# Patient Record
Sex: FEMALE | Race: WHITE | NOT HISPANIC OR LATINO | URBAN - METROPOLITAN AREA
[De-identification: names, ages, dates, MRNs, and addresses within clinical notes are randomized per-mention and may not be internally consistent; named-entity substitution may affect disease eponyms.]

---

## 2018-07-06 ENCOUNTER — OUTPATIENT (OUTPATIENT)
Dept: OUTPATIENT SERVICES | Facility: HOSPITAL | Age: 73
LOS: 1 days | Discharge: HOME | End: 2018-07-06

## 2018-07-06 VITALS
OXYGEN SATURATION: 97 % | DIASTOLIC BLOOD PRESSURE: 77 MMHG | HEIGHT: 66 IN | RESPIRATION RATE: 17 BRPM | WEIGHT: 110.01 LBS | TEMPERATURE: 97 F | SYSTOLIC BLOOD PRESSURE: 162 MMHG | HEART RATE: 77 BPM

## 2018-07-06 VITALS — DIASTOLIC BLOOD PRESSURE: 70 MMHG | HEART RATE: 69 BPM | RESPIRATION RATE: 20 BRPM | SYSTOLIC BLOOD PRESSURE: 158 MMHG

## 2018-07-06 RX ORDER — ACETAMINOPHEN 500 MG
650 TABLET ORAL ONCE
Qty: 0 | Refills: 0 | Status: DISCONTINUED | OUTPATIENT
Start: 2018-07-06 | End: 2018-07-21

## 2018-07-06 RX ORDER — SODIUM CHLORIDE 9 MG/ML
1000 INJECTION, SOLUTION INTRAVENOUS
Qty: 0 | Refills: 0 | Status: DISCONTINUED | OUTPATIENT
Start: 2018-07-06 | End: 2018-07-21

## 2018-07-06 RX ORDER — ONDANSETRON 8 MG/1
4 TABLET, FILM COATED ORAL ONCE
Qty: 0 | Refills: 0 | Status: DISCONTINUED | OUTPATIENT
Start: 2018-07-06 | End: 2018-07-21

## 2018-07-06 RX ADMIN — SODIUM CHLORIDE 100 MILLILITER(S): 9 INJECTION, SOLUTION INTRAVENOUS at 10:53

## 2018-07-06 NOTE — PRE-ANESTHESIA EVALUATION ADULT - NSANTHOSAYNRD_GEN_A_CORE
No. GABO screening performed.  STOP BANG Legend: 0-2 = LOW Risk; 3-4 = INTERMEDIATE Risk; 5-8 = HIGH Risk

## 2018-07-10 DIAGNOSIS — H25.89 OTHER AGE-RELATED CATARACT: ICD-10-CM

## 2018-07-10 DIAGNOSIS — I10 ESSENTIAL (PRIMARY) HYPERTENSION: ICD-10-CM

## 2018-07-13 ENCOUNTER — OUTPATIENT (OUTPATIENT)
Dept: OUTPATIENT SERVICES | Facility: HOSPITAL | Age: 73
LOS: 1 days | Discharge: HOME | End: 2018-07-13

## 2018-07-13 VITALS
SYSTOLIC BLOOD PRESSURE: 151 MMHG | RESPIRATION RATE: 16 BRPM | WEIGHT: 108.91 LBS | OXYGEN SATURATION: 97 % | HEART RATE: 56 BPM | TEMPERATURE: 96 F | DIASTOLIC BLOOD PRESSURE: 79 MMHG

## 2018-07-13 VITALS — DIASTOLIC BLOOD PRESSURE: 67 MMHG | SYSTOLIC BLOOD PRESSURE: 126 MMHG | RESPIRATION RATE: 16 BRPM | HEART RATE: 64 BPM

## 2018-07-13 DIAGNOSIS — Z98.890 OTHER SPECIFIED POSTPROCEDURAL STATES: Chronic | ICD-10-CM

## 2018-07-13 RX ORDER — ONDANSETRON 8 MG/1
4 TABLET, FILM COATED ORAL ONCE
Qty: 0 | Refills: 0 | Status: DISCONTINUED | OUTPATIENT
Start: 2018-07-13 | End: 2018-07-28

## 2018-07-13 RX ORDER — SODIUM CHLORIDE 9 MG/ML
1000 INJECTION INTRAMUSCULAR; INTRAVENOUS; SUBCUTANEOUS
Qty: 0 | Refills: 0 | Status: DISCONTINUED | OUTPATIENT
Start: 2018-07-13 | End: 2018-07-28

## 2018-07-13 RX ADMIN — SODIUM CHLORIDE 50 MILLILITER(S): 9 INJECTION INTRAMUSCULAR; INTRAVENOUS; SUBCUTANEOUS at 11:09

## 2018-07-17 DIAGNOSIS — H25.89 OTHER AGE-RELATED CATARACT: ICD-10-CM

## 2018-07-17 DIAGNOSIS — I10 ESSENTIAL (PRIMARY) HYPERTENSION: ICD-10-CM

## 2020-09-16 NOTE — ASU PREOP CHECKLIST - ISOLATION PRECAUTIONS
RN, CDCES called and spoke with the patient to request verbal permission to speak with his mother about issues related to diabetes management. Patient states that he gives permission to speak with his mother regarding his diabetes management.     Patient has not been seen by diabetes educator since 310/2020, and has seen dietitian/CDE on 4/28/2020 and has had an appointment with Justina Franco MD on 7/31/2020. Patient admits again today that he is fearful of taking mealtime insulin and having a low afterward. ELIESER DE LA TORRE advised trying just two units of insulin to reduce the effect of high blood sugar but not cause a rapid drop. He could do this a few days just to understand how the food and insulin corollate and build confidence that low blood sugar may not occur. Patient states that he is will to try. RN, CDCES and the patient discssed importance of management of blood sugar so that A1c is under 8%. Patient declines the RN's offer to schedule a follow up appointment at this time.     RN, CDCES called the patient's mother Leonie, who reports that Kristopher is not taking mealtime insulin again due to fear of low blood sugar. RN, CDCES encouraged Leonie to encourage the patient to take diabetes medications as advised by Claudia Franco MD. Leonie and the ELIESER DE LA TORRE discussed options to start continuous glucose monitoring system like Dexcom but would need to show proof of testing for 30 days or wear a continuous glucose monitor in order to obtain at least a three day data collection. Leonie felt that this could be a a good option for the patient to know how high blood sugar is running as well as to decrease fear of not knowing how low his blood sugar maybe dropping.     ELIESER DE LA TORRE left a message on the patient's cell phone offering to have Justina Franco MD order the Cristino 14 day sensor and that he could use his smart phone as the scanning device rather than to purchase a reader at an extra cost.     RN, CDCES notified the patient's  mother thather son did not answer the phone and that we will await his decision.     RN, CDCES held a spot on 9/22/2020 at 10:30 in case the patient would like to come into the clinic to train on the Cristino sensor.     RN, CDCES will await the patient's call for further assistance            none

## 2021-01-22 NOTE — PRE-ANESTHESIA EVALUATION ADULT - WEIGHT IN LBS
Some signs and symptoms of bleeding include: Nose bleed or cut that does not stop bleeding in 10 minutes, bleeding of the gums, vomiting (will look like coffee grounds) or coughing up blood, unusual, easy or large areas of bruising, increased or unexpected vaginal bleeding or increased menstrual flow, red or black stools, red or orange urine, prolonged or severe headache, pale skin, unusual or constant tiredness.  If you have these please call 911 or seek medical care immediately.      Some signs and symptoms of clots include: pain or tenderness in arm or leg, swelling in arm or leg, changes in skin color, or area is warm to touch, shortness or breath, trouble breathing.  Numbness or weakness especially on 1 side of the body, sudden trouble speaking or swallowing, sudden trouble seeing, sudden confusion, dizzy spells or headache.  If you have these please call 911 or seek medical care immediately.     110

## 2022-01-26 PROBLEM — I10 ESSENTIAL (PRIMARY) HYPERTENSION: Chronic | Status: ACTIVE | Noted: 2018-07-06

## 2022-06-14 ENCOUNTER — EMERGENCY (EMERGENCY)
Facility: HOSPITAL | Age: 77
LOS: 0 days | Discharge: HOME | End: 2022-06-14
Attending: EMERGENCY MEDICINE | Admitting: EMERGENCY MEDICINE
Payer: MEDICARE

## 2022-06-14 VITALS
TEMPERATURE: 98 F | OXYGEN SATURATION: 98 % | HEART RATE: 87 BPM | RESPIRATION RATE: 18 BRPM | DIASTOLIC BLOOD PRESSURE: 73 MMHG | SYSTOLIC BLOOD PRESSURE: 130 MMHG

## 2022-06-14 VITALS
DIASTOLIC BLOOD PRESSURE: 61 MMHG | RESPIRATION RATE: 18 BRPM | HEART RATE: 114 BPM | HEIGHT: 72 IN | OXYGEN SATURATION: 98 % | SYSTOLIC BLOOD PRESSURE: 120 MMHG | TEMPERATURE: 100 F

## 2022-06-14 DIAGNOSIS — K57.32 DIVERTICULITIS OF LARGE INTESTINE WITHOUT PERFORATION OR ABSCESS WITHOUT BLEEDING: ICD-10-CM

## 2022-06-14 DIAGNOSIS — R63.0 ANOREXIA: ICD-10-CM

## 2022-06-14 DIAGNOSIS — I10 ESSENTIAL (PRIMARY) HYPERTENSION: ICD-10-CM

## 2022-06-14 DIAGNOSIS — Z79.82 LONG TERM (CURRENT) USE OF ASPIRIN: ICD-10-CM

## 2022-06-14 DIAGNOSIS — Z98.890 OTHER SPECIFIED POSTPROCEDURAL STATES: Chronic | ICD-10-CM

## 2022-06-14 DIAGNOSIS — F03.90 UNSPECIFIED DEMENTIA WITHOUT BEHAVIORAL DISTURBANCE: ICD-10-CM

## 2022-06-14 DIAGNOSIS — R10.30 LOWER ABDOMINAL PAIN, UNSPECIFIED: ICD-10-CM

## 2022-06-14 LAB
ALBUMIN SERPL ELPH-MCNC: 3.4 G/DL — LOW (ref 3.5–5.2)
ALP SERPL-CCNC: 85 U/L — SIGNIFICANT CHANGE UP (ref 30–115)
ALT FLD-CCNC: 8 U/L — SIGNIFICANT CHANGE UP (ref 0–41)
ANION GAP SERPL CALC-SCNC: 15 MMOL/L — HIGH (ref 7–14)
APPEARANCE UR: ABNORMAL
AST SERPL-CCNC: 12 U/L — SIGNIFICANT CHANGE UP (ref 0–41)
BACTERIA # UR AUTO: ABNORMAL
BASOPHILS # BLD AUTO: 0.04 K/UL — SIGNIFICANT CHANGE UP (ref 0–0.2)
BASOPHILS NFR BLD AUTO: 0.4 % — SIGNIFICANT CHANGE UP (ref 0–1)
BILIRUB SERPL-MCNC: 0.3 MG/DL — SIGNIFICANT CHANGE UP (ref 0.2–1.2)
BILIRUB UR-MCNC: NEGATIVE — SIGNIFICANT CHANGE UP
BUN SERPL-MCNC: 16 MG/DL — SIGNIFICANT CHANGE UP (ref 10–20)
CALCIUM SERPL-MCNC: 9.3 MG/DL — SIGNIFICANT CHANGE UP (ref 8.5–10.1)
CHLORIDE SERPL-SCNC: 103 MMOL/L — SIGNIFICANT CHANGE UP (ref 98–110)
CO2 SERPL-SCNC: 22 MMOL/L — SIGNIFICANT CHANGE UP (ref 17–32)
COLOR SPEC: SIGNIFICANT CHANGE UP
CREAT SERPL-MCNC: 0.7 MG/DL — SIGNIFICANT CHANGE UP (ref 0.7–1.5)
DIFF PNL FLD: NEGATIVE — SIGNIFICANT CHANGE UP
EGFR: 88 ML/MIN/1.73M2 — SIGNIFICANT CHANGE UP
EOSINOPHIL # BLD AUTO: 0.18 K/UL — SIGNIFICANT CHANGE UP (ref 0–0.7)
EOSINOPHIL NFR BLD AUTO: 1.6 % — SIGNIFICANT CHANGE UP (ref 0–8)
EPI CELLS # UR: 21 /HPF — HIGH (ref 0–5)
GLUCOSE SERPL-MCNC: 118 MG/DL — HIGH (ref 70–99)
GLUCOSE UR QL: NEGATIVE — SIGNIFICANT CHANGE UP
HCT VFR BLD CALC: 35.1 % — LOW (ref 37–47)
HGB BLD-MCNC: 11.2 G/DL — LOW (ref 12–16)
HYALINE CASTS # UR AUTO: 5 /LPF — SIGNIFICANT CHANGE UP (ref 0–7)
IMM GRANULOCYTES NFR BLD AUTO: 0.5 % — HIGH (ref 0.1–0.3)
KETONES UR-MCNC: NEGATIVE — SIGNIFICANT CHANGE UP
LEUKOCYTE ESTERASE UR-ACNC: ABNORMAL
LIDOCAIN IGE QN: 14 U/L — SIGNIFICANT CHANGE UP (ref 7–60)
LYMPHOCYTES # BLD AUTO: 1.09 K/UL — LOW (ref 1.2–3.4)
LYMPHOCYTES # BLD AUTO: 9.8 % — LOW (ref 20.5–51.1)
MCHC RBC-ENTMCNC: 26.2 PG — LOW (ref 27–31)
MCHC RBC-ENTMCNC: 31.9 G/DL — LOW (ref 32–37)
MCV RBC AUTO: 82.2 FL — SIGNIFICANT CHANGE UP (ref 81–99)
MONOCYTES # BLD AUTO: 1.62 K/UL — HIGH (ref 0.1–0.6)
MONOCYTES NFR BLD AUTO: 14.6 % — HIGH (ref 1.7–9.3)
NEUTROPHILS # BLD AUTO: 8.12 K/UL — HIGH (ref 1.4–6.5)
NEUTROPHILS NFR BLD AUTO: 73.1 % — SIGNIFICANT CHANGE UP (ref 42.2–75.2)
NITRITE UR-MCNC: NEGATIVE — SIGNIFICANT CHANGE UP
NRBC # BLD: 0 /100 WBCS — SIGNIFICANT CHANGE UP (ref 0–0)
PH UR: 7 — SIGNIFICANT CHANGE UP (ref 5–8)
PLATELET # BLD AUTO: 377 K/UL — SIGNIFICANT CHANGE UP (ref 130–400)
POTASSIUM SERPL-MCNC: 4.5 MMOL/L — SIGNIFICANT CHANGE UP (ref 3.5–5)
POTASSIUM SERPL-SCNC: 4.5 MMOL/L — SIGNIFICANT CHANGE UP (ref 3.5–5)
PROT SERPL-MCNC: 7.1 G/DL — SIGNIFICANT CHANGE UP (ref 6–8)
PROT UR-MCNC: SIGNIFICANT CHANGE UP
RBC # BLD: 4.27 M/UL — SIGNIFICANT CHANGE UP (ref 4.2–5.4)
RBC # FLD: 14.3 % — SIGNIFICANT CHANGE UP (ref 11.5–14.5)
RBC CASTS # UR COMP ASSIST: 2 /HPF — SIGNIFICANT CHANGE UP (ref 0–4)
SODIUM SERPL-SCNC: 140 MMOL/L — SIGNIFICANT CHANGE UP (ref 135–146)
SP GR SPEC: 1.05 — HIGH (ref 1.01–1.03)
TROPONIN T SERPL-MCNC: <0.01 NG/ML — SIGNIFICANT CHANGE UP
UROBILINOGEN FLD QL: SIGNIFICANT CHANGE UP
WBC # BLD: 11.11 K/UL — HIGH (ref 4.8–10.8)
WBC # FLD AUTO: 11.11 K/UL — HIGH (ref 4.8–10.8)
WBC UR QL: 76 /HPF — HIGH (ref 0–5)

## 2022-06-14 PROCEDURE — 99284 EMERGENCY DEPT VISIT MOD MDM: CPT

## 2022-06-14 PROCEDURE — 93010 ELECTROCARDIOGRAM REPORT: CPT | Mod: 76

## 2022-06-14 PROCEDURE — 74177 CT ABD & PELVIS W/CONTRAST: CPT | Mod: 26,MA

## 2022-06-14 RX ORDER — CIPROFLOXACIN LACTATE 400MG/40ML
1 VIAL (ML) INTRAVENOUS
Qty: 20 | Refills: 0
Start: 2022-06-14 | End: 2022-06-23

## 2022-06-14 RX ORDER — MAGNESIUM SULFATE 500 MG/ML
2 VIAL (ML) INJECTION ONCE
Refills: 0 | Status: COMPLETED | OUTPATIENT
Start: 2022-06-14 | End: 2022-06-14

## 2022-06-14 RX ORDER — SODIUM CHLORIDE 9 MG/ML
1000 INJECTION, SOLUTION INTRAVENOUS ONCE
Refills: 0 | Status: COMPLETED | OUTPATIENT
Start: 2022-06-14 | End: 2022-06-14

## 2022-06-14 RX ORDER — METOPROLOL TARTRATE 50 MG
1 TABLET ORAL
Qty: 0 | Refills: 0 | DISCHARGE

## 2022-06-14 RX ORDER — METRONIDAZOLE 500 MG
1 TABLET ORAL
Qty: 30 | Refills: 0
Start: 2022-06-14 | End: 2022-06-23

## 2022-06-14 RX ADMIN — SODIUM CHLORIDE 1000 MILLILITER(S): 9 INJECTION, SOLUTION INTRAVENOUS at 08:12

## 2022-06-14 RX ADMIN — Medication 25 GRAM(S): at 12:04

## 2022-06-14 NOTE — ED PROVIDER NOTE - CARE PROVIDER_API CALL
Razia Seay (MD)  Gastroenterology  [address] [place] [address]  Phone: [phone]  Fax: [phone]  Follow Up Time: 4-6 Days

## 2022-06-14 NOTE — ED PROVIDER NOTE - PHYSICAL EXAMINATION
CONSTITUTIONAL: Well-developed; well-nourished; in no acute distress.   SKIN: warm, dry  HEAD: Normocephalic; atraumatic.  EYES: PERRL, EOMI, no conjunctival erythema  ENT: No nasal discharge; airway clear.  NECK: Supple; non tender.  CARD: S1, S2 normal; no murmurs, gallops, or rubs. Regular rate and rhythm.   RESP: No wheezes, rales or rhonchi.  ABD: soft, +mildly distended, +L and R lower abdominal pain to palpation, epigastric ttp.  EXT: Normal ROM.  No clubbing, cyanosis or edema.   LYMPH: No acute cervical adenopathy.  NEURO: Alert, oriented, grossly unremarkable  PSYCH: Cooperative, appropriate. CONSTITUTIONAL: Well-developed; well-nourished; in no acute distress.   SKIN: warm, dry  HEAD: Normocephalic; atraumatic.  EYES: PERRL, EOMI, no conjunctival erythema  ENT: No nasal discharge; airway clear.  NECK: Supple; non tender.  CARD: S1, S2 normal; no murmurs, gallops, or rubs. Regular rate and rhythm.   RESP: No wheezes, rales or rhonchi.  ABD: soft, +mildly distended, +L and R lower abdominal pain to palpation, +epigastric ttp.  EXT: Normal ROM.  No clubbing, cyanosis or edema.   LYMPH: No acute cervical adenopathy.  NEURO: Alert, oriented, grossly unremarkable  PSYCH: Cooperative, appropriate.

## 2022-06-14 NOTE — ED ADULT TRIAGE NOTE - BP NONINVASIVE SYSTOLIC (MM HG)
Impression: S/P Cataract Extraction by phacoemulsification with IOL placement [id number]; Shunt:  iStent Inject 0191T, 0376T OS - 8 Days. Encounter for surgical aftercare following surgery on a sense organ  Z48.810.  Plan: 2nd eye orders --Continue Travaprost QHS OD Only
120

## 2022-06-14 NOTE — ED PROVIDER NOTE - ATTENDING CONTRIBUTION TO CARE
77-year-old female history of dementia hypertension here for evaluation abdominal pain.  Patient reports abdominal pain for past month worse over past 2 days.  Patient has had decreased p.o. intake without associated fever chills vomiting or urinary symptoms.  Pain is primarily located to the lower abdomen.  There is no exam  Impression  Patient here with abdominal pain, CT demonstrates acute sigmoid diverticulitis.  Patient well-appearing with no white count normal creatinine patient given Augmentin stable for discharge.  Of note the patient's initial EKG with a prolonged QT was given magnesium with subsequent repeat EKG and a QTC that has improved.  Lastly the patient has large leukocytes on urine sample but has no symptoms.  Patient will be on Augmentin which will potential cover infection as well as a urine culture has been sent.

## 2022-06-14 NOTE — ED PROVIDER NOTE - OBJECTIVE STATEMENT
76 yo female with pmh of dementia, htn presenting for abd pain, present over the past month but worsening over the past few days. per daughter at bedside, she has had stomach problems before, was prescribed a medication that she has not been compliant with. states pain is localized to lower abdomen. has had decreased appetite recently, endorses vomiting as well.

## 2022-06-14 NOTE — ED PROVIDER NOTE - PATIENT PORTAL LINK FT
You can access the FollowMyHealth Patient Portal offered by [facility] by registering at the following website: [email or web address]. By joining [employer]’s FollowMyHealth portal, you will also be able to view your health information using other applications (apps) compatible with our system.

## 2022-06-14 NOTE — ED PROVIDER NOTE - CLINICAL SUMMARY MEDICAL DECISION MAKING FREE TEXT BOX
Patient here with abdominal pain, CT demonstrates acute sigmoid diverticulitis.  Patient well-appearing with no white count normal creatinine patient given Augmentin stable for discharge.  Of note the patient's initial EKG with a prolonged QT was given magnesium with subsequent repeat EKG and a QTC that has improved.  Lastly the patient has large leukocytes on urine sample but has no symptoms.  Patient will be on Augmentin which will potential cover infection as well as a urine culture has been sent.

## 2022-06-15 PROBLEM — Z00.00 ENCOUNTER FOR PREVENTIVE HEALTH EXAMINATION: Status: ACTIVE | Noted: 2022-06-15

## 2022-06-15 LAB
CULTURE RESULTS: SIGNIFICANT CHANGE UP
SPECIMEN SOURCE: SIGNIFICANT CHANGE UP

## 2022-06-20 ENCOUNTER — INPATIENT (INPATIENT)
Facility: HOSPITAL | Age: 77
LOS: 2 days | Discharge: HOME HEALTH | End: 2022-06-23
Attending: STUDENT IN AN ORGANIZED HEALTH CARE EDUCATION/TRAINING PROGRAM | Admitting: STUDENT IN AN ORGANIZED HEALTH CARE EDUCATION/TRAINING PROGRAM
Payer: MEDICARE

## 2022-06-20 VITALS
HEART RATE: 81 BPM | TEMPERATURE: 96 F | OXYGEN SATURATION: 99 % | DIASTOLIC BLOOD PRESSURE: 73 MMHG | HEIGHT: 72 IN | RESPIRATION RATE: 16 BRPM | SYSTOLIC BLOOD PRESSURE: 107 MMHG

## 2022-06-20 DIAGNOSIS — Z98.890 OTHER SPECIFIED POSTPROCEDURAL STATES: Chronic | ICD-10-CM

## 2022-06-20 LAB
ALBUMIN SERPL ELPH-MCNC: 3.4 G/DL — LOW (ref 3.5–5.2)
ALP SERPL-CCNC: 86 U/L — SIGNIFICANT CHANGE UP (ref 30–115)
ALT FLD-CCNC: 9 U/L — SIGNIFICANT CHANGE UP (ref 0–41)
ANION GAP SERPL CALC-SCNC: 10 MMOL/L — SIGNIFICANT CHANGE UP (ref 7–14)
AST SERPL-CCNC: 13 U/L — SIGNIFICANT CHANGE UP (ref 0–41)
BASOPHILS # BLD AUTO: 0.07 K/UL — SIGNIFICANT CHANGE UP (ref 0–0.2)
BASOPHILS NFR BLD AUTO: 1 % — SIGNIFICANT CHANGE UP (ref 0–1)
BILIRUB SERPL-MCNC: 0.2 MG/DL — SIGNIFICANT CHANGE UP (ref 0.2–1.2)
BUN SERPL-MCNC: 15 MG/DL — SIGNIFICANT CHANGE UP (ref 10–20)
CALCIUM SERPL-MCNC: 9.5 MG/DL — SIGNIFICANT CHANGE UP (ref 8.5–10.1)
CHLORIDE SERPL-SCNC: 98 MMOL/L — SIGNIFICANT CHANGE UP (ref 98–110)
CO2 SERPL-SCNC: 26 MMOL/L — SIGNIFICANT CHANGE UP (ref 17–32)
CREAT SERPL-MCNC: 0.6 MG/DL — LOW (ref 0.7–1.5)
EGFR: 92 ML/MIN/1.73M2 — SIGNIFICANT CHANGE UP
EOSINOPHIL # BLD AUTO: 0.39 K/UL — SIGNIFICANT CHANGE UP (ref 0–0.7)
EOSINOPHIL NFR BLD AUTO: 5.8 % — SIGNIFICANT CHANGE UP (ref 0–8)
GLUCOSE SERPL-MCNC: 126 MG/DL — HIGH (ref 70–99)
HCT VFR BLD CALC: 36.7 % — LOW (ref 37–47)
HGB BLD-MCNC: 12 G/DL — SIGNIFICANT CHANGE UP (ref 12–16)
IMM GRANULOCYTES NFR BLD AUTO: 1.2 % — HIGH (ref 0.1–0.3)
LACTATE SERPL-SCNC: 1.1 MMOL/L — SIGNIFICANT CHANGE UP (ref 0.7–2)
LIDOCAIN IGE QN: 12 U/L — SIGNIFICANT CHANGE UP (ref 7–60)
LYMPHOCYTES # BLD AUTO: 1.04 K/UL — LOW (ref 1.2–3.4)
LYMPHOCYTES # BLD AUTO: 15.4 % — LOW (ref 20.5–51.1)
MCHC RBC-ENTMCNC: 26.6 PG — LOW (ref 27–31)
MCHC RBC-ENTMCNC: 32.7 G/DL — SIGNIFICANT CHANGE UP (ref 32–37)
MCV RBC AUTO: 81.4 FL — SIGNIFICANT CHANGE UP (ref 81–99)
MONOCYTES # BLD AUTO: 0.7 K/UL — HIGH (ref 0.1–0.6)
MONOCYTES NFR BLD AUTO: 10.4 % — HIGH (ref 1.7–9.3)
NEUTROPHILS # BLD AUTO: 4.46 K/UL — SIGNIFICANT CHANGE UP (ref 1.4–6.5)
NEUTROPHILS NFR BLD AUTO: 66.2 % — SIGNIFICANT CHANGE UP (ref 42.2–75.2)
NRBC # BLD: 0 /100 WBCS — SIGNIFICANT CHANGE UP (ref 0–0)
PLATELET # BLD AUTO: 393 K/UL — SIGNIFICANT CHANGE UP (ref 130–400)
POTASSIUM SERPL-MCNC: 4.6 MMOL/L — SIGNIFICANT CHANGE UP (ref 3.5–5)
POTASSIUM SERPL-SCNC: 4.6 MMOL/L — SIGNIFICANT CHANGE UP (ref 3.5–5)
PROT SERPL-MCNC: 6.9 G/DL — SIGNIFICANT CHANGE UP (ref 6–8)
RBC # BLD: 4.51 M/UL — SIGNIFICANT CHANGE UP (ref 4.2–5.4)
RBC # FLD: 14.6 % — HIGH (ref 11.5–14.5)
SARS-COV-2 RNA SPEC QL NAA+PROBE: SIGNIFICANT CHANGE UP
SODIUM SERPL-SCNC: 134 MMOL/L — LOW (ref 135–146)
WBC # BLD: 6.74 K/UL — SIGNIFICANT CHANGE UP (ref 4.8–10.8)
WBC # FLD AUTO: 6.74 K/UL — SIGNIFICANT CHANGE UP (ref 4.8–10.8)

## 2022-06-20 PROCEDURE — 74177 CT ABD & PELVIS W/CONTRAST: CPT | Mod: 26,ME

## 2022-06-20 PROCEDURE — 99222 1ST HOSP IP/OBS MODERATE 55: CPT

## 2022-06-20 PROCEDURE — G1004: CPT

## 2022-06-20 PROCEDURE — 99285 EMERGENCY DEPT VISIT HI MDM: CPT | Mod: FS

## 2022-06-20 RX ORDER — ACETAMINOPHEN 500 MG
650 TABLET ORAL EVERY 6 HOURS
Refills: 0 | Status: DISCONTINUED | OUTPATIENT
Start: 2022-06-20 | End: 2022-06-23

## 2022-06-20 RX ORDER — CIPROFLOXACIN LACTATE 400MG/40ML
400 VIAL (ML) INTRAVENOUS ONCE
Refills: 0 | Status: COMPLETED | OUTPATIENT
Start: 2022-06-20 | End: 2022-06-20

## 2022-06-20 RX ORDER — IOHEXOL 300 MG/ML
30 INJECTION, SOLUTION INTRAVENOUS ONCE
Refills: 0 | Status: COMPLETED | OUTPATIENT
Start: 2022-06-20 | End: 2022-06-20

## 2022-06-20 RX ORDER — METRONIDAZOLE 500 MG
500 TABLET ORAL ONCE
Refills: 0 | Status: COMPLETED | OUTPATIENT
Start: 2022-06-20 | End: 2022-06-20

## 2022-06-20 RX ORDER — CIPROFLOXACIN LACTATE 400MG/40ML
400 VIAL (ML) INTRAVENOUS EVERY 12 HOURS
Refills: 0 | Status: DISCONTINUED | OUTPATIENT
Start: 2022-06-20 | End: 2022-06-23

## 2022-06-20 RX ORDER — DIATRIZOATE MEGLUMINE 180 MG/ML
30 INJECTION, SOLUTION INTRAVESICAL ONCE
Refills: 0 | Status: COMPLETED | OUTPATIENT
Start: 2022-06-20 | End: 2022-06-20

## 2022-06-20 RX ORDER — SODIUM CHLORIDE 9 MG/ML
1000 INJECTION INTRAMUSCULAR; INTRAVENOUS; SUBCUTANEOUS
Refills: 0 | Status: DISCONTINUED | OUTPATIENT
Start: 2022-06-20 | End: 2022-06-21

## 2022-06-20 RX ORDER — ASPIRIN/CALCIUM CARB/MAGNESIUM 324 MG
0 TABLET ORAL
Qty: 0 | Refills: 0 | DISCHARGE

## 2022-06-20 RX ORDER — MORPHINE SULFATE 50 MG/1
4 CAPSULE, EXTENDED RELEASE ORAL ONCE
Refills: 0 | Status: DISCONTINUED | OUTPATIENT
Start: 2022-06-20 | End: 2022-06-20

## 2022-06-20 RX ORDER — METRONIDAZOLE 500 MG
500 TABLET ORAL EVERY 8 HOURS
Refills: 0 | Status: DISCONTINUED | OUTPATIENT
Start: 2022-06-20 | End: 2022-06-23

## 2022-06-20 RX ORDER — MORPHINE SULFATE 50 MG/1
2 CAPSULE, EXTENDED RELEASE ORAL EVERY 6 HOURS
Refills: 0 | Status: DISCONTINUED | OUTPATIENT
Start: 2022-06-20 | End: 2022-06-23

## 2022-06-20 RX ADMIN — SODIUM CHLORIDE 75 MILLILITER(S): 9 INJECTION INTRAMUSCULAR; INTRAVENOUS; SUBCUTANEOUS at 21:30

## 2022-06-20 RX ADMIN — MORPHINE SULFATE 4 MILLIGRAM(S): 50 CAPSULE, EXTENDED RELEASE ORAL at 19:20

## 2022-06-20 RX ADMIN — Medication 400 MILLIGRAM(S): at 19:20

## 2022-06-20 RX ADMIN — Medication 100 MILLIGRAM(S): at 15:31

## 2022-06-20 RX ADMIN — DIATRIZOATE MEGLUMINE 30 MILLILITER(S): 180 INJECTION, SOLUTION INTRAVESICAL at 11:54

## 2022-06-20 RX ADMIN — MORPHINE SULFATE 4 MILLIGRAM(S): 50 CAPSULE, EXTENDED RELEASE ORAL at 12:59

## 2022-06-20 RX ADMIN — Medication 100 MILLIGRAM(S): at 21:30

## 2022-06-20 RX ADMIN — Medication 200 MILLIGRAM(S): at 15:31

## 2022-06-20 NOTE — ED PROVIDER NOTE - CHILD ABUSE FACILITY
"        1/8/2019      [address] [address]    Dear Mr. Naat Cleveland,    Your procedure is scheduled with Dr. Froylan Baker on February 6, 2019 at 8 am at: [address] [facility]  [address] [facility], [address] [place] [address],[facility]  [phone]    Please register at [facility] on February 6, 2019 by 6:30 am.    Lillian Peters can expect to be contacted 1 to 3 days prior to the surgery to confirm arrival and surgery time. These times may change due to various OR schedule needs. We will call you ASAP if this happens. The following appointment(s) have been scheduled for you:     Â· Post-op with Dr. Froylan Baker at the [facility] on February 15, 2019 at         8:30 am.    To better prepare for your surgery, please follow these instructions:    5 days prior to your appointment DO NOT TAKE ASPIRIN OR ASPIRIN CONTAINING PRODUCTS. This includes products such as [place], Pepto Bismol. Motrin, Ibuprofen and Advil should also be avoided. (Only Tylenol is aspirin free). If you take coumadin or other blood thinners contact your primary care physician. Do not eat or drink after midnight the night before your surgery. You will need someone to drive you home and remain with you up to 24 hours after you have been discharged. If you have questions regarding the procedure, medications, rehab, etc., please contact the nursing staff at [facility] - central scheduling line at [phone]. If you have any scheduling questions or need to reschedule, please contact me at the telephone number and extension listed below. Thank you,        Darwin Trejo at [phone]  Surgery Scheduler for Dr. Martines Jackelyn: [address] [facility]      ""Help us grow our quality of service. We want to improve - and you can help us. You may receive a survey in the mail.  This is your opportunity to tell us " "what we did well, and where we could use some improvement. We value your input.""                                                        Insurance Authorization Need to [address]    Prior to your surgical procedure, our team will contact your [address] [place] [address],[address] to initiate a PreAuthorization request.      This is not a guarantee of payment from your insurance company, but rather a step taken to ensure that we have all of the information and documentation for them to confirm the procedure is one that is eligible for coverage under your plan. We will contact you if we either need more information from you to fulfill the requirements of your insurance company, or if we need to discuss any concerns that may lead to postponement or cancellation of your procedure. If you have any questions regarding your surgery authorization, please check with your insurance company or call our office for an update. If you need information regarding your level of benefits or out-of-pocket expenses, please contact your insurance company directly. They can also confirm for you whether or not we (the surgeon and the hospital/surgery center) are in your planâs preferred network (aka âin-networkâ). What to do ifâ¦ My Insurance Changes:  If, at any time, [employer], plan or even card changesâ¦ Please call our office so that our team can be sure to update your records. We will need to make sure to submit any PreAuth or randy to the correct, up-to-date insurance plan. What to do ifâ¦ My Insurance Requires A Referral:  If your insurance company requires a Referral for Specialty Care or to see a Specialist, you will need to confirm with them if you have one on file. - If your insurance carrier does not have a referral, then you will need to contact your Primary Care Physician to have one directly submitted to your insurance company ASAP.     - Without a referral on file, your insurance company will not Pre-Authorize your " surgery and may not cover any of your care with our specialty. What to do ifâ¦ I have a Workers Compensation (W/C) Claim:  If you have a W/C claim, please be sure to provide our reception team with the information you have regarding your claim ASAP. We will contact your W/C carrier/adjustor to inform them of your upcoming surgery and check the status of your claim (open vs closed). We will let you know if they advise of any concerns or issues with your claim. - Even if you have an open W/C claim, please also provide us with your personal/family insurance. We will want to be sure this plan is loaded into your account. We always PreAuthorize with personal insurance as a back-up to W/C. Otherwise, if W/C doesnât cover something along the way, you will receive a bill for the services.     What to do ifâ¦ I have Month-to-Month Coverage/Premiums:  If you have an insurance plan that is paid for month to month, or is subject to plan change on a monthly basis, please be aware we cannot initiate PreAuthorization until just before the month of your surgery, as your insurance company will need to verify your premium payments/eligibility first.    What to do ifâ¦ I Do Not Have Insurance Coverage or Have other Insurance/Billing Questions:  Please call our Patient [address] [facility]:  [phone] to speak with a team member about your billing needs, including possible coverage options, setting up payment plans, our fee schedule, etc. [facility]

## 2022-06-20 NOTE — H&P ADULT - NSHPLABSRESULTS_GEN_ALL_CORE
12.0   6.74  )-----------( 393      ( 20 Jun 2022 11:05 )             36.7     06-20    134<L>  |  98  |  15  ----------------------------<  126<H>  4.6   |  26  |  0.6<L>    Ca    9.5      20 Jun 2022 11:05    TPro  6.9  /  Alb  3.4<L>  /  TBili  0.2  /  DBili  x   /  AST  13  /  ALT  9   /  AlkPhos  86  06-20          Lactate, Blood: 1.1 mmol/L (06-20-22 @ 11:05)    [id number]        < from: CT Abdomen and Pelvis w/ Oral Cont and w/ IV Cont (06.20.22 @ 14:00) >    IMPRESSION: No change in findings compatible with sigmoid diverticulitis.   No discrete fluid collection.    Other findings essentially unchanged compared to the prior examination.    < end of copied text >

## 2022-06-20 NOTE — H&P ADULT - NSHPPHYSICALEXAM_GEN_ALL_CORE
PHYSICAL EXAM:  GENERAL: NAD, lying in bed comfortably  HEAD:  Atraumatic, Normocephalic  EYES: EOMI, conjunctiva and sclera clear  ENT: dry mucous membranes  NECK: Supple, No JVD  CHEST/LUNG: Clear to auscultation bilaterally; No rales, rhonchi, wheezing, or rubs. Unlabored respirations  HEART: Regular rate and rhythm; No murmurs, rubs, or gallops  ABDOMEN: Soft, Nontender, Nondistended. No hepatomegally  EXTREMITIES:  2+ Peripheral Pulses, brisk capillary refill. No clubbing, cyanosis, or edema  NERVOUS SYSTEM:  Alert & Oriented X3, speech clear. No deficits GENERAL: NAD, lying in bed comfortably  HEAD:  Atraumatic, Normocephalic  EYES: EOMI, conjunctiva and sclera clear  ENT: dry mucous membranes  NECK: Supple, No JVD  CHEST/LUNG: Clear to auscultation bilaterally; No rales, rhonchi, wheezing, or rubs. Unlabored respirations  HEART: Regular rate and rhythm; No murmurs, rubs, or gallops  ABDOMEN: Soft, lower abdominal pain L>R  EXTREMITIES:  2+ Peripheral Pulses, brisk capillary refill. No clubbing, cyanosis, or edema  NERVOUS SYSTEM:  Alert & Oriented X3, speech clear. No deficits GENERAL: NAD, lying in bed comfortably  HEAD:  Atraumatic, Normocephalic  EYES: EOMI, conjunctiva and sclera clear  ENT: dry mucous membranes  NECK: Supple, No JVD  CHEST/LUNG: Clear to auscultation bilaterally; No rales, rhonchi, wheezing, or rubs. Unlabored respirations  HEART: Regular rate and rhythm; No murmurs, rubs, or gallops  ABDOMEN: Soft, lower abdominal pain L>R  EXTREMITIES:  2+ Peripheral Pulses, brisk capillary refill. No clubbing, cyanosis, or edema  NERVOUS SYSTEM:  Alert & Oriented X 1-2, speech clear. No deficits

## 2022-06-20 NOTE — PATIENT PROFILE ADULT - FALL HARM RISK - HARM RISK INTERVENTIONS

## 2022-06-20 NOTE — H&P ADULT - ATTENDING COMMENTS
GENERAL: NAD, well-developed, Non-toxic, stated age   HEAD:  Atraumatic, Normocephalic  EYES: EOMI, Sclera White   NECK: Supple, No JVD  CHEST/LUNG: clear b/l breath sounds; No wheezing, rhonchi, or crackles  HEART: Regular rate and rhythm; s1, s2, No murmurs, rubs, or gallops  ABDOMEN: Soft, tenderness noted in the lower abdomen, Nondistended; Bowel sounds present, No rebound or guarding noted   EXTREMITIES:  No lower extremity edema or calf tenderness to palpation.  No clubbing or cyanosis  PSYCH: AAOx3, pleasant, cooperative, not anxious  NEUROLOGY: CN intact, 5/5 strength in all extremities, Sensation grossly intact.   SKIN: No rashes or lesions    69-year-old female with Hx of dementia and HTN presents to the ED with complaints of abdominal pain for the past week.    # Acute Sigmoid diverticulitis  - hemodynamically stable   - CT abdomen/pelvis: No change in findings compatible with sigmoid diverticulitis. No discrete fluid collection.   - c/w IV cipro and flagyl  - f/u blood cultures  - c/w with bowel regimen   - outpt GI follow up     # Vascular Dementia  - AAOx1-2 at baseline     # DVT ppx  - start Lovenox     **Pt seen on 06/20/22

## 2022-06-20 NOTE — ED PROVIDER NOTE - PHYSICAL EXAMINATION
CONST: Well appearing in NAD  EYES: PERRL, EOMI, Sclera and conjunctiva clear. Vision grossly intact  ENT: No nasal discharge. TM's clear B/L without drainage. Oropharynx normal appearing, no erythema or exudates. Uvula midline.  NECK: Non-tender, no meningeal signs  CARD: Normal S1 S2; Normal rate and rhythm  RESP: Equal BS B/L, No wheezes, rhonchi or rales. No distress  GI: Tenderness over right lower and left lower quadrant no rebound or guarding  MS: Normal ROM in all extremities. No midline spinal tenderness.  SKIN: Warm, dry, no acute rashes. Good turgor  NEURO: A&Ox3, No focal deficits. Strength 5/5 with no sensory deficits. Steady gait

## 2022-06-20 NOTE — H&P ADULT - ASSESSMENT
Mrs. Deprima 69-year-old female with PMHx of dementia and HTN presents to the ED with complaints of abdominal pain for the past week-  recently seen in ED on 6/18 for abdominal pain x 2 weeks in which was diagnosed with diverticultits based on clinical and imaging studies and discharged on augmentin which she reports has been complaint with. However  states reports having worsening pain especially over the past 2 days.       #worsening abdominal pain 2/2 diverticulitis requiring IV antibiotics as no clinical improvement with oral meds.    -In the ED, vitals stable, afebrile on RA.   - Labs relatively within normal ranges.   - Repeat CT abdomen/pelvis: No change in findings compatible with sigmoid diverticulitis. No discrete fluid collection. Other findings essentially unchanged compared to the prior examination.  - s/p cipro and flagyl in ED.  - c/w with cipro and flagyl  - f/u blood cultures  - c/w with bowel regimen   - c/w with pain med prn  - if no clinical improvement with IV abx -> consult GI (no evidence of abcess collection on recent imaging)   - c/w with supportive care       #HTN    Diet: DASH/TLC/High fiber  Activity: as tolerated  DVT Prophylaxis: lovenox  GI Prophylaxis: protonix  Code Status: full  Disposition: acute        Mrs. Deprima 69-year-old female with PMHx of dementia and HTN presents to the ED with complaints of abdominal pain for the past week-  recently seen in ED on 6/18 for abdominal pain x 2 weeks in which was diagnosed with diverticultits based on clinical and imaging studies and discharged on augmentin which she reports has been complaint with. However  states reports having worsening pain especially over the past 2 days.       #worsening abdominal pain 2/2 diverticulitis requiring IV antibiotics as no clinical improvement with oral meds.    -In the ED, vitals stable, afebrile on RA.   - Labs relatively within normal ranges.   - Repeat CT abdomen/pelvis: No change in findings compatible with sigmoid diverticulitis. No discrete fluid collection. Other findings essentially unchanged compared to the prior examination.  - s/p cipro and flagyl in ED.  - c/w with cipro and flagyl  - f/u blood cultures  - c/w with bowel regimen   - c/w with pain med prn  - if no clinical improvement with IV abx -> consult GI (no evidence of abcess collection on recent imaging)   - c/w with supportive care       #HTN  -c/w metoprolol      #Dementia/depression  - c/w with donepezil and sertraline    Diet: DASH/TLC/High fiber  Activity: as tolerated  DVT Prophylaxis: lovenox  GI Prophylaxis: protonix  Code Status: full  Disposition: acute        Mrs. Deprima 69-year-old female with PMHx of dementia and HTN presents to the ED with complaints of abdominal pain for the past week-  recently seen in ED on 6/18 for abdominal pain x 2 weeks in which was diagnosed with diverticultits based on clinical and imaging studies and discharged on augmentin which she reports has been complaint with. However  states reports having worsening pain especially over the past 2 days.       #worsening abdominal pain 2/2 diverticulitis requiring IV antibiotics as no clinical improvement with oral meds.    -In the ED, vitals stable, afebrile on RA.   - Labs relatively within normal ranges.   - Repeat CT abdomen/pelvis: No change in findings compatible with sigmoid diverticulitis. No discrete fluid collection. Other findings essentially unchanged compared to the prior examination.  - s/p cipro and flagyl in ED.  - c/w with cipro and flagyl  - f/u blood cultures  - c/w with bowel regimen   - c/w with pain med prn  - if no clinical improvement with IV abx -> consult GI (no evidence of abcess collection on recent imaging)   - c/w with supportive care     #HTN  - currently on on any medications  - DASH/TLC   - on admission; normotensive; observe for now     #Dementia  - currently  not on any medications as per daughter   - c/w frequent reorientation     Diet: DASH/TLC/High fiber  Activity: as tolerated  DVT Prophylaxis: lovenox  GI Prophylaxis: protonix  Code Status: full  Disposition: acute        Mrs. Deprima 69-year-old female with PMHx of dementia and HTN presents to the ED with complaints of abdominal pain for the past week-  recently seen in ED on 6/18 for abdominal pain x 2 weeks in which was diagnosed with diverticultits based on clinical and imaging studies and discharged on augmentin which she reports has been complaint with. However  states reports having worsening pain especially over the past 2 days.       #worsening abdominal pain 2/2 diverticulitis requiring IV antibiotics as no clinical improvement with oral meds.    -In the ED, vitals stable, afebrile on RA.   - Labs relatively within normal ranges.   - Repeat CT abdomen/pelvis: No change in findings compatible with sigmoid diverticulitis. No discrete fluid collection. Other findings essentially unchanged compared to the prior examination.  - s/p cipro and flagyl in ED.  - c/w with cipro and flagyl  - f/u blood cultures  - c/w with bowel regimen   - c/w with pain med prn  - if no clinical improvement with IV abx -> consult GI (no evidence of abcess collection on recent imaging)   - c/w with supportive care     #HTN  - currently not on any medications  - DASH/TLC   - on admission; normotensive; observe for now     #Dementia  - currently  not on any medications as per daughter   - c/w frequent reorientation     Diet: DASH/TLC/High fiber  Activity: as tolerated  DVT Prophylaxis: lovenox  GI Prophylaxis: protonix  Code Status: full  Disposition: acute

## 2022-06-20 NOTE — H&P ADULT - HISTORY OF PRESENT ILLNESS
Mrs. Deprima 69-year-old female with PMHx of dementia and HTN presents to the ED with complaints of abdominal pain for the past week.  Patient was recently seen in ED on 6/18 for abdominal pain x 2 weeks in which was diagnosed with diverticultits based on clinical and imaging studies and discharged on augmentin which she reports has been complaint with. However  states reports having worsening pain especially over the past 2 days which is worse with bending walking and palpation. Denies nausea vomiting diarrhea fevers or chills.  Patient has not taken any medication for pain, went to urgent care prior to coming in today and was referred to the ER.    In the ED, vitals stable, afebrile on RA. Labs relatively within normal ranges. Repeat CT abdomen/pelvis: No change in findings compatible with sigmoid diverticulitis. No discrete fluid collection. Other findings essentially unchanged compared to the prior examination. Patient given cipro and flagyl in ED. Patient admitted for worsening abdominal pain 2/2 diverticulitis requiring IV antibiotics as no clinical improvement with oral meds.      Mrs. Deprima 69-year-old female with PMHx of dementia and HTN presents to the ED with complaints of abdominal pain for the past week.  Patient was recently seen in ED on 6/18 for abdominal pain x 2 weeks in which was diagnosed with diverticultits based on clinical and imaging studies and discharged on augmentin which she reports has been complaint with. However  states reports having worsening pain especially over the past 2 days which is worse with bending walking and palpation. Denies nausea vomiting diarrhea fevers or chills.  Patient has not taken any medication for pain, went to urgent care prior to coming in today and was referred to the ER. Daughter reports patient is currently not taking any medications and was planning to establish care with a new PCP soon.     In the ED, vitals stable, afebrile on RA. Labs relatively within normal ranges. Repeat CT abdomen/pelvis: No change in findings compatible with sigmoid diverticulitis. No discrete fluid collection. Other findings essentially unchanged compared to the prior examination. Patient given cipro and flagyl in ED. Patient admitted for worsening abdominal pain 2/2 diverticulitis requiring IV antibiotics as no clinical improvement with oral meds.

## 2022-06-20 NOTE — ED PROVIDER NOTE - OBJECTIVE STATEMENT
69-year-old female presents emerged department complaining of worsening abdominal pain for the past week.  Patient states she was in ED diagnosed with diverticulitis on CT prescribed antibiotics which she has been taking.  Patient states that she is been having worsening pain especially over the past 2 days.  Pain is worse with bending walking and palpation.  Denies nausea vomiting diarrhea fevers or chills.  Patient has not taken any medication for pain, went to urgent care prior to coming in today and was referred to the ER

## 2022-06-20 NOTE — ED PROVIDER NOTE - NS ED ROS FT
CONST: No fever, chills or bodyaches  EYES: No pain, redness, drainage or visual changes.  ENT: No ear pain or discharge, nasal discharge or congestion. No sore throat  CARD: No chest pain, palpitations  RESP: No SOB, cough, hemoptysis. No hx of asthma or COPD  GI: Abdominal pain with no nausea vomiting or diarrhea  : No urinary symptoms  MS: No joint pain, back pain or extremity pain/injury  SKIN: No rashes  NEURO: No headache, dizziness, paresthesias or LOC

## 2022-06-20 NOTE — ED PROVIDER NOTE - CLINICAL SUMMARY MEDICAL DECISION MAKING FREE TEXT BOX
69-year-old female with PMHx of dementia and HTN presents to the ED with complaints of abdominal pain for the past week.  Patient was recently seen in ED on 6/18 for abdominal pain x 2 weeks in which was diagnosed with diverticultits based on clinical and imaging studies and discharged on Augmentin which she reports has been complaint with.  States she is having worsening pain especially over the past 2 days which is worse with bending walking and palpation. Denies nausea vomiting diarrhea fevers or chills.  Patient has not taken any medication for pain, went to urgent care prior to coming in today and was referred to the ER.    In the ED, vitals stable, afebrile on RA. Labs relatively within normal ranges. Repeat CT abdomen/pelvis: No change in findings compatible with sigmoid diverticulitis. No discrete fluid collection. Other findings essentially unchanged compared to the prior examination. Patient given Cipro and Flagyl and admitted for worsening abdominal pain 2/2 diverticulitis requiring IV antibiotics as no clinical improvement with oral meds.

## 2022-06-20 NOTE — ED PROVIDER NOTE - NS ED ATTENDING STATEMENT MOD
This was a shared visit with the FARRUKH. I reviewed and verified the documentation and independently performed the documented:

## 2022-06-21 ENCOUNTER — APPOINTMENT (OUTPATIENT)
Dept: GASTROENTEROLOGY | Facility: CLINIC | Age: 77
End: 2022-06-21

## 2022-06-21 ENCOUNTER — TRANSCRIPTION ENCOUNTER (OUTPATIENT)
Age: 77
End: 2022-06-21

## 2022-06-21 LAB
ALBUMIN SERPL ELPH-MCNC: 2.9 G/DL — LOW (ref 3.5–5.2)
ALP SERPL-CCNC: 74 U/L — SIGNIFICANT CHANGE UP (ref 30–115)
ALT FLD-CCNC: 8 U/L — SIGNIFICANT CHANGE UP (ref 0–41)
ANION GAP SERPL CALC-SCNC: 10 MMOL/L — SIGNIFICANT CHANGE UP (ref 7–14)
AST SERPL-CCNC: 10 U/L — SIGNIFICANT CHANGE UP (ref 0–41)
BASOPHILS # BLD AUTO: 0.06 K/UL — SIGNIFICANT CHANGE UP (ref 0–0.2)
BASOPHILS NFR BLD AUTO: 0.7 % — SIGNIFICANT CHANGE UP (ref 0–1)
BILIRUB SERPL-MCNC: 0.4 MG/DL — SIGNIFICANT CHANGE UP (ref 0.2–1.2)
BUN SERPL-MCNC: 13 MG/DL — SIGNIFICANT CHANGE UP (ref 10–20)
CALCIUM SERPL-MCNC: 8.8 MG/DL — SIGNIFICANT CHANGE UP (ref 8.5–10.1)
CHLORIDE SERPL-SCNC: 103 MMOL/L — SIGNIFICANT CHANGE UP (ref 98–110)
CO2 SERPL-SCNC: 25 MMOL/L — SIGNIFICANT CHANGE UP (ref 17–32)
CREAT SERPL-MCNC: 0.6 MG/DL — LOW (ref 0.7–1.5)
EGFR: 92 ML/MIN/1.73M2 — SIGNIFICANT CHANGE UP
EOSINOPHIL # BLD AUTO: 0.23 K/UL — SIGNIFICANT CHANGE UP (ref 0–0.7)
EOSINOPHIL NFR BLD AUTO: 2.6 % — SIGNIFICANT CHANGE UP (ref 0–8)
GLUCOSE SERPL-MCNC: 105 MG/DL — HIGH (ref 70–99)
HCT VFR BLD CALC: 32.7 % — LOW (ref 37–47)
HGB BLD-MCNC: 10.6 G/DL — LOW (ref 12–16)
IMM GRANULOCYTES NFR BLD AUTO: 0.7 % — HIGH (ref 0.1–0.3)
LYMPHOCYTES # BLD AUTO: 0.8 K/UL — LOW (ref 1.2–3.4)
LYMPHOCYTES # BLD AUTO: 9.2 % — LOW (ref 20.5–51.1)
MAGNESIUM SERPL-MCNC: 2 MG/DL — SIGNIFICANT CHANGE UP (ref 1.8–2.4)
MCHC RBC-ENTMCNC: 26.6 PG — LOW (ref 27–31)
MCHC RBC-ENTMCNC: 32.4 G/DL — SIGNIFICANT CHANGE UP (ref 32–37)
MCV RBC AUTO: 82 FL — SIGNIFICANT CHANGE UP (ref 81–99)
MONOCYTES # BLD AUTO: 1.18 K/UL — HIGH (ref 0.1–0.6)
MONOCYTES NFR BLD AUTO: 13.5 % — HIGH (ref 1.7–9.3)
NEUTROPHILS # BLD AUTO: 6.38 K/UL — SIGNIFICANT CHANGE UP (ref 1.4–6.5)
NEUTROPHILS NFR BLD AUTO: 73.3 % — SIGNIFICANT CHANGE UP (ref 42.2–75.2)
NRBC # BLD: 0 /100 WBCS — SIGNIFICANT CHANGE UP (ref 0–0)
PLATELET # BLD AUTO: 356 K/UL — SIGNIFICANT CHANGE UP (ref 130–400)
POTASSIUM SERPL-MCNC: 4.7 MMOL/L — SIGNIFICANT CHANGE UP (ref 3.5–5)
POTASSIUM SERPL-SCNC: 4.7 MMOL/L — SIGNIFICANT CHANGE UP (ref 3.5–5)
PROCALCITONIN SERPL-MCNC: 0.08 NG/ML — SIGNIFICANT CHANGE UP (ref 0.02–0.1)
PROT SERPL-MCNC: 6 G/DL — SIGNIFICANT CHANGE UP (ref 6–8)
RBC # BLD: 3.99 M/UL — LOW (ref 4.2–5.4)
RBC # FLD: 14.6 % — HIGH (ref 11.5–14.5)
SODIUM SERPL-SCNC: 138 MMOL/L — SIGNIFICANT CHANGE UP (ref 135–146)
WBC # BLD: 8.71 K/UL — SIGNIFICANT CHANGE UP (ref 4.8–10.8)
WBC # FLD AUTO: 8.71 K/UL — SIGNIFICANT CHANGE UP (ref 4.8–10.8)

## 2022-06-21 PROCEDURE — 99232 SBSQ HOSP IP/OBS MODERATE 35: CPT

## 2022-06-21 RX ORDER — METRONIDAZOLE 500 MG
1 TABLET ORAL
Qty: 21 | Refills: 0
Start: 2022-06-21 | End: 2022-06-27

## 2022-06-21 RX ORDER — ONDANSETRON 8 MG/1
4 TABLET, FILM COATED ORAL ONCE
Refills: 0 | Status: COMPLETED | OUTPATIENT
Start: 2022-06-21 | End: 2022-06-21

## 2022-06-21 RX ORDER — SODIUM CHLORIDE 9 MG/ML
1000 INJECTION INTRAMUSCULAR; INTRAVENOUS; SUBCUTANEOUS
Refills: 0 | Status: DISCONTINUED | OUTPATIENT
Start: 2022-06-21 | End: 2022-06-22

## 2022-06-21 RX ORDER — CIPROFLOXACIN LACTATE 400MG/40ML
1 VIAL (ML) INTRAVENOUS
Qty: 14 | Refills: 0
Start: 2022-06-21 | End: 2022-06-27

## 2022-06-21 RX ADMIN — Medication 100 MILLIGRAM(S): at 21:43

## 2022-06-21 RX ADMIN — Medication 200 MILLIGRAM(S): at 05:11

## 2022-06-21 RX ADMIN — SODIUM CHLORIDE 75 MILLILITER(S): 9 INJECTION INTRAMUSCULAR; INTRAVENOUS; SUBCUTANEOUS at 00:19

## 2022-06-21 RX ADMIN — ONDANSETRON 4 MILLIGRAM(S): 8 TABLET, FILM COATED ORAL at 14:58

## 2022-06-21 RX ADMIN — SODIUM CHLORIDE 75 MILLILITER(S): 9 INJECTION INTRAMUSCULAR; INTRAVENOUS; SUBCUTANEOUS at 15:23

## 2022-06-21 RX ADMIN — Medication 200 MILLIGRAM(S): at 17:09

## 2022-06-21 RX ADMIN — SODIUM CHLORIDE 75 MILLILITER(S): 9 INJECTION INTRAMUSCULAR; INTRAVENOUS; SUBCUTANEOUS at 06:30

## 2022-06-21 RX ADMIN — Medication 100 MILLIGRAM(S): at 13:00

## 2022-06-21 RX ADMIN — Medication 100 MILLIGRAM(S): at 05:11

## 2022-06-21 RX ADMIN — SODIUM CHLORIDE 75 MILLILITER(S): 9 INJECTION INTRAMUSCULAR; INTRAVENOUS; SUBCUTANEOUS at 05:11

## 2022-06-21 NOTE — DISCHARGE NOTE PROVIDER - NSDCFUSCHEDAPPT_GEN_ALL_CORE_FT
[facility] Physician [facility]  GERIATRICS [address]  Scheduled Appointment: 07/07/2022

## 2022-06-21 NOTE — DISCHARGE NOTE PROVIDER - NSDCCPCAREPLAN_GEN_ALL_CORE_FT
PRINCIPAL DISCHARGE DIAGNOSIS  Diagnosis: Diverticulitis  Assessment and Plan of Treatment: You came in here for worsening abdominal pain. You were recently discharged with antibiotics but your abdominal pain worsened. CT abdomen/pelvis showed no new changes from the already known sigmoid diverticulitis. We started you on IV antibiotics and you responded well. Your belly pain is improving and you're tolerating youe diet. You will be discharged with 6 more days of antibiotics and you will follow up with GI outpatient.          PRINCIPAL DISCHARGE DIAGNOSIS  Diagnosis: Diverticulitis  Assessment and Plan of Treatment: You came in here for worsening abdominal pain. You were recently discharged with antibiotics but your abdominal pain worsened. CT abdomen/pelvis showed no new changes from the already known sigmoid diverticulitis. We started you on IV antibiotics and you responded well. Your belly pain is improving and you're tolerating youe diet. You will be discharged with 10 more days of antibiotics and you will follow up with GI outpatient.   Please also follow up with the PCP within 1 week to determine if you need a longer antibiotic course or repeat CT abdomen.          PRINCIPAL DISCHARGE DIAGNOSIS  Diagnosis: Diverticulitis  Assessment and Plan of Treatment: You came in here for worsening abdominal pain. You were recently discharged with antibiotics but your abdominal pain worsened. CT abdomen/pelvis showed no new changes from the already known sigmoid diverticulitis. We started you on IV antibiotics and you responded well. Your belly pain is improving and you're tolerating youe diet. You will be discharged with 10 more days of antibiotics and you will follow up with GI outpatient.   Please also follow up with the PCP within 1 week to determine if you need a longer antibiotic course or repeat CT abdomen.   GO SEE GERIATRICIAN PHYSICIAN DR. HANCOCK

## 2022-06-21 NOTE — PHYSICAL THERAPY INITIAL EVALUATION ADULT - ADDITIONAL COMMENTS
Pt. reports she was fully independent PTA and did not use an AD. Pt. reports she lives with family in a private house with 4 JUSTINA.

## 2022-06-21 NOTE — DISCHARGE NOTE PROVIDER - HOSPITAL COURSE
Mrs. Deprima 69-year-old female with PMHx of dementia and HTN presents to the ED with complaints of abdominal pain for the past week.  Patient was recently seen in ED on 6/18 for abdominal pain x 2 weeks in which was diagnosed with diverticultits based on clinical and imaging studies and discharged on augmentin which she reports has been complaint with. However  states reports having worsening pain especially over the past 2 days which is worse with bending walking and palpation. Denies nausea vomiting diarrhea fevers or chills.  Patient has not taken any medication for pain, went to urgent care prior to coming in today and was referred to the ER. Daughter reports patient is currently not taking any medications and was planning to establish care with a new PCP soon.     In the ED, vitals stable, afebrile on RA. Labs relatively within normal ranges. Repeat CT abdomen/pelvis: No change in findings compatible with sigmoid diverticulitis. No discrete fluid collection. Other findings essentially unchanged compared to the prior examination. Patient given cipro and flagyl in ED. Patient admitted for worsening abdominal pain 2/2 diverticulitis requiring IV antibiotics as no clinical improvement with oral meds.      Pt admitted for worsening abdominal pain secondary to diverticulitis requiring IV antibiotics. Repeat CT A/P showed sigmoid diverticulitis, no change, no fluid collection. Started on cipro/flagyl. Pt's abdominal pain has improved and will be discharged home with 6 more days of antbiotics to complete a 7 day course. Pt tolerating diet and stable to be discharged home today with outpatient GI follow up.      Mrs. Deprima 69-year-old female with PMHx of dementia and HTN presents to the ED with complaints of abdominal pain for the past week.  Patient was recently seen in ED on 6/18 for abdominal pain x 2 weeks in which was diagnosed with diverticultits based on clinical and imaging studies and discharged on augmentin which she reports has been complaint with. However  states reports having worsening pain especially over the past 2 days which is worse with bending walking and palpation. Denies nausea vomiting diarrhea fevers or chills.  Patient has not taken any medication for pain, went to urgent care prior to coming in today and was referred to the ER. Daughter reports patient is currently not taking any medications and was planning to establish care with a new PCP soon.     In the ED, vitals stable, afebrile on RA. Labs relatively within normal ranges. Repeat CT abdomen/pelvis: No change in findings compatible with sigmoid diverticulitis. No discrete fluid collection. Other findings essentially unchanged compared to the prior examination. Patient given cipro and flagyl in ED. Patient admitted for worsening abdominal pain 2/2 diverticulitis requiring IV antibiotics as no clinical improvement with oral meds.      Pt admitted for worsening abdominal pain secondary to diverticulitis requiring IV antibiotics. Repeat CT A/P showed sigmoid diverticulitis, no change, no fluid collection. Started on cipro/flagyl. Pt's abdominal pain has improved and will be discharged home with 10 additional days of antibiotics. Pt tolerating diet and stable to be discharged home today with outpatient GI follow up.

## 2022-06-21 NOTE — DISCHARGE NOTE PROVIDER - NSDCMRMEDTOKEN_GEN_ALL_CORE_FT
amoxicillin-clavulanate 875 mg-125 mg oral tablet: 1 tab(s) orally every 8 hours    ciprofloxacin 500 mg oral tablet: 1 tab(s) orally 2 times a day until 6/27  metroNIDAZOLE 500 mg oral tablet: 1 tab(s) orally every 8 hours until 6/27   ciprofloxacin 500 mg oral tablet: 1 tab(s) orally 2 times a day until 7/2  metroNIDAZOLE 500 mg oral tablet: 1 tab(s) orally every 8 hours until 7/2   ciprofloxacin 500 mg oral tablet: 1 tab(s) orally 2 times a day until 7/2  metroNIDAZOLE 500 mg oral tablet: 1 tab(s) orally every 8 hours until 7/2  pantoprazole 40 mg oral delayed release tablet: 1 tab(s) orally once a day (before a meal)

## 2022-06-21 NOTE — DISCHARGE NOTE PROVIDER - PROVIDER TOKENS
PROVIDER:[TOKEN:[[id number]],FOLLOWUP:[2 weeks]] PROVIDER:[TOKEN:[[id number]],FOLLOWUP:[2 weeks]],PROVIDER:[TOKEN:[[id number]]]

## 2022-06-21 NOTE — PHYSICAL THERAPY INITIAL EVALUATION ADULT - PERTINENT HX OF CURRENT PROBLEM, REHAB EVAL
69-year-old female with PMHx of dementia and HTN presents to the ED with complaints of abdominal pain for the past week.  Patient was recently seen in ED on 6/18 for abdominal pain x 2 weeks in which was diagnosed with diverticultits based on clinical and imaging studies and discharged on augmentin which she reports has been complaint with.

## 2022-06-21 NOTE — DISCHARGE NOTE PROVIDER - ATTENDING DISCHARGE PHYSICAL EXAMINATION:
Vital Signs Last 24 Hrs  T(C): 35.9 (23 Jun 2022 04:35), Max: 36.6 (22 Jun 2022 20:23)  T(F): 96.7 (23 Jun 2022 04:35), Max: 97.8 (22 Jun 2022 20:23)  HR: 95 (23 Jun 2022 04:35) (95 - 100)  BP: 116/67 (23 Jun 2022 04:35) (106/57 - 116/67)  RR: 18 (23 Jun 2022 04:35) (18 - 20)  SpO2: 97% (22 Jun 2022 20:23) (97% - 98%)    GEN: NAD  CV: NL S1/2  RESP: CTA B/L  GI: +BS, SOFT, MILD TENDERNESS LLQ NO REBOUND   EXT: NO E/C/C   MS: AOX3                           10.7   5.77  )-----------( 299      ( 23 Jun 2022 06:07 )             33.2   06-23    137  |  104  |  7<L>  ----------------------------<  180<H>  4.7   |  26  |  0.7    Ca    8.8      23 Jun 2022 06:07  Mg     2.2     06-23    TPro  5.4<L>  /  Alb  2.8<L>  /  TBili  <0.2  /  DBili  x   /  AST  14  /  ALT  8   /  AlkPhos  73  06-23

## 2022-06-21 NOTE — DISCHARGE NOTE PROVIDER - CARE PROVIDERS DIRECT ADDRESSES
,DirectAddress_Unknown ,DirectAddress_Unknown,[email or web address]

## 2022-06-21 NOTE — PHYSICAL THERAPY INITIAL EVALUATION ADULT - IMPAIRMENTS CONTRIBUTING TO GAIT DEVIATIONS, PT EVAL
Pt. experienced LOB a few times during ambulation, and required VCs and TCs to help maintain her balance/impaired balance/decreased strength

## 2022-06-21 NOTE — DISCHARGE NOTE PROVIDER - CARE PROVIDER_API CALL
Aparna Bowen)  Medicine  [address] [place] [address] [address]  [place] [address]  Phone: [phone]  Fax: [phone]  Follow Up Time: 2 weeks   Aparna Bowen)  Medicine  [address] [facility] [address]  [place] [address]  Phone: [phone]  Fax: [phone]  Follow Up Time: 2 weeks    Apolonia Cardona)  Geriatric Medicine; Internal Medicine  [address] [place] [address]  Phone: [phone]  Fax: [phone]  Follow Up Time:

## 2022-06-21 NOTE — PHYSICAL THERAPY INITIAL EVALUATION ADULT - GENERAL OBSERVATIONS, REHAB EVAL
Pt. encountered alert and NAD, supine in bed, agreeable to PT IE, (+)IV lock. Pt. left as found, supine in bed (+)IV lock, NAD

## 2022-06-21 NOTE — DISCHARGE NOTE NURSING/CASE MANAGEMENT/SOCIAL WORK - PATIENT PORTAL LINK FT
You can access the FollowMyHealth Patient Portal offered by [facility] by registering at the following website: [email or web address]. By joining [employer]’s FollowMyHealth portal, you will also be able to view your health information using other applications (apps) compatible with our system.

## 2022-06-22 LAB
ALBUMIN SERPL ELPH-MCNC: 2.9 G/DL — LOW (ref 3.5–5.2)
ALP SERPL-CCNC: 68 U/L — SIGNIFICANT CHANGE UP (ref 30–115)
ALT FLD-CCNC: 7 U/L — SIGNIFICANT CHANGE UP (ref 0–41)
ANION GAP SERPL CALC-SCNC: 10 MMOL/L — SIGNIFICANT CHANGE UP (ref 7–14)
AST SERPL-CCNC: 11 U/L — SIGNIFICANT CHANGE UP (ref 0–41)
BASOPHILS # BLD AUTO: 0.03 K/UL — SIGNIFICANT CHANGE UP (ref 0–0.2)
BASOPHILS NFR BLD AUTO: 0.9 % — SIGNIFICANT CHANGE UP (ref 0–1)
BILIRUB SERPL-MCNC: <0.2 MG/DL — SIGNIFICANT CHANGE UP (ref 0.2–1.2)
BUN SERPL-MCNC: 6 MG/DL — LOW (ref 10–20)
CALCIUM SERPL-MCNC: 8.7 MG/DL — SIGNIFICANT CHANGE UP (ref 8.5–10.1)
CHLORIDE SERPL-SCNC: 103 MMOL/L — SIGNIFICANT CHANGE UP (ref 98–110)
CO2 SERPL-SCNC: 22 MMOL/L — SIGNIFICANT CHANGE UP (ref 17–32)
CREAT SERPL-MCNC: 0.6 MG/DL — LOW (ref 0.7–1.5)
EGFR: 92 ML/MIN/1.73M2 — SIGNIFICANT CHANGE UP
EOSINOPHIL # BLD AUTO: 0.12 K/UL — SIGNIFICANT CHANGE UP (ref 0–0.7)
EOSINOPHIL NFR BLD AUTO: 3.5 % — SIGNIFICANT CHANGE UP (ref 0–8)
GLUCOSE SERPL-MCNC: 127 MG/DL — HIGH (ref 70–99)
HCT VFR BLD CALC: 31.7 % — LOW (ref 37–47)
HGB BLD-MCNC: 10.1 G/DL — LOW (ref 12–16)
IMM GRANULOCYTES NFR BLD AUTO: 1.5 % — HIGH (ref 0.1–0.3)
LYMPHOCYTES # BLD AUTO: 0.41 K/UL — LOW (ref 1.2–3.4)
LYMPHOCYTES # BLD AUTO: 12 % — LOW (ref 20.5–51.1)
MAGNESIUM SERPL-MCNC: 2 MG/DL — SIGNIFICANT CHANGE UP (ref 1.8–2.4)
MCHC RBC-ENTMCNC: 26.2 PG — LOW (ref 27–31)
MCHC RBC-ENTMCNC: 31.9 G/DL — LOW (ref 32–37)
MCV RBC AUTO: 82.1 FL — SIGNIFICANT CHANGE UP (ref 81–99)
MONOCYTES # BLD AUTO: 0.68 K/UL — HIGH (ref 0.1–0.6)
MONOCYTES NFR BLD AUTO: 19.8 % — HIGH (ref 1.7–9.3)
NEUTROPHILS # BLD AUTO: 2.14 K/UL — SIGNIFICANT CHANGE UP (ref 1.4–6.5)
NEUTROPHILS NFR BLD AUTO: 62.3 % — SIGNIFICANT CHANGE UP (ref 42.2–75.2)
NRBC # BLD: 0 /100 WBCS — SIGNIFICANT CHANGE UP (ref 0–0)
PLATELET # BLD AUTO: 267 K/UL — SIGNIFICANT CHANGE UP (ref 130–400)
POTASSIUM SERPL-MCNC: 4.4 MMOL/L — SIGNIFICANT CHANGE UP (ref 3.5–5)
POTASSIUM SERPL-SCNC: 4.4 MMOL/L — SIGNIFICANT CHANGE UP (ref 3.5–5)
PROT SERPL-MCNC: 5.7 G/DL — LOW (ref 6–8)
RBC # BLD: 3.86 M/UL — LOW (ref 4.2–5.4)
RBC # FLD: 14.5 % — SIGNIFICANT CHANGE UP (ref 11.5–14.5)
SODIUM SERPL-SCNC: 135 MMOL/L — SIGNIFICANT CHANGE UP (ref 135–146)
WBC # BLD: 3.43 K/UL — LOW (ref 4.8–10.8)
WBC # FLD AUTO: 3.43 K/UL — LOW (ref 4.8–10.8)

## 2022-06-22 PROCEDURE — 99233 SBSQ HOSP IP/OBS HIGH 50: CPT

## 2022-06-22 RX ORDER — METRONIDAZOLE 500 MG
1 TABLET ORAL
Qty: 30 | Refills: 0
Start: 2022-06-22 | End: 2022-07-01

## 2022-06-22 RX ORDER — CIPROFLOXACIN LACTATE 400MG/40ML
1 VIAL (ML) INTRAVENOUS
Qty: 20 | Refills: 0
Start: 2022-06-22 | End: 2022-07-01

## 2022-06-22 RX ORDER — PANTOPRAZOLE SODIUM 20 MG/1
40 TABLET, DELAYED RELEASE ORAL
Refills: 0 | Status: DISCONTINUED | OUTPATIENT
Start: 2022-06-22 | End: 2022-06-23

## 2022-06-22 RX ADMIN — Medication 100 MILLIGRAM(S): at 13:42

## 2022-06-22 RX ADMIN — SODIUM CHLORIDE 75 MILLILITER(S): 9 INJECTION INTRAMUSCULAR; INTRAVENOUS; SUBCUTANEOUS at 06:17

## 2022-06-22 RX ADMIN — Medication 200 MILLIGRAM(S): at 06:11

## 2022-06-22 RX ADMIN — Medication 100 MILLIGRAM(S): at 21:24

## 2022-06-22 RX ADMIN — Medication 200 MILLIGRAM(S): at 17:30

## 2022-06-22 RX ADMIN — PANTOPRAZOLE SODIUM 40 MILLIGRAM(S): 20 TABLET, DELAYED RELEASE ORAL at 17:28

## 2022-06-22 RX ADMIN — Medication 100 MILLIGRAM(S): at 05:08

## 2022-06-22 NOTE — PROGRESS NOTE ADULT - SUBJECTIVE AND OBJECTIVE BOX
Patient is a 77y old  Female who presents with a chief complaint of abdominal pain (21 Jun 2022 12:45)      Patient seen and examined at bedside.    ALLERGIES:  No Known Allergies    MEDICATIONS:  acetaminophen     Tablet .. 650 milliGRAM(s) Oral every 6 hours PRN  ciprofloxacin   IVPB 400 milliGRAM(s) IV Intermittent every 12 hours  metroNIDAZOLE  IVPB 500 milliGRAM(s) IV Intermittent every 8 hours  morphine  - Injectable 2 milliGRAM(s) IV Push every 6 hours PRN  sodium chloride 0.9%. 1000 milliLiter(s) IV Continuous <Continuous>    Vital Signs Last 24 Hrs  T(F): 97.4 (21 Jun 2022 13:00), Max: 99.2 (21 Jun 2022 05:36)  HR: 92 (21 Jun 2022 13:00) (88 - 98)  BP: 115/73 (21 Jun 2022 13:00) (111/62 - 125/60)  RR: 20 (21 Jun 2022 13:00) (18 - 20)  SpO2: 97% (21 Jun 2022 13:00) (95% - 98%)  I&O's Summary      PHYSICAL EXAM:  General: NAD, A/O x 2  ENT: MMM  Neck: Supple, No JVD  Lungs: Clear to auscultation bilaterally, no crackles   Cardio: RRR, S1/S2, No murmurs  Abdomen: Soft, Nontender, Nondistended; Bowel sounds present  Extremities: No cyanosis, No edema    LABS:                        10.6   8.71  )-----------( 356      ( 21 Jun 2022 04:30 )             32.7     06-21    138  |  103  |  13  ----------------------------<  105  4.7   |  25  |  0.6    Ca    8.8      21 Jun 2022 04:30  Mg     2.0     06-21    TPro  6.0  /  Alb  2.9  /  TBili  0.4  /  DBili  x   /  AST  10  /  ALT  8   /  AlkPhos  74  06-21        Lactate, Blood: 1.1 mmol/L (06-20 @ 11:05)                              COVID-19 PCR: NotDetec (06-20-22 @ 10:48)      RADIOLOGY & ADDITIONAL TESTS:    Care Discussed with Consultants/Other Providers: 
KEISHA PANTOJA 77y Female  MRN#: [id number]   Hospital Day: 2d    SUBJECTIVE  Patient is a 77y old Female who presents with a chief complaint of abdominal pain (2022 16:08)  Currently admitted to medicine with the primary diagnosis of Diverticulitis    INTERVAL HPI AND OVERNIGHT EVENTS:  Patient was examined and seen at bedside. This morning she is resting comfortably in bed and reports no issues or overnight events.    OBJECTIVE  PAST MEDICAL & SURGICAL HISTORY  Hypertension, unspecified type    H/O dilation and curettage  for missed       ALLERGIES:  No Known Allergies    MEDICATIONS:  STANDING MEDICATIONS  ciprofloxacin   IVPB 400 milliGRAM(s) IV Intermittent every 12 hours  metroNIDAZOLE  IVPB 500 milliGRAM(s) IV Intermittent every 8 hours  pantoprazole    Tablet 40 milliGRAM(s) Oral before breakfast    PRN MEDICATIONS  acetaminophen     Tablet .. 650 milliGRAM(s) Oral every 6 hours PRN  morphine  - Injectable 2 milliGRAM(s) IV Push every 6 hours PRN      VITAL SIGNS: Last 24 Hours  T(C): 36.6 (2022 12:47), Max: 37.2 (2022 05:22)  T(F): 97.8 (2022 12:47), Max: 98.9 (2022 05:22)  HR: 93 (2022 12:47) (69 - 96)  BP: 128/68 (2022 12:47) (99/57 - 128/68)  BP(mean): --  RR: 20 (2022 12:47) (18 - 20)  SpO2: 100% (2022 12:47) (97% - 100%)    LABS:                        10.1   3.43  )-----------( 267      ( 2022 07:05 )             31.7         135  |  103  |  6<L>  ----------------------------<  127<H>  4.4   |  22  |  0.6<L>    Ca    8.7      2022 07:05  Mg     2.0         TPro  5.7<L>  /  Alb  2.9<L>  /  TBili  <0.2  /  DBili  x   /  AST  11  /  ALT  7   /  AlkPhos  68  06-22              Culture - Blood (collected 2022 04:30)  Source: .Blood Blood  Preliminary Report (2022 14:01):    No growth to date.          RADIOLOGY:    PHYSICAL EXAM:  CONSTITUTIONAL: No acute distress, well-developed, well-groomed, AAOx3  PULMONARY: Clear to auscultation bilaterally; no wheezes, rales, or rhonchi  CARDIOVASCULAR: Regular rate and rhythm; no murmurs, rubs, or gallops  GASTROINTESTINAL: Soft, non-tender, non-distended; bowel sounds present  MUSCULOSKELETAL: 2+ peripheral pulses; no clubbing, no cyanosis, no edema  NEUROLOGY: non-focal  SKIN: No rashes or lesions; warm and dry  
KEISHA PANTOJA 77y Female  MRN#: [id number]   Hospital Day: 1d    SUBJECTIVE  Patient is a 77y old Female who presents with a chief complaint of abdominal pain (2022 16:46)  Currently admitted to medicine with the primary diagnosis of Diverticulitis      INTERVAL HPI AND OVERNIGHT EVENTS:  Patient was examined and seen at bedside. This morning she is resting comfortably in bed. Reports that her abdominal pain is improving and states that she wants to go home and see her dog.     OBJECTIVE  PAST MEDICAL & SURGICAL HISTORY  Hypertension, unspecified type    H/O dilation and curettage  for missed       ALLERGIES:  No Known Allergies    MEDICATIONS:  STANDING MEDICATIONS  ciprofloxacin   IVPB 400 milliGRAM(s) IV Intermittent every 12 hours  metroNIDAZOLE  IVPB 500 milliGRAM(s) IV Intermittent every 8 hours  sodium chloride 0.9%. 1000 milliLiter(s) IV Continuous <Continuous>    PRN MEDICATIONS  acetaminophen     Tablet .. 650 milliGRAM(s) Oral every 6 hours PRN  morphine  - Injectable 2 milliGRAM(s) IV Push every 6 hours PRN      VITAL SIGNS: Last 24 Hours  T(C): 37.3 (2022 05:36), Max: 37.3 (2022 05:36)  T(F): 99.2 (2022 05:36), Max: 99.2 (2022 05:36)  HR: 88 (2022 05:36) (88 - 105)  BP: 111/62 (2022 05:36) (111/62 - 125/60)  BP(mean): --  RR: 18 (2022 05:36) (18 - 20)  SpO2: 97% (2022 05:36) (95% - 99%)    LABS:                        10.6   8.71  )-----------( 356      ( 2022 04:30 )             32.7     06-21    138  |  103  |  13  ----------------------------<  105<H>  4.7   |  25  |  0.6<L>    Ca    8.8      2022 04:30  Mg     2.0     06-21    TPro  6.0  /  Alb  2.9<L>  /  TBili  0.4  /  DBili  x   /  AST  10  /  ALT  8   /  AlkPhos  74  06-21    RADIOLOGY:    PHYSICAL EXAM:  CONSTITUTIONAL: No acute distress, well-developed, well-groomed, AAOx3  PULMONARY: Clear to auscultation bilaterally; no wheezes, rales, or rhonchi  CARDIOVASCULAR: Regular rate and rhythm; no murmurs, rubs, or gallops  GASTROINTESTINAL: mild tenderness to palpation in the suprapubic region  MUSCULOSKELETAL: 2+ peripheral pulses; no clubbing, no cyanosis, no edema  NEUROLOGY: non-focal  SKIN: No rashes or lesions; warm and dry

## 2022-06-22 NOTE — PROGRESS NOTE ADULT - ASSESSMENT
Mrs. Deprima 69-year-old female with PMHx of dementia and HTN presents to the ED with complaints of abdominal pain for the past week-  recently seen in ED on 6/18 for abdominal pain x 2 weeks in which was diagnosed with diverticultits based on clinical and imaging studies and discharged on augmentin which she reports has been complaint with. However  states reports having worsening pain especially over the past 2 days.     #worsening abdominal pain 2/2 diverticulitis requiring IV antibiotics as no clinical improvement with oral meds- improving  -In the ED, vitals stable, afebrile on RA.   - Labs relatively within normal ranges.   - Repeat CT abdomen/pelvis: No change in findings compatible with sigmoid diverticulitis. No discrete fluid collection. Other findings essentially unchanged compared to the prior examination.  - s/p cipro and flagyl in ED  - c/w with cipro and flagyl  - BCx NGTD  - c/w with bowel regimen   - c/w with pain med prn  - if no clinical improvement with IV abx -> consult GI (no evidence of abcess collection on recent imaging)   - c/w with supportive care  - 6/22: vomited again the PM. will downgrade to soft diet, lowfat to see if pt is tolerating    #HTN- normotensive now  - currently not on any medications  - DASH/TLC   - on admission; normotensive; observe for now     #Dementia  - currently  not on any medications as per daughter   - c/w frequent reorientation     #Misc:  - Diet: DASH/TLC/High fiber  - Activity: as tolerated  - DVT Prophylaxis: lovenox  - GI Prophylaxis: protonix  - Code Status: full  - Disposition: anticipate in 24  - Pending: clinical improvement  
Mrs. Deprima 69-year-old female with PMHx of dementia and HTN presents to the ED with complaints of abdominal pain for the past week-  recently seen in ED on 6/18 for abdominal pain x 2 weeks in which was diagnosed with diverticultits based on clinical and imaging studies and discharged on augmentin which she reports has been complaint with. However  states reports having worsening pain especially over the past 2 days.     #worsening abdominal pain 2/2 diverticulitis requiring IV antibiotics as no clinical improvement with oral meds- improving  -In the ED, vitals stable, afebrile on RA.   - Labs relatively within normal ranges.   - Repeat CT abdomen/pelvis: No change in findings compatible with sigmoid diverticulitis. No discrete fluid collection. Other findings essentially unchanged compared to the prior examination.  - s/p cipro and flagyl in ED - c/w with cipro and flagyl  - f/u blood cultures  - c/w with bowel regimen   - c/w with pain med prn  - if no clinical improvement with IV abx -> consult GI (no evidence of abcess collection on recent imaging)   - pt was not able to tolerate diet 6/21, diet reduced down to clear fluids   - c/w IVFs    #HTN- normotensive now  - currently not on any medications  - DASH/TLC   - on admission; normotensive; observe for now     #Dementia  - currently  not on any medications as per daughter   - c/w frequent reorientation     #Misc:  - Diet: DASH/TLC/High fiber  - Activity: as tolerated  - DVT Prophylaxis: lovenox  - GI Prophylaxis: protonix  - Code Status: full  - Disposition: anticipate in 24-48 hours if improving  - Pending: BCx, procalc, clinical improvement    
Mrs. Deprima 69-year-old female with PMHx of dementia and HTN presents to the ED with complaints of abdominal pain for the past week-  recently seen in ED on 6/18 for abdominal pain x 2 weeks in which was diagnosed with diverticultits based on clinical and imaging studies and discharged on augmentin which she reports has been complaint with. However  states reports having worsening pain especially over the past 2 days.     #worsening abdominal pain 2/2 diverticulitis requiring IV antibiotics as no clinical improvement with oral meds- improving  -In the ED, vitals stable, afebrile on RA.   - Labs relatively within normal ranges.   - Repeat CT abdomen/pelvis: No change in findings compatible with sigmoid diverticulitis. No discrete fluid collection. Other findings essentially unchanged compared to the prior examination.  - s/p cipro and flagyl in ED  - c/w with cipro and flagyl  - f/u blood cultures  - c/w with bowel regimen   - c/w with pain med prn  - if no clinical improvement with IV abx -> consult GI (no evidence of abcess collection on recent imaging)   - c/w with supportive care  - c/w IVFs- dc if tolerating diet  - CLD for now, advance as tolerated  - as per son, has virtual appointment with GI today    #HTN- normotensive now  - currently not on any medications  - DASH/TLC   - on admission; normotensive; observe for now     #Dementia  - currently  not on any medications as per daughter   - c/w frequent reorientation     #Misc:  - Diet: DASH/TLC/High fiber  - Activity: as tolerated  - DVT Prophylaxis: lovenox  - GI Prophylaxis: protonix  - Code Status: full  - Disposition: anticipate in 24-48 hours if improving  - Pending: BCx, procalc, clinical improvement

## 2022-06-22 NOTE — PROGRESS NOTE ADULT - ATTENDING COMMENTS
Upon re-evaluation of pt admitted for Diverticulosis, she demonstrates nausea and vomiting this afternoon and therefore unable to tolerate hence will keep in the hospital today - restart liquid diet and c/w IV abx / IV zofran / will anticipate for tomorrow   c/w cipro/flagyl IV                           10.1   3.43  )-----------( 267      ( 22 Jun 2022 07:05 )             31.7     06-22    135  |  103  |  6<L>  ----------------------------<  127<H>  4.4   |  22  |  0.6<L>    Ca    8.7      22 Jun 2022 07:05  Mg     2.0     06-22    TPro  5.7<L>  /  Alb  2.9<L>  /  TBili  <0.2  /  DBili  x   /  AST  11  /  ALT  7   /  AlkPhos  68  06-22    MEDICATIONS  (STANDING):  ciprofloxacin   IVPB 400 milliGRAM(s) IV Intermittent every 12 hours  metroNIDAZOLE  IVPB 500 milliGRAM(s) IV Intermittent every 8 hours  pantoprazole    Tablet 40 milliGRAM(s) Oral before breakfast    Dispo: Possible home tomorrow if tolerates solid diet

## 2022-06-23 VITALS
TEMPERATURE: 97 F | HEART RATE: 89 BPM | RESPIRATION RATE: 20 BRPM | SYSTOLIC BLOOD PRESSURE: 92 MMHG | DIASTOLIC BLOOD PRESSURE: 57 MMHG

## 2022-06-23 LAB
ALBUMIN SERPL ELPH-MCNC: 2.8 G/DL — LOW (ref 3.5–5.2)
ALP SERPL-CCNC: 73 U/L — SIGNIFICANT CHANGE UP (ref 30–115)
ALT FLD-CCNC: 8 U/L — SIGNIFICANT CHANGE UP (ref 0–41)
ANION GAP SERPL CALC-SCNC: 7 MMOL/L — SIGNIFICANT CHANGE UP (ref 7–14)
AST SERPL-CCNC: 14 U/L — SIGNIFICANT CHANGE UP (ref 0–41)
BASOPHILS # BLD AUTO: 0.04 K/UL — SIGNIFICANT CHANGE UP (ref 0–0.2)
BASOPHILS NFR BLD AUTO: 0.7 % — SIGNIFICANT CHANGE UP (ref 0–1)
BILIRUB SERPL-MCNC: <0.2 MG/DL — SIGNIFICANT CHANGE UP (ref 0.2–1.2)
BUN SERPL-MCNC: 7 MG/DL — LOW (ref 10–20)
CALCIUM SERPL-MCNC: 8.8 MG/DL — SIGNIFICANT CHANGE UP (ref 8.5–10.1)
CHLORIDE SERPL-SCNC: 104 MMOL/L — SIGNIFICANT CHANGE UP (ref 98–110)
CO2 SERPL-SCNC: 26 MMOL/L — SIGNIFICANT CHANGE UP (ref 17–32)
CREAT SERPL-MCNC: 0.7 MG/DL — SIGNIFICANT CHANGE UP (ref 0.7–1.5)
EGFR: 88 ML/MIN/1.73M2 — SIGNIFICANT CHANGE UP
EOSINOPHIL # BLD AUTO: 0.22 K/UL — SIGNIFICANT CHANGE UP (ref 0–0.7)
EOSINOPHIL NFR BLD AUTO: 3.8 % — SIGNIFICANT CHANGE UP (ref 0–8)
GLUCOSE SERPL-MCNC: 180 MG/DL — HIGH (ref 70–99)
HCT VFR BLD CALC: 33.2 % — LOW (ref 37–47)
HGB BLD-MCNC: 10.7 G/DL — LOW (ref 12–16)
IMM GRANULOCYTES NFR BLD AUTO: 1.4 % — HIGH (ref 0.1–0.3)
LYMPHOCYTES # BLD AUTO: 0.91 K/UL — LOW (ref 1.2–3.4)
LYMPHOCYTES # BLD AUTO: 15.8 % — LOW (ref 20.5–51.1)
MAGNESIUM SERPL-MCNC: 2.2 MG/DL — SIGNIFICANT CHANGE UP (ref 1.8–2.4)
MCHC RBC-ENTMCNC: 26.3 PG — LOW (ref 27–31)
MCHC RBC-ENTMCNC: 32.2 G/DL — SIGNIFICANT CHANGE UP (ref 32–37)
MCV RBC AUTO: 81.6 FL — SIGNIFICANT CHANGE UP (ref 81–99)
MONOCYTES # BLD AUTO: 0.9 K/UL — HIGH (ref 0.1–0.6)
MONOCYTES NFR BLD AUTO: 15.6 % — HIGH (ref 1.7–9.3)
NEUTROPHILS # BLD AUTO: 3.62 K/UL — SIGNIFICANT CHANGE UP (ref 1.4–6.5)
NEUTROPHILS NFR BLD AUTO: 62.7 % — SIGNIFICANT CHANGE UP (ref 42.2–75.2)
NRBC # BLD: 0 /100 WBCS — SIGNIFICANT CHANGE UP (ref 0–0)
PLATELET # BLD AUTO: 299 K/UL — SIGNIFICANT CHANGE UP (ref 130–400)
POTASSIUM SERPL-MCNC: 4.7 MMOL/L — SIGNIFICANT CHANGE UP (ref 3.5–5)
POTASSIUM SERPL-SCNC: 4.7 MMOL/L — SIGNIFICANT CHANGE UP (ref 3.5–5)
PROT SERPL-MCNC: 5.4 G/DL — LOW (ref 6–8)
RBC # BLD: 4.07 M/UL — LOW (ref 4.2–5.4)
RBC # FLD: 14.4 % — SIGNIFICANT CHANGE UP (ref 11.5–14.5)
SODIUM SERPL-SCNC: 137 MMOL/L — SIGNIFICANT CHANGE UP (ref 135–146)
WBC # BLD: 5.77 K/UL — SIGNIFICANT CHANGE UP (ref 4.8–10.8)
WBC # FLD AUTO: 5.77 K/UL — SIGNIFICANT CHANGE UP (ref 4.8–10.8)

## 2022-06-23 PROCEDURE — 99239 HOSP IP/OBS DSCHRG MGMT >30: CPT

## 2022-06-23 RX ORDER — PANTOPRAZOLE SODIUM 20 MG/1
1 TABLET, DELAYED RELEASE ORAL
Qty: 15 | Refills: 0
Start: 2022-06-23 | End: 2022-07-07

## 2022-06-23 RX ADMIN — Medication 100 MILLIGRAM(S): at 13:46

## 2022-06-23 RX ADMIN — Medication 200 MILLIGRAM(S): at 05:11

## 2022-06-23 RX ADMIN — Medication 100 MILLIGRAM(S): at 06:25

## 2022-06-23 RX ADMIN — PANTOPRAZOLE SODIUM 40 MILLIGRAM(S): 20 TABLET, DELAYED RELEASE ORAL at 06:25

## 2022-06-26 ENCOUNTER — INPATIENT (INPATIENT)
Facility: HOSPITAL | Age: 77
LOS: 12 days | Discharge: NURSING, REHAB OR LONG-TERM CARE | End: 2022-07-09
Attending: HOSPITALIST | Admitting: HOSPITALIST

## 2022-06-26 VITALS
DIASTOLIC BLOOD PRESSURE: 66 MMHG | OXYGEN SATURATION: 98 % | WEIGHT: 87.08 LBS | HEART RATE: 82 BPM | RESPIRATION RATE: 18 BRPM | HEIGHT: 66 IN | SYSTOLIC BLOOD PRESSURE: 125 MMHG | TEMPERATURE: 98 F

## 2022-06-26 DIAGNOSIS — Z98.890 OTHER SPECIFIED POSTPROCEDURAL STATES: Chronic | ICD-10-CM

## 2022-06-26 LAB
ALBUMIN SERPL ELPH-MCNC: 3.1 G/DL — LOW (ref 3.5–5.2)
ALP SERPL-CCNC: 70 U/L — SIGNIFICANT CHANGE UP (ref 30–115)
ALT FLD-CCNC: 11 U/L — SIGNIFICANT CHANGE UP (ref 0–41)
ANION GAP SERPL CALC-SCNC: 10 MMOL/L — SIGNIFICANT CHANGE UP (ref 7–14)
APPEARANCE UR: CLEAR — SIGNIFICANT CHANGE UP
AST SERPL-CCNC: 23 U/L — SIGNIFICANT CHANGE UP (ref 0–41)
BACTERIA # UR AUTO: NEGATIVE — SIGNIFICANT CHANGE UP
BASOPHILS # BLD AUTO: 0.03 K/UL — SIGNIFICANT CHANGE UP (ref 0–0.2)
BASOPHILS NFR BLD AUTO: 0.3 % — SIGNIFICANT CHANGE UP (ref 0–1)
BILIRUB DIRECT SERPL-MCNC: <0.2 MG/DL — SIGNIFICANT CHANGE UP (ref 0–0.3)
BILIRUB INDIRECT FLD-MCNC: SIGNIFICANT CHANGE UP MG/DL (ref 0.2–1.2)
BILIRUB SERPL-MCNC: <0.2 MG/DL — SIGNIFICANT CHANGE UP (ref 0.2–1.2)
BILIRUB UR-MCNC: NEGATIVE — SIGNIFICANT CHANGE UP
BUN SERPL-MCNC: 11 MG/DL — SIGNIFICANT CHANGE UP (ref 10–20)
CALCIUM SERPL-MCNC: 8.6 MG/DL — SIGNIFICANT CHANGE UP (ref 8.5–10.1)
CHLORIDE SERPL-SCNC: 100 MMOL/L — SIGNIFICANT CHANGE UP (ref 98–110)
CO2 SERPL-SCNC: 26 MMOL/L — SIGNIFICANT CHANGE UP (ref 17–32)
COLOR SPEC: YELLOW — SIGNIFICANT CHANGE UP
CREAT SERPL-MCNC: 0.6 MG/DL — LOW (ref 0.7–1.5)
CULTURE RESULTS: SIGNIFICANT CHANGE UP
DIFF PNL FLD: NEGATIVE — SIGNIFICANT CHANGE UP
EGFR: 92 ML/MIN/1.73M2 — SIGNIFICANT CHANGE UP
EOSINOPHIL # BLD AUTO: 0.19 K/UL — SIGNIFICANT CHANGE UP (ref 0–0.7)
EOSINOPHIL NFR BLD AUTO: 2 % — SIGNIFICANT CHANGE UP (ref 0–8)
EPI CELLS # UR: 6 /HPF — HIGH (ref 0–5)
GLUCOSE SERPL-MCNC: 102 MG/DL — HIGH (ref 70–99)
GLUCOSE UR QL: NEGATIVE — SIGNIFICANT CHANGE UP
HCT VFR BLD CALC: 30.7 % — LOW (ref 37–47)
HGB BLD-MCNC: 9.8 G/DL — LOW (ref 12–16)
HYALINE CASTS # UR AUTO: 1 /LPF — SIGNIFICANT CHANGE UP (ref 0–7)
IMM GRANULOCYTES NFR BLD AUTO: 0.7 % — HIGH (ref 0.1–0.3)
KETONES UR-MCNC: NEGATIVE — SIGNIFICANT CHANGE UP
LACTATE SERPL-SCNC: 0.8 MMOL/L — SIGNIFICANT CHANGE UP (ref 0.7–2)
LEUKOCYTE ESTERASE UR-ACNC: ABNORMAL
LYMPHOCYTES # BLD AUTO: 0.85 K/UL — LOW (ref 1.2–3.4)
LYMPHOCYTES # BLD AUTO: 9 % — LOW (ref 20.5–51.1)
MCHC RBC-ENTMCNC: 25.5 PG — LOW (ref 27–31)
MCHC RBC-ENTMCNC: 31.9 G/DL — LOW (ref 32–37)
MCV RBC AUTO: 79.9 FL — LOW (ref 81–99)
MONOCYTES # BLD AUTO: 1.37 K/UL — HIGH (ref 0.1–0.6)
MONOCYTES NFR BLD AUTO: 14.5 % — HIGH (ref 1.7–9.3)
NEUTROPHILS # BLD AUTO: 6.92 K/UL — HIGH (ref 1.4–6.5)
NEUTROPHILS NFR BLD AUTO: 73.5 % — SIGNIFICANT CHANGE UP (ref 42.2–75.2)
NITRITE UR-MCNC: NEGATIVE — SIGNIFICANT CHANGE UP
NRBC # BLD: 0 /100 WBCS — SIGNIFICANT CHANGE UP (ref 0–0)
PH UR: 6 — SIGNIFICANT CHANGE UP (ref 5–8)
PLATELET # BLD AUTO: 295 K/UL — SIGNIFICANT CHANGE UP (ref 130–400)
POTASSIUM SERPL-MCNC: 4.2 MMOL/L — SIGNIFICANT CHANGE UP (ref 3.5–5)
POTASSIUM SERPL-SCNC: 4.2 MMOL/L — SIGNIFICANT CHANGE UP (ref 3.5–5)
PROT SERPL-MCNC: 6.3 G/DL — SIGNIFICANT CHANGE UP (ref 6–8)
PROT UR-MCNC: NEGATIVE — SIGNIFICANT CHANGE UP
RBC # BLD: 3.84 M/UL — LOW (ref 4.2–5.4)
RBC # FLD: 14.6 % — HIGH (ref 11.5–14.5)
RBC CASTS # UR COMP ASSIST: 14 /HPF — HIGH (ref 0–4)
SARS-COV-2 RNA SPEC QL NAA+PROBE: DETECTED
SODIUM SERPL-SCNC: 136 MMOL/L — SIGNIFICANT CHANGE UP (ref 135–146)
SP GR SPEC: 1.01 — SIGNIFICANT CHANGE UP (ref 1.01–1.03)
SPECIMEN SOURCE: SIGNIFICANT CHANGE UP
UROBILINOGEN FLD QL: SIGNIFICANT CHANGE UP
WBC # BLD: 9.43 K/UL — SIGNIFICANT CHANGE UP (ref 4.8–10.8)
WBC # FLD AUTO: 9.43 K/UL — SIGNIFICANT CHANGE UP (ref 4.8–10.8)
WBC UR QL: 1 /HPF — SIGNIFICANT CHANGE UP (ref 0–5)

## 2022-06-26 PROCEDURE — 74177 CT ABD & PELVIS W/CONTRAST: CPT | Mod: 26,MA

## 2022-06-26 PROCEDURE — 99285 EMERGENCY DEPT VISIT HI MDM: CPT | Mod: CS

## 2022-06-26 PROCEDURE — 99223 1ST HOSP IP/OBS HIGH 75: CPT

## 2022-06-26 RX ORDER — PIPERACILLIN AND TAZOBACTAM 4; .5 G/20ML; G/20ML
3.38 INJECTION, POWDER, LYOPHILIZED, FOR SOLUTION INTRAVENOUS ONCE
Refills: 0 | Status: COMPLETED | OUTPATIENT
Start: 2022-06-26 | End: 2022-06-26

## 2022-06-26 RX ORDER — PANTOPRAZOLE SODIUM 20 MG/1
40 TABLET, DELAYED RELEASE ORAL
Refills: 0 | Status: DISCONTINUED | OUTPATIENT
Start: 2022-06-26 | End: 2022-07-09

## 2022-06-26 RX ORDER — LANOLIN ALCOHOL/MO/W.PET/CERES
10 CREAM (GRAM) TOPICAL AT BEDTIME
Refills: 0 | Status: DISCONTINUED | OUTPATIENT
Start: 2022-06-26 | End: 2022-07-09

## 2022-06-26 RX ORDER — POLYETHYLENE GLYCOL 3350 17 G/17G
17 POWDER, FOR SOLUTION ORAL DAILY
Refills: 0 | Status: DISCONTINUED | OUTPATIENT
Start: 2022-06-26 | End: 2022-07-03

## 2022-06-26 RX ORDER — SENNA PLUS 8.6 MG/1
2 TABLET ORAL AT BEDTIME
Refills: 0 | Status: DISCONTINUED | OUTPATIENT
Start: 2022-06-26 | End: 2022-07-03

## 2022-06-26 RX ORDER — ENOXAPARIN SODIUM 100 MG/ML
40 INJECTION SUBCUTANEOUS EVERY 24 HOURS
Refills: 0 | Status: DISCONTINUED | OUTPATIENT
Start: 2022-06-26 | End: 2022-07-09

## 2022-06-26 RX ORDER — SODIUM CHLORIDE 9 MG/ML
1200 INJECTION, SOLUTION INTRAVENOUS ONCE
Refills: 0 | Status: COMPLETED | OUTPATIENT
Start: 2022-06-26 | End: 2022-06-26

## 2022-06-26 RX ORDER — ONDANSETRON 8 MG/1
4 TABLET, FILM COATED ORAL ONCE
Refills: 0 | Status: COMPLETED | OUTPATIENT
Start: 2022-06-26 | End: 2022-06-26

## 2022-06-26 RX ORDER — PIPERACILLIN AND TAZOBACTAM 4; .5 G/20ML; G/20ML
3.38 INJECTION, POWDER, LYOPHILIZED, FOR SOLUTION INTRAVENOUS EVERY 8 HOURS
Refills: 0 | Status: DISCONTINUED | OUTPATIENT
Start: 2022-06-26 | End: 2022-06-27

## 2022-06-26 RX ORDER — MORPHINE SULFATE 50 MG/1
4 CAPSULE, EXTENDED RELEASE ORAL ONCE
Refills: 0 | Status: DISCONTINUED | OUTPATIENT
Start: 2022-06-26 | End: 2022-06-26

## 2022-06-26 RX ADMIN — MORPHINE SULFATE 4 MILLIGRAM(S): 50 CAPSULE, EXTENDED RELEASE ORAL at 09:28

## 2022-06-26 RX ADMIN — MORPHINE SULFATE 4 MILLIGRAM(S): 50 CAPSULE, EXTENDED RELEASE ORAL at 18:15

## 2022-06-26 RX ADMIN — SODIUM CHLORIDE 1200 MILLILITER(S): 9 INJECTION, SOLUTION INTRAVENOUS at 09:27

## 2022-06-26 RX ADMIN — ONDANSETRON 4 MILLIGRAM(S): 8 TABLET, FILM COATED ORAL at 09:27

## 2022-06-26 RX ADMIN — PIPERACILLIN AND TAZOBACTAM 200 GRAM(S): 4; .5 INJECTION, POWDER, LYOPHILIZED, FOR SOLUTION INTRAVENOUS at 14:30

## 2022-06-26 NOTE — H&P ADULT - NSHPREVIEWOFSYSTEMS_GEN_ALL_CORE
REVIEW OF SYSTEMS:    CONSTITUTIONAL: No weakness, fevers or chills  EYES/ENT: No visual changes;  No vertigo or throat pain   NECK: No pain or stiffness  RESPIRATORY: No cough, wheezing, hemoptysis; No shortness of breath  CARDIOVASCULAR: No chest pain or palpitations  GASTROINTESTINAL: (+) Abdominal pain. No nausea, vomiting, or hematemesis; No diarrhea or constipation. No melena or hematochezia.  GENITOURINARY: No dysuria, frequency or hematuria  NEUROLOGICAL: No numbness or weakness  SKIN: No itching, rashes

## 2022-06-26 NOTE — H&P ADULT - HISTORY OF PRESENT ILLNESS
78yo F PMHx of dementia and HTN presents to the ED with complaints of abdominal pain.     Patient had a recent hospital course 6/20-6/22 last month for diverticulitis, and prescribed a 10-day course of cipro + flagyl to complete on 7/2/22.     At the ED, patient is afebrile, VS stable, on RA. Labs significant for COVID-19 (+), Hb- 9.8 (appears chronic). CT A+P shows unchanged severe rectosigmoid wall thickening with pericolonic infiltrative changes + diverticuli + small bowel thickening, large stool burden without drainable fluid collection, no obstruction noted.        76yo F PMHx of dementia and HTN presents to the ED with complaints of abdominal pain.     Patient had a recent hospital course 6/20-6/22 last month for diverticulitis, and prescribed a 10-day course of cipro + flagyl to complete on 7/2/22. Abdominal pain started yesterday. States it is sharp, severe, on the left lower abdomen. Per patient's daughter, patient is compliant with taking her antibiotics (in the AM). Denies nausea/vomiting. Endorses constipation, but has been having frequent small loose brown-colored stools. Patient has decreased PO intake, but has no issues with  swallowing.     Otherwise, ROS (-). Denies fever, shortness of breath, chest pain, dysuria, myalgias, back pain.     At the ED, patient is afebrile, VS stable, on RA. Labs significant for COVID-19 (+), Hb- 9.8 (appears chronic). LFTs wnl. CT A+P shows unchanged severe rectosigmoid wall thickening with pericolonic infiltrative changes + diverticuli + small bowel thickening, large stool burden without drainable fluid collection, no obstruction noted.

## 2022-06-26 NOTE — H&P ADULT - ASSESSMENT
78yo F PMHx of dementia and HTN, recent hospital course for diverticulitis presents to the ED with complaints of abdominal pain consistent with diverticulitis.    #Acute diverticulitis- suspected ?resistance  #Constipation- (loose stools likely from overflow leaking from constipation demonstrated on CT A+P)  - was prescribed 10-day course of cipro+flagyl (end date 7/2/22). Patient and patient's daughter endorses she is compliant with her medication  - CT A+P- findings consistent with unchanged diverticulitis/colitis + diverticula, pericolonic infiltrative changes. Adjacent small bowel loops have wall thickening as well   - Started zosyn. f/u ID consult  - would need outpatient colonoscopy from CT findings. Patient has not had a colonoscopy in > 10 years.   - f/u GI consult  - OK to start full diet if patient tolerates    #HTN  - well controlled without medications    #DVT ppx: lovenox  #GI ppx: protonix  #   78yo F PMHx of dementia and HTN, recent hospital course for diverticulitis presents to the ED with complaints of abdominal pain consistent with diverticulitis.    #Acute diverticulitis- suspected ?resistance  #Constipation- (loose stools likely from overflow leaking from constipation demonstrated on CT A+P)  - was prescribed 10-day course of cipro+flagyl (end date 7/2/22). Patient and patient's daughter endorses she is compliant with her medication  - CT A+P- findings consistent with unchanged diverticulitis/colitis + diverticula, pericolonic infiltrative changes. Adjacent small bowel loops have wall thickening as well   - Started zosyn. f/u ID consult  - would need outpatient colonoscopy from CT findings. Patient has not had a colonoscopy in > 10 years.   - f/u GI consult  - OK to start full diet if patient tolerates    #HTN  - well controlled without medications    #DVT ppx: lovenox  #GI ppx: protonix. patient currently not having diarrhea as she's constipated with loose stools 2/2 suspected leak. if patient does have diarrhea, switch to famotidine  #Diet: full, low fiber  #Code: full  #handoff: f/u ID 78yo F PMHx of dementia and HTN, recent hospital course for diverticulitis presents to the ED with complaints of abdominal pain consistent with diverticulitis.    #Acute diverticulitis- suspected ?resistance  #Constipation- (loose stools likely from overflow leaking from constipation demonstrated on CT A+P)  - was prescribed 10-day course of cipro+flagyl (end date 7/2/22). Patient and patient's daughter endorses she is compliant with her medication  - CT A+P- findings consistent with unchanged diverticulitis/colitis + diverticula, pericolonic infiltrative changes. Adjacent small bowel loops have wall thickening as well   - Started zosyn. f/u ID consult  - would need outpatient colonoscopy from CT findings. Patient has not had a colonoscopy in > 10 years.   - f/u GI consult  - OK to start full diet if patient tolerates    #COVID-19 (+): Asymptomatic, Incidental  - VS stable. No need for steroids. Cont monitor    #HTN  - well controlled without medications    #DVT ppx: lovenox  #GI ppx: protonix. patient currently not having diarrhea as she's constipated with loose stools 2/2 suspected leak. if patient does have diarrhea, switch to famotidine  #Diet: full, low fiber  #Code: full  #handoff: f/u ID 78yo F PMHx of dementia and HTN, recent hospital course for diverticulitis presents to the ED with complaints of abdominal pain consistent with diverticulitis.    #Acute diverticulitis- suspected ?resistance  #Constipation- (loose stools likely from overflow leaking from constipation demonstrated on CT A+P)  - was prescribed 10-day course of cipro+flagyl (end date 7/2/22). Patient and patient's daughter endorses she is compliant with her medication  - CT A+P- findings consistent with unchanged diverticulitis/colitis + diverticula, pericolonic infiltrative changes. Adjacent small bowel loops have wall thickening as well   - started senna + miralax for constipation  - Started zosyn. f/u ID consult  - would need outpatient colonoscopy from CT findings. Patient has not had a colonoscopy in > 10 years.   - f/u GI consult  - OK to start full diet if patient tolerates    #COVID-19 (+): Asymptomatic, Incidental  - VS stable. No need for steroids. Cont monitor    #HTN  - well controlled without medications    #DVT ppx: lovenox  #GI ppx: protonix. patient currently not having diarrhea as she's constipated with loose stools 2/2 suspected leak. if patient does have diarrhea, switch to famotidine  #Diet: full, low fiber  #Code: full  #handoff: f/u ID

## 2022-06-26 NOTE — H&P ADULT - NSHPPHYSICALEXAM_GEN_ALL_CORE
PHYSICAL EXAM:  GENERAL: NAD, lying in bed comfortably  HEAD:  Atraumatic, Normocephalic  EYES: EOMI, PERRLA, conjunctiva and sclera clear  ENT: Moist mucous membranes  NECK: Supple, No JVD  CHEST/LUNG: Clear to auscultation bilaterally; No rales, rhonchi, wheezing, or rubs. Unlabored respirations  HEART: Regular rate and rhythm; No murmurs, rubs, or gallops  ABDOMEN: Tenderness to palpation in the LLQ without rebound tenderness. Bowel sounds present; Soft, Nondistended. No hepatomegaly  EXTREMITIES:  2+ Peripheral Pulses, brisk capillary refill. No clubbing, cyanosis, or edema  NERVOUS SYSTEM:  Alert & Oriented X3, speech clear. No deficits   MSK: FROM all 4 extremities, full and equal strength  SKIN: No rashes or lesions

## 2022-06-26 NOTE — ED PROVIDER NOTE - PHYSICAL EXAMINATION
VITAL SIGNS: I have reviewed nursing notes and confirm.  CONSTITUTIONAL: non-toxic, well appearing  SKIN: no rash, no petechiae.  EYES: pink conjunctiva, anicteric  ENT: tongue midline, no exudates, MMM  NECK: Supple; no meningismus, no JVD  CARD: RRR, no murmurs, equal radial pulses bilaterally 2+  RESP: CTAB, no respiratory distress  ABD: + ttp to RLQ and LLQ with some distension, no peritoneal signs, soft  EXT: No edema. No calves tenderness  NEURO: Alert, oriented to name and location, no focal deficits

## 2022-06-26 NOTE — ED PROVIDER NOTE - NS ED ROS FT
Review of Systems    Constitutional: (-) fever  Cardiovascular: (-) chest pain, (-) syncope  Respiratory: (-) cough, (-) shortness of breath  Gastrointestinal: (-) vomiting, (-) diarrhea, + abdominal pain  Musculoskeletal: (-) neck pain, (-) back pain, (-) joint pain  Integumentary: (-) rash, (-) edema  Neurological: (-) headache, (-) altered mental status    Except as documented in the HPI, all other systems are negative.

## 2022-06-26 NOTE — ED PROVIDER NOTE - ATTENDING CONTRIBUTION TO CARE
76yo F PMHx of dementia and HTN presents to the ED with complaints of persistent abdominal pain for several weeks. currently on course of oral abx however pain persists. denies fevers.     CONSTITUTIONAL: Well-developed; thin, in no acute distress.   SKIN: warm, dry  HEAD: Normocephalic; atraumatic.  EYES: PERRL, EOMI, no conjunctival erythema  ENT: No nasal discharge; airway clear.  NECK: Supple; non tender.  CARD: S1, S2 normal; no murmurs, gallops, or rubs. Regular rate and rhythm.   RESP: No wheezes, rales or rhonchi.  ABD: soft nondistended, tenderness to palpation of the LLQ.   EXT: Normal ROM.  No clubbing, cyanosis or edema.   NEURO: Alert, oriented x 2, grossly unremarkable  PSYCH: Cooperative, appropriate.    failed oral abx- r/o abscess/perforation  plan for labs, imaging, admit.

## 2022-06-26 NOTE — ED PROVIDER NOTE - CLINICAL SUMMARY MEDICAL DECISION MAKING FREE TEXT BOX
78 yo F p/w abd pain 2/2 diverticulitis that failed outpatient management. labs and imaging reviewed. admitted for further management.

## 2022-06-26 NOTE — PATIENT PROFILE ADULT - FALL HARM RISK - HARM RISK INTERVENTIONS

## 2022-06-26 NOTE — H&P ADULT - ATTENDING COMMENTS
78 YO F with a PMH of dementia and HTN who presents to the hospital with a c/o ABD pain for the past x 2 days. Described as located in the LLQ pain, non-radiating, sharp, and waxes/wanes. - N/V. - black/bloody stools. Denies any fevers/chills, hematemesis, rashes, flank pain, dysuria, LE swelling, headaches, or CP.     In the ED, CT-AP w/ IV contrast showed rectosigmoid diverticulitis w/ large stool burden. Pt started on IVFs (LR), anti-emetics, IV ABXs (Zosyn), and pain meds in the ED.     FMHx: Reviewed, not relevant    Physical exam shows pt laying in bed in NAD. VSS, afebrile, not hypoxic on RA. A&Ox3. Neuro exam intact. CTA B/L with no W/C/R. RRR, no M/G/R. ABD is soft with TTP LLQ, normoactive BSs. LEs with no pitting edema. No rashes. Labs and radiology as above.     LLQ ABD pain due to acute rectosigmoid diverticulitis without abscess/perforation; no sepsis present on admission. IV ABXs (Zosyn). PRN pain meds. IVFs (LR). Anti-emetics PRN. FU cultures. GI consult.    Large stool burden. Bowel regimen.     Microcytic anemia, below baseline. Pt denies any bleeding symptoms. Send anemia work-up. Replace PRN.     Hypoalbuminemia, from poor oral intake. Nutrition eval.     Hx of dementia and HTN. Restart home meds, except as stated above. DVT PPX. Inform PCP of pt's admission to hospital. My note supersedes the residents note.     Date seen by [email or web address]

## 2022-06-26 NOTE — ED ADULT NURSE NOTE - OBJECTIVE STATEMENT
77 yr F c/o abd pain 77 yr F c/o abd pain, pt recently admitted for liver cirrhosis as per son said she was d/c on Thursday sent home with oral ABX Cipro and flagyl. Pt alert but flat affect. Will monitor.

## 2022-06-26 NOTE — H&P ADULT - NSHPLABSRESULTS_GEN_ALL_CORE
LABS:  cret                        9.8    9.43  )-----------( 295      ( 26 Jun 2022 09:28 )             30.7     06-26    136  |  100  |  11  ----------------------------<  102<H>  4.2   |  26  |  0.6<L>    Ca    8.6      26 Jun 2022 09:28    TPro  6.3  /  Alb  3.1<L>  /  TBili  <0.2  /  DBili  <0.2  /  AST  23  /  ALT  11  /  AlkPhos  70  06-26      < from: CT Abdomen and Pelvis w/ IV Cont (06.26.22 @ 11:49) >    IMPRESSION:    Redemonstration of rectosigmoid severe wall thickening with pericolonic   infiltrative change consistent with unchanged diverticulitis/colitis and   unchanged perisigmoid lymphadenopathy.    Underlying colonic neoplasm cannot be excluded on this examination.    When the patient's current symptoms improve, outpatient colonoscopy is   recommended.    Large colonic stool burden.    Bibasilar streak-like opacities, likely atelectatic although   infectious/inflammatory etiologies are not excluded in the appropriate   clinical setting.    Unchanged indeterminate splenic hypodensity and biliary duct dilatation    --- End of Report ---

## 2022-06-26 NOTE — ED PROVIDER NOTE - OBJECTIVE STATEMENT
76yo F PMHx of dementia and HTN presents to the ED with complaints of persistent abdominal pain for several weeks. Pt was recently diagnosed with diverticulitis and has returned to the hospital several times for continued pain (most recent course 6/20-6/22) and prescribed a 10-day course of cipro + flagyl as well as PPI. Denies f/c, cp, sob, n/v/d, LE edema.

## 2022-06-27 LAB
ALBUMIN SERPL ELPH-MCNC: 2.9 G/DL — LOW (ref 3.5–5.2)
ALP SERPL-CCNC: 69 U/L — SIGNIFICANT CHANGE UP (ref 30–115)
ALT FLD-CCNC: 9 U/L — SIGNIFICANT CHANGE UP (ref 0–41)
ANION GAP SERPL CALC-SCNC: 13 MMOL/L — SIGNIFICANT CHANGE UP (ref 7–14)
AST SERPL-CCNC: 15 U/L — SIGNIFICANT CHANGE UP (ref 0–41)
BILIRUB SERPL-MCNC: 0.3 MG/DL — SIGNIFICANT CHANGE UP (ref 0.2–1.2)
BUN SERPL-MCNC: 8 MG/DL — LOW (ref 10–20)
CALCIUM SERPL-MCNC: 8.7 MG/DL — SIGNIFICANT CHANGE UP (ref 8.5–10.1)
CHLORIDE SERPL-SCNC: 101 MMOL/L — SIGNIFICANT CHANGE UP (ref 98–110)
CO2 SERPL-SCNC: 23 MMOL/L — SIGNIFICANT CHANGE UP (ref 17–32)
CREAT SERPL-MCNC: 0.6 MG/DL — LOW (ref 0.7–1.5)
EGFR: 92 ML/MIN/1.73M2 — SIGNIFICANT CHANGE UP
GLUCOSE SERPL-MCNC: 150 MG/DL — HIGH (ref 70–99)
HCT VFR BLD CALC: 30.1 % — LOW (ref 37–47)
HGB BLD-MCNC: 9.8 G/DL — LOW (ref 12–16)
MAGNESIUM SERPL-MCNC: 2.1 MG/DL — SIGNIFICANT CHANGE UP (ref 1.8–2.4)
MCHC RBC-ENTMCNC: 26.3 PG — LOW (ref 27–31)
MCHC RBC-ENTMCNC: 32.6 G/DL — SIGNIFICANT CHANGE UP (ref 32–37)
MCV RBC AUTO: 80.9 FL — LOW (ref 81–99)
NRBC # BLD: 0 /100 WBCS — SIGNIFICANT CHANGE UP (ref 0–0)
PLATELET # BLD AUTO: 280 K/UL — SIGNIFICANT CHANGE UP (ref 130–400)
POTASSIUM SERPL-MCNC: 4.9 MMOL/L — SIGNIFICANT CHANGE UP (ref 3.5–5)
POTASSIUM SERPL-SCNC: 4.9 MMOL/L — SIGNIFICANT CHANGE UP (ref 3.5–5)
PROT SERPL-MCNC: 5.8 G/DL — LOW (ref 6–8)
RBC # BLD: 3.72 M/UL — LOW (ref 4.2–5.4)
RBC # FLD: 14.8 % — HIGH (ref 11.5–14.5)
SODIUM SERPL-SCNC: 137 MMOL/L — SIGNIFICANT CHANGE UP (ref 135–146)
WBC # BLD: 8.12 K/UL — SIGNIFICANT CHANGE UP (ref 4.8–10.8)
WBC # FLD AUTO: 8.12 K/UL — SIGNIFICANT CHANGE UP (ref 4.8–10.8)

## 2022-06-27 PROCEDURE — 99222 1ST HOSP IP/OBS MODERATE 55: CPT

## 2022-06-27 PROCEDURE — 74018 RADEX ABDOMEN 1 VIEW: CPT | Mod: 26

## 2022-06-27 PROCEDURE — 99233 SBSQ HOSP IP/OBS HIGH 50: CPT

## 2022-06-27 RX ORDER — CEFTRIAXONE 500 MG/1
INJECTION, POWDER, FOR SOLUTION INTRAMUSCULAR; INTRAVENOUS
Refills: 0 | Status: DISCONTINUED | OUTPATIENT
Start: 2022-06-27 | End: 2022-07-03

## 2022-06-27 RX ORDER — CEFTRIAXONE 500 MG/1
2000 INJECTION, POWDER, FOR SOLUTION INTRAMUSCULAR; INTRAVENOUS ONCE
Refills: 0 | Status: COMPLETED | OUTPATIENT
Start: 2022-06-27 | End: 2022-06-27

## 2022-06-27 RX ORDER — MORPHINE SULFATE 50 MG/1
2 CAPSULE, EXTENDED RELEASE ORAL EVERY 4 HOURS
Refills: 0 | Status: DISCONTINUED | OUTPATIENT
Start: 2022-06-27 | End: 2022-07-03

## 2022-06-27 RX ORDER — METRONIDAZOLE 500 MG
500 TABLET ORAL EVERY 8 HOURS
Refills: 0 | Status: DISCONTINUED | OUTPATIENT
Start: 2022-06-27 | End: 2022-07-03

## 2022-06-27 RX ORDER — CEFTRIAXONE 500 MG/1
1000 INJECTION, POWDER, FOR SOLUTION INTRAMUSCULAR; INTRAVENOUS EVERY 24 HOURS
Refills: 0 | Status: DISCONTINUED | OUTPATIENT
Start: 2022-06-27 | End: 2022-06-27

## 2022-06-27 RX ORDER — LANOLIN ALCOHOL/MO/W.PET/CERES
3 CREAM (GRAM) TOPICAL AT BEDTIME
Refills: 0 | Status: DISCONTINUED | OUTPATIENT
Start: 2022-06-27 | End: 2022-06-27

## 2022-06-27 RX ORDER — CEFTRIAXONE 500 MG/1
2000 INJECTION, POWDER, FOR SOLUTION INTRAMUSCULAR; INTRAVENOUS EVERY 24 HOURS
Refills: 0 | Status: DISCONTINUED | OUTPATIENT
Start: 2022-06-28 | End: 2022-07-03

## 2022-06-27 RX ORDER — ACETAMINOPHEN 500 MG
650 TABLET ORAL EVERY 6 HOURS
Refills: 0 | Status: DISCONTINUED | OUTPATIENT
Start: 2022-06-27 | End: 2022-07-09

## 2022-06-27 RX ADMIN — Medication 650 MILLIGRAM(S): at 21:42

## 2022-06-27 RX ADMIN — Medication 500 MILLIGRAM(S): at 16:13

## 2022-06-27 RX ADMIN — MORPHINE SULFATE 2 MILLIGRAM(S): 50 CAPSULE, EXTENDED RELEASE ORAL at 22:36

## 2022-06-27 RX ADMIN — MORPHINE SULFATE 2 MILLIGRAM(S): 50 CAPSULE, EXTENDED RELEASE ORAL at 22:00

## 2022-06-27 RX ADMIN — PANTOPRAZOLE SODIUM 40 MILLIGRAM(S): 20 TABLET, DELAYED RELEASE ORAL at 06:55

## 2022-06-27 RX ADMIN — ENOXAPARIN SODIUM 40 MILLIGRAM(S): 100 INJECTION SUBCUTANEOUS at 11:33

## 2022-06-27 RX ADMIN — PIPERACILLIN AND TAZOBACTAM 25 GRAM(S): 4; .5 INJECTION, POWDER, LYOPHILIZED, FOR SOLUTION INTRAVENOUS at 06:55

## 2022-06-27 RX ADMIN — CEFTRIAXONE 100 MILLIGRAM(S): 500 INJECTION, POWDER, FOR SOLUTION INTRAMUSCULAR; INTRAVENOUS at 16:28

## 2022-06-27 RX ADMIN — Medication 10 MILLIGRAM(S): at 22:01

## 2022-06-27 RX ADMIN — POLYETHYLENE GLYCOL 3350 17 GRAM(S): 17 POWDER, FOR SOLUTION ORAL at 11:33

## 2022-06-27 RX ADMIN — Medication 650 MILLIGRAM(S): at 20:47

## 2022-06-27 RX ADMIN — SENNA PLUS 2 TABLET(S): 8.6 TABLET ORAL at 22:00

## 2022-06-27 RX ADMIN — Medication 500 MILLIGRAM(S): at 22:01

## 2022-06-27 NOTE — PROGRESS NOTE ADULT - ASSESSMENT
78yo F PMHx of dementia and HTN presents to the ED with complaints of abdominal pain.   Patient had a recent hospital course 6/20-6/22 last month for diverticulitis, and prescribed a 10-day course of cipro + flagyl to complete on 7/2/22.     #Acute rectosigmoid diverticulitis  CTAP w IV 06/26: Redemonstration of rectosigmoid severe wall thickening with pericolonic infiltrative change consistent with unchanged diverticulitis/colitis  Pt endorses pain has significantly improved today, tolerating diet  - Cont zosyn 3.375mg q8h  - ID f/u regarding outpt abx recds, ?need for IV  - Cont low fiber diet  - Cont bowel regimen    #HTN  - Currently not on meds    #Dementia  - Supportive care    DVT PPX, Lovenox    #Progress Note Handoff  Pending (specify): Cont IV abx, ID eval  Family discussion: d/w pt regarding tx for diverticulitis  Disposition: Home

## 2022-06-27 NOTE — CONSULT NOTE ADULT - ASSESSMENT
76 yo F PMHx of dementia and HTN presenting with persistent abdominal pain, after being treated for Diverticulitis with PO ciprofloxacin and Flagyl. CT findings suggestive of  Rectosigmoid diverticulitis/Colitis.     # Rectosigmoid diverticulitis/Colitis  # not improved with outpatient antibiotics  # No sepsis/Perforation/Abscess  - IV antibiotics  - IV fluids  - Bowel rest  - ID eval  - OP colonoscopy to r/o colonic neoplasm after resolution of diverticulitis.           ***** INCOMPLETE NOTE PENDING DISCUSSION WITH ATTENDING *****   76 yo F PMHx of dementia and HTN presenting with persistent abdominal pain, after being treated for Diverticulitis with PO ciprofloxacin and Flagyl. CT findings suggestive of  Rectosigmoid diverticulitis/Colitis.     # Rectosigmoid diverticulitis/Colitis  # not improved with outpatient antibiotics  # No sepsis/Perforation/Abscess  - LLQ abdominal pain x 10 days  - CT abdomen with IV contrast: Redemonstration of severe rectosigmoid wall thickening with pericolonic infiltrative changes and diverticuli. There is a large colonic stool burden. Adjacent small bowel loops demonstrates wall thickening. There is a small hiatal hernia. Oral contrast is seen within the colon, likely from prior administration. No discrete drainable   fluid collection is demonstrated. No definite evidence of pneumoperitoneum. No evidence of obstruction.  - antibiotics per ID  - follow up cultures  - Monitor BMP, LFT  - Supportive care : Pain control and antiemetics PRN  - IV fluids  - Low fiber diet  - OP colonoscopy in 4-6 weeks to r/o colonic neoplasm.     ***** INCOMPLETE NOTE PENDING DISCUSSION WITH ATTENDING *****   78 yo F PMHx of dementia and HTN presenting with persistent abdominal pain, after being treated for Diverticulitis with PO ciprofloxacin and Flagyl. CT findings suggestive of  Rectosigmoid diverticulitis/Colitis.     # Rectosigmoid diverticulitis/Colitis  # not improved with outpatient antibiotics  # No sepsis/Perforation/Abscess  - LLQ abdominal pain x 10 days  - CT abdomen with IV contrast: Redemonstration of severe rectosigmoid wall thickening with pericolonic infiltrative changes and diverticuli. There is a large colonic stool burden. Adjacent small bowel loops demonstrates wall thickening. There is a small hiatal hernia. Oral contrast is seen within the colon, likely from prior administration. No discrete drainable   fluid collection is demonstrated. No definite evidence of pneumoperitoneum. No evidence of obstruction.  - antibiotics per ID  - follow up cultures  - Monitor BMP, LFT  - Supportive care : Pain control and antiemetics PRN  - IV fluids  - Low fiber diet until pain subsides  - OP colonoscopy in 4-6 weeks to r/o colonic neoplasm.     ***** INCOMPLETE NOTE PENDING DISCUSSION WITH ATTENDING *****   76 yo F PMHx of dementia and HTN presenting with persistent abdominal pain, after being treated for Diverticulitis with PO ciprofloxacin and Flagyl. CT findings suggestive of  Rectosigmoid diverticulitis/Colitis.     # Rectosigmoid diverticulitis/Colitis  # not improved with outpatient antibiotics  # No sepsis/Perforation/Abscess  - LLQ abdominal pain x 10 days  - CT abdomen with IV contrast: Redemonstration of severe rectosigmoid wall thickening with pericolonic infiltrative changes and diverticuli. There is a large colonic stool burden. Adjacent small bowel loops demonstrates wall thickening. There is a small hiatal hernia. Oral contrast is seen within the colon, likely from prior administration. No discrete drainable   fluid collection is demonstrated. No definite evidence of pneumoperitoneum. No evidence of obstruction.  - continue IV antibiotics until pain resolution  - follow up cultures  - Monitor BMP, LFT  - Supportive care : IV fluids, Pain control and antiemetics PRN  - Low fiber diet until pain subsides  - OP colonoscopy in 8 weeks to r/o colonic neoplasm  - Surgery evaluation   76 yo F PMHx of dementia and HTN presenting with persistent abdominal pain, after being treated for Diverticulitis with PO ciprofloxacin and Flagyl. CT findings suggestive of  Rectosigmoid diverticulitis/Colitis.     # Rectosigmoid diverticulitis/Colitis  # not improved with outpatient antibiotics  # No sepsis/Perforation/Abscess  - LLQ abdominal pain x 10 days  - CT abdomen with IV contrast: Redemonstration of severe rectosigmoid wall thickening with pericolonic infiltrative changes and diverticuli. There is a large colonic stool burden. Adjacent small bowel loops demonstrates wall thickening. There is a small hiatal hernia. Oral contrast is seen within the colon, likely from prior administration. No discrete drainable   fluid collection is demonstrated. No definite evidence of pneumoperitoneum. No evidence of obstruction.  - continue IV antibiotics until pain resolution  - follow up cultures  - Monitor BMP, LFT  - Supportive care : IV fluids, Pain control and antiemetics PRN  - Advance diet as tolerated  - OP colonoscopy in 8 weeks to r/o colonic neoplasm  - Surgery evaluation

## 2022-06-27 NOTE — CONSULT NOTE ADULT - SUBJECTIVE AND OBJECTIVE BOX
Gastroenterology Consultation:    Patient is a 77y old  Female who presents with a chief complaint of abdominal pain, rectosigmoid diverticulitis (2022 16:06)      Admitted on: 22  HPI:  78yo F PMHx of dementia and HTN presents to the ED with complaints of abdominal pain.     Patient had a recent hospital course - for diverticulitis, and prescribed a 10-day course of cipro + flagyl to complete on 22. Abdominal pain started yesterday. States it is sharp, severe, on the left lower abdomen. Per patient's daughter, patient is compliant with taking her antibiotics (in the AM). Denies nausea/vomiting. Endorses constipation, but has been having frequent small loose brown-colored stools. Patient has decreased PO intake, but has no issues with  swallowing.     Otherwise, ROS (-). Denies fever, shortness of breath, chest pain, dysuria, myalgias, back pain.     At the ED, patient is afebrile, VS stable, on RA. Labs significant for COVID-19 (+), Hb- 9.8 (appears chronic). LFTs wnl. CT A+P shows unchanged severe rectosigmoid wall thickening with pericolonic infiltrative changes + diverticuli + small bowel thickening, large stool burden without drainable fluid collection, no obstruction noted.     (2022 16:06)    GI history:     Prior EGD:  Prior Colonoscopy:      PAST MEDICAL & SURGICAL HISTORY:  Hypertension, unspecified type  H/O dilation and curettage  for missed     FAMILY HISTORY:    Social History:  Tobacco:  Alcohol:  Drugs:    Home Medications:  Ciprofloxacin 500mg BID  Metronidazole 500mg TID  Pantoprazole 40mg OD    MEDICATIONS  (STANDING):  enoxaparin Injectable 40 milliGRAM(s) SubCutaneous every 24 hours  melatonin 10 milliGRAM(s) Oral at bedtime  pantoprazole    Tablet 40 milliGRAM(s) Oral before breakfast  piperacillin/tazobactam IVPB.. 3.375 Gram(s) IV Intermittent every 8 hours  polyethylene glycol 3350 17 Gram(s) Oral daily  senna 2 Tablet(s) Oral at bedtime    MEDICATIONS  (PRN):      Allergies  No Known Allergies      Review of Systems:   Constitutional:  No Fever, No Chills  ENT/Mouth:  No Hearing Changes,  No Difficulty Swallowing  Eyes:  No Eye Pain, No Vision Changes  Cardiovascular:  No Chest Pain, No Palpitations  Respiratory:  No Cough, No Dyspnea  Gastrointestinal:  As described in HPI  Musculoskeletal:  No Joint Swelling, No Back Pain  Skin:  No Skin Lesions, No Jaundice  Neuro:  No Syncope, No Dizziness  Heme/Lymph:  No Bruising, No Bleeding.      Physical Examination:  T(C): 36.2 (22 @ 05:00), Max: 37.4 (22 @ 00:35)  HR: 80 (22 @ 05:00) (78 - 92)  BP: 122/63 (22 @ 05:00) (116/62 - 145/65)  RR: 18 (22 @ 05:00) (18 - 18)  SpO2: 99% (22 @ 05:00) (97% - 99%)  Height (cm): 167.6 (22 @ 08:23)  Weight (kg): 39.5 (22 @ 08:23)    Constitutional: No acute distress.  Eyes:. Conjunctivae are clear, Sclera is non-icteric.  Ears Nose and Throat: The external ears are normal appearing,  Oral mucosa is pink and moist.  Respiratory:  No signs of respiratory distress. Lung sounds are clear bilaterally.  Cardiovascular:  S1 S2, Regular rate and rhythm.  GI: Abdomen is soft, symmetric, and non-tender without distention. There are no visible lesions or scars. Bowel sounds are present and normoactive in all four quadrants. No masses, hepatomegaly, or splenomegaly are noted.   Neuro: No Tremor, No involuntary movements  Skin: No rashes, No Jaundice.      Data:                        9.8    9.43  )-----------( 295      ( 2022 09:28 )             30.7     Hgb Trend:  9.8  22 @ 09:28          136  |  100  |  11  ----------------------------<  102<H>  4.2   |  26  |  0.6<L>    Ca    8.6      2022 09:28    TPro  6.3  /  Alb  3.1<L>  /  TBili  <0.2  /  DBili  <0.2  /  AST  23  /  ALT  11  /  AlkPhos  70      Liver panel trend:  TBili <0.2   /   AST 23   /   ALT 11   /   AlkP 70   /   Tptn 6.3   /   Alb 3.1    /   DBili <0.2        TBili <0.2   /   AST 14   /   ALT 8   /   AlkP 73   /   Tptn 5.4   /   Alb 2.8    /   DBili --        TBili <0.2   /   AST 11   /   ALT 7   /   AlkP 68   /   Tptn 5.7   /   Alb 2.9    /   DBili --        TBili 0.4   /   AST 10   /   ALT 8   /   AlkP 74   /   Tptn 6.0   /   Alb 2.9    /   DBili --        TBili 0.2   /   AST 13   /   ALT 9   /   AlkP 86   /   Tptn 6.9   /   Alb 3.4    /   DBili --            Radiology:  CT Abdomen and Pelvis w/ IV Cont:   ACC: [id number] EXAM:  CT ABDOMEN AND PELVIS IC                          PROCEDURE DATE:  2022      INTERPRETATION:  CLINICAL STATEMENT: Abdominal pain    TECHNIQUE: Contiguous axial CT images were obtained from the lower chest   to the pubic symphysis following the administration of 75 cc Omnipaque   350 intravenous contrast.  Oral contrast was not administered.    Reformatted images in the coronal and sagittal planes were acquired.    COMPARISON CT: CT abdomen and pelvis 2022    FINDINGS:    LOWER CHEST: Basilar streak-like opacities...    HEPATOBILIARY: Unchanged mild intrahepatic extrahepatic biliary ductal   dilatation. Subcentimeter hepatic densities are too small to   characterize. The gallbladder is present..    SPLEEN:1.6 cm splenic hypodensity, indeterminate and unchanged..    PANCREAS: Unremarkable..    ADRENAL GLANDS: Stable..    KIDNEYS: Symmetric renal enhancement. No hydronephrosis. Right renal cyst   and left renal subcentimeter hypodensity, too small to characterize..    ABDOMINOPELVIC NODES: Perisigmoid lymphadenopathy..    PELVIC ORGANS: Under distended urinary bladder. Uterine calcifications.   Uterus and adnexa are incompletely evaluated on CT..    PERITONEUM/MESENTERY/BOWEL: Redemonstration of severe rectosigmoid wall   thickening with pericolonic infiltrative changes and diverticuli. There   is a large colonic stool burden. Adjacent small bowel loops demonstrates   wall thickening. There is a small hiatal hernia. Oral contrast is seen   within the colon, likely from prior administration. No discrete drainable   fluid collection is demonstrated. No definite evidence of   pneumoperitoneum. No evidence of obstruction..    BONES/SOFT TISSUES: Degenerative changes...    OTHER: IVC filter noted. Vascular calcifications. Focal dense   calcifications at the takeoff of the right renal artery....      IMPRESSION:    Redemonstration of rectosigmoid severe wall thickening with pericolonic   infiltrative change consistent with unchanged diverticulitis/colitis and   unchanged perisigmoid lymphadenopathy.    Underlying colonic neoplasm cannot be excluded on this examination.    When the patient's current symptoms improve, outpatient colonoscopy is   recommended.    Large colonic stool burden.    Bibasilar streak-like opacities, likely atelectatic although   infectious/inflammatory etiologies are not excluded in the appropriate   clinical setting.    Unchanged indeterminate splenic hypodensity and biliary duct dilatation    --- End of Report ---         Gastroenterology Consultation:    Patient is a 77y old  Female who presents with a chief complaint of abdominal pain, rectosigmoid diverticulitis (2022 16:06)      Admitted on: 22  HPI:  78yo F PMHx of dementia and HTN presents to the ED with complaints of abdominal pain.     Patient had a recent hospital course - for diverticulitis, and prescribed a 10-day course of cipro + flagyl to complete on 22. Abdominal pain started yesterday. States it is sharp, severe, on the left lower abdomen. Per patient's daughter, patient is compliant with taking her antibiotics (in the AM). Denies nausea/vomiting. Endorses constipation, but has been having frequent small loose brown-colored stools. Patient has decreased PO intake, but has no issues with  swallowing.     Otherwise, ROS (-). Denies fever, shortness of breath, chest pain, dysuria, myalgias, back pain.     At the ED, patient is afebrile, VS stable, on RA. Labs significant for COVID-19 (+), Hb- 9.8 (appears chronic). LFTs wnl. CT A+P shows unchanged severe rectosigmoid wall thickening with pericolonic infiltrative changes + diverticuli + small bowel thickening, large stool burden without drainable fluid collection, no obstruction noted.     (2022 16:06)    GI history: Per patient's son - patient has been having constant abdominal pain for about 10 days. Patient was initially seen in ED On  and discharged on PO augmentin, patient returned back on  and was admitted for 2 days, treated with IV cipro/flagyl --> switched to PO cipro/flagyl on discharge  to complete total 10 days course. Patient states that the pain is 7/10 on left lower quadrant, aggravated by pressing on the lower abdomen, associated with nausea, denies any vomiting, fever, chills, constipation, hematochezia, diarrhea, urinary symptoms, weight loss, fatigue, cough, sore throat, runny nose, headache. Patient doesn't have a gastroenterologist and has never had a colonoscopy or endoscopy. She has an appointment with Dr. Seay but hasn't been able to see her because she got admitted to the hospital. Denies any family history of cancers.   She has been vaccinated against covid including booster.     Prior EGD: Never  Prior Colonoscopy: Never      PAST MEDICAL & SURGICAL HISTORY:  Hypertension, unspecified type  H/O dilation and curettage  for missed     FAMILY HISTORY:    Social History:  Tobacco: None  Alcohol: 2 beers/day  Drugs: None    Home Medications:  Ciprofloxacin 500mg BID  Metronidazole 500mg TID  Pantoprazole 40mg OD    MEDICATIONS  (STANDING):  enoxaparin Injectable 40 milliGRAM(s) SubCutaneous every 24 hours  melatonin 10 milliGRAM(s) Oral at bedtime  pantoprazole    Tablet 40 milliGRAM(s) Oral before breakfast  piperacillin/tazobactam IVPB.. 3.375 Gram(s) IV Intermittent every 8 hours  polyethylene glycol 3350 17 Gram(s) Oral daily  senna 2 Tablet(s) Oral at bedtime    MEDICATIONS  (PRN):      Allergies  No Known Allergies      Review of Systems:   Constitutional:  No Fever, No Chills  ENT/Mouth:  No Hearing Changes,  No Difficulty Swallowing  Eyes:  No Eye Pain, No Vision Changes  Cardiovascular:  No Chest Pain, No Palpitations  Respiratory:  No Cough, No Dyspnea  Gastrointestinal:  As described in HPI  Musculoskeletal:  No Joint Swelling, No Back Pain  Skin:  No Skin Lesions, No Jaundice  Neuro:  No Syncope, No Dizziness  Heme/Lymph:  No Bruising, No Bleeding.      Physical Examination:  T(C): 36.2 (22 @ 05:00), Max: 37.4 (22 @ 00:35)  HR: 80 (22 @ 05:00) (78 - 92)  BP: 122/63 (22 @ 05:00) (116/62 - 145/65)  RR: 18 (22 @ 05:00) (18 - 18)  SpO2: 99% (22 @ 05:00) (97% - 99%)  Height (cm): 167.6 (22 @ 08:23)  Weight (kg): 39.5 (22 @ 08:23)    Constitutional: No acute distress.  Eyes:. Conjunctivae are clear, Sclera is non-icteric.  Ears Nose and Throat: The external ears are normal appearing,  Oral mucosa is pink and moist.  Respiratory:  No signs of respiratory distress. Lung sounds are clear bilaterally.  Cardiovascular:  S1 S2, Regular rate and rhythm.  GI: Abdomen is soft, symmetric, and tenderness to deep palpation on left lower quadrant. Midline scar +. Bowel sounds are present and normoactive in all four quadrants. No masses, hepatomegaly, or splenomegaly are noted.   Neuro: No Tremor, No involuntary movements  Skin: No rashes, No Jaundice.      Data:                        9.8    9.43  )-----------( 295      ( 2022 09:28 )             30.7     Hgb Trend:  9.8  22 @ 09:28          136  |  100  |  11  ----------------------------<  102<H>  4.2   |  26  |  0.6<L>    Ca    8.6      2022 09:28    TPro  6.3  /  Alb  3.1<L>  /  TBili  <0.2  /  DBili  <0.2  /  AST  23  /  ALT  11  /  AlkPhos  70      Liver panel trend:  TBili <0.2   /   AST 23   /   ALT 11   /   AlkP 70   /   Tptn 6.3   /   Alb 3.1    /   DBili <0.2        TBili <0.2   /   AST 14   /   ALT 8   /   AlkP 73   /   Tptn 5.4   /   Alb 2.8    /   DBili --        TBili <0.2   /   AST 11   /   ALT 7   /   AlkP 68   /   Tptn 5.7   /   Alb 2.9    /   DBili --        TBili 0.4   /   AST 10   /   ALT 8   /   AlkP 74   /   Tptn 6.0   /   Alb 2.9    /   DBili --        TBili 0.2   /   AST 13   /   ALT 9   /   AlkP 86   /   Tptn 6.9   /   Alb 3.4    /   DBili --            Radiology:  CT Abdomen and Pelvis w/ IV Cont:   ACC: [id number] EXAM:  CT ABDOMEN AND PELVIS IC                          PROCEDURE DATE:  2022      INTERPRETATION:  CLINICAL STATEMENT: Abdominal pain    TECHNIQUE: Contiguous axial CT images were obtained from the lower chest   to the pubic symphysis following the administration of 75 cc Omnipaque   350 intravenous contrast.  Oral contrast was not administered.    Reformatted images in the coronal and sagittal planes were acquired.    COMPARISON CT: CT abdomen and pelvis 2022    FINDINGS:    LOWER CHEST: Basilar streak-like opacities...    HEPATOBILIARY: Unchanged mild intrahepatic extrahepatic biliary ductal   dilatation. Subcentimeter hepatic densities are too small to   characterize. The gallbladder is present..    SPLEEN:1.6 cm splenic hypodensity, indeterminate and unchanged..    PANCREAS: Unremarkable..    ADRENAL GLANDS: Stable..    KIDNEYS: Symmetric renal enhancement. No hydronephrosis. Right renal cyst   and left renal subcentimeter hypodensity, too small to characterize..    ABDOMINOPELVIC NODES: Perisigmoid lymphadenopathy..    PELVIC ORGANS: Under distended urinary bladder. Uterine calcifications.   Uterus and adnexa are incompletely evaluated on CT..    PERITONEUM/MESENTERY/BOWEL: Redemonstration of severe rectosigmoid wall   thickening with pericolonic infiltrative changes and diverticuli. There   is a large colonic stool burden. Adjacent small bowel loops demonstrates   wall thickening. There is a small hiatal hernia. Oral contrast is seen   within the colon, likely from prior administration. No discrete drainable   fluid collection is demonstrated. No definite evidence of   pneumoperitoneum. No evidence of obstruction..    BONES/SOFT TISSUES: Degenerative changes...    OTHER: IVC filter noted. Vascular calcifications. Focal dense   calcifications at the takeoff of the right renal artery....      IMPRESSION:    Redemonstration of rectosigmoid severe wall thickening with pericolonic   infiltrative change consistent with unchanged diverticulitis/colitis and   unchanged perisigmoid lymphadenopathy.    Underlying colonic neoplasm cannot be excluded on this examination.    When the patient's current symptoms improve, outpatient colonoscopy is   recommended.    Large colonic stool burden.    Bibasilar streak-like opacities, likely atelectatic although   infectious/inflammatory etiologies are not excluded in the appropriate   clinical setting.    Unchanged indeterminate splenic hypodensity and biliary duct dilatation    --- End of Report ---         Gastroenterology Consultation:    Patient is a 77y old  Female who presents with a chief complaint of abdominal pain, rectosigmoid diverticulitis (2022 16:06)      Admitted on: 22  HPI:  76yo F PMHx of dementia and HTN presents to the ED with complaints of abdominal pain.     Patient had a recent hospital course - for diverticulitis, and prescribed a 10-day course of cipro + flagyl to complete on 22. Abdominal pain started yesterday. States it is sharp, severe, on the left lower abdomen. Per patient's daughter, patient is compliant with taking her antibiotics (in the AM). Denies nausea/vomiting. Endorses constipation, but has been having frequent small loose brown-colored stools. Patient has decreased PO intake, but has no issues with  swallowing.     Otherwise, ROS (-). Denies fever, shortness of breath, chest pain, dysuria, myalgias, back pain.     At the ED, patient is afebrile, VS stable, on RA. Labs significant for COVID-19 (+), Hb- 9.8 (appears chronic). LFTs wnl. CT A+P shows unchanged severe rectosigmoid wall thickening with pericolonic infiltrative changes + diverticuli + small bowel thickening, large stool burden without drainable fluid collection, no obstruction noted.     (2022 16:06)    GI history: Per patient's son - patient has been having constant abdominal pain for about 10 days. Patient was initially seen in ED On  and discharged on PO augmentin, patient returned back on  and was admitted for 2 days, treated with IV cipro/flagyl --> switched to PO cipro/flagyl on discharge  to complete total 10 days course. Patient states that the pain is 7/10 on left lower quadrant, aggravated by pressing on the lower abdomen, associated with nausea, denies any vomiting, fever, chills, constipation, hematochezia, diarrhea, urinary symptoms, weight loss, fatigue, cough, sore throat, runny nose, headache. Patient doesn't have a gastroenterologist and has never had a colonoscopy or endoscopy. She has an appointment with Dr. Seay but hasn't been able to see her because she got admitted to the hospital. Denies any family history of cancers.   She has been vaccinated against covid including booster.     Prior EGD: Never  Prior Colonoscopy: Never      PAST MEDICAL & SURGICAL HISTORY:  Hypertension, unspecified type  H/O dilation and curettage  for missed     FAMILY HISTORY: No family history of cancer    Social History:  Tobacco: None  Alcohol: 2 beers/day  Drugs: None    Home Medications:  Ciprofloxacin 500mg BID  Metronidazole 500mg TID  Pantoprazole 40mg OD    MEDICATIONS  (STANDING):  enoxaparin Injectable 40 milliGRAM(s) SubCutaneous every 24 hours  melatonin 10 milliGRAM(s) Oral at bedtime  pantoprazole    Tablet 40 milliGRAM(s) Oral before breakfast  piperacillin/tazobactam IVPB.. 3.375 Gram(s) IV Intermittent every 8 hours  polyethylene glycol 3350 17 Gram(s) Oral daily  senna 2 Tablet(s) Oral at bedtime    MEDICATIONS  (PRN):      Allergies  No Known Allergies      Review of Systems:   Constitutional:  No Fever, No Chills  ENT/Mouth:  No Hearing Changes,  No Difficulty Swallowing  Eyes:  No Eye Pain, No Vision Changes  Cardiovascular:  No Chest Pain, No Palpitations  Respiratory:  No Cough, No Dyspnea  Gastrointestinal:  As described in HPI  Musculoskeletal:  No Joint Swelling, No Back Pain  Skin:  No Skin Lesions, No Jaundice  Neuro:  No Syncope, No Dizziness  Heme/Lymph:  No Bruising, No Bleeding.      Physical Examination:  T(C): 36.2 (22 @ 05:00), Max: 37.4 (22 @ 00:35)  HR: 80 (22 @ 05:00) (78 - 92)  BP: 122/63 (22 @ 05:00) (116/62 - 145/65)  RR: 18 (22 @ 05:00) (18 - 18)  SpO2: 99% (22 @ 05:00) (97% - 99%)  Height (cm): 167.6 (22 @ 08:23)  Weight (kg): 39.5 (22 @ 08:23)    Constitutional: No acute distress.  Eyes:. Conjunctivae are clear, Sclera is non-icteric.  Ears Nose and Throat: The external ears are normal appearing,  Oral mucosa is pink and moist.  Respiratory:  No signs of respiratory distress. Lung sounds are clear bilaterally.  Cardiovascular:  S1 S2, Regular rate and rhythm.  GI: Abdomen is soft, symmetric, and tenderness to deep palpation on left lower quadrant. Midline scar +. Bowel sounds are present and normoactive in all four quadrants. No masses, hepatomegaly, or splenomegaly are noted.   Neuro: No Tremor, No involuntary movements  Skin: No rashes, No Jaundice.      Data:                        9.8    9.43  )-----------( 295      ( 2022 09:28 )             30.7     Hgb Trend:  9.8  22 @ 09:28          136  |  100  |  11  ----------------------------<  102<H>  4.2   |  26  |  0.6<L>    Ca    8.6      2022 09:28    TPro  6.3  /  Alb  3.1<L>  /  TBili  <0.2  /  DBili  <0.2  /  AST  23  /  ALT  11  /  AlkPhos  70      Liver panel trend:  TBili <0.2   /   AST 23   /   ALT 11   /   AlkP 70   /   Tptn 6.3   /   Alb 3.1    /   DBili <0.2        TBili <0.2   /   AST 14   /   ALT 8   /   AlkP 73   /   Tptn 5.4   /   Alb 2.8    /   DBili --        TBili <0.2   /   AST 11   /   ALT 7   /   AlkP 68   /   Tptn 5.7   /   Alb 2.9    /   DBili --        TBili 0.4   /   AST 10   /   ALT 8   /   AlkP 74   /   Tptn 6.0   /   Alb 2.9    /   DBili --        TBili 0.2   /   AST 13   /   ALT 9   /   AlkP 86   /   Tptn 6.9   /   Alb 3.4    /   DBili --            Radiology:  CT Abdomen and Pelvis w/ IV Cont:   ACC: [id number] EXAM:  CT ABDOMEN AND PELVIS IC                          PROCEDURE DATE:  2022      INTERPRETATION:  CLINICAL STATEMENT: Abdominal pain    TECHNIQUE: Contiguous axial CT images were obtained from the lower chest   to the pubic symphysis following the administration of 75 cc Omnipaque   350 intravenous contrast.  Oral contrast was not administered.    Reformatted images in the coronal and sagittal planes were acquired.    COMPARISON CT: CT abdomen and pelvis 2022    FINDINGS:    LOWER CHEST: Basilar streak-like opacities...    HEPATOBILIARY: Unchanged mild intrahepatic extrahepatic biliary ductal   dilatation. Subcentimeter hepatic densities are too small to   characterize. The gallbladder is present..    SPLEEN:1.6 cm splenic hypodensity, indeterminate and unchanged..    PANCREAS: Unremarkable..    ADRENAL GLANDS: Stable..    KIDNEYS: Symmetric renal enhancement. No hydronephrosis. Right renal cyst   and left renal subcentimeter hypodensity, too small to characterize..    ABDOMINOPELVIC NODES: Perisigmoid lymphadenopathy..    PELVIC ORGANS: Under distended urinary bladder. Uterine calcifications.   Uterus and adnexa are incompletely evaluated on CT..    PERITONEUM/MESENTERY/BOWEL: Redemonstration of severe rectosigmoid wall   thickening with pericolonic infiltrative changes and diverticuli. There   is a large colonic stool burden. Adjacent small bowel loops demonstrates   wall thickening. There is a small hiatal hernia. Oral contrast is seen   within the colon, likely from prior administration. No discrete drainable   fluid collection is demonstrated. No definite evidence of   pneumoperitoneum. No evidence of obstruction..    BONES/SOFT TISSUES: Degenerative changes...    OTHER: IVC filter noted. Vascular calcifications. Focal dense   calcifications at the takeoff of the right renal artery....      IMPRESSION:    Redemonstration of rectosigmoid severe wall thickening with pericolonic   infiltrative change consistent with unchanged diverticulitis/colitis and   unchanged perisigmoid lymphadenopathy.    Underlying colonic neoplasm cannot be excluded on this examination.    When the patient's current symptoms improve, outpatient colonoscopy is   recommended.    Large colonic stool burden.    Bibasilar streak-like opacities, likely atelectatic although   infectious/inflammatory etiologies are not excluded in the appropriate   clinical setting.    Unchanged indeterminate splenic hypodensity and biliary duct dilatation    --- End of Report ---         Gastroenterology Consultation:    Patient is a 77y old  Female who presents with a chief complaint of abdominal pain, rectosigmoid diverticulitis (2022 16:06)      Admitted on: 22  HPI:  76yo F PMHx of dementia and HTN presents to the ED with complaints of abdominal pain.     Patient had a recent hospital course - for diverticulitis, and prescribed a 10-day course of cipro + flagyl to complete on 22. Abdominal pain started yesterday. States it is sharp, severe, on the left lower abdomen. Per patient's daughter, patient is compliant with taking her antibiotics (in the AM). Denies nausea/vomiting. Endorses constipation, but has been having frequent small loose brown-colored stools. Patient has decreased PO intake, but has no issues with  swallowing.     Otherwise, ROS (-). Denies fever, shortness of breath, chest pain, dysuria, myalgias, back pain.     At the ED, patient is afebrile, VS stable, on RA. Labs significant for COVID-19 (+), Hb- 9.8 (appears chronic). LFTs wnl. CT A+P shows unchanged severe rectosigmoid wall thickening with pericolonic infiltrative changes + diverticuli + small bowel thickening, large stool burden without drainable fluid collection, no obstruction noted.     (2022 16:06)    GI history: Per patient's son - patient has been having constant abdominal pain for about 10 days. Patient was initially seen in ED On  and discharged on PO augmentin, patient returned back on  and was admitted for 2 days, treated with IV cipro/flagyl --> switched to PO cipro/flagyl on discharge  to complete total 10 days course. Patient states that the pain is 7/10 on left lower quadrant, aggravated by pressing on the lower abdomen, associated with nausea, denies any vomiting, fever, chills, constipation, hematochezia, diarrhea, urinary symptoms, weight loss, fatigue, cough, sore throat, runny nose, headache. Patient doesn't have a gastroenterologist and has never had a colonoscopy or endoscopy. She denies any prior episodes of diverticulitis. She has an appointment with Dr. Seay but hasn't been able to see her because she got admitted to the hospital. Denies any family history of cancers.   She has been vaccinated against covid including booster.     Prior EGD: Never  Prior Colonoscopy: Never      PAST MEDICAL & SURGICAL HISTORY:  Hypertension, unspecified type  H/O dilation and curettage  for missed     FAMILY HISTORY: No family history of cancer    Social History:  Tobacco: None  Alcohol: 2 beers/day  Drugs: None    Home Medications:  Ciprofloxacin 500mg BID  Metronidazole 500mg TID  Pantoprazole 40mg OD    MEDICATIONS  (STANDING):  enoxaparin Injectable 40 milliGRAM(s) SubCutaneous every 24 hours  melatonin 10 milliGRAM(s) Oral at bedtime  pantoprazole    Tablet 40 milliGRAM(s) Oral before breakfast  piperacillin/tazobactam IVPB.. 3.375 Gram(s) IV Intermittent every 8 hours  polyethylene glycol 3350 17 Gram(s) Oral daily  senna 2 Tablet(s) Oral at bedtime    MEDICATIONS  (PRN):      Allergies  No Known Allergies      Review of Systems:   Constitutional:  No Fever, No Chills  ENT/Mouth:  No Hearing Changes,  No Difficulty Swallowing  Eyes:  No Eye Pain, No Vision Changes  Cardiovascular:  No Chest Pain, No Palpitations  Respiratory:  No Cough, No Dyspnea  Gastrointestinal:  As described in HPI  Musculoskeletal:  No Joint Swelling, No Back Pain  Skin:  No Skin Lesions, No Jaundice  Neuro:  No Syncope, No Dizziness  Heme/Lymph:  No Bruising, No Bleeding.      Physical Examination:  T(C): 36.2 (22 @ 05:00), Max: 37.4 (22 @ 00:35)  HR: 80 (22 @ 05:00) (78 - 92)  BP: 122/63 (22 @ 05:00) (116/62 - 145/65)  RR: 18 (22 @ 05:00) (18 - 18)  SpO2: 99% (22 @ 05:00) (97% - 99%)  Height (cm): 167.6 (22 @ 08:23)  Weight (kg): 39.5 (22 @ 08:23)    Constitutional: No acute distress.  Eyes:. Conjunctivae are clear, Sclera is non-icteric.  Ears Nose and Throat: The external ears are normal appearing,  Oral mucosa is pink and moist.  Respiratory:  No signs of respiratory distress. Lung sounds are clear bilaterally.  Cardiovascular:  S1 S2, Regular rate and rhythm.  GI: Abdomen is soft, symmetric, and tenderness to deep palpation on left lower quadrant. Midline scar +. Bowel sounds are present and normoactive in all four quadrants. No masses, hepatomegaly, or splenomegaly are noted.   Neuro: No Tremor, No involuntary movements  Skin: No rashes, No Jaundice.      Data:                        9.8    9.43  )-----------( 295      ( 2022 09:28 )             30.7     Hgb Trend:  9.8  22 @ 09:28          136  |  100  |  11  ----------------------------<  102<H>  4.2   |  26  |  0.6<L>    Ca    8.6      2022 09:28    TPro  6.3  /  Alb  3.1<L>  /  TBili  <0.2  /  DBili  <0.2  /  AST  23  /  ALT  11  /  AlkPhos  70      Liver panel trend:  TBili <0.2   /   AST 23   /   ALT 11   /   AlkP 70   /   Tptn 6.3   /   Alb 3.1    /   DBili <0.2        TBili <0.2   /   AST 14   /   ALT 8   /   AlkP 73   /   Tptn 5.4   /   Alb 2.8    /   DBili --        TBili <0.2   /   AST 11   /   ALT 7   /   AlkP 68   /   Tptn 5.7   /   Alb 2.9    /   DBili --        TBili 0.4   /   AST 10   /   ALT 8   /   AlkP 74   /   Tptn 6.0   /   Alb 2.9    /   DBili --        TBili 0.2   /   AST 13   /   ALT 9   /   AlkP 86   /   Tptn 6.9   /   Alb 3.4    /   DBili --            Radiology:  CT Abdomen and Pelvis w/ IV Cont:   ACC: [id number] EXAM:  CT ABDOMEN AND PELVIS IC                          PROCEDURE DATE:  2022      INTERPRETATION:  CLINICAL STATEMENT: Abdominal pain    TECHNIQUE: Contiguous axial CT images were obtained from the lower chest   to the pubic symphysis following the administration of 75 cc Omnipaque   350 intravenous contrast.  Oral contrast was not administered.    Reformatted images in the coronal and sagittal planes were acquired.    COMPARISON CT: CT abdomen and pelvis 2022    FINDINGS:    LOWER CHEST: Basilar streak-like opacities...    HEPATOBILIARY: Unchanged mild intrahepatic extrahepatic biliary ductal   dilatation. Subcentimeter hepatic densities are too small to   characterize. The gallbladder is present..    SPLEEN:1.6 cm splenic hypodensity, indeterminate and unchanged..    PANCREAS: Unremarkable..    ADRENAL GLANDS: Stable..    KIDNEYS: Symmetric renal enhancement. No hydronephrosis. Right renal cyst   and left renal subcentimeter hypodensity, too small to characterize..    ABDOMINOPELVIC NODES: Perisigmoid lymphadenopathy..    PELVIC ORGANS: Under distended urinary bladder. Uterine calcifications.   Uterus and adnexa are incompletely evaluated on CT..    PERITONEUM/MESENTERY/BOWEL: Redemonstration of severe rectosigmoid wall   thickening with pericolonic infiltrative changes and diverticuli. There   is a large colonic stool burden. Adjacent small bowel loops demonstrates   wall thickening. There is a small hiatal hernia. Oral contrast is seen   within the colon, likely from prior administration. No discrete drainable   fluid collection is demonstrated. No definite evidence of   pneumoperitoneum. No evidence of obstruction..    BONES/SOFT TISSUES: Degenerative changes...    OTHER: IVC filter noted. Vascular calcifications. Focal dense   calcifications at the takeoff of the right renal artery....      IMPRESSION:    Redemonstration of rectosigmoid severe wall thickening with pericolonic   infiltrative change consistent with unchanged diverticulitis/colitis and   unchanged perisigmoid lymphadenopathy.    Underlying colonic neoplasm cannot be excluded on this examination.    When the patient's current symptoms improve, outpatient colonoscopy is   recommended.    Large colonic stool burden.    Bibasilar streak-like opacities, likely atelectatic although   infectious/inflammatory etiologies are not excluded in the appropriate   clinical setting.    Unchanged indeterminate splenic hypodensity and biliary duct dilatation    --- End of Report ---         Gastroenterology Consultation:    Patient is a 77y old  Female who presents with a chief complaint of abdominal pain, rectosigmoid diverticulitis (2022 16:06)      Admitted on: 22  HPI:  76yo F PMHx of dementia and HTN presents to the ED with complaints of abdominal pain.     Patient had a recent hospital course - for diverticulitis, and prescribed a 10-day course of cipro + flagyl to complete on 22. Abdominal pain started yesterday. States it is sharp, severe, on the left lower abdomen. Per patient's daughter, patient is compliant with taking her antibiotics (in the AM). Denies nausea/vomiting. Endorses constipation, but has been having frequent small loose brown-colored stools. Patient has decreased PO intake, but has no issues with  swallowing.     Otherwise, ROS (-). Denies fever, shortness of breath, chest pain, dysuria, myalgias, back pain.     At the ED, patient is afebrile, VS stable, on RA. Labs significant for COVID-19 (+), Hb- 9.8 (appears chronic). LFTs wnl. CT A+P shows unchanged severe rectosigmoid wall thickening with pericolonic infiltrative changes + diverticuli + small bowel thickening, large stool burden without drainable fluid collection, no obstruction noted.     (2022 16:06)    GI history: Per patient's son - patient has been having constant, abdominal pain for about 2 weeks. Patient was initially seen in ED On  and discharged on PO augmentin, patient returned back on  and was admitted for 2 days, treated with IV cipro/flagyl --> switched to PO cipro/flagyl on discharge  to complete total 10 days course. Patient states that the pain is 7/10 dull pain, on left lower quadrant, aggravated by pressing on the lower abdomen, associated with nausea, denies any vomiting, fever, chills, constipation, hematochezia, diarrhea, urinary symptoms, weight loss, fatigue, cough, sore throat, runny nose, headache. Patient doesn't have a gastroenterologist and has never had a colonoscopy or endoscopy. She denies any prior episodes of diverticulitis. She has an appointment with Dr. Seay but hasn't been able to see her because she got admitted to the hospital. Denies any family history of cancers.   She has been vaccinated against covid including booster.     Prior EGD: Never  Prior Colonoscopy: Never      PAST MEDICAL & SURGICAL HISTORY:  Hypertension, unspecified type  H/O dilation and curettage  for missed     FAMILY HISTORY: No family history of cancer    Social History:  Tobacco: None  Alcohol: 2 beers/day  Drugs: None    Home Medications:  Ciprofloxacin 500mg BID  Metronidazole 500mg TID  Pantoprazole 40mg OD    MEDICATIONS  (STANDING):  enoxaparin Injectable 40 milliGRAM(s) SubCutaneous every 24 hours  melatonin 10 milliGRAM(s) Oral at bedtime  pantoprazole    Tablet 40 milliGRAM(s) Oral before breakfast  piperacillin/tazobactam IVPB.. 3.375 Gram(s) IV Intermittent every 8 hours  polyethylene glycol 3350 17 Gram(s) Oral daily  senna 2 Tablet(s) Oral at bedtime    MEDICATIONS  (PRN):      Allergies  No Known Allergies      Review of Systems:   Constitutional:  No Fever, No Chills  ENT/Mouth:  No Hearing Changes,  No Difficulty Swallowing  Eyes:  No Eye Pain, No Vision Changes  Cardiovascular:  No Chest Pain, No Palpitations  Respiratory:  No Cough, No Dyspnea  Gastrointestinal:  As described in HPI  Musculoskeletal:  No Joint Swelling, No Back Pain  Skin:  No Skin Lesions, No Jaundice  Neuro:  No Syncope, No Dizziness  Heme/Lymph:  No Bruising, No Bleeding.      Physical Examination:  T(C): 36.2 (22 @ 05:00), Max: 37.4 (22 @ 00:35)  HR: 80 (22 @ 05:00) (78 - 92)  BP: 122/63 (22 @ 05:00) (116/62 - 145/65)  RR: 18 (22 @ 05:00) (18 - 18)  SpO2: 99% (22 @ 05:00) (97% - 99%)  Height (cm): 167.6 (22 @ 08:23)  Weight (kg): 39.5 (22 @ 08:23)    Constitutional: No acute distress.  Eyes:. Conjunctivae are clear, Sclera is non-icteric.  Ears Nose and Throat: The external ears are normal appearing,  Oral mucosa is pink and moist.  Respiratory:  No signs of respiratory distress. Lung sounds are clear bilaterally.  Cardiovascular:  S1 S2, Regular rate and rhythm.  GI: Abdomen is soft, symmetric, and tenderness to deep palpation on left lower quadrant. Midline scar +. Bowel sounds are present and normoactive in all four quadrants. No masses, hepatomegaly, or splenomegaly are noted.   Neuro: No Tremor, No involuntary movements  Skin: No rashes, No Jaundice.      Data:                        9.8    9.43  )-----------( 295      ( 2022 09:28 )             30.7     Hgb Trend:  9.8  22 @ 09:28          136  |  100  |  11  ----------------------------<  102<H>  4.2   |  26  |  0.6<L>    Ca    8.6      2022 09:28    TPro  6.3  /  Alb  3.1<L>  /  TBili  <0.2  /  DBili  <0.2  /  AST  23  /  ALT  11  /  AlkPhos  70      Liver panel trend:  TBili <0.2   /   AST 23   /   ALT 11   /   AlkP 70   /   Tptn 6.3   /   Alb 3.1    /   DBili <0.2        TBili <0.2   /   AST 14   /   ALT 8   /   AlkP 73   /   Tptn 5.4   /   Alb 2.8    /   DBili --        TBili <0.2   /   AST 11   /   ALT 7   /   AlkP 68   /   Tptn 5.7   /   Alb 2.9    /   DBili --        TBili 0.4   /   AST 10   /   ALT 8   /   AlkP 74   /   Tptn 6.0   /   Alb 2.9    /   DBili --        TBili 0.2   /   AST 13   /   ALT 9   /   AlkP 86   /   Tptn 6.9   /   Alb 3.4    /   DBili --            Radiology:  CT Abdomen and Pelvis w/ IV Cont:   ACC: [id number] EXAM:  CT ABDOMEN AND PELVIS IC                          PROCEDURE DATE:  2022      INTERPRETATION:  CLINICAL STATEMENT: Abdominal pain    TECHNIQUE: Contiguous axial CT images were obtained from the lower chest   to the pubic symphysis following the administration of 75 cc Omnipaque   350 intravenous contrast.  Oral contrast was not administered.    Reformatted images in the coronal and sagittal planes were acquired.    COMPARISON CT: CT abdomen and pelvis 2022    FINDINGS:    LOWER CHEST: Basilar streak-like opacities...    HEPATOBILIARY: Unchanged mild intrahepatic extrahepatic biliary ductal   dilatation. Subcentimeter hepatic densities are too small to   characterize. The gallbladder is present..    SPLEEN:1.6 cm splenic hypodensity, indeterminate and unchanged..    PANCREAS: Unremarkable..    ADRENAL GLANDS: Stable..    KIDNEYS: Symmetric renal enhancement. No hydronephrosis. Right renal cyst   and left renal subcentimeter hypodensity, too small to characterize..    ABDOMINOPELVIC NODES: Perisigmoid lymphadenopathy..    PELVIC ORGANS: Under distended urinary bladder. Uterine calcifications.   Uterus and adnexa are incompletely evaluated on CT..    PERITONEUM/MESENTERY/BOWEL: Redemonstration of severe rectosigmoid wall   thickening with pericolonic infiltrative changes and diverticuli. There   is a large colonic stool burden. Adjacent small bowel loops demonstrates   wall thickening. There is a small hiatal hernia. Oral contrast is seen   within the colon, likely from prior administration. No discrete drainable   fluid collection is demonstrated. No definite evidence of   pneumoperitoneum. No evidence of obstruction..    BONES/SOFT TISSUES: Degenerative changes...    OTHER: IVC filter noted. Vascular calcifications. Focal dense   calcifications at the takeoff of the right renal artery....      IMPRESSION:    Redemonstration of rectosigmoid severe wall thickening with pericolonic   infiltrative change consistent with unchanged diverticulitis/colitis and   unchanged perisigmoid lymphadenopathy.    Underlying colonic neoplasm cannot be excluded on this examination.    When the patient's current symptoms improve, outpatient colonoscopy is   recommended.    Large colonic stool burden.    Bibasilar streak-like opacities, likely atelectatic although   infectious/inflammatory etiologies are not excluded in the appropriate   clinical setting.    Unchanged indeterminate splenic hypodensity and biliary duct dilatation    --- End of Report ---

## 2022-06-27 NOTE — CONSULT NOTE ADULT - ASSESSMENT
ASSESSMENT  78yo F PMHx of dementia and HTN presents to the ED with complaints of abdominal pain.       IMPRESSION   #Diverticulitis vs vs Stercoral-colitis with perisigmoid Lymphadenopathy   - CT Abdomen and Pelvis w/ IV Cont (06.26.22 @ 11:49): Redemonstration of rectosigmoid severe wall thickening with pericolonic  infiltrative change consistent with unchanged diverticulitis/colitis and   unchanged perisigmoid lymphadenopathy. Underlying colonic neoplasm cannot be excluded on this examination. When the patient's current symptoms improve, outpatient colonoscopy is   recommended. Large colonic stool burden.    #Dementia   #Abx allergy: NKDA    RECOMMENDATIONS  - diverticulitis vs stercoral colitis -- suspect stercoral colitis from large stool burden, but given repeat admission, will treat for possible diverticulitis as well   - ensure bowel movements daily   - narrow to ceftriaxone 2g daily and PO flagyl 500 mg TID   - will plan for midline antibiotics with ceftriaxone 2g daily and PO flagyl if with clinical improvement in pain     Please call or message on [employer] Teams if with any questions.  Spectra [phone]

## 2022-06-27 NOTE — PROGRESS NOTE ADULT - SUBJECTIVE AND OBJECTIVE BOX
KEISHA PANTOJA  77y, Female  Allergy: No Known Allergies    Hospital Day: 1d    Patient seen and examined. No acute events overnight    PMH/PSH:  PAST MEDICAL & SURGICAL HISTORY:  Hypertension, unspecified type      H/O dilation and curettage  for missed     VITALS:  T(F): 97.2 (22 @ 05:00), Max: 99.4 (22 @ 00:35)  HR: 80 (22 @ 05:00)  BP: 122/63 (22 @ 05:00) (116/62 - 145/65)  RR: 18 (22 @ 05:00)  SpO2: 99% (22 @ 05:00)    TESTS & MEASUREMENTS:  Weight (Kg): 47.5 (22 @ 08:29)  BMI (kg/m2): 16.9 ()                          9.8    8.12  )-----------( 280      ( 2022 09:24 )             30.1         137  |  101  |  8<L>  ----------------------------<  150<H>  4.9   |  23  |  0.6<L>    Ca    8.7      2022 09:24  Mg     2.1         TPro  5.8<L>  /  Alb  2.9<L>  /  TBili  0.3  /  DBili  x   /  AST  15  /  ALT  9   /  AlkPhos  69      LIVER FUNCTIONS - ( 2022 09:24 )  Alb: 2.9 g/dL / Pro: 5.8 g/dL / ALK PHOS: 69 U/L / ALT: 9 U/L / AST: 15 U/L / GGT: x           Culture - Blood (collected 22 @ 04:30)  Source: .Blood Blood  Final Report (22 @ 14:00):    No Growth Final    Urinalysis Basic - ( 2022 11:30 )    Color: Yellow / Appearance: Clear / S.011 / pH: x  Gluc: x / Ketone: Negative  / Bili: Negative / Urobili: <2 mg/dL   Blood: x / Protein: Negative / Nitrite: Negative   Leuk Esterase: Small / RBC: 14 /HPF / WBC 1 /HPF   Sq Epi: x / Non Sq Epi: 6 /HPF / Bacteria: Negative    RECENT DIAGNOSTIC ORDERS:  Comprehensive Metabolic Panel: AM Sched. Collection: 2022 04:30 (22 @ 10:48)  Complete Blood Count: AM Sched. Collection: 2022 04:30 (22 @ 10:48)  Diet, Low Fiber (22 @ 17:35)    MEDICATIONS:  MEDICATIONS  (STANDING):  enoxaparin Injectable 40 milliGRAM(s) SubCutaneous every 24 hours  melatonin 10 milliGRAM(s) Oral at bedtime  pantoprazole    Tablet 40 milliGRAM(s) Oral before breakfast  piperacillin/tazobactam IVPB.. 3.375 Gram(s) IV Intermittent every 8 hours  polyethylene glycol 3350 17 Gram(s) Oral daily  senna 2 Tablet(s) Oral at bedtime    MEDICATIONS  (PRN):    HOME MEDICATIONS:    REVIEW OF SYSTEMS:  All other review of systems is negative unless indicated above.     PHYSICAL EXAM:  PHYSICAL EXAM:  GENERAL: NAD, well-developed  HEAD:  Atraumatic, Normocephalic  NECK: Supple, No JVD  CHEST/LUNG: Clear to auscultation bilaterally; No wheeze  HEART: Regular rate and rhythm; No murmurs, rubs, or gallops  ABDOMEN: Soft, LLQ tender to palpation, Nondistended; Bowel sounds present  EXTREMITIES:  2+ Peripheral Pulses, No clubbing, cyanosis, or edema  SKIN: No rashes or lesions

## 2022-06-27 NOTE — CONSULT NOTE ADULT - SUBJECTIVE AND OBJECTIVE BOX
KEISHA PANTOJA  77y, Female  Allergy: No Known Allergies      CHIEF COMPLAINT: abdominal pain, rectosigmoid diverticulitis (2022 13:04)      LOS  1d    HPI:  76yo F PMHx of dementia and HTN presents to the ED with complaints of abdominal pain.     Patient had a recent hospital course - last month for diverticulitis, and prescribed a 10-day course of cipro + flagyl to complete on 22. Abdominal pain started yesterday. States it is sharp, severe, on the left lower abdomen. Per patient's daughter, patient is compliant with taking her antibiotics (in the AM). Denies nausea/vomiting. Endorses constipation, but has been having frequent small loose brown-colored stools. Patient has decreased PO intake, but has no issues with  swallowing.     Otherwise, ROS (-). Denies fever, shortness of breath, chest pain, dysuria, myalgias, back pain.     At the ED, patient is afebrile, VS stable, on RA. Labs significant for COVID-19 (+), Hb- 9.8 (appears chronic). LFTs wnl. CT A+P shows unchanged severe rectosigmoid wall thickening with pericolonic infiltrative changes + diverticuli + small bowel thickening, large stool burden without drainable fluid collection, no obstruction noted.        (2022 16:06)      INFECTIOUS DISEASE HISTORY:  History as above.   History is conflicting. She reports that she did not take antibiotics, however, her daughter and son report that she was given course of antibiotics.  She presents for continued abdominal pain.   CT imaging showing unchanged diverticulitis.   SHe denies any nausea, vomiting.   Cannot say if abdominal pain is improving.     PAST MEDICAL & SURGICAL HISTORY:  Hypertension, unspecified type      H/O dilation and curettage  for missed           FAMILY HISTORY  Patient unable to provide medical history        SOCIAL HISTORY  Social History:  (-) tobacco  (-) smoking  (-) recreational drug use (2022 16:06)        ROS  General: Denies rigors, nightsweats  HEENT: Denies headache, rhinorrhea, sore throat, eye pain  CV: Denies CP, palpitations  PULM: Denies wheezing, hemoptysis  GI: Denies hematemesis, hematochezia, melena  : Denies discharge, hematuria  MSK: Denies arthralgias, myalgias  SKIN: Denies rash, lesions  NEURO: Denies paresthesias, weakness  PSYCH: Denies depression, anxiety    VITALS:  T(F): 97.2, Max: 99.4 (- @ 00:35)  HR: 80  BP: 122/63  RR: 18Vital Signs Last 24 Hrs  T(C): 36.2 (2022 05:00), Max: 37.4 (2022 00:35)  T(F): 97.2 (2022 05:00), Max: 99.4 (2022 00:35)  HR: 80 (2022 05:00) (78 - 92)  BP: 122/63 (2022 05:00) (116/62 - 145/65)  BP(mean): --  RR: 18 (2022 05:00) (18 - 18)  SpO2: 99% (2022 05:00) (97% - 99%)    PHYSICAL EXAM:  Gen: NAD, resting in bed  HEENT: Normocephalic, atraumatic  Neck: supple, no lymphadenopathy  CV: Regular rate & regular rhythm  Lungs: decreased BS at bases, no fremitus  Abdomen: mildly distended, tender in lower qaudrants   Ext: Warm, well perfused  Neuro: non focal, awake  Skin: no rash, no erythema  Lines: no phlebitis    TESTS & MEASUREMENTS:                        9.8    8.12  )-----------( 280      ( 2022 09:24 )             30.1         137  |  101  |  8<L>  ----------------------------<  150<H>  4.9   |  23  |  0.6<L>    Ca    8.7      2022 09:24  Mg     2.1         TPro  5.8<L>  /  Alb  2.9<L>  /  TBili  0.3  /  DBili  x   /  AST  15  /  ALT  9   /  AlkPhos  69        LIVER FUNCTIONS - ( 2022 09:24 )  Alb: 2.9 g/dL / Pro: 5.8 g/dL / ALK PHOS: 69 U/L / ALT: 9 U/L / AST: 15 U/L / GGT: x           Urinalysis Basic - ( 2022 11:30 )    Color: Yellow / Appearance: Clear / S.011 / pH: x  Gluc: x / Ketone: Negative  / Bili: Negative / Urobili: <2 mg/dL   Blood: x / Protein: Negative / Nitrite: Negative   Leuk Esterase: Small / RBC: 14 /HPF / WBC 1 /HPF   Sq Epi: x / Non Sq Epi: 6 /HPF / Bacteria: Negative        Culture - Blood (collected 22 @ 04:30)  Source: .Blood Blood  Final Report (22 @ 14:00):    No Growth Final    Culture - Urine (collected 22 @ 13:48)  Source: Clean Catch Clean Catch (Midstream)  Final Report (06-15-22 @ 18:36):    <10,000 CFU/mL Normal Urogenital Lorna        Lactate, Blood: 0.8 mmol/L (22 @ 09:28)      INFECTIOUS DISEASES TESTING  COVID-19 PCR: Detected (22 @ 12:30)  Procalcitonin, Serum: 0.08 ng/mL (22 @ 04:30)  COVID-19 PCR: NotDetec (22 @ 10:48)      RADIOLOGY & ADDITIONAL TESTS:  I have personally reviewed the last Chest xray  CXR      CT  CT Abdomen and Pelvis w/ IV Cont:   ACC: [id number] EXAM:  CT ABDOMEN AND PELVIS IC                          PROCEDURE DATE:  2022          INTERPRETATION:  CLINICAL STATEMENT: Abdominal pain    TECHNIQUE: Contiguous axial CT images were obtained from the lower chest   to the pubic symphysis following the administration of 75 cc Omnipaque   350 intravenous contrast.  Oral contrast was not administered.    Reformatted images in the coronal and sagittal planes were acquired.    COMPARISON CT: CT abdomen and pelvis 2022    FINDINGS:    LOWER CHEST: Basilar streak-like opacities...    HEPATOBILIARY: Unchanged mild intrahepatic extrahepatic biliary ductal   dilatation. Subcentimeter hepatic densities are too small to   characterize. The gallbladder is present..    SPLEEN:1.6 cm splenic hypodensity, indeterminate and unchanged..    PANCREAS: Unremarkable..    ADRENAL GLANDS: Stable..    KIDNEYS: Symmetric renal enhancement. No hydronephrosis. Right renal cyst   and left renal subcentimeter hypodensity, too small to characterize..    ABDOMINOPELVIC NODES: Perisigmoid lymphadenopathy..    PELVIC ORGANS: Under distended urinary bladder. Uterine calcifications.   Uterus and adnexa are incompletely evaluated on CT..    PERITONEUM/MESENTERY/BOWEL: Redemonstration of severe rectosigmoid wall   thickening with pericolonic infiltrative changes and diverticuli. There   is a large colonic stool burden. Adjacent small bowel loops demonstrates   wall thickening. There is a small hiatal hernia. Oral contrast is seen   within the colon, likely from prior administration. No discrete drainable   fluid collection is demonstrated. No definite evidence of   pneumoperitoneum. No evidence of obstruction..    BONES/SOFT TISSUES: Degenerative changes...    OTHER: IVC filter noted. Vascular calcifications. Focal dense   calcifications at the takeoff of the right renal artery....      IMPRESSION:    Redemonstration of rectosigmoid severe wall thickening with pericolonic   infiltrative change consistent with unchanged diverticulitis/colitis and   unchanged perisigmoid lymphadenopathy.    Underlying colonic neoplasm cannot be excluded on this examination.    When the patient's current symptoms improve, outpatient colonoscopy is   recommended.    Large colonic stool burden.    Bibasilar streak-like opacities, likely atelectatic although   infectious/inflammatory etiologies are not excluded in the appropriate   clinical setting.    Unchanged indeterminate splenic hypodensity and biliary duct dilatation    --- End of Report ---          TAMI LOVE DO; Resident Radiologist  This document has been electronically signed.  ARVIND XAVIER MD; Attending Radiologist  This document has been electronically signed. 2022  1:48PM (22 @ 11:49)      CARDIOLOGY TESTING  12 Lead ECG:   Ventricular Rate 85 BPM    Atrial Rate 85 BPM    P-R Interval 144 ms    QRS Duration 80 ms    Q-T Interval 400 ms    QTC Calculation(Bazett) 476 ms    P Axis 47 degrees    R Axis -23 degrees    T Axis 42 degrees    Diagnosis Line Sinus rhythm with marked sinus arrhythmia  Septal infarct , age undetermined  Abnormal ECG    Confirmed by Glynn Philip ([id number]) on 2022 3:19:45 PM (22 @ 14:03)  12 Lead ECG:   Ventricular Rate 87 BPM    Atrial Rate 87 BPM    P-R Interval 114 ms    QRS Duration 88 ms    Q-T Interval 422 ms    QTC Calculation(Bazett) 507 ms    P Axis 103 degrees    R Axis -14 degrees    T Axis 63 degrees    Diagnosis Line Normal sinus rhythm with sinus arrhythmia  Septal infarct , age undetermined  Abnormal ECG    Confirmed by Glynn Philip ([id number]) on 2022 3:20:13 PM (22 @ 13:33)      MEDICATIONS  enoxaparin Injectable 40 SubCutaneous every 24 hours  melatonin 10 Oral at bedtime  pantoprazole    Tablet 40 Oral before breakfast  polyethylene glycol 3350 17 Oral daily  senna 2 Oral at bedtime      Weight  Weight (kg): 47.5 (22 @ 08:29)    ANTIBIOTICS:      ALLERGIES:  No Known Allergies

## 2022-06-28 LAB
ALBUMIN SERPL ELPH-MCNC: 3 G/DL — LOW (ref 3.5–5.2)
ALP SERPL-CCNC: 72 U/L — SIGNIFICANT CHANGE UP (ref 30–115)
ALT FLD-CCNC: 9 U/L — SIGNIFICANT CHANGE UP (ref 0–41)
ANION GAP SERPL CALC-SCNC: 12 MMOL/L — SIGNIFICANT CHANGE UP (ref 7–14)
AST SERPL-CCNC: 13 U/L — SIGNIFICANT CHANGE UP (ref 0–41)
BILIRUB SERPL-MCNC: <0.2 MG/DL — SIGNIFICANT CHANGE UP (ref 0.2–1.2)
BUN SERPL-MCNC: 6 MG/DL — LOW (ref 10–20)
CALCIUM SERPL-MCNC: 8.9 MG/DL — SIGNIFICANT CHANGE UP (ref 8.5–10.1)
CHLORIDE SERPL-SCNC: 97 MMOL/L — LOW (ref 98–110)
CO2 SERPL-SCNC: 26 MMOL/L — SIGNIFICANT CHANGE UP (ref 17–32)
CREAT SERPL-MCNC: 0.5 MG/DL — LOW (ref 0.7–1.5)
EGFR: 96 ML/MIN/1.73M2 — SIGNIFICANT CHANGE UP
GLUCOSE SERPL-MCNC: 106 MG/DL — HIGH (ref 70–99)
HCT VFR BLD CALC: 35.8 % — LOW (ref 37–47)
HGB BLD-MCNC: 11.6 G/DL — LOW (ref 12–16)
MCHC RBC-ENTMCNC: 25.9 PG — LOW (ref 27–31)
MCHC RBC-ENTMCNC: 32.4 G/DL — SIGNIFICANT CHANGE UP (ref 32–37)
MCV RBC AUTO: 79.9 FL — LOW (ref 81–99)
NRBC # BLD: 0 /100 WBCS — SIGNIFICANT CHANGE UP (ref 0–0)
PLATELET # BLD AUTO: 341 K/UL — SIGNIFICANT CHANGE UP (ref 130–400)
POTASSIUM SERPL-MCNC: 4.6 MMOL/L — SIGNIFICANT CHANGE UP (ref 3.5–5)
POTASSIUM SERPL-SCNC: 4.6 MMOL/L — SIGNIFICANT CHANGE UP (ref 3.5–5)
PROT SERPL-MCNC: 6.4 G/DL — SIGNIFICANT CHANGE UP (ref 6–8)
RBC # BLD: 4.48 M/UL — SIGNIFICANT CHANGE UP (ref 4.2–5.4)
RBC # FLD: 14.6 % — HIGH (ref 11.5–14.5)
SODIUM SERPL-SCNC: 135 MMOL/L — SIGNIFICANT CHANGE UP (ref 135–146)
WBC # BLD: 6.1 K/UL — SIGNIFICANT CHANGE UP (ref 4.8–10.8)
WBC # FLD AUTO: 6.1 K/UL — SIGNIFICANT CHANGE UP (ref 4.8–10.8)

## 2022-06-28 PROCEDURE — 99285 EMERGENCY DEPT VISIT HI MDM: CPT | Mod: CS

## 2022-06-28 PROCEDURE — 99232 SBSQ HOSP IP/OBS MODERATE 35: CPT

## 2022-06-28 PROCEDURE — 99233 SBSQ HOSP IP/OBS HIGH 50: CPT

## 2022-06-28 RX ADMIN — Medication 500 MILLIGRAM(S): at 15:21

## 2022-06-28 RX ADMIN — Medication 500 MILLIGRAM(S): at 22:21

## 2022-06-28 RX ADMIN — Medication 500 MILLIGRAM(S): at 05:44

## 2022-06-28 RX ADMIN — ENOXAPARIN SODIUM 40 MILLIGRAM(S): 100 INJECTION SUBCUTANEOUS at 12:02

## 2022-06-28 RX ADMIN — PANTOPRAZOLE SODIUM 40 MILLIGRAM(S): 20 TABLET, DELAYED RELEASE ORAL at 05:44

## 2022-06-28 RX ADMIN — SENNA PLUS 2 TABLET(S): 8.6 TABLET ORAL at 22:21

## 2022-06-28 RX ADMIN — CEFTRIAXONE 100 MILLIGRAM(S): 500 INJECTION, POWDER, FOR SOLUTION INTRAMUSCULAR; INTRAVENOUS at 15:23

## 2022-06-28 RX ADMIN — Medication 10 MILLIGRAM(S): at 22:20

## 2022-06-28 NOTE — CONSULT NOTE ADULT - SUBJECTIVE AND OBJECTIVE BOX
GENERAL SURGERY CONSULT NOTE    Patient: KEISHA PANTOJA , 77y ([birth year])Female   MRN: [id number]  Location: [address]  Visit: 22 Inpatient  Date: 22 @ 14:14    HPI:  76yo F PMHx of dementia and HTN presents to the ED with complaints of abdominal pain.     Patient had a recent hospital course - last month for diverticulitis, and prescribed a 10-day course of cipro + flagyl to complete on 22. Abdominal pain started yesterday. States it is sharp, severe, on the left lower abdomen. Per patient's daughter, patient is compliant with taking her antibiotics (in the AM). Denies nausea/vomiting. Endorses constipation, but has been having frequent small loose brown-colored stools. Patient has decreased PO intake, but has no issues with swallowing.     Otherwise, ROS (-). Denies fever, shortness of breath, chest pain, dysuria, myalgias, back pain.     At the ED, patient is afebrile, VS stable, on RA. Labs significant for COVID-19 (+), Hb- 9.8 (appears chronic). LFTs wnl. CT A+P shows unchanged severe rectosigmoid wall thickening with pericolonic infiltrative changes + diverticuli + small bowel thickening, large stool burden without drainable fluid collection, no obstruction noted.   (2022 16:06)      Surgery consulted for diverticulitis. Patient is a 77 year old Female with PMHx of dementia, HTN, h/o IVC filter presents to ED with lower abdominal pain. Admitted to medical service for diverticulitis. Of note patient was recently admitted for diverticulitis from - and was discharged with 10 days of cipro and flagyl. Pain was unrelieved which brought the patient back to the hospital. Pt reports mild nausea but denies emesis. Pt currently tolerating a PO diet and having bowel movements, last BM was yesterday that was nonbloody. Reports never having a colonoscopy. ID consulted and recommended rocephin and flagyl. Denies fevers, CP, SOB, HA or urinary symptoms.    PAST MEDICAL & SURGICAL HISTORY:  Hypertension, unspecified type  H/O dilation and curettage  for missed     Home Medications:    VITALS:  T(F): 96.7 (22 @ 13:13), Max: 98 (22 @ 20:00)  HR: 85 (22 @ 13:13) (71 - 85)  BP: 144/66 (22 @ 13:13) (144/66 - 173/79)  RR: 18 (22 @ 13:13) (18 - 18)  SpO2: 100% (22 @ 10:58) (98% - 100%)    PHYSICAL EXAM:  General: NAD, AAOx3, calm and cooperative  HEENT: NCAT, RITU, EOMI, Trachea ML, Neck supple  Cardiac: RRR S1, S2, no Murmurs, rubs or gallops  Respiratory: CTAB, normal respiratory effort, breath sounds equal BL, no wheeze, rhonchi or crackles  Abdomen: Soft, non-distended, non-tender, no rebound, no guarding. +BS.  Rectal: Good tone, +stool, no blood, no raymond-anal masses/lesions, no fistulas, fissures, hemorrhoids  Musculoskeletal: Strength 5/5 BL UE/LE, ROM intact, compartments soft  Neuro: Sensation grossly intact and equal throughout, no focal deficits  Vascular: Pulses 2+ throughout, extremities well perfused  Skin: Warm/dry, normal color, no jaundice  Incision/wound: healing well, dressings in place, clean, dry and intact    MEDICATIONS  (STANDING):  cefTRIAXone   IVPB      cefTRIAXone   IVPB 2000 milliGRAM(s) IV Intermittent every 24 hours  enoxaparin Injectable 40 milliGRAM(s) SubCutaneous every 24 hours  melatonin 10 milliGRAM(s) Oral at bedtime  metroNIDAZOLE    Tablet 500 milliGRAM(s) Oral every 8 hours  pantoprazole    Tablet 40 milliGRAM(s) Oral before breakfast  polyethylene glycol 3350 17 Gram(s) Oral daily  senna 2 Tablet(s) Oral at bedtime    MEDICATIONS  (PRN):  acetaminophen     Tablet .. 650 milliGRAM(s) Oral every 6 hours PRN Moderate Pain (4 - 6)  morphine  - Injectable 2 milliGRAM(s) IV Push every 4 hours PRN Severe Pain (7 - 10)    LAB/STUDIES:                        11.6   6.10  )-----------( 341      ( 2022 06:49 )             35.8     06-28    135  |  97<L>  |  6<L>  ----------------------------<  106<H>  4.6   |  26  |  0.5<L>    Ca    8.9      2022 06:49  Mg     2.1         TPro  6.4  /  Alb  3.0<L>  /  TBili  <0.2  /  DBili  x   /  AST  13  /  ALT  9   /  AlkPhos  72      LIVER FUNCTIONS - ( 2022 06:49 )  Alb: 3.0 g/dL / Pro: 6.4 g/dL / ALK PHOS: 72 U/L / ALT: 9 U/L / AST: 13 U/L / GGT: x           Culture - Blood (collected 2022 09:40)  Source: .Blood Blood  Preliminary Report (2022 17:02):    No growth to date.    Culture - Blood (collected 2022 09:34)  Source: .Blood Blood  Preliminary Report (2022 17:02):    No growth to date.    IMAGING:  < from: Xray Kidney Ureter Bladder (22 @ 22:18) >  Impression:  Non obstructive bowel gas pattern.  IVC filter noted.  --- End of Report ---    < from: CT Abdomen and Pelvis w/ IV Cont (22 @ 11:49) >  IMPRESSION:  Redemonstration of rectosigmoid severe wall thickening with pericolonic   infiltrative change consistent with unchanged diverticulitis/colitis and   unchanged perisigmoid lymphadenopathy.    Underlying colonic neoplasm cannot be excluded on this examination.    When the patient's current symptoms improve, outpatient colonoscopy is   recommended.    Large colonic stool burden.    Bibasilar streak-like opacities, likely atelectatic although   infectious/inflammatory etiologies are not excluded in the appropriate   clinical setting.    Unchanged indeterminate splenic hypodensity and biliary duct dilatation  --- End of Report ---

## 2022-06-28 NOTE — PROGRESS NOTE ADULT - SUBJECTIVE AND OBJECTIVE BOX
CHAYO PANTOJABETH  77y  Female      Patient is a 77y old  Female who presents with a chief complaint of abdominal pain, rectosigmoid diverticulitis.      INTERVAL HPI/OVERNIGHT EVENTS:      ******************************* REVIEW OF SYSTEMS:**********************************************    All other review of systems negative    *********************** VITALS ******************************************    T(F): 96.7 (06-28-22 @ 13:13)  HR: 85 (06-28-22 @ 13:13) (71 - 85)  BP: 144/66 (06-28-22 @ 13:13) (144/66 - 173/79)  RR: 18 (06-28-22 @ 13:13) (18 - 18)  SpO2: 100% (06-28-22 @ 10:58) (98% - 100%)            ******************************** PHYSICAL EXAM:**************************************************  GENERAL: NAD    PSYCH: no agitation, baseline mentation  HEENT:     NERVOUS SYSTEM:  Alert & Oriented X3,     PULMONARY: CARLIN, CTA    CARDIOVASCULAR: S1S2 RRR    GI: Soft, NT, ND; BS present.    EXTREMITIES:  2+ Peripheral Pulses, No clubbing, cyanosis, or edema    LYMPH: No lymphadenopathy noted    SKIN: No rashes or lesions      **************************** LABS *******************************************************                          11.6   6.10  )-----------( 341      ( 28 Jun 2022 06:49 )             35.8     06-28    135  |  97<L>  |  6<L>  ----------------------------<  106<H>  4.6   |  26  |  0.5<L>    Ca    8.9      28 Jun 2022 06:49  Mg     2.1     06-27    TPro  6.4  /  Alb  3.0<L>  /  TBili  <0.2  /  DBili  x   /  AST  13  /  ALT  9   /  AlkPhos  72  06-28          Lactate Trend  06-26 @ 09:28 Lactate:0.8         CAPILLARY BLOOD GLUCOSE              **************************Active Medications *******************************************  No Known Allergies      acetaminophen     Tablet .. 650 milliGRAM(s) Oral every 6 hours PRN  cefTRIAXone   IVPB      cefTRIAXone   IVPB 2000 milliGRAM(s) IV Intermittent every 24 hours  enoxaparin Injectable 40 milliGRAM(s) SubCutaneous every 24 hours  melatonin 10 milliGRAM(s) Oral at bedtime  metroNIDAZOLE    Tablet 500 milliGRAM(s) Oral every 8 hours  morphine  - Injectable 2 milliGRAM(s) IV Push every 4 hours PRN  pantoprazole    Tablet 40 milliGRAM(s) Oral before breakfast  polyethylene glycol 3350 17 Gram(s) Oral daily  senna 2 Tablet(s) Oral at bedtime      ***************************************************  RADIOLOGY & ADDITIONAL TESTS:    Imaging Personally Reviewed:  [ ] YES  [ ] NO    HEALTH ISSUES - PROBLEM Dx:

## 2022-06-28 NOTE — PROGRESS NOTE ADULT - ASSESSMENT
78 yo F PMHx of dementia and HTN presenting with persistent abdominal pain, after being treated for Diverticulitis with PO ciprofloxacin and Flagyl. CT findings suggestive of  Rectosigmoid diverticulitis/Colitis.     # Rectosigmoid diverticulitis/Colitis  # not improved with outpatient antibiotics  # No sepsis/Perforation/Abscess  - LLQ abdominal pain x 10 days  - CT abdomen with IV contrast: Redemonstration of severe rectosigmoid wall thickening with pericolonic infiltrative changes and diverticuli. There is a large colonic stool burden. Adjacent small bowel loops demonstrates wall thickening. There is a small hiatal hernia. Oral contrast is seen within the colon, likely from prior administration. No discrete drainable   fluid collection is demonstrated. No definite evidence of pneumoperitoneum. No evidence of obstruction.  - Antibiotics per ID  - follow up cultures  - Monitor BMP, LFT  - Supportive care : IV fluids, Pain control and antiemetics PRN  - Advance diet as tolerated  - OP colonoscopy in 8 weeks to r/o colonic neoplasm  - Surgery evaluation   78 yo F PMHx of dementia and HTN presenting with persistent abdominal pain, after being treated for Diverticulitis with PO ciprofloxacin and Flagyl. CT findings suggestive of  Rectosigmoid diverticulitis/Colitis.     # Rectosigmoid diverticulitis/Colitis  # not improved with outpatient antibiotics  # No sepsis/Perforation/Abscess  - LLQ abdominal pain x 10 days  - CT abdomen with IV contrast: Redemonstration of severe rectosigmoid wall thickening with pericolonic infiltrative changes and diverticuli. There is a large colonic stool burden. Adjacent small bowel loops demonstrates wall thickening. There is a small hiatal hernia. Oral contrast is seen within the colon, likely from prior administration. No discrete drainable   fluid collection is demonstrated. No definite evidence of pneumoperitoneum. No evidence of obstruction.  - Antibiotics per ID  - Supportive care: Pain control and antiemetics PRN  - Tolerating low fiber diet  - Outpatient colonoscopy in 8 weeks to r/o colonic neoplasm - follow up in [facility] after 8 weeks  - Will sign off, Recall PRN

## 2022-06-28 NOTE — PROGRESS NOTE ADULT - SUBJECTIVE AND OBJECTIVE BOX
KEISHA PANTOJA 77y Female  MRN#: [id number]      Pt is currently admitted with the primary diagnosis of serocolitis v. diverticulitis      Hospital Day: 2d  HPI:  78yo F PMHx of dementia and HTN presents to the ED with complaints of abdominal pain.     Patient had a recent hospital course - last month for diverticulitis, and prescribed a 10-day course of cipro + flagyl to complete on 22. Abdominal pain started yesterday. States it is sharp, severe, on the left lower abdomen. Per patient's daughter, patient is compliant with taking her antibiotics (in the AM). Denies nausea/vomiting. Endorses constipation, but has been having frequent small loose brown-colored stools. Patient has decreased PO intake, but has no issues with  swallowing.     Otherwise, ROS (-). Denies fever, shortness of breath, chest pain, dysuria, myalgias, back pain.     At the ED, patient is afebrile, VS stable, on RA. Labs significant for COVID-19 (+), Hb- 9.8 (appears chronic). LFTs wnl. CT A+P shows unchanged severe rectosigmoid wall thickening with pericolonic infiltrative changes + diverticuli + small bowel thickening, large stool burden without drainable fluid collection, no obstruction noted.                                               ----------------------------------------------------------    PAST MEDICAL & SURGICAL HISTORY  Hypertension, unspecified type    H/O dilation and curettage  for missed                                               -----------------------------------------------------------  ALLERGIES:  No Known Allergies                                            ------------------------------------------------------------    HOME MEDICATIONS  Home Medications:                           MEDICATIONS:  STANDING MEDICATIONS  cefTRIAXone   IVPB      cefTRIAXone   IVPB  milliGRAM(s) IV Intermittent every 24 hours  enoxaparin Injectable 40 milliGRAM(s) SubCutaneous every 24 hours  melatonin 10 milliGRAM(s) Oral at bedtime  metroNIDAZOLE    Tablet 500 milliGRAM(s) Oral every 8 hours  pantoprazole    Tablet 40 milliGRAM(s) Oral before breakfast  polyethylene glycol 3350 17 Gram(s) Oral daily  senna 2 Tablet(s) Oral at bedtime    PRN MEDICATIONS  acetaminophen     Tablet .. 650 milliGRAM(s) Oral every 6 hours PRN  morphine  - Injectable 2 milliGRAM(s) IV Push every 4 hours PRN                                            ------------------------------------------------------------  VITAL SIGNS: Last 24 Hours  T(C): 36.2 (2022 05:37), Max: 36.7 (2022 20:00)  T(F): 97.2 (2022 05:37), Max: 98 (2022 20:00)  HR: 71 (2022 05:37) (71 - 82)  BP: 173/79 (2022 05:37) (131/59 - 173/79)  BP(mean): --  RR: 18 (2022 05:37) (18 - 20)  SpO2: 98% (2022 20:00) (98% - 100%)                                             --------------------------------------------------------------  LABS:                        11.6   6.10  )-----------( 341      ( 2022 06:49 )             35.8     06-28    135  |  97<L>  |  6<L>  ----------------------------<  106<H>  4.6   |  26  |  0.5<L>    Ca    8.9      2022 06:49  Mg     2.1         TPro  6.4  /  Alb  3.0<L>  /  TBili  <0.2  /  DBili  x   /  AST  13  /  ALT  9   /  AlkPhos  72        Urinalysis Basic - ( 2022 11:30 )    Color: Yellow / Appearance: Clear / S.011 / pH: x  Gluc: x / Ketone: Negative  / Bili: Negative / Urobili: <2 mg/dL   Blood: x / Protein: Negative / Nitrite: Negative   Leuk Esterase: Small / RBC: 14 /HPF / WBC 1 /HPF   Sq Epi: x / Non Sq Epi: 6 /HPF / Bacteria: Negative              Culture - Blood (collected 2022 09:40)  Source: .Blood Blood  Preliminary Report (2022 17:02):    No growth to date.    Culture - Blood (collected 2022 09:34)  Source: .Blood Blood  Preliminary Report (2022 17:02):    No growth to date.      PHYSICAL EXAM:  General: well-appearing in NAD. AAO x 4.  HEENT: Normocephalic, nontraumatic.   LUNGS: Clear to auscultation b/l. No wheezes, rales, or rhonchi.  HEART: RRR. No murmurs, rubs, or gallops.  ABDOMEN: Nontender, nondistended. + bowel sounds.  EXT: Pulses palpable x 4. Nonedematous.  NEURO: Deferred.  SKIN: Warm, dry.    ASSESSMENT & PLAN:  78yo F PMHx of dementia and HTN, recent hospital course for diverticulitis presents to the ED with complaints of abdominal pain consistent with diverticulitis.    #Acute diverticulitis- suspected ?resistance  #Constipation  - Failed outpt therapy [10-day course of cipro+flagyl (end date 22)]   - CT A+P- findings consistent with unchanged diverticulitis/colitis + diverticula, pericolonic infiltrative changes. Adjacent small bowel loops have wall thickening as well   - Loose stools likely from overflow leaking from constipation demonstrated on CT A+P  - C/w senna + miralax for constipation  - Started zosyn. f/u ID consult  - would need outpatient colonoscopy from CT findings. Patient has not had a colonoscopy in > 10 years.   - f/u GI consult  - OK to start full diet if patient tolerates    #COVID-19, asymptomatic, incidental  - VS stable.   - No need for steroids.   - Cont monitor    #HTN  - well controlled without medications    #DVT ppx: lovenox  #GI ppx: protonix. patient currently not having diarrhea as she's constipated with loose stools 2/2 suspected leak. if patient does have diarrhea, switch to famotidine  #Diet: full, low fiber  #Code: full  #handoff: f/u ID             KEISHA PANTOJA 77y Female  MRN#: [id number]      Pt is currently admitted with the primary diagnosis of serocolitis v. diverticulitis      Hospital Day: 2d  HPI:  76yo F PMHx of dementia and HTN presents to the ED with complaints of abdominal pain.     Patient had a recent hospital course - last month for diverticulitis, and prescribed a 10-day course of cipro + flagyl to complete on 22. Abdominal pain started yesterday. States it is sharp, severe, on the left lower abdomen. Per patient's daughter, patient is compliant with taking her antibiotics (in the AM). Denies nausea/vomiting. Endorses constipation, but has been having frequent small loose brown-colored stools. Patient has decreased PO intake, but has no issues with  swallowing.     Otherwise, ROS (-). Denies fever, shortness of breath, chest pain, dysuria, myalgias, back pain.     At the ED, patient is afebrile, VS stable, on RA. Labs significant for COVID-19 (+), Hb- 9.8 (appears chronic). LFTs wnl. CT A+P shows unchanged severe rectosigmoid wall thickening with pericolonic infiltrative changes + diverticuli + small bowel thickening, large stool burden without drainable fluid collection, no obstruction noted.     Overnight events:  No acute events overnight    Subjective:   Pt was seen and examined at bedside. Reports significant improvement in abdominal pain since prior days. No complaints at this time.                                            ----------------------------------------------------------    PAST MEDICAL & SURGICAL HISTORY  Hypertension, unspecified type    H/O dilation and curettage  for missed                                               -----------------------------------------------------------  ALLERGIES:  No Known Allergies                                            ------------------------------------------------------------    HOME MEDICATIONS  Home Medications:                           MEDICATIONS:  STANDING MEDICATIONS  cefTRIAXone   IVPB      cefTRIAXone   IVPB 2000 milliGRAM(s) IV Intermittent every 24 hours  enoxaparin Injectable 40 milliGRAM(s) SubCutaneous every 24 hours  melatonin 10 milliGRAM(s) Oral at bedtime  metroNIDAZOLE    Tablet 500 milliGRAM(s) Oral every 8 hours  pantoprazole    Tablet 40 milliGRAM(s) Oral before breakfast  polyethylene glycol 3350 17 Gram(s) Oral daily  senna 2 Tablet(s) Oral at bedtime    PRN MEDICATIONS  acetaminophen     Tablet .. 650 milliGRAM(s) Oral every 6 hours PRN  morphine  - Injectable 2 milliGRAM(s) IV Push every 4 hours PRN                                            ------------------------------------------------------------  VITAL SIGNS: Last 24 Hours  T(C): 36.2 (2022 05:37), Max: 36.7 (2022 20:00)  T(F): 97.2 (2022 05:37), Max: 98 (2022 20:00)  HR: 71 (2022 05:37) (71 - 82)  BP: 173/79 (2022 05:37) (131/59 - 173/79)  BP(mean): --  RR: 18 (2022 05:37) (18 - 20)  SpO2: 98% (2022 20:00) (98% - 100%)                                             --------------------------------------------------------------  LABS:                        11.6   6.10  )-----------( 341      ( 2022 06:49 )             35.8     06-28    135  |  97<L>  |  6<L>  ----------------------------<  106<H>  4.6   |  26  |  0.5<L>    Ca    8.9      2022 06:49  Mg     2.1         TPro  6.4  /  Alb  3.0<L>  /  TBili  <0.2  /  DBili  x   /  AST  13  /  ALT  9   /  AlkPhos  72        Urinalysis Basic - ( 2022 11:30 )    Color: Yellow / Appearance: Clear / S.011 / pH: x  Gluc: x / Ketone: Negative  / Bili: Negative / Urobili: <2 mg/dL   Blood: x / Protein: Negative / Nitrite: Negative   Leuk Esterase: Small / RBC: 14 /HPF / WBC 1 /HPF   Sq Epi: x / Non Sq Epi: 6 /HPF / Bacteria: Negative              Culture - Blood (collected 2022 09:40)  Source: .Blood Blood  Preliminary Report (2022 17:02):    No growth to date.    Culture - Blood (collected 2022 09:34)  Source: .Blood Blood  Preliminary Report (2022 17:02):    No growth to date.      PHYSICAL EXAM:  General: well-appearing in NAD.   HEENT: NCAT  LUNGS: CTAB  HEART: RRR.   ABDOMEN: + BS. Distended. Nontender. No rebound tenderness or guarding.   EXT: Nonedematous.  NEURO: Deferred.  SKIN: Warm, dry.    ASSESSMENT & PLAN:  76yo F PMHx of dementia and HTN, recent hospital course for diverticulitis presents to the ED with complaints of abdominal pain consistent with diverticulitis.    #Acute diverticulitis v. Stercoral-colitis with perisigmoid Lymphadenopathy   #Constipation  - Failed outpt therapy [10-day course of cipro+flagyl (end date 22)]   - CT A+P- findings consistent with unchanged diverticulitis/colitis + diverticula, pericolonic infiltrative changes. Adjacent small bowel loops have wall thickening as well   - Loose stools likely from overflow leaking from constipation demonstrated on CT A+P  - C/w senna + miralax for constipation  - C/w IV Ceftriaxone 2g q24H and PO Flagyl 500 mg TID per ID recs  - Procedure team consulted for midline placement for long term outpt abx  - Per GI recs,  surgery consult & low fiber diet as tolerated  - Per surgery, no acute intervention at this time, consider d/c bowel reg  - Outpt colonoscopy in 6-8 weeks upon d/c. Patient has not had a colonoscopy in > 10 years.   - Can follow up with surgery Dr. Isaacs as an outpatient     #COVID-19, asymptomatic, incidental  - VS stable.   - No need for steroids.   - Cont monitor    #HTN  - well controlled without medications    #Misc  -DVT PPx: Lovenox  -GI PPx: Protonix.   -Diet: Low Fiber diet  -Code: Full               KEISHA PANTOJA 77y Female  MRN#: [id number]      Pt is currently admitted with the primary diagnosis of  Stercoral-colitis with perisigmoid Lymphadenopathy  v. Diverticulitis      Hospital Day: 2d  HPI:  78yo F PMHx of dementia and HTN presents to the ED with complaints of abdominal pain.     Patient had a recent hospital course - last month for diverticulitis, and prescribed a 10-day course of cipro + flagyl to complete on 22. Abdominal pain started yesterday. States it is sharp, severe, on the left lower abdomen. Per patient's daughter, patient is compliant with taking her antibiotics (in the AM). Denies nausea/vomiting. Endorses constipation, but has been having frequent small loose brown-colored stools. Patient has decreased PO intake, but has no issues with  swallowing.     Otherwise, ROS (-). Denies fever, shortness of breath, chest pain, dysuria, myalgias, back pain.     At the ED, patient is afebrile, VS stable, on RA. Labs significant for COVID-19 (+), Hb- 9.8 (appears chronic). LFTs wnl. CT A+P shows unchanged severe rectosigmoid wall thickening with pericolonic infiltrative changes + diverticuli + small bowel thickening, large stool burden without drainable fluid collection, no obstruction noted.     Overnight events:  No acute events overnight    Subjective:   Pt was seen and examined at bedside. Reports significant improvement in abdominal pain since prior days. No complaints at this time.                                            ----------------------------------------------------------    PAST MEDICAL & SURGICAL HISTORY  Hypertension, unspecified type    H/O dilation and curettage  for missed                                               -----------------------------------------------------------  ALLERGIES:  No Known Allergies                                            ------------------------------------------------------------    HOME MEDICATIONS  Home Medications:                           MEDICATIONS:  STANDING MEDICATIONS  cefTRIAXone   IVPB      cefTRIAXone   IVPB 2000 milliGRAM(s) IV Intermittent every 24 hours  enoxaparin Injectable 40 milliGRAM(s) SubCutaneous every 24 hours  melatonin 10 milliGRAM(s) Oral at bedtime  metroNIDAZOLE    Tablet 500 milliGRAM(s) Oral every 8 hours  pantoprazole    Tablet 40 milliGRAM(s) Oral before breakfast  polyethylene glycol 3350 17 Gram(s) Oral daily  senna 2 Tablet(s) Oral at bedtime    PRN MEDICATIONS  acetaminophen     Tablet .. 650 milliGRAM(s) Oral every 6 hours PRN  morphine  - Injectable 2 milliGRAM(s) IV Push every 4 hours PRN                                            ------------------------------------------------------------  VITAL SIGNS: Last 24 Hours  T(C): 36.2 (2022 05:37), Max: 36.7 (2022 20:00)  T(F): 97.2 (2022 05:37), Max: 98 (2022 20:00)  HR: 71 (2022 05:37) (71 - 82)  BP: 173/79 (2022 05:37) (131/59 - 173/79)  BP(mean): --  RR: 18 (2022 05:37) (18 - 20)  SpO2: 98% (2022 20:00) (98% - 100%)                                             --------------------------------------------------------------  LABS:                        11.6   6.10  )-----------( 341      ( 2022 06:49 )             35.8     06-28    135  |  97<L>  |  6<L>  ----------------------------<  106<H>  4.6   |  26  |  0.5<L>    Ca    8.9      2022 06:49  Mg     2.1         TPro  6.4  /  Alb  3.0<L>  /  TBili  <0.2  /  DBili  x   /  AST  13  /  ALT  9   /  AlkPhos  72        Urinalysis Basic - ( 2022 11:30 )    Color: Yellow / Appearance: Clear / S.011 / pH: x  Gluc: x / Ketone: Negative  / Bili: Negative / Urobili: <2 mg/dL   Blood: x / Protein: Negative / Nitrite: Negative   Leuk Esterase: Small / RBC: 14 /HPF / WBC 1 /HPF   Sq Epi: x / Non Sq Epi: 6 /HPF / Bacteria: Negative              Culture - Blood (collected 2022 09:40)  Source: .Blood Blood  Preliminary Report (2022 17:02):    No growth to date.    Culture - Blood (collected 2022 09:34)  Source: .Blood Blood  Preliminary Report (2022 17:02):    No growth to date.      PHYSICAL EXAM:  General: well-appearing in NAD.   HEENT: NCAT  LUNGS: CTAB  HEART: RRR.   ABDOMEN: + BS. Distended. Nontender. No rebound tenderness or guarding.   EXT: Nonedematous.  NEURO: Deferred.  SKIN: Warm, dry.    ASSESSMENT & PLAN:  78yo F PMHx of dementia and HTN, recent hospital course for diverticulitis presents to the ED with complaints of abdominal pain consistent with diverticulitis.    #Acute diverticulitis v. Stercoral-colitis with perisigmoid Lymphadenopathy   #Constipation  - Failed outpt therapy [10-day course of cipro+flagyl (end date 22)]   - CT A+P- findings consistent with unchanged diverticulitis/colitis + diverticula, pericolonic infiltrative changes. Adjacent small bowel loops have wall thickening as well   - Loose stools likely from overflow leaking from constipation demonstrated on CT A+P  - C/w senna + miralax for constipation  - C/w IV Ceftriaxone 2g q24H and PO Flagyl 500 mg TID per ID recs  - Procedure team consulted for midline placement for long term outpt abx  - Per GI recs,  surgery consult & low fiber diet as tolerated  - Per surgery, no acute intervention at this time, consider d/c bowel reg  - Outpt colonoscopy in 6-8 weeks upon d/c. Patient has not had a colonoscopy in > 10 years.   - Can follow up with surgery Dr. Isaacs as an outpatient     #COVID-19, asymptomatic, incidental  - VS stable.   - No need for steroids.   - Cont monitor    #HTN  - well controlled without medications    #Misc  -DVT PPx: Lovenox  -GI PPx: Protonix.   -Diet: Low Fiber diet  -Code: Full

## 2022-06-28 NOTE — PROGRESS NOTE ADULT - ASSESSMENT
78yo F PMHx of dementia and HTN presents to the ED with complaints of abdominal pain.   Patient had a recent hospital course 6/20-6/22 last month for diverticulitis, and prescribed a 10-day course of cipro + flagyl to complete on 7/2/22.     #Acute rectosigmoid diverticulitis ( Recurrent )  CTAP w IV 06/26: Redemonstration of rectosigmoid severe wall thickening with pericolonic infiltrative change consistent with unchanged diverticulitis/colitis  Pt endorses pain has significantly improved today, tolerating diet  - Cont zosyn 3.375mg q8h  - ID f/u noted, might need Midline for prolong IV Abx   - Cont low fiber diet  - Cont bowel regimen    #HTN  - Currently not on meds    #Dementia  - Supportive care    DVT PPX, Lovenox    #Progress Note Handoff  Pending (specify): Midline / IV aBX setup.   Family discussion: d/w pt regarding tx for diverticulitis  Disposition: Home

## 2022-06-28 NOTE — PROGRESS NOTE ADULT - SUBJECTIVE AND OBJECTIVE BOX
Gastroenterology progress note:     Patient is a 77y old  Female who presents with a chief complaint of abdominal pain, rectosigmoid diverticulitis (2022 14:07)       Admitted on: 22    We are following the patient for: Rectosigmoid diverticulitis       Interval History:    No acute events overnight.   - Diet -   - last BM -   - Abdominal pain -       PAST MEDICAL & SURGICAL HISTORY:  Hypertension, unspecified type      H/O dilation and curettage  for missed       MEDICATIONS  (STANDING):  cefTRIAXone   IVPB      cefTRIAXone   IVPB 2000 milliGRAM(s) IV Intermittent every 24 hours  enoxaparin Injectable 40 milliGRAM(s) SubCutaneous every 24 hours  melatonin 10 milliGRAM(s) Oral at bedtime  metroNIDAZOLE    Tablet 500 milliGRAM(s) Oral every 8 hours  pantoprazole    Tablet 40 milliGRAM(s) Oral before breakfast  polyethylene glycol 3350 17 Gram(s) Oral daily  senna 2 Tablet(s) Oral at bedtime    MEDICATIONS  (PRN):  acetaminophen     Tablet .. 650 milliGRAM(s) Oral every 6 hours PRN Moderate Pain (4 - 6)  morphine  - Injectable 2 milliGRAM(s) IV Push every 4 hours PRN Severe Pain (7 - 10)      Allergies  No Known Allergies      Review of Systems:   Cardiovascular:  No Chest Pain, No Palpitations  Respiratory:  No Cough, No Dyspnea  Gastrointestinal:  As described in HPI  Skin:  No Skin Lesions, No Jaundice  Neuro:  No Syncope, No Dizziness    Physical Examination:  T(C): 36.2 (22 @ 05:37), Max: 36.7 (22 @ 20:00)  HR: 71 (22 @ 05:37) (71 - 82)  BP: 173/79 (22 @ 05:37) (131/59 - 173/79)  RR: 18 (22 @ 05:37) (18 - 20)  SpO2: 98% (22 @ 20:00) (98% - 100%)        GENERAL: AAOx3, no acute distress.  HEAD:  Atraumatic, Normocephalic  EYES: conjunctiva and sclera clear  NECK: Supple, no JVD or thyromegaly  CHEST/LUNG: Clear to auscultation bilaterally; No wheeze, rhonchi, or rales  HEART: Regular rate and rhythm; normal S1, S2, No murmurs.  ABDOMEN: Soft, nontender, nondistended; Bowel sounds present  NEUROLOGY: No asterixis or tremor.   SKIN: Intact, no jaundice     Data:                        11.6   6.10  )-----------( 341      ( 2022 06:49 )             35.8     Hgb trend:  11.6  22 @ 06:49  9.8  22 @ 09:24  9.8  22 @ 09:28          137  |  101  |  8<L>  ----------------------------<  150<H>  4.9   |  23  |  0.6<L>    Ca    8.7      2022 09:24  Mg     2.1         TPro  5.8<L>  /  Alb  2.9<L>  /  TBili  0.3  /  DBili  x   /  AST  15  /  ALT  9   /  AlkPhos  69      Liver panel trend:  TBili 0.3   /   AST 15   /   ALT 9   /   AlkP 69   /   Tptn 5.8   /   Alb 2.9    /   DBili --        TBili <0.2   /   AST 23   /   ALT 11   /   AlkP 70   /   Tptn 6.3   /   Alb 3.1    /   DBili <0.2        TBili <0.2   /   AST 14   /   ALT 8   /   AlkP 73   /   Tptn 5.4   /   Alb 2.8    /   DBili --        TBili <0.2   /   AST 11   /   ALT 7   /   AlkP 68   /   Tptn 5.7   /   Alb 2.9    /   DBili --        TBili 0.4   /   AST 10   /   ALT 8   /   AlkP 74   /   Tptn 6.0   /   Alb 2.9    /   DBili --        TBili 0.2   /   AST 13   /   ALT 9   /   AlkP 86   /   Tptn 6.9   /   Alb 3.4    /   DBili --                Culture - Blood (collected 2022 09:40)  Source: .Blood Blood  Preliminary Report (2022 17:02):    No growth to date.    Culture - Blood (collected 2022 09:34)  Source: .Blood Blood  Preliminary Report (2022 17:02):    No growth to date.       Radiology:    CT Abdomen and Pelvis w/ IV Cont:   ACC: [id number] EXAM:  CT ABDOMEN AND PELVIS IC                          PROCEDURE DATE:  2022      INTERPRETATION:  CLINICAL STATEMENT: Abdominal pain    IMPRESSION:    Redemonstration of rectosigmoid severe wall thickening with pericolonic   infiltrative change consistent with unchanged diverticulitis/colitis and   unchanged perisigmoid lymphadenopathy.    Underlying colonic neoplasm cannot be excluded on this examination.    When the patient's current symptoms improve, outpatient colonoscopy is   recommended.    Large colonic stool burden.    Bibasilar streak-like opacities, likely atelectatic although   infectious/inflammatory etiologies are not excluded in the appropriate   clinical setting.    Unchanged indeterminate splenic hypodensity and biliary duct dilatation    --- End of Report ---     Gastroenterology progress note:     Patient is a 77y old  Female who presents with a chief complaint of abdominal pain, rectosigmoid diverticulitis (2022 14:07)       Admitted on: 22    We are following the patient for: Rectosigmoid diverticulitis       Interval History:    No acute events overnight.   - Diet - Low fiber diet  - last BM - Yesterday  - Abdominal pain - Improved      PAST MEDICAL & SURGICAL HISTORY:  Hypertension, unspecified type      H/O dilation and curettage  for missed       MEDICATIONS  (STANDING):  cefTRIAXone   IVPB      cefTRIAXone   IVPB 2000 milliGRAM(s) IV Intermittent every 24 hours  enoxaparin Injectable 40 milliGRAM(s) SubCutaneous every 24 hours  melatonin 10 milliGRAM(s) Oral at bedtime  metroNIDAZOLE    Tablet 500 milliGRAM(s) Oral every 8 hours  pantoprazole    Tablet 40 milliGRAM(s) Oral before breakfast  polyethylene glycol 3350 17 Gram(s) Oral daily  senna 2 Tablet(s) Oral at bedtime    MEDICATIONS  (PRN):  acetaminophen     Tablet .. 650 milliGRAM(s) Oral every 6 hours PRN Moderate Pain (4 - 6)  morphine  - Injectable 2 milliGRAM(s) IV Push every 4 hours PRN Severe Pain (7 - 10)      Allergies  No Known Allergies      Review of Systems:   Cardiovascular:  No Chest Pain, No Palpitations  Respiratory:  No Cough, No Dyspnea  Gastrointestinal:  As described in HPI  Skin:  No Skin Lesions, No Jaundice  Neuro:  No Syncope, No Dizziness    Physical Examination:  T(C): 36.2 (22 @ 05:37), Max: 36.7 (22 @ 20:00)  HR: 71 (22 @ 05:37) (71 - 82)  BP: 173/79 (22 @ 05:37) (131/59 - 173/79)  RR: 18 (22 @ 05:37) (18 - 20)  SpO2: 98% (22 @ 20:00) (98% - 100%)        GENERAL: AAO x 2-3, no acute distress.  HEAD:  Atraumatic, Normocephalic  EYES: conjunctiva and sclera clear  NECK: Supple, no JVD or thyromegaly  CHEST/LUNG: Clear to auscultation bilaterally; No wheeze, rhonchi, or rales  HEART: Regular rate and rhythm; normal S1, S2, No murmurs.  ABDOMEN: Abdomen is soft, symmetric, and tenderness to deep palpation on left lower quadrant. Midline scar +. Bowel sounds are present and normoactive in all four quadrants. No masses,  NEUROLOGY: No asterixis or tremor.   SKIN: Intact, no jaundice     Data:                        11.6   6.10  )-----------( 341      ( 2022 06:49 )             35.8     Hgb trend:  11.6  22 @ 06:49  9.8  22 @ 09:24  9.8  22 @ 09:28      06    137  |  101  |  8<L>  ----------------------------<  150<H>  4.9   |  23  |  0.6<L>    Ca    8.7      2022 09:24  Mg     2.1         TPro  5.8<L>  /  Alb  2.9<L>  /  TBili  0.3  /  DBili  x   /  AST  15  /  ALT  9   /  AlkPhos  69      Liver panel trend:  TBili 0.3   /   AST 15   /   ALT 9   /   AlkP 69   /   Tptn 5.8   /   Alb 2.9    /   DBili --        TBili <0.2   /   AST 23   /   ALT 11   /   AlkP 70   /   Tptn 6.3   /   Alb 3.1    /   DBili <0.2        TBili <0.2   /   AST 14   /   ALT 8   /   AlkP 73   /   Tptn 5.4   /   Alb 2.8    /   DBili --        TBili <0.2   /   AST 11   /   ALT 7   /   AlkP 68   /   Tptn 5.7   /   Alb 2.9    /   DBili --        TBili 0.4   /   AST 10   /   ALT 8   /   AlkP 74   /   Tptn 6.0   /   Alb 2.9    /   DBili --        TBili 0.2   /   AST 13   /   ALT 9   /   AlkP 86   /   Tptn 6.9   /   Alb 3.4    /   DBili --                Culture - Blood (collected 2022 09:40)  Source: .Blood Blood  Preliminary Report (2022 17:02):    No growth to date.    Culture - Blood (collected 2022 09:34)  Source: .Blood Blood  Preliminary Report (2022 17:02):    No growth to date.       Radiology:    CT Abdomen and Pelvis w/ IV Cont:   ACC: [id number] EXAM:  CT ABDOMEN AND PELVIS IC                          PROCEDURE DATE:  2022      INTERPRETATION:  CLINICAL STATEMENT: Abdominal pain    IMPRESSION:    Redemonstration of rectosigmoid severe wall thickening with pericolonic   infiltrative change consistent with unchanged diverticulitis/colitis and   unchanged perisigmoid lymphadenopathy.    Underlying colonic neoplasm cannot be excluded on this examination.    When the patient's current symptoms improve, outpatient colonoscopy is   recommended.    Large colonic stool burden.    Bibasilar streak-like opacities, likely atelectatic although   infectious/inflammatory etiologies are not excluded in the appropriate   clinical setting.    Unchanged indeterminate splenic hypodensity and biliary duct dilatation    --- End of Report ---     Gastroenterology progress note:     Patient is a 77y old  Female who presents with a chief complaint of abdominal pain, rectosigmoid diverticulitis (2022 14:07)       Admitted on: 22    We are following the patient for: Rectosigmoid diverticulitis       Interval History:    No acute events overnight.   - Diet - Low fiber diet  - last BM - Yesterday  - Abdominal pain - Improved      PAST MEDICAL & SURGICAL HISTORY:  Hypertension, unspecified type      H/O dilation and curettage  for missed       MEDICATIONS  (STANDING):  cefTRIAXone   IVPB      cefTRIAXone   IVPB 2000 milliGRAM(s) IV Intermittent every 24 hours  enoxaparin Injectable 40 milliGRAM(s) SubCutaneous every 24 hours  melatonin 10 milliGRAM(s) Oral at bedtime  metroNIDAZOLE    Tablet 500 milliGRAM(s) Oral every 8 hours  pantoprazole    Tablet 40 milliGRAM(s) Oral before breakfast  polyethylene glycol 3350 17 Gram(s) Oral daily  senna 2 Tablet(s) Oral at bedtime    MEDICATIONS  (PRN):  acetaminophen     Tablet .. 650 milliGRAM(s) Oral every 6 hours PRN Moderate Pain (4 - 6)  morphine  - Injectable 2 milliGRAM(s) IV Push every 4 hours PRN Severe Pain (7 - 10)      Allergies  No Known Allergies      Review of Systems:   Cardiovascular:  No Chest Pain, No Palpitations  Respiratory:  No Cough, No Dyspnea  Gastrointestinal:  As described in HPI  Skin:  No Skin Lesions, No Jaundice  Neuro:  No Syncope, No Dizziness    Physical Examination:  T(C): 36.2 (22 @ 05:37), Max: 36.7 (22 @ 20:00)  HR: 71 (22 @ 05:37) (71 - 82)  BP: 173/79 (22 @ 05:37) (131/59 - 173/79)  RR: 18 (22 @ 05:37) (18 - 20)  SpO2: 98% (22 @ 20:00) (98% - 100%)        GENERAL: AAO x 2-3, no acute distress.  HEAD:  Atraumatic, Normocephalic  EYES: conjunctiva and sclera clear  NECK: Supple, no JVD or thyromegaly  CHEST/LUNG: Clear to auscultation bilaterally; No wheeze, rhonchi, or rales  HEART: Regular rate and rhythm; normal S1, S2, No murmurs.  ABDOMEN: Abdomen is soft, symmetric, and Non-tender. Midline scar +. Bowel sounds are present and normoactive in all four quadrants. No masses  NEUROLOGY: No asterixis or tremor.   SKIN: Intact, no jaundice     Data:                        11.6   6.10  )-----------( 341      ( 2022 06:49 )             35.8     Hgb trend:  11.6  22 @ 06:49  9.8  22 @ 09:24  9.8  22 @ 09:28          137  |  101  |  8<L>  ----------------------------<  150<H>  4.9   |  23  |  0.6<L>    Ca    8.7      2022 09:24  Mg     2.1         TPro  5.8<L>  /  Alb  2.9<L>  /  TBili  0.3  /  DBili  x   /  AST  15  /  ALT  9   /  AlkPhos  69      Liver panel trend:  TBili 0.3   /   AST 15   /   ALT 9   /   AlkP 69   /   Tptn 5.8   /   Alb 2.9    /   DBili --        TBili <0.2   /   AST 23   /   ALT 11   /   AlkP 70   /   Tptn 6.3   /   Alb 3.1    /   DBili <0.2        TBili <0.2   /   AST 14   /   ALT 8   /   AlkP 73   /   Tptn 5.4   /   Alb 2.8    /   DBili --        TBili <0.2   /   AST 11   /   ALT 7   /   AlkP 68   /   Tptn 5.7   /   Alb 2.9    /   DBili --        TBili 0.4   /   AST 10   /   ALT 8   /   AlkP 74   /   Tptn 6.0   /   Alb 2.9    /   DBili --        TBili 0.2   /   AST 13   /   ALT 9   /   AlkP 86   /   Tptn 6.9   /   Alb 3.4    /   DBili --                Culture - Blood (collected 2022 09:40)  Source: .Blood Blood  Preliminary Report (2022 17:02):    No growth to date.    Culture - Blood (collected 2022 09:34)  Source: .Blood Blood  Preliminary Report (2022 17:02):    No growth to date.       Radiology:    CT Abdomen and Pelvis w/ IV Cont:   ACC: [id number] EXAM:  CT ABDOMEN AND PELVIS IC                          PROCEDURE DATE:  2022      INTERPRETATION:  CLINICAL STATEMENT: Abdominal pain    IMPRESSION:    Redemonstration of rectosigmoid severe wall thickening with pericolonic   infiltrative change consistent with unchanged diverticulitis/colitis and   unchanged perisigmoid lymphadenopathy.    Underlying colonic neoplasm cannot be excluded on this examination.    When the patient's current symptoms improve, outpatient colonoscopy is   recommended.    Large colonic stool burden.    Bibasilar streak-like opacities, likely atelectatic although   infectious/inflammatory etiologies are not excluded in the appropriate   clinical setting.    Unchanged indeterminate splenic hypodensity and biliary duct dilatation    --- End of Report ---

## 2022-06-28 NOTE — PROCEDURE NOTE - ADDITIONAL PROCEDURE DETAILS
Utilizing ultrasound guidance, a 4 Azeri, 10cm-long Powerglide Pro Midline Catheter was inserted into the left basilic vein. Prior to needle insertion, the patency of the vein was confirmed with ultrasound.  The representative images are stored on the [employer] application. Utilizing ultrasound guidance, a 4 Tamazight, 10cm-long Powerglide Pro Midline catheter was inserted into the left basilic vein. Prior to needle insertion, the patency of the vein was confirmed with ultrasound.  The representative images are stored on the [employer] application.

## 2022-06-28 NOTE — CONSULT NOTE ADULT - ASSESSMENT
ASSESSMENT:  Patient is a 77 year old Female with PMHx of dementia, HTN, h/o IVC filter presents to ED with lower abdominal pain. Admitted to medical service for diverticulitis. Of note patient was recently admitted for diverticulitis from 6/20-6/22 and was discharged with 10 days of cipro and flagyl. Pain was unrelieved which brought the patient back to the hospital. Reports never having a colonoscopy. Physical exam findings, imaging, and labs as documented above.     PLAN:  -No acute surgical intervention. Patient can follow up with Dr. Isaacs as an outpatient   -Continue Abx per ID recommendations: ceftriaxone 2g daily and PO flagyl 500mg TID. Plan for midline for outpatient antibiotics   -Recommend colonoscopy 6-8 weeks   -Bowel regimen could prolong/worsen active diverticulitis. Should consider discontinuation and ensure diet is low fiber/low residue   -Supportive care: pain control and antiemetics prn     Above plan discussed with Attending Surgeon Dr. Isaacs, patient, patient family, and Primary team  06-28-22 @ 14:14    [place] Team Spectra: [phone]

## 2022-06-29 ENCOUNTER — TRANSCRIPTION ENCOUNTER (OUTPATIENT)
Age: 77
End: 2022-06-29

## 2022-06-29 DIAGNOSIS — K57.32 DIVERTICULITIS OF LARGE INTESTINE WITHOUT PERFORATION OR ABSCESS WITHOUT BLEEDING: ICD-10-CM

## 2022-06-29 DIAGNOSIS — F01.50 VASCULAR DEMENTIA WITHOUT BEHAVIORAL DISTURBANCE: ICD-10-CM

## 2022-06-29 DIAGNOSIS — I10 ESSENTIAL (PRIMARY) HYPERTENSION: ICD-10-CM

## 2022-06-29 LAB
ALBUMIN SERPL ELPH-MCNC: 2.9 G/DL — LOW (ref 3.5–5.2)
ALP SERPL-CCNC: 74 U/L — SIGNIFICANT CHANGE UP (ref 30–115)
ALT FLD-CCNC: 10 U/L — SIGNIFICANT CHANGE UP (ref 0–41)
ANION GAP SERPL CALC-SCNC: 10 MMOL/L — SIGNIFICANT CHANGE UP (ref 7–14)
AST SERPL-CCNC: 16 U/L — SIGNIFICANT CHANGE UP (ref 0–41)
BASOPHILS # BLD AUTO: 0.04 K/UL — SIGNIFICANT CHANGE UP (ref 0–0.2)
BASOPHILS NFR BLD AUTO: 0.5 % — SIGNIFICANT CHANGE UP (ref 0–1)
BILIRUB SERPL-MCNC: 0.2 MG/DL — SIGNIFICANT CHANGE UP (ref 0.2–1.2)
BUN SERPL-MCNC: 7 MG/DL — LOW (ref 10–20)
CALCIUM SERPL-MCNC: 9.2 MG/DL — SIGNIFICANT CHANGE UP (ref 8.5–10.1)
CHLORIDE SERPL-SCNC: 100 MMOL/L — SIGNIFICANT CHANGE UP (ref 98–110)
CO2 SERPL-SCNC: 27 MMOL/L — SIGNIFICANT CHANGE UP (ref 17–32)
CREAT SERPL-MCNC: 0.5 MG/DL — LOW (ref 0.7–1.5)
EGFR: 96 ML/MIN/1.73M2 — SIGNIFICANT CHANGE UP
EOSINOPHIL # BLD AUTO: 0.48 K/UL — SIGNIFICANT CHANGE UP (ref 0–0.7)
EOSINOPHIL NFR BLD AUTO: 6.6 % — SIGNIFICANT CHANGE UP (ref 0–8)
GLUCOSE SERPL-MCNC: 112 MG/DL — HIGH (ref 70–99)
HCT VFR BLD CALC: 34.1 % — LOW (ref 37–47)
HGB BLD-MCNC: 11.1 G/DL — LOW (ref 12–16)
IMM GRANULOCYTES NFR BLD AUTO: 0.7 % — HIGH (ref 0.1–0.3)
LYMPHOCYTES # BLD AUTO: 1.31 K/UL — SIGNIFICANT CHANGE UP (ref 1.2–3.4)
LYMPHOCYTES # BLD AUTO: 17.9 % — LOW (ref 20.5–51.1)
MAGNESIUM SERPL-MCNC: 2.1 MG/DL — SIGNIFICANT CHANGE UP (ref 1.8–2.4)
MCHC RBC-ENTMCNC: 26.4 PG — LOW (ref 27–31)
MCHC RBC-ENTMCNC: 32.6 G/DL — SIGNIFICANT CHANGE UP (ref 32–37)
MCV RBC AUTO: 81.2 FL — SIGNIFICANT CHANGE UP (ref 81–99)
MONOCYTES # BLD AUTO: 0.93 K/UL — HIGH (ref 0.1–0.6)
MONOCYTES NFR BLD AUTO: 12.7 % — HIGH (ref 1.7–9.3)
NEUTROPHILS # BLD AUTO: 4.51 K/UL — SIGNIFICANT CHANGE UP (ref 1.4–6.5)
NEUTROPHILS NFR BLD AUTO: 61.6 % — SIGNIFICANT CHANGE UP (ref 42.2–75.2)
NRBC # BLD: 0 /100 WBCS — SIGNIFICANT CHANGE UP (ref 0–0)
PLATELET # BLD AUTO: 373 K/UL — SIGNIFICANT CHANGE UP (ref 130–400)
POTASSIUM SERPL-MCNC: 4.4 MMOL/L — SIGNIFICANT CHANGE UP (ref 3.5–5)
POTASSIUM SERPL-SCNC: 4.4 MMOL/L — SIGNIFICANT CHANGE UP (ref 3.5–5)
PROT SERPL-MCNC: 6.3 G/DL — SIGNIFICANT CHANGE UP (ref 6–8)
RBC # BLD: 4.2 M/UL — SIGNIFICANT CHANGE UP (ref 4.2–5.4)
RBC # FLD: 14.7 % — HIGH (ref 11.5–14.5)
SODIUM SERPL-SCNC: 137 MMOL/L — SIGNIFICANT CHANGE UP (ref 135–146)
WBC # BLD: 7.32 K/UL — SIGNIFICANT CHANGE UP (ref 4.8–10.8)
WBC # FLD AUTO: 7.32 K/UL — SIGNIFICANT CHANGE UP (ref 4.8–10.8)

## 2022-06-29 PROCEDURE — 99232 SBSQ HOSP IP/OBS MODERATE 35: CPT

## 2022-06-29 RX ORDER — METRONIDAZOLE 500 MG
1 TABLET ORAL
Qty: 21 | Refills: 0
Start: 2022-06-29 | End: 2022-07-05

## 2022-06-29 RX ORDER — LANOLIN ALCOHOL/MO/W.PET/CERES
1 CREAM (GRAM) TOPICAL
Qty: 30 | Refills: 0
Start: 2022-06-29 | End: 2022-07-28

## 2022-06-29 RX ORDER — SACCHAROMYCES BOULARDII 250 MG
1 POWDER IN PACKET (EA) ORAL
Qty: 14 | Refills: 0
Start: 2022-06-29 | End: 2022-07-05

## 2022-06-29 RX ADMIN — Medication 500 MILLIGRAM(S): at 05:29

## 2022-06-29 RX ADMIN — POLYETHYLENE GLYCOL 3350 17 GRAM(S): 17 POWDER, FOR SOLUTION ORAL at 11:49

## 2022-06-29 RX ADMIN — Medication 10 MILLIGRAM(S): at 21:08

## 2022-06-29 RX ADMIN — PANTOPRAZOLE SODIUM 40 MILLIGRAM(S): 20 TABLET, DELAYED RELEASE ORAL at 05:29

## 2022-06-29 RX ADMIN — ENOXAPARIN SODIUM 40 MILLIGRAM(S): 100 INJECTION SUBCUTANEOUS at 11:49

## 2022-06-29 RX ADMIN — CEFTRIAXONE 100 MILLIGRAM(S): 500 INJECTION, POWDER, FOR SOLUTION INTRAMUSCULAR; INTRAVENOUS at 16:21

## 2022-06-29 RX ADMIN — Medication 500 MILLIGRAM(S): at 21:09

## 2022-06-29 RX ADMIN — Medication 500 MILLIGRAM(S): at 13:58

## 2022-06-29 RX ADMIN — SENNA PLUS 2 TABLET(S): 8.6 TABLET ORAL at 21:09

## 2022-06-29 NOTE — DISCHARGE NOTE PROVIDER - CARE PROVIDERS DIRECT ADDRESSES
,DirectAddress_Unknown,DirectAddress_Unknown,[email or web address] ,DirectAddress_Unknown,[email or web address]

## 2022-06-29 NOTE — DISCHARGE NOTE PROVIDER - CARE PROVIDER_API CALL
Aparna Bowen)  Medicine  [address] [facility] [address]  [place] [address]  Phone: [phone]  Fax: [phone]  Follow Up Time: 2 weeks    Antonio Isaacs)  Surgery  [address] [place] [address]  Phone: [phone]  Fax: [phone]  Follow Up Time: 2 weeks    Fidel Starr)  Gastroenterology; Internal Medicine  [address] [place] [address]  Phone: [phone]  Fax: [phone]  Follow Up Time: 2 weeks   Aparna Bowen)  Medicine  [address] [facility] [address]  [place] [address]  Phone: [phone]  Fax: [phone]  Follow Up Time: 2 weeks    Fidel Starr)  Gastroenterology; Internal Medicine  [address] [place] [address]  Phone: [phone]  Fax: [phone]  Follow Up Time: 2 weeks    Lonnie Wade)  Urology  [address] [place] [address], [address] [place] [address]  Phone: [phone]  Fax: [phone]  Follow Up Time: 2 weeks

## 2022-06-29 NOTE — PROGRESS NOTE ADULT - SUBJECTIVE AND OBJECTIVE BOX
KEISHA PANTOJA 77y Female  MRN#: [id number]      Pt is currently admitted with the primary diagnosis of Diverticulitis      Hospital Day: 3d  HPI:  76yo F PMHx of dementia and HTN presents to the ED with complaints of abdominal pain.     Patient had a recent hospital course - last month for diverticulitis, and prescribed a 10-day course of cipro + flagyl to complete on 22. Abdominal pain started yesterday. States it is sharp, severe, on the left lower abdomen. Per patient's daughter, patient is compliant with taking her antibiotics (in the AM). Denies nausea/vomiting. Endorses constipation, but has been having frequent small loose brown-colored stools. Patient has decreased PO intake, but has no issues with  swallowing.     Otherwise, ROS (-). Denies fever, shortness of breath, chest pain, dysuria, myalgias, back pain.     At the ED, patient is afebrile, VS stable, on RA. Labs significant for COVID-19 (+), Hb- 9.8 (appears chronic). LFTs wnl. CT A+P shows unchanged severe rectosigmoid wall thickening with pericolonic infiltrative changes + diverticuli + small bowel thickening, large stool burden without drainable fluid collection, no obstruction noted.     Overnight events:  No acute events overnight    Subjective:   Pt was seen and examined at bedside. Requesting to go home. At baseline. Denies: abdominal pain.                                                ----------------------------------------------------------    PAST MEDICAL & SURGICAL HISTORY  Hypertension, unspecified type    H/O dilation and curettage  for missed                                               -----------------------------------------------------------  ALLERGIES:  No Known Allergies                                            ------------------------------------------------------------    HOME MEDICATIONS  Home Medications:                           MEDICATIONS:  STANDING MEDICATIONS  cefTRIAXone   IVPB      cefTRIAXone   IVPB 2000 milliGRAM(s) IV Intermittent every 24 hours  enoxaparin Injectable 40 milliGRAM(s) SubCutaneous every 24 hours  melatonin 10 milliGRAM(s) Oral at bedtime  metroNIDAZOLE    Tablet 500 milliGRAM(s) Oral every 8 hours  pantoprazole    Tablet 40 milliGRAM(s) Oral before breakfast  polyethylene glycol 3350 17 Gram(s) Oral daily  senna 2 Tablet(s) Oral at bedtime    PRN MEDICATIONS  acetaminophen     Tablet .. 650 milliGRAM(s) Oral every 6 hours PRN  morphine  - Injectable 2 milliGRAM(s) IV Push every 4 hours PRN                                            ------------------------------------------------------------  VITAL SIGNS: Last 24 Hours  T(C): 35.7 (2022 12:49), Max: 36.2 (2022 05:53)  T(F): 96.3 (2022 12:49), Max: 97.1 (2022 05:53)  HR: 74 (2022 12:49) (71 - 86)  BP: 113/65 (2022 12:49) (113/65 - 136/71)  BP(mean): --  RR: 18 (2022 12:49) (18 - 18)  SpO2: 97% (2022 08:43) (97% - 97%)                                             --------------------------------------------------------------  LABS:                        11.1   7.32  )-----------( 373      ( 2022 07:15 )             34.1     06-29    137  |  100  |  7<L>  ----------------------------<  112<H>  4.4   |  27  |  0.5<L>    Ca    9.2      2022 07:15  Mg     2.1         TPro  6.3  /  Alb  2.9<L>  /  TBili  0.2  /  DBili  x   /  AST  16  /  ALT  10  /  AlkPhos  74        PHYSICAL EXAM:  General: well-appearing in NAD.   HEENT: NCAT  LUNGS: CTAB  HEART: RRR.   ABDOMEN: + BS. Distended. Nontender. No rebound tenderness or guarding.   EXT: Nonedematous.  NEURO: Deferred.  SKIN: Warm, dry.      ASSESSMENT & PLAN:  76yo F PMHx of dementia and HTN, recent hospital course for diverticulitis presents to the ED with complaints of abdominal pain consistent with diverticulitis.    #Acute diverticulitis v. Stercoral-colitis with perisigmoid Lymphadenopathy   #Constipation  - Failed outpt therapy [10-day course of cipro+flagyl (end date 22)]   - CT A+P- findings consistent with unchanged diverticulitis/colitis + diverticula, pericolonic infiltrative changes. Adjacent small bowel loops have wall thickening as well   - Loose stools likely from overflow leaking from constipation demonstrated on CT A+P  - C/w senna + miralax for constipation  - C/w IV Ceftriaxone 2g q24H and PO Flagyl 500 mg TID (end date: 22) upon discharge per ID recs  - Midline placed in LA (22)  - Per GI recs,  surgery consult & low fiber diet as tolerated  - Per surgery, no acute intervention at this time, consider d/c bowel reg  - Outpt colonoscopy in 6-8 weeks upon d/c. Patient has not had a colonoscopy in > 10 years.   - Can follow up with surgery Dr. Isaacs as an outpatient     #COVID-19, asymptomatic, incidental  - VS stable.   - No need for steroids.   - Cont monitor    #HTN  - well controlled without medications    #Misc  -DVT PPx: Lovenox  -GI PPx: Protonix.   -Diet: Low Fiber diet  -Code: Full  -Dispo: Pending NH placement for IV abx administration, tentative d/c on 22

## 2022-06-29 NOTE — DISCHARGE NOTE PROVIDER - NSDCCPCAREPLAN_GEN_ALL_CORE_FT
PRINCIPAL DISCHARGE DIAGNOSIS  Diagnosis: Rectosigmoid diverticulitis  Assessment and Plan of Treatment: You were admitted to [facility] for diverticulitis (infection of the diverticula of the colon). You were started on IV ceftriaxone 2g q24H and oral flagyl 500 mg three times a day to be completed on 7/6/22. Please follow-up w/ your primary care physician. It is also important that you follow-up w/ gastroenterology to have a colonoscopy done in 6-8 weeks to check for colon cancer and to follow-up w/ general surgery regarding possible surgical interventions.      SECONDARY DISCHARGE DIAGNOSES  Diagnosis: Abdominal pain  Assessment and Plan of Treatment:      PRINCIPAL DISCHARGE DIAGNOSIS  Diagnosis: Rectosigmoid diverticulitis  Assessment and Plan of Treatment: You were admitted to [facility] for diverticulitis (infection of the diverticula of the colon). You were started on IV ceftriaxone 2g q24H and oral flagyl 500 mg three times a day to be completed on 7/6/22. Please follow-up w/ your primary care physician. It is also important that you follow-up w/ gastroenterology to have a colonoscopy done in 6-8 weeks to check for colon cancer and to follow-up w/ general surgery regarding possible surgical interventions. You will need CBC, CMP, ESR, & CRP in 1 week w/ visiting nurse while on IV ceftriaxone.      SECONDARY DISCHARGE DIAGNOSES  Diagnosis: Abdominal pain  Assessment and Plan of Treatment:      PRINCIPAL DISCHARGE DIAGNOSIS  Diagnosis: Rectosigmoid diverticulitis  Assessment and Plan of Treatment: You had recurrent diverticulitis which we treated with antibiotics, you have finished your course of antibiotics. Continue to take a probiotic and stool softeners (senna, miralax) to avoid constipation. Follow up with a gastroenterologist to get a colonoscopy in the next few months.      SECONDARY DISCHARGE DIAGNOSES  Diagnosis: Urinary retention  Assessment and Plan of Treatment: You were retaining urine and have a garcia catheter in place. You were seen by the urologist who recommended keeping the garcia catheter in place for 3 weeks and to follow up with the urology team outpatient for further urologic testing.

## 2022-06-29 NOTE — DISCHARGE NOTE PROVIDER - DETAILS OF MALNUTRITION DIAGNOSIS/DIAGNOSES
This patient has been assessed with a concern for Malnutrition and was treated during this hospitalization for the following Nutrition diagnosis/diagnoses:     -  07/02/2022: Severe protein-calorie malnutrition   -  07/02/2022: Underweight (BMI < 19)

## 2022-06-29 NOTE — PROGRESS NOTE ADULT - ASSESSMENT
ASSESSMENT  76yo F PMHx of dementia and HTN presents to the ED with complaints of abdominal pain.       IMPRESSION   #Diverticulitis vs vs Stercoral-colitis with perisigmoid Lymphadenopathy   - CT Abdomen and Pelvis w/ IV Cont (06.26.22 @ 11:49): Redemonstration of rectosigmoid severe wall thickening with pericolonic  infiltrative change consistent with unchanged diverticulitis/colitis and   unchanged perisigmoid lymphadenopathy. Underlying colonic neoplasm cannot be excluded on this examination. When the patient's current symptoms improve, outpatient colonoscopy is   recommended. Large colonic stool burden.    #Dementia   #Abx allergy: NKDA    RECOMMENDATIONS  This is a preliminary incomplete pended note, all final recommendations to follow after interview and examination of the patient.    Please call or message on Microsoft Teams if with any questions.  Spectra [phone]   ASSESSMENT  78yo F PMHx of dementia and HTN presents to the ED with complaints of abdominal pain.       IMPRESSION   #Diverticulitis vs vs Stercoral-colitis with perisigmoid Lymphadenopathy   - CT Abdomen and Pelvis w/ IV Cont (06.26.22 @ 11:49): Redemonstration of rectosigmoid severe wall thickening with pericolonic  infiltrative change consistent with unchanged diverticulitis/colitis and   unchanged perisigmoid lymphadenopathy. Underlying colonic neoplasm cannot be excluded on this examination. When the patient's current symptoms improve, outpatient colonoscopy is   recommended. Large colonic stool burden.    #Dementia   #Abx allergy: NKDA    RECOMMENDATIONS  - continue ceftriaxone 2g daily   - continue PO flagyl 500 mg TID   - given previous PO antibiotic failure and unclear if truly taking, can finish 10 day course of total antibiotics (end date 7/6) with midline ceftriaxone and oral flagyl     Please call or message on Microsoft Teams if with any questions.  Spectra [phone]

## 2022-06-29 NOTE — PROGRESS NOTE ADULT - SUBJECTIVE AND OBJECTIVE BOX
KEISHA PANTOJA 77y Female  MRN#: [id number]      Pt is currently admitted with the primary diagnosis of      Hospital Day: 3d  HPI:  78yo F PMHx of dementia and HTN presents to the ED with complaints of abdominal pain.     Patient had a recent hospital course - last month for diverticulitis, and prescribed a 10-day course of cipro + flagyl to complete on 22. Abdominal pain started yesterday. States it is sharp, severe, on the left lower abdomen. Per patient's daughter, patient is compliant with taking her antibiotics (in the AM). Denies nausea/vomiting. Endorses constipation, but has been having frequent small loose brown-colored stools. Patient has decreased PO intake, but has no issues with  swallowing.     Otherwise, ROS (-). Denies fever, shortness of breath, chest pain, dysuria, myalgias, back pain.     At the ED, patient is afebrile, VS stable, on RA. Labs significant for COVID-19 (+), Hb- 9.8 (appears chronic). LFTs wnl. CT A+P shows unchanged severe rectosigmoid wall thickening with pericolonic infiltrative changes + diverticuli + small bowel thickening, large stool burden without drainable fluid collection, no obstruction noted.     Overnight events:  No acute events overnight    Subjective:   Pt was seen and examined at bedside. Reports significant improvement in abdominal pain since prior days. No complaints at this time.                                            ----------------------------------------------------------    PAST MEDICAL & SURGICAL HISTORY  Hypertension, unspecified type    H/O dilation and curettage  for missed                                               -----------------------------------------------------------  ALLERGIES:  No Known Allergies                                            ------------------------------------------------------------    HOME MEDICATIONS  Home Medications:                           MEDICATIONS:  STANDING MEDICATIONS  cefTRIAXone   IVPB      cefTRIAXone   IVPB 2000 milliGRAM(s) IV Intermittent every 24 hours  enoxaparin Injectable 40 milliGRAM(s) SubCutaneous every 24 hours  melatonin 10 milliGRAM(s) Oral at bedtime  metroNIDAZOLE    Tablet 500 milliGRAM(s) Oral every 8 hours  pantoprazole    Tablet 40 milliGRAM(s) Oral before breakfast  polyethylene glycol 3350 17 Gram(s) Oral daily  senna 2 Tablet(s) Oral at bedtime    PRN MEDICATIONS  acetaminophen     Tablet .. 650 milliGRAM(s) Oral every 6 hours PRN  morphine  - Injectable 2 milliGRAM(s) IV Push every 4 hours PRN                                            ------------------------------------------------------------  VITAL SIGNS: Last 24 Hours  T(C): 36.2 (2022 05:53), Max: 36.2 (2022 05:53)  T(F): 97.1 (2022 05:53), Max: 97.1 (2022 05:53)  HR: 86 (2022 05:53) (71 - 86)  BP: 133/73 (2022 05:53) (133/73 - 144/66)  BP(mean): --  RR: 18 (2022 05:53) (18 - 18)  SpO2: 97% (2022 22:47) (97% - 100%)                                             --------------------------------------------------------------  LABS:                        11.6   6.10  )-----------( 341      ( 2022 06:49 )             35.8     06-28    135  |  97<L>  |  6<L>  ----------------------------<  106<H>  4.6   |  26  |  0.5<L>    Ca    8.9      2022 06:49  Mg     2.1         TPro  6.4  /  Alb  3.0<L>  /  TBili  <0.2  /  DBili  x   /  AST  13  /  ALT  9   /  AlkPhos  72        Culture - Blood (collected 2022 09:40)  Source: .Blood Blood  Preliminary Report (2022 17:02):    No growth to date.    Culture - Blood (collected 2022 09:34)  Source: .Blood Blood  Preliminary Report (2022 17:02):    No growth to date.      PHYSICAL EXAM:  General: well-appearing in NAD.   HEENT: NCAT  LUNGS: CTAB  HEART: RRR.   ABDOMEN: + BS. Distended. Nontender. No rebound tenderness or guarding.   EXT: Nonedematous.  NEURO: Deferred.  SKIN: Warm, dry.    ASSESSMENT & PLAN:  78yo F PMHx of dementia and HTN, recent hospital course for diverticulitis presents to the ED with complaints of abdominal pain consistent with diverticulitis.    #Acute diverticulitis v. Stercoral-colitis with perisigmoid Lymphadenopathy   #Constipation  - Failed outpt therapy [10-day course of cipro+flagyl (end date 22)]   - CT A+P- findings consistent with unchanged diverticulitis/colitis + diverticula, pericolonic infiltrative changes. Adjacent small bowel loops have wall thickening as well   - Loose stools likely from overflow leaking from constipation demonstrated on CT A+P  - C/w senna + miralax for constipation  - C/w IV Ceftriaxone 2g q24H and PO Flagyl 500 mg TID per ID recs  - Procedure team consulted for midline placement for long term outpt abx  - Per GI recs,  surgery consult & low fiber diet as tolerated  - Per surgery, no acute intervention at this time, consider d/c bowel reg  - Outpt colonoscopy in 6-8 weeks upon d/c. Patient has not had a colonoscopy in > 10 years.   - Can follow up with surgery Dr. Isaacs as an outpatient     #COVID-19, asymptomatic, incidental  - VS stable.   - No need for steroids.   - Cont monitor    #HTN  - well controlled without medications    #Misc  -DVT PPx: Lovenox  -GI PPx: Protonix.   -Diet: Low Fiber diet  -Code: Full

## 2022-06-29 NOTE — DISCHARGE NOTE PROVIDER - PROVIDER TOKENS
PROVIDER:[TOKEN:[[id number]],FOLLOWUP:[2 weeks]],PROVIDER:[TOKEN:[[id number]],FOLLOWUP:[2 weeks]],PROVIDER:[TOKEN:[[id number]],FOLLOWUP:[2 weeks]] PROVIDER:[TOKEN:[[id number]],FOLLOWUP:[2 weeks]],PROVIDER:[TOKEN:[[id number]],FOLLOWUP:[2 weeks]],PROVIDER:[TOKEN:[[id number]],FOLLOWUP:[2 weeks]]

## 2022-06-29 NOTE — DISCHARGE NOTE NURSING/CASE MANAGEMENT/SOCIAL WORK - PATIENT PORTAL LINK FT
You can access the FollowMyHealth Patient Portal offered by [facility] by registering at the following website: [email or web address]. By joining [employer]’s FollowMyHealth portal, you will also be able to view your health information using other applications (apps) compatible with our system.

## 2022-06-29 NOTE — DISCHARGE NOTE PROVIDER - EXTENDED VTE YES NO FOR MLM ENOXAPARIN
-- DO NOT REPLY / DO NOT REPLY ALL --  -- Message is from the [facility]--    Provider paged via [employer] Documentation - The below message was copied and pasted from a [employer] page:    [facility]   lFavio Rmoo MD   Secure Text   [phone] ACC URGENT CALLER NAME: TESSA RE: KENZIE SANCHEZ PATIENT 2010 PATIENT PCP: [facility] PATIENT WILL NOT EAT AFTER 6 UNITS OF INSULIN AND HIS SUGAR . HAS HEADACHE FATIGUE AND CHILL..ROPA*        ,

## 2022-06-29 NOTE — PROGRESS NOTE ADULT - SUBJECTIVE AND OBJECTIVE BOX
KEISHA PANTOJA  77y, Female  Allergy: No Known Allergies      LOS  3d    CHIEF COMPLAINT: abdominal pain, rectosigmoid diverticulitis (29 Jun 2022 07:00)      INTERVAL EVENTS/HPI  - No acute events overnight  - T(F): , Max: 97.1 (06-29-22 @ 05:53)  - Denies any worsening symptoms  - Tolerating medication  - WBC Count: 7.32 (06-29-22 @ 07:15)  WBC Count: 6.10 (06-28-22 @ 06:49)     - Creatinine, Serum: 0.5 (06-28-22 @ 06:49)  Creatinine, Serum: 0.6 (06-27-22 @ 09:24)       ROS  General: Denies rigors, nightsweats  HEENT: Denies headache, rhinorrhea, sore throat, eye pain  CV: Denies CP, palpitations  PULM: Denies wheezing, hemoptysis  GI: Denies hematemesis, hematochezia, melena  : Denies discharge, hematuria  MSK: Denies arthralgias, myalgias  SKIN: Denies rash, lesions  NEURO: Denies paresthesias, weakness  PSYCH: Denies depression, anxiety    VITALS:  T(F): 97.1, Max: 97.1 (06-29-22 @ 05:53)  HR: 86  BP: 133/73  RR: 18Vital Signs Last 24 Hrs  T(C): 36.2 (29 Jun 2022 05:53), Max: 36.2 (29 Jun 2022 05:53)  T(F): 97.1 (29 Jun 2022 05:53), Max: 97.1 (29 Jun 2022 05:53)  HR: 86 (29 Jun 2022 05:53) (71 - 86)  BP: 133/73 (29 Jun 2022 05:53) (133/73 - 144/66)  BP(mean): --  RR: 18 (29 Jun 2022 05:53) (18 - 18)  SpO2: 97% (28 Jun 2022 22:47) (97% - 100%)    PHYSICAL EXAM:  Gen: NAD, resting in bed  HEENT: Normocephalic, atraumatic  Neck: supple, no lymphadenopathy  CV: Regular rate & regular rhythm  Lungs: decreased BS at bases, no fremitus  Abdomen: Soft, BS present  Ext: Warm, well perfused  Neuro: non focal, awake  Skin: no rash, no erythema  Lines: no phlebitis    FH: Non-contributory  Social Hx: Non-contributory    TESTS & MEASUREMENTS:                        11.1   7.32  )-----------( 373      ( 29 Jun 2022 07:15 )             34.1     06-28    135  |  97<L>  |  6<L>  ----------------------------<  106<H>  4.6   |  26  |  0.5<L>    Ca    8.9      28 Jun 2022 06:49  Mg     2.1     06-27    TPro  6.4  /  Alb  3.0<L>  /  TBili  <0.2  /  DBili  x   /  AST  13  /  ALT  9   /  AlkPhos  72  06-28      LIVER FUNCTIONS - ( 28 Jun 2022 06:49 )  Alb: 3.0 g/dL / Pro: 6.4 g/dL / ALK PHOS: 72 U/L / ALT: 9 U/L / AST: 13 U/L / GGT: x               Culture - Blood (collected 06-26-22 @ 09:40)  Source: .Blood Blood  Preliminary Report (06-27-22 @ 17:02):    No growth to date.    Culture - Blood (collected 06-26-22 @ 09:34)  Source: .Blood Blood  Preliminary Report (06-27-22 @ 17:02):    No growth to date.    Culture - Blood (collected 06-21-22 @ 04:30)  Source: .Blood Blood  Final Report (06-26-22 @ 14:00):    No Growth Final    Culture - Urine (collected 06-14-22 @ 13:48)  Source: Clean Catch Clean Catch (Midstream)  Final Report (06-15-22 @ 18:36):    <10,000 CFU/mL Normal Urogenital Lorna        Lactate, Blood: 0.8 mmol/L (06-26-22 @ 09:28)      INFECTIOUS DISEASES TESTING  COVID-19 PCR: Detected (06-26-22 @ 12:30)  Procalcitonin, Serum: 0.08 (06-21-22 @ 04:30)  COVID-19 PCR: NotDetec (06-20-22 @ 10:48)      INFLAMMATORY MARKERS      RADIOLOGY & ADDITIONAL TESTS:  I have personally reviewed the last available Chest xray  CXR      CT  CT Abdomen and Pelvis w/ IV Cont:   ACC: [id number] EXAM:  CT ABDOMEN AND PELVIS IC                          PROCEDURE DATE:  06/26/2022          INTERPRETATION:  CLINICAL STATEMENT: Abdominal pain    TECHNIQUE: Contiguous axial CT images were obtained from the lower chest   to the pubic symphysis following the administration of 75 cc Omnipaque   350 intravenous contrast.  Oral contrast was not administered.    Reformatted images in the coronal and sagittal planes were acquired.    COMPARISON CT: CT abdomen and pelvis 6/20/2022    FINDINGS:    LOWER CHEST: Basilar streak-like opacities...    HEPATOBILIARY: Unchanged mild intrahepatic extrahepatic biliary ductal   dilatation. Subcentimeter hepatic densities are too small to   characterize. The gallbladder is present..    SPLEEN:1.6 cm splenic hypodensity, indeterminate and unchanged..    PANCREAS: Unremarkable..    ADRENAL GLANDS: Stable..    KIDNEYS: Symmetric renal enhancement. No hydronephrosis. Right renal cyst   and left renal subcentimeter hypodensity, too small to characterize..    ABDOMINOPELVIC NODES: Perisigmoid lymphadenopathy..    PELVIC ORGANS: Under distended urinary bladder. Uterine calcifications.   Uterus and adnexa are incompletely evaluated on CT..    PERITONEUM/MESENTERY/BOWEL: Redemonstration of severe rectosigmoid wall   thickening with pericolonic infiltrative changes and diverticuli. There   is a large colonic stool burden. Adjacent small bowel loops demonstrates   wall thickening. There is a small hiatal hernia. Oral contrast is seen   within the colon, likely from prior administration. No discrete drainable   fluid collection is demonstrated. No definite evidence of   pneumoperitoneum. No evidence of obstruction..    BONES/SOFT TISSUES: Degenerative changes...    OTHER: IVC filter noted. Vascular calcifications. Focal dense   calcifications at the takeoff of the right renal artery....      IMPRESSION:    Redemonstration of rectosigmoid severe wall thickening with pericolonic   infiltrative change consistent with unchanged diverticulitis/colitis and   unchanged perisigmoid lymphadenopathy.    Underlying colonic neoplasm cannot be excluded on this examination.    When the patient's current symptoms improve, outpatient colonoscopy is   recommended.    Large colonic stool burden.    Bibasilar streak-like opacities, likely atelectatic although   infectious/inflammatory etiologies are not excluded in the appropriate   clinical setting.    Unchanged indeterminate splenic hypodensity and biliary duct dilatation    --- End of Report ---          TAMI LOVE DO; Resident Radiologist  This document has been electronically signed.  ARVIND XAVIER MD; Attending Radiologist  This document has been electronically signed. Jun 26 2022  1:48PM (06-26-22 @ 11:49)      CARDIOLOGY TESTING      MEDICATIONS  cefTRIAXone   IVPB     cefTRIAXone   IVPB 2000 IV Intermittent every 24 hours  enoxaparin Injectable 40 SubCutaneous every 24 hours  melatonin 10 Oral at bedtime  metroNIDAZOLE    Tablet 500 Oral every 8 hours  pantoprazole    Tablet 40 Oral before breakfast  polyethylene glycol 3350 17 Oral daily  senna 2 Oral at bedtime      WEIGHT  Weight (kg): 47.5 (06-27-22 @ 08:29)      ANTIBIOTICS:  cefTRIAXone   IVPB      cefTRIAXone   IVPB 2000 milliGRAM(s) IV Intermittent every 24 hours  metroNIDAZOLE    Tablet 500 milliGRAM(s) Oral every 8 hours      All available historical records have been reviewed       KEISHA PANTOJA  77y, Female  Allergy: No Known Allergies      LOS  3d    CHIEF COMPLAINT: abdominal pain, rectosigmoid diverticulitis (29 Jun 2022 07:00)      INTERVAL EVENTS/HPI  - No acute events overnight  - T(F): , Max: 97.1 (06-29-22 @ 05:53)  - Denies any worsening symptoms; abdominal pain improving   - Tolerating medication  - WBC Count: 7.32 (06-29-22 @ 07:15)  WBC Count: 6.10 (06-28-22 @ 06:49)     - Creatinine, Serum: 0.5 (06-28-22 @ 06:49)  Creatinine, Serum: 0.6 (06-27-22 @ 09:24)       ROS  General: Denies rigors, nightsweats  HEENT: Denies headache, rhinorrhea, sore throat, eye pain  CV: Denies CP, palpitations  PULM: Denies wheezing, hemoptysis  GI: Denies hematemesis, hematochezia, melena  : Denies discharge, hematuria  MSK: Denies arthralgias, myalgias  SKIN: Denies rash, lesions  NEURO: Denies paresthesias, weakness  PSYCH: Denies depression, anxiety    VITALS:  T(F): 97.1, Max: 97.1 (06-29-22 @ 05:53)  HR: 86  BP: 133/73  RR: 18Vital Signs Last 24 Hrs  T(C): 36.2 (29 Jun 2022 05:53), Max: 36.2 (29 Jun 2022 05:53)  T(F): 97.1 (29 Jun 2022 05:53), Max: 97.1 (29 Jun 2022 05:53)  HR: 86 (29 Jun 2022 05:53) (71 - 86)  BP: 133/73 (29 Jun 2022 05:53) (133/73 - 144/66)  BP(mean): --  RR: 18 (29 Jun 2022 05:53) (18 - 18)  SpO2: 97% (28 Jun 2022 22:47) (97% - 100%)    PHYSICAL EXAM:  Gen: NAD, resting in bed  HEENT: Normocephalic, atraumatic  Neck: supple, no lymphadenopathy  CV: Regular rate & regular rhythm  Lungs: decreased BS at bases, no fremitus  Abdomen: Soft, BS present  Ext: Warm, well perfused  Neuro: non focal, awake  Skin: no rash, no erythema  Lines: no phlebitis    FH: Non-contributory  Social Hx: Non-contributory    TESTS & MEASUREMENTS:                        11.1   7.32  )-----------( 373      ( 29 Jun 2022 07:15 )             34.1     06-28    135  |  97<L>  |  6<L>  ----------------------------<  106<H>  4.6   |  26  |  0.5<L>    Ca    8.9      28 Jun 2022 06:49  Mg     2.1     06-27    TPro  6.4  /  Alb  3.0<L>  /  TBili  <0.2  /  DBili  x   /  AST  13  /  ALT  9   /  AlkPhos  72  06-28      LIVER FUNCTIONS - ( 28 Jun 2022 06:49 )  Alb: 3.0 g/dL / Pro: 6.4 g/dL / ALK PHOS: 72 U/L / ALT: 9 U/L / AST: 13 U/L / GGT: x               Culture - Blood (collected 06-26-22 @ 09:40)  Source: .Blood Blood  Preliminary Report (06-27-22 @ 17:02):    No growth to date.    Culture - Blood (collected 06-26-22 @ 09:34)  Source: .Blood Blood  Preliminary Report (06-27-22 @ 17:02):    No growth to date.    Culture - Blood (collected 06-21-22 @ 04:30)  Source: .Blood Blood  Final Report (06-26-22 @ 14:00):    No Growth Final    Culture - Urine (collected 06-14-22 @ 13:48)  Source: Clean Catch Clean Catch (Midstream)  Final Report (06-15-22 @ 18:36):    <10,000 CFU/mL Normal Urogenital Lorna        Lactate, Blood: 0.8 mmol/L (06-26-22 @ 09:28)      INFECTIOUS DISEASES TESTING  COVID-19 PCR: Detected (06-26-22 @ 12:30)  Procalcitonin, Serum: 0.08 (06-21-22 @ 04:30)  COVID-19 PCR: NotDetec (06-20-22 @ 10:48)      INFLAMMATORY MARKERS      RADIOLOGY & ADDITIONAL TESTS:  I have personally reviewed the last available Chest xray  CXR      CT  CT Abdomen and Pelvis w/ IV Cont:   ACC: [id number] EXAM:  CT ABDOMEN AND PELVIS IC                          PROCEDURE DATE:  06/26/2022          INTERPRETATION:  CLINICAL STATEMENT: Abdominal pain    TECHNIQUE: Contiguous axial CT images were obtained from the lower chest   to the pubic symphysis following the administration of 75 cc Omnipaque   350 intravenous contrast.  Oral contrast was not administered.    Reformatted images in the coronal and sagittal planes were acquired.    COMPARISON CT: CT abdomen and pelvis 6/20/2022    FINDINGS:    LOWER CHEST: Basilar streak-like opacities...    HEPATOBILIARY: Unchanged mild intrahepatic extrahepatic biliary ductal   dilatation. Subcentimeter hepatic densities are too small to   characterize. The gallbladder is present..    SPLEEN:1.6 cm splenic hypodensity, indeterminate and unchanged..    PANCREAS: Unremarkable..    ADRENAL GLANDS: Stable..    KIDNEYS: Symmetric renal enhancement. No hydronephrosis. Right renal cyst   and left renal subcentimeter hypodensity, too small to characterize..    ABDOMINOPELVIC NODES: Perisigmoid lymphadenopathy..    PELVIC ORGANS: Under distended urinary bladder. Uterine calcifications.   Uterus and adnexa are incompletely evaluated on CT..    PERITONEUM/MESENTERY/BOWEL: Redemonstration of severe rectosigmoid wall   thickening with pericolonic infiltrative changes and diverticuli. There   is a large colonic stool burden. Adjacent small bowel loops demonstrates   wall thickening. There is a small hiatal hernia. Oral contrast is seen   within the colon, likely from prior administration. No discrete drainable   fluid collection is demonstrated. No definite evidence of   pneumoperitoneum. No evidence of obstruction..    BONES/SOFT TISSUES: Degenerative changes...    OTHER: IVC filter noted. Vascular calcifications. Focal dense   calcifications at the takeoff of the right renal artery....      IMPRESSION:    Redemonstration of rectosigmoid severe wall thickening with pericolonic   infiltrative change consistent with unchanged diverticulitis/colitis and   unchanged perisigmoid lymphadenopathy.    Underlying colonic neoplasm cannot be excluded on this examination.    When the patient's current symptoms improve, outpatient colonoscopy is   recommended.    Large colonic stool burden.    Bibasilar streak-like opacities, likely atelectatic although   infectious/inflammatory etiologies are not excluded in the appropriate   clinical setting.    Unchanged indeterminate splenic hypodensity and biliary duct dilatation    --- End of Report ---          TAMI LOVE DO; Resident Radiologist  This document has been electronically signed.  ARVIND XAVIER MD; Attending Radiologist  This document has been electronically signed. Jun 26 2022  1:48PM (06-26-22 @ 11:49)      CARDIOLOGY TESTING      MEDICATIONS  cefTRIAXone   IVPB     cefTRIAXone   IVPB 2000 IV Intermittent every 24 hours  enoxaparin Injectable 40 SubCutaneous every 24 hours  melatonin 10 Oral at bedtime  metroNIDAZOLE    Tablet 500 Oral every 8 hours  pantoprazole    Tablet 40 Oral before breakfast  polyethylene glycol 3350 17 Oral daily  senna 2 Oral at bedtime      WEIGHT  Weight (kg): 47.5 (06-27-22 @ 08:29)      ANTIBIOTICS:  cefTRIAXone   IVPB      cefTRIAXone   IVPB 2000 milliGRAM(s) IV Intermittent every 24 hours  metroNIDAZOLE    Tablet 500 milliGRAM(s) Oral every 8 hours      All available historical records have been reviewed

## 2022-06-29 NOTE — DISCHARGE NOTE PROVIDER - HOSPITAL COURSE
76yo F PMHx of dementia and HTN presents to the ED with complaints of abdominal pain. Patient had a recent hospital course 6/20-6/22 last month for diverticulitis, and prescribed a 10-day course of cipro + flagyl to complete on 7/2/22. Abdominal pain started yesterday. States it is sharp, severe, on the left lower abdomen. Per patient's daughter, patient is compliant with taking her antibiotics (in the AM). Denies nausea/vomiting. Endorses constipation, but has been having frequent small loose brown-colored stools. Patient has decreased PO intake, but has no issues with  swallowing. In the ED, patient is afebrile, VS stable, on RA. Labs significant for COVID-19 (+), Hb- 9.8 (appears chronic). LFTs wnl. CT A+P shows unchanged severe rectosigmoid wall thickening with pericolonic infiltrative changes + diverticuli + small bowel thickening, large stool burden without drainable fluid collection, no obstruction noted.  GI was consulted and recommended advancing diet as tolerated (to low fiber diet) as well as outpatient colonoscopy in 8 weeks to r/o colon cancer & outpatient GI follow-up. GI also recommended surgical consult. General surgery was consulted and recommended no acute surgical intervention, but patient could follow-up w/ Dr. Grant outpatient. Patient started on IV Zosyn in the ED. ID consulted and recommended IV ceftriaxone 2g q24H and PO Flagyl 500 mg TID instead of Zosyn. Patient's abdominal pain has improved significantly since admission. Patient had a midline placed on 6/29/22 to continue outpatient IV abx treatment per ID recs. Pt will continue IV ceftriaxone q24H and PO Flagyl 500 mg TID until 7/6/22. Patient tested positive for COVID-19 on 6/26/22 on asx screening. Patient has been stable w/ no symptoms. Patient is medically stable for discharge. Pt will be discharged home w/ assistance.         76yo F PMHx of dementia and HTN presents to the ED with complaints of abdominal pain. Patient had a recent hospital course 6/20-6/22 last month for diverticulitis, and prescribed a 10-day course of cipro + flagyl to complete on 7/2/22. Abdominal pain started yesterday. States it is sharp, severe, on the left lower abdomen. Per patient's daughter, patient is compliant with taking her antibiotics (in the AM). Denies nausea/vomiting. Endorses constipation, but has been having frequent small loose brown-colored stools. Patient has decreased PO intake, but has no issues with  swallowing. In the ED, patient is afebrile, VS stable, on RA. Labs significant for COVID-19 (+), Hb- 9.8 (appears chronic). LFTs wnl. CT A+P shows unchanged severe rectosigmoid wall thickening with pericolonic infiltrative changes + diverticuli + small bowel thickening, large stool burden without drainable fluid collection, no obstruction noted.  GI was consulted and recommended advancing diet as tolerated (to low fiber diet) as well as outpatient colonoscopy in 8 weeks to r/o colon cancer & outpatient GI follow-up. GI also recommended surgical consult. General surgery was consulted and recommended no acute surgical intervention, but patient could follow-up w/ Dr. Grant outpatient. Patient started on IV Zosyn in the ED. ID consulted and recommended IV ceftriaxone 2g q24H and PO Flagyl 500 mg TID instead of Zosyn. Patient's abdominal pain has improved significantly since admission. Patient had a midline placed on 6/29/22 to continue outpatient IV abx treatment per ID recs. Pt will continue IV ceftriaxone q24H and PO Flagyl 500 mg TID until 7/6/22. Patient tested positive for COVID-19 on 6/26/22 on asx screening. Patient has been stable w/ no symptoms. Patient is medically stable for discharge. Pt will be discharged home w/ home care services.  Pt will need CBC, CMP, ESR, & CRP in 1 week w/ visiting nurse while on IV ceftriaxone. 78yo F PMHx of dementia and HTN, recent hospital course for diverticulitis presents to the ED with complaints of abdominal pain consistent with diverticulitis.    #Acute diverticulitis v. Stercoral-colitis with perisigmoid Lymphadenopathy   #Constipation  - Failed outpt therapy [10-day course of cipro+flagyl (end date 7/2/22)]   - CT A+P- findings consistent with unchanged diverticulitis/colitis + diverticula, pericolonic infiltrative changes. Adjacent small bowel loops have wall thickening as well   - Loose stools likely from overflow leaking from constipation demonstrated on CT A+P  - C/w senna + miralax for constipation  - stopping zosyn now that pain improved and greater then 7 days   - Midline placed in [place] (6/28/22)  - Per GI recs,  surgery consult & low fiber diet as tolerated  - Per surgery, no further intervention at this time  - Outpt colonoscopy in 6-8 weeks upon d/c. Patient has not had a colonoscopy in > 10 years.   - Can follow up with surgery Dr. Isaacs as an outpatient   - KUB performed 6/30/22 to assess stool burden: non-obstructing gas pattern, FOS  - Started on PO Zofran and transitioned to IV Zofran prn 2/2 vomiting (will order EKG if given more than 3 doses).  - 7/1: QTc 537, TWI on EKG, f/u trop. pt with N/V, switched zofran to chlorpromazine 25mg  - 7/2/22: c/o worsening abd pain, unable to tolerate diet  - CT A/P (7/2/22): Stable rectosigmoid diverticulitis, no abscess.  -7/5 Parenteral nutrition  -patient tolerating liquids and minced foods well,+bowel movements  -diet advanced to regular -continue with PPN, monitor diet tolerance, nutrition following   -as per surgery: patient will require bowel regimen on discharge, no further interventions, f/u Dr. Isaacs outpatient    #Urinary retention/Dehydration  -s/p garcia  maintain garcia 2-3 weeks and will f/u for TOV as outpatient   monitor output  -PPN began 7/5  -continue to monitor electrolytes    #TWI on EKG  - 7/1: Repeat EKG showed TWI in lead II, III, and AVF.   - 7/2: Trop <0.01 x 1    #COVID-19, asymptomatic, incidental  - VS stable.   - No need for steroids.   - Cont monitor    #HTN  - well controlled without medications    #Dementia  -AOx1 at baseline  -supportive care     78yo F PMHx of dementia and HTN, recent hospital course for diverticulitis presents to the ED with complaints of abdominal pain consistent with diverticulitis.    #Acute diverticulitis v. Stercoral-colitis with perisigmoid Lymphadenopathy   #Constipation  - Failed outpt therapy [10-day course of cipro+flagyl (end date 7/2/22)]   - CT A+P- findings consistent with unchanged diverticulitis/colitis + diverticula, pericolonic infiltrative changes. Adjacent small bowel loops have wall thickening as well   - Loose stools likely from overflow leaking from constipation demonstrated on CT A+P  - C/w senna + miralax for constipation  - stopping zosyn now that pain improved and greater then 7 days   - Midline placed in [place] (6/28/22)  - Per GI recs,  surgery consult & low fiber diet as tolerated  - Per surgery, no further intervention at this time  - Outpt colonoscopy in 6-8 weeks upon d/c. Patient has not had a colonoscopy in > 10 years.   - Can follow up with surgery Dr. Isaacs as an outpatient   - KUB performed 6/30/22 to assess stool burden: non-obstructing gas pattern, FOS  - Started on PO Zofran and transitioned to IV Zofran prn 2/2 vomiting (will order EKG if given more than 3 doses).  - 7/1: QTc 537, TWI on EKG, f/u trop. pt with N/V, switched zofran to chlorpromazine 25mg  - 7/2/22: c/o worsening abd pain, unable to tolerate diet  - CT A/P (7/2/22): Stable rectosigmoid diverticulitis, no abscess.  -7/5 Parenteral nutrition  -patient tolerating liquids and minced foods well,+bowel movements  -diet advanced to regular -off PPN,    #Urinary retention/Dehydration  -s/p garcia  maintain garcia 2-3 weeks and will f/u for TOV as outpatient   monitor output    #TWI on EKG  - 7/1: Repeat EKG showed TWI in lead II, III, and AVF.   - 7/2: Trop <0.01 x 1    #COVID-19, asymptomatic, incidental  - VS stable.   - No need for steroids.   - Cont monitor    #HTN  - well controlled without medications    #Dementia  -AOx1 at baseline  -supportive care    DC planning to SNF today.

## 2022-06-29 NOTE — DISCHARGE NOTE NURSING/CASE MANAGEMENT/SOCIAL WORK - NSDCPEFALRISK_GEN_ALL_CORE
For information on Fall & Injury Prevention, visit: [email or web address] OR  [email or web address] OR  https://www.cdc.gov/steadi/patient.html

## 2022-06-29 NOTE — DISCHARGE NOTE PROVIDER - NSDCMRMEDTOKEN_GEN_ALL_CORE_FT
metroNIDAZOLE 500 mg oral tablet: 1 tab(s) orally every 8 hours   pantoprazole 40 mg oral delayed release tablet: 1 tab(s) orally once a day (before a meal)   Florastor 250 mg oral capsule: 1 cap(s) orally 2 times a day   melatonin 10 mg oral tablet: 1 tab(s) orally once a day (at bedtime)  metroNIDAZOLE 500 mg oral tablet: 1 tab(s) orally every 8 hours until 7/6/22  pantoprazole 40 mg oral delayed release tablet: 1 tab(s) orally once a day (before a meal)   donepezil 5 mg oral tablet: 1 tab(s) orally once a day (at bedtime)  Florastor 250 mg oral capsule: 1 cap(s) orally 2 times a day   melatonin 10 mg oral tablet: 1 tab(s) orally once a day (at bedtime)  pantoprazole 40 mg oral delayed release tablet: 1 tab(s) orally once a day (before a meal)  senna leaf extract oral tablet: 2 tab(s) orally once a day (at bedtime)

## 2022-06-29 NOTE — DISCHARGE NOTE PROVIDER - NSDCFUSCHEDAPPT_GEN_ALL_CORE_FT
[facility] Physician [facility]  GERIATRICS [address]  Scheduled Appointment: 07/07/2022

## 2022-06-29 NOTE — PROGRESS NOTE ADULT - ASSESSMENT
77 year old Female with PMHx of dementia, HTN, h/o IVC filter presents to ED with lower abdominal pain. Admitted to medical service for diverticulitis. Of note patient was recently admitted for diverticulitis from 6/20-6/22 and was discharged with 10 days of cipro and flagyl. Pain was unrelieved which brought the patient back to the hospital. Reports never having a colonoscopy. Physical exam findings, imaging, and labs as documented above.     PLAN:  -No acute surgical intervention. Patient can follow up with Dr. Isaacs as an outpatient   -Continue Abx per ID recommendations: ceftriaxone 2g daily and PO flagyl 500mg TID via midline  -F/u gi for colonoscopy 6-8 weeks   -Bowel regimen could prolong/worsen active diverticulitis. Should consider discontinuation and ensure diet is low fiber/low residue   -Supportive care: pain control and antiemetics prn     [address]ra [phone]

## 2022-06-29 NOTE — PROGRESS NOTE ADULT - SUBJECTIVE AND OBJECTIVE BOX
GENERAL SURGERY PROGRESS NOTE    Patient: KEISHA PANTOJA , 77y ([birth year])Female   MRN: [id number]  Location: [address]  Visit: 22 Inpatient  Date: 22 @ 11:11      Procedure/Dx/Injuries: acute diverticulitis    Events of past 24 hours: midline  placed, no acute events    PAST MEDICAL & SURGICAL HISTORY:  Hypertension, unspecified type      H/O dilation and curettage  for missed           Vitals:   T(F): 97.1 (22 @ 05:53), Max: 97.1 (22 @ 05:53)  HR: 86 (22 @ 05:53)  BP: 133/73 (22 @ 05:53)  RR: 18 (22 @ 05:53)  SpO2: 97% (22 @ 08:43)      Diet, Low Fiber      Fluids:     I & O's:      PHYSICAL EXAM:  General Appearance: AAOX3, NAD  Heart: RRR  Lungs: CTAx2  Abdomen:  soft, mild distended, non tender, non rebound, no guarding  MSK/Extremities:  mobile    MEDICATIONS  (STANDING):  cefTRIAXone   IVPB      cefTRIAXone   IVPB 2000 milliGRAM(s) IV Intermittent every 24 hours  enoxaparin Injectable 40 milliGRAM(s) SubCutaneous every 24 hours  melatonin 10 milliGRAM(s) Oral at bedtime  metroNIDAZOLE    Tablet 500 milliGRAM(s) Oral every 8 hours  pantoprazole    Tablet 40 milliGRAM(s) Oral before breakfast  polyethylene glycol 3350 17 Gram(s) Oral daily  senna 2 Tablet(s) Oral at bedtime    MEDICATIONS  (PRN):  acetaminophen     Tablet .. 650 milliGRAM(s) Oral every 6 hours PRN Moderate Pain (4 - 6)  morphine  - Injectable 2 milliGRAM(s) IV Push every 4 hours PRN Severe Pain (7 - 10)      DVT PROPHYLAXIS: enoxaparin Injectable 40 milliGRAM(s) SubCutaneous every 24 hours    GI PROPHYLAXIS: pantoprazole    Tablet 40 milliGRAM(s) Oral before breakfast    ANTICOAGULATION:   ANTIBIOTICS:  cefTRIAXone   IVPB    cefTRIAXone   IVPB 2000 milliGRAM(s)  metroNIDAZOLE    Tablet 500 milliGRAM(s)            LAB/STUDIES:  Labs:  CAPILLARY BLOOD GLUCOSE                              11.1   7.32  )-----------( 373      ( 2022 07:15 )             34.1       Auto Neutrophil %: 61.6 % (22 @ 07:15)  Auto Immature Granulocyte %: 0.7 % (22 @ 07:15)        137  |  100  |  7<L>  ----------------------------<  112<H>  4.4   |  27  |  0.5<L>      Calcium, Total Serum: 9.2 mg/dL (22 @ 07:15)      LFTs:             6.3  | 0.2  | 16       ------------------[74      ( 2022 07:15 )  2.9  | x    | 10          Lipase:x      Amylase:x             Coags:                        IMAGING:      ACCESS/ DEVICES:  [ x] Peripheral IV  [ ] Central Venous Line	[ ] R	[ ] L	[ ] IJ	[ ] Fem	[ ] SC	Placed:   [ ] Arterial Line		[ ] R	[ ] L	[ ] Fem	[ ] Rad	[ ] Ax	Placed:   [ ] PICC:					[ ] Mediport  [ ] Urinary Catheter,  Date Placed:   [ ] Chest tube: [ ] Right, [ ] Left  [ ] MELVIN/Simon Drains

## 2022-06-30 LAB
ANION GAP SERPL CALC-SCNC: 14 MMOL/L — SIGNIFICANT CHANGE UP (ref 7–14)
BASOPHILS # BLD AUTO: 0.06 K/UL — SIGNIFICANT CHANGE UP (ref 0–0.2)
BASOPHILS NFR BLD AUTO: 1.1 % — HIGH (ref 0–1)
BUN SERPL-MCNC: 9 MG/DL — LOW (ref 10–20)
CALCIUM SERPL-MCNC: 9 MG/DL — SIGNIFICANT CHANGE UP (ref 8.5–10.1)
CHLORIDE SERPL-SCNC: 102 MMOL/L — SIGNIFICANT CHANGE UP (ref 98–110)
CO2 SERPL-SCNC: 21 MMOL/L — SIGNIFICANT CHANGE UP (ref 17–32)
CREAT SERPL-MCNC: 0.6 MG/DL — LOW (ref 0.7–1.5)
EGFR: 92 ML/MIN/1.73M2 — SIGNIFICANT CHANGE UP
EOSINOPHIL # BLD AUTO: 0.53 K/UL — SIGNIFICANT CHANGE UP (ref 0–0.7)
EOSINOPHIL NFR BLD AUTO: 9.5 % — HIGH (ref 0–8)
GLUCOSE SERPL-MCNC: 90 MG/DL — SIGNIFICANT CHANGE UP (ref 70–99)
HCT VFR BLD CALC: 34.8 % — LOW (ref 37–47)
HGB BLD-MCNC: 11.1 G/DL — LOW (ref 12–16)
IMM GRANULOCYTES NFR BLD AUTO: 1.3 % — HIGH (ref 0.1–0.3)
LYMPHOCYTES # BLD AUTO: 0.98 K/UL — LOW (ref 1.2–3.4)
LYMPHOCYTES # BLD AUTO: 17.6 % — LOW (ref 20.5–51.1)
MAGNESIUM SERPL-MCNC: 2.1 MG/DL — SIGNIFICANT CHANGE UP (ref 1.8–2.4)
MCHC RBC-ENTMCNC: 25.8 PG — LOW (ref 27–31)
MCHC RBC-ENTMCNC: 31.9 G/DL — LOW (ref 32–37)
MCV RBC AUTO: 80.7 FL — LOW (ref 81–99)
MONOCYTES # BLD AUTO: 0.75 K/UL — HIGH (ref 0.1–0.6)
MONOCYTES NFR BLD AUTO: 13.4 % — HIGH (ref 1.7–9.3)
NEUTROPHILS # BLD AUTO: 3.19 K/UL — SIGNIFICANT CHANGE UP (ref 1.4–6.5)
NEUTROPHILS NFR BLD AUTO: 57.1 % — SIGNIFICANT CHANGE UP (ref 42.2–75.2)
NRBC # BLD: 0 /100 WBCS — SIGNIFICANT CHANGE UP (ref 0–0)
PLATELET # BLD AUTO: 379 K/UL — SIGNIFICANT CHANGE UP (ref 130–400)
POTASSIUM SERPL-MCNC: 4.6 MMOL/L — SIGNIFICANT CHANGE UP (ref 3.5–5)
POTASSIUM SERPL-SCNC: 4.6 MMOL/L — SIGNIFICANT CHANGE UP (ref 3.5–5)
RBC # BLD: 4.31 M/UL — SIGNIFICANT CHANGE UP (ref 4.2–5.4)
RBC # FLD: 14.7 % — HIGH (ref 11.5–14.5)
SODIUM SERPL-SCNC: 137 MMOL/L — SIGNIFICANT CHANGE UP (ref 135–146)
WBC # BLD: 5.58 K/UL — SIGNIFICANT CHANGE UP (ref 4.8–10.8)
WBC # FLD AUTO: 5.58 K/UL — SIGNIFICANT CHANGE UP (ref 4.8–10.8)

## 2022-06-30 PROCEDURE — 93010 ELECTROCARDIOGRAM REPORT: CPT

## 2022-06-30 PROCEDURE — 74018 RADEX ABDOMEN 1 VIEW: CPT | Mod: 26

## 2022-06-30 PROCEDURE — 99232 SBSQ HOSP IP/OBS MODERATE 35: CPT

## 2022-06-30 RX ORDER — SODIUM CHLORIDE 9 MG/ML
1000 INJECTION INTRAMUSCULAR; INTRAVENOUS; SUBCUTANEOUS
Refills: 0 | Status: DISCONTINUED | OUTPATIENT
Start: 2022-06-30 | End: 2022-07-01

## 2022-06-30 RX ORDER — ONDANSETRON 8 MG/1
4 TABLET, FILM COATED ORAL EVERY 8 HOURS
Refills: 0 | Status: DISCONTINUED | OUTPATIENT
Start: 2022-06-30 | End: 2022-06-30

## 2022-06-30 RX ORDER — ONDANSETRON 8 MG/1
4 TABLET, FILM COATED ORAL EVERY 8 HOURS
Refills: 0 | Status: DISCONTINUED | OUTPATIENT
Start: 2022-06-30 | End: 2022-07-01

## 2022-06-30 RX ADMIN — CEFTRIAXONE 100 MILLIGRAM(S): 500 INJECTION, POWDER, FOR SOLUTION INTRAMUSCULAR; INTRAVENOUS at 14:35

## 2022-06-30 RX ADMIN — Medication 500 MILLIGRAM(S): at 21:04

## 2022-06-30 RX ADMIN — SODIUM CHLORIDE 60 MILLILITER(S): 9 INJECTION INTRAMUSCULAR; INTRAVENOUS; SUBCUTANEOUS at 11:04

## 2022-06-30 RX ADMIN — SENNA PLUS 2 TABLET(S): 8.6 TABLET ORAL at 21:04

## 2022-06-30 RX ADMIN — Medication 10 MILLIGRAM(S): at 11:04

## 2022-06-30 RX ADMIN — Medication 500 MILLIGRAM(S): at 14:36

## 2022-06-30 RX ADMIN — Medication 500 MILLIGRAM(S): at 05:21

## 2022-06-30 RX ADMIN — POLYETHYLENE GLYCOL 3350 17 GRAM(S): 17 POWDER, FOR SOLUTION ORAL at 11:05

## 2022-06-30 RX ADMIN — PANTOPRAZOLE SODIUM 40 MILLIGRAM(S): 20 TABLET, DELAYED RELEASE ORAL at 06:54

## 2022-06-30 RX ADMIN — Medication 10 MILLIGRAM(S): at 21:05

## 2022-06-30 RX ADMIN — ENOXAPARIN SODIUM 40 MILLIGRAM(S): 100 INJECTION SUBCUTANEOUS at 11:05

## 2022-06-30 NOTE — PROGRESS NOTE ADULT - SUBJECTIVE AND OBJECTIVE BOX
Progress Note: General Surgery  Patient: KEISHA PANTOJA , 77y (1945)Female   MRN: [id number]  Location: [address]  Visit: 06-26-22 Inpatient  Date: 06-30-22 @ 16:11    Admit Diagnosis/Chief Complaint: Abdominal pain, diverticulitis    Events/ 24h: No acute events overnight. Pain improved. Tolerating diet. ID following, plan to send home on IV antibiotics.     Vitals: T(F): 97.2 (06-30-22 @ 11:39), Max: 98.3 (06-29-22 @ 21:27)  HR: 70 (06-30-22 @ 11:39)  BP: 129/71 (06-30-22 @ 11:39) (123/66 - 137/73)  RR: 18 (06-30-22 @ 11:39)  SpO2: 95% (06-30-22 @ 05:54)    In:   Out:   Net:     Diet: Diet, Low Fiber (06-26-22 @ 17:35)    IV Fluids: sodium chloride 0.9%. 1000 milliLiter(s) (60 mL/Hr) IV Continuous <Continuous>      Medications: [Standing]  cefTRIAXone   IVPB      cefTRIAXone   IVPB 2000 milliGRAM(s) IV Intermittent every 24 hours  enoxaparin Injectable 40 milliGRAM(s) SubCutaneous every 24 hours  melatonin 10 milliGRAM(s) Oral at bedtime  metroNIDAZOLE    Tablet 500 milliGRAM(s) Oral every 8 hours  pantoprazole    Tablet 40 milliGRAM(s) Oral before breakfast  polyethylene glycol 3350 17 Gram(s) Oral daily  senna 2 Tablet(s) Oral at bedtime  sodium chloride 0.9%. 1000 milliLiter(s) (60 mL/Hr) IV Continuous <Continuous>    DVT Prophylaxis: enoxaparin Injectable 40 milliGRAM(s) SubCutaneous every 24 hours    GI Prophylaxis: pantoprazole    Tablet 40 milliGRAM(s) Oral before breakfast    Antibiotics: cefTRIAXone   IVPB      cefTRIAXone   IVPB 2000 milliGRAM(s) IV Intermittent every 24 hours  metroNIDAZOLE    Tablet 500 milliGRAM(s) Oral every 8 hours    Anticoagulation:   Medications:[PRN]  acetaminophen     Tablet .. 650 milliGRAM(s) Oral every 6 hours PRN  morphine  - Injectable 2 milliGRAM(s) IV Push every 4 hours PRN  ondansetron Injectable 4 milliGRAM(s) IV Push every 8 hours PRN      Labs:                        11.1   5.58  )-----------( 379      ( 30 Jun 2022 06:34 )             34.8     06-30    137  |  102  |  9<L>  ----------------------------<  90  4.6   |  21  |  0.6<L>    Ca    9.0      30 Jun 2022 06:34  Mg     2.1     06-30    TPro  6.3  /  Alb  2.9<L>  /  TBili  0.2  /  DBili  x   /  AST  16  /  ALT  10  /  AlkPhos  74  06-29    LIVER FUNCTIONS - ( 29 Jun 2022 07:15 )  Alb: 2.9 g/dL / Pro: 6.3 g/dL / ALK PHOS: 74 U/L / ALT: 10 U/L / AST: 16 U/L / GGT: x                 Urine/Micro:

## 2022-06-30 NOTE — PHYSICAL THERAPY INITIAL EVALUATION ADULT - PERTINENT HX OF CURRENT PROBLEM, REHAB EVAL
76yo F PMHx of dementia and HTN, recent hospital course for diverticulitis presents to the ED with complaints of abdominal pain consistent with diverticulitis.

## 2022-06-30 NOTE — PHYSICAL THERAPY INITIAL EVALUATION ADULT - ADDITIONAL COMMENTS
Pt. reports she was independent PTA and did not use an AD. Pt. reports she lives with her family in a private house with 4 JUSTINA.

## 2022-06-30 NOTE — PROGRESS NOTE ADULT - ASSESSMENT
Assessment:  77y Female patient admitted with diverticulitis, improved during hospital stay. Now tolerating diet, pain much improved.  Patient seen and examined at bedside. NAD.     Plan:  - Discharge today  - Midline + Antibiotics  - Will require GI followup and colonoscopy in 6-8 weeks      Date/Time: 06-30-22 @ 16:11

## 2022-06-30 NOTE — PROGRESS NOTE ADULT - SUBJECTIVE AND OBJECTIVE BOX
KEISHA PANTOJA 77y Female  MRN#: [id number]      Pt is currently admitted with the primary diagnosis of      Hospital Day: 4d  HPI:  76yo F PMHx of dementia and HTN presents to the ED with complaints of abdominal pain.     Patient had a recent hospital course - last month for diverticulitis, and prescribed a 10-day course of cipro + flagyl to complete on 22. Abdominal pain started yesterday. States it is sharp, severe, on the left lower abdomen. Per patient's daughter, patient is compliant with taking her antibiotics (in the AM). Denies nausea/vomiting. Endorses constipation, but has been having frequent small loose brown-colored stools. Patient has decreased PO intake, but has no issues with  swallowing.     Otherwise, ROS (-). Denies fever, shortness of breath, chest pain, dysuria, myalgias, back pain.     At the ED, patient is afebrile, VS stable, on RA. Labs significant for COVID-19 (+), Hb- 9.8 (appears chronic). LFTs wnl. CT A+P shows unchanged severe rectosigmoid wall thickening with pericolonic infiltrative changes + diverticuli + small bowel thickening, large stool burden without drainable fluid collection, no obstruction noted.     Overnight events:  No acute events overnight    Subjective:   Pt was seen and examined at bedside. Requesting to go home. At baseline. Denies: abdominal pain.                                              ----------------------------------------------------------    PAST MEDICAL & SURGICAL HISTORY  Hypertension, unspecified type    H/O dilation and curettage  for missed                                               -----------------------------------------------------------  ALLERGIES:  No Known Allergies                                            ------------------------------------------------------------    HOME MEDICATIONS  Home Medications:                           MEDICATIONS:  STANDING MEDICATIONS  cefTRIAXone   IVPB      cefTRIAXone   IVPB 2000 milliGRAM(s) IV Intermittent every 24 hours  enoxaparin Injectable 40 milliGRAM(s) SubCutaneous every 24 hours  melatonin 10 milliGRAM(s) Oral at bedtime  metroNIDAZOLE    Tablet 500 milliGRAM(s) Oral every 8 hours  pantoprazole    Tablet 40 milliGRAM(s) Oral before breakfast  polyethylene glycol 3350 17 Gram(s) Oral daily  senna 2 Tablet(s) Oral at bedtime    PRN MEDICATIONS  acetaminophen     Tablet .. 650 milliGRAM(s) Oral every 6 hours PRN  morphine  - Injectable 2 milliGRAM(s) IV Push every 4 hours PRN                                            ------------------------------------------------------------  VITAL SIGNS: Last 24 Hours  T(C): 36.2 (2022 05:54), Max: 36.8 (2022 21:27)  T(F): 97.1 (2022 05:54), Max: 98.3 (2022 21:27)  HR: 74 (2022 05:54) (74 - 80)  BP: 137/73 (2022 05:54) (113/65 - 137/73)  BP(mean): --  RR: 18 (2022 05:54) (18 - 18)  SpO2: 95% (2022 05:54) (95% - 97%)                                             --------------------------------------------------------------  LABS:                        11.1   5.58  )-----------( 379      ( 2022 06:34 )             34.8     06-29    137  |  100  |  7<L>  ----------------------------<  112<H>  4.4   |  27  |  0.5<L>    Ca    9.2      2022 07:15  Mg     2.1         TPro  6.3  /  Alb  2.9<L>  /  TBili  0.2  /  DBili  x   /  AST  16  /  ALT  10  /  AlkPhos  74            PHYSICAL EXAM:  General: well-appearing in NAD.   HEENT: NCAT  LUNGS: CTAB  HEART: RRR.   ABDOMEN: + BS. Distended. Nontender. No rebound tenderness or guarding.   EXT: Nonedematous.  NEURO: Deferred.  SKIN: Warm, dry.      ASSESSMENT & PLAN:  76yo F PMHx of dementia and HTN, recent hospital course for diverticulitis presents to the ED with complaints of abdominal pain consistent with diverticulitis.    #Acute diverticulitis v. Stercoral-colitis with perisigmoid Lymphadenopathy   #Constipation  - Failed outpt therapy [10-day course of cipro+flagyl (end date 22)]   - CT A+P- findings consistent with unchanged diverticulitis/colitis + diverticula, pericolonic infiltrative changes. Adjacent small bowel loops have wall thickening as well   - Loose stools likely from overflow leaking from constipation demonstrated on CT A+P  - C/w senna + miralax for constipation  - C/w IV Ceftriaxone 2g q24H and PO Flagyl 500 mg TID (end date: 22) upon discharge per ID recs  - Midline placed in [place] (22)  - Per GI recs,  surgery consult & low fiber diet as tolerated  - Per surgery, no acute intervention at this time, consider d/c bowel reg  - Outpt colonoscopy in 6-8 weeks upon d/c. Patient has not had a colonoscopy in > 10 years.   - Can follow up with surgery Dr. Isaacs as an outpatient     #COVID-19, asymptomatic, incidental  - VS stable.   - No need for steroids.   - Cont monitor    #HTN  - well controlled without medications    #Misc  -DVT PPx: Lovenox  -GI PPx: Protonix.   -Diet: Low Fiber diet  -Code: Full  -Dispo: Pending NH placement for IV abx administration, tentative d/c on 22             KEISHA PANTOJA 77y Female  MRN#: [id number]      Pt is currently admitted with the primary diagnosis of acute diverticulitis      Hospital Day: 4d  HPI:  76yo F PMHx of dementia and HTN presents to the ED with complaints of abdominal pain.     Patient had a recent hospital course - last month for diverticulitis, and prescribed a 10-day course of cipro + flagyl to complete on 22. Abdominal pain started yesterday. States it is sharp, severe, on the left lower abdomen. Per patient's daughter, patient is compliant with taking her antibiotics (in the AM). Denies nausea/vomiting. Endorses constipation, but has been having frequent small loose brown-colored stools. Patient has decreased PO intake, but has no issues with  swallowing.     Otherwise, ROS (-). Denies fever, shortness of breath, chest pain, dysuria, myalgias, back pain.     At the ED, patient is afebrile, VS stable, on RA. Labs significant for COVID-19 (+), Hb- 9.8 (appears chronic). LFTs wnl. CT A+P shows unchanged severe rectosigmoid wall thickening with pericolonic infiltrative changes + diverticuli + small bowel thickening, large stool burden without drainable fluid collection, no obstruction noted.     Overnight events:  No acute events overnight    Subjective:   Pt was seen and examined at bedside. Requesting to go home. C/o nausea. No BM overnight. Denies: abdominal pain.                                              ----------------------------------------------------------    PAST MEDICAL & SURGICAL HISTORY  Hypertension, unspecified type    H/O dilation and curettage  for missed                                               -----------------------------------------------------------  ALLERGIES:  No Known Allergies                                            ------------------------------------------------------------    HOME MEDICATIONS  Home Medications:                           MEDICATIONS:  STANDING MEDICATIONS  cefTRIAXone   IVPB      cefTRIAXone   IVPB 2000 milliGRAM(s) IV Intermittent every 24 hours  enoxaparin Injectable 40 milliGRAM(s) SubCutaneous every 24 hours  melatonin 10 milliGRAM(s) Oral at bedtime  metroNIDAZOLE    Tablet 500 milliGRAM(s) Oral every 8 hours  pantoprazole    Tablet 40 milliGRAM(s) Oral before breakfast  polyethylene glycol 3350 17 Gram(s) Oral daily  senna 2 Tablet(s) Oral at bedtime    PRN MEDICATIONS  acetaminophen     Tablet .. 650 milliGRAM(s) Oral every 6 hours PRN  morphine  - Injectable 2 milliGRAM(s) IV Push every 4 hours PRN                                            ------------------------------------------------------------  VITAL SIGNS: Last 24 Hours  T(C): 36.2 (2022 05:54), Max: 36.8 (2022 21:27)  T(F): 97.1 (2022 05:54), Max: 98.3 (2022 21:27)  HR: 74 (2022 05:54) (74 - 80)  BP: 137/73 (2022 05:54) (113/65 - 137/73)  BP(mean): --  RR: 18 (2022 05:54) (18 - 18)  SpO2: 95% (2022 05:54) (95% - 97%)                                             --------------------------------------------------------------  LABS:                        11.1   5.58  )-----------( 379      ( 2022 06:34 )             34.8     06-29    137  |  100  |  7<L>  ----------------------------<  112<H>  4.4   |  27  |  0.5<L>    Ca    9.2      2022 07:15  Mg     2.1         TPro  6.3  /  Alb  2.9<L>  /  TBili  0.2  /  DBili  x   /  AST  16  /  ALT  10  /  AlkPhos  74            PHYSICAL EXAM:  General: well-appearing in NAD.   HEENT: NCAT  LUNGS: CTAB  HEART: RRR.   ABDOMEN: + BS. Distended. Nontender. No rebound tenderness or guarding.   EXT: Nonedematous.  NEURO: Deferred.  SKIN: Warm, dry.      ASSESSMENT & PLAN:  76yo F PMHx of dementia and HTN, recent hospital course for diverticulitis presents to the ED with complaints of abdominal pain consistent with diverticulitis.    #Acute diverticulitis v. Stercoral-colitis with perisigmoid Lymphadenopathy   #Constipation  - Failed outpt therapy [10-day course of cipro+flagyl (end date 22)]   - CT A+P- findings consistent with unchanged diverticulitis/colitis + diverticula, pericolonic infiltrative changes. Adjacent small bowel loops have wall thickening as well   - Loose stools likely from overflow leaking from constipation demonstrated on CT A+P  - C/w senna + miralax for constipation  - C/w IV Ceftriaxone 2g q24H and PO Flagyl 500 mg TID (end date: 22) upon discharge per ID recs  - Midline placed in [place] (22)  - Per GI recs,  surgery consult & low fiber diet as tolerated  - Per surgery, no acute intervention at this time  - Outpt colonoscopy in 6-8 weeks upon d/c. Patient has not had a colonoscopy in > 10 years.   - Can follow up with surgery Dr. Isaacs as an outpatient   - KUB performed 22 to assess stool burden: non-obstructing gas pattern, FOS  - Started on PO Zofran and transitioned to IV Zofran prn 2/2 vomiting (will order EKG if given more than 3 doses).  - C/w IVF (NS @ 60cc/hr), d/c tomorrow     #COVID-19, asymptomatic, incidental  - VS stable.   - No need for steroids.   - Cont monitor    #HTN  - well controlled without medications    #Misc  -DVT PPx: Lovenox  -GI PPx: Protonix.   -Diet: Low Fiber diet  -Code: Full  -Dispo: Pending NH placement for IV abx administration, tentative d/c on 22

## 2022-07-01 LAB
ALBUMIN SERPL ELPH-MCNC: 2.8 G/DL — LOW (ref 3.5–5.2)
ALP SERPL-CCNC: 78 U/L — SIGNIFICANT CHANGE UP (ref 30–115)
ALT FLD-CCNC: 21 U/L — SIGNIFICANT CHANGE UP (ref 0–41)
ANION GAP SERPL CALC-SCNC: 12 MMOL/L — SIGNIFICANT CHANGE UP (ref 7–14)
AST SERPL-CCNC: 34 U/L — SIGNIFICANT CHANGE UP (ref 0–41)
BASOPHILS # BLD AUTO: 0.04 K/UL — SIGNIFICANT CHANGE UP (ref 0–0.2)
BASOPHILS NFR BLD AUTO: 0.4 % — SIGNIFICANT CHANGE UP (ref 0–1)
BILIRUB SERPL-MCNC: 0.3 MG/DL — SIGNIFICANT CHANGE UP (ref 0.2–1.2)
BUN SERPL-MCNC: 9 MG/DL — LOW (ref 10–20)
CALCIUM SERPL-MCNC: 8.7 MG/DL — SIGNIFICANT CHANGE UP (ref 8.5–10.1)
CHLORIDE SERPL-SCNC: 98 MMOL/L — SIGNIFICANT CHANGE UP (ref 98–110)
CO2 SERPL-SCNC: 23 MMOL/L — SIGNIFICANT CHANGE UP (ref 17–32)
CREAT SERPL-MCNC: 0.6 MG/DL — LOW (ref 0.7–1.5)
CULTURE RESULTS: SIGNIFICANT CHANGE UP
CULTURE RESULTS: SIGNIFICANT CHANGE UP
EGFR: 92 ML/MIN/1.73M2 — SIGNIFICANT CHANGE UP
EOSINOPHIL # BLD AUTO: 0.46 K/UL — SIGNIFICANT CHANGE UP (ref 0–0.7)
EOSINOPHIL NFR BLD AUTO: 5 % — SIGNIFICANT CHANGE UP (ref 0–8)
GLUCOSE SERPL-MCNC: 126 MG/DL — HIGH (ref 70–99)
HCT VFR BLD CALC: 34.3 % — LOW (ref 37–47)
HGB BLD-MCNC: 11.1 G/DL — LOW (ref 12–16)
IMM GRANULOCYTES NFR BLD AUTO: 1 % — HIGH (ref 0.1–0.3)
LYMPHOCYTES # BLD AUTO: 1.05 K/UL — LOW (ref 1.2–3.4)
LYMPHOCYTES # BLD AUTO: 11.3 % — LOW (ref 20.5–51.1)
MAGNESIUM SERPL-MCNC: 1.9 MG/DL — SIGNIFICANT CHANGE UP (ref 1.8–2.4)
MAGNESIUM SERPL-MCNC: 2 MG/DL — SIGNIFICANT CHANGE UP (ref 1.8–2.4)
MCHC RBC-ENTMCNC: 26 PG — LOW (ref 27–31)
MCHC RBC-ENTMCNC: 32.4 G/DL — SIGNIFICANT CHANGE UP (ref 32–37)
MCV RBC AUTO: 80.3 FL — LOW (ref 81–99)
MONOCYTES # BLD AUTO: 1.43 K/UL — HIGH (ref 0.1–0.6)
MONOCYTES NFR BLD AUTO: 15.4 % — HIGH (ref 1.7–9.3)
NEUTROPHILS # BLD AUTO: 6.2 K/UL — SIGNIFICANT CHANGE UP (ref 1.4–6.5)
NEUTROPHILS NFR BLD AUTO: 66.9 % — SIGNIFICANT CHANGE UP (ref 42.2–75.2)
NRBC # BLD: 0 /100 WBCS — SIGNIFICANT CHANGE UP (ref 0–0)
PLATELET # BLD AUTO: 433 K/UL — HIGH (ref 130–400)
POTASSIUM SERPL-MCNC: 4.2 MMOL/L — SIGNIFICANT CHANGE UP (ref 3.5–5)
POTASSIUM SERPL-SCNC: 4.2 MMOL/L — SIGNIFICANT CHANGE UP (ref 3.5–5)
PROT SERPL-MCNC: 6.1 G/DL — SIGNIFICANT CHANGE UP (ref 6–8)
RBC # BLD: 4.27 M/UL — SIGNIFICANT CHANGE UP (ref 4.2–5.4)
RBC # FLD: 14.6 % — HIGH (ref 11.5–14.5)
SODIUM SERPL-SCNC: 133 MMOL/L — LOW (ref 135–146)
SPECIMEN SOURCE: SIGNIFICANT CHANGE UP
SPECIMEN SOURCE: SIGNIFICANT CHANGE UP
TROPONIN T SERPL-MCNC: <0.01 NG/ML — SIGNIFICANT CHANGE UP
WBC # BLD: 9.27 K/UL — SIGNIFICANT CHANGE UP (ref 4.8–10.8)
WBC # FLD AUTO: 9.27 K/UL — SIGNIFICANT CHANGE UP (ref 4.8–10.8)

## 2022-07-01 PROCEDURE — 99232 SBSQ HOSP IP/OBS MODERATE 35: CPT

## 2022-07-01 PROCEDURE — 93010 ELECTROCARDIOGRAM REPORT: CPT | Mod: 76

## 2022-07-01 RX ORDER — CHLORPROMAZINE HCL 10 MG
25 TABLET ORAL ONCE
Refills: 0 | Status: COMPLETED | OUTPATIENT
Start: 2022-07-01 | End: 2022-07-01

## 2022-07-01 RX ORDER — CHLORPROMAZINE HCL 10 MG
25 TABLET ORAL EVERY 6 HOURS
Refills: 0 | Status: DISCONTINUED | OUTPATIENT
Start: 2022-07-01 | End: 2022-07-01

## 2022-07-01 RX ORDER — SCOPALAMINE 1 MG/3D
1 PATCH, EXTENDED RELEASE TRANSDERMAL ONCE
Refills: 0 | Status: COMPLETED | OUTPATIENT
Start: 2022-07-01 | End: 2022-07-01

## 2022-07-01 RX ORDER — DONEPEZIL HYDROCHLORIDE 10 MG/1
5 TABLET, FILM COATED ORAL AT BEDTIME
Refills: 0 | Status: DISCONTINUED | OUTPATIENT
Start: 2022-07-01 | End: 2022-07-09

## 2022-07-01 RX ORDER — SODIUM CHLORIDE 9 MG/ML
1000 INJECTION INTRAMUSCULAR; INTRAVENOUS; SUBCUTANEOUS
Refills: 0 | Status: DISCONTINUED | OUTPATIENT
Start: 2022-07-01 | End: 2022-07-02

## 2022-07-01 RX ORDER — SIMETHICONE 80 MG/1
80 TABLET, CHEWABLE ORAL THREE TIMES A DAY
Refills: 0 | Status: DISCONTINUED | OUTPATIENT
Start: 2022-07-01 | End: 2022-07-09

## 2022-07-01 RX ADMIN — Medication 10 MILLIGRAM(S): at 21:33

## 2022-07-01 RX ADMIN — Medication 500 MILLIGRAM(S): at 14:19

## 2022-07-01 RX ADMIN — Medication 500 MILLIGRAM(S): at 05:14

## 2022-07-01 RX ADMIN — POLYETHYLENE GLYCOL 3350 17 GRAM(S): 17 POWDER, FOR SOLUTION ORAL at 11:49

## 2022-07-01 RX ADMIN — SIMETHICONE 80 MILLIGRAM(S): 80 TABLET, CHEWABLE ORAL at 21:33

## 2022-07-01 RX ADMIN — PANTOPRAZOLE SODIUM 40 MILLIGRAM(S): 20 TABLET, DELAYED RELEASE ORAL at 06:00

## 2022-07-01 RX ADMIN — DONEPEZIL HYDROCHLORIDE 5 MILLIGRAM(S): 10 TABLET, FILM COATED ORAL at 21:32

## 2022-07-01 RX ADMIN — SENNA PLUS 2 TABLET(S): 8.6 TABLET ORAL at 21:32

## 2022-07-01 RX ADMIN — Medication 10 MILLIGRAM(S): at 11:49

## 2022-07-01 RX ADMIN — Medication 25 MILLIGRAM(S): at 16:40

## 2022-07-01 RX ADMIN — ENOXAPARIN SODIUM 40 MILLIGRAM(S): 100 INJECTION SUBCUTANEOUS at 11:49

## 2022-07-01 RX ADMIN — SCOPALAMINE 1 PATCH: 1 PATCH, EXTENDED RELEASE TRANSDERMAL at 21:34

## 2022-07-01 RX ADMIN — CEFTRIAXONE 100 MILLIGRAM(S): 500 INJECTION, POWDER, FOR SOLUTION INTRAMUSCULAR; INTRAVENOUS at 14:20

## 2022-07-01 RX ADMIN — SODIUM CHLORIDE 60 MILLILITER(S): 9 INJECTION INTRAMUSCULAR; INTRAVENOUS; SUBCUTANEOUS at 20:30

## 2022-07-01 RX ADMIN — Medication 500 MILLIGRAM(S): at 21:33

## 2022-07-01 NOTE — PROGRESS NOTE ADULT - SUBJECTIVE AND OBJECTIVE BOX
KEISHA PANTOJA 77y Female  MRN#: [id number]   CODE STATUS: Full    Hospital Day: 5d    Pt is currently admitted with the primary diagnosis of Diverticulitis    SUBJECTIVE  Hospital Course  Pt here with recurrent diverticulitis; on IV rocephin and PO flagyl, will c/w abx through midline until . Repeat EKG today with [facility], sending trops. Pt nauseous on zofran, QTc prolonged.     Overnight events   None    Subjective complaints   As above                                            ----------------------------------------------------------  OBJECTIVE  PAST MEDICAL & SURGICAL HISTORY  Hypertension, unspecified type    H/O dilation and curettage  for missed                                               -----------------------------------------------------------  ALLERGIES:  No Known Allergies                                            ------------------------------------------------------------    HOME MEDICATIONS  Home Medications:                           MEDICATIONS:  STANDING MEDICATIONS  cefTRIAXone   IVPB      cefTRIAXone   IVPB 2000 milliGRAM(s) IV Intermittent every 24 hours  enoxaparin Injectable 40 milliGRAM(s) SubCutaneous every 24 hours  melatonin 10 milliGRAM(s) Oral at bedtime  metroNIDAZOLE    Tablet 500 milliGRAM(s) Oral every 8 hours  pantoprazole    Tablet 40 milliGRAM(s) Oral before breakfast  polyethylene glycol 3350 17 Gram(s) Oral daily  senna 2 Tablet(s) Oral at bedtime  sodium chloride 0.9%. 1000 milliLiter(s) IV Continuous <Continuous>    PRN MEDICATIONS  acetaminophen     Tablet .. 650 milliGRAM(s) Oral every 6 hours PRN  morphine  - Injectable 2 milliGRAM(s) IV Push every 4 hours PRN  ondansetron Injectable 4 milliGRAM(s) IV Push every 8 hours PRN                                            ------------------------------------------------------------  VITAL SIGNS: Last 24 Hours  T(C): 36.8 (2022 12:29), Max: 36.8 (2022 12:29)  T(F): 98.3 (2022 12:29), Max: 98.3 (2022 12:29)  HR: 87 (2022 12:) (87 - 95)  BP: 133/70 (2022 12:) (133/70 - 152/82)  BP(mean): --  RR: 18 (2022 12:) (18 - 18)  SpO2: 97% (2022 07:48) (97% - 97%)                                             --------------------------------------------------------------  LABS:                        11.1   9.27  )-----------( 433      ( 2022 06:47 )             34.3     06-30    137  |  102  |  9<L>  ----------------------------<  90  4.6   |  21  |  0.6<L>    Ca    9.0      2022 06:34  Mg     2.0     07-                                            -------------------------------------------------------------  RADIOLOGY:  < from: Xray Kidney Ureter Bladder (22 @ 13:09) >  FINDINGS/  IMPRESSION:    Nonspecific nonobstructive bowel gas pattern. Enteric contrast. IVC   filter. Stable bones.    --- End of Report ---    < end of copied text >                                          --------------------------------------------------------------    PHYSICAL EXAM:  General: NAD, AOx1-2  HEENT: Normocephalic, atraumatic  LUNGS: Normal breath sounds, no wheezes/crackles  HEART: S1s2, no mrg  ABDOMEN: Soft, NT/ND. No rigidity/guarding  EXT: Peripheral pulses +1, n ocyanosis/edema  NEURO: Generalized weakness but no focal deficits  SKIN: Scattered ecchymoses                                         --------------------------------------------------------------     KEISHA PANTOJA 77y Female  MRN#: [id number]   CODE STATUS: Full    Hospital Day: 5d    Pt is currently admitted with the primary diagnosis of Diverticulitis    SUBJECTIVE  Hospital Course  Pt here with recurrent diverticulitis; on IV rocephin and PO flagyl, will c/w abx through midline until . Repeat EKG today with [facility], sending trops. Pt nauseous on zofran, QTc prolonged. Started chlorpromazine instead. Pt nauseous and vomiting, not tolerating PO intake.     Overnight events   None    Subjective complaints   As above                                            ----------------------------------------------------------  OBJECTIVE  PAST MEDICAL & SURGICAL HISTORY  Hypertension, unspecified type    H/O dilation and curettage  for missed                                               -----------------------------------------------------------  ALLERGIES:  No Known Allergies                                            ------------------------------------------------------------    HOME MEDICATIONS  Home Medications:                           MEDICATIONS:  STANDING MEDICATIONS  cefTRIAXone   IVPB      cefTRIAXone   IVPB 2000 milliGRAM(s) IV Intermittent every 24 hours  enoxaparin Injectable 40 milliGRAM(s) SubCutaneous every 24 hours  melatonin 10 milliGRAM(s) Oral at bedtime  metroNIDAZOLE    Tablet 500 milliGRAM(s) Oral every 8 hours  pantoprazole    Tablet 40 milliGRAM(s) Oral before breakfast  polyethylene glycol 3350 17 Gram(s) Oral daily  senna 2 Tablet(s) Oral at bedtime  sodium chloride 0.9%. 1000 milliLiter(s) IV Continuous <Continuous>    PRN MEDICATIONS  acetaminophen     Tablet .. 650 milliGRAM(s) Oral every 6 hours PRN  morphine  - Injectable 2 milliGRAM(s) IV Push every 4 hours PRN  ondansetron Injectable 4 milliGRAM(s) IV Push every 8 hours PRN                                            ------------------------------------------------------------  VITAL SIGNS: Last 24 Hours  T(C): 36.8 (2022 12:29), Max: 36.8 (2022 12:29)  T(F): 98.3 (2022 12:29), Max: 98.3 (2022 12:29)  HR: 87 (:) (87 - 95)  BP: 133/70 (2022 12:) (133/70 - 152/82)  BP(mean): --  RR: 18 (2022 12:29) (18 - 18)  SpO2: 97% (2022 07:48) (97% - 97%)                                             --------------------------------------------------------------  LABS:                        11.1   9.27  )-----------( 433      ( 2022 06:47 )             34.3     0630    137  |  102  |  9<L>  ----------------------------<  90  4.6   |  21  |  0.6<L>    Ca    9.0      2022 06:34  Mg     2.0     07-01                                            -------------------------------------------------------------  RADIOLOGY:  < from: Xray Kidney Ureter Bladder (22 @ 13:09) >  FINDINGS/  IMPRESSION:    Nonspecific nonobstructive bowel gas pattern. Enteric contrast. IVC   filter. Stable bones.    --- End of Report ---    < end of copied text >                                          --------------------------------------------------------------    PHYSICAL EXAM:  General: NAD, AOx1-2  HEENT: Normocephalic, atraumatic  LUNGS: Normal breath sounds, no wheezes/crackles  HEART: S1s2, no mrg  ABDOMEN: Soft, NT/ND. No rigidity/guarding  EXT: Peripheral pulses +1, n ocyanosis/edema  NEURO: Generalized weakness but no focal deficits  SKIN: Scattered ecchymoses                                         --------------------------------------------------------------

## 2022-07-01 NOTE — DIETITIAN INITIAL EVALUATION ADULT - OTHER CALCULATIONS
using ABW 47.5kg with consideration for SPCM  ENERGY: 1173-1466kcal/day (MSJ x1.2-1.5)  PROTEIN: 57-71g/day (1.2-1.5g/kg)  FLUIDS: 1189mL/day (25mL/kg)

## 2022-07-01 NOTE — DIETITIAN INITIAL EVALUATION ADULT - ENERGY INTAKE
During admission    overall </=75% est energy needs for >/=1mo/Poor (<50%) During admission patient consuming very minimal food. Mostly soup, gingerale, ensure brought from home. Had cookies at bedside. Says she is having abdominal distension which is making her feel full and subsequently causing decreased appetite + could also be from taste changes and changes in mental status associated with COVID-19. Additionally patient with nausea and vomiting as of yesterday, not able to tolerate solids. Was vomiting at time of visit as well.

## 2022-07-01 NOTE — DIETITIAN INITIAL EVALUATION ADULT - NUTRITION DIAGNOSITC TERMINOLOGY #1
Patient:   RISHI LAMAS            MRN: [id number]            FIN: [id number]               Age:   46 years     Sex:  FEMALE     :  69   Associated Diagnoses:   None   Author:   MARIBELL SHEFFIELD        Chief Complaint    Found Unresponsive     Primary Care Physician      Physician Name:  PCP at Outside Hospital    Consulting Physicians     Physician Name:  TIFFANY HOOPER Speciality:  NEUROSURGERY Consult Reason:  SAH      History of present illness    This is a 46-year-old female with past medical history significant for thyroid nodule which was removed, questionable intracranial mass which was be monitored as outpatient, she was brought ED at [facility] by EMS after she was found unresponsive outside a \"treatment center. \"EMS report stated that patient had been drinking previously and that was stench of alcohol on her breath, she was not able to provide any information.  On arrival to ED she was evaluated by the trauma team, imaging revealed minimal subarachnoid hemorrhage in the left frontal convexity, also left frontal convexity scalp hematoma.  She sustained occipital scalp laceration status post repair in the emergency department.  CT of thoracic spine and lumbar spine and other imaging was negative for any acute injuries.  She was admitted for further workup and evaluation.  Patient is alert and awake and she reported that she had been with her coworkers and they were celebrating at a restaurant and she had to migrate us to drink.  She was returning to work with her son when she fell and lost consciousness.  She was being evaluated for unknown intracranial mass which report was on the right frontal area that was 2 cm about 6 years ago.  She also mentioned a thyroid nodule that was biopsied and negative for malignancy.  She denies any other past medical history or surgical history.  At time of my assessment she is complaining of diffuse pain headache otherwise no acute  objective complaints.  Patient reported that she is getting increasingly weak in the last 1 week, reporting dizziness and lightheadedness, reporting gait instability and falling to the right side.  She was in the process of getting her intracranial mass reevaluated by her primary care physician denies any other illness or hospitalization        REVIEW OF SYSTEMS:   Constitutional: No fatigue  Skin: No rash  Eyes: No recent vision problems or eye pain    ENT: No congestion, ear pain, or sore throat  Endocrine: No thyroid problems    Cardiovascular: No chest pain  Respiratory: No cough, shortness of breath, congestion, or wheezing  Gastrointestinal: No nausea, no vomiting or diarrhea  Musculoskeletal: No joint swelling    Neurologic: No headache  Hematologic: No unusual bruising or bleeding  Psychiatric: No psychiatric problems, hallucinations or depression    Past medical history  Thyroid nodule  ?intracranial Mass       Past surgical history  Thyroid Nodile biopsy     Family history     Mother: HTN, Hyperlipidemia       Social history      Alcohol  Details: Use: Current.  If current Alcohol user: More than 5 (M) or 4 (F) drinks within a couple of hours? No. Pt reprots ~ 2 drinks tiwce a week   Exercise  Details: Exercise: Occasionally.  Substance Abuse  Details: Use: None.  Tobacco  Details: Smoked/Smokeless Tobacco Last 30 Days: No.  Smoking Tobacco Use: Never smoker.  Smokeless Tobacco Use Never.       Home Medications (3) Active  Medrol Dosepak oral 4 mg tablet See Instructions  no known home meds   Tylenol oral 325 mg tablet 650 mg = 2 tab, PRN, Oral, Q6hr     Medications (8) Active  Scheduled: (0)  Continuous: (2)  Lactated Ringers 1,000 mL  1,000 mL, IV, 100 mL/hr  Sodium Chloride 0.45% 1,000 mL  1,000 mL, IV, 100 mL/hr  PRN: (6)  Acetaminophen 325 mg tab  650 mg 2 tab, Oral, Q6H  acetaminophen-HYDROcodone  1 tab, Oral, Q6H  ketorolac  30 mg, IV Push, Q6H  LORazepam 2 mg/1 mL inj SDV  1 mg 0.5 mL, Slow IV  Push, Q1H  LORazepam 2 mg/1 mL inj SDV  2 mg 1 mL, Slow IV Push, Q Bedtime  Ondansetron 4 mg/2 mL inj SDV  4 mg 2 mL, Slow IV Push, Q6H       Allergies (1) Active Reaction  NKA None Documented      Immunizations:  Pneumovax : not inidicated   Flu shot : needs vaccination     CODE STATUS: FULL          I & O between:  10-NOV-2017 16:22 TO 11-NOV-2017 16:22  Med Dosing Weight:  69.5  kg   11-NOV-2017  24 Hour Intake:   803.00  ( 11.55 mL/kg )  24 Hour Output:   2025.00           24 Hour Urine/Stool Output:   0.0  24 Hour Balance:   -1222.00           24 Hour Urine Output:   2025.00  ( 1.21 mL/kg/hr )                   Urine Count:  1       Vitals between:   10-NOV-2017 16:22:51   TO   11-NOV-2017 16:22:51                   LAST RESULT MINIMUM MAXIMUM  Temperature 36.8 36 37.2  Heart Rate 66 66 87  Respiratory Rate 16 16 16  NISBP           109 98 123  NIDBP           64 49 81  NIMBP           79 65 95  SpO2                    98 96 98      GENERAL: Alert awake and oriented  HEAD:  Occipital Laceration s/p repair   EYES: Pupils equally round and reactive to light, extraocular muscle movement is intact  EARS: Normal  MOUTH:  Mucous membrane is moist  NECK: Cervical brace in place   CHEST: Clear to auscultation bilaterally  CARDIAC: Normal S1 and S2 heard regular rate and rhythm no murmurs appreciated.  VASCULAR: No peripheral edema.  2+ dorsalis pedis pulses bilaterall  ABDOMEN: Soft, nontender, nondistended with normal bowel sounds.  MUSCULOSKELETAL: No swelling, deformity   NEUROLOGIC EXAM:No focal deficit appreciated, Normal reflexes bilaterally    PSYCHIATRIC: Pleasant and cooperative  SKIN: No lesions or rashes noted         Labs between:  10-NOV-2017 16:22 to 11-NOV-2017 16:22    CBC:                 WBC  HgB  Hct  Plt  MCV  RDW   11-NOV-2017 8.6  12.8  36.7  207  93.4  12.0   10-NOV-2017 9.3  13.6  39.2  221  94.2  12.0     DIFF:                 Seg  Neutroph//ABS  Lymph//ABS  Mono//ABS  EOS/ABS   11-NOV-2017 NOT  APPLICABLE  77 // 6.5 17 // 1.5 6 // 0.6 0 // (L) 0.0     BMP:                 Na  Cl  BUN  Glu   11-NOV-2017 141  (H) 108  (L) 4  (H) 126                              K  CO2  Cr  Ca                              3.9  24  (L) 0.47  (L) 8.0   BMP:                 Na  Cl  BUN  Glu   10-NOV-2017 144  105  6  (H) 107                              K  CO2  Cr  Ca                              (L) 3.2  26  0.54  8.5     COAG:                 INR  PT  PTT  Ddimer  Fibrinogen    10-NOV-2017 1.0  10.3  24                        Radiology:       Result title:  CT HEAD OR BRAIN WO CON  Result status:  Final  Verified by:  CRISS ARAGON on 11/11/2017 2:55  IMPRESSION:1.  Left frontal high convexity subarachnoid hemorrhage.    Result title:  CT THORACIC AND LUMBAR SPINE REFORMATTED  Result status:  Final  Verified by:  CRISS ARAGON on 11/11/2017 8:48  IMPRESSION:1.  There are no acute abnormalities.  Multilevel disc degeneration and spondylosis are present.    Result title:  CT HEAD OR BRAIN WO CON  Result status:  Final  Verified by:  CRISS ARAGON on 11/11/2017 9:39  IMPRESSION:1.  The examination is abnormal with bilateral high convexity frontal subarachnoid hemorrhage.    Result title:  CT CERVICAL SPINE WO CON  Result status:  Final  Verified by:  CRISS ARAGON on 11/11/2017 8:46  IMPRESSION:1. There are no acute abnormalities.    Result title:  CT CHEST, ABDOMEN AND PELVIS WO CON  Result status:  Final  Verified by:  KYREE CHRISTENSEN on 11/11/2017 8:00  IMPRESSION:1.  No definite acute traumatic abnormality of the visualized chest, abdomen, pelvis.2.  Moderate constipation.3.  Diverticulosis without acute diverticulitis.4.  Scattered atherosclerosis descending aorta and its branches.  Result title:  MRI BRAIN WO/W CON  Result status:  Final  Verified by:  CRISS ARAGON on 11/11/2017 2:53  IMPRESSION:1.  Left frontal high convexity subarachnoid  hemorrhage.    Result title:  MRI CERVICAL SPINE WO CON  Result status:  Final  Verified by:  MAHESH, CRISS MERIDA on 11/11/2017 2:59  IMPRESSION:1.  Mild multilevel disc degeneration and disc bulging, as detailed above, without acute normality, disc herniation, or significant spinal stenosis.  Result title:  XR CHEST PORTABLE 1V  Result status:  Final  Verified by:  EDWIN, SAAD FELICITA on 11/10/2017 1:29  IMPRESSION: Normal chest    Based on the patient's presentation on admission, I expect the patient to require at least 2 midnights of medically necessary Hospital services for the following reasons:     Impression     --Fall: Etiology uncertain, intoxication? Neurogenic Syncope?   --Traumatic SAH/Scalp Hemtoma   --Hx of Intracranial Mass: Not seen on imaging  --Alcohol intoxicatoin: pt denies daily ETOH use, hx of abuse or dependence   --Hypokalemia     Plan     --Trauma and Nuerosurgery on consult, managment os scal laceration, SAH per their services respectively  --Neuro check every 4 hours  --Hold off on any antiplatelet or anticoagulation for now  --Seizure prophylaxis not recommended by neurosurgery.  Will defer to them  --Asked patient to bring records of old intracranial mass if available.  Will compare outside imaging to imaging obtained here  --Monitor for alcohol withdrawal.  Start CIWA protocol, PRN Ativan as needed  --Pain management, adjust as needed   --Replete electrolyte  --General Diet  --SCDs for DVT prophyalxis, chemical anticoagulation contraindicated in setting of SAH                               Electronically Signed On 11/11/2017 16:45  __________________________________________________   MARIBELL SHEFFIELD      Electronically Signed On 11/12/2017 12:58  __________________________________________________   MARIBELL SHEFFIELD     Malnutrition...

## 2022-07-01 NOTE — PROGRESS NOTE ADULT - SUBJECTIVE AND OBJECTIVE BOX
GENERAL SURGERY PROGRESS NOTE    Patient: KEISHA PANTOJA , 77y (43)Female   MRN: [id number]  Location: [address]  Visit: 22 Inpatient  Date: 22 @ 01:37    Procedure/Dx/Injuries:  diverticulitis    Events of past 24 hours: No acute events overnight. Pain improved. Tolerating diet. anticipated for discharge today on antibiotics    PAST MEDICAL & SURGICAL HISTORY:  Hypertension, unspecified type      H/O dilation and curettage  for missed           Vitals:   T(F): 98 (22 @ 20:43), Max: 98 (22 @ 20:43)  HR: 95 (22 @ 20:43)  BP: 152/82 (22 @ 20:43)  RR: 18 (22 @ 20:43)  SpO2: 95% (22 @ 05:54)      Diet, Low Fiber      Fluids: sodium chloride 0.9%.: Solution, 1000 milliLiter(s) infuse at 60 mL/Hr, Stop After 16 Hours      I & O's:    Bowel Movement: : [] YES [] NO  Flatus: : [] YES [] NO    PHYSICAL EXAM:  General: NAD,   Cardiac: RRR S1, S2,  Respiratory: CTAB,   Abdomen: Soft, non-distended, non-tender,  Vascular: extremities well perfused      MEDICATIONS  (STANDING):  cefTRIAXone   IVPB      cefTRIAXone   IVPB 2000 milliGRAM(s) IV Intermittent every 24 hours  enoxaparin Injectable 40 milliGRAM(s) SubCutaneous every 24 hours  melatonin 10 milliGRAM(s) Oral at bedtime  metroNIDAZOLE    Tablet 500 milliGRAM(s) Oral every 8 hours  pantoprazole    Tablet 40 milliGRAM(s) Oral before breakfast  polyethylene glycol 3350 17 Gram(s) Oral daily  senna 2 Tablet(s) Oral at bedtime  sodium chloride 0.9%. 1000 milliLiter(s) (60 mL/Hr) IV Continuous <Continuous>    MEDICATIONS  (PRN):  acetaminophen     Tablet .. 650 milliGRAM(s) Oral every 6 hours PRN Moderate Pain (4 - 6)  morphine  - Injectable 2 milliGRAM(s) IV Push every 4 hours PRN Severe Pain (7 - 10)  ondansetron Injectable 4 milliGRAM(s) IV Push every 8 hours PRN Nausea and/or Vomiting      DVT PROPHYLAXIS: enoxaparin Injectable 40 milliGRAM(s) SubCutaneous every 24 hours    GI PROPHYLAXIS: pantoprazole    Tablet 40 milliGRAM(s) Oral before breakfast    ANTICOAGULATION:   ANTIBIOTICS:  cefTRIAXone   IVPB    cefTRIAXone   IVPB 2000 milliGRAM(s)  metroNIDAZOLE    Tablet 500 milliGRAM(s)        LAB/STUDIES:  Labs:  CAPILLARY BLOOD GLUCOSE                              11.1   5.58  )-----------( 379      ( 2022 06:34 )             34.8       Auto Neutrophil %: 57.1 % (22 @ 06:34)  Auto Immature Granulocyte %: 1.3 % (22 @ 06:34)        137  |  102  |  9<L>  ----------------------------<  90  4.6   |  21  |  0.6<L>      Calcium, Total Serum: 9.0 mg/dL (22 @ 06:34)      LFTs:             6.3  | 0.2  | 16       ------------------[74      ( 2022 07:15 )  2.9  | x    | 10          Lipase:x      Amylase:x        IMAGING:  < from: Xray Kidney Ureter Bladder (22 @ 13:09) >  IMPRESSION:    Nonspecific nonobstructive bowel gas pattern. Enteric contrast. IVC   filter. Stable bones.    < end of copied text >      ACCESS/ DEVICES:  [x ] Peripheral IV  [ ] Central Venous Line	[ ] R	[ ] L	[ ] IJ	[ ] Fem	[ ] SC	Placed:   [ ] Arterial Line		[ ] R	[ ] L	[ ] Fem	[ ] Rad	[ ] Ax	Placed:   [ ] PICC:					[ ] Mediport  [ ] Urinary Catheter,  Date Placed:   [ ] Chest tube: [ ] Right, [ ] Left  [ ] MELVIN/Simon Drains

## 2022-07-01 NOTE — DIETITIAN INITIAL EVALUATION ADULT - PERTINENT LABORATORY DATA
07-01    133<L>  |  98  |  9<L>  ----------------------------<  126<H>  4.2   |  23  |  0.6<L>    Ca    8.7      01 Jul 2022 17:46  Mg     1.9     07-01    TPro  6.1  /  Alb  2.8<L>  /  TBili  0.3  /  DBili  x   /  AST  34  /  ALT  21  /  AlkPhos  78  07-01

## 2022-07-01 NOTE — DIETITIAN INITIAL EVALUATION ADULT - PERTINENT MEDS FT
MEDICATIONS  (STANDING):  cefTRIAXone   IVPB      cefTRIAXone   IVPB 2000 milliGRAM(s) IV Intermittent every 24 hours  donepezil 5 milliGRAM(s) Oral at bedtime  enoxaparin Injectable 40 milliGRAM(s) SubCutaneous every 24 hours  melatonin 10 milliGRAM(s) Oral at bedtime  metroNIDAZOLE    Tablet 500 milliGRAM(s) Oral every 8 hours  pantoprazole    Tablet 40 milliGRAM(s) Oral before breakfast  polyethylene glycol 3350 17 Gram(s) Oral daily  senna 2 Tablet(s) Oral at bedtime  sodium chloride 0.9%. 1000 milliLiter(s) (60 mL/Hr) IV Continuous <Continuous>    MEDICATIONS  (PRN):  acetaminophen     Tablet .. 650 milliGRAM(s) Oral every 6 hours PRN Moderate Pain (4 - 6)  morphine  - Injectable 2 milliGRAM(s) IV Push every 4 hours PRN Severe Pain (7 - 10)

## 2022-07-01 NOTE — DIETITIAN INITIAL EVALUATION ADULT - OTHER INFO
76yo F PMHx of dementia and HTN, recent hospital course for diverticulitis presents to the ED with complaints of abdominal pain consistent with diverticulitis.  #Acute diverticulitis v. Stercoral-colitis with perisigmoid Lymphadenopathy

## 2022-07-01 NOTE — DIETITIAN INITIAL EVALUATION ADULT - ADD RECOMMEND
RECOMMEND: simethicone 80mg three times a day for KUB indicating patient with gas  RECOMMEND: calorie count x3days  Patient at HIGH nutrition risk, unable to tolerate solids. Will continue to monitor and follow up within 4days

## 2022-07-01 NOTE — DIETITIAN INITIAL EVALUATION ADULT - ORAL INTAKE PTA/DIET HISTORY
met with patient and family at bedside  -reports decrease appetite/ PO intake for a few months though further declined ~1 month prior to admission was primarily consuming liquids/2-3 Ensure supplements per day. Suspect related to GI signs/symptoms. Prior to this PO intake was adequate at baseline

## 2022-07-01 NOTE — PROGRESS NOTE ADULT - ASSESSMENT
ASSESSMENT:  77y F admitted with diverticulitis    PLAN:  continue antibiotics   per ID discharge with midline IV ceftriaxone and oral flagyl   GI followup and colonoscopy in 6-8 weeks  pain control  incentive spirometry  DVT/GI prophylaxis  SCDs, IS  encourage ambulation      spectra [phone]

## 2022-07-01 NOTE — PROGRESS NOTE ADULT - ASSESSMENT
ASSESSMENT & PLAN    76yo F PMHx of dementia and HTN, recent hospital course for diverticulitis presents to the ED with complaints of abdominal pain consistent with diverticulitis.    #Acute diverticulitis v. Stercoral-colitis with perisigmoid Lymphadenopathy   #Constipation  - Failed outpt therapy [10-day course of cipro+flagyl (end date 7/2/22)]   - CT A+P- findings consistent with unchanged diverticulitis/colitis + diverticula, pericolonic infiltrative changes. Adjacent small bowel loops have wall thickening as well   - Loose stools likely from overflow leaking from constipation demonstrated on CT A+P  - C/w senna + miralax for constipation  - C/w IV Ceftriaxone 2g q24H and PO Flagyl 500 mg TID (end date: 7/6/22) upon discharge per ID recs  - Midline placed in LA (6/28/22)  - Per GI recs,  surgery consult & low fiber diet as tolerated  - Per surgery, no acute intervention at this time  - Outpt colonoscopy in 6-8 weeks upon d/c. Patient has not had a colonoscopy in > 10 years.   - Can follow up with surgery Dr. Isaacs as an outpatient   - KUB performed 6/30/22 to assess stool burden: non-obstructing gas pattern, FOS  - Started on PO Zofran and transitioned to IV Zofran prn 2/2 vomiting (will order EKG if given more than 3 doses).  - s/p IVF NS    #TWI on EKG  - 7/1: Repeat EKG showed TWI in lead II, III, and AVF.   - sending stat trops    #COVID-19, asymptomatic, incidental  - VS stable.   - No need for steroids.   - Cont monitor    #HTN  - well controlled without medications    #Misc  -DVT PPx: Lovenox  -GI PPx: Protonix.   -Diet: Low Fiber diet  -Code: Full  -Dispo: Pending NH placement for IV abx administration, tentative d/c on 7/1/22, if no trop elevation ASSESSMENT & PLAN    78yo F PMHx of dementia and HTN, recent hospital course for diverticulitis presents to the ED with complaints of abdominal pain consistent with diverticulitis.    #Acute diverticulitis v. Stercoral-colitis with perisigmoid Lymphadenopathy   #Constipation  - Failed outpt therapy [10-day course of cipro+flagyl (end date 7/2/22)]   - CT A+P- findings consistent with unchanged diverticulitis/colitis + diverticula, pericolonic infiltrative changes. Adjacent small bowel loops have wall thickening as well   - Loose stools likely from overflow leaking from constipation demonstrated on CT A+P  - C/w senna + miralax for constipation  - C/w IV Ceftriaxone 2g q24H and PO Flagyl 500 mg TID (end date: 7/6/22) upon discharge per ID recs  - Midline placed in LA (6/28/22)  - Per GI recs,  surgery consult & low fiber diet as tolerated  - Per surgery, no acute intervention at this time  - Outpt colonoscopy in 6-8 weeks upon d/c. Patient has not had a colonoscopy in > 10 years.   - Can follow up with surgery Dr. Isaacs as an outpatient   - KUB performed 6/30/22 to assess stool burden: non-obstructing gas pattern, FOS  - Started on PO Zofran and transitioned to IV Zofran prn 2/2 vomiting (will order EKG if given more than 3 doses).  - 7/1: QTc 537, TWI on EKG, f/u trop. pt with N/V, switched zofran to chlorpromazine 25mg  - s/p IVF NS    #TWI on EKG  - 7/1: Repeat EKG showed TWI in lead II, III, and AVF.   - sending stat trops    #COVID-19, asymptomatic, incidental  - VS stable.   - No need for steroids.   - Cont monitor    #HTN  - well controlled without medications    #Misc  -DVT PPx: Lovenox  -GI PPx: Protonix.   -Diet: Low Fiber diet  -Code: Full  -Dispo: Pending NH placement for IV abx administration, tentative d/c on 7/1/22, if no trop elevation

## 2022-07-01 NOTE — DIETITIAN INITIAL EVALUATION ADULT - DIET TYPE
Full liquids (patient is not currently tolerating solids) --> advance to low fiber diet as tolerated

## 2022-07-02 PROCEDURE — 74177 CT ABD & PELVIS W/CONTRAST: CPT | Mod: 26

## 2022-07-02 PROCEDURE — 99233 SBSQ HOSP IP/OBS HIGH 50: CPT

## 2022-07-02 RX ORDER — MORPHINE SULFATE 50 MG/1
3 CAPSULE, EXTENDED RELEASE ORAL ONCE
Refills: 0 | Status: DISCONTINUED | OUTPATIENT
Start: 2022-07-02 | End: 2022-07-02

## 2022-07-02 RX ORDER — SODIUM CHLORIDE 9 MG/ML
1000 INJECTION INTRAMUSCULAR; INTRAVENOUS; SUBCUTANEOUS
Refills: 0 | Status: DISCONTINUED | OUTPATIENT
Start: 2022-07-02 | End: 2022-07-03

## 2022-07-02 RX ADMIN — SIMETHICONE 80 MILLIGRAM(S): 80 TABLET, CHEWABLE ORAL at 21:38

## 2022-07-02 RX ADMIN — POLYETHYLENE GLYCOL 3350 17 GRAM(S): 17 POWDER, FOR SOLUTION ORAL at 12:07

## 2022-07-02 RX ADMIN — MORPHINE SULFATE 2 MILLIGRAM(S): 50 CAPSULE, EXTENDED RELEASE ORAL at 10:46

## 2022-07-02 RX ADMIN — ENOXAPARIN SODIUM 40 MILLIGRAM(S): 100 INJECTION SUBCUTANEOUS at 16:25

## 2022-07-02 RX ADMIN — SODIUM CHLORIDE 80 MILLILITER(S): 9 INJECTION INTRAMUSCULAR; INTRAVENOUS; SUBCUTANEOUS at 11:00

## 2022-07-02 RX ADMIN — Medication 500 MILLIGRAM(S): at 16:08

## 2022-07-02 RX ADMIN — MORPHINE SULFATE 3 MILLIGRAM(S): 50 CAPSULE, EXTENDED RELEASE ORAL at 14:42

## 2022-07-02 RX ADMIN — SIMETHICONE 80 MILLIGRAM(S): 80 TABLET, CHEWABLE ORAL at 05:20

## 2022-07-02 RX ADMIN — Medication 10 MILLIGRAM(S): at 21:37

## 2022-07-02 RX ADMIN — Medication 500 MILLIGRAM(S): at 05:20

## 2022-07-02 RX ADMIN — MORPHINE SULFATE 2 MILLIGRAM(S): 50 CAPSULE, EXTENDED RELEASE ORAL at 19:09

## 2022-07-02 RX ADMIN — CEFTRIAXONE 100 MILLIGRAM(S): 500 INJECTION, POWDER, FOR SOLUTION INTRAMUSCULAR; INTRAVENOUS at 17:04

## 2022-07-02 RX ADMIN — SENNA PLUS 2 TABLET(S): 8.6 TABLET ORAL at 21:38

## 2022-07-02 RX ADMIN — SIMETHICONE 80 MILLIGRAM(S): 80 TABLET, CHEWABLE ORAL at 16:09

## 2022-07-02 RX ADMIN — Medication 500 MILLIGRAM(S): at 21:37

## 2022-07-02 RX ADMIN — DONEPEZIL HYDROCHLORIDE 5 MILLIGRAM(S): 10 TABLET, FILM COATED ORAL at 21:38

## 2022-07-02 RX ADMIN — PANTOPRAZOLE SODIUM 40 MILLIGRAM(S): 20 TABLET, DELAYED RELEASE ORAL at 06:15

## 2022-07-02 NOTE — PROGRESS NOTE ADULT - SUBJECTIVE AND OBJECTIVE BOX
BOSTONSYD KEISHA  77y  Female      Patient is a 77y old  Female who presents with a chief complaint of RECTOSIGMOID DIVERTICULITIS; ABDOMINAL PAIN     (01 Jul 2022 23:56)      INTERVAL HPI/OVERNIGHT EVENTS: forgetful, c/o worsening abdominal pain. barely eating today  All other review of systems negative    vss  deconditioned  a bit dry  LLQ more diffusely tender today on lighter palpation without rebound or guarding  s1s2 rrr no m/r/g  cta b/l on ra  ue midline cdi    T(C): 37.2 (07-02-22 @ 13:20), Max: 37.2 (07-02-22 @ 13:20)  HR: 90 (07-02-22 @ 13:20) (90 - 102)  BP: 118/62 (07-02-22 @ 13:20) (111/61 - 118/62)  RR: 18 (07-02-22 @ 13:20) (18 - 20)  SpO2: 100% (07-02-22 @ 06:00) (96% - 100%)  Wt(kg): --Vital Signs Last 24 Hrs  T(C): 37.2 (02 Jul 2022 13:20), Max: 37.2 (02 Jul 2022 13:20)  T(F): 99 (02 Jul 2022 13:20), Max: 99 (02 Jul 2022 13:20)  HR: 90 (02 Jul 2022 13:20) (90 - 102)  BP: 118/62 (02 Jul 2022 13:20) (111/61 - 118/62)  BP(mean): --  RR: 18 (02 Jul 2022 13:20) (18 - 20)  SpO2: 100% (02 Jul 2022 06:00) (96% - 100%)        Consultant(s) Notes Reviewed:  [x ] YES  [ ] NO    Discussed with Consultants/Other Providers [ x] YES     LABS                          11.1   9.27  )-----------( 433      ( 01 Jul 2022 06:47 )             34.3     07-01    133<L>  |  98  |  9<L>  ----------------------------<  126<H>  4.2   |  23  |  0.6<L>    Ca    8.7      01 Jul 2022 17:46  Mg     1.9     07-01    TPro  6.1  /  Alb  2.8<L>  /  TBili  0.3  /  DBili  x   /  AST  34  /  ALT  21  /  AlkPhos  78  07-01          Lactate Trend    CARDIAC MARKERS ( 01 Jul 2022 17:46 )  x     / <0.01 ng/mL / x     / x     / x          CAPILLARY BLOOD GLUCOSE            RADIOLOGY & ADDITIONAL TESTS:    Imaging Personally Reviewed:  [ ] YES  [ ] NO    HEALTH ISSUES - PROBLEM Dx:

## 2022-07-02 NOTE — PROGRESS NOTE ADULT - ASSESSMENT
Assessment:  77y F admitted with diverticulitis    PLAN:  continue antibiotics   GI followup and colonoscopy in 6-8 weeks  pain control  incentive spirometry  DVT/GI prophylaxis  SCDs, IS  encourage ambulation

## 2022-07-02 NOTE — PROGRESS NOTE ADULT - ASSESSMENT
Patient seen and examined independently of resident. My addendum supersedes resident notation. Case discussed with housestaff, nursing and patient    78yo F PMHx of dementia and HTN presents to the ED with complaints of abdominal pain.   Patient had a recent hospital course 6/20-6/22 last month for diverticulitis, and prescribed a 10-day course of cipro + flagyl to complete on 7/2/22.     #Acute rectosigmoid diverticulitis ( Recurrent )  CTAP w IV 06/26: Redemonstration of rectosigmoid severe wall thickening with pericolonic infiltrative change consistent with unchanged diverticulitis/colitis  has midline  - continue ceftriaxone 2g daily   - continue PO flagyl 500 mg TID   - given previous PO antibiotic failure and unclear if truly taking, can finish 10 day course of total antibiotics (end date 7/6) with midline ceftriaxone and oral flagyl   abdominal pain worsened today, not tolerating substantial PO intake last 2 days  CT abd/pelvis c iv contrast repeated- shows stable severe thickening, no change from prior, no report of a microperf or walled off abscess  deescalate diet, if unable to tolerate liquids, may need PPN  iv opiate analgesia escalated in light of worsening discomfort  repeat labs pending    #Nausea/vomiting  qtc prolonged, will trial scopolamine patch as antiemetic without qtc prolongation risk    #dehydration  not eating much, c/w gentle ivh  still not keeping up with gi losses today  deescalated to liquid diet    #HTN  - Currently not on meds    #Dementia  - Supportive care    DVT PPX, Lovenox    #Progress Note Handoff  Pending (specify): worsening abdominal pain today, deescalate to liquids, monitor abdominal pain, CT shows no interval change. abx may need to be prolonged or switched, ppn may be necessary if unable to tolerate liquids appropriately  Family discussion: d/w family today again  Disposition: snf when better, ACUTE as above

## 2022-07-02 NOTE — PROGRESS NOTE ADULT - SUBJECTIVE AND OBJECTIVE BOX
GENERAL SURGERY PROGRESS NOTE    Patient: KEISHA PANTOJA , 77y (43)Female   MRN: [id number]  Location: [address]  Visit: 22 Inpatient  Date: 22 @ 19:32      Procedure/Dx/Injuries: Diverticulitis    Events of past 24 hours: Pt tolerating clear liquid diet. Passing stools and gas. No nausea or vomiting. Continued pain in Left lower quadrant - no changes.     PAST MEDICAL & SURGICAL HISTORY:  Hypertension, unspecified type    H/O dilation and curettage  for missed     Vitals:   T(F): 99 (22 @ 13:20), Max: 99 (22 @ 13:20)  HR: 90 (22 @ 13:20)  BP: 118/62 (22 @ 13:20)  RR: 18 (22 @ 13:20)  SpO2: 100% (22 @ 06:00)      Diet, Full Liquid  Fluids: sodium chloride 0.9%.: Solution, 1000 milliLiter(s) infuse at 80 mL/Hr, Stop After 16 Hours  I & O's:    Bowel Movement: : [x] YES [] NO  Flatus: : [x] YES [] NO    PHYSICAL EXAM:  General: NAD,   Cardiac: RRR S1, S2,  Respiratory: CTAB,   Abdomen: Soft, slightly distended, tender in left lower quadrant  Vascular: extremities well perfused      MEDICATIONS  (STANDING):  cefTRIAXone   IVPB      cefTRIAXone   IVPB 2000 milliGRAM(s) IV Intermittent every 24 hours  donepezil 5 milliGRAM(s) Oral at bedtime  enoxaparin Injectable 40 milliGRAM(s) SubCutaneous every 24 hours  melatonin 10 milliGRAM(s) Oral at bedtime  metroNIDAZOLE    Tablet 500 milliGRAM(s) Oral every 8 hours  pantoprazole    Tablet 40 milliGRAM(s) Oral before breakfast  polyethylene glycol 3350 17 Gram(s) Oral daily  senna 2 Tablet(s) Oral at bedtime  simethicone 80 milliGRAM(s) Chew three times a day  sodium chloride 0.9%. 1000 milliLiter(s) (80 mL/Hr) IV Continuous <Continuous>    MEDICATIONS  (PRN):  acetaminophen     Tablet .. 650 milliGRAM(s) Oral every 6 hours PRN Moderate Pain (4 - 6)  morphine  - Injectable 2 milliGRAM(s) IV Push every 4 hours PRN Severe Pain (7 - 10)    DVT PROPHYLAXIS: enoxaparin Injectable 40 milliGRAM(s) SubCutaneous every 24 hours    GI PROPHYLAXIS: pantoprazole    Tablet 40 milliGRAM(s) Oral before breakfast    ANTICOAGULATION:   ANTIBIOTICS:  cefTRIAXone   IVPB    cefTRIAXone   IVPB 2000 milliGRAM(s)  metroNIDAZOLE    Tablet 500 milliGRAM(s)    LAB/STUDIES:  Labs:  CAPILLARY BLOOD GLUCOSE                         11.1   9.27  )-----------( 433      ( 2022 06:47 )             34.3     07    133<L>  |  98  |  9<L>  ----------------------------<  126<H>  4.2   |  23  |  0.6<L>    LFTs:             6.1  | 0.3  | 34       ------------------[78      ( 2022 17:46 )  2.8  | x    | 21          Lipase:x      Amylase:x         Coags:    CARDIAC MARKERS ( 2022 17:46 )  x     / <0.01 ng/mL / x     / x     / x        IMAGING:  < from: CT Abdomen and Pelvis w/ IV Cont (22 @ 15:36) >  IMPRESSION:  Unchanged rectosigmoid diverticulitis. No focal fluid collection.  --- End of Report ---

## 2022-07-02 NOTE — CHART NOTE - NSCHARTNOTEFT_GEN_A_CORE
Calorie count document posted in pt's room, RN notified. CC doc to be collected and results to be documented on 7/5.

## 2022-07-03 LAB
ALBUMIN SERPL ELPH-MCNC: 2.8 G/DL — LOW (ref 3.5–5.2)
ALP SERPL-CCNC: 72 U/L — SIGNIFICANT CHANGE UP (ref 30–115)
ALT FLD-CCNC: 16 U/L — SIGNIFICANT CHANGE UP (ref 0–41)
ANION GAP SERPL CALC-SCNC: 7 MMOL/L — SIGNIFICANT CHANGE UP (ref 7–14)
APTT BLD: 29.3 SEC — SIGNIFICANT CHANGE UP (ref 27–39.2)
AST SERPL-CCNC: 27 U/L — SIGNIFICANT CHANGE UP (ref 0–41)
BILIRUB SERPL-MCNC: 0.2 MG/DL — SIGNIFICANT CHANGE UP (ref 0.2–1.2)
BLD GP AB SCN SERPL QL: SIGNIFICANT CHANGE UP
BUN SERPL-MCNC: 5 MG/DL — LOW (ref 10–20)
CALCIUM SERPL-MCNC: 8.3 MG/DL — LOW (ref 8.5–10.1)
CHLORIDE SERPL-SCNC: 99 MMOL/L — SIGNIFICANT CHANGE UP (ref 98–110)
CO2 SERPL-SCNC: 26 MMOL/L — SIGNIFICANT CHANGE UP (ref 17–32)
CREAT SERPL-MCNC: 0.5 MG/DL — LOW (ref 0.7–1.5)
EGFR: 96 ML/MIN/1.73M2 — SIGNIFICANT CHANGE UP
GLUCOSE SERPL-MCNC: 109 MG/DL — HIGH (ref 70–99)
HCT VFR BLD CALC: 32.6 % — LOW (ref 37–47)
HGB BLD-MCNC: 10.4 G/DL — LOW (ref 12–16)
INR BLD: 1.43 RATIO — HIGH (ref 0.65–1.3)
MCHC RBC-ENTMCNC: 25.6 PG — LOW (ref 27–31)
MCHC RBC-ENTMCNC: 31.9 G/DL — LOW (ref 32–37)
MCV RBC AUTO: 80.3 FL — LOW (ref 81–99)
NRBC # BLD: 0 /100 WBCS — SIGNIFICANT CHANGE UP (ref 0–0)
PLATELET # BLD AUTO: 341 K/UL — SIGNIFICANT CHANGE UP (ref 130–400)
POTASSIUM SERPL-MCNC: 5 MMOL/L — SIGNIFICANT CHANGE UP (ref 3.5–5)
POTASSIUM SERPL-SCNC: 5 MMOL/L — SIGNIFICANT CHANGE UP (ref 3.5–5)
PROT SERPL-MCNC: 5.7 G/DL — LOW (ref 6–8)
PROTHROM AB SERPL-ACNC: 16.4 SEC — HIGH (ref 9.95–12.87)
RBC # BLD: 4.06 M/UL — LOW (ref 4.2–5.4)
RBC # FLD: 14.7 % — HIGH (ref 11.5–14.5)
SODIUM SERPL-SCNC: 132 MMOL/L — LOW (ref 135–146)
WBC # BLD: 8.47 K/UL — SIGNIFICANT CHANGE UP (ref 4.8–10.8)
WBC # FLD AUTO: 8.47 K/UL — SIGNIFICANT CHANGE UP (ref 4.8–10.8)

## 2022-07-03 PROCEDURE — 99221 1ST HOSP IP/OBS SF/LOW 40: CPT

## 2022-07-03 PROCEDURE — 71045 X-RAY EXAM CHEST 1 VIEW: CPT | Mod: 26

## 2022-07-03 PROCEDURE — 99233 SBSQ HOSP IP/OBS HIGH 50: CPT

## 2022-07-03 RX ORDER — SODIUM CHLORIDE 9 MG/ML
1000 INJECTION INTRAMUSCULAR; INTRAVENOUS; SUBCUTANEOUS
Refills: 0 | Status: DISCONTINUED | OUTPATIENT
Start: 2022-07-03 | End: 2022-07-03

## 2022-07-03 RX ORDER — SODIUM CHLORIDE 9 MG/ML
1000 INJECTION, SOLUTION INTRAVENOUS
Refills: 0 | Status: DISCONTINUED | OUTPATIENT
Start: 2022-07-03 | End: 2022-07-03

## 2022-07-03 RX ORDER — SODIUM CHLORIDE 9 MG/ML
2500 INJECTION, SOLUTION INTRAVENOUS
Refills: 0 | Status: DISCONTINUED | OUTPATIENT
Start: 2022-07-03 | End: 2022-07-03

## 2022-07-03 RX ORDER — SODIUM CHLORIDE 9 MG/ML
1000 INJECTION, SOLUTION INTRAVENOUS
Refills: 0 | Status: DISCONTINUED | OUTPATIENT
Start: 2022-07-03 | End: 2022-07-04

## 2022-07-03 RX ORDER — SODIUM CHLORIDE 9 MG/ML
1000 INJECTION, SOLUTION INTRAVENOUS
Refills: 0 | Status: DISCONTINUED | OUTPATIENT
Start: 2022-07-03 | End: 2022-07-07

## 2022-07-03 RX ORDER — IOHEXOL 300 MG/ML
30 INJECTION, SOLUTION INTRAVENOUS ONCE
Refills: 0 | Status: DISCONTINUED | OUTPATIENT
Start: 2022-07-03 | End: 2022-07-03

## 2022-07-03 RX ORDER — PIPERACILLIN AND TAZOBACTAM 4; .5 G/20ML; G/20ML
3.38 INJECTION, POWDER, LYOPHILIZED, FOR SOLUTION INTRAVENOUS EVERY 8 HOURS
Refills: 0 | Status: DISCONTINUED | OUTPATIENT
Start: 2022-07-03 | End: 2022-07-07

## 2022-07-03 RX ORDER — PIPERACILLIN AND TAZOBACTAM 4; .5 G/20ML; G/20ML
3.38 INJECTION, POWDER, LYOPHILIZED, FOR SOLUTION INTRAVENOUS ONCE
Refills: 0 | Status: COMPLETED | OUTPATIENT
Start: 2022-07-03 | End: 2022-07-03

## 2022-07-03 RX ORDER — SODIUM CHLORIDE 9 MG/ML
500 INJECTION, SOLUTION INTRAVENOUS
Refills: 0 | Status: DISCONTINUED | OUTPATIENT
Start: 2022-07-03 | End: 2022-07-04

## 2022-07-03 RX ADMIN — ENOXAPARIN SODIUM 40 MILLIGRAM(S): 100 INJECTION SUBCUTANEOUS at 14:30

## 2022-07-03 RX ADMIN — SIMETHICONE 80 MILLIGRAM(S): 80 TABLET, CHEWABLE ORAL at 14:30

## 2022-07-03 RX ADMIN — PIPERACILLIN AND TAZOBACTAM 25 GRAM(S): 4; .5 INJECTION, POWDER, LYOPHILIZED, FOR SOLUTION INTRAVENOUS at 21:15

## 2022-07-03 RX ADMIN — PIPERACILLIN AND TAZOBACTAM 200 GRAM(S): 4; .5 INJECTION, POWDER, LYOPHILIZED, FOR SOLUTION INTRAVENOUS at 14:28

## 2022-07-03 RX ADMIN — Medication 10 MILLIGRAM(S): at 21:11

## 2022-07-03 RX ADMIN — SIMETHICONE 80 MILLIGRAM(S): 80 TABLET, CHEWABLE ORAL at 07:02

## 2022-07-03 RX ADMIN — Medication 500 MILLIGRAM(S): at 07:02

## 2022-07-03 RX ADMIN — SIMETHICONE 80 MILLIGRAM(S): 80 TABLET, CHEWABLE ORAL at 21:12

## 2022-07-03 RX ADMIN — SODIUM CHLORIDE 80 MILLILITER(S): 9 INJECTION INTRAMUSCULAR; INTRAVENOUS; SUBCUTANEOUS at 14:43

## 2022-07-03 RX ADMIN — MORPHINE SULFATE 2 MILLIGRAM(S): 50 CAPSULE, EXTENDED RELEASE ORAL at 08:30

## 2022-07-03 RX ADMIN — DONEPEZIL HYDROCHLORIDE 5 MILLIGRAM(S): 10 TABLET, FILM COATED ORAL at 21:11

## 2022-07-03 RX ADMIN — PANTOPRAZOLE SODIUM 40 MILLIGRAM(S): 20 TABLET, DELAYED RELEASE ORAL at 07:02

## 2022-07-03 RX ADMIN — SCOPALAMINE 1 PATCH: 1 PATCH, EXTENDED RELEASE TRANSDERMAL at 08:22

## 2022-07-03 NOTE — CONSULT NOTE ADULT - CONSULT REASON
Diverticulitis
Diverticulitis
urinary retention, >1.5L output; severe diverticulitis
abdominal pain, rectosigmoid diverticulitis

## 2022-07-03 NOTE — CONSULT NOTE ADULT - NS ATTEND AMEND GEN_ALL_CORE FT
seen on july 3  continue garcia catheter  monitor vitals and labs -- watch for post-obstructive diureresis  ensure no constipation  can have TOV when closer to baseline

## 2022-07-03 NOTE — PROGRESS NOTE ADULT - SUBJECTIVE AND OBJECTIVE BOX
GENERAL SURGERY PROGRESS NOTE     BOSTONCHAYO VELARDEBETH  77y  Female  Hospital day    OVERNIGHT EVENTS: Patient endorses significant abdominal pain, worsened since yesterday. Tmax of 100.5, tachy to 112, has since resolved. Denies gas or bowel movements, reports decreased PO intake.     T(F): 98.7 (07-03-22 @ 05:00), Max: 100.5 (07-02-22 @ 20:46)  HR: 94 (07-03-22 @ 05:00) (90 - 112)  BP: 126/69 (07-03-22 @ 05:00) (118/62 - 126/69)  RR: 18 (07-03-22 @ 05:00) (18 - 18)  SpO2: 96% (07-03-22 @ 05:00) (96% - 98%)    DIET/FLUIDS: lactated ringers. 1000 milliLiter(s) IV Continuous <Continuous>  sodium chloride 0.9%. 1000 milliLiter(s) IV Continuous <Continuous>    URINE:  Indwelling Urethral Catheter:     Connect To:  Leg Bag    Indication:  Urinary Retention / Obstruction (07-03-22 @ 10:44)    GI proph:  pantoprazole    Tablet 40 milliGRAM(s) Oral before breakfast    AC/ proph: enoxaparin Injectable 40 milliGRAM(s) SubCutaneous every 24 hours    ABx: piperacillin/tazobactam IVPB. 3.375 Gram(s) IV Intermittent once  piperacillin/tazobactam IVPB.. 3.375 Gram(s) IV Intermittent every 8 hours    PHYSICAL EXAM:  GENERAL: NAD  CHEST/LUNG: Clear to auscultation bilaterally  HEART: Regular rate and rhythm  ABDOMEN: Distended, diffusely tender   EXTREMITIES:  No clubbing, cyanosis, or edema    LABS               10.4   8.47  )-----------( 341      ( 03 Jul 2022 09:25 )             32.6       07-03    132<L>  |  99  |  5<L>  ----------------------------<  109<H>  5.0   |  26  |  0.5<L>    Calcium, Total Serum: 8.3 mg/dL (07-03-22 @ 09:25)    LFTs:             5.7  | 0.2  | 27       ------------------[72      ( 03 Jul 2022 09:25 )  2.8  | x    | 16          Coags:     16.40  ----< 1.43    ( 03 Jul 2022 09:25 )     29.3      CARDIAC MARKERS ( 01 Jul 2022 17:46 )  x     / <0.01 ng/mL / x     / x     / x        RADIOLOGY & ADDITIONAL TESTS:  < from: CT Abdomen and Pelvis w/ IV Cont (07.02.22 @ 15:36) >  IMPRESSION:  Unchanged rectosigmoid diverticulitis. No focal fluid collection.

## 2022-07-03 NOTE — PROGRESS NOTE ADULT - ASSESSMENT
77y F admitted with diverticulitis    PLAN:  -Acute worsening of exam, STAT CXR upright does not demonstrate free air   -Pending repeat CT IV & PO contrast in light of exam change  -Keep NPO, IVF   -Zosyn for IV antibiotic coverage   -pain control  -DVT/GI prophylaxis  -SCDs, IS  -encourage ambulation  -Patient and plan discussed with Dr. Isaacs

## 2022-07-03 NOTE — PROGRESS NOTE ADULT - SUBJECTIVE AND OBJECTIVE BOX
KEISHA PANTOJA 77y Female  MRN#: [id number]      Pt is currently admitted with the primary diagnosis of      Hospital Day: 7d  HPI:  78yo F PMHx of dementia and HTN presents to the ED with complaints of abdominal pain.     Patient had a recent hospital course - last month for diverticulitis, and prescribed a 10-day course of cipro + flagyl to complete on 22. Abdominal pain started yesterday. States it is sharp, severe, on the left lower abdomen. Per patient's daughter, patient is compliant with taking her antibiotics (in the AM). Denies nausea/vomiting. Endorses constipation, but has been having frequent small loose brown-colored stools. Patient has decreased PO intake, but has no issues with  swallowing.     Otherwise, ROS (-). Denies fever, shortness of breath, chest pain, dysuria, myalgias, back pain.     At the ED, patient is afebrile, VS stable, on RA. Labs significant for COVID-19 (+), Hb- 9.8 (appears chronic). LFTs wnl. CT A+P shows unchanged severe rectosigmoid wall thickening with pericolonic infiltrative changes + diverticuli + small bowel thickening, large stool burden without drainable fluid collection, no obstruction noted.     Overnight events:  No acute events overnight    Subjective:   Pt was seen and examined at bedside. Requesting to go home. C/o nausea. No BM overnight. Denies: abdominal pain.                                            ----------------------------------------------------------    PAST MEDICAL & SURGICAL HISTORY  Hypertension, unspecified type    H/O dilation and curettage  for missed                                               -----------------------------------------------------------  ALLERGIES:  No Known Allergies                                            ------------------------------------------------------------    HOME MEDICATIONS  Home Medications:                           MEDICATIONS:  STANDING MEDICATIONS  cefTRIAXone   IVPB      cefTRIAXone   IVPB 2000 milliGRAM(s) IV Intermittent every 24 hours  donepezil 5 milliGRAM(s) Oral at bedtime  enoxaparin Injectable 40 milliGRAM(s) SubCutaneous every 24 hours  melatonin 10 milliGRAM(s) Oral at bedtime  metroNIDAZOLE    Tablet 500 milliGRAM(s) Oral every 8 hours  pantoprazole    Tablet 40 milliGRAM(s) Oral before breakfast  polyethylene glycol 3350 17 Gram(s) Oral daily  senna 2 Tablet(s) Oral at bedtime  simethicone 80 milliGRAM(s) Chew three times a day  sodium chloride 0.9%. 1000 milliLiter(s) IV Continuous <Continuous>    PRN MEDICATIONS  acetaminophen     Tablet .. 650 milliGRAM(s) Oral every 6 hours PRN  morphine  - Injectable 2 milliGRAM(s) IV Push every 4 hours PRN                                            ------------------------------------------------------------  VITAL SIGNS: Last 24 Hours  T(C): 38.1 (2022 20:46), Max: 38.1 (2022 20:46)  T(F): 100.5 (2022 20:46), Max: 100.5 (2022 20:46)  HR: 112 (2022 20:46) (90 - 112)  BP: 124/60 (2022 20:46) (111/61 - 124/60)  BP(mean): --  RR: 18 (2022 13:20) (18 - 18)  SpO2: 98% (2022 20:46) (98% - 100%)                                             --------------------------------------------------------------  LABS:                        11.1   9.27  )-----------( 433      ( 2022 06:47 )             34.3     07-    133<L>  |  98  |  9<L>  ----------------------------<  126<H>  4.2   |  23  |  0.6<L>    Ca    8.7      2022 17:46  Mg     1.9     07-    TPro  6.1  /  Alb  2.8<L>  /  TBili  0.3  /  DBili  x   /  AST  34  /  ALT  21  /  AlkPhos  78  07-      CARDIAC MARKERS ( 2022 17:46 )  x     / <0.01 ng/mL / x     / x     / x                                                  -------------------------------------------------------------  RADIOLOGY:                                            --------------------------------------------------------------    PHYSICAL EXAM:  General: NAD, AOx1-2  HEENT: Normocephalic, atraumatic  LUNGS: Normal breath sounds, no wheezes/crackles  HEART: S1s2, no mrg  ABDOMEN: Soft, NT/ND. No rigidity/guarding  EXT: Peripheral pulses +1, n ocyanosis/edema  NEURO: Generalized weakness but no focal deficits  SKIN: Scattered ecchymoses                               ASSESSMENT & PLAN    78yo F PMHx of dementia and HTN, recent hospital course for diverticulitis presents to the ED with complaints of abdominal pain consistent with diverticulitis.    #Acute diverticulitis v. Stercoral-colitis with perisigmoid Lymphadenopathy   #Constipation  - Failed outpt therapy [10-day course of cipro+flagyl (end date 22)]   - CT A+P- findings consistent with unchanged diverticulitis/colitis + diverticula, pericolonic infiltrative changes. Adjacent small bowel loops have wall thickening as well   - Loose stools likely from overflow leaking from constipation demonstrated on CT A+P  - C/w senna + miralax for constipation  - C/w IV Ceftriaxone 2g q24H and PO Flagyl 500 mg TID (end date: 22) upon discharge per ID recs  - Midline placed in [place] (22)  - Per GI recs,  surgery consult & low fiber diet as tolerated  - Per surgery, no acute intervention at this time  - Outpt colonoscopy in 6-8 weeks upon d/c. Patient has not had a colonoscopy in > 10 years.   - Can follow up with surgery Dr. Isaacs as an outpatient   - KUB performed 22 to assess stool burden: non-obstructing gas pattern, FOS  - Started on PO Zofran and transitioned to IV Zofran prn 2/2 vomiting (will order EKG if given more than 3 doses).  - : QTc 537, TWI on EKG, f/u trop. pt with N/V, switched zofran to chlorpromazine 25mg  - s/p IVF NS    #TWI on EKG  - : Repeat EKG showed TWI in lead II, III, and AVF.   - sending stat trops    #COVID-19, asymptomatic, incidental  - VS stable.   - No need for steroids.   - Cont monitor    #HTN  - well controlled without medications    #Misc  -DVT PPx: Lovenox  -GI PPx: Protonix.   -Diet: Low Fiber diet  -Code: Full  -Dispo: Pending NH placement for IV abx administration, tentative d/c on 22, if no trop elevation             KEISHA PANTOJA 77y Female  MRN#: [id number]      Pt is currently admitted with the primary diagnosis of acute diverticulitis      Hospital Day: 7d  HPI:  76yo F PMHx of dementia and HTN presents to the ED with complaints of abdominal pain.     Patient had a recent hospital course - last month for diverticulitis, and prescribed a 10-day course of cipro + flagyl to complete on 22. Abdominal pain started yesterday. States it is sharp, severe, on the left lower abdomen. Per patient's daughter, patient is compliant with taking her antibiotics (in the AM). Denies nausea/vomiting. Endorses constipation, but has been having frequent small loose brown-colored stools. Patient has decreased PO intake, but has no issues with  swallowing.     Otherwise, ROS (-). Denies fever, shortness of breath, chest pain, dysuria, myalgias, back pain.     At the ED, patient is afebrile, VS stable, on RA. Labs significant for COVID-19 (+), Hb- 9.8 (appears chronic). LFTs wnl. CT A+P shows unchanged severe rectosigmoid wall thickening with pericolonic infiltrative changes + diverticuli + small bowel thickening, large stool burden without drainable fluid collection, no obstruction noted.     Overnight events:  No acute events overnight                                              ----------------------------------------------------------    PAST MEDICAL & SURGICAL HISTORY  Hypertension, unspecified type    H/O dilation and curettage  for missed                                               -----------------------------------------------------------  ALLERGIES:  No Known Allergies                                            ------------------------------------------------------------    HOME MEDICATIONS  Home Medications:                           MEDICATIONS:  STANDING MEDICATIONS  cefTRIAXone   IVPB      cefTRIAXone   IVPB 2000 milliGRAM(s) IV Intermittent every 24 hours  donepezil 5 milliGRAM(s) Oral at bedtime  enoxaparin Injectable 40 milliGRAM(s) SubCutaneous every 24 hours  melatonin 10 milliGRAM(s) Oral at bedtime  metroNIDAZOLE    Tablet 500 milliGRAM(s) Oral every 8 hours  pantoprazole    Tablet 40 milliGRAM(s) Oral before breakfast  polyethylene glycol 3350 17 Gram(s) Oral daily  senna 2 Tablet(s) Oral at bedtime  simethicone 80 milliGRAM(s) Chew three times a day  sodium chloride 0.9%. 1000 milliLiter(s) IV Continuous <Continuous>    PRN MEDICATIONS  acetaminophen     Tablet .. 650 milliGRAM(s) Oral every 6 hours PRN  morphine  - Injectable 2 milliGRAM(s) IV Push every 4 hours PRN                                            ------------------------------------------------------------  VITAL SIGNS: Last 24 Hours  T(C): 38.1 (2022 20:46), Max: 38.1 (2022 20:46)  T(F): 100.5 (2022 20:46), Max: 100.5 (2022 20:46)  HR: 112 (2022 20:46) (90 - 112)  BP: 124/60 (2022 20:46) (111/61 - 124/60)  BP(mean): --  RR: 18 (2022 13:20) (18 - 18)  SpO2: 98% (2022 20:46) (98% - 100%)                                             --------------------------------------------------------------  LABS:                        11.1   9.27  )-----------( 433      ( 2022 06:47 )             34.3     07-01    133<L>  |  98  |  9<L>  ----------------------------<  126<H>  4.2   |  23  |  0.6<L>    Ca    8.7      2022 17:46  Mg     1.9         TPro  6.1  /  Alb  2.8<L>  /  TBili  0.3  /  DBili  x   /  AST  34  /  ALT  21  /  AlkPhos  78        CARDIAC MARKERS ( 2022 17:46 )  x     / <0.01 ng/mL / x     / x     / x                                                  -------------------------------------------------------------  RADIOLOGY:                                            --------------------------------------------------------------                               ASSESSMENT & PLAN    76yo F PMHx of dementia and HTN, recent hospital course for diverticulitis presents to the ED with complaints of abdominal pain consistent with diverticulitis.    #Acute diverticulitis v. Stercoral-colitis with perisigmoid Lymphadenopathy   #Constipation  - Failed outpt therapy [10-day course of cipro+flagyl (end date 22)]   - CT A+P- findings consistent with unchanged diverticulitis/colitis + diverticula, pericolonic infiltrative changes. Adjacent small bowel loops have wall thickening as well   - Loose stools likely from overflow leaking from constipation demonstrated on CT A+P  - C/w senna + miralax for constipation  - C/w IV Ceftriaxone 2g q24H and PO Flagyl 500 mg TID (end date: 22) upon discharge per ID recs  - Midline placed in [place] (22)  - Per GI recs,  surgery consult & low fiber diet as tolerated  - Per surgery, no acute intervention at this time  - Outpt colonoscopy in 6-8 weeks upon d/c. Patient has not had a colonoscopy in > 10 years.   - Can follow up with surgery Dr. Isaacs as an outpatient   - KUB performed 22 to assess stool burden: non-obstructing gas pattern, FOS  - Started on PO Zofran and transitioned to IV Zofran prn 2/2 vomiting (will order EKG if given more than 3 doses).  - : QTc 537, TWI on EKG, f/u trop. pt with N/V, switched zofran to chlorpromazine 25mg  - s/p IVF NS  - 22: c/o worsening abd pain, unable to tolerate diet  - CT A/P (22): Stable rectosigmoid diverticulitis, no abscess.    #TWI on EKG  - : Repeat EKG showed TWI in lead II, III, and AVF.   - : Trop <0.01 x 1    #COVID-19, asymptomatic, incidental  - VS stable.   - No need for steroids.   - Cont monitor    #HTN  - well controlled without medications    #Misc  -DVT PPx: Lovenox  -GI PPx: Protonix.   -Diet: Low Fiber diet  -Code: Full  -Dispo: Pending NH placement for IV abx administration

## 2022-07-03 NOTE — CONSULT NOTE ADULT - SUBJECTIVE AND OBJECTIVE BOX
UROLOGY CONSULT:     78 yo F with Dementia and HTN a/w severe rectosigmoid diverticulitis and urinary retention with COVID -  c/s for urinary retetion with large urine output. Patient seen and examined at bedside. Patient reports she had severe abdominal pain in LLQ. Patient denies any frequency, urgency, incomplete bladder emptying, nocturia, dysuria, hematuria. Garcia was placed with UO of 1.3 L of yellow urine. She reports she does not follow an urologist. Garcia placed at 11:30am today.       HPI:  78yo F PMHx of dementia and HTN presents to the ED with complaints of abdominal pain.     Patient had a recent hospital course - last month for diverticulitis, and prescribed a 10-day course of cipro + flagyl to complete on 22. Abdominal pain started yesterday. States it is sharp, severe, on the left lower abdomen. Per patient's daughter, patient is compliant with taking her antibiotics (in the AM). Denies nausea/vomiting. Endorses constipation, but has been having frequent small loose brown-colored stools. Patient has decreased PO intake, but has no issues with  swallowing.     Otherwise, ROS (-). Denies fever, shortness of breath, chest pain, dysuria, myalgias, back pain.     At the ED, patient is afebrile, VS stable, on RA. Labs significant for COVID-19 (+), Hb- 9.8 (appears chronic). LFTs wnl. CT A+P shows unchanged severe rectosigmoid wall thickening with pericolonic infiltrative changes + diverticuli + small bowel thickening, large stool burden without drainable fluid collection, no obstruction noted.        (2022 16:06)      PAST MEDICAL & SURGICAL HISTORY:  Hypertension, unspecified type      H/O dilation and curettage  for missed           MEDICATIONS  (STANDING):  donepezil 5 milliGRAM(s) Oral at bedtime  enoxaparin Injectable 40 milliGRAM(s) SubCutaneous every 24 hours  lactated ringers. 1000 milliLiter(s) (90 mL/Hr) IV Continuous <Continuous>  melatonin 10 milliGRAM(s) Oral at bedtime  pantoprazole    Tablet 40 milliGRAM(s) Oral before breakfast  piperacillin/tazobactam IVPB.. 3.375 Gram(s) IV Intermittent every 8 hours  simethicone 80 milliGRAM(s) Chew three times a day  sodium chloride 0.9%. 1000 milliLiter(s) (80 mL/Hr) IV Continuous <Continuous>    MEDICATIONS  (PRN):  acetaminophen     Tablet .. 650 milliGRAM(s) Oral every 6 hours PRN Moderate Pain (4 - 6)  morphine  - Injectable 2 milliGRAM(s) IV Push every 4 hours PRN Severe Pain (7 - 10)      Allergies    No Known Allergies    Intolerances        SOCIAL HISTORY: No illicit drug use    FAMILY HISTORY:  Patient unable to provide medical history        REVIEW OF SYSTEMS:  [ ] A ten-point review of systems was otherwise negative except as noted.     Vital Signs Last 24 Hrs  T(C): 36.8 (2022 13:53), Max: 38.1 (2022 20:46)  T(F): 98.2 (2022 13:53), Max: 100.5 (2022 20:46)  HR: 70 (2022 13:53) (70 - 112)  BP: 122/66 (2022 13:53) (122/66 - 126/69)  RR: 18 (2022 13:53) (18 - 18)  SpO2: 96% (2022 05:00) (96% - 98%)    PHYSICAL EXAM:    GEN: NAD, well-developed, awake and alert.  SKIN: Good color, non diaphoretic.  HEENT: NC/AT.  RESP: No dyspnea, non-labored breathing. No use of accessory muscles.  CARDIO: +S1/S2  ABDO: Firm, distended, non-tender to palpation, no palpable bladder, no suprapubic tenderness  BACK: No CVAT B/L  : + 16 fr garcia in place draining yellow urine, 135cc.       I&O's Summary    2022 07:01  -  2022 15:17  --------------------------------------------------------  IN: 0 mL / OUT: 1300 mL / NET: -1300 mL        LABS:                        10.4   8.47  )-----------( 341      ( 2022 09:25 )             32.6     07-    132<L>  |  99  |  5<L>  ----------------------------<  109<H>  5.0   |  26  |  0.5<L>    Ca    8.3<L>      2022 09:25  Mg     1.9     07-    TPro  5.7<L>  /  Alb  2.8<L>  /  TBili  0.2  /  DBili  x   /  AST  27  /  ALT  16  /  AlkPhos  72  07-03    PT/INR - ( 2022 09:25 )   PT: 16.40 sec;   INR: 1.43 ratio         PTT - ( 2022 09:25 )  PTT:29.3 sec          RADIOLOGY & ADDITIONAL STUDIES:  < from: CT Abdomen and Pelvis w/ IV Cont (22 @ 15:36) >  ACC: [id number] EXAM:  CT ABDOMEN AND PELVIS IC                          PROCEDURE DATE:  2022          INTERPRETATION:  CLINICAL STATEMENT: Recurrent diverticulitis.    TECHNIQUE: Contiguous axial CT images were obtained from the lower chest   to the pubic symphysis following administration of 95 cc Optiray 350   intravenous contrast.  Oral contrast was not administered.  Reformatted   images in the coronal and sagittal planes were acquired.    COMPARISON CT: CT of the and pelvis dated 2022    FINDINGS:    LOWER CHEST: Bibasilar atelectasis.    HEPATOBILIARY: Unremarkable.    SPLEEN: Splenic hypodensities too small to characterize.    PANCREAS: Unremarkable.    ADRENAL GLANDS: Unremarkable.    KIDNEYS: Symmetric enhancement no obstructing calculus or hydronephrosis.   Subcentimeter hypodensities too small to characterize.    ABDOMINOPELVIC NODES: Unremarkable.    PELVIC ORGANS: Distended bladder. Myomatous uterus.    PERITONEUM/MESENTERY/BOWEL: Hiatal hernia. Redemonstration of severe   rectosigmoid wall thickening with pericolonic infiltrative changes and   diverticuli. No focal fluid collection. No evidence for bowel   obstruction. No pneumoperitoneum. Small amount of pelvic free fluid.    BONES/SOFT TISSUES: Degenerative changes of the spine. Grade 1   anterolisthesis of L4 and L5.    OTHER: Infrarenal IVC filter present.    IMPRESSION:    Unchanged rectosigmoid diverticulitis. No focal fluid collection.    --- End of Report ---    STEVEN BROWN MD; Attending Radiologist  This document has been electronically signed. 2022  3:50PM    < end of copied text >

## 2022-07-03 NOTE — CONSULT NOTE ADULT - REASON FOR ADMISSION
abdominal pain, rectosigmoid diverticulitis

## 2022-07-04 PROCEDURE — 99233 SBSQ HOSP IP/OBS HIGH 50: CPT

## 2022-07-04 RX ADMIN — PIPERACILLIN AND TAZOBACTAM 25 GRAM(S): 4; .5 INJECTION, POWDER, LYOPHILIZED, FOR SOLUTION INTRAVENOUS at 06:48

## 2022-07-04 RX ADMIN — DONEPEZIL HYDROCHLORIDE 5 MILLIGRAM(S): 10 TABLET, FILM COATED ORAL at 21:26

## 2022-07-04 RX ADMIN — Medication 10 MILLIGRAM(S): at 21:26

## 2022-07-04 RX ADMIN — SIMETHICONE 80 MILLIGRAM(S): 80 TABLET, CHEWABLE ORAL at 21:26

## 2022-07-04 RX ADMIN — SIMETHICONE 80 MILLIGRAM(S): 80 TABLET, CHEWABLE ORAL at 15:11

## 2022-07-04 RX ADMIN — PIPERACILLIN AND TAZOBACTAM 25 GRAM(S): 4; .5 INJECTION, POWDER, LYOPHILIZED, FOR SOLUTION INTRAVENOUS at 21:27

## 2022-07-04 RX ADMIN — ENOXAPARIN SODIUM 40 MILLIGRAM(S): 100 INJECTION SUBCUTANEOUS at 11:34

## 2022-07-04 RX ADMIN — PIPERACILLIN AND TAZOBACTAM 25 GRAM(S): 4; .5 INJECTION, POWDER, LYOPHILIZED, FOR SOLUTION INTRAVENOUS at 14:00

## 2022-07-04 RX ADMIN — Medication 10 MILLIGRAM(S): at 15:16

## 2022-07-04 RX ADMIN — SODIUM CHLORIDE 130 MILLILITER(S): 9 INJECTION, SOLUTION INTRAVENOUS at 23:44

## 2022-07-04 NOTE — PROGRESS NOTE ADULT - SUBJECTIVE AND OBJECTIVE BOX
KEISHA PANTOJA 77y Female  MRN#: [id number]     Hospital Day: 8d    Pt is currently admitted with the primary diagnosis of diverticulitis     SUBJECTIVE  No acute events overnight, pt seen and examined this am, no complaints.                                          ----------------------------------------------------------  OBJECTIVE  PAST MEDICAL & SURGICAL HISTORY  Hypertension, unspecified type    H/O dilation and curettage  for missed                                               -----------------------------------------------------------  ALLERGIES:  No Known Allergies                                            ------------------------------------------------------------    HOME MEDICATIONS  Home Medications:                           MEDICATIONS:  STANDING MEDICATIONS  donepezil 5 milliGRAM(s) Oral at bedtime  enoxaparin Injectable 40 milliGRAM(s) SubCutaneous every 24 hours  lactated ringers. 1000 milliLiter(s) IV Continuous <Continuous>  lactated ringers. 1000 milliLiter(s) IV Continuous <Continuous>  lactated ringers. 500 milliLiter(s) IV Continuous <Continuous>  melatonin 10 milliGRAM(s) Oral at bedtime  pantoprazole    Tablet 40 milliGRAM(s) Oral before breakfast  piperacillin/tazobactam IVPB.. 3.375 Gram(s) IV Intermittent every 8 hours  simethicone 80 milliGRAM(s) Chew three times a day    PRN MEDICATIONS  acetaminophen     Tablet .. 650 milliGRAM(s) Oral every 6 hours PRN  morphine  - Injectable 2 milliGRAM(s) IV Push every 4 hours PRN                                            ------------------------------------------------------------  VITAL SIGNS: Last 24 Hours  T(C): 36.9 (2022 05:00), Max: 37.1 (2022 20:00)  T(F): 98.5 (2022 05:00), Max: 98.8 (2022 20:00)  HR: 82 (2022 05:00) (70 - 89)  BP: 118/62 (2022 05:00) (115/63 - 122/66)  BP(mean): --  RR: 18 (2022 05:00) (18 - 18)  SpO2: 96% (2022 05:00) (96% - 100%)      22 @ 07:01  -  22 @ 07:00  --------------------------------------------------------  IN: 0 mL / OUT: 1500 mL / NET: -1500 mL                                             --------------------------------------------------------------  LABS:                        10.4   8.47  )-----------( 341      ( 2022 09:25 )             32.6     07-03    132<L>  |  99  |  5<L>  ----------------------------<  109<H>  5.0   |  26  |  0.5<L>    Ca    8.3<L>      2022 09:25    TPro  5.7<L>  /  Alb  2.8<L>  /  TBili  0.2  /  DBili  x   /  AST  27  /  ALT  16  /  AlkPhos  72  07-03    PT/INR - ( 2022 09:25 )   PT: 16.40 sec;   INR: 1.43 ratio         PTT - ( 2022 09:25 )  PTT:29.3 sec                PHYSICAL EXAM:  GENERAL: NAD, lying in bed comfortably  HEAD:  Atraumatic, Normocephalic  CHEST/LUNG: Clear to auscultation bilaterally; No rales, rhonchi, wheezing, or rubs. Unlabored respirations  HEART: Regular rate and rhythm; No murmurs, rubs, or gallops  ABDOMEN: Soft, minimal tenderness and distention  EXTREMITIES:  No clubbing, cyanosis, or edema  NERVOUS SYSTEM:  nonfocal                                                --------------------------------------------------------------

## 2022-07-04 NOTE — PROGRESS NOTE ADULT - SUBJECTIVE AND OBJECTIVE BOX
GENERAL SURGERY PROGRESS NOTE    Patient: KEISHA PANTOJA , 77y (43)Female   MRN: [id number]  Location: [address]  Visit: 22 Inpatient  Date: 22 @ 04:48    Hospital Day #: 9    Procedure/Dx/Injuries: rectosigmoid diverticulitis    Events of past 24 hours: patient had worsening abdominal pain yesterday morning (7/3), found to be in urinary retention. garcia cathter placed, 1300 mL output on insertion with significant resolution of abdominal pain and improved physical exam     PAST MEDICAL & SURGICAL HISTORY:  Hypertension, unspecified type  H/O dilation and curettage  for missed     Vitals:   T(F): 98.8 (22 @ 20:00), Max: 98.8 (22 @ 20:00)  HR: 89 (22 @ 20:00)  BP: 115/63 (22 @ 20:00)  RR: 18 (22 @ 20:00)  SpO2: 100% (22 @ 20:00)    Diet, Clear Liquid    Fluids: lactated ringers.: Solution, 500 milliLiter(s) infuse at 130 mL/Hr  lactated ringers.: Solution, 1000 milliLiter(s) infuse at 130 mL/Hr  lactated ringers.: Solution, 1000 milliLiter(s) infuse at 130 mL/Hr    I & O's:    Bowel Movement: : [] YES [] NO  Flatus: : [] YES [] NO    PHYSICAL EXAM:  General: NAD  Cardiac: RRR S1, S2  Respiratory:normal respiratory effort, breath sounds equal BL  Abdomen: Soft, mildly-distended, positive tenderness, improved from previous exam    MEDICATIONS  (STANDING):  donepezil 5 milliGRAM(s) Oral at bedtime  enoxaparin Injectable 40 milliGRAM(s) SubCutaneous every 24 hours  lactated ringers. 1000 milliLiter(s) (130 mL/Hr) IV Continuous <Continuous>  lactated ringers. 1000 milliLiter(s) (130 mL/Hr) IV Continuous <Continuous>  lactated ringers. 500 milliLiter(s) (130 mL/Hr) IV Continuous <Continuous>  melatonin 10 milliGRAM(s) Oral at bedtime  pantoprazole    Tablet 40 milliGRAM(s) Oral before breakfast  piperacillin/tazobactam IVPB.. 3.375 Gram(s) IV Intermittent every 8 hours  simethicone 80 milliGRAM(s) Chew three times a day    MEDICATIONS  (PRN):  acetaminophen     Tablet .. 650 milliGRAM(s) Oral every 6 hours PRN Moderate Pain (4 - 6)  morphine  - Injectable 2 milliGRAM(s) IV Push every 4 hours PRN Severe Pain (7 - 10)    DVT PROPHYLAXIS: enoxaparin Injectable 40 milliGRAM(s) SubCutaneous every 24 hours    GI PROPHYLAXIS: pantoprazole    Tablet 40 milliGRAM(s) Oral before breakfast    ANTICOAGULATION:   ANTIBIOTICS:  piperacillin/tazobactam IVPB.. 3.375 Gram(s)    LAB/STUDIES:  Labs:  CAPILLARY BLOOD GLUCOSE                        10.4   8.47  )-----------( 341      ( 2022 09:25 )             32.6           132<L>  |  99  |  5<L>  ----------------------------<  109<H>  5.0   |  26  |  0.5<L>    Calcium, Total Serum: 8.3 mg/dL (22 @ 09:25)    LFTs:             5.7  | 0.2  | 27       ------------------[72      ( 2022 09:25 )  2.8  | x    | 16          Lipase:x      Amylase:x        Coags:     16.40  ----< 1.43    ( 2022 09:25 )     29.3      IMAGING:  Xray Chest 1 View-PORTABLE IMMEDIATE (Xray Chest 1 View-PORTABLE IMMEDIATE .) (22 @ 09:32) >  Findings:  Support devices: None.  Cardiac/mediastinum/hilum: The cardiac silhouette is normal in size.  Lung parenchyma/Pleura: There is no evidence of focal consolidation,   pleural effusion or pneumothorax.  Skeleton/soft tissues: No evidence of acute osseous abnormality.    Impression:  There is no evidence of focal consolidation, pleural effusion or   pneumothorax

## 2022-07-04 NOTE — PROGRESS NOTE ADULT - ASSESSMENT
78yo F PMHx of dementia and HTN, recent hospital course for diverticulitis presents to the ED with complaints of abdominal pain consistent with diverticulitis.    #Acute diverticulitis v. Stercoral-colitis with perisigmoid Lymphadenopathy   #Constipation  - Failed outpt therapy [10-day course of cipro+flagyl (end date 7/2/22)]   - CT A+P- findings consistent with unchanged diverticulitis/colitis + diverticula, pericolonic infiltrative changes. Adjacent small bowel loops have wall thickening as well   - Loose stools likely from overflow leaking from constipation demonstrated on CT A+P  - C/w senna + miralax for constipation  - C/w zosyn  - Midline placed in LA (6/28/22)  - Per GI recs,  surgery consult & low fiber diet as tolerated  - Per surgery, no acute intervention at this time  - Outpt colonoscopy in 6-8 weeks upon d/c. Patient has not had a colonoscopy in > 10 years.   - Can follow up with surgery Dr. Isaacs as an outpatient   - KUB performed 6/30/22 to assess stool burden: non-obstructing gas pattern, FOS  - Started on PO Zofran and transitioned to IV Zofran prn 2/2 vomiting (will order EKG if given more than 3 doses).  - 7/1: QTc 537, TWI on EKG, f/u trop. pt with N/V, switched zofran to chlorpromazine 25mg  - 7/2/22: c/o worsening abd pain, unable to tolerate diet  - CT A/P (7/2/22): Stable rectosigmoid diverticulitis, no abscess.    #TWI on EKG  - 7/1: Repeat EKG showed TWI in lead II, III, and AVF.   - 7/2: Trop <0.01 x 1    #COVID-19, asymptomatic, incidental  - VS stable.   - No need for steroids.   - Cont monitor    #HTN  - well controlled without medications    #Misc  -DVT PPx: Lovenox  -GI PPx: Protonix.   -Diet: clears  -Code: Full

## 2022-07-04 NOTE — PROGRESS NOTE ADULT - ASSESSMENT
77y F admitted with diverticulitis    PLAN:  -Monitor for worsening abdominal pain  -Monitor garcia output  -Zosyn for IV antibiotic coverage   -pain control  -DVT/GI prophylaxis  -SCDs, IS  -encourage ambulation  -Patient and plan discussed with Dr. Isaacs     [phone]

## 2022-07-05 LAB
ALBUMIN SERPL ELPH-MCNC: 2.5 G/DL — LOW (ref 3.5–5.2)
ALP SERPL-CCNC: 64 U/L — SIGNIFICANT CHANGE UP (ref 30–115)
ALT FLD-CCNC: 12 U/L — SIGNIFICANT CHANGE UP (ref 0–41)
ANION GAP SERPL CALC-SCNC: 6 MMOL/L — LOW (ref 7–14)
AST SERPL-CCNC: 13 U/L — SIGNIFICANT CHANGE UP (ref 0–41)
BASOPHILS # BLD AUTO: 0.05 K/UL — SIGNIFICANT CHANGE UP (ref 0–0.2)
BASOPHILS NFR BLD AUTO: 1.1 % — HIGH (ref 0–1)
BILIRUB SERPL-MCNC: 0.2 MG/DL — SIGNIFICANT CHANGE UP (ref 0.2–1.2)
BUN SERPL-MCNC: 3 MG/DL — LOW (ref 10–20)
CALCIUM SERPL-MCNC: 8.8 MG/DL — SIGNIFICANT CHANGE UP (ref 8.5–10.1)
CHLORIDE SERPL-SCNC: 106 MMOL/L — SIGNIFICANT CHANGE UP (ref 98–110)
CO2 SERPL-SCNC: 29 MMOL/L — SIGNIFICANT CHANGE UP (ref 17–32)
CREAT SERPL-MCNC: 0.5 MG/DL — LOW (ref 0.7–1.5)
EGFR: 96 ML/MIN/1.73M2 — SIGNIFICANT CHANGE UP
EOSINOPHIL # BLD AUTO: 0.32 K/UL — SIGNIFICANT CHANGE UP (ref 0–0.7)
EOSINOPHIL NFR BLD AUTO: 6.9 % — SIGNIFICANT CHANGE UP (ref 0–8)
GLUCOSE SERPL-MCNC: 89 MG/DL — SIGNIFICANT CHANGE UP (ref 70–99)
HCT VFR BLD CALC: 32 % — LOW (ref 37–47)
HGB BLD-MCNC: 10.2 G/DL — LOW (ref 12–16)
IMM GRANULOCYTES NFR BLD AUTO: 1.7 % — HIGH (ref 0.1–0.3)
LACTATE SERPL-SCNC: 0.6 MMOL/L — LOW (ref 0.7–2)
LYMPHOCYTES # BLD AUTO: 0.95 K/UL — LOW (ref 1.2–3.4)
LYMPHOCYTES # BLD AUTO: 20.3 % — LOW (ref 20.5–51.1)
MAGNESIUM SERPL-MCNC: 2.1 MG/DL — SIGNIFICANT CHANGE UP (ref 1.8–2.4)
MCHC RBC-ENTMCNC: 26.1 PG — LOW (ref 27–31)
MCHC RBC-ENTMCNC: 31.9 G/DL — LOW (ref 32–37)
MCV RBC AUTO: 81.8 FL — SIGNIFICANT CHANGE UP (ref 81–99)
MONOCYTES # BLD AUTO: 0.56 K/UL — SIGNIFICANT CHANGE UP (ref 0.1–0.6)
MONOCYTES NFR BLD AUTO: 12 % — HIGH (ref 1.7–9.3)
NEUTROPHILS # BLD AUTO: 2.71 K/UL — SIGNIFICANT CHANGE UP (ref 1.4–6.5)
NEUTROPHILS NFR BLD AUTO: 58 % — SIGNIFICANT CHANGE UP (ref 42.2–75.2)
NRBC # BLD: 0 /100 WBCS — SIGNIFICANT CHANGE UP (ref 0–0)
PLATELET # BLD AUTO: 360 K/UL — SIGNIFICANT CHANGE UP (ref 130–400)
POTASSIUM SERPL-MCNC: 4.3 MMOL/L — SIGNIFICANT CHANGE UP (ref 3.5–5)
POTASSIUM SERPL-SCNC: 4.3 MMOL/L — SIGNIFICANT CHANGE UP (ref 3.5–5)
PROT SERPL-MCNC: 5.8 G/DL — LOW (ref 6–8)
RBC # BLD: 3.91 M/UL — LOW (ref 4.2–5.4)
RBC # FLD: 15.2 % — HIGH (ref 11.5–14.5)
SODIUM SERPL-SCNC: 141 MMOL/L — SIGNIFICANT CHANGE UP (ref 135–146)
TRIGL SERPL-MCNC: 76 MG/DL — SIGNIFICANT CHANGE UP
WBC # BLD: 4.67 K/UL — LOW (ref 4.8–10.8)
WBC # FLD AUTO: 4.67 K/UL — LOW (ref 4.8–10.8)

## 2022-07-05 PROCEDURE — 74018 RADEX ABDOMEN 1 VIEW: CPT | Mod: 26

## 2022-07-05 PROCEDURE — 99233 SBSQ HOSP IP/OBS HIGH 50: CPT

## 2022-07-05 RX ORDER — I.V. FAT EMULSION 20 G/100ML
1.58 EMULSION INTRAVENOUS
Qty: 75 | Refills: 0 | Status: DISCONTINUED | OUTPATIENT
Start: 2022-07-05 | End: 2022-07-05

## 2022-07-05 RX ORDER — KETOROLAC TROMETHAMINE 30 MG/ML
15 SYRINGE (ML) INJECTION ONCE
Refills: 0 | Status: DISCONTINUED | OUTPATIENT
Start: 2022-07-05 | End: 2022-07-05

## 2022-07-05 RX ORDER — ONDANSETRON 8 MG/1
4 TABLET, FILM COATED ORAL ONCE
Refills: 0 | Status: DISCONTINUED | OUTPATIENT
Start: 2022-07-05 | End: 2022-07-09

## 2022-07-05 RX ORDER — ELECTROLYTE SOLUTION,INJ
1 VIAL (ML) INTRAVENOUS
Refills: 0 | Status: DISCONTINUED | OUTPATIENT
Start: 2022-07-05 | End: 2022-07-05

## 2022-07-05 RX ADMIN — PANTOPRAZOLE SODIUM 40 MILLIGRAM(S): 20 TABLET, DELAYED RELEASE ORAL at 05:51

## 2022-07-05 RX ADMIN — SIMETHICONE 80 MILLIGRAM(S): 80 TABLET, CHEWABLE ORAL at 15:25

## 2022-07-05 RX ADMIN — ENOXAPARIN SODIUM 40 MILLIGRAM(S): 100 INJECTION SUBCUTANEOUS at 11:40

## 2022-07-05 RX ADMIN — PIPERACILLIN AND TAZOBACTAM 25 GRAM(S): 4; .5 INJECTION, POWDER, LYOPHILIZED, FOR SOLUTION INTRAVENOUS at 21:27

## 2022-07-05 RX ADMIN — SIMETHICONE 80 MILLIGRAM(S): 80 TABLET, CHEWABLE ORAL at 05:51

## 2022-07-05 RX ADMIN — Medication 10 MILLIGRAM(S): at 21:28

## 2022-07-05 RX ADMIN — SODIUM CHLORIDE 130 MILLILITER(S): 9 INJECTION, SOLUTION INTRAVENOUS at 08:03

## 2022-07-05 RX ADMIN — Medication 15 MILLIGRAM(S): at 14:02

## 2022-07-05 RX ADMIN — SODIUM CHLORIDE 130 MILLILITER(S): 9 INJECTION, SOLUTION INTRAVENOUS at 16:49

## 2022-07-05 RX ADMIN — SIMETHICONE 80 MILLIGRAM(S): 80 TABLET, CHEWABLE ORAL at 21:28

## 2022-07-05 RX ADMIN — Medication 15 MILLIGRAM(S): at 14:32

## 2022-07-05 RX ADMIN — I.V. FAT EMULSION 23.4 GM/KG/DAY: 20 EMULSION INTRAVENOUS at 21:27

## 2022-07-05 RX ADMIN — Medication 1 EACH: at 21:27

## 2022-07-05 RX ADMIN — PIPERACILLIN AND TAZOBACTAM 25 GRAM(S): 4; .5 INJECTION, POWDER, LYOPHILIZED, FOR SOLUTION INTRAVENOUS at 15:25

## 2022-07-05 RX ADMIN — DONEPEZIL HYDROCHLORIDE 5 MILLIGRAM(S): 10 TABLET, FILM COATED ORAL at 21:28

## 2022-07-05 RX ADMIN — PIPERACILLIN AND TAZOBACTAM 25 GRAM(S): 4; .5 INJECTION, POWDER, LYOPHILIZED, FOR SOLUTION INTRAVENOUS at 05:51

## 2022-07-05 NOTE — CHART NOTE - NSCHARTNOTEFT_GEN_A_CORE
NUTRITION SUPPORT CONSULTATION    HPI:  78yo F PMHx of dementia and HTN presents to the ED with complaints of abdominal pain.     Patient had a recent hospital course - last month for diverticulitis, and prescribed a 10-day course of cipro + flagyl to complete on 22. Abdominal pain started yesterday. States it is sharp, severe, on the left lower abdomen. Per patient's daughter, patient is compliant with taking her antibiotics (in the AM). Denies nausea/vomiting. Endorses constipation, but has been having frequent small loose brown-colored stools. Patient has decreased PO intake, but has no issues with  swallowing.     Otherwise, ROS (-). Denies fever, shortness of breath, chest pain, dysuria, myalgias, back pain.     At the ED, patient is afebrile, VS stable, on RA. Labs significant for COVID-19 (+), Hb- 9.8 (appears chronic). LFTs wnl. CT A+P shows unchanged severe rectosigmoid wall thickening with pericolonic infiltrative changes + diverticuli + small bowel thickening, large stool burden without drainable fluid collection, no obstruction noted.   (2022 16:06)      PAST MEDICAL & SURGICAL HISTORY:  Hypertension, unspecified type  H/O dilation and curettage  for missed     Allergies  No Known Allergies    MEDICATIONS  (STANDING):  donepezil 5 milliGRAM(s) Oral at bedtime  enoxaparin Injectable 40 milliGRAM(s) SubCutaneous every 24 hours  lactated ringers. 1000 milliLiter(s) (130 mL/Hr) IV Continuous <Continuous>  melatonin 10 milliGRAM(s) Oral at bedtime  ondansetron Injectable 4 milliGRAM(s) IV Push once  pantoprazole    Tablet 40 milliGRAM(s) Oral before breakfast  piperacillin/tazobactam IVPB.. 3.375 Gram(s) IV Intermittent every 8 hours  simethicone 80 milliGRAM(s) Chew three times a day    MEDICATIONS  (PRN):  acetaminophen     Tablet .. 650 milliGRAM(s) Oral every 6 hours PRN Moderate Pain (4 - 6)    ICU Vital Signs Last 24 Hrs  T(C): 35.9 (2022 13:00), Max: 36.8 (2022 19:56)  T(F): 96.7 (2022 13:00), Max: 98.2 (2022 19:56)  HR: 66 (2022 13:00) (66 - 82)  BP: 149/71 (2022 13:00) (136/67 - 149/71)  RR: 18 (2022 13:00) (18 - 18)  SpO2: 97% (2022 19:56) (97% - 97%)    Drug Dosing Weight  Height (cm): 167.6 (2022 08:29)  Weight (kg): 47.5 (2022 08:29)  BMI (kg/m2): 16.9 (2022 08:29)  BSA (m2): 1.52 (2022 08:29)        EXAM     Abd:    LE:   Enteral access: none  IV access: midline cath    LABS      141  |  106  |  3<L>  ----------------------------<  89  4.3   |  29  |  0.5<L>    Ca    8.8      2022 08:31  Mg     2.1     07-05  Triglycerides, Serum: 76 mg/dL (22 @ 14:50)    TPro  5.8<L>  /  Alb  2.5<L>  /  TBili  0.2  /  DBili  x   /  AST  13  /  ALT  12  /  AlkPhos  64  07-05                        10.2   4.67  )-----------( 360      ( 2022 08:31 )             32.0     Radiology:  < from: Xray Abdomen 1 View PORTABLE -Urgent (Xray Abdomen 1 View PORTABLE -Urgent .) (22 @ 14:06) >    ACC: [id number] EXAM:  XR ABDOMEN PORTABLE URGENT 1V                        PROCEDURE DATE:  2022      INTERPRETATION:  Clinical History / Reason for exam: Abdominal distention    Supine view the abdomen was obtained. There is a nonobstructive bowel gas   pattern with mild fecal retention.    There is scoliosis with degenerative changes. IVC filter is noted.    IMPRESSION:    Nonspecific nonobstructive bowel gas pattern with mild fecal retention.    --- End of Report ---  < end of copied text >      < from: CT Abdomen and Pelvis w/ IV Cont (22 @ 15:36) >  ACC: [id number] EXAM:  CT ABDOMEN AND PELVIS IC                          PROCEDURE DATE:  2022      INTERPRETATION:  CLINICAL STATEMENT: Recurrent diverticulitis.    TECHNIQUE: Contiguous axial CT images were obtained from the lower chest   to the pubic symphysis following administration of 95 cc Optiray 350   intravenous contrast.  Oral contrast was not administered.  Reformatted   images in the coronal and sagittal planes were acquired.    COMPARISON CT: CT of the and pelvis dated 2022    FINDINGS:    LOWER CHEST: Bibasilar atelectasis.    HEPATOBILIARY: Unremarkable.    SPLEEN: Splenic hypodensities too small to characterize.    PANCREAS: Unremarkable.    ADRENAL GLANDS: Unremarkable.    KIDNEYS: Symmetric enhancement no obstructing calculus or hydronephrosis.   Subcentimeter hypodensities too small to characterize.    ABDOMINOPELVIC NODES: Unremarkable.    PELVIC ORGANS: Distended bladder. Myomatous uterus.    PERITONEUM/MESENTERY/BOWEL: Hiatal hernia. Redemonstration of severe   rectosigmoid wall thickening with pericolonic infiltrative changes and   diverticuli. No focal fluid collection. No evidence for bowel   obstruction. No pneumoperitoneum. Small amount of pelvic free fluid.    BONES/SOFT TISSUES: Degenerative changes of the spine. Grade 1   anterolisthesis of L4 and L5.    OTHER: Infrarenal IVC filter present.    IMPRESSION:    Unchanged rectosigmoid diverticulitis. No focal fluid collection.    --- End of Report ---  < end of copied text >    Diet, Full Liquid:   Fiber/Residue Restricted (22 @ 14:45)    ASSESSMENT  78yo F PMHx of dementia and HTN, recent hospital course for diverticulitis presents to the ED with complaints of abdominal pain consistent with diverticulitis.    - Acute diverticulitis v. Stercoral-colitis with perisigmoid Lymphadenopathy   - Constipation  - COVID-19, asymptomatic, incidental  - underweight, BMI 16.9    PLAN  - add baseline TG level to this am's bloodwork already drawn  - start PPN tonight via midline cath  - d/c IVF's when PPN begins  - daily bmp, in phos, mg while on parenteral nutrition  - uziel cts X 2d NUTRITION SUPPORT CONSULTATION    HPI:  78yo F PMHx of dementia and HTN presents to the ED with complaints of abdominal pain.     Patient had a recent hospital course - last month for diverticulitis, and prescribed a 10-day course of cipro + flagyl to complete on 22. Abdominal pain started yesterday. States it is sharp, severe, on the left lower abdomen. Per patient's daughter, patient is compliant with taking her antibiotics (in the AM). Denies nausea/vomiting. Endorses constipation, but has been having frequent small loose brown-colored stools. Patient has decreased PO intake, but has no issues with  swallowing.     Otherwise, ROS (-). Denies fever, shortness of breath, chest pain, dysuria, myalgias, back pain.     At the ED, patient is afebrile, VS stable, on RA. Labs significant for COVID-19 (+), Hb- 9.8 (appears chronic). LFTs wnl. CT A+P shows unchanged severe rectosigmoid wall thickening with pericolonic infiltrative changes + diverticuli + small bowel thickening, large stool burden without drainable fluid collection, no obstruction noted.   (2022 16:06)    OOB ambulating to the bathroom with walker. + BM's X 2 today, PO intake minimal 2nd to pain, +LLQ. +vomiting earlier today. Prolonged NPO/inadequate PO intake. d/w medical resident, family at bedside and will start PPN tonight    PAST MEDICAL & SURGICAL HISTORY:  Hypertension, unspecified type  H/O dilation and curettage  for missed     Allergies  No Known Allergies    MEDICATIONS  (STANDING):  donepezil 5 milliGRAM(s) Oral at bedtime  enoxaparin Injectable 40 milliGRAM(s) SubCutaneous every 24 hours  lactated ringers. 1000 milliLiter(s) (130 mL/Hr) IV Continuous <Continuous>  melatonin 10 milliGRAM(s) Oral at bedtime  ondansetron Injectable 4 milliGRAM(s) IV Push once  pantoprazole    Tablet 40 milliGRAM(s) Oral before breakfast  piperacillin/tazobactam IVPB.. 3.375 Gram(s) IV Intermittent every 8 hours  simethicone 80 milliGRAM(s) Chew three times a day    MEDICATIONS  (PRN):  acetaminophen     Tablet .. 650 milliGRAM(s) Oral every 6 hours PRN Moderate Pain (4 - 6)    ICU Vital Signs Last 24 Hrs  T(C): 35.9 (2022 13:00), Max: 36.8 (2022 19:56)  T(F): 96.7 (2022 13:00), Max: 98.2 (2022 19:56)  HR: 66 (2022 13:00) (66 - 82)  BP: 149/71 (2022 13:00) (136/67 - 149/71)  RR: 18 (2022 13:00) (18 - 18)  SpO2: 97% (2022 19:56) (97% - 97%)    Drug Dosing Weight  Height (cm): 167.6 (2022 08:29)  Weight (kg): 47.5 (2022 08:29)  BMI (kg/m2): 16.9 (2022 08:29)  BSA (m2): 1.52 (2022 08:29)    Usual Wt 54.5kg ~ 3 months ago    EXAM  A&O X 1  Abd: soft, +LLQ tenderness  Skin: no breakdown  Enteral access: none  IV access: midline cath    LABS      141  |  106  |  3<L>  ----------------------------<  89  4.3   |  29  |  0.5<L>    Ca    8.8      2022 08:31  Mg     2.1     07-05  Triglycerides, Serum: 76 mg/dL (22 @ 14:50)    TPro  5.8<L>  /  Alb  2.5<L>  /  TBili  0.2  /  DBili  x   /  AST  13  /  ALT  12  /  AlkPhos  64  07-05                        10.2   4.67  )-----------( 360      ( 2022 08:31 )             32.0     Radiology:  < from: Xray Abdomen 1 View PORTABLE -Urgent (Xray Abdomen 1 View PORTABLE -Urgent .) (22 @ 14:06) >    ACC: [id number] EXAM:  XR ABDOMEN PORTABLE URGENT 1V                        PROCEDURE DATE:  2022      INTERPRETATION:  Clinical History / Reason for exam: Abdominal distention    Supine view the abdomen was obtained. There is a nonobstructive bowel gas   pattern with mild fecal retention.    There is scoliosis with degenerative changes. IVC filter is noted.    IMPRESSION:    Nonspecific nonobstructive bowel gas pattern with mild fecal retention.    --- End of Report ---  < end of copied text >      < from: CT Abdomen and Pelvis w/ IV Cont (22 @ 15:36) >  ACC: [id number] EXAM:  CT ABDOMEN AND PELVIS IC                          PROCEDURE DATE:  2022      INTERPRETATION:  CLINICAL STATEMENT: Recurrent diverticulitis.    TECHNIQUE: Contiguous axial CT images were obtained from the lower chest   to the pubic symphysis following administration of 95 cc Optiray 350   intravenous contrast.  Oral contrast was not administered.  Reformatted   images in the coronal and sagittal planes were acquired.    COMPARISON CT: CT of the and pelvis dated 2022    FINDINGS:    LOWER CHEST: Bibasilar atelectasis.    HEPATOBILIARY: Unremarkable.    SPLEEN: Splenic hypodensities too small to characterize.    PANCREAS: Unremarkable.    ADRENAL GLANDS: Unremarkable.    KIDNEYS: Symmetric enhancement no obstructing calculus or hydronephrosis.   Subcentimeter hypodensities too small to characterize.    ABDOMINOPELVIC NODES: Unremarkable.    PELVIC ORGANS: Distended bladder. Myomatous uterus.    PERITONEUM/MESENTERY/BOWEL: Hiatal hernia. Redemonstration of severe   rectosigmoid wall thickening with pericolonic infiltrative changes and   diverticuli. No focal fluid collection. No evidence for bowel   obstruction. No pneumoperitoneum. Small amount of pelvic free fluid.    BONES/SOFT TISSUES: Degenerative changes of the spine. Grade 1   anterolisthesis of L4 and L5.    OTHER: Infrarenal IVC filter present.    IMPRESSION:    Unchanged rectosigmoid diverticulitis. No focal fluid collection.    --- End of Report ---  < end of copied text >    Diet, Full Liquid:   Fiber/Residue Restricted (22 @ 14:45)    ASSESSMENT  78yo F PMHx of dementia and HTN, recent hospital course for diverticulitis presents to the ED with complaints of abdominal pain consistent with diverticulitis.    - Acute diverticulitis v. Stercoral-colitis with perisigmoid Lymphadenopathy   - Constipation  - COVID-19, asymptomatic, incidental  - underweight, BMI 16.9  - severe wt loss, ~13% over 3 months  - severe protein calorie malnutrition    PLAN  - add baseline TG level to this am's bloodwork already drawn  - start PPN tonight via midline cath  - d/c IVF's when PPN begins  - daily bmp, in phos, mg while on parenteral nutrition  - PO intake as tolerated  - add Ensure enlive 240ml PO tid, family reports she tolerated it well at home  - uziel cts X 2d  - will follow

## 2022-07-05 NOTE — CHART NOTE - NSCHARTNOTEFT_GEN_A_CORE
Calorie Count Results:    Day 1:   1025kcal, 44 g pro/day ** ensure was ordered this day which helped kcal/ pro intake**  Day 2:  200kcal, 3 g pro/day **breakfast was NPO, no documentation for dinner**  Day 3:  600kcal, 5 g pro/day **no ensure ordered**    Average intake per day:  608kcal/day  17g protein/day    Nutrition support team is following patient, refer to recommendations per NST    Les Montaño, RD  #[id number]

## 2022-07-05 NOTE — PROGRESS NOTE ADULT - SUBJECTIVE AND OBJECTIVE BOX
KEISHA PANTOJA 77y Female  MRN#: [id number]     Hospital Day: 9d    Pt is currently admitted with the primary diagnosis of diverticulitis     SUBJECTIVE  No acute events overnight, pt seen and examined this am, no complaints.                                          ----------------------------------------------------------  OBJECTIVE  PAST MEDICAL & SURGICAL HISTORY  Hypertension, unspecified type    H/O dilation and curettage  for missed                                               -----------------------------------------------------------  ALLERGIES:  No Known Allergies                                            ------------------------------------------------------------    HOME MEDICATIONS  Home Medications:                           MEDICATIONS:  STANDING MEDICATIONS  donepezil 5 milliGRAM(s) Oral at bedtime  enoxaparin Injectable 40 milliGRAM(s) SubCutaneous every 24 hours  lactated ringers. 1000 milliLiter(s) IV Continuous <Continuous>  melatonin 10 milliGRAM(s) Oral at bedtime  pantoprazole    Tablet 40 milliGRAM(s) Oral before breakfast  piperacillin/tazobactam IVPB.. 3.375 Gram(s) IV Intermittent every 8 hours  simethicone 80 milliGRAM(s) Chew three times a day    PRN MEDICATIONS  acetaminophen     Tablet .. 650 milliGRAM(s) Oral every 6 hours PRN                                            ------------------------------------------------------------  VITAL SIGNS: Last 24 Hours  T(C): 36.2 (2022 05:38), Max: 36.8 (2022 19:56)  T(F): 97.2 (2022 05:38), Max: 98.2 (2022 19:56)  HR: 66 (2022 05:38) (66 - 88)  BP: 136/67 (2022 05:38) (120/64 - 143/66)  BP(mean): --  RR: 18 (2022 05:38) (18 - 18)  SpO2: 97% (2022 19:56) (97% - 97%)      22 @ 07:01  -  22 @ 07:00  --------------------------------------------------------  IN: 1980 mL / OUT: 7000 mL / NET: -5020 mL    22 @ 07:01  -  22 @ 10:30  --------------------------------------------------------  IN: 700 mL / OUT: 775 mL / NET: -75 mL                                             --------------------------------------------------------------  LABS:                        10.2   4.67  )-----------( 360      ( 2022 08:31 )             32.0     07-05    141  |  106  |  3<L>  ----------------------------<  89  4.3   |  29  |  0.5<L>    Ca    8.8      2022 08:31  Mg     2.1     07-05    TPro  5.8<L>  /  Alb  2.5<L>  /  TBili  0.2  /  DBili  x   /  AST  13  /  ALT  12  /  AlkPhos  64  07-05          Lactate, Blood: 0.6 mmol/L *L* (22 @ 08:31)                PHYSICAL EXAM:  GENERAL: NAD, lying in bed comfortably  HEAD:  Atraumatic, Normocephalic  CHEST/LUNG: Clear to auscultation bilaterally; No rales, rhonchi, wheezing, or rubs. Unlabored respirations  HEART: Regular rate and rhythm; No murmurs, rubs, or gallops  ABDOMEN: Soft, mildly tender, nondistended   EXTREMITIES:  No clubbing, cyanosis, or edema  NERVOUS SYSTEM:  nonfocal                                                --------------------------------------------------------------

## 2022-07-05 NOTE — PROGRESS NOTE ADULT - ASSESSMENT
76yo F PMHx of dementia and HTN, recent hospital course for diverticulitis presents to the ED with complaints of abdominal pain consistent with diverticulitis.    #Acute diverticulitis v. Stercoral-colitis with perisigmoid Lymphadenopathy   #Constipation  - Failed outpt therapy [10-day course of cipro+flagyl (end date 7/2/22)]   - CT A+P- findings consistent with unchanged diverticulitis/colitis + diverticula, pericolonic infiltrative changes. Adjacent small bowel loops have wall thickening as well   - Loose stools likely from overflow leaking from constipation demonstrated on CT A+P  - C/w senna + miralax for constipation  - C/w zosyn  - Midline placed in LA (6/28/22)  - Per GI recs,  surgery consult & low fiber diet as tolerated  - Per surgery, no acute intervention at this time  - Outpt colonoscopy in 6-8 weeks upon d/c. Patient has not had a colonoscopy in > 10 years.   - Can follow up with surgery Dr. Isaacs as an outpatient   - KUB performed 6/30/22 to assess stool burden: non-obstructing gas pattern, FOS  - Started on PO Zofran and transitioned to IV Zofran prn 2/2 vomiting (will order EKG if given more than 3 doses).  - 7/1: QTc 537, TWI on EKG, f/u trop. pt with N/V, switched zofran to chlorpromazine 25mg  - 7/2/22: c/o worsening abd pain, unable to tolerate diet  - CT A/P (7/2/22): Stable rectosigmoid diverticulitis, no abscess.  -unable to tolerate diet, nutrition eval     #TWI on EKG  - 7/1: Repeat EKG showed TWI in lead II, III, and AVF.   - 7/2: Trop <0.01 x 1    #COVID-19, asymptomatic, incidental  - VS stable.   - No need for steroids.   - Cont monitor    #HTN  - well controlled without medications    #Misc  -DVT PPx: Lovenox  -GI PPx: Protonix.   -Diet: clears  -Code: Full

## 2022-07-05 NOTE — PROGRESS NOTE ADULT - ASSESSMENT
ASSESSMENT:  77y F admitted with diverticulitis    PLAN:  -Advance diet as tolerated  -Will require bowel regiment on discharge  -Monitor for worsening abdominal pain  -Monitor garcia output  -Zosyn for IV antibiotic coverage   -DVT/GI prophylaxis  -SCDs, IS  -encourage ambulation  -encourage incentive spirometry

## 2022-07-05 NOTE — PROGRESS NOTE ADULT - SUBJECTIVE AND OBJECTIVE BOX
GENERAL SURGERY PROGRESS NOTE    Patient: KEISHA PANTOJA , 77y (43)Female   MRN: [id number]  Location: [address]  Visit: 22 Inpatient  Date: 22 @ 17:10    Hospital Day #: 10    Procedure/Dx/Injuries: Diverticulitis    Events of past 24 hours: No acute events overnight. Vitals WNL. WBC 4.67 from 8.47.    PAST MEDICAL & SURGICAL HISTORY:  Hypertension, unspecified type    H/O dilation and curettage  for missed     Vitals:   T(F): 96.7 (22 @ 13:00), Max: 98.2 (22 @ 19:56)  HR: 66 (22 @ 13:00)  BP: 149/71 (22 @ 13:00)  RR: 18 (22 @ 13:00)  SpO2: 97% (22 @ 19:56)    Diet, Full Liquid:   Fiber/Residue Restricted    22 @ 07:01  -  22 @ 07:00  --------------------------------------------------------  IN:    Lactated Ringers: 1300 mL    Oral Fluid: 680 mL  Total IN: 1980 mL    OUT:    Indwelling Catheter - Urethral (mL): 7000 mL  Total OUT: 7000 mL    Total NET: -5020 mL    PHYSICAL EXAM:  General: NAD  Cardiac: RRR S1, S2  Respiratory: normal respiratory effort, breath sounds equal BL  Abdomen: Soft, mildly-distended, positive tenderness in LLQ    MEDICATIONS  (STANDING):  donepezil 5 milliGRAM(s) Oral at bedtime  enoxaparin Injectable 40 milliGRAM(s) SubCutaneous every 24 hours  fat emulsion (Plant Based) 20% Infusion 1.579 Gm/kG/Day (23.4 mL/Hr) IV Continuous <Continuous>  lactated ringers. 1000 milliLiter(s) (130 mL/Hr) IV Continuous <Continuous>  melatonin 10 milliGRAM(s) Oral at bedtime  ondansetron Injectable 4 milliGRAM(s) IV Push once  pantoprazole    Tablet 40 milliGRAM(s) Oral before breakfast  Parenteral Nutrition - Adult 1 Each (75 mL/Hr) TPN Continuous <Continuous>  piperacillin/tazobactam IVPB.. 3.375 Gram(s) IV Intermittent every 8 hours  simethicone 80 milliGRAM(s) Chew three times a day    MEDICATIONS  (PRN):  acetaminophen     Tablet .. 650 milliGRAM(s) Oral every 6 hours PRN Moderate Pain (4 - 6)    DVT PROPHYLAXIS: enoxaparin Injectable 40 milliGRAM(s) SubCutaneous every 24 hours    GI PROPHYLAXIS: pantoprazole    Tablet 40 milliGRAM(s) Oral before breakfast    ANTICOAGULATION:   ANTIBIOTICS:  piperacillin/tazobactam IVPB.. 3.375 Gram(s)    LAB/STUDIES:  Labs:  CAPILLARY BLOOD GLUCOSE                      10.2   4.67  )-----------( 360      ( 2022 08:31 )             32.0       Auto Neutrophil %: 58.0 % (22 @ 08:31)  Auto Immature Granulocyte %: 1.7 % (22 @ 08:31)        141  |  106  |  3<L>  ----------------------------<  89  4.3   |  29  |  0.5<L>      Calcium, Total Serum: 8.8 mg/dL (22 @ 08:31)    LFTs:             5.8  | 0.2  | 13       ------------------[64      ( 2022 08:31 )  2.5  | x    | 12          Lipase:x      Amylase:x         Lactate, Blood: 0.6 mmol/L (22 @ 08:31)      IMAGING:  < from: Xray Abdomen 1 View PORTABLE -Urgent (Xray Abdomen 1 View PORTABLE -Urgent .) (22 @ 14:06) >  IMPRESSION:  Nonspecific nonobstructive bowel gas pattern with mild fecal retention.  --- End of Report ---

## 2022-07-06 LAB
ALBUMIN SERPL ELPH-MCNC: 2.5 G/DL — LOW (ref 3.5–5.2)
ALP SERPL-CCNC: 57 U/L — SIGNIFICANT CHANGE UP (ref 30–115)
ALT FLD-CCNC: 10 U/L — SIGNIFICANT CHANGE UP (ref 0–41)
ANION GAP SERPL CALC-SCNC: 8 MMOL/L — SIGNIFICANT CHANGE UP (ref 7–14)
AST SERPL-CCNC: 11 U/L — SIGNIFICANT CHANGE UP (ref 0–41)
BASOPHILS # BLD AUTO: 0.06 K/UL — SIGNIFICANT CHANGE UP (ref 0–0.2)
BASOPHILS NFR BLD AUTO: 1.1 % — HIGH (ref 0–1)
BILIRUB SERPL-MCNC: <0.2 MG/DL — SIGNIFICANT CHANGE UP (ref 0.2–1.2)
BUN SERPL-MCNC: 4 MG/DL — LOW (ref 10–20)
CALCIUM SERPL-MCNC: 8.9 MG/DL — SIGNIFICANT CHANGE UP (ref 8.5–10.1)
CHLORIDE SERPL-SCNC: 106 MMOL/L — SIGNIFICANT CHANGE UP (ref 98–110)
CO2 SERPL-SCNC: 28 MMOL/L — SIGNIFICANT CHANGE UP (ref 17–32)
CREAT SERPL-MCNC: 0.6 MG/DL — LOW (ref 0.7–1.5)
EGFR: 92 ML/MIN/1.73M2 — SIGNIFICANT CHANGE UP
EOSINOPHIL # BLD AUTO: 0.51 K/UL — SIGNIFICANT CHANGE UP (ref 0–0.7)
EOSINOPHIL NFR BLD AUTO: 9 % — HIGH (ref 0–8)
GLUCOSE SERPL-MCNC: 109 MG/DL — HIGH (ref 70–99)
HCT VFR BLD CALC: 31.2 % — LOW (ref 37–47)
HGB BLD-MCNC: 9.8 G/DL — LOW (ref 12–16)
IMM GRANULOCYTES NFR BLD AUTO: 1.9 % — HIGH (ref 0.1–0.3)
LYMPHOCYTES # BLD AUTO: 1.08 K/UL — LOW (ref 1.2–3.4)
LYMPHOCYTES # BLD AUTO: 19.1 % — LOW (ref 20.5–51.1)
MAGNESIUM SERPL-MCNC: 2.1 MG/DL — SIGNIFICANT CHANGE UP (ref 1.8–2.4)
MCHC RBC-ENTMCNC: 25.8 PG — LOW (ref 27–31)
MCHC RBC-ENTMCNC: 31.4 G/DL — LOW (ref 32–37)
MCV RBC AUTO: 82.1 FL — SIGNIFICANT CHANGE UP (ref 81–99)
MONOCYTES # BLD AUTO: 0.66 K/UL — HIGH (ref 0.1–0.6)
MONOCYTES NFR BLD AUTO: 11.7 % — HIGH (ref 1.7–9.3)
NEUTROPHILS # BLD AUTO: 3.24 K/UL — SIGNIFICANT CHANGE UP (ref 1.4–6.5)
NEUTROPHILS NFR BLD AUTO: 57.2 % — SIGNIFICANT CHANGE UP (ref 42.2–75.2)
NRBC # BLD: 0 /100 WBCS — SIGNIFICANT CHANGE UP (ref 0–0)
PHOSPHATE SERPL-MCNC: 3.3 MG/DL — SIGNIFICANT CHANGE UP (ref 2.1–4.9)
PLATELET # BLD AUTO: 382 K/UL — SIGNIFICANT CHANGE UP (ref 130–400)
POTASSIUM SERPL-MCNC: 4.6 MMOL/L — SIGNIFICANT CHANGE UP (ref 3.5–5)
POTASSIUM SERPL-SCNC: 4.6 MMOL/L — SIGNIFICANT CHANGE UP (ref 3.5–5)
PROT SERPL-MCNC: 5.3 G/DL — LOW (ref 6–8)
RBC # BLD: 3.8 M/UL — LOW (ref 4.2–5.4)
RBC # FLD: 15.3 % — HIGH (ref 11.5–14.5)
SODIUM SERPL-SCNC: 142 MMOL/L — SIGNIFICANT CHANGE UP (ref 135–146)
WBC # BLD: 5.66 K/UL — SIGNIFICANT CHANGE UP (ref 4.8–10.8)
WBC # FLD AUTO: 5.66 K/UL — SIGNIFICANT CHANGE UP (ref 4.8–10.8)

## 2022-07-06 PROCEDURE — 99233 SBSQ HOSP IP/OBS HIGH 50: CPT

## 2022-07-06 RX ORDER — ELECTROLYTE SOLUTION,INJ
1 VIAL (ML) INTRAVENOUS
Refills: 0 | Status: DISCONTINUED | OUTPATIENT
Start: 2022-07-06 | End: 2022-07-06

## 2022-07-06 RX ORDER — I.V. FAT EMULSION 20 G/100ML
1.58 EMULSION INTRAVENOUS
Qty: 75 | Refills: 0 | Status: DISCONTINUED | OUTPATIENT
Start: 2022-07-06 | End: 2022-07-06

## 2022-07-06 RX ADMIN — SIMETHICONE 80 MILLIGRAM(S): 80 TABLET, CHEWABLE ORAL at 22:54

## 2022-07-06 RX ADMIN — PIPERACILLIN AND TAZOBACTAM 25 GRAM(S): 4; .5 INJECTION, POWDER, LYOPHILIZED, FOR SOLUTION INTRAVENOUS at 22:55

## 2022-07-06 RX ADMIN — DONEPEZIL HYDROCHLORIDE 5 MILLIGRAM(S): 10 TABLET, FILM COATED ORAL at 22:54

## 2022-07-06 RX ADMIN — PIPERACILLIN AND TAZOBACTAM 25 GRAM(S): 4; .5 INJECTION, POWDER, LYOPHILIZED, FOR SOLUTION INTRAVENOUS at 15:21

## 2022-07-06 RX ADMIN — Medication 1 EACH: at 22:50

## 2022-07-06 RX ADMIN — SIMETHICONE 80 MILLIGRAM(S): 80 TABLET, CHEWABLE ORAL at 15:21

## 2022-07-06 RX ADMIN — I.V. FAT EMULSION 23.4 GM/KG/DAY: 20 EMULSION INTRAVENOUS at 22:50

## 2022-07-06 RX ADMIN — Medication 10 MILLIGRAM(S): at 22:54

## 2022-07-06 RX ADMIN — ENOXAPARIN SODIUM 40 MILLIGRAM(S): 100 INJECTION SUBCUTANEOUS at 11:51

## 2022-07-06 RX ADMIN — PIPERACILLIN AND TAZOBACTAM 25 GRAM(S): 4; .5 INJECTION, POWDER, LYOPHILIZED, FOR SOLUTION INTRAVENOUS at 05:26

## 2022-07-06 RX ADMIN — PANTOPRAZOLE SODIUM 40 MILLIGRAM(S): 20 TABLET, DELAYED RELEASE ORAL at 05:26

## 2022-07-06 RX ADMIN — SIMETHICONE 80 MILLIGRAM(S): 80 TABLET, CHEWABLE ORAL at 05:26

## 2022-07-06 NOTE — MEDICAL STUDENT PROGRESS NOTE(EDUCATION) - SUBJECTIVE AND OBJECTIVE BOX
KEISHA PANTOJA 77y Female  MRN#: [id number]     Hospital Day: 10d    Pt is currently admitted with the primary diagnosis of     SUBJECTIVE  No acute events overnight, pt seen and examined this am, no complaints.                                          ----------------------------------------------------------  OBJECTIVE  PAST MEDICAL & SURGICAL HISTORY  Hypertension, unspecified type    H/O dilation and curettage  for missed                                               -----------------------------------------------------------  ALLERGIES:  No Known Allergies                                            ------------------------------------------------------------    HOME MEDICATIONS  Home Medications:                           MEDICATIONS:  STANDING MEDICATIONS  donepezil 5 milliGRAM(s) Oral at bedtime  enoxaparin Injectable 40 milliGRAM(s) SubCutaneous every 24 hours  fat emulsion (Plant Based) 20% Infusion 1.579 Gm/kG/Day IV Continuous <Continuous>  lactated ringers. 1000 milliLiter(s) IV Continuous <Continuous>  melatonin 10 milliGRAM(s) Oral at bedtime  ondansetron Injectable 4 milliGRAM(s) IV Push once  pantoprazole    Tablet 40 milliGRAM(s) Oral before breakfast  Parenteral Nutrition - Adult 1 Each TPN Continuous <Continuous>  piperacillin/tazobactam IVPB.. 3.375 Gram(s) IV Intermittent every 8 hours  simethicone 80 milliGRAM(s) Chew three times a day    PRN MEDICATIONS  acetaminophen     Tablet .. 650 milliGRAM(s) Oral every 6 hours PRN                                            ------------------------------------------------------------  VITAL SIGNS: Last 24 Hours  T(C): 36.4 (2022 05:00), Max: 36.4 (2022 20:02)  T(F): 97.6 (2022 05:00), Max: 97.6 (2022 20:02)  HR: 72 (2022 05:00) (66 - 72)  BP: 135/60 (2022 05:00) (121/61 - 149/71)  BP(mean): --  RR: 18 (2022 05:00) (18 - 18)  SpO2: 100% (2022 05:00) (100% - 100%)      22 @ 07:01  -  22 @ 07:00  --------------------------------------------------------  IN: 1480 mL / OUT: 4500 mL / NET: -3020 mL    22 @ 07:01  -  22 @ 11:27  --------------------------------------------------------  IN: 120 mL / OUT: 0 mL / NET: 120 mL                                             --------------------------------------------------------------  LABS:                        9.8    5.66  )-----------( 382      ( 2022 07:07 )             31.2     07-    142  |  106  |  4<L>  ----------------------------<  109<H>  4.6   |  28  |  0.6<L>    Ca    8.9      2022 07:07  Phos  3.3     07-06  Mg     2.1         TPro  5.3<L>  /  Alb  2.5<L>  /  TBili  <0.2  /  DBili  x   /  AST  11  /  ALT  10  /  AlkPhos  57                                                                -------------------------------------------------------------  RADIOLOGY:  CXR - 7                                          --------------------------------------------------------------    PHYSICAL EXAM:  GENERAL: NAD, lying in bed comfortably  HEAD:  Atraumatic, Normocephalic  EYES:  conjunctiva and sclera clear  ENT: Moist mucous membranes  NECK: Supple, No JVD  CHEST/LUNG: Clear to auscultation bilaterally; No rales, rhonchi, wheezing, or rubs. Unlabored respirations  HEART: Regular rate and rhythm; No murmurs, rubs, or gallops  ABDOMEN: Soft, nontender, nondistended  EXTREMITIES:  No clubbing, cyanosis, or edema  NERVOUS SYSTEM:  A&Ox3                                               --------------------------------------------------------------    ASSESSMENT & PLAN                                                                                  ----------------------------------------------------  # DVT prophylaxis   # GI prophylaxis   # Diet   # Activity Score (AM-PAC)  # Code status   # Disposition                                                                              --------------------------------------------------------  # Handoff      KEISHA PANTOJA 77y Female  MRN#: [id number]     Hospital Day: 10d    Pt is currently admitted with the primary diagnosis of diverticulitis complicated by urinary retention    SUBJECTIVE  No acute events overnight, pt seen and examined this am, no complaints. Denies LLQ pain, nausea or vomitting. Bowel sound present,  +BM this AM                                          ----------------------------------------------------------  OBJECTIVE  PAST MEDICAL & SURGICAL HISTORY  Hypertension, unspecified type    H/O dilation and curettage  for missed                                               -----------------------------------------------------------  ALLERGIES:  No Known Allergies                                            ------------------------------------------------------------    HOME MEDICATIONS  Home Medications:                           MEDICATIONS:  STANDING MEDICATIONS  donepezil 5 milliGRAM(s) Oral at bedtime  enoxaparin Injectable 40 milliGRAM(s) SubCutaneous every 24 hours  fat emulsion (Plant Based) 20% Infusion 1.579 Gm/kG/Day IV Continuous <Continuous>  lactated ringers. 1000 milliLiter(s) IV Continuous <Continuous>  melatonin 10 milliGRAM(s) Oral at bedtime  ondansetron Injectable 4 milliGRAM(s) IV Push once  pantoprazole    Tablet 40 milliGRAM(s) Oral before breakfast  Parenteral Nutrition - Adult 1 Each TPN Continuous <Continuous>  piperacillin/tazobactam IVPB.. 3.375 Gram(s) IV Intermittent every 8 hours  simethicone 80 milliGRAM(s) Chew three times a day    PRN MEDICATIONS  acetaminophen     Tablet .. 650 milliGRAM(s) Oral every 6 hours PRN                                            ------------------------------------------------------------  VITAL SIGNS: Last 24 Hours  T(C): 36.4 (2022 05:00), Max: 36.4 (2022 20:02)  T(F): 97.6 (2022 05:00), Max: 97.6 (2022 20:02)  HR: 72 (2022 05:00) (66 - 72)  BP: 135/60 (2022 05:00) (121/61 - 149/71)  BP(mean): --  RR: 18 (2022 05:00) (18 - 18)  SpO2: 100% (2022 05:00) (100% - 100%)      22 @ 07:01  -  22 @ 07:00  --------------------------------------------------------  IN: 1480 mL / OUT: 4500 mL / NET: -3020 mL    22 @ 07:01  -  22 @ 11:27  --------------------------------------------------------  IN: 120 mL / OUT: 0 mL / NET: 120 mL                                             --------------------------------------------------------------  LABS:                        9.8    5.66  )-----------( 382      ( 2022 07:07 )             31.2     07-    142  |  106  |  4<L>  ----------------------------<  109<H>  4.6   |  28  |  0.6<L>    Ca    8.9      2022 07:07  Phos  3.3     07-06  Mg     2.1     07-06    TPro  5.3<L>  /  Alb  2.5<L>  /  TBili  <0.2  /  DBili  x   /  AST  11  /  ALT  10  /  AlkPhos  57  07-06                                                              -------------------------------------------------------------  RADIOLOGY:  CT Abdomen - :   IMPRESSION:  Unchanged rectosigmoid diverticulitis. No focal fluid collection.    CXR 7/3:  Impression:  There is no evidence of focal consolidation, pleural effusion or   pneumothorax.    XR Abdomen   IMPRESSION:  Nonspecific nonobstructive bowel gas pattern with mild fecal retention.                                              --------------------------------------------------------------    PHYSICAL EXAM:  General: No acute distress, well-developed, well-groomed, AAOx3  LUNGS: Clear to auscultation bilaterally; no wheezes, rales, or rhonchi  HEART: Regular rate and rhythm; no murmurs, rubs, or gallops  ABDOMEN:  Soft, non-tender to palpation, moderately distended; bowel sounds present  EXT:  2+ peripheral pulses; no edema, clubbing of cyanosis  NEURO: No focal deficits                                               --------------------------------------------------------------    ASSESSMENT & PLAN	  78yo F PMHx of dementia and HTN, recent hospital course for diverticulitis presents to the ED with complaints of abdominal pain consistent with diverticulitis.    #Acute diverticulitis v. Stercoral-colitis with perisigmoid Lymphadenopathy   #Constipation  - Failed outpt therapy [10-day course of cipro+flagyl (end date 22)]   - CT A+P- findings consistent with unchanged diverticulitis/colitis + diverticula, pericolonic infiltrative changes. Adjacent small bowel loops have wall thickening as well   - Loose stools likely from overflow leaking from constipation demonstrated on CT A+P  - C/w senna + miralax for constipation  - C/w zosyn  - Midline placed in LA (22)  - Per GI recs,  surgery consult & low fiber diet as tolerated  - Per surgery, no acute intervention at this time  - Outpt colonoscopy in 6-8 weeks upon d/c. Patient has not had a colonoscopy in > 10 years.   - Can follow up with surgery Dr. Isaacs as an outpatient   - KUB performed 22 to assess stool burden: non-obstructing gas pattern, FOS  - Started on PO Zofran and transitioned to IV Zofran prn 2/2 vomiting (will order EKG if given more than 3 doses).  - : QTc 537, TWI on EKG, f/u trop. pt with N/V, switched zofran to chlorpromazine 25mg  - 22: c/o worsening abd pain, unable to tolerate diet  - CT A/P (22): Stable rectosigmoid diverticulitis, no abscess.  -unable to tolerate PO intake  -Began parenteral nutrition   -patient tolerating liquids well, requests solid foods  -diet advanced to moist and minced  -continue with PPN, monitor diet tolerence   -if improved, anticipating possible d/c tomorrow    #Urinary retention/Dehydration  -s/p garcia  -currently dry  maintain garcia 2-3 weeks  monitor UO carefully  -PPN began   -continue to monitor electrolytes        #TWI on EKG  - : Repeat EKG showed TWI in lead II, III, and AVF.   - : Trop <0.01 x 1    #COVID-19, asymptomatic, incidental  - VS stable.   - No need for steroids.   - Cont monitor  -D/c isolation precautions    #HTN  - well controlled without medications    #Dementia  -AOx1 at baseline  -supportive care                                                                              ----------------------------------------------------  # DVT prophylaxis Lovenox  # GI prophylaxis Protonix  # Diet - Minced and Moist as tolerated,   # Code status Full  # Disposition -  possible tomorrow dc home if diet is tolerated                                                                     --------------------------------------------------------  # Handoff   D/C isolation precautions, continue parenteral nutrition, diet advanced from liquids to minced and moist: monitor tolerance, possible d/c tomorrow,        KEISHA PANTOJA 77y Female  MRN#: [id number]     Hospital Day: 10d    Pt is currently admitted with the primary diagnosis of diverticulitis complicated by urinary retention    SUBJECTIVE  No acute events overnight, pt seen and examined this am, no complaints. Denies LLQ pain, nausea or vomitting. Bowel sound present,  +BM this AM                                          ----------------------------------------------------------  OBJECTIVE  PAST MEDICAL & SURGICAL HISTORY  Hypertension, unspecified type    H/O dilation and curettage  for missed                                               -----------------------------------------------------------  ALLERGIES:  No Known Allergies                                            ------------------------------------------------------------    HOME MEDICATIONS  Home Medications:                           MEDICATIONS:  STANDING MEDICATIONS  donepezil 5 milliGRAM(s) Oral at bedtime  enoxaparin Injectable 40 milliGRAM(s) SubCutaneous every 24 hours  fat emulsion (Plant Based) 20% Infusion 1.579 Gm/kG/Day IV Continuous <Continuous>  lactated ringers. 1000 milliLiter(s) IV Continuous <Continuous>  melatonin 10 milliGRAM(s) Oral at bedtime  ondansetron Injectable 4 milliGRAM(s) IV Push once  pantoprazole    Tablet 40 milliGRAM(s) Oral before breakfast  Parenteral Nutrition - Adult 1 Each TPN Continuous <Continuous>  piperacillin/tazobactam IVPB.. 3.375 Gram(s) IV Intermittent every 8 hours  simethicone 80 milliGRAM(s) Chew three times a day    PRN MEDICATIONS  acetaminophen     Tablet .. 650 milliGRAM(s) Oral every 6 hours PRN                                            ------------------------------------------------------------  VITAL SIGNS: Last 24 Hours  T(C): 36.4 (2022 05:00), Max: 36.4 (2022 20:02)  T(F): 97.6 (2022 05:00), Max: 97.6 (2022 20:02)  HR: 72 (2022 05:00) (66 - 72)  BP: 135/60 (2022 05:00) (121/61 - 149/71)  BP(mean): --  RR: 18 (2022 05:00) (18 - 18)  SpO2: 100% (2022 05:00) (100% - 100%)      22 @ 07:01  -  22 @ 07:00  --------------------------------------------------------  IN: 1480 mL / OUT: 4500 mL / NET: -3020 mL    22 @ 07:01  -  22 @ 11:27  --------------------------------------------------------  IN: 120 mL / OUT: 0 mL / NET: 120 mL                                             --------------------------------------------------------------  LABS:                        9.8    5.66  )-----------( 382      ( 2022 07:07 )             31.2     07-    142  |  106  |  4<L>  ----------------------------<  109<H>  4.6   |  28  |  0.6<L>    Ca    8.9      2022 07:07  Phos  3.3     07-06  Mg     2.1     07-06    TPro  5.3<L>  /  Alb  2.5<L>  /  TBili  <0.2  /  DBili  x   /  AST  11  /  ALT  10  /  AlkPhos  57  07-06                                                              -------------------------------------------------------------  RADIOLOGY:  CT Abdomen - :   IMPRESSION:  Unchanged rectosigmoid diverticulitis. No focal fluid collection.    CXR 7/3:  Impression:  There is no evidence of focal consolidation, pleural effusion or   pneumothorax.    XR Abdomen   IMPRESSION:  Nonspecific nonobstructive bowel gas pattern with mild fecal retention.                                              --------------------------------------------------------------    PHYSICAL EXAM:  General: No acute distress, well-developed, well-groomed, AAOx3  LUNGS: Clear to auscultation bilaterally; no wheezes, rales, or rhonchi  HEART: Regular rate and rhythm; no murmurs, rubs, or gallops  ABDOMEN:  Soft, non-tender to palpation, moderately distended; bowel sounds present  EXT:  2+ peripheral pulses; no edema, clubbing of cyanosis  NEURO: No focal deficits                                               --------------------------------------------------------------    ASSESSMENT & PLAN	  76yo F PMHx of dementia and HTN, recent hospital course for diverticulitis presents to the ED with complaints of abdominal pain consistent with diverticulitis.    #Acute diverticulitis v. Stercoral-colitis with perisigmoid Lymphadenopathy   #Constipation  - Failed outpt therapy [10-day course of cipro+flagyl (end date 22)]   - CT A+P- findings consistent with unchanged diverticulitis/colitis + diverticula, pericolonic infiltrative changes. Adjacent small bowel loops have wall thickening as well   - Loose stools likely from overflow leaking from constipation demonstrated on CT A+P  - C/w senna + miralax for constipation  - C/w zosyn  - Midline placed in LA (22)  - Per GI recs,  surgery consult & low fiber diet as tolerated  - Per surgery, no acute intervention at this time  - Outpt colonoscopy in 6-8 weeks upon d/c. Patient has not had a colonoscopy in > 10 years.   - Can follow up with surgery Dr. Isaacs as an outpatient   - KUB performed 22 to assess stool burden: non-obstructing gas pattern, FOS  - Started on PO Zofran and transitioned to IV Zofran prn 2/2 vomiting (will order EKG if given more than 3 doses).  - : QTc 537, TWI on EKG, f/u trop. pt with N/V, switched zofran to chlorpromazine 25mg  - 22: c/o worsening abd pain, unable to tolerate diet  - CT A/P (22): Stable rectosigmoid diverticulitis, no abscess.  -unable to tolerate PO intake  -Began parenteral nutrition   -patient tolerating liquids well, requests solid foods  -diet advanced to moist and minced  -continue with PPN, monitor diet tolerance   -as per surgery: patient will require bowel regimen on discharge  -if improved, anticipating possible d/c tomorrow    #Urinary retention/Dehydration  -s/p garcia  maintain garcia 2-3 weeks  monitor output  -PPN began   -continue to monitor electrolytes        #TWI on EKG  - : Repeat EKG showed TWI in lead II, III, and AVF.   - : Trop <0.01 x 1    #COVID-19, asymptomatic, incidental  - VS stable.   - No need for steroids.   - Cont monitor  -D/c isolation precautions    #HTN  - well controlled without medications    #Dementia  -AOx1 at baseline  -supportive care                                                                              ----------------------------------------------------  # DVT prophylaxis Lovenox  # GI prophylaxis Protonix  # Diet - Minced and Moist as tolerated,   # Code status Full  # Disposition -  possible tomorrow dc home if diet is tolerated                                                                     --------------------------------------------------------  # Handoff   D/C isolation precautions, continue parenteral nutrition, diet advanced from liquids to minced and moist: monitor tolerance, possible d/c tomorrow,        KEISHA PANTOJA 77y Female  MRN#: [id number]     Hospital Day: 10d    Pt is currently admitted with the primary diagnosis of diverticulitis complicated by urinary retention    SUBJECTIVE  No acute events overnight, pt seen and examined this am, no complaints. Denies abd pain, nausea or vomitting. Bowel sound present,  +BM this AM, advancing diet to mechanical soft                                           ----------------------------------------------------------  OBJECTIVE  PAST MEDICAL & SURGICAL HISTORY  Hypertension, unspecified type    H/O dilation and curettage  for missed                                               -----------------------------------------------------------  ALLERGIES:  No Known Allergies                                            ------------------------------------------------------------    HOME MEDICATIONS  Home Medications:                           MEDICATIONS:  STANDING MEDICATIONS  donepezil 5 milliGRAM(s) Oral at bedtime  enoxaparin Injectable 40 milliGRAM(s) SubCutaneous every 24 hours  fat emulsion (Plant Based) 20% Infusion 1.579 Gm/kG/Day IV Continuous <Continuous>  lactated ringers. 1000 milliLiter(s) IV Continuous <Continuous>  melatonin 10 milliGRAM(s) Oral at bedtime  ondansetron Injectable 4 milliGRAM(s) IV Push once  pantoprazole    Tablet 40 milliGRAM(s) Oral before breakfast  Parenteral Nutrition - Adult 1 Each TPN Continuous <Continuous>  piperacillin/tazobactam IVPB.. 3.375 Gram(s) IV Intermittent every 8 hours  simethicone 80 milliGRAM(s) Chew three times a day    PRN MEDICATIONS  acetaminophen     Tablet .. 650 milliGRAM(s) Oral every 6 hours PRN                                            ------------------------------------------------------------  VITAL SIGNS: Last 24 Hours  T(C): 36.4 (2022 05:00), Max: 36.4 (2022 20:02)  T(F): 97.6 (2022 05:00), Max: 97.6 (2022 20:02)  HR: 72 (2022 05:00) (66 - 72)  BP: 135/60 (2022 05:00) (121/61 - 149/71)  BP(mean): --  RR: 18 (2022 05:00) (18 - 18)  SpO2: 100% (2022 05:00) (100% - 100%)      22 @ 07:01  -  22 @ 07:00  --------------------------------------------------------  IN: 1480 mL / OUT: 4500 mL / NET: -3020 mL    22 @ 07:01  -  22 @ 11:27  --------------------------------------------------------  IN: 120 mL / OUT: 0 mL / NET: 120 mL                                             --------------------------------------------------------------  LABS:                        9.8    5.66  )-----------( 382      ( 2022 07:07 )             31.2     07-    142  |  106  |  4<L>  ----------------------------<  109<H>  4.6   |  28  |  0.6<L>    Ca    8.9      2022 07:07  Phos  3.3     07-  Mg     2.1     07-    TPro  5.3<L>  /  Alb  2.5<L>  /  TBili  <0.2  /  DBili  x   /  AST  11  /  ALT  10  /  AlkPhos  57  07-06                                                              -------------------------------------------------------------  RADIOLOGY:  CT Abdomen - :   IMPRESSION:  Unchanged rectosigmoid diverticulitis. No focal fluid collection.    CXR 7/3:  Impression:  There is no evidence of focal consolidation, pleural effusion or   pneumothorax.    XR Abdomen   IMPRESSION:  Nonspecific nonobstructive bowel gas pattern with mild fecal retention.                                              --------------------------------------------------------------    PHYSICAL EXAM:  General: No acute distress, well-developed, well-groomed, AAOx3  LUNGS: Clear to auscultation bilaterally; no wheezes, rales, or rhonchi  HEART: Regular rate and rhythm; no murmurs, rubs, or gallops  ABDOMEN:  Soft, non-tender to palpation, moderately distended; bowel sounds present  EXT:  2+ peripheral pulses; no edema, clubbing of cyanosis  NEURO: No focal deficits                                               --------------------------------------------------------------    ASSESSMENT & PLAN	  78yo F PMHx of dementia and HTN, recent hospital course for diverticulitis presents to the ED with complaints of abdominal pain consistent with diverticulitis.    #Acute diverticulitis v. Stercoral-colitis with perisigmoid Lymphadenopathy   #Constipation  - Failed outpt therapy [10-day course of cipro+flagyl (end date 22)]   - CT A+P- findings consistent with unchanged diverticulitis/colitis + diverticula, pericolonic infiltrative changes. Adjacent small bowel loops have wall thickening as well   - Loose stools likely from overflow leaking from constipation demonstrated on CT A+P  - C/w senna + miralax for constipation  - C/w zosyn  - Midline placed in LA (22)  - Per GI recs,  surgery consult & low fiber diet as tolerated  - Per surgery, no acute intervention at this time  - Outpt colonoscopy in 6-8 weeks upon d/c. Patient has not had a colonoscopy in > 10 years.   - Can follow up with surgery Dr. Isaacs as an outpatient   - KUB performed 22 to assess stool burden: non-obstructing gas pattern, FOS  - Started on PO Zofran and transitioned to IV Zofran prn 2/2 vomiting (will order EKG if given more than 3 doses).  - : QTc 537, TWI on EKG, f/u trop. pt with N/V, switched zofran to chlorpromazine 25mg  - 22: c/o worsening abd pain, unable to tolerate diet  - CT A/P (22): Stable rectosigmoid diverticulitis, no abscess.  -Began parenteral nutrition   -patient tolerating liquids well, requests solid foods  -diet advanced to minced and moist   -continue with PPN, monitor diet tolerance, nutrition following   -as per surgery: patient will require bowel regimen on discharge    #Urinary retention/Dehydration  -s/p garcia  maintain garcia 2-3 weeks and will f/u for TOV as outpatient   monitor output  -PPN began   -continue to monitor electrolytes    #TWI on EKG  - : Repeat EKG showed TWI in lead II, III, and AVF.   - : Trop <0.01 x 1    #COVID-19, asymptomatic, incidental  - VS stable.   - No need for steroids.   - Cont monitor    #HTN  - well controlled without medications    #Dementia  -AOx1 at baseline  -supportive care                                                                              ----------------------------------------------------  # DVT prophylaxis Lovenox  # GI prophylaxis Protonix  # Diet - Minced and Moist as tolerated,   # Code status Full  # Disposition - acute                                                                      --------------------------------------------------------  # Handoff    continue parenteral nutrition, diet advanced from liquids to minced and moist: monitor tolerance

## 2022-07-06 NOTE — PROGRESS NOTE ADULT - ASSESSMENT
78yo F PMHx of dementia and HTN, recent hospital course for diverticulitis presents to the ED with complaints of abdominal pain consistent with diverticulitis.    #Acute diverticulitis v. Stercoral-colitis with perisigmoid Lymphadenopathy   #Constipation  - Failed outpt therapy [10-day course of cipro+flagyl (end date 7/2/22)]   - CT A+P- findings consistent with unchanged diverticulitis/colitis + diverticula, pericolonic infiltrative changes. Adjacent small bowel loops have wall thickening as well   - Loose stools likely from overflow leaking from constipation demonstrated on CT A+P  - C/w senna + miralax for constipation  - C/w zosyn  - Midline placed in LA (6/28/22)  - Per GI recs,  surgery consult & low fiber diet as tolerated  - Per surgery, no acute intervention at this time  - Outpt colonoscopy in 6-8 weeks upon d/c. Patient has not had a colonoscopy in > 10 years.   - Can follow up with surgery Dr. Isaacs as an outpatient   - KUB performed 6/30/22 to assess stool burden: non-obstructing gas pattern, FOS  - Started on PO Zofran and transitioned to IV Zofran prn 2/2 vomiting (will order EKG if given more than 3 doses).  - 7/1: QTc 537, TWI on EKG, f/u trop. pt with N/V, switched zofran to chlorpromazine 25mg  - 7/2/22: c/o worsening abd pain, unable to tolerate diet  - CT A/P (7/2/22): Stable rectosigmoid diverticulitis, no abscess.  - resumed clear liquid diet yesterday >> tolerated well >> advanced to minced and moist today.    c/w PPN for now.    #TWI on EKG  - 7/1: Repeat EKG showed TWI in lead II, III, and AVF.   - 7/2: Trop <0.01 x 1    #COVID-19, asymptomatic, incidental  - VS stable.   - No need for steroids.   - Cont monitor    #HTN  - well controlled without medications    #Misc  -DVT PPx: Lovenox  -GI PPx: Protonix.   -Diet: Minced and moist   -Code: Full    #Progress Note Handoff  Pending (specify): PPN/ Diet tolerance   Family discussion: with the SON.   Disposition: [facility]

## 2022-07-06 NOTE — PROGRESS NOTE ADULT - SUBJECTIVE AND OBJECTIVE BOX
GENERAL SURGERY PROGRESS NOTE    Patient: KEISHA PANTOJA , 77y (43)Female   MRN: [id number]  Location: [address]  Visit: 22 Inpatient  Date: 22 @ 09:52    Hospital Day #: 11    Procedure/Dx/Injuries: Diverticulitis    Events of past 24 hours: No acute events overnight.     PAST MEDICAL & SURGICAL HISTORY:  Hypertension, unspecified type  H/O dilation and curettage  for missed     Vitals:   T(F): 97.6 (22 @ 05:00), Max: 97.6 (22 @ 20:02)  HR: 72 (22 @ 05:00)  BP: 135/60 (22 @ 05:00)  RR: 18 (22 @ 05:00)  SpO2: 100% (22 @ 05:00)    Diet, Full Liquid:   Fiber/Residue Restricted    Fluids:   I & O's:    22 @ 07:01  -  22 @ 07:00  --------------------------------------------------------  IN:    Lactated Ringers: 1300 mL    Oral Fluid: 180 mL  Total IN: 1480 mL    OUT:    Indwelling Catheter - Urethral (mL): 4500 mL  Total OUT: 4500 mL    Total NET: -3020 mL      PHYSICAL EXAM:  General: NAD  Cardiac: RRR S1, S2  Respiratory: normal respiratory effort, breath sounds equal BL  Abdomen: Soft, mildly-distended, positive tenderness in LLQ    MEDICATIONS  (STANDING):  donepezil 5 milliGRAM(s) Oral at bedtime  enoxaparin Injectable 40 milliGRAM(s) SubCutaneous every 24 hours  fat emulsion (Plant Based) 20% Infusion 1.579 Gm/kG/Day (23.4 mL/Hr) IV Continuous <Continuous>  lactated ringers. 1000 milliLiter(s) (130 mL/Hr) IV Continuous <Continuous>  melatonin 10 milliGRAM(s) Oral at bedtime  ondansetron Injectable 4 milliGRAM(s) IV Push once  pantoprazole    Tablet 40 milliGRAM(s) Oral before breakfast  Parenteral Nutrition - Adult 1 Each (75 mL/Hr) TPN Continuous <Continuous>  piperacillin/tazobactam IVPB.. 3.375 Gram(s) IV Intermittent every 8 hours  simethicone 80 milliGRAM(s) Chew three times a day    MEDICATIONS  (PRN):  acetaminophen     Tablet .. 650 milliGRAM(s) Oral every 6 hours PRN Moderate Pain (4 - 6)    DVT PROPHYLAXIS: enoxaparin Injectable 40 milliGRAM(s) SubCutaneous every 24 hours  GI PROPHYLAXIS: pantoprazole    Tablet 40 milliGRAM(s) Oral before breakfast    ANTICOAGULATION:   ANTIBIOTICS:  piperacillin/tazobactam IVPB.. 3.375 Gram(s)    LAB/STUDIES:  Labs:  CAPILLARY BLOOD GLUCOSE                        9.8    5.66  )-----------( 382      ( 2022 07:07 )             31.2       Auto Neutrophil %: 57.2 % (22 @ 07:07)  Auto Immature Granulocyte %: 1.9 % (22 @ 07:07)        142  |  106  |  4<L>  ----------------------------<  109<H>  4.6   |  28  |  0.6<L>    Calcium, Total Serum: 8.9 mg/dL (22 @ 07:07)    LFTs:             5.3  | <0.2 | 11       ------------------[57      ( 2022 07:07 )  2.5  | x    | 10          Lipase:x      Amylase:x         Lactate, Blood: 0.6 mmol/L (22 @ 08:31)        IMAGING:  < from: Xray Abdomen 1 View PORTABLE -Urgent (Xray Abdomen 1 View PORTABLE -Urgent .) (22 @ 14:06) >  IMPRESSION:  Nonspecific nonobstructive bowel gas pattern with mild fecal retention.  --- End of Report ---    < from: CT Abdomen and Pelvis w/ IV Cont (22 @ 15:36) >  IMPRESSION:  Unchanged rectosigmoid diverticulitis. No focal fluid collection.  --- End of Report ---

## 2022-07-07 ENCOUNTER — APPOINTMENT (OUTPATIENT)
Dept: GERIATRICS | Facility: CLINIC | Age: 77
End: 2022-07-07

## 2022-07-07 LAB
ALBUMIN SERPL ELPH-MCNC: 2.6 G/DL — LOW (ref 3.5–5.2)
ALP SERPL-CCNC: 59 U/L — SIGNIFICANT CHANGE UP (ref 30–115)
ALT FLD-CCNC: 10 U/L — SIGNIFICANT CHANGE UP (ref 0–41)
ANION GAP SERPL CALC-SCNC: 9 MMOL/L — SIGNIFICANT CHANGE UP (ref 7–14)
AST SERPL-CCNC: 12 U/L — SIGNIFICANT CHANGE UP (ref 0–41)
BASOPHILS # BLD AUTO: 0.06 K/UL — SIGNIFICANT CHANGE UP (ref 0–0.2)
BASOPHILS NFR BLD AUTO: 0.9 % — SIGNIFICANT CHANGE UP (ref 0–1)
BILIRUB SERPL-MCNC: <0.2 MG/DL — SIGNIFICANT CHANGE UP (ref 0.2–1.2)
BUN SERPL-MCNC: 6 MG/DL — LOW (ref 10–20)
CALCIUM SERPL-MCNC: 8.8 MG/DL — SIGNIFICANT CHANGE UP (ref 8.5–10.1)
CHLORIDE SERPL-SCNC: 103 MMOL/L — SIGNIFICANT CHANGE UP (ref 98–110)
CO2 SERPL-SCNC: 27 MMOL/L — SIGNIFICANT CHANGE UP (ref 17–32)
CREAT SERPL-MCNC: 0.5 MG/DL — LOW (ref 0.7–1.5)
EGFR: 96 ML/MIN/1.73M2 — SIGNIFICANT CHANGE UP
EOSINOPHIL # BLD AUTO: 0.64 K/UL — SIGNIFICANT CHANGE UP (ref 0–0.7)
EOSINOPHIL NFR BLD AUTO: 9.9 % — HIGH (ref 0–8)
GLUCOSE SERPL-MCNC: 122 MG/DL — HIGH (ref 70–99)
HCT VFR BLD CALC: 29.6 % — LOW (ref 37–47)
HCT VFR BLD CALC: 32.6 % — LOW (ref 37–47)
HGB BLD-MCNC: 10.2 G/DL — LOW (ref 12–16)
HGB BLD-MCNC: 9.7 G/DL — LOW (ref 12–16)
IMM GRANULOCYTES NFR BLD AUTO: 2.6 % — HIGH (ref 0.1–0.3)
LYMPHOCYTES # BLD AUTO: 1.18 K/UL — LOW (ref 1.2–3.4)
LYMPHOCYTES # BLD AUTO: 18.2 % — LOW (ref 20.5–51.1)
MAGNESIUM SERPL-MCNC: 2 MG/DL — SIGNIFICANT CHANGE UP (ref 1.8–2.4)
MCHC RBC-ENTMCNC: 25.7 PG — LOW (ref 27–31)
MCHC RBC-ENTMCNC: 26.4 PG — LOW (ref 27–31)
MCHC RBC-ENTMCNC: 31.3 G/DL — LOW (ref 32–37)
MCHC RBC-ENTMCNC: 32.8 G/DL — SIGNIFICANT CHANGE UP (ref 32–37)
MCV RBC AUTO: 80.4 FL — LOW (ref 81–99)
MCV RBC AUTO: 82.1 FL — SIGNIFICANT CHANGE UP (ref 81–99)
MONOCYTES # BLD AUTO: 0.8 K/UL — HIGH (ref 0.1–0.6)
MONOCYTES NFR BLD AUTO: 12.3 % — HIGH (ref 1.7–9.3)
NEUTROPHILS # BLD AUTO: 3.64 K/UL — SIGNIFICANT CHANGE UP (ref 1.4–6.5)
NEUTROPHILS NFR BLD AUTO: 56.1 % — SIGNIFICANT CHANGE UP (ref 42.2–75.2)
NRBC # BLD: 0 /100 WBCS — SIGNIFICANT CHANGE UP (ref 0–0)
NRBC # BLD: 0 /100 WBCS — SIGNIFICANT CHANGE UP (ref 0–0)
PHOSPHATE SERPL-MCNC: 3 MG/DL — SIGNIFICANT CHANGE UP (ref 2.1–4.9)
PLATELET # BLD AUTO: 351 K/UL — SIGNIFICANT CHANGE UP (ref 130–400)
PLATELET # BLD AUTO: 399 K/UL — SIGNIFICANT CHANGE UP (ref 130–400)
POTASSIUM SERPL-MCNC: 4.5 MMOL/L — SIGNIFICANT CHANGE UP (ref 3.5–5)
POTASSIUM SERPL-SCNC: 4.5 MMOL/L — SIGNIFICANT CHANGE UP (ref 3.5–5)
PROT SERPL-MCNC: 5.5 G/DL — LOW (ref 6–8)
RBC # BLD: 3.68 M/UL — LOW (ref 4.2–5.4)
RBC # BLD: 3.97 M/UL — LOW (ref 4.2–5.4)
RBC # FLD: 15.1 % — HIGH (ref 11.5–14.5)
RBC # FLD: 15.3 % — HIGH (ref 11.5–14.5)
SARS-COV-2 RNA SPEC QL NAA+PROBE: DETECTED
SODIUM SERPL-SCNC: 139 MMOL/L — SIGNIFICANT CHANGE UP (ref 135–146)
WBC # BLD: 5.72 K/UL — SIGNIFICANT CHANGE UP (ref 4.8–10.8)
WBC # BLD: 6.49 K/UL — SIGNIFICANT CHANGE UP (ref 4.8–10.8)
WBC # FLD AUTO: 5.72 K/UL — SIGNIFICANT CHANGE UP (ref 4.8–10.8)
WBC # FLD AUTO: 6.49 K/UL — SIGNIFICANT CHANGE UP (ref 4.8–10.8)

## 2022-07-07 PROCEDURE — 99233 SBSQ HOSP IP/OBS HIGH 50: CPT

## 2022-07-07 RX ORDER — I.V. FAT EMULSION 20 G/100ML
1.58 EMULSION INTRAVENOUS
Qty: 75 | Refills: 0 | Status: DISCONTINUED | OUTPATIENT
Start: 2022-07-07 | End: 2022-07-08

## 2022-07-07 RX ORDER — ELECTROLYTE SOLUTION,INJ
1 VIAL (ML) INTRAVENOUS
Refills: 0 | Status: DISCONTINUED | OUTPATIENT
Start: 2022-07-07 | End: 2022-07-08

## 2022-07-07 RX ADMIN — PIPERACILLIN AND TAZOBACTAM 25 GRAM(S): 4; .5 INJECTION, POWDER, LYOPHILIZED, FOR SOLUTION INTRAVENOUS at 05:10

## 2022-07-07 RX ADMIN — SIMETHICONE 80 MILLIGRAM(S): 80 TABLET, CHEWABLE ORAL at 22:03

## 2022-07-07 RX ADMIN — Medication 10 MILLIGRAM(S): at 22:03

## 2022-07-07 RX ADMIN — SIMETHICONE 80 MILLIGRAM(S): 80 TABLET, CHEWABLE ORAL at 17:39

## 2022-07-07 RX ADMIN — ENOXAPARIN SODIUM 40 MILLIGRAM(S): 100 INJECTION SUBCUTANEOUS at 12:12

## 2022-07-07 RX ADMIN — Medication 1 EACH: at 22:22

## 2022-07-07 RX ADMIN — SIMETHICONE 80 MILLIGRAM(S): 80 TABLET, CHEWABLE ORAL at 05:10

## 2022-07-07 RX ADMIN — I.V. FAT EMULSION 23.4 GM/KG/DAY: 20 EMULSION INTRAVENOUS at 22:22

## 2022-07-07 RX ADMIN — PANTOPRAZOLE SODIUM 40 MILLIGRAM(S): 20 TABLET, DELAYED RELEASE ORAL at 05:10

## 2022-07-07 RX ADMIN — DONEPEZIL HYDROCHLORIDE 5 MILLIGRAM(S): 10 TABLET, FILM COATED ORAL at 22:03

## 2022-07-07 NOTE — MEDICAL STUDENT PROGRESS NOTE(EDUCATION) - SUBJECTIVE AND OBJECTIVE BOX
KEISHA PANTOJA 77y Female  MRN#: [id number]     Hospital Day: 11d    Pt is currently admitted with the primary diagnosis of diverticulitis complicated by urinary retention    SUBJECTIVE  No acute events overnight, pt seen and examined this am, no complaints. Denies abd pain, nausea or vomiting Bowel sounds present,  +BM this overnight, tolerated diet upgrade well.                                           ----------------------------------------------------------  OBJECTIVE  PAST MEDICAL & SURGICAL HISTORY  Hypertension, unspecified type    H/O dilation and curettage  for missed                                               -----------------------------------------------------------  ALLERGIES:  No Known Allergies                                            ------------------------------------------------------------    HOME MEDICATIONS  Home Medications:                           MEDICATIONS:  STANDING MEDICATIONS  donepezil 5 milliGRAM(s) Oral at bedtime  enoxaparin Injectable 40 milliGRAM(s) SubCutaneous every 24 hours  fat emulsion (Plant Based) 20% Infusion 1.579 Gm/kG/Day IV Continuous <Continuous>  fat emulsion (Plant Based) 20% Infusion 1.579 Gm/kG/Day IV Continuous <Continuous>  melatonin 10 milliGRAM(s) Oral at bedtime  ondansetron Injectable 4 milliGRAM(s) IV Push once  pantoprazole    Tablet 40 milliGRAM(s) Oral before breakfast  Parenteral Nutrition - Adult 1 Each TPN Continuous <Continuous>  Parenteral Nutrition - Adult 1 Each TPN Continuous <Continuous>  simethicone 80 milliGRAM(s) Chew three times a day    PRN MEDICATIONS  acetaminophen     Tablet .. 650 milliGRAM(s) Oral every 6 hours PRN                                            ------------------------------------------------------------  VITAL SIGNS: Last 24 Hours  T(C): 36.4 (2022 05:00), Max: 36.4 (2022 05:00)  T(F): 97.6 (2022 05:00), Max: 97.6 (2022 05:00)  HR: 58 (2022 05:00) (58 - 73)  BP: 128/67 (2022 05:00) (128/67 - 134/65)  BP(mean): --  RR: 18 (2022 05:00) (18 - 18)  SpO2: 100% (2022 05:00) (99% - 100%)      22 @ 07:01  -  22 @ 07:00  --------------------------------------------------------  IN: 2030 mL / OUT: 5250 mL / NET: -3220 mL                                             --------------------------------------------------------------  LABS:                        10.2   6.49  )-----------( 399      ( 2022 08:21 )             32.6         139  |  103  |  6<L>  ----------------------------<  122<H>  4.5   |  27  |  0.5<L>    Ca    8.8      2022 08:21  Phos  3.0     07-  Mg     2.0     07-    TPro  5.5<L>  /  Alb  2.6<L>  /  TBili  <0.2  /  DBili  x   /  AST  12  /  ALT  10  /  AlkPhos  59  07-07                                                              -------------------------------------------------------------  RADIOLOGY:  CT Abdomen - :   IMPRESSION:  Unchanged rectosigmoid diverticulitis. No focal fluid collection.    CXR 7/3:  Impression:  There is no evidence of focal consolidation, pleural effusion or   pneumothorax.    XR Abdomen   IMPRESSION:  Nonspecific nonobstructive bowel gas pattern with mild fecal retention.                                        --------------------------------------------------------------    PHYSICAL EXAM:  General: No acute distress, well-developed, well-groomed  LUNGS: Clear to auscultation bilaterally; no wheezes, rales, or rhonchi  HEART: Regular rate and rhythm; no murmurs, rubs, or gallops  ABDOMEN:  Soft, non-tender to palpation, moderately distended; bowel sounds present  EXT:  2+ peripheral pulses; no edema, clubbing of cyanosis  NEURO: No focal deficits AOx1 at at baseline                                               --------------------------------------------------------------    ASSESSMENT & PLAN  78yo F PMHx of dementia and HTN, recent hospital course for diverticulitis presents to the ED with complaints of abdominal pain consistent with diverticulitis.    #Acute diverticulitis v. Stercoral-colitis with perisigmoid Lymphadenopathy   #Constipation  - Failed outpt therapy [10-day course of cipro+flagyl (end date 22)]   - CT A+P- findings consistent with unchanged diverticulitis/colitis + diverticula, pericolonic infiltrative changes. Adjacent small bowel loops have wall thickening as well   - Loose stools likely from overflow leaking from constipation demonstrated on CT A+P  - C/w senna + miralax for constipation  - C/w zosyn  - Midline placed in LA (22)  - Per GI recs,  surgery consult & low fiber diet as tolerated  - Per surgery, no further intervention at this time  - Outpt colonoscopy in 6-8 weeks upon d/c. Patient has not had a colonoscopy in > 10 years.   - Can follow up with surgery Dr. Isaacs as an outpatient   - KUB performed 22 to assess stool burden: non-obstructing gas pattern, FOS  - Started on PO Zofran and transitioned to IV Zofran prn 2/2 vomiting (will order EKG if given more than 3 doses).  - : QTc 537, TWI on EKG, f/u trop. pt with N/V, switched zofran to chlorpromazine 25mg  - 22: c/o worsening abd pain, unable to tolerate diet  - CT A/P (22): Stable rectosigmoid diverticulitis, no abscess.  - Parenteral nutrition  -patient tolerating liquids and minced foods well  -+bowel movements  -diet advanced to regular -continue with PPN, monitor diet tolerance, nutrition following   -as per surgery: patient will require bowel regimen on discharge, no further interventions, f/u Dr. Isaacs outpatient    #Urinary retention/Dehydration  -s/p garcia  maintain garcia 2-3 weeks and will f/u for TOV as outpatient   monitor output  -PPN began   -continue to monitor electrolytes    #TWI on EKG  - : Repeat EKG showed TWI in lead II, III, and AVF.   - : Trop <0.01 x 1    #COVID-19, asymptomatic, incidental  - VS stable.   - No need for steroids.   - Cont monitor    #HTN  - well controlled without medications    #Dementia  -AOx1 at baseline  -supportive care                                                                              ----------------------------------------------------  # DVT prophylaxis Lovenox  # GI prophylaxis Protonix  # Diet - Regular as tolerated,   # Code status Full  # Disposition - acute                                                                      --------------------------------------------------------  # Handoff    continue parenteral nutrition, diet advanced from minced and moist to regular: monitor tolerance                                                                        KEISHA PANTOJA 77y Female  MRN#: [id number]     Hospital Day: 11d    Pt is currently admitted with the primary diagnosis of diverticulitis complicated by urinary retention    SUBJECTIVE  No acute events overnight, pt seen and examined this am, no complaints. Denies abd pain, nausea or vomiting Bowel sounds present,  +BM this overnight, tolerated diet upgrade well.                                           ----------------------------------------------------------  OBJECTIVE  PAST MEDICAL & SURGICAL HISTORY  Hypertension, unspecified type    H/O dilation and curettage  for missed                                               -----------------------------------------------------------  ALLERGIES:  No Known Allergies                                            ------------------------------------------------------------    HOME MEDICATIONS  Home Medications:                           MEDICATIONS:  STANDING MEDICATIONS  donepezil 5 milliGRAM(s) Oral at bedtime  enoxaparin Injectable 40 milliGRAM(s) SubCutaneous every 24 hours  fat emulsion (Plant Based) 20% Infusion 1.579 Gm/kG/Day IV Continuous <Continuous>  fat emulsion (Plant Based) 20% Infusion 1.579 Gm/kG/Day IV Continuous <Continuous>  melatonin 10 milliGRAM(s) Oral at bedtime  ondansetron Injectable 4 milliGRAM(s) IV Push once  pantoprazole    Tablet 40 milliGRAM(s) Oral before breakfast  Parenteral Nutrition - Adult 1 Each TPN Continuous <Continuous>  Parenteral Nutrition - Adult 1 Each TPN Continuous <Continuous>  simethicone 80 milliGRAM(s) Chew three times a day    PRN MEDICATIONS  acetaminophen     Tablet .. 650 milliGRAM(s) Oral every 6 hours PRN                                            ------------------------------------------------------------  VITAL SIGNS: Last 24 Hours  T(C): 36.4 (2022 05:00), Max: 36.4 (2022 05:00)  T(F): 97.6 (2022 05:00), Max: 97.6 (2022 05:00)  HR: 58 (2022 05:00) (58 - 73)  BP: 128/67 (2022 05:00) (128/67 - 134/65)  BP(mean): --  RR: 18 (2022 05:00) (18 - 18)  SpO2: 100% (2022 05:00) (99% - 100%)      22 @ 07:01  -  22 @ 07:00  --------------------------------------------------------  IN: 2030 mL / OUT: 5250 mL / NET: -3220 mL                                             --------------------------------------------------------------  LABS:                        10.2   6.49  )-----------( 399      ( 2022 08:21 )             32.6         139  |  103  |  6<L>  ----------------------------<  122<H>  4.5   |  27  |  0.5<L>    Ca    8.8      2022 08:21  Phos  3.0     07-  Mg     2.0     07-    TPro  5.5<L>  /  Alb  2.6<L>  /  TBili  <0.2  /  DBili  x   /  AST  12  /  ALT  10  /  AlkPhos  59  07-07                                                              -------------------------------------------------------------  RADIOLOGY:  CT Abdomen - :   IMPRESSION:  Unchanged rectosigmoid diverticulitis. No focal fluid collection.    CXR 7/3:  Impression:  There is no evidence of focal consolidation, pleural effusion or   pneumothorax.    XR Abdomen   IMPRESSION:  Nonspecific nonobstructive bowel gas pattern with mild fecal retention.                                        --------------------------------------------------------------    PHYSICAL EXAM:  General: No acute distress, well-developed, well-groomed  LUNGS: Clear to auscultation bilaterally; no wheezes, rales, or rhonchi  HEART: Regular rate and rhythm; no murmurs, rubs, or gallops  ABDOMEN:  Soft, non-tender to palpation, moderately distended; bowel sounds present  EXT:  2+ peripheral pulses; no edema, clubbing of cyanosis  NEURO: No focal deficits AOx1 at at baseline                                               --------------------------------------------------------------    ASSESSMENT & PLAN  76yo F PMHx of dementia and HTN, recent hospital course for diverticulitis presents to the ED with complaints of abdominal pain consistent with diverticulitis.    #Acute diverticulitis v. Stercoral-colitis with perisigmoid Lymphadenopathy   #Constipation  - Failed outpt therapy [10-day course of cipro+flagyl (end date 22)]   - CT A+P- findings consistent with unchanged diverticulitis/colitis + diverticula, pericolonic infiltrative changes. Adjacent small bowel loops have wall thickening as well   - Loose stools likely from overflow leaking from constipation demonstrated on CT A+P  - C/w senna + miralax for constipation  - C/w zosyn  - Midline placed in LA (22)  - Per GI recs,  surgery consult & low fiber diet as tolerated  - Per surgery, no further intervention at this time  - Outpt colonoscopy in 6-8 weeks upon d/c. Patient has not had a colonoscopy in > 10 years.   - Can follow up with surgery Dr. Isaacs as an outpatient   - KUB performed 22 to assess stool burden: non-obstructing gas pattern, FOS  - Started on PO Zofran and transitioned to IV Zofran prn 2/2 vomiting (will order EKG if given more than 3 doses).  - : QTc 537, TWI on EKG, f/u trop. pt with N/V, switched zofran to chlorpromazine 25mg  - 22: c/o worsening abd pain, unable to tolerate diet  - CT A/P (22): Stable rectosigmoid diverticulitis, no abscess.  - Parenteral nutrition  -patient tolerating liquids and minced foods well,+bowel movements  -diet advanced to regular -continue with PPN, monitor diet tolerance, nutrition following   -as per surgery: patient will require bowel regimen on discharge, no further interventions, f/u Dr. Isaacs outpatient    #Urinary retention/Dehydration  -s/p garcia  maintain garcia 2-3 weeks and will f/u for TOV as outpatient   monitor output  -PPN began   -continue to monitor electrolytes    #TWI on EKG  - : Repeat EKG showed TWI in lead II, III, and AVF.   - : Trop <0.01 x 1    #COVID-19, asymptomatic, incidental  - VS stable.   - No need for steroids.   - Cont monitor    #HTN  - well controlled without medications    #Dementia  -AOx1 at baseline  -supportive care                                                                              ----------------------------------------------------  # DVT prophylaxis Lovenox  # GI prophylaxis Protonix  # Diet - Regular as tolerated,   # Code status Full  # Disposition - acute, STR at [facility] via ambulance                                                                     --------------------------------------------------------  # Handoff    continue parenteral nutrition, diet advanced to regular: monitor tolerance                                                                        KEISHA PANTOJA 77y Female  MRN#: [id number]     Hospital Day: 11d    Pt is currently admitted with the primary diagnosis of diverticulitis complicated by urinary retention    SUBJECTIVE  No acute events overnight, pt seen and examined this am, no complaints. Denies abd pain, nausea or vomiting Bowel sounds present,  +BM this overnight, tolerated diet upgrade well.                                           ----------------------------------------------------------  OBJECTIVE  PAST MEDICAL & SURGICAL HISTORY  Hypertension, unspecified type    H/O dilation and curettage  for missed                                               -----------------------------------------------------------  ALLERGIES:  No Known Allergies                                            ------------------------------------------------------------    HOME MEDICATIONS  Home Medications:                           MEDICATIONS:  STANDING MEDICATIONS  donepezil 5 milliGRAM(s) Oral at bedtime  enoxaparin Injectable 40 milliGRAM(s) SubCutaneous every 24 hours  fat emulsion (Plant Based) 20% Infusion 1.579 Gm/kG/Day IV Continuous <Continuous>  fat emulsion (Plant Based) 20% Infusion 1.579 Gm/kG/Day IV Continuous <Continuous>  melatonin 10 milliGRAM(s) Oral at bedtime  ondansetron Injectable 4 milliGRAM(s) IV Push once  pantoprazole    Tablet 40 milliGRAM(s) Oral before breakfast  Parenteral Nutrition - Adult 1 Each TPN Continuous <Continuous>  Parenteral Nutrition - Adult 1 Each TPN Continuous <Continuous>  simethicone 80 milliGRAM(s) Chew three times a day    PRN MEDICATIONS  acetaminophen     Tablet .. 650 milliGRAM(s) Oral every 6 hours PRN                                            ------------------------------------------------------------  VITAL SIGNS: Last 24 Hours  T(C): 36.4 (2022 05:00), Max: 36.4 (2022 05:00)  T(F): 97.6 (2022 05:00), Max: 97.6 (2022 05:00)  HR: 58 (2022 05:00) (58 - 73)  BP: 128/67 (2022 05:00) (128/67 - 134/65)  BP(mean): --  RR: 18 (2022 05:00) (18 - 18)  SpO2: 100% (2022 05:00) (99% - 100%)      22 @ 07:01  -  22 @ 07:00  --------------------------------------------------------  IN: 2030 mL / OUT: 5250 mL / NET: -3220 mL                                             --------------------------------------------------------------  LABS:                        10.2   6.49  )-----------( 399      ( 2022 08:21 )             32.6         139  |  103  |  6<L>  ----------------------------<  122<H>  4.5   |  27  |  0.5<L>    Ca    8.8      2022 08:21  Phos  3.0     07-  Mg     2.0     07-    TPro  5.5<L>  /  Alb  2.6<L>  /  TBili  <0.2  /  DBili  x   /  AST  12  /  ALT  10  /  AlkPhos  59  07-07                                                              -------------------------------------------------------------  RADIOLOGY:  CT Abdomen - :   IMPRESSION:  Unchanged rectosigmoid diverticulitis. No focal fluid collection.    CXR 7/3:  Impression:  There is no evidence of focal consolidation, pleural effusion or   pneumothorax.    XR Abdomen   IMPRESSION:  Nonspecific nonobstructive bowel gas pattern with mild fecal retention.                                        --------------------------------------------------------------    PHYSICAL EXAM:  General: No acute distress, well-developed, well-groomed  LUNGS: Clear to auscultation bilaterally; no wheezes, rales, or rhonchi  HEART: Regular rate and rhythm; no murmurs, rubs, or gallops  ABDOMEN:  Soft, non-tender to palpation, moderately distended; bowel sounds present  EXT:  2+ peripheral pulses; no edema, clubbing of cyanosis  NEURO: No focal deficits AOx1 at at baseline                                               --------------------------------------------------------------    ASSESSMENT & PLAN  76yo F PMHx of dementia and HTN, recent hospital course for diverticulitis presents to the ED with complaints of abdominal pain consistent with diverticulitis.    #Acute diverticulitis v. Stercoral-colitis with perisigmoid Lymphadenopathy   #Constipation  - Failed outpt therapy [10-day course of cipro+flagyl (end date 22)]   - CT A+P- findings consistent with unchanged diverticulitis/colitis + diverticula, pericolonic infiltrative changes. Adjacent small bowel loops have wall thickening as well   - Loose stools likely from overflow leaking from constipation demonstrated on CT A+P  - C/w senna + miralax for constipation  - stopping zosyn now that pain improved and greater then 7 days   - Midline placed in LA (22)  - Per GI recs,  surgery consult & low fiber diet as tolerated  - Per surgery, no further intervention at this time  - Outpt colonoscopy in 6-8 weeks upon d/c. Patient has not had a colonoscopy in > 10 years.   - Can follow up with surgery Dr. Isaacs as an outpatient   - KUB performed 22 to assess stool burden: non-obstructing gas pattern, FOS  - Started on PO Zofran and transitioned to IV Zofran prn 2/2 vomiting (will order EKG if given more than 3 doses).  - : QTc 537, TWI on EKG, f/u trop. pt with N/V, switched zofran to chlorpromazine 25mg  - 22: c/o worsening abd pain, unable to tolerate diet  - CT A/P (22): Stable rectosigmoid diverticulitis, no abscess.  - Parenteral nutrition  -patient tolerating liquids and minced foods well,+bowel movements  -diet advanced to regular -continue with PPN, monitor diet tolerance, nutrition following   -as per surgery: patient will require bowel regimen on discharge, no further interventions, f/u Dr. Isaacs outpatient    #Urinary retention/Dehydration  -s/p garcia  maintain garcia 2-3 weeks and will f/u for TOV as outpatient   monitor output  -PPN began   -continue to monitor electrolytes    #TWI on EKG  - : Repeat EKG showed TWI in lead II, III, and AVF.   - : Trop <0.01 x 1    #COVID-19, asymptomatic, incidental  - VS stable.   - No need for steroids.   - Cont monitor    #HTN  - well controlled without medications    #Dementia  -AOx1 at baseline  -supportive care                                                                              ----------------------------------------------------  # DVT prophylaxis Lovenox  # GI prophylaxis Protonix  # Diet - Regular as tolerated,   # Code status Full  # Disposition - acute, STR at [facility] via ambulance                                                                     --------------------------------------------------------  # Handoff    continue parenteral nutrition, diet advanced to regular: monitor tolerance

## 2022-07-07 NOTE — PROGRESS NOTE ADULT - SUBJECTIVE AND OBJECTIVE BOX
BOSTONSYD KEISHA  77y  Female      Patient is a 77y old  Female who presents with a chief complaint of abdominal pain, rectosigmoid diverticulitis.      INTERVAL HPI/OVERNIGHT EVENTS:      ******************************* REVIEW OF SYSTEMS:*********************************************    All other review of systems negative    *********************** VITALS ******************************************    T(F): 96.5 (07-07-22 @ 12:40)  HR: 70 (07-07-22 @ 12:40) (58 - 73)  BP: 135/66 (07-07-22 @ 12:40) (128/67 - 135/66)  RR: 19 (07-07-22 @ 12:40) (18 - 19)  SpO2: 100% (07-07-22 @ 05:00) (99% - 100%)    07-06-22 @ 07:01  -  07-07-22 @ 07:00  --------------------------------------------------------  IN: 2030 mL / OUT: 5250 mL / NET: -3220 mL    07-07-22 @ 07:01  -  07-07-22 @ 16:12  --------------------------------------------------------  IN: 0 mL / OUT: 2000 mL / NET: -2000 mL            07-06-22 @ 07:01  -  07-07-22 @ 07:00  --------------------------------------------------------  IN: 2030 mL / OUT: 5250 mL / NET: -3220 mL    07-07-22 @ 07:01  -  07-07-22 @ 16:12  --------------------------------------------------------  IN: 0 mL / OUT: 2000 mL / NET: -2000 mL        ******************************** PHYSICAL EXAM:**************************************************  GENERAL: NAD    PSYCH: no agitation, baseline mentation  HEENT:     NERVOUS SYSTEM:  Alert & Oriented X3,   PULMONARY: CARLIN, CTA    CARDIOVASCULAR: S1S2 RRR    GI: Soft, NT, ND; BS present.    EXTREMITIES:  2+ Peripheral Pulses, No clubbing, cyanosis, or edema    LYMPH: No lymphadenopathy noted    SKIN: No rashes or lesions      **************************** LABS *******************************************************                          10.2   6.49  )-----------( 399      ( 07 Jul 2022 08:21 )             32.6     07-07    139  |  103  |  6<L>  ----------------------------<  122<H>  4.5   |  27  |  0.5<L>    Ca    8.8      07 Jul 2022 08:21  Phos  3.0     07-07  Mg     2.0     07-07    TPro  5.5<L>  /  Alb  2.6<L>  /  TBili  <0.2  /  DBili  x   /  AST  12  /  ALT  10  /  AlkPhos  59  07-07          Lactate Trend  07-05 @ 08:31 Lactate:0.6         CAPILLARY BLOOD GLUCOSE              **************************Active Medications *******************************************  No Known Allergies      acetaminophen     Tablet .. 650 milliGRAM(s) Oral every 6 hours PRN  donepezil 5 milliGRAM(s) Oral at bedtime  enoxaparin Injectable 40 milliGRAM(s) SubCutaneous every 24 hours  fat emulsion (Plant Based) 20% Infusion 1.579 Gm/kG/Day IV Continuous <Continuous>  fat emulsion (Plant Based) 20% Infusion 1.579 Gm/kG/Day IV Continuous <Continuous>  melatonin 10 milliGRAM(s) Oral at bedtime  ondansetron Injectable 4 milliGRAM(s) IV Push once  pantoprazole    Tablet 40 milliGRAM(s) Oral before breakfast  Parenteral Nutrition - Adult 1 Each TPN Continuous <Continuous>  Parenteral Nutrition - Adult 1 Each TPN Continuous <Continuous>  simethicone 80 milliGRAM(s) Chew three times a day      ***************************************************  RADIOLOGY & ADDITIONAL TESTS:    Imaging Personally Reviewed:  [ ] YES  [ ] NO    HEALTH ISSUES - PROBLEM Dx:

## 2022-07-07 NOTE — PROGRESS NOTE ADULT - SUBJECTIVE AND OBJECTIVE BOX
GENERAL SURGERY PROGRESS NOTE    Patient: KEISHA PANTOJA , 77y (43)Female   MRN: [id number]  Location: [address]  Visit: 22 Inpatient  Date: 22 @ 01:05    Hospital Day #: 12    Procedure/Dx/Injuries: Diverticulitis    Events of past 24 hours: No acute events overnight    PAST MEDICAL & SURGICAL HISTORY:  Hypertension, unspecified type  H/O dilation and curettage  for missed     Vitals:   T(F): 97.3 (22 @ 19:46), Max: 97.6 (22 @ 05:00)  HR: 73 (22 @ 19:46)  BP: 134/65 (22 @ 19:46)  RR: 18 (22 @ 19:46)  SpO2: 99% (22 @ 19:46)    Fluids:     I & O's:    22 @ 07:01  -  22 @ 07:00  --------------------------------------------------------  IN:    Lactated Ringers: 1300 mL    Oral Fluid: 180 mL  Total IN: 1480 mL    OUT:    Indwelling Catheter - Urethral (mL): 4500 mL  Total OUT: 4500 mL    Total NET: -3020 mL    PHYSICAL EXAM:  General: NAD  Cardiac: RRR S1, S2  Respiratory: normal respiratory effort, breath sounds equal BL  Abdomen: Soft, mildly-distended, positive tenderness in LLQ    MEDICATIONS  (STANDING):  donepezil 5 milliGRAM(s) Oral at bedtime  enoxaparin Injectable 40 milliGRAM(s) SubCutaneous every 24 hours  fat emulsion (Plant Based) 20% Infusion 1.579 Gm/kG/Day (23.4 mL/Hr) IV Continuous <Continuous>  lactated ringers. 1000 milliLiter(s) (130 mL/Hr) IV Continuous <Continuous>  melatonin 10 milliGRAM(s) Oral at bedtime  ondansetron Injectable 4 milliGRAM(s) IV Push once  pantoprazole    Tablet 40 milliGRAM(s) Oral before breakfast  Parenteral Nutrition - Adult 1 Each (75 mL/Hr) TPN Continuous <Continuous>  Parenteral Nutrition - Adult 1 Each (65 mL/Hr) TPN Continuous <Continuous>  piperacillin/tazobactam IVPB.. 3.375 Gram(s) IV Intermittent every 8 hours  simethicone 80 milliGRAM(s) Chew three times a day    MEDICATIONS  (PRN):  acetaminophen     Tablet .. 650 milliGRAM(s) Oral every 6 hours PRN Moderate Pain (4 - 6)    DVT PROPHYLAXIS: enoxaparin Injectable 40 milliGRAM(s) SubCutaneous every 24 hours    GI PROPHYLAXIS: pantoprazole    Tablet 40 milliGRAM(s) Oral before breakfast    ANTICOAGULATION:   ANTIBIOTICS:  piperacillin/tazobactam IVPB.. 3.375 Gram(s)    LAB/STUDIES:  Labs:  CAPILLARY BLOOD GLUCOSE                        9.7    5.72  )-----------( 351      ( 2022 00:06 )             29.6       Auto Neutrophil %: 57.2 % (22 @ 07:07)  Auto Immature Granulocyte %: 1.9 % (22 @ 07:07)        142  |  106  |  4<L>  ----------------------------<  109<H>  4.6   |  28  |  0.6<L>      Calcium, Total Serum: 8.9 mg/dL (22 @ 07:07)    LFTs:             5.3  | <0.2 | 11       ------------------[57      ( 2022 07:07 )  2.5  | x    | 10          Lipase:x      Amylase:x         Lactate, Blood: 0.6 mmol/L (22 @ 08:31)    Coags:    IMAGING:  < from: Xray Abdomen 1 View PORTABLE -Urgent (Xray Abdomen 1 View PORTABLE -Urgent .) (22 @ 14:06) >  IMPRESSION:  Nonspecific nonobstructive bowel gas pattern with mild fecal retention.  --- End of Report ---

## 2022-07-07 NOTE — PROGRESS NOTE ADULT - ASSESSMENT
76yo F PMHx of dementia and HTN, recent hospital course for diverticulitis presents to the ED with complaints of abdominal pain consistent with diverticulitis.    #Acute diverticulitis v. Stercoral-colitis with perisigmoid Lymphadenopathy   #Constipation  - Failed outpt therapy [10-day course of cipro+flagyl (end date 7/2/22)]   - CT A+P- findings consistent with unchanged diverticulitis/colitis + diverticula, pericolonic infiltrative changes. Adjacent small bowel loops have wall thickening as well   - Loose stools likely from overflow leaking from constipation demonstrated on CT A+P  - C/w senna + miralax for constipation  - C/w zosyn  - Midline placed in LA (6/28/22)  - Per GI recs,  surgery consult & low fiber diet as tolerated  - Per surgery, no acute intervention at this time  - Outpt colonoscopy in 6-8 weeks upon d/c. Patient has not had a colonoscopy in > 10 years.   - Can follow up with surgery Dr. Isaacs as an outpatient   - KUB performed 6/30/22 to assess stool burden: non-obstructing gas pattern, FOS  - Started on PO Zofran and transitioned to IV Zofran prn 2/2 vomiting (will order EKG if given more than 3 doses).  - 7/1: QTc 537, TWI on EKG, f/u trop. pt with N/V, switched zofran to chlorpromazine 25mg  - 7/2/22: c/o worsening abd pain, unable to tolerate diet  - CT A/P (7/2/22): Stable rectosigmoid diverticulitis, no abscess.  - Tolerating diet well so far,  advanced to regular today.    c/w PPN for now. Will contact Nutrition re stopping PPN.     #TWI on EKG  - 7/1: Repeat EKG showed TWI in lead II, III, and AVF.   - 7/2: Trop <0.01 x 1    #COVID-19, asymptomatic, incidental  - VS stable.   - No need for steroids.   - Cont monitor    #HTN  - well controlled without medications    #Misc  -DVT PPx: Lovenox  -GI PPx: Protonix.   -Diet: Minced and moist   -Code: Full    #Progress Note Handoff  Pending (specify): PPN/ Diet tolerance   Family discussion: with the SON.   Disposition: [facility]

## 2022-07-08 LAB
ALBUMIN SERPL ELPH-MCNC: 2.7 G/DL — LOW (ref 3.5–5.2)
ALP SERPL-CCNC: 62 U/L — SIGNIFICANT CHANGE UP (ref 30–115)
ALT FLD-CCNC: 10 U/L — SIGNIFICANT CHANGE UP (ref 0–41)
ANION GAP SERPL CALC-SCNC: 10 MMOL/L — SIGNIFICANT CHANGE UP (ref 7–14)
AST SERPL-CCNC: 14 U/L — SIGNIFICANT CHANGE UP (ref 0–41)
BASOPHILS # BLD AUTO: 0.08 K/UL — SIGNIFICANT CHANGE UP (ref 0–0.2)
BASOPHILS NFR BLD AUTO: 0.9 % — SIGNIFICANT CHANGE UP (ref 0–1)
BILIRUB SERPL-MCNC: <0.2 MG/DL — SIGNIFICANT CHANGE UP (ref 0.2–1.2)
BUN SERPL-MCNC: 7 MG/DL — LOW (ref 10–20)
CALCIUM SERPL-MCNC: 8.7 MG/DL — SIGNIFICANT CHANGE UP (ref 8.5–10.1)
CHLORIDE SERPL-SCNC: 102 MMOL/L — SIGNIFICANT CHANGE UP (ref 98–110)
CO2 SERPL-SCNC: 24 MMOL/L — SIGNIFICANT CHANGE UP (ref 17–32)
CREAT SERPL-MCNC: 0.5 MG/DL — LOW (ref 0.7–1.5)
EGFR: 96 ML/MIN/1.73M2 — SIGNIFICANT CHANGE UP
EOSINOPHIL # BLD AUTO: 0.7 K/UL — SIGNIFICANT CHANGE UP (ref 0–0.7)
EOSINOPHIL NFR BLD AUTO: 7.7 % — SIGNIFICANT CHANGE UP (ref 0–8)
GLUCOSE SERPL-MCNC: 106 MG/DL — HIGH (ref 70–99)
HCT VFR BLD CALC: 31 % — LOW (ref 37–47)
HGB BLD-MCNC: 10 G/DL — LOW (ref 12–16)
IMM GRANULOCYTES NFR BLD AUTO: 2.4 % — HIGH (ref 0.1–0.3)
LYMPHOCYTES # BLD AUTO: 1.11 K/UL — LOW (ref 1.2–3.4)
LYMPHOCYTES # BLD AUTO: 12.3 % — LOW (ref 20.5–51.1)
MAGNESIUM SERPL-MCNC: 1.9 MG/DL — SIGNIFICANT CHANGE UP (ref 1.8–2.4)
MCHC RBC-ENTMCNC: 26.5 PG — LOW (ref 27–31)
MCHC RBC-ENTMCNC: 32.3 G/DL — SIGNIFICANT CHANGE UP (ref 32–37)
MCV RBC AUTO: 82.2 FL — SIGNIFICANT CHANGE UP (ref 81–99)
MONOCYTES # BLD AUTO: 1.21 K/UL — HIGH (ref 0.1–0.6)
MONOCYTES NFR BLD AUTO: 13.4 % — HIGH (ref 1.7–9.3)
NEUTROPHILS # BLD AUTO: 5.72 K/UL — SIGNIFICANT CHANGE UP (ref 1.4–6.5)
NEUTROPHILS NFR BLD AUTO: 63.3 % — SIGNIFICANT CHANGE UP (ref 42.2–75.2)
NRBC # BLD: 0 /100 WBCS — SIGNIFICANT CHANGE UP (ref 0–0)
PHOSPHATE SERPL-MCNC: 2.7 MG/DL — SIGNIFICANT CHANGE UP (ref 2.1–4.9)
PLATELET # BLD AUTO: 398 K/UL — SIGNIFICANT CHANGE UP (ref 130–400)
POTASSIUM SERPL-MCNC: 4.6 MMOL/L — SIGNIFICANT CHANGE UP (ref 3.5–5)
POTASSIUM SERPL-SCNC: 4.6 MMOL/L — SIGNIFICANT CHANGE UP (ref 3.5–5)
PROT SERPL-MCNC: 5.7 G/DL — LOW (ref 6–8)
RBC # BLD: 3.77 M/UL — LOW (ref 4.2–5.4)
RBC # FLD: 15.5 % — HIGH (ref 11.5–14.5)
SODIUM SERPL-SCNC: 136 MMOL/L — SIGNIFICANT CHANGE UP (ref 135–146)
WBC # BLD: 9.04 K/UL — SIGNIFICANT CHANGE UP (ref 4.8–10.8)
WBC # FLD AUTO: 9.04 K/UL — SIGNIFICANT CHANGE UP (ref 4.8–10.8)

## 2022-07-08 PROCEDURE — 99233 SBSQ HOSP IP/OBS HIGH 50: CPT

## 2022-07-08 RX ORDER — SENNA PLUS 8.6 MG/1
2 TABLET ORAL
Qty: 60 | Refills: 0
Start: 2022-07-08 | End: 2022-08-06

## 2022-07-08 RX ORDER — DONEPEZIL HYDROCHLORIDE 10 MG/1
1 TABLET, FILM COATED ORAL
Qty: 30 | Refills: 0
Start: 2022-07-08 | End: 2022-08-06

## 2022-07-08 RX ADMIN — PANTOPRAZOLE SODIUM 40 MILLIGRAM(S): 20 TABLET, DELAYED RELEASE ORAL at 05:58

## 2022-07-08 RX ADMIN — DONEPEZIL HYDROCHLORIDE 5 MILLIGRAM(S): 10 TABLET, FILM COATED ORAL at 22:17

## 2022-07-08 RX ADMIN — Medication 10 MILLIGRAM(S): at 22:18

## 2022-07-08 RX ADMIN — SIMETHICONE 80 MILLIGRAM(S): 80 TABLET, CHEWABLE ORAL at 18:38

## 2022-07-08 RX ADMIN — I.V. FAT EMULSION 23.4 GM/KG/DAY: 20 EMULSION INTRAVENOUS at 05:59

## 2022-07-08 RX ADMIN — SIMETHICONE 80 MILLIGRAM(S): 80 TABLET, CHEWABLE ORAL at 22:17

## 2022-07-08 RX ADMIN — Medication 1 EACH: at 05:59

## 2022-07-08 RX ADMIN — SIMETHICONE 80 MILLIGRAM(S): 80 TABLET, CHEWABLE ORAL at 05:58

## 2022-07-08 RX ADMIN — ENOXAPARIN SODIUM 40 MILLIGRAM(S): 100 INJECTION SUBCUTANEOUS at 18:39

## 2022-07-08 NOTE — PROGRESS NOTE ADULT - ASSESSMENT
ASSESSMENT:  77y F admitted with rectosigmoid diverticulitis    PLAN:  -Patient tolerating regular diet well   -Will require bowel regiment on discharge  -Monitor for worsening abdominal pain  -Monitor garcia output   -DVT/GI prophylaxis  -SCDs, IS  -encourage ambulation  -encourage incentive spirometry  -discharge per medicine     [phone]

## 2022-07-08 NOTE — PROGRESS NOTE ADULT - ASSESSMENT
78yo F PMHx of dementia and HTN, recent hospital course for diverticulitis presents to the ED with complaints of abdominal pain consistent with diverticulitis.    #Acute diverticulitis v. Stercoral-colitis with perisigmoid Lymphadenopathy   #Constipation  - Failed outpt therapy [10-day course of cipro+flagyl (end date 7/2/22)]   - CT A+P- findings consistent with unchanged diverticulitis/colitis + diverticula, pericolonic infiltrative changes. Adjacent small bowel loops have wall thickening as well   - Loose stools likely from overflow leaking from constipation demonstrated on CT A+P  - C/w senna + miralax for constipation  - s/p zosyn  - Midline placed in LA (6/28/22) > dc on discharge  - Per surgery, no acute intervention at this time  - Outpt colonoscopy in 6-8 weeks upon d/c. Patient has not had a colonoscopy in > 10 years.   - Can follow up with surgery Dr. Isaacs as an outpatient   - CT A/P (7/2/22): Stable rectosigmoid diverticulitis, no abscess.  - Tolerating diet well so far,  advanced to regular .    will dc  PPN, plan d/w the Nutrition.      #TWI on EKG  - 7/1: Repeat EKG showed TWI in lead II, III, and AVF.   - 7/2: Trop <0.01 x 1    #COVID-19, asymptomatic, incidental  - VS stable.   - No need for steroids.   - Cont monitor    #HTN  - well controlled without medications    #Misc  -DVT PPx: Lovenox  -GI PPx: Protonix.   -Diet: Minced and moist   -Code: Full    DC planning.   #Progress Note Handoff  Pending (specify):  Diet tolerance  Family discussion: with the SON.   Disposition: [facility]

## 2022-07-08 NOTE — PROGRESS NOTE ADULT - PROVIDER SPECIALTY LIST ADULT
Hospitalist
Internal Medicine
Gastroenterology
Hospitalist
Hospitalist
Internal Medicine
Surgery
Infectious Disease
Internal Medicine
Surgery
Hospitalist
Surgery
21-Feb-2020 11:35

## 2022-07-08 NOTE — MEDICAL STUDENT PROGRESS NOTE(EDUCATION) - SUBJECTIVE AND OBJECTIVE BOX
KEISHA PANTOJA 77y Female  MRN#: [id number]     Hospital Day: 12d    Pt is currently admitted with the primary diagnosis of diverticulitis complicated by urinary retention      SUBJECTIVE  No acute events overnight, pt seen and examined this am, no complaints. Denies abd pain, nausea or vomiting Bowel sounds present,  +BM yesterday, tolerated regular diet  well.                                          ----------------------------------------------------------  OBJECTIVE  PAST MEDICAL & SURGICAL HISTORY  Hypertension, unspecified type    H/O dilation and curettage  for missed                                               -----------------------------------------------------------  ALLERGIES:  No Known Allergies                                            ------------------------------------------------------------    HOME MEDICATIONS  Home Medications:                           MEDICATIONS:  STANDING MEDICATIONS  donepezil 5 milliGRAM(s) Oral at bedtime  enoxaparin Injectable 40 milliGRAM(s) SubCutaneous every 24 hours  melatonin 10 milliGRAM(s) Oral at bedtime  ondansetron Injectable 4 milliGRAM(s) IV Push once  pantoprazole    Tablet 40 milliGRAM(s) Oral before breakfast  simethicone 80 milliGRAM(s) Chew three times a day    PRN MEDICATIONS  acetaminophen     Tablet .. 650 milliGRAM(s) Oral every 6 hours PRN                                            ------------------------------------------------------------  VITAL SIGNS: Last 24 Hours  T(C): 36.2 (2022 05:00), Max: 36.4 (2022 21:27)  T(F): 97.1 (2022 05:00), Max: 97.5 (2022 21:27)  HR: 82 (2022 05:00) (70 - 86)  BP: 127/60 (2022 05:00) (124/62 - 135/66)  BP(mean): 87 (2022 21:27) (87 - 87)  RR: 18 (2022 05:00) (18 - 19)  SpO2: 100% (2022 05:00) (99% - 100%)      22 @ 07:01  -  22 @ 07:00  --------------------------------------------------------  IN: 750 mL / OUT: 5700 mL / NET: -4950 mL                                             --------------------------------------------------------------  LABS:                        10.0   9.04  )-----------( 398      ( 2022 08:28 )             31.0     07-08    136  |  102  |  7<L>  ----------------------------<  106<H>  4.6   |  24  |  0.5<L>    Ca    8.7      2022 08:28  Phos  2.7     07-08  Mg     1.9     07-08    TPro  5.7<L>  /  Alb  2.7<L>  /  TBili  <0.2  /  DBili  x   /  AST  14  /  ALT  10  /  AlkPhos  62  07-08                                                              -------------------------------------------------------------  RADIOLOGY:  CT Abdomen - :   IMPRESSION:  Unchanged rectosigmoid diverticulitis. No focal fluid collection.    CXR 7/3:  Impression:  There is no evidence of focal consolidation, pleural effusion or   pneumothorax.    XR Abdomen   IMPRESSION:  Nonspecific nonobstructive bowel gas pattern with mild fecal retention.                                           --------------------------------------------------------------    PHYSICAL EXAM:  General: No acute distress, well-developed, well-groomed  LUNGS: Clear to auscultation bilaterally; no wheezes, rales, or rhonchi  HEART: Regular rate and rhythm; no murmurs, rubs, or gallops  ABDOMEN:  Soft, non-tender to palpation, nondistended; bowel sounds present  EXT:  2+ peripheral pulses; no edema, clubbing of cyanosis  NEURO: No focal deficits AOx1 at at baseline                                             --------------------------------------------------------------    ASSESSMENT & PLAN  76yo F PMHx of dementia and HTN, recent hospital course for diverticulitis presents to the ED with complaints of abdominal pain consistent with diverticulitis.    #Acute diverticulitis v. Stercoral-colitis with perisigmoid Lymphadenopathy   #Constipation  - Failed outpt therapy [10-day course of cipro+flagyl (end date 22)]   - CT A+P- findings consistent with unchanged diverticulitis/colitis + diverticula, pericolonic infiltrative changes. Adjacent small bowel loops have wall thickening as well   - Loose stools likely from overflow leaking from constipation demonstrated on CT A+P  - C/w senna + miralax for constipation  - stopping zosyn now that pain improved and greater then 7 days   - Midline placed in LA (22)  - Per GI recs,  surgery consult & low fiber diet as tolerated  - Per surgery, no further intervention at this time  - Outpt colonoscopy in 6-8 weeks upon d/c. Patient has not had a colonoscopy in > 10 years.   - Can follow up with surgery Dr. Isaacs as an outpatient   - KUB performed 22 to assess stool burden: non-obstructing gas pattern, FOS  - Started on PO Zofran and transitioned to IV Zofran prn 2/2 vomiting (will order EKG if given more than 3 doses).  - : QTc 537, TWI on EKG, f/u trop. pt with N/V, switched zofran to chlorpromazine 25mg  - 22: c/o worsening abd pain, unable to tolerate diet  - CT A/P (22): Stable rectosigmoid diverticulitis, no abscess.  - Parenteral nutrition  -as per surgery: patient will require bowel regimen on discharge, no further interventions, f/u Dr. Isaacs outpatient  -patient tolerant to stepwise diet upgrades, currently on regular diet  -f/u nutrition to d/c PPN    #Urinary retention/Dehydration  -s/p garcia  maintain garcia 2-3 weeks and will f/u for TOV as outpatient   monitor output  -PPN began   -continue to monitor electrolytes    #TWI on EKG  - : Repeat EKG showed TWI in lead II, III, and AVF.   - : Trop <0.01 x 1    #COVID-19, asymptomatic, incidental  - VS stable.   - No need for steroids.   - Cont monitor    #HTN  - well controlled without medications    #Dementia  -AOx1 at baseline  -supportive care                                                                              ----------------------------------------------------  # DVT prophylaxis Lovenox  # GI prophylaxis Protonix  # Diet - Regular   # Code status Full  # Disposition - acute awaiting PPN removal, STR at [facility] via ambulance possible this weekend                                                                     --------------------------------------------------------  # Handoff   f/u nutrition to d/c PPN, continue to monitor diet tolerance,                                                                                    KEISHA PANTOJA 77y Female  MRN#: [id number]     Hospital Day: 12d    Pt is currently admitted with the primary diagnosis of diverticulitis complicated by urinary retention      SUBJECTIVE  No acute events overnight, pt seen and examined this am, no complaints. Denies abd pain, nausea or vomiting Bowel sounds present,  +BM yesterday, tolerated regular diet  well.                                          ----------------------------------------------------------  OBJECTIVE  PAST MEDICAL & SURGICAL HISTORY  Hypertension, unspecified type    H/O dilation and curettage  for missed                                               -----------------------------------------------------------  ALLERGIES:  No Known Allergies                                            ------------------------------------------------------------    HOME MEDICATIONS  Home Medications:                           MEDICATIONS:  STANDING MEDICATIONS  donepezil 5 milliGRAM(s) Oral at bedtime  enoxaparin Injectable 40 milliGRAM(s) SubCutaneous every 24 hours  melatonin 10 milliGRAM(s) Oral at bedtime  ondansetron Injectable 4 milliGRAM(s) IV Push once  pantoprazole    Tablet 40 milliGRAM(s) Oral before breakfast  simethicone 80 milliGRAM(s) Chew three times a day    PRN MEDICATIONS  acetaminophen     Tablet .. 650 milliGRAM(s) Oral every 6 hours PRN                                            ------------------------------------------------------------  VITAL SIGNS: Last 24 Hours  T(C): 36.2 (2022 05:00), Max: 36.4 (2022 21:27)  T(F): 97.1 (2022 05:00), Max: 97.5 (2022 21:27)  HR: 82 (2022 05:00) (70 - 86)  BP: 127/60 (2022 05:00) (124/62 - 135/66)  BP(mean): 87 (2022 21:27) (87 - 87)  RR: 18 (2022 05:00) (18 - 19)  SpO2: 100% (2022 05:00) (99% - 100%)      22 @ 07:01  -  22 @ 07:00  --------------------------------------------------------  IN: 750 mL / OUT: 5700 mL / NET: -4950 mL                                             --------------------------------------------------------------  LABS:                        10.0   9.04  )-----------( 398      ( 2022 08:28 )             31.0     07-08    136  |  102  |  7<L>  ----------------------------<  106<H>  4.6   |  24  |  0.5<L>    Ca    8.7      2022 08:28  Phos  2.7     07-08  Mg     1.9     07-08    TPro  5.7<L>  /  Alb  2.7<L>  /  TBili  <0.2  /  DBili  x   /  AST  14  /  ALT  10  /  AlkPhos  62  07-08                                                              -------------------------------------------------------------  RADIOLOGY:  CT Abdomen - :   IMPRESSION:  Unchanged rectosigmoid diverticulitis. No focal fluid collection.    CXR 7/3:  Impression:  There is no evidence of focal consolidation, pleural effusion or   pneumothorax.    XR Abdomen   IMPRESSION:  Nonspecific nonobstructive bowel gas pattern with mild fecal retention.                                           --------------------------------------------------------------    PHYSICAL EXAM:  General: No acute distress, well-developed, well-groomed  LUNGS: Clear to auscultation bilaterally; no wheezes, rales, or rhonchi  HEART: Regular rate and rhythm; no murmurs, rubs, or gallops  ABDOMEN:  Soft, non-tender to palpation, nondistended; bowel sounds present  EXT:  2+ peripheral pulses; no edema, clubbing of cyanosis  NEURO: No focal deficits AOx1 at at baseline                                             --------------------------------------------------------------    ASSESSMENT & PLAN  76yo F PMHx of dementia and HTN, recent hospital course for diverticulitis presents to the ED with complaints of abdominal pain consistent with diverticulitis.    #Acute diverticulitis v. Stercoral-colitis with perisigmoid Lymphadenopathy   #Constipation  - Failed outpt therapy [10-day course of cipro+flagyl (end date 22)]   - CT A+P- findings consistent with unchanged diverticulitis/colitis + diverticula, pericolonic infiltrative changes. Adjacent small bowel loops have wall thickening as well   - Loose stools likely from overflow leaking from constipation demonstrated on CT A+P  - C/w senna + miralax for constipation  - stopping zosyn now that pain improved and greater then 7 days   - Midline placed in LA (22)  - Per GI recs,  surgery consult & low fiber diet as tolerated  - Per surgery, no further intervention at this time  - Outpt colonoscopy in 6-8 weeks upon d/c. Patient has not had a colonoscopy in > 10 years.   - KUB performed 22 to assess stool burden: non-obstructing gas pattern, FOS  - Started on PO Zofran and transitioned to IV Zofran prn 2/2 vomiting (will order EKG if given more than 3 doses).  - : QTc 537, TWI on EKG, f/u trop. pt with N/V, switched zofran to chlorpromazine 25mg  - 22: c/o worsening abd pain, unable to tolerate diet  - CT A/P (22): Stable rectosigmoid diverticulitis, no abscess.  - Parenteral nutrition  -as per surgery: patient will require bowel regimen on discharge, no further interventions  -patient tolerant to stepwise diet upgrades, currently on regular diet  -can d/c ppn as per nutrition     #Urinary retention/Dehydration  -s/p garcia  maintain garcia 2-3 weeks and will f/u for TOV as outpatient   monitor output  -PPN began   -continue to monitor electrolytes    #TWI on EKG  - : Repeat EKG showed TWI in lead II, III, and AVF.   - : Trop <0.01 x 1    #COVID-19, asymptomatic, incidental  - VS stable.   - No need for steroids.   - Cont monitor    #HTN  - well controlled without medications    #Dementia  -AOx1 at baseline  -supportive care                                                                              ----------------------------------------------------  # DVT prophylaxis Lovenox  # GI prophylaxis Protonix  # Diet - Regular   # Code status Full  # Disposition -str                                                                      --------------------------------------------------------

## 2022-07-08 NOTE — PROGRESS NOTE ADULT - SUBJECTIVE AND OBJECTIVE BOX
GENERAL SURGERY PROGRESS NOTE    Patient: KEISHA PANTOJA , 77y (43)Female   MRN: [id number]  Location: [address]  Visit: 22 Inpatient  Date: 22 @ 02:02    Hospital Day #: 13    Procedure/Dx/Injuries: rectosigmoid diverticulitis     Events of past 24 hours:  -patient advance to regular diet, tolerating well     PAST MEDICAL & SURGICAL HISTORY:  Hypertension, unspecified type  H/O dilation and curettage  for missed       Vitals:   T(F): 97.5 (22 @ 21:27), Max: 97.6 (22 @ 05:00)  HR: 86 (22 @ 21:27)  BP: 124/62 (22 @ 21:27)  RR: 18 (22 @ 21:27)  SpO2: 99% (22 @ 12:40)    Diet, Regular    I & O's:    22 @ 07:01  -  22 @ 07:00  --------------------------------------------------------  IN:    Lactated Ringers: 1430 mL    Oral Fluid: 600 mL  Total IN: 2030 mL    OUT:    Indwelling Catheter - Urethral (mL): 5250 mL  Total OUT: 5250 mL    Total NET: -3220 mL    Bowel Movement: : [] YES [] NO  Flatus: : [] YES [] NO    PHYSICAL EXAM:  General: NAD, AAOx3, calm and cooperative  Cardiac: RRR S1, S2  Respiratory: normal respiratory effort  Abdomen: Soft, non-distended, non-tender  Skin: Warm/dry, normal color, no jaundice  Incision/wound: healing well, dressings in place, clean, dry and intact    MEDICATIONS  (STANDING):  donepezil 5 milliGRAM(s) Oral at bedtime  enoxaparin Injectable 40 milliGRAM(s) SubCutaneous every 24 hours  fat emulsion (Plant Based) 20% Infusion 1.579 Gm/kG/Day (23.4 mL/Hr) IV Continuous <Continuous>  melatonin 10 milliGRAM(s) Oral at bedtime  ondansetron Injectable 4 milliGRAM(s) IV Push once  pantoprazole    Tablet 40 milliGRAM(s) Oral before breakfast  Parenteral Nutrition - Adult 1 Each (65 mL/Hr) TPN Continuous <Continuous>  Parenteral Nutrition - Adult 1 Each (65 mL/Hr) TPN Continuous <Continuous>  simethicone 80 milliGRAM(s) Chew three times a day    MEDICATIONS  (PRN):  acetaminophen     Tablet .. 650 milliGRAM(s) Oral every 6 hours PRN Moderate Pain (4 - 6)    DVT PROPHYLAXIS: enoxaparin Injectable 40 milliGRAM(s) SubCutaneous every 24 hours    GI PROPHYLAXIS: pantoprazole    Tablet 40 milliGRAM(s) Oral before breakfast    ANTICOAGULATION:   ANTIBIOTICS:      LAB/STUDIES:  Labs:  CAPILLARY BLOOD GLUCOSE                        10.2   6.49  )-----------( 399      ( 2022 08:21 )             32.6       Auto Neutrophil %: 56.1 % (22 @ 08:21)  Auto Immature Granulocyte %: 2.6 % (22 @ 08:21)        139  |  103  |  6<L>  ----------------------------<  122<H>  4.5   |  27  |  0.5<L>      Calcium, Total Serum: 8.8 mg/dL (22 @ 08:21)      LFTs:             5.5  | <0.2 | 12       ------------------[59      ( 2022 08:21 )  2.6  | x    | 10          Lipase:x      Amylase:x         Lactate, Blood: 0.6 mmol/L (22 @ 08:31)

## 2022-07-08 NOTE — PROGRESS NOTE ADULT - SUBJECTIVE AND OBJECTIVE BOX
BOSTONSYD KEISHA  77y  Female      Patient is a 77y old  Female who presents with a chief complaint of abdominal pain, rectosigmoid diverticulitis.      INTERVAL HPI/OVERNIGHT EVENTS:      ******************************* REVIEW OF SYSTEMS:**********************************************    All other review of systems negative    *********************** VITALS ******************************************    T(F): 99.9 (07-08-22 @ 14:30)  HR: 55 (07-08-22 @ 14:30) (55 - 86)  BP: 106/59 (07-08-22 @ 14:30) (106/59 - 127/60)  RR: 18 (07-08-22 @ 14:30) (18 - 18)  SpO2: 100% (07-08-22 @ 05:00) (100% - 100%)    07-07-22 @ 07:01  -  07-08-22 @ 07:00  --------------------------------------------------------  IN: 750 mL / OUT: 5700 mL / NET: -4950 mL            07-07-22 @ 07:01  -  07-08-22 @ 07:00  --------------------------------------------------------  IN: 750 mL / OUT: 5700 mL / NET: -4950 mL        ******************************** PHYSICAL EXAM:**************************************************  GENERAL: NAD    PSYCH: no agitation, baseline mentation  HEENT:     NERVOUS SYSTEM:  Alert & Oriented X3,   PULMONARY: CARLIN, CTA    CARDIOVASCULAR: S1S2 RRR    GI: Soft, NT, ND; BS present.    EXTREMITIES:  2+ Peripheral Pulses, No clubbing, cyanosis, or edema    LYMPH: No lymphadenopathy noted    SKIN: No rashes or lesions      **************************** LABS *******************************************************                          10.0   9.04  )-----------( 398      ( 08 Jul 2022 08:28 )             31.0     07-08    136  |  102  |  7<L>  ----------------------------<  106<H>  4.6   |  24  |  0.5<L>    Ca    8.7      08 Jul 2022 08:28  Phos  2.7     07-08  Mg     1.9     07-08    TPro  5.7<L>  /  Alb  2.7<L>  /  TBili  <0.2  /  DBili  x   /  AST  14  /  ALT  10  /  AlkPhos  62  07-08          Lactate Trend  07-05 @ 08:31 Lactate:0.6         CAPILLARY BLOOD GLUCOSE              **************************Active Medications *******************************************  No Known Allergies      acetaminophen     Tablet .. 650 milliGRAM(s) Oral every 6 hours PRN  donepezil 5 milliGRAM(s) Oral at bedtime  enoxaparin Injectable 40 milliGRAM(s) SubCutaneous every 24 hours  melatonin 10 milliGRAM(s) Oral at bedtime  ondansetron Injectable 4 milliGRAM(s) IV Push once  pantoprazole    Tablet 40 milliGRAM(s) Oral before breakfast  simethicone 80 milliGRAM(s) Chew three times a day      ***************************************************  RADIOLOGY & ADDITIONAL TESTS:    Imaging Personally Reviewed:  [ ] YES  [ ] NO    HEALTH ISSUES - PROBLEM Dx:

## 2022-07-08 NOTE — PROGRESS NOTE ADULT - REASON FOR ADMISSION
abdominal pain, rectosigmoid diverticulitis

## 2022-07-08 NOTE — PROGRESS NOTE ADULT - NUTRITIONAL ASSESSMENT
This patient has been assessed with a concern for Malnutrition and has been determined to have a diagnosis/diagnoses of Severe protein-calorie malnutrition and Underweight (BMI < 19).    This patient is being managed with:   Parenteral Nutrition - Adult-  Entered: Jul 6 2022  8:00PM    fat emulsion (Plant Based) 20% Infusion-  75 Gram(s) in IV Solution 375 milliLiter(s) infuse at 23.4 mL/Hr  Dose Rate: 1.579 Gm/kG/Day Infuse Over: 16 Hours; Stop After 16 Hours  Administration Instructions: Use 1.2 micron in-line filter  Entered: Jul 6 2022  8:00PM    Diet Minced and Moist-  Entered: Jul 6 2022 10:34AM    Parenteral Nutrition - Adult-  Entered: Jul 5 2022  8:00PM    fat emulsion (Plant Based) 20% Infusion-  75 Gram(s) in IV Solution 375 milliLiter(s) infuse at 23.4 mL/Hr  Dose Rate: 1.579 Gm/kG/Day Infuse Over: 16 Hours; Stop After 16 Hours  Administration Instructions: Use 1.2 micron in-line filter  Entered: Jul 5 2022  8:00PM    
This patient has been assessed with a concern for Malnutrition and has been determined to have a diagnosis/diagnoses of Severe protein-calorie malnutrition and Underweight (BMI < 19).    This patient is being managed with:   Diet Full Liquid-  Entered: Jul 2 2022  4:37PM    
This patient has been assessed with a concern for Malnutrition and has been determined to have a diagnosis/diagnoses of Severe protein-calorie malnutrition and Underweight (BMI < 19).    This patient is being managed with:   Diet Full Liquid-  Entered: Jul 2 2022  4:37PM    
This patient has been assessed with a concern for Malnutrition and has been determined to have a diagnosis/diagnoses of Severe protein-calorie malnutrition and Underweight (BMI < 19).    This patient is being managed with:   Parenteral Nutrition - Adult-  Entered: Jul 7 2022  8:00PM    fat emulsion (Plant Based) 20% Infusion-  75 Gram(s) in IV Solution 375 milliLiter(s) infuse at 23.4 mL/Hr  Dose Rate: 1.579 Gm/kG/Day Infuse Over: 16 Hours; Stop After 16 Hours  Administration Instructions: Use 1.2 micron in-line filter  Entered: Jul 7 2022  8:00PM    Diet Regular-  Entered: Jul 7 2022  9:57AM    Parenteral Nutrition - Adult-  Entered: Jul 6 2022  8:00PM    fat emulsion (Plant Based) 20% Infusion-  75 Gram(s) in IV Solution 375 milliLiter(s) infuse at 23.4 mL/Hr  Dose Rate: 1.579 Gm/kG/Day Infuse Over: 16 Hours; Stop After 16 Hours  Administration Instructions: Use 1.2 micron in-line filter  Entered: Jul 6 2022  8:00PM    
This patient has been assessed with a concern for Malnutrition and has been determined to have a diagnosis/diagnoses of Severe protein-calorie malnutrition and Underweight (BMI < 19).    This patient is being managed with:   Diet Full Liquid-  Entered: Jul 2 2022  4:37PM    
This patient has been assessed with a concern for Malnutrition and has been determined to have a diagnosis/diagnoses of Severe protein-calorie malnutrition and Underweight (BMI < 19).    This patient is being managed with:   Parenteral Nutrition - Adult-  Entered: Jul 6 2022  8:00PM    fat emulsion (Plant Based) 20% Infusion-  75 Gram(s) in IV Solution 375 milliLiter(s) infuse at 23.4 mL/Hr  Dose Rate: 1.579 Gm/kG/Day Infuse Over: 16 Hours; Stop After 16 Hours  Administration Instructions: Use 1.2 micron in-line filter  Entered: Jul 6 2022  8:00PM    Diet Minced and Moist-  Entered: Jul 6 2022 10:34AM    Parenteral Nutrition - Adult-  Entered: Jul 5 2022  8:00PM    
This patient has been assessed with a concern for Malnutrition and has been determined to have a diagnosis/diagnoses of Severe protein-calorie malnutrition and Underweight (BMI < 19).    This patient is being managed with:   Diet Clear Liquid-  Entered: Jul  3 2022  1:18PM    
This patient has been assessed with a concern for Malnutrition and has been determined to have a diagnosis/diagnoses of Severe protein-calorie malnutrition and Underweight (BMI < 19).    This patient is being managed with:   Diet Clear Liquid-  Entered: Jul  3 2022  1:18PM    
This patient has been assessed with a concern for Malnutrition and has been determined to have a diagnosis/diagnoses of Severe protein-calorie malnutrition and Underweight (BMI < 19).    This patient is being managed with:   Diet Full Liquid-  Fiber/Residue Restricted  Entered: Jul 4 2022  2:44PM    
This patient has been assessed with a concern for Malnutrition and has been determined to have a diagnosis/diagnoses of Severe protein-calorie malnutrition and Underweight (BMI < 19).    This patient is being managed with:   Diet Regular-  Entered: Jul 7 2022  9:57AM    
This patient has been assessed with a concern for Malnutrition and has been determined to have a diagnosis/diagnoses of Severe protein-calorie malnutrition and Underweight (BMI < 19).    This patient is being managed with:   Parenteral Nutrition - Adult-  Entered: Jul 5 2022  8:00PM    fat emulsion (Plant Based) 20% Infusion-  75 Gram(s) in IV Solution 375 milliLiter(s) infuse at 23.4 mL/Hr  Dose Rate: 1.579 Gm/kG/Day Infuse Over: 16 Hours; Stop After 16 Hours  Administration Instructions: Use 1.2 micron in-line filter  Entered: Jul 5 2022  8:00PM    Diet Full Liquid-  Fiber/Residue Restricted  Entered: Jul 4 2022  2:44PM    
This patient has been assessed with a concern for Malnutrition and has been determined to have a diagnosis/diagnoses of Severe protein-calorie malnutrition and Underweight (BMI < 19).    This patient is being managed with:   Parenteral Nutrition - Adult-  Entered: Jul 5 2022  8:00PM    fat emulsion (Plant Based) 20% Infusion-  75 Gram(s) in IV Solution 375 milliLiter(s) infuse at 23.4 mL/Hr  Dose Rate: 1.579 Gm/kG/Day Infuse Over: 16 Hours; Stop After 16 Hours  Administration Instructions: Use 1.2 micron in-line filter  Entered: Jul 5 2022  8:00PM    Diet Full Liquid-  Fiber/Residue Restricted  Entered: Jul 4 2022  2:44PM    
This patient has been assessed with a concern for Malnutrition and has been determined to have a diagnosis/diagnoses of Severe protein-calorie malnutrition and Underweight (BMI < 19).    This patient is being managed with:   Parenteral Nutrition - Adult-  Entered: Jul 7 2022  8:00PM    fat emulsion (Plant Based) 20% Infusion-  75 Gram(s) in IV Solution 375 milliLiter(s) infuse at 23.4 mL/Hr  Dose Rate: 1.579 Gm/kG/Day Infuse Over: 16 Hours; Stop After 16 Hours  Administration Instructions: Use 1.2 micron in-line filter  Entered: Jul 7 2022  8:00PM    Diet Regular-  Entered: Jul 7 2022  9:57AM    Parenteral Nutrition - Adult-  Entered: Jul 6 2022  8:00PM

## 2022-07-09 VITALS
SYSTOLIC BLOOD PRESSURE: 118 MMHG | TEMPERATURE: 98 F | DIASTOLIC BLOOD PRESSURE: 56 MMHG | HEART RATE: 78 BPM | RESPIRATION RATE: 15 BRPM

## 2022-07-09 LAB
ALBUMIN SERPL ELPH-MCNC: 2.8 G/DL — LOW (ref 3.5–5.2)
ALP SERPL-CCNC: 64 U/L — SIGNIFICANT CHANGE UP (ref 30–115)
ALT FLD-CCNC: 11 U/L — SIGNIFICANT CHANGE UP (ref 0–41)
ANION GAP SERPL CALC-SCNC: 11 MMOL/L — SIGNIFICANT CHANGE UP (ref 7–14)
AST SERPL-CCNC: 17 U/L — SIGNIFICANT CHANGE UP (ref 0–41)
BASOPHILS # BLD AUTO: 0.26 K/UL — HIGH (ref 0–0.2)
BASOPHILS NFR BLD AUTO: 4.4 % — HIGH (ref 0–1)
BILIRUB SERPL-MCNC: 0.2 MG/DL — SIGNIFICANT CHANGE UP (ref 0.2–1.2)
BUN SERPL-MCNC: 8 MG/DL — LOW (ref 10–20)
CALCIUM SERPL-MCNC: 9 MG/DL — SIGNIFICANT CHANGE UP (ref 8.5–10.1)
CHLORIDE SERPL-SCNC: 102 MMOL/L — SIGNIFICANT CHANGE UP (ref 98–110)
CO2 SERPL-SCNC: 26 MMOL/L — SIGNIFICANT CHANGE UP (ref 17–32)
CREAT SERPL-MCNC: 0.5 MG/DL — LOW (ref 0.7–1.5)
EGFR: 96 ML/MIN/1.73M2 — SIGNIFICANT CHANGE UP
EOSINOPHIL # BLD AUTO: 0.72 K/UL — HIGH (ref 0–0.7)
EOSINOPHIL NFR BLD AUTO: 12.3 % — HIGH (ref 0–8)
GLUCOSE SERPL-MCNC: 87 MG/DL — SIGNIFICANT CHANGE UP (ref 70–99)
HCT VFR BLD CALC: 31.4 % — LOW (ref 37–47)
HGB BLD-MCNC: 10.1 G/DL — LOW (ref 12–16)
LYMPHOCYTES # BLD AUTO: 0.82 K/UL — LOW (ref 1.2–3.4)
LYMPHOCYTES # BLD AUTO: 14 % — LOW (ref 20.5–51.1)
MAGNESIUM SERPL-MCNC: 2.1 MG/DL — SIGNIFICANT CHANGE UP (ref 1.8–2.4)
MANUAL SMEAR VERIFICATION: SIGNIFICANT CHANGE UP
MCHC RBC-ENTMCNC: 26.5 PG — LOW (ref 27–31)
MCHC RBC-ENTMCNC: 32.2 G/DL — SIGNIFICANT CHANGE UP (ref 32–37)
MCV RBC AUTO: 82.4 FL — SIGNIFICANT CHANGE UP (ref 81–99)
MONOCYTES # BLD AUTO: 0.61 K/UL — HIGH (ref 0.1–0.6)
MONOCYTES NFR BLD AUTO: 10.5 % — HIGH (ref 1.7–9.3)
MYELOCYTES NFR BLD: 1.8 % — HIGH (ref 0–0)
NEUTROPHILS # BLD AUTO: 3.17 K/UL — SIGNIFICANT CHANGE UP (ref 1.4–6.5)
NEUTROPHILS NFR BLD AUTO: 54.4 % — SIGNIFICANT CHANGE UP (ref 42.2–75.2)
PLAT MORPH BLD: NORMAL — SIGNIFICANT CHANGE UP
PLATELET # BLD AUTO: 330 K/UL — SIGNIFICANT CHANGE UP (ref 130–400)
POLYCHROMASIA BLD QL SMEAR: SLIGHT — SIGNIFICANT CHANGE UP
POTASSIUM SERPL-MCNC: 4.6 MMOL/L — SIGNIFICANT CHANGE UP (ref 3.5–5)
POTASSIUM SERPL-SCNC: 4.6 MMOL/L — SIGNIFICANT CHANGE UP (ref 3.5–5)
PROT SERPL-MCNC: 5.8 G/DL — LOW (ref 6–8)
RBC # BLD: 3.81 M/UL — LOW (ref 4.2–5.4)
RBC # FLD: 16 % — HIGH (ref 11.5–14.5)
RBC BLD AUTO: NORMAL — SIGNIFICANT CHANGE UP
SODIUM SERPL-SCNC: 139 MMOL/L — SIGNIFICANT CHANGE UP (ref 135–146)
VARIANT LYMPHS # BLD: 2.6 % — SIGNIFICANT CHANGE UP (ref 0–5)
WBC # BLD: 5.83 K/UL — SIGNIFICANT CHANGE UP (ref 4.8–10.8)
WBC # FLD AUTO: 5.83 K/UL — SIGNIFICANT CHANGE UP (ref 4.8–10.8)

## 2022-07-09 PROCEDURE — 99233 SBSQ HOSP IP/OBS HIGH 50: CPT

## 2022-07-09 RX ADMIN — SIMETHICONE 80 MILLIGRAM(S): 80 TABLET, CHEWABLE ORAL at 17:42

## 2022-07-09 RX ADMIN — ENOXAPARIN SODIUM 40 MILLIGRAM(S): 100 INJECTION SUBCUTANEOUS at 12:20

## 2022-07-09 RX ADMIN — PANTOPRAZOLE SODIUM 40 MILLIGRAM(S): 20 TABLET, DELAYED RELEASE ORAL at 06:18

## 2022-07-09 RX ADMIN — SIMETHICONE 80 MILLIGRAM(S): 80 TABLET, CHEWABLE ORAL at 06:18

## 2022-07-14 ENCOUNTER — INPATIENT (INPATIENT)
Facility: HOSPITAL | Age: 77
LOS: 5 days | Discharge: HOME HEALTH | End: 2022-07-20
Attending: INTERNAL MEDICINE | Admitting: INTERNAL MEDICINE

## 2022-07-14 VITALS
HEART RATE: 80 BPM | TEMPERATURE: 97 F | SYSTOLIC BLOOD PRESSURE: 104 MMHG | DIASTOLIC BLOOD PRESSURE: 56 MMHG | HEIGHT: 66 IN | RESPIRATION RATE: 20 BRPM | OXYGEN SATURATION: 97 %

## 2022-07-14 DIAGNOSIS — Z98.890 OTHER SPECIFIED POSTPROCEDURAL STATES: Chronic | ICD-10-CM

## 2022-07-14 LAB
ALBUMIN SERPL ELPH-MCNC: 3.2 G/DL — LOW (ref 3.5–5.2)
ALP SERPL-CCNC: 89 U/L — SIGNIFICANT CHANGE UP (ref 30–115)
ALT FLD-CCNC: 13 U/L — SIGNIFICANT CHANGE UP (ref 0–41)
ANION GAP SERPL CALC-SCNC: 11 MMOL/L — SIGNIFICANT CHANGE UP (ref 7–14)
APPEARANCE UR: CLEAR — SIGNIFICANT CHANGE UP
AST SERPL-CCNC: 17 U/L — SIGNIFICANT CHANGE UP (ref 0–41)
BACTERIA # UR AUTO: ABNORMAL
BASOPHILS # BLD AUTO: 0.05 K/UL — SIGNIFICANT CHANGE UP (ref 0–0.2)
BASOPHILS NFR BLD AUTO: 0.5 % — SIGNIFICANT CHANGE UP (ref 0–1)
BILIRUB DIRECT SERPL-MCNC: <0.2 MG/DL — SIGNIFICANT CHANGE UP (ref 0–0.3)
BILIRUB INDIRECT FLD-MCNC: >0 MG/DL — LOW (ref 0.2–1.2)
BILIRUB SERPL-MCNC: 0.2 MG/DL — SIGNIFICANT CHANGE UP (ref 0.2–1.2)
BILIRUB UR-MCNC: NEGATIVE — SIGNIFICANT CHANGE UP
BUN SERPL-MCNC: 11 MG/DL — SIGNIFICANT CHANGE UP (ref 10–20)
CALCIUM SERPL-MCNC: 9.2 MG/DL — SIGNIFICANT CHANGE UP (ref 8.5–10.1)
CHLORIDE SERPL-SCNC: 100 MMOL/L — SIGNIFICANT CHANGE UP (ref 98–110)
CO2 SERPL-SCNC: 27 MMOL/L — SIGNIFICANT CHANGE UP (ref 17–32)
COLOR SPEC: YELLOW — SIGNIFICANT CHANGE UP
CREAT SERPL-MCNC: 0.5 MG/DL — LOW (ref 0.7–1.5)
DIFF PNL FLD: NEGATIVE — SIGNIFICANT CHANGE UP
EGFR: 96 ML/MIN/1.73M2 — SIGNIFICANT CHANGE UP
EOSINOPHIL # BLD AUTO: 0.42 K/UL — SIGNIFICANT CHANGE UP (ref 0–0.7)
EOSINOPHIL NFR BLD AUTO: 3.8 % — SIGNIFICANT CHANGE UP (ref 0–8)
EPI CELLS # UR: 0 /HPF — SIGNIFICANT CHANGE UP (ref 0–5)
GLUCOSE SERPL-MCNC: 95 MG/DL — SIGNIFICANT CHANGE UP (ref 70–99)
GLUCOSE UR QL: NEGATIVE — SIGNIFICANT CHANGE UP
HCT VFR BLD CALC: 33.4 % — LOW (ref 37–47)
HGB BLD-MCNC: 10.5 G/DL — LOW (ref 12–16)
HYALINE CASTS # UR AUTO: 1 /LPF — SIGNIFICANT CHANGE UP (ref 0–7)
IMM GRANULOCYTES NFR BLD AUTO: 0.5 % — HIGH (ref 0.1–0.3)
KETONES UR-MCNC: NEGATIVE — SIGNIFICANT CHANGE UP
LACTATE SERPL-SCNC: 1.3 MMOL/L — SIGNIFICANT CHANGE UP (ref 0.7–2)
LEUKOCYTE ESTERASE UR-ACNC: ABNORMAL
LIDOCAIN IGE QN: 16 U/L — SIGNIFICANT CHANGE UP (ref 7–60)
LYMPHOCYTES # BLD AUTO: 0.99 K/UL — LOW (ref 1.2–3.4)
LYMPHOCYTES # BLD AUTO: 9 % — LOW (ref 20.5–51.1)
MCHC RBC-ENTMCNC: 26.1 PG — LOW (ref 27–31)
MCHC RBC-ENTMCNC: 31.4 G/DL — LOW (ref 32–37)
MCV RBC AUTO: 83.1 FL — SIGNIFICANT CHANGE UP (ref 81–99)
MONOCYTES # BLD AUTO: 0.71 K/UL — HIGH (ref 0.1–0.6)
MONOCYTES NFR BLD AUTO: 6.4 % — SIGNIFICANT CHANGE UP (ref 1.7–9.3)
NEUTROPHILS # BLD AUTO: 8.8 K/UL — HIGH (ref 1.4–6.5)
NEUTROPHILS NFR BLD AUTO: 79.8 % — HIGH (ref 42.2–75.2)
NITRITE UR-MCNC: POSITIVE
NRBC # BLD: 0 /100 WBCS — SIGNIFICANT CHANGE UP (ref 0–0)
PH UR: 6.5 — SIGNIFICANT CHANGE UP (ref 5–8)
PLATELET # BLD AUTO: 355 K/UL — SIGNIFICANT CHANGE UP (ref 130–400)
POTASSIUM SERPL-MCNC: 4.8 MMOL/L — SIGNIFICANT CHANGE UP (ref 3.5–5)
POTASSIUM SERPL-SCNC: 4.8 MMOL/L — SIGNIFICANT CHANGE UP (ref 3.5–5)
PROT SERPL-MCNC: 6.7 G/DL — SIGNIFICANT CHANGE UP (ref 6–8)
PROT UR-MCNC: NEGATIVE — SIGNIFICANT CHANGE UP
RBC # BLD: 4.02 M/UL — LOW (ref 4.2–5.4)
RBC # FLD: 16.7 % — HIGH (ref 11.5–14.5)
RBC CASTS # UR COMP ASSIST: 2 /HPF — SIGNIFICANT CHANGE UP (ref 0–4)
SARS-COV-2 RNA SPEC QL NAA+PROBE: DETECTED
SODIUM SERPL-SCNC: 138 MMOL/L — SIGNIFICANT CHANGE UP (ref 135–146)
SP GR SPEC: 1.01 — SIGNIFICANT CHANGE UP (ref 1.01–1.03)
UROBILINOGEN FLD QL: SIGNIFICANT CHANGE UP
WBC # BLD: 11.03 K/UL — HIGH (ref 4.8–10.8)
WBC # FLD AUTO: 11.03 K/UL — HIGH (ref 4.8–10.8)
WBC UR QL: 27 /HPF — HIGH (ref 0–5)

## 2022-07-14 PROCEDURE — 93010 ELECTROCARDIOGRAM REPORT: CPT

## 2022-07-14 PROCEDURE — 74177 CT ABD & PELVIS W/CONTRAST: CPT | Mod: 26,MA

## 2022-07-14 PROCEDURE — 99285 EMERGENCY DEPT VISIT HI MDM: CPT | Mod: CS,GC

## 2022-07-14 RX ORDER — CEFTRIAXONE 500 MG/1
1000 INJECTION, POWDER, FOR SOLUTION INTRAMUSCULAR; INTRAVENOUS ONCE
Refills: 0 | Status: COMPLETED | OUTPATIENT
Start: 2022-07-14 | End: 2022-07-14

## 2022-07-14 RX ORDER — SACCHAROMYCES BOULARDII 250 MG
250 POWDER IN PACKET (EA) ORAL
Refills: 0 | Status: DISCONTINUED | OUTPATIENT
Start: 2022-07-14 | End: 2022-07-20

## 2022-07-14 RX ORDER — METRONIDAZOLE 500 MG
500 TABLET ORAL ONCE
Refills: 0 | Status: COMPLETED | OUTPATIENT
Start: 2022-07-14 | End: 2022-07-14

## 2022-07-14 RX ORDER — ENOXAPARIN SODIUM 100 MG/ML
40 INJECTION SUBCUTANEOUS EVERY 24 HOURS
Refills: 0 | Status: DISCONTINUED | OUTPATIENT
Start: 2022-07-14 | End: 2022-07-20

## 2022-07-14 RX ORDER — LANOLIN ALCOHOL/MO/W.PET/CERES
10 CREAM (GRAM) TOPICAL AT BEDTIME
Refills: 0 | Status: DISCONTINUED | OUTPATIENT
Start: 2022-07-14 | End: 2022-07-20

## 2022-07-14 RX ORDER — DONEPEZIL HYDROCHLORIDE 10 MG/1
5 TABLET, FILM COATED ORAL AT BEDTIME
Refills: 0 | Status: DISCONTINUED | OUTPATIENT
Start: 2022-07-14 | End: 2022-07-20

## 2022-07-14 RX ORDER — PANTOPRAZOLE SODIUM 20 MG/1
40 TABLET, DELAYED RELEASE ORAL
Refills: 0 | Status: DISCONTINUED | OUTPATIENT
Start: 2022-07-14 | End: 2022-07-20

## 2022-07-14 RX ORDER — CEFTRIAXONE 500 MG/1
2000 INJECTION, POWDER, FOR SOLUTION INTRAMUSCULAR; INTRAVENOUS EVERY 24 HOURS
Refills: 0 | Status: DISCONTINUED | OUTPATIENT
Start: 2022-07-14 | End: 2022-07-15

## 2022-07-14 RX ORDER — CHLORHEXIDINE GLUCONATE 213 G/1000ML
1 SOLUTION TOPICAL
Refills: 0 | Status: DISCONTINUED | OUTPATIENT
Start: 2022-07-14 | End: 2022-07-20

## 2022-07-14 RX ORDER — METRONIDAZOLE 500 MG
500 TABLET ORAL EVERY 8 HOURS
Refills: 0 | Status: DISCONTINUED | OUTPATIENT
Start: 2022-07-14 | End: 2022-07-15

## 2022-07-14 RX ORDER — SENNA PLUS 8.6 MG/1
2 TABLET ORAL AT BEDTIME
Refills: 0 | Status: DISCONTINUED | OUTPATIENT
Start: 2022-07-14 | End: 2022-07-20

## 2022-07-14 RX ADMIN — DONEPEZIL HYDROCHLORIDE 5 MILLIGRAM(S): 10 TABLET, FILM COATED ORAL at 21:26

## 2022-07-14 RX ADMIN — Medication 100 MILLIGRAM(S): at 16:15

## 2022-07-14 RX ADMIN — Medication 10 MILLIGRAM(S): at 21:26

## 2022-07-14 RX ADMIN — CEFTRIAXONE 100 MILLIGRAM(S): 500 INJECTION, POWDER, FOR SOLUTION INTRAMUSCULAR; INTRAVENOUS at 14:38

## 2022-07-14 RX ADMIN — Medication 100 MILLIGRAM(S): at 21:27

## 2022-07-14 RX ADMIN — CEFTRIAXONE 1000 MILLIGRAM(S): 500 INJECTION, POWDER, FOR SOLUTION INTRAMUSCULAR; INTRAVENOUS at 15:49

## 2022-07-14 RX ADMIN — Medication 250 MILLIGRAM(S): at 19:52

## 2022-07-14 RX ADMIN — SENNA PLUS 2 TABLET(S): 8.6 TABLET ORAL at 21:26

## 2022-07-14 NOTE — H&P ADULT - HISTORY OF PRESENT ILLNESS
Patient is a 76yo female with history of dementia (AAOx1 at baseline), HTN and recent hospital course for diverticulitis and asymptomatic COVID who presents to the ED from NH for lower abdominal pain and urinary retention. S/p recent hospital stay 6/26-7/9 with acute diverticulitis and incidental covid found to be retaining urine and discharged to NH rehab facility with Garcia catheter. Garcia removed at NH yesterday patient with no urinary output since yesterday and with lower abdominal pain which began this morning. She denies any associated sx. No nausea, vomiting, diarrhea, sob, cp, cough, fevers. She is unclear why she is here and would like to reach her family because she wants to go home. She is AOx1-2 however coherent able to express clearly her needs, responding appropriately to all questions and following commands. Admits she is able to ambulate on her own, and per prior PT note she walked 30 ft with rolling walker. Per ED note, family expresses concern that nursing home is not taking care of patient. Multiple family members on file were attempted to be reached after patient interview without success. Of note, there was concern of nausea and NBNB vomiting reported by family, however no episodes reported here.     In ED: VS wnl. Abdominal pain much improved after garcia catheter was placed, draining clear urine.   Labs showed WBC 11K otherwise unremarkable. UA was positive for few bacteria / 27 wbc / +LE/ +nitrites. CT abdomen unchanged from prior, consistent with severe rectosigmoid diverticulitis with extension into small bowel.    Being admitted to medicine for PT eval and safe disposition planning.  Patient is a 78yo female with history of dementia (AAOx1 at baseline), HTN and recent hospital course for diverticulitis and asymptomatic COVID who presents to the ED from NH for lower abdominal pain and urinary retention. S/p recent hospital stay 6/26-7/9 with acute diverticulitis and incidental covid found to be retaining urine and discharged to NH rehab facility with Garcia catheter. Garcia removed at NH yesterday patient with no urinary output since yesterday and with lower abdominal pain which began this morning. She denies any associated sx. No nausea, vomiting, diarrhea, sob, cp, cough, fevers. She is unclear why she is here and would like to reach her family because she wants to go home and she was supposed to be discharged home from NH today. She is AOx1-2 however coherent able to express clearly her needs, responding appropriately to all questions and following commands. Admits she is able to ambulate on her own, and per prior PT note she walked 30 ft with rolling walker. Per ED note, family expresses concern that nursing home is not taking care of patient. Multiple family members on file were attempted to be reached after patient interview without success. Of note, there was concern of nausea and NBNB vomiting reported by family, however no episodes reported here.     In ED: VS wnl. Abdominal pain much improved after garcia catheter was placed, draining clear urine.   Labs showed WBC 11K otherwise unremarkable. UA was positive for few bacteria / 27 wbc / +LE/ +nitrites. CT abdomen unchanged from prior, consistent with severe rectosigmoid diverticulitis with extension into small bowel.    Being admitted to medicine for PT eval and safe disposition planning as well as persistent  Patient is a 78yo female with history of dementia (AAOx1 at baseline), HTN and recent hospital course for diverticulitis and asymptomatic COVID who presents to the ED from NH for lower abdominal pain and urinary retention. S/p recent hospital stay 6/26-7/9 with acute diverticulitis and incidental covid found to be retaining urine and discharged to NH rehab facility with Garcia catheter. Garcia removed at NH yesterday patient with no urinary output since yesterday and with lower abdominal pain which began this morning. She denies any associated sx. No nausea, vomiting, diarrhea, sob, cp, cough, fevers. She is unclear why she is here and would like to reach her family because she wants to go home and she was supposed to be discharged home from NH today. She is AOx1-2 however coherent able to express clearly her needs, responding appropriately to all questions and following commands. Admits she is able to ambulate on her own, and per prior PT note she walked 30 ft with rolling walker. Per ED note, family expresses concern that nursing home is not taking care of patient. Multiple family members on file were attempted to be reached after patient interview without success. Of note, there was concern of nausea and NBNB vomiting reported by family, however no episodes reported here.     In ED: VS wnl. Abdominal pain much improved after garcia catheter was placed, draining clear urine.   Labs showed WBC 11K otherwise unremarkable. UA was positive for few bacteria / 27 wbc / +LE/ +nitrites. CT abdomen unchanged from prior, consistent with severe rectosigmoid diverticulitis with extension into small bowel.    Being admitted to medicine for PT eval and safe disposition planning as well as persistent severe diverticulitis and suspected cystitis.

## 2022-07-14 NOTE — H&P ADULT - NSHPLABSRESULTS_GEN_ALL_CORE
10.5   11.03 )-----------( 355      ( 2022 13:16 )             33.4         138  |  100  |  11  ----------------------------<  95  4.8   |  27  |  0.5<L>    Ca    9.2      2022 13:16    TPro  6.7  /  Alb  3.2<L>  /  TBili  0.2  /  DBili  <0.2  /  AST  17  /  ALT  13  /  AlkPhos  89           Urinalysis Basic - ( 2022 13:30 )    Color: Yellow / Appearance: Clear / S.010 / pH: x  Gluc: x / Ketone: Negative  / Bili: Negative / Urobili: <2 mg/dL   Blood: x / Protein: Negative / Nitrite: Positive   Leuk Esterase: Large / RBC: 2 /HPF / WBC 27 /HPF   Sq Epi: x / Non Sq Epi: 0 /HPF / Bacteria: Few  Lactate Trend   @ 13:16 Lactate:1.3     Culture Results:   No Growth Final ( @ 09:40)  Culture Results:   No Growth Final ( @ 09:34)  Culture Results:   No Growth Final ( @ 04:30)        CT ABDOMEN AND PELVIS IC                        PROCEDURE DATE:  2022    TECHNIQUE: Contiguous axial CT images were obtained from the lower chest   to the pubic symphysis following administration of 100cc Optiray 320   intravenous contrast.  Oral contrast was not administered.  Reformatted   images in the coronal and sagittal planes were acquired.  COMPARISON CT: 2022  OTHER STUDIES USED FOR CORRELATION: None.  FINDINGS:  LOWER CHEST: Bibasilar atelectasis.  HEPATOBILIARY: Cholelithiasis. No biliary ductal dilatation or suspicious   parenchymal lesion.  SPLEEN: Unchanged splenic hypodensities.  PANCREAS: Unremarkable.  ADRENAL GLANDS: Unremarkable.  KIDNEYS: Symmetric renal enhancement without hydronephrosis bilaterally.   Subcentimeter hypodensities, unchanged.  ABDOMINOPELVIC NODES: Unremarkable.  PELVIC ORGANS: Thomson catheter in urinary bladder. Uterine calcifications   and fibroids. Dilated gonadal veins bilaterally.  PERITONEUM/MESENTERY/BOWEL: Severe rectosigmoid circumferential wall   thickening with surrounding inflammation. Inflammatory changes   surrounding loop of adjacent small bowel, fistulization cannot be   excluded. No drainable fluid collection or pneumoperitoneum. No evidence   for bowel obstruction.. Normal caliber appendix.  BONES/SOFT TISSUES: Unchanged..  OTHER: IVC filter is unchanged    IMPRESSION:  Since 2022;  Severe rectosigmoid circumferential wall thickening with surrounding   inflammation consistent with colitis/diverticulitis, overall unchanged.   Inflammatory changes extend to adjacent small bowel loop and   fistulization cannot be excluded.    No drainable fluid collection or pneumoperitoneum.

## 2022-07-14 NOTE — ED ADULT NURSE NOTE - NSIMPLEMENTINTERV_GEN_ALL_ED
Implemented All Fall Risk Interventions:  [place] to call system. Call bell, personal items and telephone within reach. Instruct patient to call for assistance. Room bathroom lighting operational. Non-slip footwear when patient is off stretcher. Physically safe environment: no spills, clutter or unnecessary equipment. Stretcher in lowest position, wheels locked, appropriate side rails in place. Provide visual cue, wrist band, yellow gown, etc. Monitor gait and stability. Monitor for mental status changes and reorient to person, place, and time. Review medications for side effects contributing to fall risk. Reinforce activity limits and safety measures with patient and family.

## 2022-07-14 NOTE — H&P ADULT - ASSESSMENT
Patient is a 78yo female with history of dementia (AAOx1 at baseline), HTN and recent hospital course for diverticulitis and asymptomatic COVID who presents to the ED from NH for lower abdominal pain and urinary retention.    #Urinary retention s/p Garcia Removal  -failed TOV at NH  -abd pain improved after garcia re-inserted in ED  -maintain Garcia catheter for now   -seen by urology last admission  -f/u outpatient urology for trial of void, UDS, cystoscopy, cytology and further urological management upon discharge     #Persistent acute rectosigmoid diverticulitis with extension into small bowel loops - stable  - Failed outpatient PO therapy outpatient therapy but unclear if patient truly taking Cipro / Flagyl  - Completed 10 day course of IV antibiotics on 7/6: Rocephin and Flagyl  - seen by surgery and GI last admission - no intervention  - On admission: CT shows persistent evidence of diverticulitis and rectosigmoid bowel wall thickening with extension into small bowel; however clinically patient with improved abdominal pain, no N/V/D, abd soft non-tender, normal VS, WBC 11K elevation more likely due to UTI given hx  - C/w Rocephin and Flagyl for now  - Follow up ID recommendations  - C/w senna and monitor BM for constipation - add miralax if constipated   - low fiber diet  - consider GI and surgical consult if patient has recurring abdominal pain or worsening abdominal exam  - eventually patient will need OP colonoscopy in 6-8 weeks upon d/c as had not had a colonoscopy in > 10 years    #Acute cystitis - catheter-associated  -will treat though pt asymptomatic as pt with urinary stasis due to retention and elevated WBC  -c/w IV Rocephin and Flagyl for concurrent diverticulitis  -c/w re-placed Garcia  -follow up cultures    #HTN  -BP controlled off medications  -monitor    #Recent covid  -PCR + 6/26  -asx on room air  -finished 10day quarantine    #Dementia  -AxOx1 at baseline, stable    DVT prophylaxis - Lovenox  Diet - DASH/TLC/ low fiber  Activity - IAT  Code Status - Full     Patient is a 78yo female with history of dementia (AAOx1 at baseline), HTN and recent hospital course for diverticulitis and asymptomatic COVID who presents to the ED from NH for lower abdominal pain and urinary retention.    #Urinary retention s/p Garcia Removal  -failed TOV at NH  -abd pain improved after garcia re-inserted in ED  -maintain Garcia catheter for now   -seen by urology last admission  -f/u outpatient urology for trial of void, UDS, cystoscopy, cytology and further urological management upon discharge     #Persistent acute rectosigmoid diverticulitis with extension into small bowel loops - stable  - Failed outpatient PO therapy outpatient therapy but unclear if patient truly taking Cipro / Flagyl  - Completed 10 day course of IV antibiotics on 7/6: Rocephin and Flagyl  - seen by surgery and GI last admission - no intervention  - On admission: CT shows persistent evidence of diverticulitis and rectosigmoid bowel wall thickening with extension into small bowel; however clinically patient with improved abdominal pain, no N/V/D, abd soft non-tender, normal VS, WBC 11K elevation more likely due to UTI given hx  - started on Rocephin and Flagyl in ED, continue for now   - Follow up ID recommendations  - C/w senna and monitor BM for constipation - add miralax if constipated   - low fiber diet  - consider GI and surgical consult if patient has recurring abdominal pain or worsening abdominal exam  - eventually patient will need OP colonoscopy in 6-8 weeks upon d/c as had not had a colonoscopy in > 10 years    #Acute cystitis - catheter-associated  -will treat though pt asymptomatic as pt with urinary stasis due to retention and elevated WBC  -c/w IV Rocephin and Flagyl for concurrent diverticulitis  -c/w re-placed Garcia  -follow up cultures    #HTN  -BP controlled off medications  -monitor    #Recent covid  -PCR + 6/26  -asx on room air  -finished 10day quarantine    #Dementia  -AxOx1 at baseline, stable    DVT prophylaxis - Lovenox  Diet - DASH/TLC/ low fiber  Activity - IAT  Code Status - Full     Patient is a 76yo female with history of dementia (AAOx1 at baseline), HTN and recent hospital course for diverticulitis and asymptomatic COVID who presents to the ED from NH for lower abdominal pain and urinary retention.    #Urinary retention s/p Garcia Removal  #Abdominal pain- improved  -failed TOV at NH  -abd pain improved after garcia re-inserted in ED  -maintain Garcia catheter for now   -seen by urology last admission  -f/u outpatient urology for trial of void, UDS, cystoscopy, cytology and further urological management upon discharge     #Persistent acute rectosigmoid diverticulitis with extension into small bowel loops - stable  - Failed outpatient PO therapy outpatient therapy but unclear if patient truly taking Cipro / Flagyl  - Completed 10 day course of IV antibiotics on 7/6: Rocephin and Flagyl  - seen by surgery and GI last admission - no intervention  - On admission: CT shows persistent evidence of diverticulitis and rectosigmoid bowel wall thickening with extension into small bowel; however clinically patient with improved abdominal pain, no N/V/D, abd soft non-tender, normal VS, WBC 11K elevation more likely due to UTI given hx  - started on Rocephin and Flagyl in ED, continue for now   - Follow up ID recommendations  - C/w senna and monitor BM for constipation - add miralax if constipated   - low fiber diet  - consider GI and surgical consult if patient has recurring abdominal pain or worsening abdominal exam  - eventually patient will need OP colonoscopy in 6-8 weeks upon d/c as had not had a colonoscopy in > 10 years    #Acute cystitis - catheter-associated  -will treat though pt asymptomatic as pt with urinary stasis due to retention and elevated WBC  -c/w IV Rocephin and Flagyl for concurrent diverticulitis  -c/w re-placed Garcia  -follow up cultures    #HTN  -BP controlled off medications  -monitor    #Recent covid  -PCR + 6/26  -asx on room air  -finished 10day quarantine    #Dementia  -AxOx1 at baseline, stable    DVT prophylaxis - Lovenox  Diet - DASH/TLC/ low fiber  Activity - IAT  Code Status - Full    Disposition: pt admitted to medicine floor. Can likely be discharged home if ambulating with PT safely, tolerating PO intake, and antibiotics plan. Unable to reach both family members listed in chart for further history and treatment planning

## 2022-07-14 NOTE — ED PROVIDER NOTE - CLINICAL SUMMARY MEDICAL DECISION MAKING FREE TEXT BOX
.    77-year-old female past medical history dementia, HTN, diverticulitis, sent from NH for abdominal pain, nausea, vomiting, x1 day.  As per family, patient has not urinated since yesterday (Thomson catheter had been placed prior, was removed yesterday).  No diarrhea, chest pain, shortness of breath, fever.    Agree with exam above, plus patient is tired appearing, abdomen soft, + grimacing with palpation of lower abdomen, no rebound or guarding.    POCUS shows about 300 cc retained urine.  Thomson placed with good drainage of clear yellow urine.    UA positive for UTI.  CT shows findings consistent with colitis, that was similar with prior CT.  Patient got IVF, and antibiotics.    IMP: UTI, urinary retention, colitis.  Patient admitted to medicine.  ED work-up and results discussed with sonRobb ([phone]).    .

## 2022-07-14 NOTE — H&P ADULT - NSHPPHYSICALEXAM_GEN_ALL_CORE
General: no acute distress thin elderly female, well appearing  Head: atraumatic, normocephalic  Neck: no lymphadenopathy, no tracheal deviation  Eyes: EOMI, no icterus  Lungs/Chest: Clear to auscultation bilaterally, no wheeze  Heart: regular rate, regular rhythm, S1/S2  Abdomen: BS audible, non-distended, soft, non-tender, no rigidity, no guarding  Extremities: no clubbing, no cyanosis, no edema  Skin: warm and dry  Neuro: AAOx1-2 (knows her name and , and hospital) , speech clear and fluent, moving all extremities. responding to questions appropriately, coherent and following commands

## 2022-07-14 NOTE — ED PROVIDER NOTE - OBJECTIVE STATEMENT
69-year-old female past medical history dementia, hypertension, recent admission for diverticulitis discharged on July 8 2 [facility] presents for abdominal pain, nausea, vomiting that started today.  Patient was also found to be in urinary retention last admission, was discharged to nursing home with Thomson catheter in.  Per family was at bedside, Thomson was removed at nursing home yesterday.  Patient has not urinated since.  No fever, chills documented in nursing home chart.  Patient is AO x1 to name only.  Family members report that patient has had diffuse abdominal pain, nausea, multiple episodes of nonbloody emesis today.  No diarrhea, constipation, chest pain, shortness of breath.  To their knowledge patient has not urinated since Thomson was removed.  Family expresses concern that nursing home is not taking care of patient. 77-year-old female past medical history dementia, hypertension, recent admission for diverticulitis discharged on July 8 2 [facility] presents for abdominal pain, nausea, vomiting that started today.  Patient was also found to be in urinary retention last admission, was discharged to nursing home with Thomson catheter in.  Per family was at bedside, Thomson was removed at nursing home yesterday.  Patient has not urinated since.  No fever, chills documented in nursing home chart.  Patient is AO x1 to name only.  Family members report that patient has had diffuse abdominal pain, nausea, multiple episodes of nonbloody emesis today.  No diarrhea, constipation, chest pain, shortness of breath.  To their knowledge patient has not urinated since Thomson was removed.  Family expresses concern that nursing home is not taking care of patient.

## 2022-07-14 NOTE — ED PROVIDER NOTE - PHYSICAL EXAMINATION
VITAL SIGNS: I have reviewed nursing notes and confirm.  CONSTITUTIONAL: elderly female in stretcher, NAD, non toxic appearing.  SKIN: Warm dry, normal skin turgor  HEAD: NCAT  EYES: EOMI, no scleral icterus  ENT: Moist mucous membranes, normal pharynx with no erythema or exudates  NECK: Supple; non tender. Full ROM. No cervical LAD  CARD: RRR, no murmurs, rubs or gallops  RESP: clear to ausculation b/l.  No rales, rhonchi, or wheezing.  ABD: soft, +suprapubic distention, no tenderness to palpation. no CVA tenderness.  EXT: Full ROM, no bony tenderness, no pedal edema, no calf tenderness  NEURO: AOx1 to name only. moves all extremities equally. no focal deficits.  PSYCH: Cooperative, appropriate.

## 2022-07-14 NOTE — ED ADULT NURSE NOTE - OBJECTIVE STATEMENT
Presents in Ed c/o periumbilical abdominal pain. States had garcia removed yesterday and doesn't remember when she urinated last time. Denies N, V, D.

## 2022-07-14 NOTE — ED PROVIDER NOTE - PROGRESS NOTE DETAILS
Michael: Bedside ultrasound showed 300 cc of urine.  Thomson placed with 300 to 400 cc output.  UA positive for UTI.  Antibiotics ordered.  Patient will be admitted for UTI.  CT abdomen pelvis shows unchanged diverticulitis.  Endorsed to MAR.

## 2022-07-14 NOTE — H&P ADULT - NSHPREVIEWOFSYSTEMS_GEN_ALL_CORE
REVIEW OF SYSTEMS:    CONSTITUTIONAL: No weakness, fevers or chills, unintentional weight loss  EYES/ENT: No visual changes;  No vertigo or throat pain   NECK: No pain or stiffness  RESPIRATORY: No shortness of breath, cough, wheezing, hemoptysis  CARDIOVASCULAR: No chest pain, palpitations, dizziness  GASTROINTESTINAL: +lower abdominal pain. No nausea, vomiting, or hematemesis; No diarrhea or constipation. No melena or hematochezia.  GENITOURINARY: No dysuria, frequency, hematuria  EXTREMITIES: No edema, cyanosis  SKIN: No itching, rashes  MUSCULOSKELETAL: No injury  NEUROLOGICAL: No weakness

## 2022-07-15 DIAGNOSIS — R59.9 ENLARGED LYMPH NODES, UNSPECIFIED: ICD-10-CM

## 2022-07-15 DIAGNOSIS — F03.90 UNSPECIFIED DEMENTIA, UNSPECIFIED SEVERITY, WITHOUT BEHAVIORAL DISTURBANCE, PSYCHOTIC DISTURBANCE, MOOD DISTURBANCE, AND ANXIETY: ICD-10-CM

## 2022-07-15 DIAGNOSIS — K57.32 DIVERTICULITIS OF LARGE INTESTINE WITHOUT PERFORATION OR ABSCESS WITHOUT BLEEDING: ICD-10-CM

## 2022-07-15 DIAGNOSIS — K59.00 CONSTIPATION, UNSPECIFIED: ICD-10-CM

## 2022-07-15 DIAGNOSIS — I10 ESSENTIAL (PRIMARY) HYPERTENSION: ICD-10-CM

## 2022-07-15 DIAGNOSIS — E86.0 DEHYDRATION: ICD-10-CM

## 2022-07-15 DIAGNOSIS — N32.0 BLADDER-NECK OBSTRUCTION: ICD-10-CM

## 2022-07-15 DIAGNOSIS — D50.9 IRON DEFICIENCY ANEMIA, UNSPECIFIED: ICD-10-CM

## 2022-07-15 DIAGNOSIS — E43 UNSPECIFIED SEVERE PROTEIN-CALORIE MALNUTRITION: ICD-10-CM

## 2022-07-15 DIAGNOSIS — U07.1 COVID-19: ICD-10-CM

## 2022-07-15 DIAGNOSIS — R11.2 NAUSEA WITH VOMITING, UNSPECIFIED: ICD-10-CM

## 2022-07-15 DIAGNOSIS — E88.09 OTHER DISORDERS OF PLASMA-PROTEIN METABOLISM, NOT ELSEWHERE CLASSIFIED: ICD-10-CM

## 2022-07-15 DIAGNOSIS — K52.89 OTHER SPECIFIED NONINFECTIVE GASTROENTERITIS AND COLITIS: ICD-10-CM

## 2022-07-15 DIAGNOSIS — R33.9 RETENTION OF URINE, UNSPECIFIED: ICD-10-CM

## 2022-07-15 DIAGNOSIS — R94.31 ABNORMAL ELECTROCARDIOGRAM [ECG] [EKG]: ICD-10-CM

## 2022-07-15 LAB
ALBUMIN SERPL ELPH-MCNC: 2.6 G/DL — LOW (ref 3.5–5.2)
ALP SERPL-CCNC: 74 U/L — SIGNIFICANT CHANGE UP (ref 30–115)
ALT FLD-CCNC: 10 U/L — SIGNIFICANT CHANGE UP (ref 0–41)
ANION GAP SERPL CALC-SCNC: 9 MMOL/L — SIGNIFICANT CHANGE UP (ref 7–14)
AST SERPL-CCNC: 10 U/L — SIGNIFICANT CHANGE UP (ref 0–41)
BASOPHILS # BLD AUTO: 0.06 K/UL — SIGNIFICANT CHANGE UP (ref 0–0.2)
BASOPHILS NFR BLD AUTO: 1.2 % — HIGH (ref 0–1)
BILIRUB SERPL-MCNC: 0.8 MG/DL — SIGNIFICANT CHANGE UP (ref 0.2–1.2)
BUN SERPL-MCNC: 10 MG/DL — SIGNIFICANT CHANGE UP (ref 10–20)
CALCIUM SERPL-MCNC: 8.8 MG/DL — SIGNIFICANT CHANGE UP (ref 8.5–10.1)
CHLORIDE SERPL-SCNC: 103 MMOL/L — SIGNIFICANT CHANGE UP (ref 98–110)
CO2 SERPL-SCNC: 27 MMOL/L — SIGNIFICANT CHANGE UP (ref 17–32)
CREAT SERPL-MCNC: 0.6 MG/DL — LOW (ref 0.7–1.5)
EGFR: 92 ML/MIN/1.73M2 — SIGNIFICANT CHANGE UP
EOSINOPHIL # BLD AUTO: 0.51 K/UL — SIGNIFICANT CHANGE UP (ref 0–0.7)
EOSINOPHIL NFR BLD AUTO: 9.9 % — HIGH (ref 0–8)
GLUCOSE SERPL-MCNC: 88 MG/DL — SIGNIFICANT CHANGE UP (ref 70–99)
HCT VFR BLD CALC: 29.2 % — LOW (ref 37–47)
HGB BLD-MCNC: 9.3 G/DL — LOW (ref 12–16)
IMM GRANULOCYTES NFR BLD AUTO: 0.8 % — HIGH (ref 0.1–0.3)
LYMPHOCYTES # BLD AUTO: 0.88 K/UL — LOW (ref 1.2–3.4)
LYMPHOCYTES # BLD AUTO: 17 % — LOW (ref 20.5–51.1)
MAGNESIUM SERPL-MCNC: 2 MG/DL — SIGNIFICANT CHANGE UP (ref 1.8–2.4)
MCHC RBC-ENTMCNC: 26.1 PG — LOW (ref 27–31)
MCHC RBC-ENTMCNC: 31.8 G/DL — LOW (ref 32–37)
MCV RBC AUTO: 82 FL — SIGNIFICANT CHANGE UP (ref 81–99)
MONOCYTES # BLD AUTO: 0.79 K/UL — HIGH (ref 0.1–0.6)
MONOCYTES NFR BLD AUTO: 15.3 % — HIGH (ref 1.7–9.3)
NEUTROPHILS # BLD AUTO: 2.89 K/UL — SIGNIFICANT CHANGE UP (ref 1.4–6.5)
NEUTROPHILS NFR BLD AUTO: 55.8 % — SIGNIFICANT CHANGE UP (ref 42.2–75.2)
NRBC # BLD: 0 /100 WBCS — SIGNIFICANT CHANGE UP (ref 0–0)
PLATELET # BLD AUTO: 339 K/UL — SIGNIFICANT CHANGE UP (ref 130–400)
POTASSIUM SERPL-MCNC: 4.6 MMOL/L — SIGNIFICANT CHANGE UP (ref 3.5–5)
POTASSIUM SERPL-SCNC: 4.6 MMOL/L — SIGNIFICANT CHANGE UP (ref 3.5–5)
PROCALCITONIN SERPL-MCNC: 0.39 NG/ML — HIGH (ref 0.02–0.1)
PROT SERPL-MCNC: 5.8 G/DL — LOW (ref 6–8)
RBC # BLD: 3.56 M/UL — LOW (ref 4.2–5.4)
RBC # FLD: 16.9 % — HIGH (ref 11.5–14.5)
SODIUM SERPL-SCNC: 139 MMOL/L — SIGNIFICANT CHANGE UP (ref 135–146)
WBC # BLD: 5.17 K/UL — SIGNIFICANT CHANGE UP (ref 4.8–10.8)
WBC # FLD AUTO: 5.17 K/UL — SIGNIFICANT CHANGE UP (ref 4.8–10.8)

## 2022-07-15 PROCEDURE — 99233 SBSQ HOSP IP/OBS HIGH 50: CPT

## 2022-07-15 PROCEDURE — 99223 1ST HOSP IP/OBS HIGH 75: CPT

## 2022-07-15 RX ORDER — PIPERACILLIN AND TAZOBACTAM 4; .5 G/20ML; G/20ML
3.38 INJECTION, POWDER, LYOPHILIZED, FOR SOLUTION INTRAVENOUS ONCE
Refills: 0 | Status: COMPLETED | OUTPATIENT
Start: 2022-07-15 | End: 2022-07-15

## 2022-07-15 RX ORDER — PIPERACILLIN AND TAZOBACTAM 4; .5 G/20ML; G/20ML
3.38 INJECTION, POWDER, LYOPHILIZED, FOR SOLUTION INTRAVENOUS EVERY 8 HOURS
Refills: 0 | Status: DISCONTINUED | OUTPATIENT
Start: 2022-07-15 | End: 2022-07-16

## 2022-07-15 RX ADMIN — Medication 250 MILLIGRAM(S): at 17:29

## 2022-07-15 RX ADMIN — CEFTRIAXONE 100 MILLIGRAM(S): 500 INJECTION, POWDER, FOR SOLUTION INTRAMUSCULAR; INTRAVENOUS at 05:26

## 2022-07-15 RX ADMIN — PIPERACILLIN AND TAZOBACTAM 25 GRAM(S): 4; .5 INJECTION, POWDER, LYOPHILIZED, FOR SOLUTION INTRAVENOUS at 15:18

## 2022-07-15 RX ADMIN — PANTOPRAZOLE SODIUM 40 MILLIGRAM(S): 20 TABLET, DELAYED RELEASE ORAL at 05:29

## 2022-07-15 RX ADMIN — ENOXAPARIN SODIUM 40 MILLIGRAM(S): 100 INJECTION SUBCUTANEOUS at 05:28

## 2022-07-15 RX ADMIN — Medication 10 MILLIGRAM(S): at 21:33

## 2022-07-15 RX ADMIN — PIPERACILLIN AND TAZOBACTAM 200 GRAM(S): 4; .5 INJECTION, POWDER, LYOPHILIZED, FOR SOLUTION INTRAVENOUS at 10:39

## 2022-07-15 RX ADMIN — PIPERACILLIN AND TAZOBACTAM 25 GRAM(S): 4; .5 INJECTION, POWDER, LYOPHILIZED, FOR SOLUTION INTRAVENOUS at 21:34

## 2022-07-15 RX ADMIN — CHLORHEXIDINE GLUCONATE 1 APPLICATION(S): 213 SOLUTION TOPICAL at 05:26

## 2022-07-15 RX ADMIN — Medication 10 MILLIGRAM(S): at 11:37

## 2022-07-15 RX ADMIN — DONEPEZIL HYDROCHLORIDE 5 MILLIGRAM(S): 10 TABLET, FILM COATED ORAL at 21:33

## 2022-07-15 RX ADMIN — SENNA PLUS 2 TABLET(S): 8.6 TABLET ORAL at 21:33

## 2022-07-15 RX ADMIN — Medication 250 MILLIGRAM(S): at 05:26

## 2022-07-15 RX ADMIN — Medication 100 MILLIGRAM(S): at 05:25

## 2022-07-15 NOTE — PATIENT PROFILE ADULT - TRANSPORTATION
"[facility]  Hieu Zuniga M.D.  JOCE Zuniga M.D.  COLTON Hunter      Internal Medicine Progress Note    5/10/2019   2:16 PM    Name:  Susy Ko  MRN:    [id number]     Acct:     [id number]   Room:  [address] [place] [address] Day: 4     Admit Date: 5/6/2019  8:39 PM  PCP: Monster Zuniga MD    Subjective:     C/C:   Chief Complaint   Patient presents with   • Abnormal KUB       Interval History: Status:  stayed the same. Resting in bed. Daughters at bedside. Dobhoff \"came out\" overnight. Patient tolerating a more substantial diet. Renal function improved. Patient more alert. Appears comfortable. No new concerns.     Review of Systems   Constitution: Positive for weakness and malaise/fatigue. Negative for chills, decreased appetite, weight gain and weight loss.   HENT: Negative for congestion, ear discharge, hoarse voice and tinnitus.    Eyes: Negative for blurred vision, discharge, visual disturbance and visual halos.   Cardiovascular: Negative for chest pain, claudication, dyspnea on exertion, irregular heartbeat, leg swelling, orthopnea and paroxysmal nocturnal dyspnea.   Respiratory: Negative for cough, shortness of breath, sputum production and wheezing.    Endocrine: Negative for cold intolerance, heat intolerance and polyuria.   Hematologic/Lymphatic: Negative for adenopathy. Does not bruise/bleed easily.   Skin: Negative for dry skin, itching and suspicious lesions.   Musculoskeletal: Negative for arthritis, back pain, falls, joint pain, muscle weakness and myalgias.   Gastrointestinal: Negative for abdominal pain, constipation, diarrhea, dysphagia and hematemesis.   Genitourinary: Negative for bladder incontinence, dysuria and frequency.   Neurological: Negative for aphonia, disturbances in coordination and dizziness.   Psychiatric/Behavioral: Negative for altered mental status, depression, memory loss and substance abuse. The patient does not have insomnia and is not " nervous/anxious.    Allergic/Immunologic: Negative for environmental allergies.     Medications:     Allergies:   Allergies   Allergen Reactions   • Morphine And Related Mental Status Change   • Adhesive Tape Rash       Current Meds:   Current Facility-Administered Medications:   •  acetaminophen (TYLENOL) tablet 650 mg, 650 mg, Oral, Q6H PRN, Monster Zuniga MD  •  acetaminophen (TYLENOL) tablet 650 mg, 650 mg, Oral, Q4H PRN, Monster Zuniga MD  •  ALPRAZolam (XANAX) tablet 0.5 mg, 0.5 mg, Oral, Nightly, Monster Zuniga MD, 0.5 mg at 05/09/19 2241  •  calcitRIOL (ROCALTROL) solution 0.25 mcg, 0.25 mcg, Nasogastric, Daily, Monster Zuniga MD, 0.25 mcg at 05/10/19 0844  •  calcium carbonate (oyster shell) tablet 1,250 mg, 1,250 mg, Oral, Daily, Ramsey Weldon MD, 1,250 mg at 05/10/19 0843  •  calcium gluconate 1 g in sodium chloride 0.9 % 100 mL IVPB, 1 g, Intravenous, PRN, Last Rate: 100 mL/hr at 05/08/19 0451, 1 g at 05/08/19 0451 **AND** calcium gluconate 6 g in sodium chloride 0.9 % 500 mL IVPB, 6 g, Intravenous, PRN, Last Rate: 83.3 mL/hr at 05/08/19 0647, 6 g at 05/08/19 0647 **AND** Calcium, , , PRN, Monster Zuniga MD  •  carbidopa-levodopa (SINEMET)  MG per tablet 1 tablet, 1 tablet, Oral, TID, Monster Zuniga MD, 1 tablet at 05/10/19 0844  •  cefTRIAXone (ROCEPHIN) 1 g/100 mL 0.9% NS (MBP), 1 g, Intravenous, Q24H, Traci Kohler, APRN, 1 g at 05/09/19 1715  •  citalopram (CeleXA) tablet 10 mg, 10 mg, Oral, Nightly, Monster Zuniga MD, 10 mg at 05/09/19 2241  •  cyancobalamin (VITAMIN B-12) tablet 100 mcg, 100 mcg, Oral, Daily, Monster Zuniga MD, 100 mcg at 05/10/19 0844  •  [START ON 5/11/2019] famotidine (PEPCID) tablet 20 mg, 20 mg, Oral, Daily, Monster Zuniga MD  •  HYDROcodone-acetaminophen (NORCO) 7.5-325 MG per tablet 1 tablet, 1 tablet, Oral, Q4H PRN, Monster Zuniga MD  •  [COMPLETED] Insert peripheral IV, , , Once  "**AND** sodium chloride 0.9 % flush 10 mL, 10 mL, Intravenous, PRN, Prosper Jiang, DO  •  sodium chloride 0.9 % flush 3 mL, 3 mL, Intravenous, Q12H, Monster Zuniga MD, 3 mL at 05/10/19 0844  •  sodium chloride 0.9 % flush 3-10 mL, 3-10 mL, Intravenous, PRN, Monster Zuniga MD  •  sodium chloride 0.9 % with KCl 20 mEq/L infusion, 75 mL/hr, Intravenous, Continuous, Cirilo Ross, APRN, Last Rate: 75 mL/hr at 05/10/19 0038, 75 mL/hr at 05/10/19 0038  •  [COMPLETED] vancomycin 1250 mg/250 mL 0.9% NS IVPB ([facility]), 20 mg/kg, Intravenous, Once, 1,250 mg at 19 0451 **FOLLOWED BY** vancomycin 500 mg/100 mL 0.9% NS IVPB ([facility]), 500 mg, Intravenous, Q48H, Monster Zuniga MD, 500 mg at 05/10/19 0424    Data:     Code Status:    Code Status and Medical Interventions:   Ordered at: 19 1110     Level Of Support Discussed With:    Patient     Code Status:    CPR     Medical Interventions (Level of Support Prior to Arrest):    Full       History reviewed. No pertinent family history.    Social History     Socioeconomic History   • Marital status:      Spouse name: Not on file   • Number of children: Not on file   • Years of education: Not on file   • Highest education level: Not on file   Tobacco Use   • Smoking status: Former Smoker   • Smokeless tobacco: Never Used   Substance and Sexual Activity   • Alcohol use: No   • Drug use: No   • Sexual activity: Defer       Vitals:  /51 (BP Location: Left arm, Patient Position: Lying)   Pulse 85   Temp 99 °F (37.2 °C) (Oral)   Resp 12   Ht 160 cm (63\")   Wt 57.2 kg (126 lb)   SpO2 92%   BMI 22.32 kg/m²   Temp (24hrs), Av.5 °F (36.9 °C), Min:98 °F (36.7 °C), Max:99 °F (37.2 °C)        I/O (24Hr):    Intake/Output Summary (Last 24 hours) at 5/10/2019 1416  Last data filed at 5/10/2019 0946  Gross per 24 hour   Intake 2299 ml   Output 1200 ml   Net 1099 ml       Labs and imaging:      Recent Results (from the past 12 hour(s)) "   CK    Collection Time: 05/10/19  6:07 AM   Result Value Ref Range    Creatine Kinase 5,304 (H) 0 - 203 U/L   Comprehensive Metabolic Panel    Collection Time: 05/10/19  6:07 AM   Result Value Ref Range    Glucose 136 (H) 70 - 100 mg/dL    BUN 92 (H) 5 - 21 mg/dL    Creatinine 2.58 (H) 0.50 - 1.40 mg/dL    Sodium 140 135 - 145 mmol/L    Potassium 3.5 3.5 - 5.3 mmol/L    Chloride 101 98 - 110 mmol/L    CO2 33.0 (H) 24.0 - 31.0 mmol/L    Calcium 7.3 (L) 8.4 - 10.4 mg/dL    Total Protein 4.5 (L) 6.3 - 8.7 g/dL    Albumin 2.20 (L) 3.50 - 5.00 g/dL    ALT (SGPT) 32 0 - 54 U/L    AST (SGOT) 112 (H) 7 - 45 U/L    Alkaline Phosphatase 97 24 - 120 U/L    Total Bilirubin 0.2 0.1 - 1.0 mg/dL    eGFR Non African Amer 18 (L) >60 mL/min/1.73    Globulin 2.3 gm/dL    A/G Ratio 1.0 (L) 1.1 - 2.5 g/dL    BUN/Creatinine Ratio 35.7 (H) 7.0 - 25.0    Anion Gap 6.0 4.0 - 13.0 mmol/L   CBC Auto Differential    Collection Time: 05/10/19  6:07 AM   Result Value Ref Range    WBC 10.85 (H) 4.80 - 10.80 10*3/mm3    RBC 2.99 (L) 4.20 - 5.40 10*6/mm3    Hemoglobin 7.6 (L) 12.0 - 16.0 g/dL    Hematocrit 23.0 (L) 37.0 - 47.0 %    MCV 76.9 (L) 82.0 - 98.0 fL    MCH 25.4 (L) 28.0 - 32.0 pg    MCHC 33.0 33.0 - 36.0 g/dL    RDW 17.3 (H) 12.0 - 15.0 %    RDW-SD 48.6 40.0 - 54.0 fl    MPV 11.2 6.0 - 12.0 fL    Platelets 92 (L) 130 - 400 10*3/mm3    nRBC 0.0 0.0 - 0.2 /100 WBC   Manual Differential    Collection Time: 05/10/19  6:07 AM   Result Value Ref Range    Neutrophil % 81.0 (H) 39.0 - 78.0 %    Lymphocyte % 12.0 (L) 15.0 - 45.0 %    Monocyte % 3.0 (L) 4.0 - 12.0 %    Eosinophil % 4.0 0.0 - 4.0 %    Neutrophils Absolute 8.79 (H) 1.87 - 8.40 10*3/mm3    Lymphocytes Absolute 1.30 0.72 - 4.86 10*3/mm3    Monocytes Absolute 0.33 0.19 - 1.30 10*3/mm3    Eosinophils Absolute 0.43 0.00 - 0.70 10*3/mm3    Anisocytosis Slight/1+ None Seen    Poikilocytes Slight/1+ None Seen    WBC Morphology Normal Normal    Platelet Morphology Normal Normal     Physical  Examination:        Physical Exam   Constitutional: She is oriented to person, place, and time. She appears well-developed and well-nourished.   HENT:   Head: Normocephalic and atraumatic.   Nose: Nose normal.   Mouth/Throat: Oropharynx is clear and moist.   Eyes: Conjunctivae and EOM are normal. Pupils are equal, round, and reactive to light.   Neck: Normal range of motion. Neck supple.   Cardiovascular: Normal rate, regular rhythm, normal heart sounds and intact distal pulses.   Pulmonary/Chest: Effort normal and breath sounds normal.   Abdominal: Soft. Bowel sounds are normal.   Ostomy - stoma pink and appliance intact   Musculoskeletal:   Generalized weakness  Contractures to BLE   Neurological: She is alert and oriented to person, place, and time.   Intermittent confusion   Skin: Skin is warm and dry.   DTI to right heel  Blisters and excoriation to coccyx   Nursing note and vitals reviewed.      Assessment:            Severe sepsis (CMS/HCC)    Past Medical History:   Diagnosis Date   • Anxiety    • Breast cancer (CMS/HCC)      LEFT WITH RECONSTRUCTION   • Colostomy in place (CMS/HCC)    • Dementia    • Depression    • Edema     LOWER FEET   • Hydrocephalus     NORMAL PRESSURE   • Hypertension    • Incontinence    • Nausea    • Parastomal hernia    • Parkinson disease (CMS/HCC)    • Tremor     RIGHT HAND   • Unsteady gait         Plan:        1. Sepsis  2. E. Coli UTI - present on admission  3. Acute renal failure  4. Rhabdomyolysis  5. Hypotension  6. Dementia  7. Abdominal pain  8. Metabolic acidosis  9. Hyperkalemia  10. Uremia  11. Anxiety    Continue current treatment. Monitor counts. Increase activity. Labs in am. Continue antibiotics. Continue fluid resuscitation. DNR per family wishes. Continue nutrition support. Aggressive therapies.       Electronically signed by Monster Zuniga MD on 5/10/2019 at 2:16 PM            no

## 2022-07-15 NOTE — CONSULT NOTE ADULT - ASSESSMENT
ASSESSMENT:  78y/o female with PMHx of dementia, HTN, diverticulitis presented to ED c/o abdominal pain x 1 day. Patient was at the NH and she had the Garcia removed, and had no urinary output after she then started c/o abdominal pain, She was brought to ED where Garcia was placed. Abdominal pain much improved after garcia catheter was placed, draining clear urine. Patient with recent history of acute diverticulitis. patient was admitted on 6/26, treated with IV antibiotics for for diverticulitis and sent home on 7/18.   WBC 5. CTAP shows Severe rectosigmoid colitis/diverticulitis, with Inflammatory changes extend to adjacent small bowel loop and fistulization cannot be excluded.    PLAN:  - continue antibiotics per ID  - per GI  repeat CT with rectal contrast to confirm fistula.   - pain control    Above plan discussed with Attending Surgeon Dr. Isaacs , patient, patient family, and Primary team  07-15-22 @ 20:34     ASSESSMENT:  76y/o female with PMHx of dementia, HTN, diverticulitis presented to ED c/o abdominal pain x 1 day. Patient was at the NH and she had the Garcia removed, and had no urinary output after she then started c/o abdominal pain, She was brought to ED where Garcia was placed. Abdominal pain much improved after garcia catheter was placed, draining clear urine. Patient with recent history of acute diverticulitis. patient was admitted on 6/26, treated with IV antibiotics for for diverticulitis and sent home on 7/18.   WBC 5. CTAP shows Severe rectosigmoid colitis/diverticulitis, with Inflammatory changes extend to adjacent small bowel loop and fistulization cannot be excluded.    PLAN:  - continue antibiotics per ID  - per GI  repeat CT with rectal contrast to confirm fistula.   - pain control  -Surgery will follow while inpatient, patient can follow up with Dr. Isaacs after discharge.     Above plan discussed with Attending Surgeon Dr. Isaacs , patient, patient family, and Primary team  07-15-22 @ 20:34

## 2022-07-15 NOTE — PHYSICAL THERAPY INITIAL EVALUATION ADULT - PERTINENT HX OF CURRENT PROBLEM, REHAB EVAL
Patient is a 78yo female with history of dementia (AAOx1 at baseline), HTN and recent hospital course for diverticulitis and asymptomatic COVID who presents to the ED from NH for lower abdominal pain and urinary retention. S/p recent hospital stay 6/26-7/9 with acute diverticulitis and incidental covid found to be retaining urine and discharged to NH rehab facility with Thomson catheter. Thomson removed at NH yesterday patient with no urinary output since yesterday and with lower abdominal pain

## 2022-07-15 NOTE — CONSULT NOTE ADULT - SUBJECTIVE AND OBJECTIVE BOX
KEISHA PANTOJA  77y, Female  Allergy: No Known Allergies      All historical available data reviewed.    HPI:  Patient is a 78yo female with history of dementia (AAOx1 at baseline), HTN and recent hospital course for diverticulitis and asymptomatic COVID who presents to the ED from NH for lower abdominal pain and urinary retention. S/p recent hospital stay - with acute diverticulitis and incidental covid found to be retaining urine and discharged to NH rehab facility with Garcia catheter. Garcia removed at NH yesterday patient with no urinary output since yesterday and with lower abdominal pain which began this morning. She denies any associated sx. No nausea, vomiting, diarrhea, sob, cp, cough, fevers. She is unclear why she is here and would like to reach her family because she wants to go home and she was supposed to be discharged home from NH today. She is AOx1-2 however coherent able to express clearly her needs, responding appropriately to all questions and following commands. Admits she is able to ambulate on her own, and per prior PT note she walked 30 ft with rolling walker. Per ED note, family expresses concern that nursing home is not taking care of patient. Multiple family members on file were attempted to be reached after patient interview without success. Of note, there was concern of nausea and NBNB vomiting reported by family, however no episodes reported here.     In ED: VS wnl. Abdominal pain much improved after garcia catheter was placed, draining clear urine.   Labs showed WBC 11K otherwise unremarkable. UA was positive for few bacteria / 27 wbc / +LE/ +nitrites. CT abdomen unchanged from prior, consistent with severe rectosigmoid diverticulitis with extension into small bowel.    Being admitted to medicine for PT eval and safe disposition planning as well as persistent severe diverticulitis and suspected cystitis.  (2022 16:07)    FAMILY HISTORY:  Patient unable to provide medical history      PAST MEDICAL & SURGICAL HISTORY:  Hypertension, unspecified type      H/O dilation and curettage  for missed             VITALS:  T(F): 99.2, Max: 100 (22 @ 15:49)  HR: 85  BP: 107/59  RR: 18Vital Signs Last 24 Hrs  T(C): 37.3 (15 Jul 2022 14:32), Max: 37.8 (2022 15:49)  T(F): 99.2 (15 Jul 2022 14:32), Max: 100 (2022 15:49)  HR: 85 (15 Jul 2022 14:32) (77 - 85)  BP: 107/59 (15 Jul 2022 14:32) (107/59 - 123/60)  BP(mean): --  RR: 18 (15 Jul 2022 14:32) (18 - 18)  SpO2: 96% (15 Jul 2022 14:32) (91% - 97%)    Parameters below as of 15 Jul 2022 04:35  Patient On (Oxygen Delivery Method): room air        TESTS & MEASUREMENTS:                        9.3    5.17  )-----------( 339      ( 15 Jul 2022 05:46 )             29.2     07-15    139  |  103  |  10  ----------------------------<  88  4.6   |  27  |  0.6<L>    Ca    8.8      15 Jul 2022 05:46  Mg     2.0     07-15    TPro  5.8<L>  /  Alb  2.6<L>  /  TBili  0.8  /  DBili  x   /  AST  10  /  ALT  10  /  AlkPhos  74  07-15    LIVER FUNCTIONS - ( 15 Jul 2022 05:46 )  Alb: 2.6 g/dL / Pro: 5.8 g/dL / ALK PHOS: 74 U/L / ALT: 10 U/L / AST: 10 U/L / GGT: x             Urinalysis Basic - ( 2022 13:30 )    Color: Yellow / Appearance: Clear / S.010 / pH: x  Gluc: x / Ketone: Negative  / Bili: Negative / Urobili: <2 mg/dL   Blood: x / Protein: Negative / Nitrite: Positive   Leuk Esterase: Large / RBC: 2 /HPF / WBC 27 /HPF   Sq Epi: x / Non Sq Epi: 0 /HPF / Bacteria: Few          RADIOLOGY & ADDITIONAL TESTS:  Personal review of radiological diagnostics performed  Echo and EKG results noted when applicable.     MEDICATIONS:  chlorhexidine 2% Cloths 1 Application(s) Topical <User Schedule>  donepezil 5 milliGRAM(s) Oral at bedtime  enoxaparin Injectable 40 milliGRAM(s) SubCutaneous every 24 hours  melatonin 10 milliGRAM(s) Oral at bedtime  pantoprazole    Tablet 40 milliGRAM(s) Oral before breakfast  piperacillin/tazobactam IVPB.. 3.375 Gram(s) IV Intermittent every 8 hours  saccharomyces boulardii 250 milliGRAM(s) Oral two times a day  senna 2 Tablet(s) Oral at bedtime      ANTIBIOTICS:  piperacillin/tazobactam IVPB.. 3.375 Gram(s) IV Intermittent every 8 hours

## 2022-07-15 NOTE — CONSULT NOTE ADULT - ASSESSMENT
78yo female with history of dementia (AAOx1 at baseline), HTN and recent hospital course for diverticulitis and asymptomatic COVID who presents to the ED from NH for lower abdominal pain and urinary retention. S/p recent hospital stay 6/26-7/9 with acute diverticulitis and incidental covid found to be retaining urine and discharged to NH rehab facility with Thomson catheter. Thomson removed at NH yesterday patient with no urinary output since yesterday and with lower abdominal pain which began this morning.  7/14 CT abdomen unchanged from prior, consistent with severe rectosigmoid diverticulitis with extension into small bowel.      IMPRESSION;  Recto sigmoid diverticulitis  Clinically no ongoing peritonitis    RECOMMENDATIONS;  Rocephin 2 gm iv q24h Flagyl 500 mg q8h  Could discharge on po Ciprofloxacin 500 mg q12h and po Flagyl 500 mg q8h till 729  Please do not hesitate to recall ID if any questions arise either through [email or web address] or through [employer]

## 2022-07-15 NOTE — PATIENT PROFILE ADULT - STATED REASON FOR ADMISSION
Pt is moving to the [facility] area and needs a script to hold her to transfer to another provider  Pt is also asking for a name of a provider in the [facility] area that you can recommend  acute uti,diverticulitis acute uti, diverticulitis

## 2022-07-15 NOTE — CONSULT NOTE ADULT - SUBJECTIVE AND OBJECTIVE BOX
GENERAL SURGERY CONSULT NOTE    Patient: KEISHA PANTOJA , 77y (43)Female   MRN: [id number]  Location: [address]  Visit: 22 Inpatient  Date: 07-15-22 @ 20:34    HPI:  76yo female with PMHx of dementia, HTN, diverticulitis presented c/o abdominal pain x 1 day. Patient was at the NH and she had the Garcia removed, and had no urinary output after she then started c/o abdominal pain, She was brought to ED where Garcia was placed. Abdominal pain much improved after garcia catheter was placed, draining clear urine. Patient with recent history of acute diverticulitis. The symptoms initially started on  when patient was seen in the ED for abdominal pain, diagnosed with diverticulitis and sent home on PO Augmentin, she was then admitted on  with same diagnosis and sent home on  on cipro flagyl, patient was readmitted on , treated with IV antibiotics for for diverticulitis and sent home on .   At this time patient denies any abdominal pain, nausea, vomiting, fever, chills     PAST MEDICAL & SURGICAL HISTORY:  Hypertension, unspecified type    H/O dilation and curettage  for missed     Home Medications:      VITALS:  T(F): 98.4 (07-15-22 @ 20:28), Max: 99.2 (07-15-22 @ 14:32)  HR: 92 (07-15-22 @ 20:28) (77 - 92)  BP: 118/59 (07-15-22 @ 20:28) (107/59 - 123/60)  RR: 18 (07-15-22 @ 20:28) (18 - 18)  SpO2: 96% (07-15-22 @ 20:28) (91% - 97%)    PHYSICAL EXAM:  General: NAD, calm and cooperative  Cardiac: RRR S1, S2, ps  Respiratory: CTAB,   Abdomen: Soft, non-distended, non-tender, no rebound,   Vascular: Pulses 2+ throughout, extremities well perfused    MEDICATIONS  (STANDING):  chlorhexidine 2% Cloths 1 Application(s) Topical <User Schedule>  donepezil 5 milliGRAM(s) Oral at bedtime  enoxaparin Injectable 40 milliGRAM(s) SubCutaneous every 24 hours  melatonin 10 milliGRAM(s) Oral at bedtime  pantoprazole    Tablet 40 milliGRAM(s) Oral before breakfast  piperacillin/tazobactam IVPB.. 3.375 Gram(s) IV Intermittent every 8 hours  saccharomyces boulardii 250 milliGRAM(s) Oral two times a day  senna 2 Tablet(s) Oral at bedtime    MEDICATIONS  (PRN):      LAB/STUDIES:                        9.3    5.17  )-----------( 339      ( 15 Jul 2022 05:46 )             29.2     07-15    139  |  103  |  10  ----------------------------<  88  4.6   |  27  |  0.6<L>    Ca    8.8      15 Jul 2022 05:46  Mg     2.0     07-15    TPro  5.8<L>  /  Alb  2.6<L>  /  TBili  0.8  /  DBili  x   /  AST  10  /  ALT  10  /  AlkPhos  74  07-15      LIVER FUNCTIONS - ( 15 Jul 2022 05:46 )  Alb: 2.6 g/dL / Pro: 5.8 g/dL / ALK PHOS: 74 U/L / ALT: 10 U/L / AST: 10 U/L / GGT: x           Urinalysis Basic - ( 2022 13:30 )    Color: Yellow / Appearance: Clear / S.010 / pH: x  Gluc: x / Ketone: Negative  / Bili: Negative / Urobili: <2 mg/dL   Blood: x / Protein: Negative / Nitrite: Positive   Leuk Esterase: Large / RBC: 2 /HPF / WBC 27 /HPF   Sq Epi: x / Non Sq Epi: 0 /HPF / Bacteria: Few    IMAGING:    < from: CT Abdomen and Pelvis w/ IV Cont (22 @ 14:54) >  IMPRESSION:    Since 2022;    Severe rectosigmoid circumferential wall thickening with surrounding   inflammation consistent with colitis/diverticulitis, overall unchanged.   Inflammatory changes extend to adjacent small bowel loop and   fistulization cannot be excluded.    No drainable fluid collection or pneumoperitoneum.    < end of copied text >    ACCESS DEVICES:  [ x] Peripheral IV  [ ] Central Venous Line	[ ] R	[ ] L	[ ] IJ	[ ] Fem	[ ] SC	Placed:   [ ] Arterial Line		[ ] R	[ ] L	[ ] Fem	[ ] Rad	[ ] Ax	Placed:   [ ] PICC:					[ ] Mediport  [ ] Urinary Catheter, Date Placed:

## 2022-07-15 NOTE — PHYSICAL THERAPY INITIAL EVALUATION ADULT - GENERAL OBSERVATIONS, REHAB EVAL
13:57-14:22. Pt encountered semifowler in bed in NAD, + garcia, received hygenic care from Kim GARCIA, pt reports she keep having BM throughout day. Pt was agreeable to PT.

## 2022-07-15 NOTE — PROGRESS NOTE ADULT - ASSESSMENT
Patient is a 76yo female with history of dementia (AAOx1 at baseline), HTN and recent hospital course for diverticulitis and asymptomatic COVID who presents to the ED from NH for lower abdominal pain and urinary retention.    #Urinary retention   - garcia catheter is continued  - f/u outpatient urology for trial of void, UDS, cystoscopy, cytology and further urological management upon discharge     #diverticulitis   -failed TOV at NH  - On admission: CT shows persistent evidence of diverticulitis and rectosigmoid bowel wall thickening with extension into small bowel;  - DC Rocephin and Flagyl   - Failed outpatient PO therapy outpatient therapy but unclear if patient truly taking Cipro / Flagyl  - changed patient to tazocin for failed treatment in the previous admission   - surgery is consulted for the recurrent dicerticulitis   - PT is consulted.  - Hb is 9.3  - albumin 2.6  - dulcolax was given for constipation.           # GOC:  - palliative care is consulted for the GOc and advanced directives.       #HTN  -BP controlled off medications  -monitor    #Recent covid  -PCR + 6/26  -asx on room air  -finished 10day quarantine    #Dementia  -AxOx1 at baseline, stable

## 2022-07-15 NOTE — CONSULT NOTE ADULT - TIME BILLING
coordination of care
Counseled patient about diagnostic testing and treatment plan. All questions answered.

## 2022-07-15 NOTE — PHYSICAL THERAPY INITIAL EVALUATION ADULT - FOLLOWS COMMANDS/ANSWERS QUESTIONS, REHAB EVAL
with answering question and follow commands, however pt states " I don't know " when doesn't know the answers (pt has hx of dementia)/100% of the time

## 2022-07-15 NOTE — CONSULT NOTE ADULT - ASSESSMENT
76yo female with history of dementia (AAOx1 at baseline), HTN and recent hospital course for diverticulitis and asymptomatic COVID who presents to the ED from NH for lower abdominal pain and urinary retention. S/p recent hospital stay 6/26-7/9 with acute diverticulitis. per family, patients symptoms have not improved since she started this patient for the last month. CT scan was significant for Severe rectosigmoid circumferential wall thickening with surrounding inflammation consistent with colitis/diverticulitis, overall unchanged. Inflammatory changes extend to adjacent small bowel loop and fistulization cannot be excluded. No drainable fluid collection or pneumoperitoneum.    # Persistent Recto sigmoid Diverticulitis likely due to fistula formation  - patient has persistent abdominal pain for almost one month duration    Rec:   - NPO  - Surgery eval.   - IV hydration.   - c/w Abx per ID   - repeat CT with rectal contrast to confirm fistula.   - Patient would benefit from colonoscopy 4-8 weeks after resolution of active inflammation.    76yo female with history of dementia (AAOx1 at baseline), HTN and recent hospital course for diverticulitis and asymptomatic COVID who presents to the ED from NH for lower abdominal pain and urinary retention. S/p recent hospital stay 6/26-7/9 with acute diverticulitis. per family, patients symptoms have not improved since she started this patient for the last month. CT scan was significant for Severe rectosigmoid circumferential wall thickening with surrounding inflammation consistent with colitis/diverticulitis, overall unchanged. Inflammatory changes extend to adjacent small bowel loop and fistulization cannot be excluded. No drainable fluid collection or pneumoperitoneum.    # Persistent Recto sigmoid Diverticulitis/ colitis fistulization can not be excluded on CT abdomen and pelvis   - patient has persistent abdominal pain for almost one month duration    Rec:   - NPO  - Surgery eval.   - IV hydration.   - c/w Abx per ID   - repeat CT with rectal contrast to confirm fistula.   - Patient would benefit from colonoscopy 4-8 weeks after resolution of active inflammation.    78yo female with history of dementia (AAOx1 at baseline), HTN and recent hospital course for diverticulitis and asymptomatic COVID who presents to the ED from NH for lower abdominal pain and urinary retention. S/p recent hospital stay 6/26-7/9 with acute diverticulitis. per family, patients symptoms have not improved since started  the last month. CT scan was significant for Severe rectosigmoid circumferential wall thickening with surrounding inflammation consistent with colitis/diverticulitis, overall unchanged. Inflammatory changes extend to adjacent small bowel loop and fistulization cannot be excluded. No drainable fluid collection or pneumoperitoneum.    # Persistent Recto sigmoid Diverticulitis/ colitis fistulization can not be excluded on CT abdomen and pelvis   - patient has persistent abdominal pain for almost one month duration    Rec:   - NPO  - Surgery eval.   - IV hydration.   - c/w Abx per ID   - MR abdomen and pelvis with contrast to rule out entero-colonic fistula   - Patient would benefit from colonoscopy 4-8 weeks after resolution of active inflammation.    78yo female with history of dementia (AAOx1 at baseline), HTN and recent hospital course for diverticulitis and asymptomatic COVID who presents to the ED from NH for lower abdominal pain and urinary retention. S/p recent hospital stay 6/26-7/9 with acute diverticulitis. per family, patients symptoms have not improved since started  the last month. CT scan was significant for Severe rectosigmoid circumferential wall thickening with surrounding inflammation consistent with colitis/diverticulitis, overall unchanged. Inflammatory changes extend to adjacent small bowel loop and fistulization cannot be excluded. No drainable fluid collection or pneumoperitoneum.    # Persistent Recto sigmoid Diverticulitis/ colitis fistulization can not be excluded on CT abdomen and pelvis   #constipation  #urine retention with UTI     Rec:   - NPO, start clear liquid diet once pain improves   - Surgery eval.   - IV hydration.   - c/w Abx per ID   - MR abdomen and pelvis with contrast to rule out entero-colonic fistula   - Patient would benefit from colonoscopy 4-8 weeks after resolution of active inflammation.     #urine retention and UTI : can be contributing to abdominal pain also  Management per primary team

## 2022-07-15 NOTE — CONSULT NOTE ADULT - SUBJECTIVE AND OBJECTIVE BOX
Gastroenterology Consultation:    Patient is a 77y old  Female who presents with a chief complaint of urinary retention, abd pain (15 Jul 2022 15:34)    Admitted on: 22    HPI:  Patient is a 76yo female with history of dementia (AAOx1 at baseline), HTN and recent hospital course for diverticulitis and asymptomatic COVID who presents to the ED from NH for lower abdominal pain and urinary retention. S/p recent hospital stay - with acute diverticulitis. per family, patients symptoms have not improved since she started this patient for the last month. CT scan was significant for Severe rectosigmoid circumferential wall thickening with surrounding inflammation consistent with colitis/diverticulitis, overall unchanged. Inflammatory changes extend to adjacent small bowel loop and fistulization cannot be excluded. No drainable fluid collection or pneumoperitoneum.    Prior EGD: no previous EGD per family     Prior Colonoscopy: no previous EGD per family    PAST MEDICAL & SURGICAL HISTORY:  Hypertension, unspecified type  H/O dilation and curettage  for missed     FAMILY HISTORY:  no FH of GI malignancies     Social History:  never smoker.     Home Medications:        MEDICATIONS  (STANDING):  chlorhexidine 2% Cloths 1 Application(s) Topical <User Schedule>  donepezil 5 milliGRAM(s) Oral at bedtime  enoxaparin Injectable 40 milliGRAM(s) SubCutaneous every 24 hours  melatonin 10 milliGRAM(s) Oral at bedtime  pantoprazole    Tablet 40 milliGRAM(s) Oral before breakfast  piperacillin/tazobactam IVPB.. 3.375 Gram(s) IV Intermittent every 8 hours  saccharomyces boulardii 250 milliGRAM(s) Oral two times a day  senna 2 Tablet(s) Oral at bedtime    MEDICATIONS  (PRN):      Allergies  No Known Allergies      Review of Systems:   Constitutional:  No Fever, No Chills  ENT/Mouth:  No Hearing Changes,  No Difficulty Swallowing  Eyes:  No Eye Pain, No Vision Changes  Cardiovascular:  No Chest Pain, No Palpitations  Respiratory:  No Cough, No Dyspnea  Gastrointestinal:  As described in HPI  Musculoskeletal:  No Joint Swelling, No Back Pain  Skin:  No Skin Lesions, No Jaundice  Neuro:  No Syncope, No Dizziness  Heme/Lymph:  No Bruising, No Bleeding.          Physical Examination:  T(C): 37.3 (07-15-22 @ 14:32), Max: 37.3 (07-15-22 @ 14:32)  HR: 85 (07-15-22 @ 14:32) (77 - 85)  BP: 107/59 (07-15-22 @ 14:32) (107/59 - 123/60)  RR: 18 (07-15-22 @ 14:32) (18 - 18)  SpO2: 96% (07-15-22 @ 14:32) (91% - 97%)      22 @ 07:01  -  07-15-22 @ 07:00  --------------------------------------------------------  IN: 0 mL / OUT: 1300 mL / NET: -1300 mL    07-15-22 @ 07:01  -  07-15-22 @ 16:14  --------------------------------------------------------  IN: 0 mL / OUT: 1002 mL / NET: -1002 mL      GENERAL: AAOx1, no acute distress.  HEAD:  Atraumatic, Normocephalic  EYES: conjunctiva and sclera clear  NECK: Supple, no JVD or thyromegaly  CHEST/LUNG: Clear to auscultation bilaterall  HEART: Regular rate and rhythm; normal S1, S2, No murmurs.  ABDOMEN: Soft, nontender, nondistended;  NEUROLOGY: No asterixis or tremor.   SKIN: Intact, no jaundice        Data:                        9.3    5.17  )-----------( 339      ( 15 Jul 2022 05:46 )             29.2     Hgb Trend:  9.3  07-15-22 @ 05:46  10.5  22 @ 13:16      07-15-22 @ 07:01  -  07-15-22 @ 16:14  --------------------------------------------------------  IN: 0 mL      07-15    139  |  103  |  10  ----------------------------<  88  4.6   |  27  |  0.6<L>    Ca    8.8      15 Jul 2022 05:46  Mg     2.0     07-15    TPro  5.8<L>  /  Alb  2.6<L>  /  TBili  0.8  /  DBili  x   /  AST  10  /  ALT  10  /  AlkPhos  74  07-15    Liver panel trend:  TBili 0.8   /   AST 10   /   ALT 10   /   AlkP 74   /   Tptn 5.8   /   Alb 2.6    /   DBili --      07-15  TBili 0.2   /   AST 17   /   ALT 13   /   AlkP 89   /   Tptn 6.7   /   Alb 3.2    /   DBili <0.2        TBili 0.2   /   AST 17   /   ALT 11   /   AlkP 64   /   Tptn 5.8   /   Alb 2.8    /   DBili --        TBili <0.2   /   AST 14   /   ALT 10   /   AlkP 62   /   Tptn 5.7   /   Alb 2.7    /   DBili --        TBili <0.2   /   AST 12   /   ALT 10   /   AlkP 59   /   Tptn 5.5   /   Alb 2.6    /   DBili --        TBili <0.2   /   AST 11   /   ALT 10   /   AlkP 57   /   Tptn 5.3   /   Alb 2.5    /   DBili --          Radiology:    < from: CT Abdomen and Pelvis w/ IV Cont (22 @ 14:54) >  ACC: [id number] EXAM:  CT ABDOMEN AND PELVIS IC                          PROCEDURE DATE:  2022          INTERPRETATION:  CLINICAL STATEMENT: Nausea and vomiting. Recent history   of diverticulitis    TECHNIQUE: Contiguous axial CT images were obtained from the lower chest   to the pubic symphysis following administration of 100cc Optiray 320   intravenous contrast.  Oral contrast was not administered.  Reformatted   images in the coronal and sagittal planes were acquired.    COMPARISON CT: 2022    OTHER STUDIES USED FOR CORRELATION: None.      FINDINGS:    LOWER CHEST: Bibasilar atelectasis.    HEPATOBILIARY: Cholelithiasis. No biliary ductal dilatation or suspicious   parenchymal lesion.    SPLEEN: Unchanged splenic hypodensities.    PANCREAS: Unremarkable.    ADRENAL GLANDS: Unremarkable.    KIDNEYS: Symmetric renal enhancement without hydronephrosis bilaterally.   Subcentimeter hypodensities, unchanged.    ABDOMINOPELVIC NODES: Unremarkable.    PELVIC ORGANS: Thomson catheter in urinary bladder. Uterine calcifications   and fibroids. Dilated gonadal veins bilaterally.    PERITONEUM/MESENTERY/BOWEL: Severe rectosigmoid circumferential wall   thickening with surrounding inflammation. Inflammatory changes   surrounding loop of adjacent small bowel, fistulization cannot be   excluded. No drainable fluid collection or pneumoperitoneum. No evidence   for bowel obstruction.. Normal caliber appendix.    BONES/SOFT TISSUES: Unchanged..    OTHER: IVC filter is unchanged      IMPRESSION:    Since 2022;    Severe rectosigmoid circumferential wall thickening with surrounding   inflammation consistent with colitis/diverticulitis, overall unchanged.   Inflammatory changes extend to adjacent small bowel loop and   fistulization cannot be excluded.    No drainable fluid collection or pneumoperitoneum.    < end of copied text >     Gastroenterology Consultation:    Patient is a 77y old  Female who presents with a chief complaint of urinary retention, abd pain (15 Jul 2022 15:34)    Admitted on: 22    HPI:  Patient is a 76yo female with history of dementia (AAOx1 at baseline), HTN and recent hospital course for diverticulitis and asymptomatic COVID who presents to the ED from NH for lower abdominal pain and urinary retention. S/p recent hospital stay - with acute diverticulitis. per family, patients symptoms have not improved since  started . CT scan was significant for Severe rectosigmoid circumferential wall thickening with surrounding inflammation consistent with colitis/diverticulitis, overall unchanged. Inflammatory changes extend to adjacent small bowel loop and fistulization cannot be excluded. No drainable fluid collection or pneumoperitoneum.    Prior EGD: no previous EGD per family     Prior Colonoscopy: no previous EGD per family    PAST MEDICAL & SURGICAL HISTORY:  Hypertension, unspecified type  H/O dilation and curettage  for missed     FAMILY HISTORY:  no FH of GI malignancies     Social History:  never smoker.     Home Medications:        MEDICATIONS  (STANDING):  chlorhexidine 2% Cloths 1 Application(s) Topical <User Schedule>  donepezil 5 milliGRAM(s) Oral at bedtime  enoxaparin Injectable 40 milliGRAM(s) SubCutaneous every 24 hours  melatonin 10 milliGRAM(s) Oral at bedtime  pantoprazole    Tablet 40 milliGRAM(s) Oral before breakfast  piperacillin/tazobactam IVPB.. 3.375 Gram(s) IV Intermittent every 8 hours  saccharomyces boulardii 250 milliGRAM(s) Oral two times a day  senna 2 Tablet(s) Oral at bedtime    MEDICATIONS  (PRN):      Allergies  No Known Allergies      Review of Systems:   Constitutional:  No Fever, No Chills  ENT/Mouth:  No Hearing Changes,  No Difficulty Swallowing  Eyes:  No Eye Pain, No Vision Changes  Cardiovascular:  No Chest Pain, No Palpitations  Respiratory:  No Cough, No Dyspnea  Gastrointestinal:  As described in HPI  Musculoskeletal:  No Joint Swelling, No Back Pain  Skin:  No Skin Lesions, No Jaundice  Neuro:  No Syncope, No Dizziness  Heme/Lymph:  No Bruising, No Bleeding.          Physical Examination:  T(C): 37.3 (07-15-22 @ 14:32), Max: 37.3 (07-15-22 @ 14:32)  HR: 85 (07-15-22 @ 14:32) (77 - 85)  BP: 107/59 (07-15-22 @ 14:32) (107/59 - 123/60)  RR: 18 (07-15-22 @ 14:32) (18 - 18)  SpO2: 96% (07-15-22 @ 14:32) (91% - 97%)      22 @ 07:01  -  07-15-22 @ 07:00  --------------------------------------------------------  IN: 0 mL / OUT: 1300 mL / NET: -1300 mL    07-15-22 @ 07:01  -  07-15-22 @ 16:14  --------------------------------------------------------  IN: 0 mL / OUT: 1002 mL / NET: -1002 mL      GENERAL: AAOx1, no acute distress.  HEAD:  Atraumatic, Normocephalic  EYES: conjunctiva and sclera clear  NECK: Supple, no JVD or thyromegaly  CHEST/LUNG: Clear to auscultation bilaterally  HEART: Regular rate and rhythm; normal S1, S2, No murmurs.  ABDOMEN: Soft, nontender, nondistended;  NEUROLOGY: No asterixis or tremor.   SKIN: Intact, no jaundice        Data:                        9.3    5.17  )-----------( 339      ( 15 Jul 2022 05:46 )             29.2     Hgb Trend:  9.3  07-15-22 @ 05:46  10.5  22 @ 13:16      07-15-22 @ 07:01  -  07-15-22 @ 16:14  --------------------------------------------------------  IN: 0 mL      07-15    139  |  103  |  10  ----------------------------<  88  4.6   |  27  |  0.6<L>    Ca    8.8      15 Jul 2022 05:46  Mg     2.0     -15    TPro  5.8<L>  /  Alb  2.6<L>  /  TBili  0.8  /  DBili  x   /  AST  10  /  ALT  10  /  AlkPhos  74  07-15    Liver panel trend:  TBili 0.8   /   AST 10   /   ALT 10   /   AlkP 74   /   Tptn 5.8   /   Alb 2.6    /   DBili --      07-15  TBili 0.2   /   AST 17   /   ALT 13   /   AlkP 89   /   Tptn 6.7   /   Alb 3.2    /   DBili <0.2        TBili 0.2   /   AST 17   /   ALT 11   /   AlkP 64   /   Tptn 5.8   /   Alb 2.8    /   DBili --        TBili <0.2   /   AST 14   /   ALT 10   /   AlkP 62   /   Tptn 5.7   /   Alb 2.7    /   DBili --        TBili <0.2   /   AST 12   /   ALT 10   /   AlkP 59   /   Tptn 5.5   /   Alb 2.6    /   DBili --        TBili <0.2   /   AST 11   /   ALT 10   /   AlkP 57   /   Tptn 5.3   /   Alb 2.5    /   DBili --          Radiology:    < from: CT Abdomen and Pelvis w/ IV Cont (22 @ 14:54) >  ACC: [id number] EXAM:  CT ABDOMEN AND PELVIS IC                          PROCEDURE DATE:  2022          INTERPRETATION:  CLINICAL STATEMENT: Nausea and vomiting. Recent history   of diverticulitis    TECHNIQUE: Contiguous axial CT images were obtained from the lower chest   to the pubic symphysis following administration of 100cc Optiray 320   intravenous contrast.  Oral contrast was not administered.  Reformatted   images in the coronal and sagittal planes were acquired.    COMPARISON CT: 2022    OTHER STUDIES USED FOR CORRELATION: None.      FINDINGS:    LOWER CHEST: Bibasilar atelectasis.    HEPATOBILIARY: Cholelithiasis. No biliary ductal dilatation or suspicious   parenchymal lesion.    SPLEEN: Unchanged splenic hypodensities.    PANCREAS: Unremarkable.    ADRENAL GLANDS: Unremarkable.    KIDNEYS: Symmetric renal enhancement without hydronephrosis bilaterally.   Subcentimeter hypodensities, unchanged.    ABDOMINOPELVIC NODES: Unremarkable.    PELVIC ORGANS: Thomson catheter in urinary bladder. Uterine calcifications   and fibroids. Dilated gonadal veins bilaterally.    PERITONEUM/MESENTERY/BOWEL: Severe rectosigmoid circumferential wall   thickening with surrounding inflammation. Inflammatory changes   surrounding loop of adjacent small bowel, fistulization cannot be   excluded. No drainable fluid collection or pneumoperitoneum. No evidence   for bowel obstruction.. Normal caliber appendix.    BONES/SOFT TISSUES: Unchanged..    OTHER: IVC filter is unchanged      IMPRESSION:    Since 2022;    Severe rectosigmoid circumferential wall thickening with surrounding   inflammation consistent with colitis/diverticulitis, overall unchanged.   Inflammatory changes extend to adjacent small bowel loop and   fistulization cannot be excluded.    No drainable fluid collection or pneumoperitoneum.    < end of copied text >

## 2022-07-15 NOTE — CONSULT NOTE ADULT - MUSCULOSKELETAL
normal/ROM intact/normal gait/strength 5/5 bilateral upper extremities/strength 5/5 bilateral lower extremities negative Awake

## 2022-07-16 LAB
ALBUMIN SERPL ELPH-MCNC: 3 G/DL — LOW (ref 3.5–5.2)
ALP SERPL-CCNC: 77 U/L — SIGNIFICANT CHANGE UP (ref 30–115)
ALT FLD-CCNC: 10 U/L — SIGNIFICANT CHANGE UP (ref 0–41)
ANION GAP SERPL CALC-SCNC: 11 MMOL/L — SIGNIFICANT CHANGE UP (ref 7–14)
AST SERPL-CCNC: 12 U/L — SIGNIFICANT CHANGE UP (ref 0–41)
BASOPHILS # BLD AUTO: 0.06 K/UL — SIGNIFICANT CHANGE UP (ref 0–0.2)
BASOPHILS NFR BLD AUTO: 1.1 % — HIGH (ref 0–1)
BILIRUB SERPL-MCNC: <0.2 MG/DL — SIGNIFICANT CHANGE UP (ref 0.2–1.2)
BUN SERPL-MCNC: 8 MG/DL — LOW (ref 10–20)
CALCIUM SERPL-MCNC: 9 MG/DL — SIGNIFICANT CHANGE UP (ref 8.5–10.1)
CHLORIDE SERPL-SCNC: 105 MMOL/L — SIGNIFICANT CHANGE UP (ref 98–110)
CO2 SERPL-SCNC: 26 MMOL/L — SIGNIFICANT CHANGE UP (ref 17–32)
CREAT SERPL-MCNC: 0.6 MG/DL — LOW (ref 0.7–1.5)
CULTURE RESULTS: SIGNIFICANT CHANGE UP
EGFR: 92 ML/MIN/1.73M2 — SIGNIFICANT CHANGE UP
EOSINOPHIL # BLD AUTO: 0.69 K/UL — SIGNIFICANT CHANGE UP (ref 0–0.7)
EOSINOPHIL NFR BLD AUTO: 12.6 % — HIGH (ref 0–8)
GLUCOSE SERPL-MCNC: 93 MG/DL — SIGNIFICANT CHANGE UP (ref 70–99)
HCT VFR BLD CALC: 31.7 % — LOW (ref 37–47)
HGB BLD-MCNC: 10 G/DL — LOW (ref 12–16)
IMM GRANULOCYTES NFR BLD AUTO: 0.7 % — HIGH (ref 0.1–0.3)
LYMPHOCYTES # BLD AUTO: 1.37 K/UL — SIGNIFICANT CHANGE UP (ref 1.2–3.4)
LYMPHOCYTES # BLD AUTO: 25.1 % — SIGNIFICANT CHANGE UP (ref 20.5–51.1)
MAGNESIUM SERPL-MCNC: 2.2 MG/DL — SIGNIFICANT CHANGE UP (ref 1.8–2.4)
MCHC RBC-ENTMCNC: 26.3 PG — LOW (ref 27–31)
MCHC RBC-ENTMCNC: 31.5 G/DL — LOW (ref 32–37)
MCV RBC AUTO: 83.4 FL — SIGNIFICANT CHANGE UP (ref 81–99)
MONOCYTES # BLD AUTO: 0.79 K/UL — HIGH (ref 0.1–0.6)
MONOCYTES NFR BLD AUTO: 14.5 % — HIGH (ref 1.7–9.3)
NEUTROPHILS # BLD AUTO: 2.51 K/UL — SIGNIFICANT CHANGE UP (ref 1.4–6.5)
NEUTROPHILS NFR BLD AUTO: 46 % — SIGNIFICANT CHANGE UP (ref 42.2–75.2)
NRBC # BLD: 0 /100 WBCS — SIGNIFICANT CHANGE UP (ref 0–0)
PLATELET # BLD AUTO: 337 K/UL — SIGNIFICANT CHANGE UP (ref 130–400)
POTASSIUM SERPL-MCNC: 4.5 MMOL/L — SIGNIFICANT CHANGE UP (ref 3.5–5)
POTASSIUM SERPL-SCNC: 4.5 MMOL/L — SIGNIFICANT CHANGE UP (ref 3.5–5)
PROT SERPL-MCNC: 6.1 G/DL — SIGNIFICANT CHANGE UP (ref 6–8)
RBC # BLD: 3.8 M/UL — LOW (ref 4.2–5.4)
RBC # FLD: 17.3 % — HIGH (ref 11.5–14.5)
SODIUM SERPL-SCNC: 142 MMOL/L — SIGNIFICANT CHANGE UP (ref 135–146)
SPECIMEN SOURCE: SIGNIFICANT CHANGE UP
WBC # BLD: 5.46 K/UL — SIGNIFICANT CHANGE UP (ref 4.8–10.8)
WBC # FLD AUTO: 5.46 K/UL — SIGNIFICANT CHANGE UP (ref 4.8–10.8)

## 2022-07-16 PROCEDURE — 99233 SBSQ HOSP IP/OBS HIGH 50: CPT

## 2022-07-16 PROCEDURE — 74176 CT ABD & PELVIS W/O CONTRAST: CPT | Mod: 26

## 2022-07-16 RX ORDER — CEFTRIAXONE 500 MG/1
2000 INJECTION, POWDER, FOR SOLUTION INTRAMUSCULAR; INTRAVENOUS EVERY 24 HOURS
Refills: 0 | Status: DISCONTINUED | OUTPATIENT
Start: 2022-07-16 | End: 2022-07-20

## 2022-07-16 RX ORDER — DIATRIZOATE MEGLUMINE 180 MG/ML
30 INJECTION, SOLUTION INTRAVESICAL ONCE
Refills: 0 | Status: DISCONTINUED | OUTPATIENT
Start: 2022-07-16 | End: 2022-07-20

## 2022-07-16 RX ORDER — METRONIDAZOLE 500 MG
500 TABLET ORAL EVERY 8 HOURS
Refills: 0 | Status: DISCONTINUED | OUTPATIENT
Start: 2022-07-16 | End: 2022-07-20

## 2022-07-16 RX ORDER — TAMSULOSIN HYDROCHLORIDE 0.4 MG/1
0.4 CAPSULE ORAL AT BEDTIME
Refills: 0 | Status: DISCONTINUED | OUTPATIENT
Start: 2022-07-16 | End: 2022-07-20

## 2022-07-16 RX ADMIN — TAMSULOSIN HYDROCHLORIDE 0.4 MILLIGRAM(S): 0.4 CAPSULE ORAL at 21:55

## 2022-07-16 RX ADMIN — DONEPEZIL HYDROCHLORIDE 5 MILLIGRAM(S): 10 TABLET, FILM COATED ORAL at 21:56

## 2022-07-16 RX ADMIN — SENNA PLUS 2 TABLET(S): 8.6 TABLET ORAL at 21:55

## 2022-07-16 RX ADMIN — Medication 10 MILLIGRAM(S): at 21:56

## 2022-07-16 RX ADMIN — Medication 100 MILLIGRAM(S): at 21:56

## 2022-07-16 RX ADMIN — PIPERACILLIN AND TAZOBACTAM 25 GRAM(S): 4; .5 INJECTION, POWDER, LYOPHILIZED, FOR SOLUTION INTRAVENOUS at 05:05

## 2022-07-16 RX ADMIN — PANTOPRAZOLE SODIUM 40 MILLIGRAM(S): 20 TABLET, DELAYED RELEASE ORAL at 05:06

## 2022-07-16 RX ADMIN — Medication 250 MILLIGRAM(S): at 17:17

## 2022-07-16 RX ADMIN — CEFTRIAXONE 100 MILLIGRAM(S): 500 INJECTION, POWDER, FOR SOLUTION INTRAMUSCULAR; INTRAVENOUS at 17:18

## 2022-07-16 RX ADMIN — ENOXAPARIN SODIUM 40 MILLIGRAM(S): 100 INJECTION SUBCUTANEOUS at 05:06

## 2022-07-16 RX ADMIN — Medication 250 MILLIGRAM(S): at 05:06

## 2022-07-16 RX ADMIN — CHLORHEXIDINE GLUCONATE 1 APPLICATION(S): 213 SOLUTION TOPICAL at 05:08

## 2022-07-16 NOTE — PROGRESS NOTE ADULT - ASSESSMENT
Patient seen and examined independently of resident. My addendum supersedes resident notation. Case discussed with housestaff, nursing and patient    78yo F PMHx of dementia and HTN presents to the ED with complaints of abdominal pain. Admitted with acute recurrent severe rectosigmoid diverticulitis, complicated by urinary retention, functional debility returns to the hospital with lower abdominal pain, found to have recurrent urinary retention, and persistent abnormal rectosigmoid imaging marginal worsening suggestive of possible fistulization    #recurrent urinary retention  garcia  flomax  on prior hospitalization had LARGE volume retention- urology recommendations at last event recommended keeping garcia for 2-3 weeks  f/u ucx    #rectosigmoid diverticulitis ( Recurrent )- possible fistula formation  - iv abx restarted per id recommendation  rocephin 2gqd + flagyl 500 tid  , gi and sx evals appreciated  undergoing rectal contrast CT to visualize if fistula is present  serial abdominal exams- today's exam remains relatively benign with NO peritoneal signs    #HTN  - Currently not on meds    #Dementia  - Supportive care  reorient when confused    DVT PPX, Lovenox      Provider Hand off  Pending- c/w iv abx, f/u ct rectal contrast study then gi/ sx follow ups regarding next steps; family expresses interest in transfer to [facility]- cm on the floor reached out to transfer center, we should hear back tomorrow if there is an accepting physician to discuss with further  family discussion- d/w 2 family at bedside at length. questions + concerns addressed  Disposition- c/w inpatient care with possible transfer upcoming

## 2022-07-16 NOTE — PROGRESS NOTE ADULT - ASSESSMENT
ASSESSMENT:  78y/o female with PMHx of dementia, HTN, diverticulitis presented to ED c/o abdominal pain x 1 day. Patient was at the NH and she had the Garcia removed, and had no urinary output after she then started c/o abdominal pain, She was brought to ED where Garcia was placed. Abdominal pain much improved after garcia catheter was placed, draining clear urine. Patient with recent history of acute diverticulitis. patient was admitted on 6/26, treated with IV antibiotics for for diverticulitis and sent home on 7/18.   WBC 5. CTAP shows Severe rectosigmoid colitis/diverticulitis, with Inflammatory changes extend to adjacent small bowel loop and fistulization cannot be excluded.    PLAN:  - continue antibiotics per ID  - per GI  repeat CT with rectal contrast to confirm fistula.   -  pain control  -  DVT/GI ppx  -  surgery to follow while inpatient    [id number]

## 2022-07-17 LAB
ALBUMIN SERPL ELPH-MCNC: 2.8 G/DL — LOW (ref 3.5–5.2)
ALP SERPL-CCNC: 68 U/L — SIGNIFICANT CHANGE UP (ref 30–115)
ALT FLD-CCNC: 10 U/L — SIGNIFICANT CHANGE UP (ref 0–41)
ANION GAP SERPL CALC-SCNC: 8 MMOL/L — SIGNIFICANT CHANGE UP (ref 7–14)
AST SERPL-CCNC: 12 U/L — SIGNIFICANT CHANGE UP (ref 0–41)
BASOPHILS # BLD AUTO: 0.06 K/UL — SIGNIFICANT CHANGE UP (ref 0–0.2)
BASOPHILS NFR BLD AUTO: 1.2 % — HIGH (ref 0–1)
BILIRUB SERPL-MCNC: <0.2 MG/DL — SIGNIFICANT CHANGE UP (ref 0.2–1.2)
BUN SERPL-MCNC: 9 MG/DL — LOW (ref 10–20)
CALCIUM SERPL-MCNC: 8.9 MG/DL — SIGNIFICANT CHANGE UP (ref 8.5–10.1)
CHLORIDE SERPL-SCNC: 105 MMOL/L — SIGNIFICANT CHANGE UP (ref 98–110)
CO2 SERPL-SCNC: 26 MMOL/L — SIGNIFICANT CHANGE UP (ref 17–32)
CREAT SERPL-MCNC: 0.5 MG/DL — LOW (ref 0.7–1.5)
EGFR: 96 ML/MIN/1.73M2 — SIGNIFICANT CHANGE UP
EOSINOPHIL # BLD AUTO: 0.55 K/UL — SIGNIFICANT CHANGE UP (ref 0–0.7)
EOSINOPHIL NFR BLD AUTO: 11.1 % — HIGH (ref 0–8)
GLUCOSE SERPL-MCNC: 90 MG/DL — SIGNIFICANT CHANGE UP (ref 70–99)
HCT VFR BLD CALC: 29.7 % — LOW (ref 37–47)
HGB BLD-MCNC: 9.5 G/DL — LOW (ref 12–16)
IMM GRANULOCYTES NFR BLD AUTO: 0.8 % — HIGH (ref 0.1–0.3)
LYMPHOCYTES # BLD AUTO: 1.1 K/UL — LOW (ref 1.2–3.4)
LYMPHOCYTES # BLD AUTO: 22.2 % — SIGNIFICANT CHANGE UP (ref 20.5–51.1)
MAGNESIUM SERPL-MCNC: 2 MG/DL — SIGNIFICANT CHANGE UP (ref 1.8–2.4)
MCHC RBC-ENTMCNC: 26.7 PG — LOW (ref 27–31)
MCHC RBC-ENTMCNC: 32 G/DL — SIGNIFICANT CHANGE UP (ref 32–37)
MCV RBC AUTO: 83.4 FL — SIGNIFICANT CHANGE UP (ref 81–99)
MONOCYTES # BLD AUTO: 0.64 K/UL — HIGH (ref 0.1–0.6)
MONOCYTES NFR BLD AUTO: 12.9 % — HIGH (ref 1.7–9.3)
NEUTROPHILS # BLD AUTO: 2.56 K/UL — SIGNIFICANT CHANGE UP (ref 1.4–6.5)
NEUTROPHILS NFR BLD AUTO: 51.8 % — SIGNIFICANT CHANGE UP (ref 42.2–75.2)
NRBC # BLD: 0 /100 WBCS — SIGNIFICANT CHANGE UP (ref 0–0)
PLATELET # BLD AUTO: 310 K/UL — SIGNIFICANT CHANGE UP (ref 130–400)
POTASSIUM SERPL-MCNC: 4 MMOL/L — SIGNIFICANT CHANGE UP (ref 3.5–5)
POTASSIUM SERPL-SCNC: 4 MMOL/L — SIGNIFICANT CHANGE UP (ref 3.5–5)
PROT SERPL-MCNC: 5.8 G/DL — LOW (ref 6–8)
RBC # BLD: 3.56 M/UL — LOW (ref 4.2–5.4)
RBC # FLD: 17.3 % — HIGH (ref 11.5–14.5)
SODIUM SERPL-SCNC: 139 MMOL/L — SIGNIFICANT CHANGE UP (ref 135–146)
WBC # BLD: 4.95 K/UL — SIGNIFICANT CHANGE UP (ref 4.8–10.8)
WBC # FLD AUTO: 4.95 K/UL — SIGNIFICANT CHANGE UP (ref 4.8–10.8)

## 2022-07-17 PROCEDURE — 99233 SBSQ HOSP IP/OBS HIGH 50: CPT

## 2022-07-17 RX ORDER — SODIUM CHLORIDE 9 MG/ML
1000 INJECTION, SOLUTION INTRAVENOUS
Refills: 0 | Status: DISCONTINUED | OUTPATIENT
Start: 2022-07-17 | End: 2022-07-19

## 2022-07-17 RX ADMIN — DONEPEZIL HYDROCHLORIDE 5 MILLIGRAM(S): 10 TABLET, FILM COATED ORAL at 22:01

## 2022-07-17 RX ADMIN — Medication 250 MILLIGRAM(S): at 05:37

## 2022-07-17 RX ADMIN — CHLORHEXIDINE GLUCONATE 1 APPLICATION(S): 213 SOLUTION TOPICAL at 05:38

## 2022-07-17 RX ADMIN — SENNA PLUS 2 TABLET(S): 8.6 TABLET ORAL at 22:00

## 2022-07-17 RX ADMIN — TAMSULOSIN HYDROCHLORIDE 0.4 MILLIGRAM(S): 0.4 CAPSULE ORAL at 22:00

## 2022-07-17 RX ADMIN — PANTOPRAZOLE SODIUM 40 MILLIGRAM(S): 20 TABLET, DELAYED RELEASE ORAL at 05:37

## 2022-07-17 RX ADMIN — ENOXAPARIN SODIUM 40 MILLIGRAM(S): 100 INJECTION SUBCUTANEOUS at 05:37

## 2022-07-17 RX ADMIN — Medication 100 MILLIGRAM(S): at 22:01

## 2022-07-17 RX ADMIN — Medication 250 MILLIGRAM(S): at 17:10

## 2022-07-17 RX ADMIN — Medication 100 MILLIGRAM(S): at 05:37

## 2022-07-17 RX ADMIN — Medication 100 MILLIGRAM(S): at 13:00

## 2022-07-17 RX ADMIN — Medication 10 MILLIGRAM(S): at 22:01

## 2022-07-17 RX ADMIN — SODIUM CHLORIDE 100 MILLILITER(S): 9 INJECTION, SOLUTION INTRAVENOUS at 23:37

## 2022-07-17 RX ADMIN — CEFTRIAXONE 100 MILLIGRAM(S): 500 INJECTION, POWDER, FOR SOLUTION INTRAMUSCULAR; INTRAVENOUS at 13:46

## 2022-07-17 NOTE — PROGRESS NOTE ADULT - ASSESSMENT
76y/o female with PMHx of dementia, HTN, diverticulitis presented to ED c/o abdominal pain x 1 day. Patient was at the NH and she had the Garcia removed, and had no urinary output after she then started c/o abdominal pain, She was brought to ED where Garcia was placed. Abdominal pain much improved after garcia catheter was placed, draining clear urine. Patient with recent history of acute diverticulitis. patient was admitted on 6/26, treated with IV antibiotics for for diverticulitis and sent home on 7/18.   WBC 5. CTAP shows Severe rectosigmoid colitis/diverticulitis, with Inflammatory changes extend to adjacent small bowel loop and fistulization cannot be excluded.    PLAN:  - Recommend NPO until ROBF   - F/u outputs   - continue antibiotics per ID  - Multimodal pain control  -  Urinary catheter in place   -  DVT/GI ppx  -  Surgery to follow while inpatient     [id number]    To be discussed with attending.

## 2022-07-18 LAB
ALBUMIN SERPL ELPH-MCNC: 2.8 G/DL — LOW (ref 3.5–5.2)
ALP SERPL-CCNC: 73 U/L — SIGNIFICANT CHANGE UP (ref 30–115)
ALT FLD-CCNC: 11 U/L — SIGNIFICANT CHANGE UP (ref 0–41)
ANION GAP SERPL CALC-SCNC: 12 MMOL/L — SIGNIFICANT CHANGE UP (ref 7–14)
AST SERPL-CCNC: 15 U/L — SIGNIFICANT CHANGE UP (ref 0–41)
BASOPHILS # BLD AUTO: 0.07 K/UL — SIGNIFICANT CHANGE UP (ref 0–0.2)
BASOPHILS NFR BLD AUTO: 1.6 % — HIGH (ref 0–1)
BILIRUB SERPL-MCNC: 0.2 MG/DL — SIGNIFICANT CHANGE UP (ref 0.2–1.2)
BUN SERPL-MCNC: 10 MG/DL — SIGNIFICANT CHANGE UP (ref 10–20)
CALCIUM SERPL-MCNC: 8.8 MG/DL — SIGNIFICANT CHANGE UP (ref 8.5–10.1)
CHLORIDE SERPL-SCNC: 105 MMOL/L — SIGNIFICANT CHANGE UP (ref 98–110)
CO2 SERPL-SCNC: 23 MMOL/L — SIGNIFICANT CHANGE UP (ref 17–32)
CREAT SERPL-MCNC: 0.5 MG/DL — LOW (ref 0.7–1.5)
EGFR: 96 ML/MIN/1.73M2 — SIGNIFICANT CHANGE UP
EOSINOPHIL # BLD AUTO: 0.55 K/UL — SIGNIFICANT CHANGE UP (ref 0–0.7)
EOSINOPHIL NFR BLD AUTO: 12.5 % — HIGH (ref 0–8)
GLUCOSE SERPL-MCNC: 67 MG/DL — LOW (ref 70–99)
HCT VFR BLD CALC: 31.5 % — LOW (ref 37–47)
HGB BLD-MCNC: 10 G/DL — LOW (ref 12–16)
IMM GRANULOCYTES NFR BLD AUTO: 0.9 % — HIGH (ref 0.1–0.3)
LYMPHOCYTES # BLD AUTO: 1.01 K/UL — LOW (ref 1.2–3.4)
LYMPHOCYTES # BLD AUTO: 23 % — SIGNIFICANT CHANGE UP (ref 20.5–51.1)
MAGNESIUM SERPL-MCNC: 2.1 MG/DL — SIGNIFICANT CHANGE UP (ref 1.8–2.4)
MCHC RBC-ENTMCNC: 26.7 PG — LOW (ref 27–31)
MCHC RBC-ENTMCNC: 31.7 G/DL — LOW (ref 32–37)
MCV RBC AUTO: 84.2 FL — SIGNIFICANT CHANGE UP (ref 81–99)
MONOCYTES # BLD AUTO: 0.6 K/UL — SIGNIFICANT CHANGE UP (ref 0.1–0.6)
MONOCYTES NFR BLD AUTO: 13.6 % — HIGH (ref 1.7–9.3)
NEUTROPHILS # BLD AUTO: 2.13 K/UL — SIGNIFICANT CHANGE UP (ref 1.4–6.5)
NEUTROPHILS NFR BLD AUTO: 48.4 % — SIGNIFICANT CHANGE UP (ref 42.2–75.2)
NRBC # BLD: 0 /100 WBCS — SIGNIFICANT CHANGE UP (ref 0–0)
PLATELET # BLD AUTO: 339 K/UL — SIGNIFICANT CHANGE UP (ref 130–400)
POTASSIUM SERPL-MCNC: 4.4 MMOL/L — SIGNIFICANT CHANGE UP (ref 3.5–5)
POTASSIUM SERPL-SCNC: 4.4 MMOL/L — SIGNIFICANT CHANGE UP (ref 3.5–5)
PROT SERPL-MCNC: 6 G/DL — SIGNIFICANT CHANGE UP (ref 6–8)
RBC # BLD: 3.74 M/UL — LOW (ref 4.2–5.4)
RBC # FLD: 17.4 % — HIGH (ref 11.5–14.5)
SARS-COV-2 RNA SPEC QL NAA+PROBE: DETECTED
SODIUM SERPL-SCNC: 140 MMOL/L — SIGNIFICANT CHANGE UP (ref 135–146)
WBC # BLD: 4.4 K/UL — LOW (ref 4.8–10.8)
WBC # FLD AUTO: 4.4 K/UL — LOW (ref 4.8–10.8)

## 2022-07-18 PROCEDURE — 99232 SBSQ HOSP IP/OBS MODERATE 35: CPT

## 2022-07-18 RX ADMIN — TAMSULOSIN HYDROCHLORIDE 0.4 MILLIGRAM(S): 0.4 CAPSULE ORAL at 21:02

## 2022-07-18 RX ADMIN — Medication 250 MILLIGRAM(S): at 17:18

## 2022-07-18 RX ADMIN — CEFTRIAXONE 100 MILLIGRAM(S): 500 INJECTION, POWDER, FOR SOLUTION INTRAMUSCULAR; INTRAVENOUS at 13:48

## 2022-07-18 RX ADMIN — CHLORHEXIDINE GLUCONATE 1 APPLICATION(S): 213 SOLUTION TOPICAL at 05:12

## 2022-07-18 RX ADMIN — SODIUM CHLORIDE 100 MILLILITER(S): 9 INJECTION, SOLUTION INTRAVENOUS at 21:10

## 2022-07-18 RX ADMIN — ENOXAPARIN SODIUM 40 MILLIGRAM(S): 100 INJECTION SUBCUTANEOUS at 05:12

## 2022-07-18 RX ADMIN — DONEPEZIL HYDROCHLORIDE 5 MILLIGRAM(S): 10 TABLET, FILM COATED ORAL at 21:02

## 2022-07-18 RX ADMIN — PANTOPRAZOLE SODIUM 40 MILLIGRAM(S): 20 TABLET, DELAYED RELEASE ORAL at 05:12

## 2022-07-18 RX ADMIN — SODIUM CHLORIDE 100 MILLILITER(S): 9 INJECTION, SOLUTION INTRAVENOUS at 10:55

## 2022-07-18 RX ADMIN — Medication 10 MILLIGRAM(S): at 21:02

## 2022-07-18 RX ADMIN — Medication 100 MILLIGRAM(S): at 05:13

## 2022-07-18 RX ADMIN — Medication 100 MILLIGRAM(S): at 21:01

## 2022-07-18 RX ADMIN — Medication 250 MILLIGRAM(S): at 05:12

## 2022-07-18 RX ADMIN — Medication 100 MILLIGRAM(S): at 13:48

## 2022-07-18 RX ADMIN — SENNA PLUS 2 TABLET(S): 8.6 TABLET ORAL at 21:02

## 2022-07-18 NOTE — PROGRESS NOTE ADULT - ASSESSMENT
ASSESSMENT	  78y/o female with PMHx of dementia, HTN, diverticulitis presented to ED c/o abdominal pain x 1 day. Patient was at the NH and she had the Garcia removed, and had no urinary output after she then started c/o abdominal pain, She was brought to ED where Garcia was placed. Abdominal pain much improved after garcia catheter was placed, draining clear urine. Patient with recent history of acute diverticulitis. patient was admitted on 6/26, treated with IV antibiotics for for diverticulitis and sent home on 7/18.   WBC 5. CTAP shows Severe rectosigmoid colitis/diverticulitis, with Inflammatory changes extend to adjacent small bowel loop and fistulization cannot be excluded.    PLAN:  - Trial clear liquid diet in AM, f/u toleration  - Advance diet as tolerated  - F/u outputs   - continue antibiotics per ID  - Multimodal pain control  -  Urinary catheter in place   -  DVT/GI ppx  -  Surgery to follow while inpatient   SPECTRA [id number] ASSESSMENT	  78y/o female with PMHx of dementia, HTN, diverticulitis presented to ED c/o abdominal pain x 1 day. Patient was at the NH and she had the Garcia removed, and had no urinary output after she then started c/o abdominal pain, She was brought to ED where Garcia was placed. Abdominal pain much improved after garcia catheter was placed, draining clear urine. Patient with recent history of acute diverticulitis. patient was admitted on 6/26, treated with IV antibiotics for for diverticulitis and sent home on 7/18.   WBC 5. CTAP shows Severe rectosigmoid colitis/diverticulitis, with Inflammatory changes extend to adjacent small bowel loop and fistulization cannot be excluded.    PLAN:  - Trial clear liquid diet in AM, f/u toleration  - No active plan to take to OR this admission in setting of COVID and symptomatic improvement  - Requesting medical and cardiac risk stratification if needed in the future  - continue antibiotics per ID  - patient should follow up with urology as outpatient for urinary retention  - Multimodal pain control  -  Urinary catheter in place   -  DVT/GI ppx  SPECTRA [id number]

## 2022-07-18 NOTE — PROGRESS NOTE ADULT - ATTENDING COMMENTS
As described above, patient with diverticulitis  See above for details  I have reviewed and edited  the above note and agree with the impressions and findings and recommendations

## 2022-07-18 NOTE — PROGRESS NOTE ADULT - ASSESSMENT
· Assessment	  Patient is a 78yo female with history of dementia (AAOx1 at baseline), HTN and recent hospital course for diverticulitis and asymptomatic COVID who presents to the ED from NH for lower abdominal pain and urinary retention.    #Urinary retention   - garcia catheter is continued  - f/u outpatient urology for trial of void, UDS, cystoscopy, cytology and further urological management upon discharge     #Recurrent Rectosigmoid Diverticulitis   -failed TOV at NH  - On admission: CT shows persistent evidence of diverticulitis and rectosigmoid bowel wall thickening with extension into small bowel;  - DC Rocephin and Flagyl   - Failed outpatient PO therapy outpatient therapy but unclear if patient truly taking Cipro / Flagyl  - changed patient to tazocin for failed treatment in the previous admission   - surgery is consulted for the recurrent dicerticulitis   - PT is consulted.  - Hb is 9.5  - albumin 2.8  - dulcolax was given for constipation, patient had documented BM yesterda  -As patient has not been getting better with prior ABX regimen and rectosigmoid diverticulitis is recurrent, spoke with ID regarding possible change to ABX regimen/need for home IV treatment  -Per ID, will get midline and give 10 days Ertapenem 1gm iv q 24h  -Per surgery recs, will get cardio on board for pre-op risk stratification, as well as medical risk stratification per medicine team, although no plans for surgical intervention at this time, appreciate recs, surgery to continue following      # GOC:  - palliative care is consulted for the GOc and advanced directives.       #HTN  -BP controlled off medications  -monitor    #Recent covid  -PCR + 6/26  -asx on room air  -finished 10day quarantine    #Dementia  -AxOx1 at baseline, stable

## 2022-07-18 NOTE — PROGRESS NOTE ADULT - ASSESSMENT
76yo female with history of dementia (AAOx1 at baseline), HTN and recent hospital course for diverticulitis and asymptomatic COVID who presents to the ED from NH for lower abdominal pain and urinary retention. S/p recent hospital stay 6/26-7/9 with acute diverticulitis. per family, patients symptoms have not improved since started  the last month. CT scan was significant for Severe rectosigmoid circumferential wall thickening with surrounding inflammation consistent with colitis/diverticulitis, overall unchanged. Inflammatory changes extend to adjacent small bowel loop and fistulization cannot be excluded. No drainable fluid collection or pneumoperitoneum. Repeat w PO contrast Rectal contrast reaches the mid descending colon, without evidence of  fistula or leak.     # Persistent Recto sigmoid Diverticulitis/ colitis fistulization was ruled out based on the CT w PO contrast.   # constipation  # urine retention with UTI     - No signs of sepsis   - initial CT scan with suspicion of fistulization However repeat CT scan w PO contrast failed to reveal any fistula formation.   - BM+, abdomin is soft and nontender.     Rec:   - Start Clear liquid Diet, advance diet as tolerated   - Surgery recs noted, f/u further recommendations.   - IV hydration.   - c/w Abx per ID   - MR abdomen and pelvis with contrast to rule out entero-colonic fistula   - Patient would benefit from colonoscopy 4-8 weeks as outpatient after resolution of active inflammation.      76yo female with history of dementia (AAOx1 at baseline), HTN and recent hospital course for diverticulitis and asymptomatic COVID who presents to the ED from NH for lower abdominal pain and urinary retention. S/p recent hospital stay 6/26-7/9 with acute diverticulitis. per family, patients symptoms have not improved since started  the last month. CT scan was significant for Severe rectosigmoid circumferential wall thickening with surrounding inflammation consistent with colitis/diverticulitis, overall unchanged. Inflammatory changes extend to adjacent small bowel loop and fistulization cannot be excluded. No drainable fluid collection or pneumoperitoneum. Repeat w PO contrast Rectal contrast reaches the mid descending colon, without evidence of  fistula or leak.     # Persistent Recto sigmoid Diverticulitis/ colitis fistulization was ruled out based on the CT w PO contrast.   # constipation  # urine retention with UTI     - No signs of sepsis   - initial CT scan with suspicion of fistulization However repeat CT scan w PO contrast failed to reveal any fistula formation.   - BM+, abdomin is soft and nontender.     Rec:   - Start Clear liquid Diet, advance diet as tolerated   - Surgery recs noted, f/u further recommendations.   - IV hydration.   - c/w Abx per ID   - Patient would benefit from colonoscopy 4-8 weeks as outpatient after resolution of active inflammation.

## 2022-07-19 ENCOUNTER — TRANSCRIPTION ENCOUNTER (OUTPATIENT)
Age: 77
End: 2022-07-19

## 2022-07-19 DIAGNOSIS — R33.9 RETENTION OF URINE, UNSPECIFIED: ICD-10-CM

## 2022-07-19 DIAGNOSIS — Z51.5 ENCOUNTER FOR PALLIATIVE CARE: ICD-10-CM

## 2022-07-19 DIAGNOSIS — K57.92 DIVERTICULITIS OF INTESTINE, PART UNSPECIFIED, WITHOUT PERFORATION OR ABSCESS WITHOUT BLEEDING: ICD-10-CM

## 2022-07-19 DIAGNOSIS — F03.90 UNSPECIFIED DEMENTIA WITHOUT BEHAVIORAL DISTURBANCE: ICD-10-CM

## 2022-07-19 LAB
ALBUMIN SERPL ELPH-MCNC: 2.7 G/DL — LOW (ref 3.5–5.2)
ALP SERPL-CCNC: 70 U/L — SIGNIFICANT CHANGE UP (ref 30–115)
ALT FLD-CCNC: 12 U/L — SIGNIFICANT CHANGE UP (ref 0–41)
ANION GAP SERPL CALC-SCNC: 10 MMOL/L — SIGNIFICANT CHANGE UP (ref 7–14)
AST SERPL-CCNC: 15 U/L — SIGNIFICANT CHANGE UP (ref 0–41)
BASOPHILS # BLD AUTO: 0.04 K/UL — SIGNIFICANT CHANGE UP (ref 0–0.2)
BASOPHILS NFR BLD AUTO: 0.9 % — SIGNIFICANT CHANGE UP (ref 0–1)
BILIRUB SERPL-MCNC: <0.2 MG/DL — SIGNIFICANT CHANGE UP (ref 0.2–1.2)
BUN SERPL-MCNC: 6 MG/DL — LOW (ref 10–20)
CALCIUM SERPL-MCNC: 8.7 MG/DL — SIGNIFICANT CHANGE UP (ref 8.5–10.1)
CHLORIDE SERPL-SCNC: 108 MMOL/L — SIGNIFICANT CHANGE UP (ref 98–110)
CO2 SERPL-SCNC: 25 MMOL/L — SIGNIFICANT CHANGE UP (ref 17–32)
CREAT SERPL-MCNC: 0.5 MG/DL — LOW (ref 0.7–1.5)
CULTURE RESULTS: SIGNIFICANT CHANGE UP
CULTURE RESULTS: SIGNIFICANT CHANGE UP
EGFR: 96 ML/MIN/1.73M2 — SIGNIFICANT CHANGE UP
EOSINOPHIL # BLD AUTO: 0.53 K/UL — SIGNIFICANT CHANGE UP (ref 0–0.7)
EOSINOPHIL NFR BLD AUTO: 12.4 % — HIGH (ref 0–8)
GLUCOSE SERPL-MCNC: 89 MG/DL — SIGNIFICANT CHANGE UP (ref 70–99)
HCT VFR BLD CALC: 29.9 % — LOW (ref 37–47)
HGB BLD-MCNC: 9.7 G/DL — LOW (ref 12–16)
IMM GRANULOCYTES NFR BLD AUTO: 0.7 % — HIGH (ref 0.1–0.3)
LYMPHOCYTES # BLD AUTO: 1.03 K/UL — LOW (ref 1.2–3.4)
LYMPHOCYTES # BLD AUTO: 24.1 % — SIGNIFICANT CHANGE UP (ref 20.5–51.1)
MAGNESIUM SERPL-MCNC: 1.9 MG/DL — SIGNIFICANT CHANGE UP (ref 1.8–2.4)
MCHC RBC-ENTMCNC: 26.9 PG — LOW (ref 27–31)
MCHC RBC-ENTMCNC: 32.4 G/DL — SIGNIFICANT CHANGE UP (ref 32–37)
MCV RBC AUTO: 82.8 FL — SIGNIFICANT CHANGE UP (ref 81–99)
MONOCYTES # BLD AUTO: 0.59 K/UL — SIGNIFICANT CHANGE UP (ref 0.1–0.6)
MONOCYTES NFR BLD AUTO: 13.8 % — HIGH (ref 1.7–9.3)
NEUTROPHILS # BLD AUTO: 2.06 K/UL — SIGNIFICANT CHANGE UP (ref 1.4–6.5)
NEUTROPHILS NFR BLD AUTO: 48.1 % — SIGNIFICANT CHANGE UP (ref 42.2–75.2)
NRBC # BLD: 0 /100 WBCS — SIGNIFICANT CHANGE UP (ref 0–0)
PLATELET # BLD AUTO: 265 K/UL — SIGNIFICANT CHANGE UP (ref 130–400)
POTASSIUM SERPL-MCNC: 4.4 MMOL/L — SIGNIFICANT CHANGE UP (ref 3.5–5)
POTASSIUM SERPL-SCNC: 4.4 MMOL/L — SIGNIFICANT CHANGE UP (ref 3.5–5)
PROT SERPL-MCNC: 5.6 G/DL — LOW (ref 6–8)
RBC # BLD: 3.61 M/UL — LOW (ref 4.2–5.4)
RBC # FLD: 17.3 % — HIGH (ref 11.5–14.5)
SODIUM SERPL-SCNC: 143 MMOL/L — SIGNIFICANT CHANGE UP (ref 135–146)
SPECIMEN SOURCE: SIGNIFICANT CHANGE UP
SPECIMEN SOURCE: SIGNIFICANT CHANGE UP
WBC # BLD: 4.28 K/UL — LOW (ref 4.8–10.8)
WBC # FLD AUTO: 4.28 K/UL — LOW (ref 4.8–10.8)

## 2022-07-19 PROCEDURE — 99233 SBSQ HOSP IP/OBS HIGH 50: CPT

## 2022-07-19 PROCEDURE — 99223 1ST HOSP IP/OBS HIGH 75: CPT

## 2022-07-19 PROCEDURE — 74018 RADEX ABDOMEN 1 VIEW: CPT | Mod: 26

## 2022-07-19 PROCEDURE — 99497 ADVNCD CARE PLAN 30 MIN: CPT | Mod: 25

## 2022-07-19 RX ADMIN — TAMSULOSIN HYDROCHLORIDE 0.4 MILLIGRAM(S): 0.4 CAPSULE ORAL at 21:54

## 2022-07-19 RX ADMIN — Medication 250 MILLIGRAM(S): at 17:08

## 2022-07-19 RX ADMIN — ENOXAPARIN SODIUM 40 MILLIGRAM(S): 100 INJECTION SUBCUTANEOUS at 06:22

## 2022-07-19 RX ADMIN — DONEPEZIL HYDROCHLORIDE 5 MILLIGRAM(S): 10 TABLET, FILM COATED ORAL at 21:54

## 2022-07-19 RX ADMIN — CEFTRIAXONE 100 MILLIGRAM(S): 500 INJECTION, POWDER, FOR SOLUTION INTRAMUSCULAR; INTRAVENOUS at 11:10

## 2022-07-19 RX ADMIN — PANTOPRAZOLE SODIUM 40 MILLIGRAM(S): 20 TABLET, DELAYED RELEASE ORAL at 06:22

## 2022-07-19 RX ADMIN — Medication 10 MILLIGRAM(S): at 21:54

## 2022-07-19 RX ADMIN — Medication 100 MILLIGRAM(S): at 21:52

## 2022-07-19 RX ADMIN — Medication 100 MILLIGRAM(S): at 11:09

## 2022-07-19 RX ADMIN — SENNA PLUS 2 TABLET(S): 8.6 TABLET ORAL at 21:53

## 2022-07-19 RX ADMIN — Medication 250 MILLIGRAM(S): at 06:23

## 2022-07-19 RX ADMIN — Medication 100 MILLIGRAM(S): at 06:21

## 2022-07-19 NOTE — PROGRESS NOTE ADULT - ASSESSMENT
76yo female with history of dementia (AAOx1 at baseline), HTN and recent hospital course for diverticulitis and asymptomatic COVID who presents to the ED from NH for lower abdominal pain and urinary retention. S/p recent hospital stay 6/26-7/9 with acute diverticulitis. per family, patients symptoms have not improved since started  the last month. CT scan was significant for Severe rectosigmoid circumferential wall thickening with surrounding inflammation consistent with colitis/diverticulitis, overall unchanged. Inflammatory changes extend to adjacent small bowel loop and fistulization cannot be excluded. No drainable fluid collection or pneumoperitoneum. Repeat w PO contrast Rectal contrast reaches the mid descending colon, without evidence of  fistula or leak.     # Persistent Recto sigmoid Diverticulitis/ colitis fistulization was ruled out based on the CT w PO contrast, resolving  # constipation  # urine retention with UTI     - No signs of sepsis   - initial CT scan with suspicion of fistulization However repeat CT scan w PO contrast failed to reveal any fistula formation.   - BM+, abdomin is soft and nontender.   - tolerating clear liquids, no abdominal pain or fever.     Rec:   - tolerating clear liquids, Advance diet as tolerated   - Surgery recs noted, f/u further recommendations.   - IV hydration.   - c/w Abx per ID   - Patient would benefit from colonoscopy 4-8 weeks as outpatient after resolution of active inflammation.   - call as needed.    78yo female with history of dementia (AAOx1 at baseline), HTN and recent hospital course for diverticulitis and asymptomatic COVID who presents to the ED from NH for lower abdominal pain and urinary retention. S/p recent hospital stay 6/26-7/9 with acute diverticulitis. per family, patients symptoms have not improved since started  the last month. CT scan was significant for Severe rectosigmoid circumferential wall thickening with surrounding inflammation consistent with colitis/diverticulitis, overall unchanged. Inflammatory changes extend to adjacent small bowel loop and fistulization cannot be excluded. No drainable fluid collection or pneumoperitoneum. Repeat w PO contrast Rectal contrast reaches the mid descending colon, without evidence of  fistula or leak.     # Persistent Recto sigmoid Diverticulitis/ colitis fistulization was ruled out based on the CT w PO contrast, resolving  # constipation  # urine retention with UTI     - No signs of sepsis   - initial CT scan with suspicion of fistulization However repeat CT scan w PO contrast failed to reveal any fistula formation.   - BM+, abdomin is soft and nontender.   - tolerating clear liquids, no abdominal pain or fever.     Rec:   -Advance diet as tolerated   - Surgery recs noted, f/u further recommendations.   - IV hydration.   - c/w Abx per ID   - Patient would benefit from colonoscopy 4-8 weeks as outpatient after resolution of active inflammation.   - call as needed.

## 2022-07-19 NOTE — CONSULT NOTE ADULT - CONVERSATION DETAILS
Spoke with daughter (HCP- see one content), of patient on phone. She expressed that the patient did not do well at [facility] and so they want the patient to go home from here. Discussed advanced directives which she believes her mother may have written a living will for in the past. Discussed code status, feeding tubes, etc. She feels her mother would likely not want to be intubated or have feeding tubes, but would maybe be OK with surgery. She plans to speak with her mother and her family about this and let us know if they come to clear decisions. For now they are focused on getting her home.

## 2022-07-19 NOTE — DISCHARGE NOTE NURSING/CASE MANAGEMENT/SOCIAL WORK - NSDCPEFALRISK_GEN_ALL_CORE
For information on Fall & Injury Prevention, visit: [email or web address] OR  [email or web address] OR  https://www.cdc.gov/steadi/patient.html

## 2022-07-19 NOTE — PROGRESS NOTE ADULT - ASSESSMENT
· Assessment	  Patient is a 78yo female with history of dementia (AAOx1 at baseline), HTN and recent hospital course for diverticulitis and asymptomatic COVID who presents to the ED from NH for lower abdominal pain and urinary retention.    #Urinary retention   - garcia catheter is continued  - f/u outpatient urology for trial of void, UDS, cystoscopy, cytology and further urological management upon discharge     #Recurrent Rectosigmoid Diverticulitis   -failed TOV at NH  - On admission: CT shows persistent evidence of diverticulitis and rectosigmoid bowel wall thickening with extension into small bowel;  - DC Rocephin and Flagyl   - Failed outpatient PO therapy outpatient therapy but unclear if patient truly taking Cipro / Flagyl  - changed patient to tazocin for failed treatment in the previous admission   - surgery is consulted for the recurrent dicerticulitis   - PT is consulted.  - Hb is 9.5  - albumin 2.8  - dulcolax was given for constipation, patient had documented BM yesterda  -Per ID, will get midline and give 10 days Ertapenem 1gm iv q 24h  - Pending Cardiology and Medical surgical risk stratification      # GOC:  - palliative care is consulted for the GOc and advanced directives.       #HTN  -BP controlled off medications  -monitor    #Recent covid  -PCR + 6/26  -asx on room air  -finished 10day quarantine    #Dementia  -AxOx1 at baseline, stable

## 2022-07-19 NOTE — DISCHARGE NOTE NURSING/CASE MANAGEMENT/SOCIAL WORK - PATIENT PORTAL LINK FT
You can access the FollowMyHealth Patient Portal offered by [facility] by registering at the following website: [email or web address]. By joining [employer]’s FollowMyHealth portal, you will also be able to view your health information using other applications (apps) compatible with our system.

## 2022-07-19 NOTE — CHART NOTE - NSCHARTNOTEFT_GEN_A_CORE
PALLIATIVE MEDICINE INTERDISCIPLINARY TEAM NOTE    Provider:                                         [ X  ] Initial visit        Met with: [ X  ] Patient  [   ] Family  [   ] Other:    Primary Language: [ X  ] English [   ] Other*:                      *Interpretation provided by:    SUPPORT DIAGNOSES            (Check all that apply)    [   ] EOL issues  [   ] Advanced Illness  [   ] Cultural / spiritual concerns  [   ] Pain / suffering  [   ] Dementia / AMS  [   ] Other:  [  X ] AD issues  [   ] Grief / loss / sadness  [   ] Discharge issues  [   ] Distress / coping    PSYCHOSOCIAL ASSESSMENT OF PATIENT         (Check all that apply)    [ X  ] Initial Assessment            [   ] Reassessment          [   ] Not Applicable this visit    Pain/suffering acuity:  [ X ] None to mild (0-3)           [   ] Moderate (4-6)        [   ] High (7-10)    Mental Status:  [   ] Alert/oriented (x2)          [   ] Confused/Altered(x2/x1)         [   ] Non-resp    Functional status:  [   ] Independent w ADLs      [ X  ] Needs Assistance             [   ] Bedbound/Full Care    Coping:  [  X ] Coping well                     [   ] Coping w/difficulty            [   ] Poor coping    Support system:  [X   ] Strong                              [   ] Adequate                        [   ] Inadequate      Past history and medications for:     [ ] Anxiety       [ ] Depression    [ ] Sleep disorders       SERVICE PROVIDED  [   ]Discharge support / facilitation  [   ]AD / goals of care counseling                                  [   ]EOL / death / bereavement counseling  [ X  ]Counseling / support                                                [   ] Family meeting  [   ]Prayer / sacrament / ritual                                      [   ] Referral   [   ]Other                                                                       NOTE and Plan of Care (PoC):    patient is a 76 y/o F with noted pmhx, presenting with c/o abdominal pain. pall team visited patient earlier today. patient encountered resting in bedside chair; eating her lunch at time of visit. patient denied any pain or discomfort. her only complaint was that she did not like the hospital food. she said she has support from her family, and is hopeful that she will be able to go home soon. support rendered. [phone]

## 2022-07-19 NOTE — CONSULT NOTE ADULT - NS ATTEND AMEND GEN_ALL_CORE FT
High Risk patient as patient with infection requiring IV antibiotics and intensive monitoring  Family wishes for Full code status  They will discuss code status after the patient is discharged    As goals are clear, will sign off  [phone] PRN

## 2022-07-19 NOTE — PROGRESS NOTE ADULT - ASSESSMENT
76yo female with history of dementia (AAOx1 at baseline), HTN and recent hospital course for diverticulitis and asymptomatic COVID who presents to the ED from NH for lower abdominal pain and urinary retention. S/p recent hospital stay 6/26-7/9 with acute diverticulitis. per family, patients symptoms have not improved since started  the last month. CT scan was significant for Severe rectosigmoid circumferential wall thickening with surrounding inflammation consistent with colitis/diverticulitis, overall unchanged. Inflammatory changes extend to adjacent small bowel loop and fistulization cannot be excluded. No drainable fluid collection or pneumoperitoneum. Repeat w PO contrast Rectal contrast reaches the mid descending colon, without evidence of  fistula or leak.     # Persistent Recto sigmoid Diverticulitis/ colitis fistulization was ruled out based on the CT w PO contrast, resolving  # constipation  # urine retention with UTI     - No signs of sepsis   - initial CT scan with suspicion of fistulization However repeat CT scan w PO contrast failed to reveal any fistula formation.   - BM+, abdomin is soft and nontender.   - tolerating clear liquids, no abdominal pain or fever.     Rec:   - Advance diet as tolerated   - Surgery recs noted, f/u further recommendations.   - IV hydration.   - c/w Abx per ID   - Patient would benefit from colonoscopy 4-8 weeks as outpatient after resolution of active inflammation.   - call as needed.

## 2022-07-19 NOTE — CONSULT NOTE ADULT - PROBLEM SELECTOR RECOMMENDATION 3
A & O X 2   lives with son, Valeriy A & O X 2   lives with sonValeriy    Recommend non-pharmacological interventions to prevent/treat delirium  - maintain day/night light cycles  - optimize sleep-wake cycle, minimize environmental noise  - reorientation frequently  - use verbal redirection as first line  - minimize restraints and lines  - ensure good bladder/bowel function  - ensure adequate pain control

## 2022-07-19 NOTE — CONSULT NOTE ADULT - PROBLEM SELECTOR RECOMMENDATION 9
recurrent  recommendations per ID  recs per GI  no acute surgery per surgical team   continue medical  management recurrent  recommendations per ID  continue IV antibiotics  recs per GI  no acute surgery per surgical team   continue medical  management

## 2022-07-19 NOTE — PROGRESS NOTE ADULT - ATTENDING COMMENTS
As described above, patient with diverticulitis  See above for details  I have reviewed  the above note and agree with the impressions and findings and recommendations

## 2022-07-19 NOTE — PROGRESS NOTE ADULT - ASSESSMENT
HPI:  Patient is a 76yo female with history of dementia (AAOx1 at baseline), HTN and recent hospital course for diverticulitis and asymptomatic COVID who presents to the ED from NH for lower abdominal pain and urinary retention. S/p recent hospital stay 6/26-7/9 with acute diverticulitis and incidental covid found to be retaining urine and discharged to NH rehab facility with Garcia catheter. Garcia removed at NH yesterday patient with no urinary output since yesterday and with lower abdominal pain which began this morning. She denies any associated sx. No nausea, vomiting, diarrhea, sob, cp, cough, fevers. She is unclear why she is here and would like to reach her family because she wants to go home and she was supposed to be discharged home from NH today. She is AOx1-2 however coherent able to express clearly her needs, responding appropriately to all questions and following commands. Admits she is able to ambulate on her own, and per prior PT note she walked 30 ft with rolling walker. Per ED note, family expresses concern that nursing home is not taking care of patient. Multiple family members on file were attempted to be reached after patient interview without success. Of note, there was concern of nausea and NBNB vomiting reported by family, however no episodes reported here.     In ED: VS wnl. Abdominal pain much improved after garcia catheter was placed, draining clear urine.   Labs showed WBC 11K otherwise unremarkable. UA was positive for few bacteria / 27 wbc / +LE/ +nitrites. CT abdomen unchanged from prior, consistent with severe rectosigmoid diverticulitis with extension into small bowel.    Being admitted to medicine for PT eval and safe disposition planning as well as persistent severe diverticulitis and suspected cystitis.  (14 Jul 2022 16:07)    #Urinary retention     #Recurrent sigmoid diverticulitis   abx rocephin flagyl inpatient , cipro and flagyl on dc until 7/29   appreciate surgery eval op follow up  GI - op follow up     #Dementia     #HTN  BP: 123/63 (19 Jul 2022 07:53) (123/63 - 144/61)  controlled     PROGRESS NOTE HANDOFF    Pending: DC PLANNING     Family discussion: home aware of plan of care     Disposition: Nursing facility  HPI:  Patient is a 78yo female with history of dementia (AAOx1 at baseline), HTN and recent hospital course for diverticulitis and asymptomatic COVID who presents to the ED from NH for lower abdominal pain and urinary retention. S/p recent hospital stay 6/26-7/9 with acute diverticulitis and incidental covid found to be retaining urine and discharged to NH rehab facility with Garcia catheter. Garcia removed at NH yesterday patient with no urinary output since yesterday and with lower abdominal pain which began this morning. She denies any associated sx. No nausea, vomiting, diarrhea, sob, cp, cough, fevers. She is unclear why she is here and would like to reach her family because she wants to go home and she was supposed to be discharged home from NH today. She is AOx1-2 however coherent able to express clearly her needs, responding appropriately to all questions and following commands. Admits she is able to ambulate on her own, and per prior PT note she walked 30 ft with rolling walker. Per ED note, family expresses concern that nursing home is not taking care of patient. Multiple family members on file were attempted to be reached after patient interview without success. Of note, there was concern of nausea and NBNB vomiting reported by family, however no episodes reported here.     In ED: VS wnl. Abdominal pain much improved after garcia catheter was placed, draining clear urine.   Labs showed WBC 11K otherwise unremarkable. UA was positive for few bacteria / 27 wbc / +LE/ +nitrites. CT abdomen unchanged from prior, consistent with severe rectosigmoid diverticulitis with extension into small bowel.    Being admitted to medicine for PT eval and safe disposition planning as well as persistent severe diverticulitis and suspected cystitis.  (14 Jul 2022 16:07)    #Urinary retention     #Recurrent sigmoid diverticulitis   abx rocephin flagyl inpatient , midline to be placed ertapen   appreciate surgery eval op follow up  GI - op follow up     #Dementia     #HTN  BP: 123/63 (19 Jul 2022 07:53) (123/63 - 144/61)  controlled     PROGRESS NOTE HANDOFF    Pending: DC PLANNING     Family discussion: home aware of plan of care     Disposition: Nursing facility

## 2022-07-19 NOTE — PROGRESS NOTE ADULT - ASSESSMENT
ASSESSMENT:  78y/o female with PMHx of dementia, HTN, diverticulitis presented to ED c/o abdominal pain x 1 day. Patient was at the NH and she had the Garcia removed, and had no urinary output after she then started c/o abdominal pain, She was brought to ED where Garcia was placed. Abdominal pain much improved after garcia catheter was placed, draining clear urine. Patient with recent history of acute diverticulitis. patient was admitted on 6/26, treated with IV antibiotics for for diverticulitis and sent home on 7/18.   WBC 5. CTAP shows Severe rectosigmoid colitis/diverticulitis, with Inflammatory changes extend to adjacent small bowel loop and fistulization cannot be excluded.    PLAN:  - Patient should not receive any bowel prep  - Patient tolerating clear diet  - No active plan to take to OR this admission in setting of COVID and symptomatic improvement  - Requesting medical and cardiac risk stratification if needed in the future  - continue antibiotics per ID  - patient should follow up with urology as outpatient for urinary retention  - Multimodal pain control  - Urinary catheter in place   - F/U labs and replete electrolytes as needed  - Encourage ambulation, coughing, deep breathing, and incentive spirometry  - Plan to be discussed with attending

## 2022-07-19 NOTE — CONSULT NOTE ADULT - ASSESSMENT
Patient is a 78yo female with history of dementia (AAOx1 at baseline), HTN and recent hospital course for diverticulitis and asymptomatic COVID admitted with urinary retention, recurrent rectosigmoid diverticulitis. No plans per surgery for OR this admission due to symptomatic improvement.     MEDD (morphine equivalent daily dose):      See Recs below.    Please call [phone] with questions or concerns 24/7.   We will continue to follow.     Discussed with primary MD.   Patient is a 78yo female with history of dementia (AAOx1 at baseline), HTN and recent hospital course for diverticulitis and asymptomatic COVID admitted with urinary retention, recurrent rectosigmoid diverticulitis. No plans per surgery for OR this admission due to symptomatic improvement.    Overall the patient is feeling well and eager to get home.   Spoke with daughter Lisbet about advance directives and she will speak with her family about them and get back to us. She does not feel prepared to make any decisions at this moment.     MEDD (morphine equivalent daily dose): 0      See Recs below.    Please call [phone] with questions or concerns 24/7.   We will continue to follow.     Discussed with primary MD.   Patient is a 78yo female with history of dementia (AAOx1 at baseline), HTN and recent hospital course for diverticulitis and asymptomatic COVID admitted with urinary retention, recurrent rectosigmoid diverticulitis. No plans per surgery for OR this admission due to symptomatic improvement.    Overall the patient is feeling well and eager to get home.   Spoke with daughter Lisbet about advance directives and she will speak with her family about them and get back to us. She does not feel prepared to make any decisions at this moment.     MEDD (morphine equivalent daily dose): 0      See Recs below.    Please call [phone] with questions or concerns 24/7.     Discussed with primary MD.   Patient is a 78yo female with history of dementia (AAOx1 at baseline), HTN and recent hospital course for diverticulitis and asymptomatic COVID admitted with urinary retention, recurrent rectosigmoid diverticulitis. No plans per surgery for OR this admission due to symptomatic improvement.    Overall the patient is feeling well and eager to get home.   Spoke with daughter Lisbet about advance directives and she will speak with her family about them and get back to us. She does not feel prepared to make any decisions at this moment.     MEDD (morphine equivalent daily dose): 0      See Recs below.    Please call [phone] with questions or concerns 24/7.     We will sign off.    Discussed with primary MD.

## 2022-07-19 NOTE — CONSULT NOTE ADULT - SUBJECTIVE AND OBJECTIVE BOX
HPI:    Patient is a 76 yo female with history of dementia (AAOx1 at baseline), HTN and recent hospital course for diverticulitis and asymptomatic COVID who presents to the ED from NH for lower abdominal pain and urinary retention. S/p recent hospital stay 6/26-7/9 with acute diverticulitis and incidental covid found to be retaining urine and discharged to NH rehab facility with Garcia catheter. Garcia removed at NH yesterday patient with no urinary output since yesterday and with lower abdominal pain which began this morning. She denies any associated sx. No nausea, vomiting, diarrhea, sob, cp, cough, fevers. She is unclear why she is here and would like to reach her family because she wants to go home and she was supposed to be discharged home from NH today. She is AOx1-2 however coherent able to express clearly her needs, responding appropriately to all questions and following commands. Admits she is able to ambulate on her own, and per prior PT note she walked 30 ft with rolling walker. Per ED note, family expresses concern that nursing home is not taking care of patient. Multiple family members on file were attempted to be reached after patient interview without success. Of note, there was concern of nausea and NBNB vomiting reported by family, however no episodes reported here.     In ED: VS wnl. Abdominal pain much improved after garcia catheter was placed, draining clear urine.   Labs showed WBC 11K otherwise unremarkable. UA was positive for few bacteria / 27 wbc / +LE/ +nitrites. CT abdomen unchanged from prior, consistent with severe rectosigmoid diverticulitis with extension into small bowel.    Being admitted to medicine for PT eval and safe disposition planning as well as persistent severe diverticulitis and suspected cystitis.  (14 Jul 2022 16:07)    PERTINENT PM/SXH:   Hypertension, unspecified type    No significant past surgical history    H/O dilation and curettage      FAMILY HISTORY:  Patient unable to provide medical history    ITEMS NOT CHECKED ARE NOT PRESENT    SOCIAL HISTORY:     Significant other/partner[ ]  Children[ ]  Shinto/Spirituality:  Substance hx:  [ ]   Tobacco hx:  [ ]   Alcohol hx: [ ]   Living Situation: [ ]Home  [ ]Long term care  [ ]Rehab [ ]Other  Home Services: [ ] HHA [ ] Visting RN [ ] Hospice  Occupation:  Home Opioid hx:  [ ] Y [ ] N [ ] I-Stop Reference No:    ADVANCE DIRECTIVES:     MOLST  [ ]  Living Will  [ ]   DECISION MAKER(s):  [ X] Health Care Proxy(s)  [ ] Surrogate(s)  [ ] Guardian           Name(s): Phone Number(s): Lisbet Sommer - primary, Valeriy Dong- Secondary     BASELINE (I)ADL(s) (prior to admission):  [place]: [ ]Total  [ ] Moderate [ ]Dependent  Palliative Performance Status Version 2:         %    http://[email or web address]/files/[email or web address]_performance_scale_[email or web address].pdf    Allergies    No Known Allergies    Intolerances    MEDICATIONS  (STANDING):  cefTRIAXone   IVPB 2000 milliGRAM(s) IV Intermittent every 24 hours  chlorhexidine 2% Cloths 1 Application(s) Topical <User Schedule>  diatrizoate meglumine/diatrizoate sodium. 30 milliLiter(s) Oral once  donepezil 5 milliGRAM(s) Oral at bedtime  enoxaparin Injectable 40 milliGRAM(s) SubCutaneous every 24 hours  melatonin 10 milliGRAM(s) Oral at bedtime  metroNIDAZOLE  IVPB 500 milliGRAM(s) IV Intermittent every 8 hours  pantoprazole    Tablet 40 milliGRAM(s) Oral before breakfast  saccharomyces boulardii 250 milliGRAM(s) Oral two times a day  senna 2 Tablet(s) Oral at bedtime  tamsulosin 0.4 milliGRAM(s) Oral at bedtime    MEDICATIONS  (PRN):    PRESENT SYMPTOMS: [ ]Unable to obtain due to poor mentation   Source if other than patient:  [ ]Family   [ ]Team     Pain: [ ]yes [ ]no  QOL impact -   Location -                    Aggravating factors -  Quality -  Radiation -  Timing-  Severity (0-10 scale):  Minimal acceptable level (0-10 scale):     CPOT:    https://www.sccm.org/getattachment/[email or web address]-6u6x-8h5e-5918y7326a1p/Critical-Care-Pain-Observation-Tool-(CPOT)      PAIN AD Score:     [email or web address]://[email or web address]/cog/painad.pdf (press ctrl +  left click to view)    Dyspnea:                           [ ]Mild [ ]Moderate [ ]Severe  Anxiety:                             [ ]Mild [ ]Moderate [ ]Severe  Fatigue:                             [ ]Mild [ ]Moderate [ ]Severe  Nausea:                             [ ]Mild [ ]Moderate [ ]Severe  Loss of appetite:              [ ]Mild [ ]Moderate [ ]Severe  Constipation:                    [ ]Mild [ ]Moderate [ ]Severe    Other Symptoms:  [ ]All other review of systems negative     Palliative Performance Status Version 2:         %    http://[email or web address]/[email or web address]_performance_scale_[email or web address].pdf  PHYSICAL EXAM:  Vital Signs Last 24 Hrs  T(C): 36.2 (19 Jul 2022 07:53), Max: 37.1 (18 Jul 2022 23:53)  T(F): 97.2 (19 Jul 2022 07:53), Max: 98.7 (18 Jul 2022 23:53)  HR: 55 (19 Jul 2022 07:53) (55 - 74)  BP: 123/63 (19 Jul 2022 07:53) (123/63 - 144/61)  BP(mean): --  RR: 18 (19 Jul 2022 07:53) (18 - 20)  SpO2: 97% (18 Jul 2022 23:53) (97% - 97%)    Parameters below as of 18 Jul 2022 23:53  Patient On (Oxygen Delivery Method): room air     I&O's Summary    18 Jul 2022 07:01  -  19 Jul 2022 07:00  --------------------------------------------------------  IN: 918 mL / OUT: 2700 mL / NET: -1782 mL    19 Jul 2022 07:01  -  19 Jul 2022 10:59  --------------------------------------------------------  IN: 0 mL / OUT: 600 mL / NET: -600 mL      GENERAL:  [ ]Alert  [ ]Oriented x   [ ]Lethargic  [ ]Cachexia  [ ]Unarousable  [ ]Verbal  [ ]Non-Verbal  Behavioral:   [ ] Anxiety  [ ] Delirium [ ] Agitation [ ] Other  HEENT:  [ ]Normal   [ ]Dry mouth   [ ]ET Tube/Trach  [ ]Oral lesions  PULMONARY:   [ ]Clear [ ]Tachypnea  [ ]Audible excessive secretions   [ ]Rhonchi        [ ]Right [ ]Left [ ]Bilateral  [ ]Crackles        [ ]Right [ ]Left [ ]Bilateral  [ ]Wheezing     [ ]Right [ ]Left [ ]Bilateral  [ ]Diminished breath sounds [ ]right [ ]left [ ]bilateral  CARDIOVASCULAR:    [ ]Regular [ ]Irregular [ ]Tachy  [ ]Carter [ ]Murmur [ ]Other  GASTROINTESTINAL:  [ ]Soft  [ ]Distended   [ ]+BS  [ ]Non tender [ ]Tender  [ ]PEG [ ]OGT/ NGT  Last BM:   GENITOURINARY:  [ ]Normal [ ] Incontinent   [ ]Oliguria/Anuria   [ ]Garcia  MUSCULOSKELETAL:   [ ]Normal   [ ]Weakness  [ ]Bed/Wheelchair bound [ ]Edema  NEUROLOGIC:   [ ]No focal deficits  [ ]Cognitive impairment  [ ]Dysphagia [ ]Dysarthria [ ]Paresis [ ]Other   SKIN:   [ ]Normal    [ ]Rash  [ ]Pressure ulcer(s)       Present on admission [ ]y [ ]n    CRITICAL CARE:  [ ] Shock Present  [ ]Septic [ ]Cardiogenic [ ]Neurologic [ ]Hypovolemic  [ ]  Vasopressors [ ]  Inotropes   [ ]Respiratory failure present [ ]Mechanical ventilation [ ]Non-invasive ventilatory support [ ]High flow  [ ]Acute  [ ]Chronic [ ]Hypoxic  [ ]Hypercarbic [ ]Other  [ ]Other organ failure     LABS:                        9.7    4.28  )-----------( 265      ( 19 Jul 2022 08:38 )             29.9   07-19    143  |  108  |  6<L>  ----------------------------<  89  4.4   |  25  |  0.5<L>    Ca    8.7      19 Jul 2022 08:38  Mg     1.9     07-19    TPro  5.6<L>  /  Alb  2.7<L>  /  TBili  <0.2  /  DBili  x   /  AST  15  /  ALT  12  /  AlkPhos  70  07-19        RADIOLOGY & ADDITIONAL STUDIES:    PROTEIN CALORIE MALNUTRITION PRESENT: [ ]mild [ ]moderate [ ]severe [ ]underweight [ ]morbid obesity  https://www.andeal.org/vault/6644/web/files/ONC/Table_Clinical%20Characteristics%20to%20Document%20Malnutrition-White%20JV%20et%20al%202012.pdf    Height (cm): 167.6 (07-14-22 @ 12:05), 167.6 (06-27-22 @ 08:29), 167.6 (06-20-22 @ 22:36)  Weight (kg): 47.5 (06-27-22 @ 08:29), 45 (06-20-22 @ 22:36)  BMI (kg/m2): 16.9 (07-14-22 @ 12:05), 16.9 (06-27-22 @ 08:29), 16 (06-20-22 @ 22:36)    [ ]PPSV2 < or = to 30% [ ]significant weight loss  [ ]poor nutritional intake  [ ]anasarca      [ ]Artificial Nutrition      REFERRALS:   [ ]Chaplaincy  [ ]Hospice  [ ]Child Life  [ ]Social Work  [ ]Case management [ ]Holistic Therapy     Goals of Care Document:    HPI:    Patient is a 78 yo female with history of dementia (AAOx1 at baseline), HTN and recent hospital course for diverticulitis and asymptomatic COVID who presents to the ED from NH for lower abdominal pain and urinary retention. S/p recent hospital stay 6/26-7/9 with acute diverticulitis and incidental covid found to be retaining urine and discharged to NH rehab facility with Garcia catheter. Garcia removed at NH yesterday patient with no urinary output since yesterday and with lower abdominal pain which began this morning. She denies any associated sx. No nausea, vomiting, diarrhea, sob, cp, cough, fevers. She is unclear why she is here and would like to reach her family because she wants to go home and she was supposed to be discharged home from NH today. She is AOx1-2 however coherent able to express clearly her needs, responding appropriately to all questions and following commands. Admits she is able to ambulate on her own, and per prior PT note she walked 30 ft with rolling walker. Per ED note, family expresses concern that nursing home is not taking care of patient. Multiple family members on file were attempted to be reached after patient interview without success. Of note, there was concern of nausea and NBNB vomiting reported by family, however no episodes reported here.     In ED: VS wnl. Abdominal pain much improved after garcia catheter was placed, draining clear urine.   Labs showed WBC 11K otherwise unremarkable. UA was positive for few bacteria / 27 wbc / +LE/ +nitrites. CT abdomen unchanged from prior, consistent with severe rectosigmoid diverticulitis with extension into small bowel.    Being admitted to medicine for PT eval and safe disposition planning as well as persistent severe diverticulitis and suspected cystitis.  (14 Jul 2022 16:07)    PERTINENT PM/SXH:   Hypertension, unspecified type    No significant past surgical history    H/O dilation and curettage      FAMILY HISTORY:  Patient unable to provide medical history    ITEMS NOT CHECKED ARE NOT PRESENT    SOCIAL HISTORY:     Significant other/partner[ ]  Children[X - 9 ]  Muslim/Spirituality:  Substance hx:  [ ]   Tobacco hx:  [ ]   Alcohol hx: [ ]   Living Situation: [ ]Home  [ ]Long term care  [X ]Rehab [ ]Other  Home Services: [ ] HHA [ ] Leander RN [ ] Hospice  Occupation:  Home Opioid hx:  [ ] Y [ ] N [X ] I-Stop Reference No:    ADVANCE DIRECTIVES:     MOLST  [ ]  Living Will  [ ]   DECISION MAKER(s):  [ X] Health Care Proxy(s)  [ ] Surrogate(s)  [ ] Guardian           Name(s): Phone Number(s): Lisbet Sommer - primary, Valeriy Dong- Secondary     BASELINE (I)ADL(s) (prior to admission):  [place]: [ ]Total  [ ] Moderate [ ]Dependent  Palliative Performance Status Version 2:         %    [email or web address]/files/[email or web address]_[email or web address]    Allergies    No Known Allergies    Intolerances    MEDICATIONS  (STANDING):  cefTRIAXone   IVPB 2000 milliGRAM(s) IV Intermittent every 24 hours  chlorhexidine 2% Cloths 1 Application(s) Topical <User Schedule>  diatrizoate meglumine/diatrizoate sodium. 30 milliLiter(s) Oral once  donepezil 5 milliGRAM(s) Oral at bedtime  enoxaparin Injectable 40 milliGRAM(s) SubCutaneous every 24 hours  melatonin 10 milliGRAM(s) Oral at bedtime  metroNIDAZOLE  IVPB 500 milliGRAM(s) IV Intermittent every 8 hours  pantoprazole    Tablet 40 milliGRAM(s) Oral before breakfast  saccharomyces boulardii 250 milliGRAM(s) Oral two times a day  senna 2 Tablet(s) Oral at bedtime  tamsulosin 0.4 milliGRAM(s) Oral at bedtime    MEDICATIONS  (PRN):    PRESENT SYMPTOMS: [ ]Unable to obtain due to poor mentation   Source if other than patient:  [ ]Family   [ ]Team     Pain: [ ]yes [ ]no  QOL impact -   Location -                    Aggravating factors -  Quality -  Radiation -  Timing-  Severity (0-10 scale):  Minimal acceptable level (0-10 scale):     CPOT:    https://www.sccm.org/getattachment/ick34f11-6w4q-5p2v-7h6c-2096x8292h6t/Critical-Care-Pain-Observation-Tool-(CPOT)      PAIN AD Score:     [email or web address]/[email or web address].pdf (press ctrl +  left click to view)    Dyspnea:                           [ ]Mild [ ]Moderate [ ]Severe  Anxiety:                             [ ]Mild [ ]Moderate [ ]Severe  Fatigue:                             [ ]Mild [ ]Moderate [ ]Severe  Nausea:                             [ ]Mild [ ]Moderate [ ]Severe  Loss of appetite:              [ ]Mild [ ]Moderate [ ]Severe  Constipation:                    [ ]Mild [ ]Moderate [ ]Severe    Other Symptoms:  [ ]All other review of systems negative     Palliative Performance Status Version 2:         %    http://[email or web address]/files/news/palliative_performance_scale_[email or web address].pdf  PHYSICAL EXAM:  Vital Signs Last 24 Hrs  T(C): 36.2 (19 Jul 2022 07:53), Max: 37.1 (18 Jul 2022 23:53)  T(F): 97.2 (19 Jul 2022 07:53), Max: 98.7 (18 Jul 2022 23:53)  HR: 55 (19 Jul 2022 07:53) (55 - 74)  BP: 123/63 (19 Jul 2022 07:53) (123/63 - 144/61)  BP(mean): --  RR: 18 (19 Jul 2022 07:53) (18 - 20)  SpO2: 97% (18 Jul 2022 23:53) (97% - 97%)    Parameters below as of 18 Jul 2022 23:53  Patient On (Oxygen Delivery Method): room air     I&O's Summary    18 Jul 2022 07:01  -  19 Jul 2022 07:00  --------------------------------------------------------  IN: 918 mL / OUT: 2700 mL / NET: -1782 mL    19 Jul 2022 07:01  -  19 Jul 2022 10:59  --------------------------------------------------------  IN: 0 mL / OUT: 600 mL / NET: -600 mL      GENERAL:  [ ]Alert  [ ]Oriented x   [ ]Lethargic  [ ]Cachexia  [ ]Unarousable  [ ]Verbal  [ ]Non-Verbal  Behavioral:   [ ] Anxiety  [ ] Delirium [ ] Agitation [ ] Other  HEENT:  [ ]Normal   [ ]Dry mouth   [ ]ET Tube/Trach  [ ]Oral lesions  PULMONARY:   [ ]Clear [ ]Tachypnea  [ ]Audible excessive secretions   [ ]Rhonchi        [ ]Right [ ]Left [ ]Bilateral  [ ]Crackles        [ ]Right [ ]Left [ ]Bilateral  [ ]Wheezing     [ ]Right [ ]Left [ ]Bilateral  [ ]Diminished breath sounds [ ]right [ ]left [ ]bilateral  CARDIOVASCULAR:    [ ]Regular [ ]Irregular [ ]Tachy  [ ]Carter [ ]Murmur [ ]Other  GASTROINTESTINAL:  [ ]Soft  [ ]Distended   [ ]+BS  [ ]Non tender [ ]Tender  [ ]PEG [ ]OGT/ NGT  Last BM:   GENITOURINARY:  [ ]Normal [ ] Incontinent   [ ]Oliguria/Anuria   [ ]Garcia  MUSCULOSKELETAL:   [ ]Normal   [ ]Weakness  [ ]Bed/Wheelchair bound [ ]Edema  NEUROLOGIC:   [ ]No focal deficits  [ ]Cognitive impairment  [ ]Dysphagia [ ]Dysarthria [ ]Paresis [ ]Other   SKIN:   [ ]Normal    [ ]Rash  [ ]Pressure ulcer(s)       Present on admission [ ]y [ ]n    CRITICAL CARE:  [ ] Shock Present  [ ]Septic [ ]Cardiogenic [ ]Neurologic [ ]Hypovolemic  [ ]  Vasopressors [ ]  Inotropes   [ ]Respiratory failure present [ ]Mechanical ventilation [ ]Non-invasive ventilatory support [ ]High flow  [ ]Acute  [ ]Chronic [ ]Hypoxic  [ ]Hypercarbic [ ]Other  [ ]Other organ failure     LABS:                        9.7    4.28  )-----------( 265      ( 19 Jul 2022 08:38 )             29.9   07-19    143  |  108  |  6<L>  ----------------------------<  89  4.4   |  25  |  0.5<L>    Ca    8.7      19 Jul 2022 08:38  Mg     1.9     07-19    TPro  5.6<L>  /  Alb  2.7<L>  /  TBili  <0.2  /  DBili  x   /  AST  15  /  ALT  12  /  AlkPhos  70  07-19        RADIOLOGY & ADDITIONAL STUDIES:    PROTEIN CALORIE MALNUTRITION PRESENT: [ ]mild [ ]moderate [ ]severe [ ]underweight [ ]morbid obesity  https://www.andeal.org/vault/3137/web/files/ONC/Table_Clinical%20Characteristics%20to%20Document%20Malnutrition-White%20JV%20et%20al%727490.pdf    Height (cm): 167.6 (07-14-22 @ 12:05), 167.6 (06-27-22 @ 08:29), 167.6 (06-20-22 @ 22:36)  Weight (kg): 47.5 (06-27-22 @ 08:29), 45 (06-20-22 @ 22:36)  BMI (kg/m2): 16.9 (07-14-22 @ 12:05), 16.9 (06-27-22 @ 08:29), 16 (06-20-22 @ 22:36)    [ ]PPSV2 < or = to 30% [ ]significant weight loss  [ ]poor nutritional intake  [ ]anasarca      [ ]Artificial Nutrition      REFERRALS:   [ ]Chaplaincy  [ ]Hospice  [ ]Child Life  [ ]Social Work  [ ]Case management [ ]Holistic Therapy     Goals of Care Document:    HPI:    Patient is a 76 yo female with history of dementia (AAOx1 at baseline), HTN and recent hospital course for diverticulitis and asymptomatic COVID who presents to the ED from NH for lower abdominal pain and urinary retention. S/p recent hospital stay 6/26-7/9 with acute diverticulitis and incidental covid found to be retaining urine and discharged to NH rehab facility with Garcia catheter. Garcia removed at NH yesterday patient with no urinary output since yesterday and with lower abdominal pain which began this morning. She denies any associated sx. No nausea, vomiting, diarrhea, sob, cp, cough, fevers. She is unclear why she is here and would like to reach her family because she wants to go home and she was supposed to be discharged home from NH today. She is AOx1-2 however coherent able to express clearly her needs, responding appropriately to all questions and following commands. Admits she is able to ambulate on her own, and per prior PT note she walked 30 ft with rolling walker. Per ED note, family expresses concern that nursing home is not taking care of patient. Multiple family members on file were attempted to be reached after patient interview without success. Of note, there was concern of nausea and NBNB vomiting reported by family, however no episodes reported here.     In ED: VS wnl. Abdominal pain much improved after garcia catheter was placed, draining clear urine.   Labs showed WBC 11K otherwise unremarkable. UA was positive for few bacteria / 27 wbc / +LE/ +nitrites. CT abdomen unchanged from prior, consistent with severe rectosigmoid diverticulitis with extension into small bowel.    Being admitted to medicine for PT eval and safe disposition planning as well as persistent severe diverticulitis and suspected cystitis.  (14 Jul 2022 16:07)    PERTINENT PM/SXH:   Hypertension, unspecified type    No significant past surgical history    H/O dilation and curettage      FAMILY HISTORY:  Patient unable to provide medical history    ITEMS NOT CHECKED ARE NOT PRESENT    SOCIAL HISTORY:     Significant other/partner[ ]  Children[X - 9 ]  Presybeterian/Spirituality:  Substance hx:  [ ]   Tobacco hx:  [ ]   Alcohol hx: [ ]   Living Situation: [ ]Home  [ ]Long term care  [X ]Rehab [ ]Other  Home Services: [ ] HHA [ ] Visting RN [ ] Hospice  Occupation:  Home Opioid hx:  [ ] Y [ ] N [X ] I-Stop Reference No: This report was requested by: Coral Darby | Reference #: [id number]    ADVANCE DIRECTIVES:     MOLST  [ ]  Living Will  [ ]   DECISION MAKER(s):  [ X] Health Care Proxy(s)  [ ] Surrogate(s)  [ ] Guardian           Name(s): Phone Number(s): Lisbet Sommer - primary, Valeriy Dong- Secondary     BASELINE (I)ADL(s) (prior to admission):  [place]: [ ]Total  [X ] Moderate [ ]Dependent  Palliative Performance Status Version 2:        70 %    http://npcrc.org/files/news/palliative_performance_scale_ppsv2.pdf    Allergies    No Known Allergies    Intolerances    MEDICATIONS  (STANDING):  cefTRIAXone   IVPB 2000 milliGRAM(s) IV Intermittent every 24 hours  chlorhexidine 2% Cloths 1 Application(s) Topical <User Schedule>  diatrizoate meglumine/diatrizoate sodium. 30 milliLiter(s) Oral once  donepezil 5 milliGRAM(s) Oral at bedtime  enoxaparin Injectable 40 milliGRAM(s) SubCutaneous every 24 hours  melatonin 10 milliGRAM(s) Oral at bedtime  metroNIDAZOLE  IVPB 500 milliGRAM(s) IV Intermittent every 8 hours  pantoprazole    Tablet 40 milliGRAM(s) Oral before breakfast  saccharomyces boulardii 250 milliGRAM(s) Oral two times a day  senna 2 Tablet(s) Oral at bedtime  tamsulosin 0.4 milliGRAM(s) Oral at bedtime    MEDICATIONS  (PRN):    PRESENT SYMPTOMS:  Pain: [ ]yes [ X]no  QOL impact -   Location -                    Aggravating factors -  Quality -  Radiation -  Timing-  Severity (0-10 scale):  Minimal acceptable level (0-10 scale):     PAIN AD Score:  0    http://geriatrictoolkit.[place].[facility]/cog/painad.pdf (press ctrl +  left click to view)    Dyspnea:                           [ ]Mild [ ]Moderate [ ]Severe  Anxiety:                             [ ]Mild [ ]Moderate [ ]Severe  Fatigue:                             [ ]Mild [ ]Moderate [ ]Severe  Nausea:                             [ ]Mild [ ]Moderate [ ]Severe  Loss of appetite:              [ ]Mild [ ]Moderate [ ]Severe  Constipation:                    [ ]Mild [ ]Moderate [ ]Severe    Other Symptoms:  [X ]All other review of systems negative     Palliative Performance Status Version 2:       60  %    http://npcrc.org/files/news/palliative_performance_scale_ppsv2.pdf    PHYSICAL EXAM:  Vital Signs Last 24 Hrs  T(C): 36.2 (19 Jul 2022 07:53), Max: 37.1 (18 Jul 2022 23:53)  T(F): 97.2 (19 Jul 2022 07:53), Max: 98.7 (18 Jul 2022 23:53)  HR: 55 (19 Jul 2022 07:53) (55 - 74)  BP: 123/63 (19 Jul 2022 07:53) (123/63 - 144/61)  RR: 18 (19 Jul 2022 07:53) (18 - 20)  SpO2: 97% (18 Jul 2022 23:53) (97% - 97%)    GENERAL:  [ X]Alert  [X]Oriented x 2  [ ]Lethargic  [ ]Cachexia  [ ]Unarousable  [ X]Verbal  [ ]Non-Verbal  Behavioral:   [ ] Anxiety  [ ] Delirium [ ] Agitation [ ] Other  HEENT:  [X ]Normal   [ ]Dry mouth   [ ]ET Tube/Trach  [ ]Oral lesions  PULMONARY:   [X ]Clear [ ]Tachypnea  [ ]Audible excessive secretions   [ ]Rhonchi        [ ]Right [ ]Left [ ]Bilateral  [ ]Crackles        [ ]Right [ ]Left [ ]Bilateral  [ ]Wheezing     [ ]Right [ ]Left [ ]Bilateral  [ ]Diminished breath sounds [ ]right [ ]left [ ]bilateral  CARDIOVASCULAR:    [X ]Regular [ ]Irregular [ ]Tachy  [ ]Carter [ ]Murmur [ ]Other  GASTROINTESTINAL:  [ X]Soft  [ ]Distended   [ ]+BS  [ ]Non tender [ ]Tender  [ ]PEG [ ]OGT/ NGT  Last BM:   GENITOURINARY:  [ ]Normal [ ] Incontinent   [ ]Oliguria/Anuria   [ X]Garcia  MUSCULOSKELETAL:   [ X]Normal   [ ]Weakness  [ ]Bed/Wheelchair bound [ ]Edema  NEUROLOGIC:   [ ]No focal deficits  [ X]Cognitive impairment  [ ]Dysphagia [ ]Dysarthria [ ]Paresis [ ]Other   SKIN:   [X ]Normal    [ ]Rash  [ ]Pressure ulcer(s)       Present on admission [ ]y [ ]n      LABS:                        9.7    4.28  )-----------( 265      ( 19 Jul 2022 08:38 )             29.9   07-19    143  |  108  |  6<L>  ----------------------------<  89  4.4   |  25  |  0.5<L>    Ca    8.7      19 Jul 2022 08:38  Mg     1.9     07-19    TPro  5.6<L>  /  Alb  2.7<L>  /  TBili  <0.2  /  DBili  x   /  AST  15  /  ALT  12  /  AlkPhos  70  07-19    RADIOLOGY & ADDITIONAL STUDIES:    < from: CT Abdomen and Pelvis w/ Oral Cont (07.16.22 @ 13:18) >  IMPRESSION:  Since July 14, 2022:    Persistent sigmoid circumferential wall thickening with surrounding   inflammation consistent with colitis/diverticulitis, overall unchanged.   Rectal contrast reaches the mid descending colon, without evidence of   fistula or leak. No evidence of abscess.    --- End of Report ---      < end of copied text >    REFERRALS:   [ ]Chaplaincy  [ ]Hospice  [ ]Child Life  [ ]Social Work  [ X]Case management [ ]Holistic Therapy    CC: lower abdominal pain    HPI:    Patient is a 76 yo female with history of dementia (AAOx1 at baseline), HTN and recent hospital course for diverticulitis and asymptomatic COVID who presents to the ED from NH for lower abdominal pain and urinary retention. S/p recent hospital stay 6/26-7/9 with acute diverticulitis and incidental covid found to be retaining urine and discharged to NH rehab facility with Garcia catheter. Garcia removed at NH yesterday patient with no urinary output since yesterday and with lower abdominal pain which began this morning. She denies any associated sx. No nausea, vomiting, diarrhea, sob, cp, cough, fevers. She is unclear why she is here and would like to reach her family because she wants to go home and she was supposed to be discharged home from NH today. She is AOx1-2 however coherent able to express clearly her needs, responding appropriately to all questions and following commands. Admits she is able to ambulate on her own, and per prior PT note she walked 30 ft with rolling walker. Per ED note, family expresses concern that nursing home is not taking care of patient. Multiple family members on file were attempted to be reached after patient interview without success. Of note, there was concern of nausea and NBNB vomiting reported by family, however no episodes reported here.     In ED: VS wnl. Abdominal pain much improved after garcia catheter was placed, draining clear urine.   Labs showed WBC 11K otherwise unremarkable. UA was positive for few bacteria / 27 wbc / +LE/ +nitrites. CT abdomen unchanged from prior, consistent with severe rectosigmoid diverticulitis with extension into small bowel.    Being admitted to medicine for PT eval and safe disposition planning as well as persistent severe diverticulitis and suspected cystitis.  (14 Jul 2022 16:07)    PERTINENT PM/SXH:   Hypertension, unspecified type    No significant past surgical history    H/O dilation and curettage      FAMILY HISTORY:  Patient unable to provide medical history    ITEMS NOT CHECKED ARE NOT PRESENT    SOCIAL HISTORY:     Significant other/partner[ ]  Children[X - 9 ]  Zoroastrianism/Spirituality:  Substance hx:  [ ]   Tobacco hx:  [ ]   Alcohol hx: [ ]   Living Situation: [ ]Home  [ ]Long term care  [X ]Rehab [ ]Other  Home Services: [ ] EARLINE [ ] Visting RN [ ] Hospice  Occupation:  Home Opioid hx:  [ ] Y [ ] N [X ] I-Stop Reference No: This report was requested by: Coral CLARA Darby | Reference #: [id number]    ADVANCE DIRECTIVES:     MOLST  [ ]  Living Will  [ ]   DECISION MAKER(s):  [ X] Health Care Proxy(s)  [ ] Surrogate(s)  [ ] Guardian           Name(s): Phone Number(s): Lisbet Sommer - primary, Valeriy Dong- Secondary     BASELINE (I)ADL(s) (prior to admission):  Surprise: [ ]Total  [X ] Moderate [ ]Dependent  Palliative Performance Status Version 2:        70 %    http://npcrc.org/files/news/palliative_performance_scale_[email or web address].pdf    Allergies  No Known Allergies    MEDICATIONS  (STANDING):  cefTRIAXone   IVPB 2000 milliGRAM(s) IV Intermittent every 24 hours  chlorhexidine 2% Cloths 1 Application(s) Topical <User Schedule>  diatrizoate meglumine/diatrizoate sodium. 30 milliLiter(s) Oral once  donepezil 5 milliGRAM(s) Oral at bedtime  enoxaparin Injectable 40 milliGRAM(s) SubCutaneous every 24 hours  melatonin 10 milliGRAM(s) Oral at bedtime  metroNIDAZOLE  IVPB 500 milliGRAM(s) IV Intermittent every 8 hours  pantoprazole    Tablet 40 milliGRAM(s) Oral before breakfast  saccharomyces boulardii 250 milliGRAM(s) Oral two times a day  senna 2 Tablet(s) Oral at bedtime  tamsulosin 0.4 milliGRAM(s) Oral at bedtime    MEDICATIONS  (PRN):    PRESENT SYMPTOMS:  Pain: [ ]yes [ X]no  QOL impact -   Location -                    Aggravating factors -  Quality -  Radiation -  Timing-  Severity (0-10 scale):  Minimal acceptable level (0-10 scale):     PAIN AD Score:  0    [email or web address] (press ctrl +  left click to view)    Dyspnea:                           [ ]Mild [ ]Moderate [ ]Severe  Anxiety:                             [ ]Mild [ ]Moderate [ ]Severe  Fatigue:                             [ ]Mild [ ]Moderate [ ]Severe  Nausea:                             [ ]Mild [ ]Moderate [ ]Severe  Loss of appetite:              [ ]Mild [ ]Moderate [ ]Severe  Constipation:                    [ ]Mild [ ]Moderate [ ]Severe    Other Symptoms:  [X ]All other review of systems negative     Palliative Performance Status Version 2:       60  %    [email or web address]/files/news/palliative_performance_scale_[email or web address]    PHYSICAL EXAM:  Vital Signs Last 24 Hrs  T(C): 36.2 (19 Jul 2022 07:53), Max: 37.1 (18 Jul 2022 23:53)  T(F): 97.2 (19 Jul 2022 07:53), Max: 98.7 (18 Jul 2022 23:53)  HR: 55 (19 Jul 2022 07:53) (55 - 74)  BP: 123/63 (19 Jul 2022 07:53) (123/63 - 144/61)  RR: 18 (19 Jul 2022 07:53) (18 - 20)  SpO2: 97% (18 Jul 2022 23:53) (97% - 97%)    GENERAL:  [ X]Alert  [X]Oriented x 2  [ ]Lethargic  [ ]Cachexia  [ ]Unarousable  [ X]Verbal  [ ]Non-Verbal  Behavioral:   [ ] Anxiety  [ ] Delirium [ ] Agitation [ ] Other  HEENT:  [X ]Normal   [ ]Dry mouth   [ ]ET Tube/Trach  [ ]Oral lesions  PULMONARY:   [X ]Clear [ ]Tachypnea  [ ]Audible excessive secretions   [ ]Rhonchi        [ ]Right [ ]Left [ ]Bilateral  [ ]Crackles        [ ]Right [ ]Left [ ]Bilateral  [ ]Wheezing     [ ]Right [ ]Left [ ]Bilateral  [ ]Diminished breath sounds [ ]right [ ]left [ ]bilateral  CARDIOVASCULAR:    [X ]Regular [ ]Irregular [ ]Tachy  [ ]Carter [ ]Murmur [ ]Other  GASTROINTESTINAL:  [ X]Soft  [ ]Distended   [ ]+BS  [ ]Non tender [ ]Tender  [ ]PEG [ ]OGT/ NGT  Last BM:   GENITOURINARY:  [ ]Normal [ ] Incontinent   [ ]Oliguria/Anuria   [ X]Garcia  MUSCULOSKELETAL:   [ X]Normal   [ ]Weakness  [ ]Bed/Wheelchair bound [ ]Edema  NEUROLOGIC:   [ ]No focal deficits  [ X]Cognitive impairment  [ ]Dysphagia [ ]Dysarthria [ ]Paresis [ ]Other   SKIN:   [X ]Normal    [ ]Rash  [ ]Pressure ulcer(s)       Present on admission [ ]y [ ]n    All labs and studies reviewed    LABS:                        9.7    4.28  )-----------( 265      ( 19 Jul 2022 08:38 )             29.9   07-19    143  |  108  |  6<L>  ----------------------------<  89  4.4   |  25  |  0.5<L>    Ca    8.7      19 Jul 2022 08:38  Mg     1.9     07-19    TPro  5.6<L>  /  Alb  2.7<L>  /  TBili  <0.2  /  DBili  x   /  AST  15  /  ALT  12  /  AlkPhos  70  07-19    RADIOLOGY & ADDITIONAL STUDIES:    < from: CT Abdomen and Pelvis w/ Oral Cont (07.16.22 @ 13:18) >  IMPRESSION:  Since July 14, 2022:    Persistent sigmoid circumferential wall thickening with surrounding   inflammation consistent with colitis/diverticulitis, overall unchanged.   Rectal contrast reaches the mid descending colon, without evidence of   fistula or leak. No evidence of abscess.    --- End of Report ---    < from: 12 Lead ECG (07.14.22 @ 13:36) >    Ventricular Rate 74 BPM    Atrial Rate 74 BPM    P-R Interval 144 ms    QRS Duration 80 ms    Q-T Interval 424 ms    QTC Calculation(Bazett) 470 ms    P Axis 44 degrees    R Axis -53 degrees    T Axis 16 degrees    Diagnosis Line Normal sinus rhythm  Left anterior fascicular block  Septal infarct , age undetermined  Abnormal ECG    < end of copied text >        < end of copied text >    REFERRALS:   [ ]Chaplaincy  [ ]Hospice  [ ]Child Life  [ ]Social Work  [ X]Case management [ ]Holistic Therapy

## 2022-07-20 ENCOUNTER — TRANSCRIPTION ENCOUNTER (OUTPATIENT)
Age: 77
End: 2022-07-20

## 2022-07-20 VITALS
TEMPERATURE: 97 F | RESPIRATION RATE: 18 BRPM | SYSTOLIC BLOOD PRESSURE: 122 MMHG | HEART RATE: 71 BPM | DIASTOLIC BLOOD PRESSURE: 57 MMHG

## 2022-07-20 LAB
ALBUMIN SERPL ELPH-MCNC: 2.6 G/DL — LOW (ref 3.5–5.2)
ALP SERPL-CCNC: 73 U/L — SIGNIFICANT CHANGE UP (ref 30–115)
ALT FLD-CCNC: 12 U/L — SIGNIFICANT CHANGE UP (ref 0–41)
ANION GAP SERPL CALC-SCNC: 9 MMOL/L — SIGNIFICANT CHANGE UP (ref 7–14)
AST SERPL-CCNC: 18 U/L — SIGNIFICANT CHANGE UP (ref 0–41)
BASOPHILS # BLD AUTO: 0.06 K/UL — SIGNIFICANT CHANGE UP (ref 0–0.2)
BASOPHILS NFR BLD AUTO: 1.1 % — HIGH (ref 0–1)
BILIRUB SERPL-MCNC: <0.2 MG/DL — SIGNIFICANT CHANGE UP (ref 0.2–1.2)
BUN SERPL-MCNC: 8 MG/DL — LOW (ref 10–20)
CALCIUM SERPL-MCNC: 8.5 MG/DL — SIGNIFICANT CHANGE UP (ref 8.5–10.1)
CHLORIDE SERPL-SCNC: 109 MMOL/L — SIGNIFICANT CHANGE UP (ref 98–110)
CO2 SERPL-SCNC: 23 MMOL/L — SIGNIFICANT CHANGE UP (ref 17–32)
CREAT SERPL-MCNC: 0.6 MG/DL — LOW (ref 0.7–1.5)
EGFR: 92 ML/MIN/1.73M2 — SIGNIFICANT CHANGE UP
EOSINOPHIL # BLD AUTO: 0.6 K/UL — SIGNIFICANT CHANGE UP (ref 0–0.7)
EOSINOPHIL NFR BLD AUTO: 11.5 % — HIGH (ref 0–8)
GLUCOSE SERPL-MCNC: 102 MG/DL — HIGH (ref 70–99)
HCT VFR BLD CALC: 28.8 % — LOW (ref 37–47)
HGB BLD-MCNC: 9.3 G/DL — LOW (ref 12–16)
IMM GRANULOCYTES NFR BLD AUTO: 0.8 % — HIGH (ref 0.1–0.3)
LYMPHOCYTES # BLD AUTO: 1.17 K/UL — LOW (ref 1.2–3.4)
LYMPHOCYTES # BLD AUTO: 22.4 % — SIGNIFICANT CHANGE UP (ref 20.5–51.1)
MAGNESIUM SERPL-MCNC: 1.8 MG/DL — SIGNIFICANT CHANGE UP (ref 1.8–2.4)
MCHC RBC-ENTMCNC: 26.6 PG — LOW (ref 27–31)
MCHC RBC-ENTMCNC: 32.3 G/DL — SIGNIFICANT CHANGE UP (ref 32–37)
MCV RBC AUTO: 82.5 FL — SIGNIFICANT CHANGE UP (ref 81–99)
MONOCYTES # BLD AUTO: 0.79 K/UL — HIGH (ref 0.1–0.6)
MONOCYTES NFR BLD AUTO: 15.1 % — HIGH (ref 1.7–9.3)
NEUTROPHILS # BLD AUTO: 2.56 K/UL — SIGNIFICANT CHANGE UP (ref 1.4–6.5)
NEUTROPHILS NFR BLD AUTO: 49.1 % — SIGNIFICANT CHANGE UP (ref 42.2–75.2)
NRBC # BLD: 0 /100 WBCS — SIGNIFICANT CHANGE UP (ref 0–0)
PLATELET # BLD AUTO: 273 K/UL — SIGNIFICANT CHANGE UP (ref 130–400)
POTASSIUM SERPL-MCNC: 4 MMOL/L — SIGNIFICANT CHANGE UP (ref 3.5–5)
POTASSIUM SERPL-SCNC: 4 MMOL/L — SIGNIFICANT CHANGE UP (ref 3.5–5)
PROT SERPL-MCNC: 5.5 G/DL — LOW (ref 6–8)
RBC # BLD: 3.49 M/UL — LOW (ref 4.2–5.4)
RBC # FLD: 17.6 % — HIGH (ref 11.5–14.5)
SODIUM SERPL-SCNC: 141 MMOL/L — SIGNIFICANT CHANGE UP (ref 135–146)
WBC # BLD: 5.22 K/UL — SIGNIFICANT CHANGE UP (ref 4.8–10.8)
WBC # FLD AUTO: 5.22 K/UL — SIGNIFICANT CHANGE UP (ref 4.8–10.8)

## 2022-07-20 PROCEDURE — 99239 HOSP IP/OBS DSCHRG MGMT >30: CPT

## 2022-07-20 PROCEDURE — L9981: CPT

## 2022-07-20 PROCEDURE — 76942 ECHO GUIDE FOR BIOPSY: CPT | Mod: 26

## 2022-07-20 PROCEDURE — 36000 PLACE NEEDLE IN VEIN: CPT

## 2022-07-20 RX ORDER — ERTAPENEM SODIUM 1 G/1
1 INJECTION, POWDER, LYOPHILIZED, FOR SOLUTION INTRAMUSCULAR; INTRAVENOUS
Qty: 10 | Refills: 0
Start: 2022-07-20 | End: 2022-07-29

## 2022-07-20 RX ORDER — POLYETHYLENE GLYCOL 3350 17 G/17G
1 POWDER, FOR SOLUTION ORAL
Qty: 15 | Refills: 0
Start: 2022-07-20 | End: 2022-08-03

## 2022-07-20 RX ORDER — TAMSULOSIN HYDROCHLORIDE 0.4 MG/1
1 CAPSULE ORAL
Qty: 20 | Refills: 0
Start: 2022-07-20 | End: 2022-08-08

## 2022-07-20 RX ADMIN — Medication 250 MILLIGRAM(S): at 06:24

## 2022-07-20 RX ADMIN — CEFTRIAXONE 100 MILLIGRAM(S): 500 INJECTION, POWDER, FOR SOLUTION INTRAMUSCULAR; INTRAVENOUS at 13:43

## 2022-07-20 RX ADMIN — Medication 100 MILLIGRAM(S): at 13:43

## 2022-07-20 RX ADMIN — ENOXAPARIN SODIUM 40 MILLIGRAM(S): 100 INJECTION SUBCUTANEOUS at 06:24

## 2022-07-20 RX ADMIN — Medication 100 MILLIGRAM(S): at 06:24

## 2022-07-20 RX ADMIN — PANTOPRAZOLE SODIUM 40 MILLIGRAM(S): 20 TABLET, DELAYED RELEASE ORAL at 06:24

## 2022-07-20 RX ADMIN — CHLORHEXIDINE GLUCONATE 1 APPLICATION(S): 213 SOLUTION TOPICAL at 07:02

## 2022-07-20 NOTE — PROGRESS NOTE ADULT - PROVIDER SPECIALTY LIST ADULT
Internal Medicine
Surgery
Gastroenterology
Gastroenterology
Hospitalist
Surgery
Internal Medicine
Surgery
Surgery
Gastroenterology
Internal Medicine
Surgery

## 2022-07-20 NOTE — DIETITIAN INITIAL EVALUATION ADULT - PERTINENT MEDS FT
MEDICATIONS  (STANDING):  cefTRIAXone   IVPB 2000 milliGRAM(s) IV Intermittent every 24 hours  diatrizoate meglumine/diatrizoate sodium. 30 milliLiter(s) Oral once  donepezil 5 milliGRAM(s) Oral at bedtime  enoxaparin Injectable 40 milliGRAM(s) SubCutaneous every 24 hours  metroNIDAZOLE  IVPB 500 milliGRAM(s) IV Intermittent every 8 hours  pantoprazole    Tablet 40 milliGRAM(s) Oral before breakfast  saccharomyces boulardii 250 milliGRAM(s) Oral two times a day  senna 2 Tablet(s) Oral at bedtime  tamsulosin 0.4 milliGRAM(s) Oral at bedtime

## 2022-07-20 NOTE — DISCHARGE NOTE PROVIDER - NSDCFUSCHEDAPPT_GEN_ALL_CORE_FT
Rocío Valdovinos  [facility]  [facility] [address]  Scheduled Appointment: 08/02/2022

## 2022-07-20 NOTE — DIETITIAN INITIAL EVALUATION ADULT - LAB (SPECIFY)
Pt to consume and tolerate >75% of meals/snacks and PO supplements in 10 days. --Low risk f/u in 10 days.

## 2022-07-20 NOTE — DIETITIAN INITIAL EVALUATION ADULT - NAME AND PHONE
Nutrition Intervention:meals and snacks,medical food supplements  Nutrition Monitoring:diet order,energy intake,body composition,NFPF

## 2022-07-20 NOTE — DIETITIAN INITIAL EVALUATION ADULT - PERTINENT LABORATORY DATA
07-20    141  |  109  |  8<L>  ----------------------------<  102<H>  4.0   |  23  |  0.6<L>    Ca    8.5      20 Jul 2022 07:38  Mg     1.8     07-20    TPro  5.5<L>  /  Alb  2.6<L>  /  TBili  <0.2  /  DBili  x   /  AST  18  /  ALT  12  /  AlkPhos  73  07-20

## 2022-07-20 NOTE — DIETITIAN INITIAL EVALUATION ADULT - ADD RECOMMEND
1. Add Ensure Enlive BID (700kcal/40g PRO)   2. change diet to low fiber w/ low sodium   3. encourage and assist w/ po intake

## 2022-07-20 NOTE — DISCHARGE NOTE PROVIDER - NSDCMRMEDTOKEN_GEN_ALL_CORE_FT
donepezil 5 mg oral tablet: 1 tab(s) orally once a day (at bedtime)  ertapenem 1 g injection: 1 gram(s) intravenously once a day   Flomax 0.4 mg oral capsule: 1 cap(s) orally once a day (at bedtime)  Florastor 250 mg oral capsule: 1 cap(s) orally 2 times a day   melatonin 10 mg oral tablet: 1 tab(s) orally once a day (at bedtime)  pantoprazole 40 mg oral delayed release tablet: 1 tab(s) orally once a day (before a meal)  senna leaf extract oral tablet: 2 tab(s) orally once a day (at bedtime)   donepezil 5 mg oral tablet: 1 tab(s) orally once a day (at bedtime)  ertapenem 1 g injection: 1 gram(s) intravenously once a day   Flomax 0.4 mg oral capsule: 1 cap(s) orally once a day (at bedtime)  Florastor 250 mg oral capsule: 1 cap(s) orally 2 times a day   melatonin 10 mg oral tablet: 1 tab(s) orally once a day (at bedtime)  MiraLax oral powder for reconstitution: 1 packet(s) orally once a day . Hold for diarrhea  pantoprazole 40 mg oral delayed release tablet: 1 tab(s) orally once a day (before a meal)  senna leaf extract oral tablet: 2 tab(s) orally once a day (at bedtime)

## 2022-07-20 NOTE — DISCHARGE NOTE PROVIDER - HOSPITAL COURSE
Patient is a 76yo female with history of dementia (AAOx1 at baseline), HTN and recent hospital course for diverticulitis and asymptomatic COVID who presents to the ED from NH for lower abdominal pain and urinary retention. Thomson catheter is continued. Patient to f/u outpatient urology for trial of void, UDS, cystoscopy, cytology and further urological management upon discharge  For Recurrent Rectosigmoid Diverticulitis CT shows persistent evidence of diverticulitis and rectosigmoid bowel wall thickening with extension into small bowel. Per ID, will get midline and give 10 days Ertapenem 1gm iv q 24h. Surgery recs, no intervention at this time, can d/c patient on bowel regimen. Patient is tolerating feeds well and is stable for D/c. Palliative was consulted and GOC were documented.     Patient is a 78yo female with history of dementia (AAOx1 at baseline), HTN and recent hospital course for diverticulitis and asymptomatic COVID who presents to the ED from NH for lower abdominal pain and urinary retention. Thomson catheter is continued. Patient to f/u outpatient urology for trial of void, UDS, cystoscopy, cytology and further urological management upon discharge  For Recurrent Rectosigmoid Diverticulitis CT shows persistent evidence of diverticulitis and rectosigmoid bowel wall thickening with extension into small bowel. Per ID, will get midline and give 10 days Ertapenem 1gm iv q 24h. Surgery recs, no intervention at this time, can d/c patient on bowel regimen. Patient is tolerating feeds well and is stable for D/c. Palliative was consulted and GOC were documented.      # Recto sigmoid diverticulitis  -  No signs of sepsis  - C/w IV ertapenem  - midline placed 7/20  - GI eval:  Patient would benefit from colonoscopy 4-8 weeks as outpatient after resolution of active inflammation.   - outpt F/u with surgery    # Probable UTI  # Urinary retention  - urine Culture Results: >=3 organisms. Probable collection contamination. (07.14.22 @ 13:30)  - blood Culture Results: No Growth Final (07.14.22 @ 13:16  - c/w flomax  -urology eval:   - Continue Thomson catheter for 2-3 weeks    - F/u outpatient for trial of void, UDS, cystoscopy, cytology and further urological management upon discharge     # Asymptomatic COVID  - Xray Chest 1 View-PORTABLE IMMEDIATE (Xray Chest 1 View-PORTABLE IMMEDIATE .) (07.03.22 @ 09:32) >There is no evidence of focal consolidation, pleural effusion or   pneumothorax.  - oxygen sat 95 on RA    # Dementia  - c/w home meds

## 2022-07-20 NOTE — DIETITIAN INITIAL EVALUATION ADULT - ORAL INTAKE PTA/DIET HISTORY
spoke w/ pt and son Christo (#[phone]) because pt seemed to be slightly confused upon RD visit. Pt's son and pt both report optimum po/appetite PTA, pt eats 2 1/2 meals daily and drinks at least 2-3 Ensure shakes. UBW reported to be 50kg vs. wt at admit: 44.8kg? discussed wt loss w/ pt and son, however, unable to explain what could've caused it. However, pt's ht in EMR likely an error as pt does not look taller than 60 inches. NKFA. Takes one-a-day multivitamins.

## 2022-07-20 NOTE — PROGRESS NOTE ADULT - SUBJECTIVE AND OBJECTIVE BOX
KEISHA PANTOJA  77y  [id number]      Patient is a 77y old  Female who presents with a chief complaint of urinary retention, abd pain (19 Jul 2022 10:57)      INTERVAL HPI/OVERNIGHT EVENTS:        REVIEW OF SYSTEMS:  CONSTITUTIONAL: No fever, weight loss, or fatigue  EYES: No eye pain, visual disturbances, or discharge  ENMT:  No difficulty hearing, tinnitus, vertigo; No sinus or throat pain  NECK: No pain or stiffness  BREASTS: No pain, masses, or nipple discharge  RESPIRATORY: No cough, wheezing, chills or hemoptysis; No shortness of breath  CARDIOVASCULAR: No chest pain, palpitations, dizziness, or leg swelling  GASTROINTESTINAL: No abdominal or epigastric pain. No nausea, vomiting, or hematemesis; No diarrhea or constipation. No melena or hematochezia.  GENITOURINARY: No dysuria, frequency, hematuria, or incontinence  NEUROLOGICAL: No headaches, memory loss, loss of strength, numbness, or tremors  SKIN: No itching, burning, rashes, or lesions   LYMPH NODES: No enlarged glands  ENDOCRINE: No heat or cold intolerance; No hair loss  MUSCULOSKELETAL: No joint pain or swelling; No muscle, back, or extremity pain  PSYCHIATRIC: No depression, anxiety, mood swings, or difficulty sleeping  HEME/LYMPH: No easy bruising, or bleeding gums  ALLERY AND IMMUNOLOGIC: No hives or eczema  FAMILY HISTORY:  Patient unable to provide medical history      T(C): 36.2 (07-19-22 @ 07:53), Max: 37.1 (07-18-22 @ 23:53)  HR: 55 (07-19-22 @ 07:53) (55 - 74)  BP: 123/63 (07-19-22 @ 07:53) (123/63 - 144/61)  RR: 18 (07-19-22 @ 07:53) (18 - 20)  SpO2: 97% (07-18-22 @ 23:53) (97% - 97%)  Wt(kg): --Vital Signs Last 24 Hrs  T(C): 36.2 (19 Jul 2022 07:53), Max: 37.1 (18 Jul 2022 23:53)  T(F): 97.2 (19 Jul 2022 07:53), Max: 98.7 (18 Jul 2022 23:53)  HR: 55 (19 Jul 2022 07:53) (55 - 74)  BP: 123/63 (19 Jul 2022 07:53) (123/63 - 144/61)  BP(mean): --  RR: 18 (19 Jul 2022 07:53) (18 - 20)  SpO2: 97% (18 Jul 2022 23:53) (97% - 97%)    Parameters below as of 18 Jul 2022 23:53  Patient On (Oxygen Delivery Method): room air        PHYSICAL EXAM:  GENERAL: NAD, well-groomed, well-developed  HEAD:  Atraumatic, Normocephalic  EYES: EOMI, PERRLA, conjunctiva and sclera clear  ENMT: No tonsillar erythema, exudates, or enlargement; Moist mucous membranes, Good dentition, No lesions  NECK: Supple, No JVD, Normal thyroid  NERVOUS SYSTEM:  Alert & Oriented X1  PULM: Clear to auscultation bilaterally  CARDIAC: Regular rate and rhythm; No murmurs, rubs, or gallops  GI: Soft, Nontender, Nondistended; Bowel sounds present  EXTREMITIES:  2+ Peripheral Pulses, No clubbing, cyanosis, or edema  LYMPH: No lymphadenopathy noted  SKIN: No rashes or lesions    Consultant(s) Notes Reviewed:  [x ] YES  [ ] NO  Care Discussed with Consultants/Other Providers [ x] YES  [ ] NO    LABS:                            9.7    4.28  )-----------( 265      ( 19 Jul 2022 08:38 )             29.9   07-19    143  |  108  |  6<L>  ----------------------------<  89  4.4   |  25  |  0.5<L>    Ca    8.7      19 Jul 2022 08:38  Mg     1.9     07-19    TPro  5.6<L>  /  Alb  2.7<L>  /  TBili  <0.2  /  DBili  x   /  AST  15  /  ALT  12  /  AlkPhos  70  07-19            cefTRIAXone   IVPB 2000 milliGRAM(s) IV Intermittent every 24 hours  chlorhexidine 2% Cloths 1 Application(s) Topical <User Schedule>  diatrizoate meglumine/diatrizoate sodium. 30 milliLiter(s) Oral once  donepezil 5 milliGRAM(s) Oral at bedtime  enoxaparin Injectable 40 milliGRAM(s) SubCutaneous every 24 hours  melatonin 10 milliGRAM(s) Oral at bedtime  metroNIDAZOLE  IVPB 500 milliGRAM(s) IV Intermittent every 8 hours  pantoprazole    Tablet 40 milliGRAM(s) Oral before breakfast  saccharomyces boulardii 250 milliGRAM(s) Oral two times a day  senna 2 Tablet(s) Oral at bedtime  tamsulosin 0.4 milliGRAM(s) Oral at bedtime      HEALTH ISSUES - PROBLEM Dx:          Case Discussed with House Staff   45 minutes spent on total encounter; more than 50% of the visit was spent counseling and/or coordinating care by the attending physician.   Spectra [phone]  
Gastroenterology progress note:     Patient is a 77y old  Female who presents with a chief complaint of urinary retention, abd pain (2022 10:57)       Admitted on: 22    We are following the patient for Diverticulitis        Interval History:  78yo female with history of dementia (AAOx1 at baseline), HTN and recent hospital course for diverticulitis and asymptomatic COVID who presents to the ED from NH for lower abdominal pain and urinary retention. S/p recent hospital stay - with acute diverticulitis. per family, patients symptoms have not improved since started  the last month. CT scan was significant for Severe rectosigmoid circumferential wall thickening with surrounding inflammation consistent with colitis/diverticulitis, overall unchanged. Inflammatory changes extend to adjacent small bowel loop and fistulization cannot be excluded. No drainable fluid collection or pneumoperitoneum. Repeat w PO contrast Rectal contrast reaches the mid descending colon, without evidence of  fistula or leak.     No acute events overnight.   - Diet - clear liquids   - last BM - overnight  - Abdominal pain - no pain       PAST MEDICAL & SURGICAL HISTORY:  Hypertension, unspecified type      H/O dilation and curettage  for missed           MEDICATIONS  (STANDING):  cefTRIAXone   IVPB 2000 milliGRAM(s) IV Intermittent every 24 hours  chlorhexidine 2% Cloths 1 Application(s) Topical <User Schedule>  diatrizoate meglumine/diatrizoate sodium. 30 milliLiter(s) Oral once  donepezil 5 milliGRAM(s) Oral at bedtime  enoxaparin Injectable 40 milliGRAM(s) SubCutaneous every 24 hours  melatonin 10 milliGRAM(s) Oral at bedtime  metroNIDAZOLE  IVPB 500 milliGRAM(s) IV Intermittent every 8 hours  pantoprazole    Tablet 40 milliGRAM(s) Oral before breakfast  saccharomyces boulardii 250 milliGRAM(s) Oral two times a day  senna 2 Tablet(s) Oral at bedtime  tamsulosin 0.4 milliGRAM(s) Oral at bedtime    MEDICATIONS  (PRN):      Allergies  No Known Allergies      Review of Systems:   Cardiovascular:  No Chest Pain, No Palpitations  Respiratory:  No Cough, No Dyspnea  Gastrointestinal:  As described in HPI  constitutional no fever  HENT no headache  MOHIT no weakness  psych no mood change\  Skin:  No Skin Lesions, No Jaundice  Neuro:  No Syncope, No Dizziness    Physical Examination:  T(C): 36.2 (22 @ 07:53), Max: 37.1 (22 @ 23:53)  HR: 55 (22 @ 07:53) (55 - 74)  BP: 123/63 (22 @ 07:53) (123/63 - 144/61)  RR: 18 (22 @ 07:53) (18 - 20)  SpO2: 97% (22 @ 23:53) (97% - 97%)      22 @ 07:01  -  22 @ 07:00  --------------------------------------------------------  IN: 918 mL / OUT: 2700 mL / NET: -1782 mL    22 @ 07:01  -  22 @ 11:41  --------------------------------------------------------  IN: 0 mL / OUT: 600 mL / NET: -600 mL        GENERAL: AAOx1, no acute distress.  HEAD:  Atraumatic, Normocephalic  EYES: conjunctiva and sclera clear  NECK: Supple, no JVD or thyromegaly  CHEST/LUNG: Clear to auscultation bilaterally; No wheeze, rhonchi, or rales  HEART: Regular rate and rhythm; normal S1, S2, No murmurs.  ABDOMEN: Soft, nontender, nondistended; Bowel sounds present  NEUROLOGY: No asterixis or tremor.   SKIN: Intact, no jaundice     Data:                        9.7    4.28  )-----------( 265      ( 2022 08:38 )             29.9     Hgb trend:  9.7  22 @ 08:38  10.0  22 @ 04:54  9.5  22 @ 05:27        07-19    143  |  108  |  6<L>  ----------------------------<  89  4.4   |  25  |  0.5<L>    Ca    8.7      2022 08:38  Mg     1.9         TPro  5.6<L>  /  Alb  2.7<L>  /  TBili  <0.2  /  DBili  x   /  AST  15  /  ALT  12  /  AlkPhos  70      Liver panel trend:  TBili <0.2   /   AST 15   /   ALT 12   /   AlkP 70   /   Tptn 5.6   /   Alb 2.7    /   DBili --        TBili 0.2   /   AST 15   /   ALT 11   /   AlkP 73   /   Tptn 6.0   /   Alb 2.8    /   DBili --        TBili <0.2   /   AST 12   /   ALT 10   /   AlkP 68   /   Tptn 5.8   /   Alb 2.8    /   DBili --        TBili <0.2   /   AST 12   /   ALT 10   /   AlkP 77   /   Tptn 6.1   /   Alb 3.0    /   DBili --        TBili 0.8   /   AST 10   /   ALT 10   /   AlkP 74   /   Tptn 5.8   /   Alb 2.6    /   DBili --      07-15  TBili 0.2   /   AST 17   /   ALT 13   /   AlkP 89   /   Tptn 6.7   /   Alb 3.2    /   DBili <0.2                   Radiology:      
Gastroenterology progress note:     Patient is a 77y old  Female who presents with a chief complaint of urinary retention, abd pain (2022 14:04)       Admitted on: 22    We are following the patient for Diverticulitis        Interval History:  Patient is a 78yo female with history of dementia (AAOx1 at baseline), HTN and recent hospital course for diverticulitis and asymptomatic COVID who presents to the ED from NH for lower abdominal pain and urinary retention. S/p recent hospital stay - with acute diverticulitis. per family, patients symptoms have not improved since  started . CT scan was significant for Severe rectosigmoid circumferential wall thickening with surrounding inflammation consistent with colitis/diverticulitis, overall unchanged. Inflammatory changes extend to adjacent small bowel loop and fistulization cannot be excluded. No drainable fluid collection or pneumoperitoneum. CT with PO contrast fails to reveal any fistula formation.     No acute events overnight.   - Diet - clear diet   - last BM - no BM in the last 24 hours   - Abdominal pain - comfortable       PAST MEDICAL & SURGICAL HISTORY:  Hypertension, unspecified type      H/O dilation and curettage  for missed       MEDICATIONS  (STANDING):  cefTRIAXone   IVPB 2000 milliGRAM(s) IV Intermittent every 24 hours  chlorhexidine 2% Cloths 1 Application(s) Topical <User Schedule>  diatrizoate meglumine/diatrizoate sodium. 30 milliLiter(s) Oral once  donepezil 5 milliGRAM(s) Oral at bedtime  enoxaparin Injectable 40 milliGRAM(s) SubCutaneous every 24 hours  lactated ringers. 1000 milliLiter(s) (100 mL/Hr) IV Continuous <Continuous>  melatonin 10 milliGRAM(s) Oral at bedtime  metroNIDAZOLE  IVPB 500 milliGRAM(s) IV Intermittent every 8 hours  pantoprazole    Tablet 40 milliGRAM(s) Oral before breakfast  saccharomyces boulardii 250 milliGRAM(s) Oral two times a day  senna 2 Tablet(s) Oral at bedtime  tamsulosin 0.4 milliGRAM(s) Oral at bedtime    MEDICATIONS  (PRN):      Allergies  No Known Allergies      Review of Systems:   Cardiovascular:  No Chest Pain, No Palpitations  Respiratory:  No Cough, No Dyspnea  Gastrointestinal:  As described in HPI  psych no mood changes   eye no vision changes   constitutional no fever   HENT no headache   MOHIT no weakness   Skin:  No Skin Lesions, No Jaundice  Neuro:  No Syncope, No Dizziness    Physical Examination:  T(C): 36.3 (22 @ 14:00), Max: 36.3 (22 @ 14:00)  HR: 62 (22 @ 14:00) (18 - 62)  BP: 139/65 (22 @ 14:00) (132/59 - 139/65)  RR: 20 (22 @ 14:00) (18 - 20)  SpO2: 95% (22 @ 09:46) (95% - 100%)      22 @ 07:01  -  22 @ 07:00  --------------------------------------------------------  IN: 0 mL / OUT: 2000 mL / NET: -2000 mL    22 @ 07:01  -  22 @ 14:40  --------------------------------------------------------  IN: 0 mL / OUT: 800 mL / NET: -800 mL        GENERAL: AAOx1, no acute distress.  HEAD:  Atraumatic, Normocephalic  EYES: conjunctiva and sclera clear  NECK: Supple, no JVD or thyromegaly  CHEST/LUNG: Clear to auscultation bilaterally; No wheeze, rhonchi, or rales  HEART: Regular rate and rhythm; normal S1, S2, No murmurs.  ABDOMEN: Soft, nontender, nondistended; Bowel sounds present  NEUROLOGY: No asterixis or tremor.   SKIN: Intact, no jaundice     Data:                        10.0   4.40  )-----------( 339      ( 2022 04:54 )             31.5     Hgb trend:  10.0  22 @ 04:54  9.5  22 @ 05:27  10.0  22 @ 06:11      07-15-22 @ 07:01  -  22 @ 07:00  --------------------------------------------------------  IN: 0 mL          140  |  105  |  10  ----------------------------<  67<L>  4.4   |  23  |  0.5<L>    Ca    8.8      2022 04:54  Mg     2.1         TPro  6.0  /  Alb  2.8<L>  /  TBili  0.2  /  DBili  x   /  AST  15  /  ALT  11  /  AlkPhos  73      Liver panel trend:  TBili 0.2   /   AST 15   /   ALT 11   /   AlkP 73   /   Tptn 6.0   /   Alb 2.8    /   DBili --        TBili <0.2   /   AST 12   /   ALT 10   /   AlkP 68   /   Tptn 5.8   /   Alb 2.8    /   DBili --        TBili <0.2   /   AST 12   /   ALT 10   /   AlkP 77   /   Tptn 6.1   /   Alb 3.0    /   DBili --        TBili 0.8   /   AST 10   /   ALT 10   /   AlkP 74   /   Tptn 5.8   /   Alb 2.6    /   DBili --      07-15  TBili 0.2   /   AST 17   /   ALT 13   /   AlkP 89   /   Tptn 6.7   /   Alb 3.2    /   DBili <0.2        TBili 0.2   /   AST 17   /   ALT 11   /   AlkP 64   /   Tptn 5.8   /   Alb 2.8    /   DBili --          Radiology:    < from: CT Abdomen and Pelvis w/ Oral Cont (22 @ 13:18) >    Persistent sigmoid circumferential wall thickening with surrounding   inflammation consistent with colitis/diverticulitis, overall unchanged.   Rectal contrast reaches the mid descending colon, without evidence of   fistula or leak. No evidence of abscess.    < end of copied text >    
KEISHA PANTOJA 77y Female  MRN#: [id number]   CODE STATUS:________    Hospital Day: 5d    Pt is currently admitted with the primary diagnosis of Persistent diverticulitis - Urinary retention    SUBJECTIVE  Hospital Course  Patient is a 76yo female with history of dementia (AAOx1 at baseline), HTN and recent hospital course for diverticulitis and asymptomatic COVID who presents to the ED from NH for lower abdominal pain and urinary retention.    Overnight events   None significant    Subjective complaints   Patient doing well. No complaints AAO X2.   Present Today:   - Thomson:  No [  ], Yes [   ] : Indication:     - Type of IV Access:       .. CVC/Piccline:  No [  ], Yes [   ] : Indication:       .. Midline: No [  ], Yes [   ] : Indication:                                             ----------------------------------------------------------  OBJECTIVE  PAST MEDICAL & SURGICAL HISTORY  Hypertension, unspecified type    H/O dilation and curettage  for missed                                               -----------------------------------------------------------  ALLERGIES:  No Known Allergies                                            ------------------------------------------------------------    HOME MEDICATIONS  Home Medications:                           MEDICATIONS:  STANDING MEDICATIONS  cefTRIAXone   IVPB 2000 milliGRAM(s) IV Intermittent every 24 hours  chlorhexidine 2% Cloths 1 Application(s) Topical <User Schedule>  diatrizoate meglumine/diatrizoate sodium. 30 milliLiter(s) Oral once  donepezil 5 milliGRAM(s) Oral at bedtime  enoxaparin Injectable 40 milliGRAM(s) SubCutaneous every 24 hours  melatonin 10 milliGRAM(s) Oral at bedtime  metroNIDAZOLE  IVPB 500 milliGRAM(s) IV Intermittent every 8 hours  pantoprazole    Tablet 40 milliGRAM(s) Oral before breakfast  saccharomyces boulardii 250 milliGRAM(s) Oral two times a day  senna 2 Tablet(s) Oral at bedtime  tamsulosin 0.4 milliGRAM(s) Oral at bedtime    PRN MEDICATIONS                                            ------------------------------------------------------------  VITAL SIGNS: Last 24 Hours  T(C): 36.2 (2022 07:53), Max: 37.1 (2022 23:53)  T(F): 97.2 (2022 07:53), Max: 98.7 (2022 23:53)  HR: 55 (2022 07:53) (55 - 74)  BP: 123/63 (2022 07:53) (123/63 - 144/61)  BP(mean): --  RR: 18 (2022 07:53) (18 - 19)  SpO2: 97% (2022 23:53) (97% - 97%)      22 @ 07:  -  22 @ 07:00  --------------------------------------------------------  IN: 918 mL / OUT: 2700 mL / NET: -1782 mL    22 @ 07:01  22 @ 15:20  --------------------------------------------------------  IN: 500 mL / OUT: 1690 mL / NET: -1190 mL                                             --------------------------------------------------------------  LABS:                        9.7    4.28  )-----------( 265      ( 2022 08:38 )             29.9         143  |  108  |  6<L>  ----------------------------<  89  4.4   |  25  |  0.5<L>    Ca    8.7      2022 08:38  Mg     1.9         TPro  5.6<L>  /  Alb  2.7<L>  /  TBili  <0.2  /  DBili  x   /  AST  15  /  ALT  12  /  AlkPhos  70                                                                -------------------------------------------------------------  RADIOLOGY:                                            --------------------------------------------------------------    PHYSICAL EXAM:  CONSTITUTIONAL: No acute distress, well-developed, well-groomed, resting comfortably in bed  HEAD: Atraumatic, normocephalic  EYES: EOM intact, PERRLA, conjunctiva and sclera clear  ENT: Supple, no masses, no thyromegaly, no bruits, no JVD; moist mucous membranes  PULMONARY: Clear to auscultation bilaterally; no wheezes, rales, or rhonchi  CARDIOVASCULAR: Regular rate and rhythm; no murmurs, rubs, or gallops  GASTROINTESTINAL: Soft, non-tender, non-distended; bowel sounds present  MUSCULOSKELETAL: 2+ peripheral pulses; no clubbing, no cyanosis, no edema  NEUROLOGY: non-focal, no facial droop  SKIN: No rashes or lesions; warm and dry         
GENERAL SURGERY PROGRESS NOTE    Patient: KEISHA PANTOJA , 77y (43)Female   MRN: [id number]  Location: [address]  Visit: 22 Inpatient  Date: 22 @ 01:39    Hospital Day #: 5    Procedure/Dx/Injuries: Diverticulitis and urinary retention    Events of past 24 hours: Diet advanced to clear liquid. Patient has not passed flatus or bowel movement. No acute events overnight.    PAST MEDICAL & SURGICAL HISTORY:  Hypertension, unspecified type  H/O dilation and curettage  for missed     Vitals:   T(F): 98.7 (22 @ 23:53), Max: 98.7 (22 @ 23:53)  HR: 69 (22 @ 23:53)  BP: 144/61 (22 @ 23:53)  RR: 19 (22 @ 23:53)  SpO2: 97% (22 @ 23:53)    Diet, Clear Liquid    Fluids:     I & O's:    22 @ 07:01  -  22 @ 07:00  --------------------------------------------------------  IN:  Total IN: 0 mL    OUT:    Indwelling Catheter - Urethral (mL): 2000 mL  Total OUT: 2000 mL    Total NET: -2000 mL    PHYSICAL EXAM:  General: NAD, AAOx3, calm and cooperative  Cardiac: RRR S1, S2  Respiratory: CTAB, normal respiratory effort  Abdomen: Soft, non-distended, non-tender, no rebound, no guarding. +BS.  Vascular: Pulses 2+ throughout, extremities well perfused  Skin: Warm/dry, normal color, no jaundice  Incision/wound: healing well, dressings in place, clean, dry and intact    MEDICATIONS  (STANDING):  cefTRIAXone   IVPB 2000 milliGRAM(s) IV Intermittent every 24 hours  chlorhexidine 2% Cloths 1 Application(s) Topical <User Schedule>  diatrizoate meglumine/diatrizoate sodium. 30 milliLiter(s) Oral once  donepezil 5 milliGRAM(s) Oral at bedtime  enoxaparin Injectable 40 milliGRAM(s) SubCutaneous every 24 hours  lactated ringers. 1000 milliLiter(s) (100 mL/Hr) IV Continuous <Continuous>  melatonin 10 milliGRAM(s) Oral at bedtime  metroNIDAZOLE  IVPB 500 milliGRAM(s) IV Intermittent every 8 hours  pantoprazole    Tablet 40 milliGRAM(s) Oral before breakfast  saccharomyces boulardii 250 milliGRAM(s) Oral two times a day  senna 2 Tablet(s) Oral at bedtime  tamsulosin 0.4 milliGRAM(s) Oral at bedtime    MEDICATIONS  (PRN):    DVT PROPHYLAXIS: enoxaparin Injectable 40 milliGRAM(s) SubCutaneous every 24 hours    GI PROPHYLAXIS: pantoprazole    Tablet 40 milliGRAM(s) Oral before breakfast    ANTICOAGULATION:   ANTIBIOTICS:  cefTRIAXone   IVPB 2000 milliGRAM(s)  metroNIDAZOLE  IVPB 500 milliGRAM(s)    LAB/STUDIES:  Labs:  CAPILLARY BLOOD GLUCOSE               10.0   4.40  )-----------( 339       04:54 )             31.5       Auto Neutrophil %: 48.4 % (22 @ 04:54)  Auto Immature Granulocyte %: 0.9 % (22 @ 04:54)        140  |  105  |  10  ----------------------------<  67<L>  4.4   |  23  |  0.5<L>      Calcium, Total Serum: 8.8 mg/dL (22 @ 04:54)      LFTs:             6.0  | 0.2  | 15       ------------------[73      ( 2022 04:54 )  2.8  | x    | 11          Lipase:x      Amylase:x               
GENERAL SURGERY PROGRESS NOTE    Patient: KEISHA PANTOJA , 77y (43)Female   MRN: [id number]  Location: [address]  Visit: 22 Inpatient  Date: 22 @ 04:08    Hospital Day #: 1  Post-Op Day #:    PAST MEDICAL & SURGICAL HISTORY:  Hypertension, unspecified type  H/O dilation and curettage  for missed     Vitals:   T(F): 98.4 (07-15-22 @ 20:28), Max: 99.2 (07-15-22 @ 14:32)  HR: 92 (07-15-22 @ 20:28)  BP: 118/59 (07-15-22 @ 20:28)  RR: 18 (07-15-22 @ 20:28)  SpO2: 96% (07-15-22 @ 20:28)    Diet, DASH/TLC:   Sodium & Cholesterol Restricted  Fiber/Residue Restricted    I & O's:    22 @ 07:01  -  07-15-22 @ 07:00  --------------------------------------------------------  IN:  Total IN: 0 mL    OUT:    Indwelling Catheter - Urethral (mL): 1300 mL  Total OUT: 1300 mL    Total NET: -1300 mL    PHYSICAL EXAM:  General: NAD, AAOx3, calm and cooperative  Cardiac: RRR S1, S2  Respiratory: normal respiratory effort  Abdomen: Soft, non-distended, non-tender  Skin: Warm/dry, normal color, no jaundice    MEDICATIONS  (STANDING):  chlorhexidine 2% Cloths 1 Application(s) Topical <User Schedule>  donepezil 5 milliGRAM(s) Oral at bedtime  enoxaparin Injectable 40 milliGRAM(s) SubCutaneous every 24 hours  melatonin 10 milliGRAM(s) Oral at bedtime  pantoprazole    Tablet 40 milliGRAM(s) Oral before breakfast  piperacillin/tazobactam IVPB.. 3.375 Gram(s) IV Intermittent every 8 hours  saccharomyces boulardii 250 milliGRAM(s) Oral two times a day  senna 2 Tablet(s) Oral at bedtime    MEDICATIONS  (PRN):    DVT PROPHYLAXIS: enoxaparin Injectable 40 milliGRAM(s) SubCutaneous every 24 hours    GI PROPHYLAXIS: pantoprazole    Tablet 40 milliGRAM(s) Oral before breakfast    ANTICOAGULATION:   ANTIBIOTICS:  piperacillin/tazobactam IVPB.. 3.375 Gram(s)    LAB/STUDIES:  Labs:  CAPILLARY BLOOD GLUCOSE                        9.3    5.17  )-----------( 339      ( 15 Jul 2022 05:46 )             29.2       Auto Neutrophil %: 55.8 % (07-15-22 @ 05:46)  Auto Immature Granulocyte %: 0.8 % (07-15-22 @ 05:46)    07-15    139  |  103  |  10  ----------------------------<  88  4.6   |  27  |  0.6<L>      Calcium, Total Serum: 8.8 mg/dL (07-15-22 @ 05:46)    LFTs:             5.8  | 0.8  | 10       ------------------[74      ( 15 Jul 2022 05:46 )  2.6  | x    | 10          Lipase:x      Amylase:x         Lactate, Blood: 1.3 mmol/L (22 @ 13:16)    Urinalysis Basic - ( 2022 13:30 )    Color: Yellow / Appearance: Clear / S.010 / pH: x  Gluc: x / Ketone: Negative  / Bili: Negative / Urobili: <2 mg/dL   Blood: x / Protein: Negative / Nitrite: Positive   Leuk Esterase: Large / RBC: 2 /HPF / WBC 27 /HPF   Sq Epi: x / Non Sq Epi: 0 /HPF / Bacteria: Few    Culture - Blood (collected 2022 13:16)  Source: .Blood Blood-Peripheral  Preliminary Report (15 Jul 2022 23:02):    No growth to date.    Culture - Blood (collected 2022 13:16)  Source: .Blood Blood-Peripheral  Preliminary Report (15 Jul 2022 22:02):    No growth to date.    IMAGING:  f/u CT w/ rectal contrast        
Gastroenterology progress note:     Patient is a 77y old  Female who presents with a chief complaint of urinary retention, abd pain (2022 01:38)     Admitted on: 22    We are following the patient for    Interval History:  76yo female with history of dementia (AAOx1 at baseline), HTN and recent hospital course for diverticulitis and asymptomatic COVID who presents to the ED from NH for lower abdominal pain and urinary retention. S/p recent hospital stay - with acute diverticulitis. per family, patients symptoms have not improved since started  the last month. CT scan was significant for Severe rectosigmoid circumferential wall thickening with surrounding inflammation consistent with colitis/diverticulitis, overall unchanged. Inflammatory changes extend to adjacent small bowel loop and fistulization cannot be excluded. No drainable fluid collection or pneumoperitoneum. Repeat w PO contrast Rectal contrast reaches the mid descending colon, without evidence of  fistula or leak.     No acute events overnight.   - Diet - tolerating clear diet   - last BM - yesterday no blood or melena   - Abdominal pain - denies pain     PAST MEDICAL & SURGICAL HISTORY:  Hypertension, unspecified type  H/O dilation and curettage  for missed     MEDICATIONS  (STANDING):  cefTRIAXone   IVPB 2000 milliGRAM(s) IV Intermittent every 24 hours  chlorhexidine 2% Cloths 1 Application(s) Topical <User Schedule>  diatrizoate meglumine/diatrizoate sodium. 30 milliLiter(s) Oral once  donepezil 5 milliGRAM(s) Oral at bedtime  enoxaparin Injectable 40 milliGRAM(s) SubCutaneous every 24 hours  lactated ringers. 1000 milliLiter(s) (100 mL/Hr) IV Continuous <Continuous>  melatonin 10 milliGRAM(s) Oral at bedtime  metroNIDAZOLE  IVPB 500 milliGRAM(s) IV Intermittent every 8 hours  pantoprazole    Tablet 40 milliGRAM(s) Oral before breakfast  saccharomyces boulardii 250 milliGRAM(s) Oral two times a day  senna 2 Tablet(s) Oral at bedtime  tamsulosin 0.4 milliGRAM(s) Oral at bedtime    MEDICATIONS  (PRN):      Allergies  No Known Allergies      Review of Systems:   Cardiovascular:  No Chest Pain, No Palpitations  Respiratory:  No Cough, No Dyspnea  MOHIT no weakness  constitutional no fever  HENT no headache   psych no mood change   Gastrointestinal:  As described in HPI  Skin:  No Skin Lesions, No Jaundice  Neuro:  No Syncope, No Dizziness    Physical Examination:  T(C): 36.2 (22 @ 07:53), Max: 37.1 (22 @ 23:53)  HR: 55 (22 @ 07:53) (55 - 74)  BP: 123/63 (22 @ 07:53) (123/63 - 144/61)  RR: 18 (22 @ 07:53) (18 - 20)  SpO2: 97% (22 @ 23:53) (95% - 97%)      22 @ 07:01  -  22 @ 07:00  --------------------------------------------------------  IN: 918 mL / OUT: 2700 mL / NET: -1782 mL        GENERAL: AAOx1, no acute distress.  HEAD:  Atraumatic, Normocephalic  EYES: conjunctiva and sclera clear  NECK: Supple, no JVD or thyromegaly  CHEST/LUNG: Clear to auscultation bilaterally; No wheeze, rhonchi, or rales  HEART: Regular rate and rhythm; normal S1, S2, No murmurs.  ABDOMEN: Soft, nontender, nondistended; Bowel sounds present  NEUROLOGY: No asterixis or tremor.   SKIN: Intact, no jaundice     Data:                        10.0   4.40  )-----------( 339      ( 2022 04:54 )             31.5     Hgb trend:  10.0  22 @ 04:54  9.5  22 @ 05:27        07-18    140  |  105  |  10  ----------------------------<  67<L>  4.4   |  23  |  0.5<L>    Ca    8.8      2022 04:54  Mg     2.1         TPro  6.0  /  Alb  2.8<L>  /  TBili  0.2  /  DBili  x   /  AST  15  /  ALT  11  /  AlkPhos  73      Liver panel trend:  TBili 0.2   /   AST 15   /   ALT 11   /   AlkP 73   /   Tptn 6.0   /   Alb 2.8    /   DBili --        TBili <0.2   /   AST 12   /   ALT 10   /   AlkP 68   /   Tptn 5.8   /   Alb 2.8    /   DBili --        TBili <0.2   /   AST 12   /   ALT 10   /   AlkP 77   /   Tptn 6.1   /   Alb 3.0    /   DBili --        TBili 0.8   /   AST 10   /   ALT 10   /   AlkP 74   /   Tptn 5.8   /   Alb 2.6    /   DBili --      07-15  TBili 0.2   /   AST 17   /   ALT 13   /   AlkP 89   /   Tptn 6.7   /   Alb 3.2    /   DBili <0.2                   Radiology:      
Patient seen and examined independently of resident. My addendum supersedes resident notation. Case discussed with housestaff, nursing and patient    78yo F PMHx of dementia and HTN presents to the ED with complaints of abdominal pain. Admitted with acute recurrent severe rectosigmoid diverticulitis, complicated by urinary retention, functional debility returns to the hospital with lower abdominal pain, found to have recurrent urinary retention, and persistent abnormal rectosigmoid imaging marginal worsening suggestive of possible fistulization    #recurrent urinary retention  garcia  flomax  on prior hospitalization had LARGE volume retention- urology recommendations at last event recommended keeping garcia for 2-3 weeks  f/u ucx    #rectosigmoid diverticulitis ( Recurrent )- - fistula formation has been ruled OUT  - iv abx restarted per id recommendation  rocephin 2gqd + flagyl 500 tid  , gi and sx evals appreciated  rectal contrast CT did NOT demonstrate fistulous tract formation  serial abdominal exams- today's exam remains relatively benign with NO peritoneal signs  discussing further with surgery if reasonable to advance diet    #HTN  - Currently not on meds    #Dementia  - Supportive care  reorient when confused    DVT PPX, Lovenox    high risk      Provider Hand off  Pending- c/w iv abx, patient is npo at this moment- discussing further c surgery if will advance; f/u surgical and GI recommendation  family discussion- d/w 2 family at bedside at length yesterday. case discussed with [facility] transfer center yesterday. cm informed me today that transfer has been denied  Disposition- c/w inpatient care
GENERAL SURGERY PROGRESS NOTE    24 Hour Events:  Patient kept NPO for ROBF 7/17  -BM/-F  IVF begun    Vitals:  T(F): 96.9 (07-17-22 @ 21:01), Max: 97.4 (07-17-22 @ 05:49)  HR: 18 (07-17-22 @ 21:01)  BP: 132/68 (07-17-22 @ 21:01)  RR: 19 (07-17-22 @ 21:01)  SpO2: 98% (07-17-22 @ 21:01)    Diet, NPO:   Except Medications    Fluids: lactated ringers.: Solution, 1000 milliLiter(s) infuse at 100 mL/Hr    I & O's:    07-16-22 @ 07:01  -  07-17-22 @ 07:00  --------------------------------------------------------  IN:  Total IN: 0 mL    OUT:    Indwelling Catheter - Urethral (mL): 750 mL  Total OUT: 750 mL    Total NET: -750 mL    PHYSICAL EXAM:  General: NAD  Cardiac: RRR, S1/S2 identified  Respiratory: unlabored breathing at rest, clear to auscultation b/l  Abdomen: Soft, non-distended, non-tender, no rebound, no guarding.  Musculoskeletal: FROM in b/l UE and LE  Neuro: Sensation grossly intact and equal throughout, no focal deficits  Skin: Warm/dry, normal color, no jaundice    MEDICATIONS  (STANDING):  cefTRIAXone   IVPB 2000 milliGRAM(s) IV Intermittent every 24 hours  chlorhexidine 2% Cloths 1 Application(s) Topical <User Schedule>  diatrizoate meglumine/diatrizoate sodium. 30 milliLiter(s) Oral once  donepezil 5 milliGRAM(s) Oral at bedtime  enoxaparin Injectable 40 milliGRAM(s) SubCutaneous every 24 hours  lactated ringers. 1000 milliLiter(s) (100 mL/Hr) IV Continuous <Continuous>  melatonin 10 milliGRAM(s) Oral at bedtime  metroNIDAZOLE  IVPB 500 milliGRAM(s) IV Intermittent every 8 hours  pantoprazole    Tablet 40 milliGRAM(s) Oral before breakfast  saccharomyces boulardii 250 milliGRAM(s) Oral two times a day  senna 2 Tablet(s) Oral at bedtime  tamsulosin 0.4 milliGRAM(s) Oral at bedtime    MEDICATIONS  (PRN):    DVT PROPHYLAXIS: enoxaparin Injectable 40 milliGRAM(s) SubCutaneous every 24 hours    GI PROPHYLAXIS: pantoprazole    Tablet 40 milliGRAM(s) Oral before breakfast    ANTICOAGULATION:   ANTIBIOTICS:  cefTRIAXone   IVPB 2000 milliGRAM(s)  metroNIDAZOLE  IVPB 500 milliGRAM(s)    LAB/STUDIES:  Labs:  CAPILLARY BLOOD GLUCOSE               9.5    4.95  )-----------( 310      ( 17 Jul 2022 05:27 )             29.7       Auto Neutrophil %: 51.8 % (07-17-22 @ 05:27)  Auto Immature Granulocyte %: 0.8 % (07-17-22 @ 05:27)    07-17    139  |  105  |  9<L>  ----------------------------<  90  4.0   |  26  |  0.5<L>      Calcium, Total Serum: 8.9 mg/dL (07-17-22 @ 05:27)      LFTs:             5.8  | <0.2 | 12       ------------------[68      ( 17 Jul 2022 05:27 )  2.8  | x    | 10            IMAGING:  CT Abdomen and Pelvis w/ Oral Cont (07.16.22 @ 13:18)  Persistent sigmoid circumferential wall thickening with surrounding   inflammation consistent with colitis/diverticulitis, overall unchanged.   Rectal contrast reaches the mid descending colon, without evidence of   fistula or leak. No evidence of abscess.    
GENERAL SURGERY PROGRESS NOTE    Patient: KEISHA PANTOJA , 77y (43)Female   MRN: [id number]  Location: [address]   Visit: 22 Inpatient  Date: 22 @ 03:39    Hospital Day #: 7    Procedure/Dx/Injuries: Diverticulitis and urinary retention    Events of past 24 hours: midline placed, patient seen by palliative    PAST MEDICAL & SURGICAL HISTORY:  Hypertension, unspecified type    H/O dilation and curettage  for missed     Vitals:   T(F): 98.3 (22 @ 23:46), Max: 98.3 (22 @ 23:46)  HR: 70 (22 @ 23:46)  BP: 131/63 (22 @ 23:46)  RR: 18 (22 @ 23:46)  SpO2: 95% (22 @ 15:10)    Diet, Regular:   DASH/TLC Sodium & Cholesterol Restricted    I & O's:    22 @ 07:01  -  22 @ 07:00  --------------------------------------------------------  IN:    IV PiggyBack: 100 mL    Lactated Ringers: 700 mL    Oral Fluid: 118 mL  Total IN: 918 mL    OUT:    Indwelling Catheter - Urethral (mL): 2700 mL  Total OUT: 2700 mL    Total NET: -1782 mL    Bowel Movement: : [x] YES [] NO    PHYSICAL EXAM:  General: NAD  Cardiac: RRR S1, S2  Respiratory: normal respiratory effort  Abdomen: Soft, non-distended, non-tender  Skin: Warm/dry, normal color, no jaundice    MEDICATIONS  (STANDING):  cefTRIAXone   IVPB 2000 milliGRAM(s) IV Intermittent every 24 hours  chlorhexidine 2% Cloths 1 Application(s) Topical <User Schedule>  diatrizoate meglumine/diatrizoate sodium. 30 milliLiter(s) Oral once  donepezil 5 milliGRAM(s) Oral at bedtime  enoxaparin Injectable 40 milliGRAM(s) SubCutaneous every 24 hours  melatonin 10 milliGRAM(s) Oral at bedtime  metroNIDAZOLE  IVPB 500 milliGRAM(s) IV Intermittent every 8 hours  pantoprazole    Tablet 40 milliGRAM(s) Oral before breakfast  saccharomyces boulardii 250 milliGRAM(s) Oral two times a day  senna 2 Tablet(s) Oral at bedtime  tamsulosin 0.4 milliGRAM(s) Oral at bedtime    MEDICATIONS  (PRN):    DVT PROPHYLAXIS: enoxaparin Injectable 40 milliGRAM(s) SubCutaneous every 24 hours    GI PROPHYLAXIS: pantoprazole    Tablet 40 milliGRAM(s) Oral before breakfast    ANTICOAGULATION:   ANTIBIOTICS:  cefTRIAXone   IVPB 2000 milliGRAM(s)  metroNIDAZOLE  IVPB 500 milliGRAM(s)    LAB/STUDIES:  Labs:  CAPILLARY BLOOD GLUCOSE                        9.7    4.28  )-----------( 265      ( 2022 08:38 )             29.9       Auto Neutrophil %: 48.1 % (22 @ 08:38)  Auto Immature Granulocyte %: 0.7 % (22 @ 08:38)        143  |  108  |  6<L>  ----------------------------<  89  4.4   |  25  |  0.5<L>      Calcium, Total Serum: 8.7 mg/dL (22 @ 08:38)    LFTs:             5.6  | <0.2 | 15       ------------------[70      ( 2022 08:38 )  2.7  | x    | 12            
GENERAL SURGERY PROGRESS NOTE    Patient: KEISHA PANTOJA , 77y (43)Female   MRN: [id number]  Location: [address]  Visit: 22 Inpatient  Date: 22 @ 03:14    Events of past 24 hours: No acute issues. Has not passed flatus or had bowel movement. No pain. Thomson catheter in place. Hemodynamically stable, afebrile.     PAST MEDICAL & SURGICAL HISTORY:  Hypertension, unspecified type      H/O dilation and curettage  for missed           Vitals:   T(F): 97.6 (22 @ 20:15), Max: 97.6 (22 @ 20:15)  HR: 72 (22 @ 20:15)  BP: 129/69 (22 @ 20:15)  RR: 18 (22 @ 20:15)  SpO2: 94% (22 @ 08:00)      Diet, NPO:   NPO for Procedure/Test     NPO Start Date: 2022,   NPO Start Time: 09:30  Diet, DASH/TLC:   Sodium & Cholesterol Restricted  Fiber/Residue Restricted      Fluids:     I & O's:    07-15-22 @ 07:01  -  22 @ 07:00  --------------------------------------------------------  IN:  Total IN: 0 mL    OUT:    Blood Loss (mL): 3 mL    Indwelling Catheter - Urethral (mL): 2500 mL  Total OUT: 2503 mL    Total NET: -2503 mL        Bowel Movement: : [] YES [x] NO  Flatus: : [] YES [x] NO    PHYSICAL EXAM:  General: NAD, calm and cooperative  HEENT: NCAT  Cardiac: No peripheral cyanosis or pallor, extremities well perfused   Respiratory: Equal chest rise bilaterally, non-labored breathing   Abdomen: Soft, non-distended, non-tender, no rebound, no guarding  Musculoskeletal: Strength 5/5 BL UE/LE, ROM intact, compartments soft  Neuro: At baseline, no focal deficits  Skin: Warm/dry, normal color, no jaundice  Incision/wound: healing well, dressings in place, clean, dry and intact    MEDICATIONS  (STANDING):  cefTRIAXone   IVPB 2000 milliGRAM(s) IV Intermittent every 24 hours  chlorhexidine 2% Cloths 1 Application(s) Topical <User Schedule>  diatrizoate meglumine/diatrizoate sodium. 30 milliLiter(s) Oral once  donepezil 5 milliGRAM(s) Oral at bedtime  enoxaparin Injectable 40 milliGRAM(s) SubCutaneous every 24 hours  melatonin 10 milliGRAM(s) Oral at bedtime  metroNIDAZOLE  IVPB 500 milliGRAM(s) IV Intermittent every 8 hours  pantoprazole    Tablet 40 milliGRAM(s) Oral before breakfast  saccharomyces boulardii 250 milliGRAM(s) Oral two times a day  senna 2 Tablet(s) Oral at bedtime  tamsulosin 0.4 milliGRAM(s) Oral at bedtime    MEDICATIONS  (PRN):      DVT PROPHYLAXIS: enoxaparin Injectable 40 milliGRAM(s) SubCutaneous every 24 hours    GI PROPHYLAXIS: pantoprazole    Tablet 40 milliGRAM(s) Oral before breakfast    ANTICOAGULATION:   ANTIBIOTICS:  cefTRIAXone   IVPB 2000 milliGRAM(s)  metroNIDAZOLE  IVPB 500 milliGRAM(s)    LAB/STUDIES:  Labs:  CAPILLARY BLOOD GLUCOSE                              10.0   5.46  )-----------( 337      ( 2022 06:11 )             31.7       Auto Neutrophil %: 46.0 % (22 @ 06:11)  Auto Immature Granulocyte %: 0.7 % (22 @ 06:11)        142  |  105  |  8<L>  ----------------------------<  93  4.5   |  26  |  0.6<L>      Calcium, Total Serum: 9.0 mg/dL (22 @ 06:11)      LFTs:             6.1  | <0.2 | 12       ------------------[77      ( 2022 06:11 )  3.0  | x    | 10          Lipase:x      Amylase:x         Lactate, Blood: 1.3 mmol/L (22 @ 13:16)    Culture - Urine (collected 2022 13:30)  Source: Catheterized Catheterized  Final Report (2022 10:49):    >=3 organisms. Probable collection contamination.    Culture - Blood (collected 2022 13:16)  Source: .Blood Blood-Peripheral  Preliminary Report (15 Jul 2022 23:02):    No growth to date.    Culture - Blood (collected 2022 13:16)  Source: .Blood Blood-Peripheral  Preliminary Report (15 Jul 2022 22:02):    No growth to date.  
KEISHA PANTOJA 77y Female  MRN#: [id number]   Hospital Day: 4d    SUBJECTIVE  Patient is a 77y old Female who presents with a chief complaint of urinary retention, abd pain (2022 03:48)  Currently admitted to medicine with the primary diagnosis of Acute UTI      INTERVAL HPI AND OVERNIGHT EVENTS:  Patient was examined and seen at bedside. This morning she is resting comfortably in bed and reports no issues or overnight events. No abdominal pain this AM, documented BM most recently yesterday. No diarrhea, nausea, vomiting, CP, SOB, dizziness, pain or swelling in the extremities. Patient is AOx1 at baseline.     REVIEW OF SYMPTOMS:  Pertinent ROS per above, remainder negative.    OBJECTIVE  PAST MEDICAL & SURGICAL HISTORY  Hypertension, unspecified type    H/O dilation and curettage  for missed       ALLERGIES:  No Known Allergies    MEDICATIONS:  STANDING MEDICATIONS  cefTRIAXone   IVPB 2000 milliGRAM(s) IV Intermittent every 24 hours  chlorhexidine 2% Cloths 1 Application(s) Topical <User Schedule>  diatrizoate meglumine/diatrizoate sodium. 30 milliLiter(s) Oral once  donepezil 5 milliGRAM(s) Oral at bedtime  enoxaparin Injectable 40 milliGRAM(s) SubCutaneous every 24 hours  lactated ringers. 1000 milliLiter(s) IV Continuous <Continuous>  melatonin 10 milliGRAM(s) Oral at bedtime  metroNIDAZOLE  IVPB 500 milliGRAM(s) IV Intermittent every 8 hours  pantoprazole    Tablet 40 milliGRAM(s) Oral before breakfast  saccharomyces boulardii 250 milliGRAM(s) Oral two times a day  senna 2 Tablet(s) Oral at bedtime  tamsulosin 0.4 milliGRAM(s) Oral at bedtime    PRN MEDICATIONS      VITAL SIGNS: Last 24 Hours  T(C): 36.3 (2022 14:00), Max: 36.3 (2022 14:00)  T(F): 97.3 (2022 14:00), Max: 97.3 (2022 14:00)  HR: 62 (2022 14:00) (18 - 62)  BP: 139/65 (2022 14:00) (118/62 - 139/65)  BP(mean): --  RR: 20 (2022 14:00) (18 - 20)  SpO2: 95% (2022 09:46) (95% - 100%)    LABS:                        10.0   4.40  )-----------( 339      ( 2022 04:54 )             31.5         140  |  105  |  10  ----------------------------<  67<L>  4.4   |  23  |  0.5<L>    Ca    8.8      2022 04:54  Mg     2.1         TPro  6.0  /  Alb  2.8<L>  /  TBili  0.2  /  DBili  x   /  AST  15  /  ALT  11  /  AlkPhos  73        RADIOLOGY:      PHYSICAL EXAM:  CONSTITUTIONAL: No acute distress, well-developed, well-groomed, resting comfortably in bed  HEAD: Atraumatic, normocephalic  EYES: EOM intact, PERRLA, conjunctiva and sclera clear  ENT: Supple, no masses, no thyromegaly, no bruits, no JVD; moist mucous membranes  PULMONARY: Clear to auscultation bilaterally; no wheezes, rales, or rhonchi  CARDIOVASCULAR: Regular rate and rhythm; no murmurs, rubs, or gallops  GASTROINTESTINAL: Soft, non-tender, non-distended; bowel sounds present  MUSCULOSKELETAL: 2+ peripheral pulses; no clubbing, no cyanosis, no edema  NEUROLOGY: non-focal, no facial droop  SKIN: No rashes or lesions; warm and dry  
SUBJECTIVE / OVERNIGHT EVENTS  Patient slept well overnight. reports abdominal discomfort. denies any other respiratry or Gi symptoms.     MEDICATIONS  chlorhexidine 2% Cloths 1 Application(s) Topical <User Schedule>  donepezil 5 milliGRAM(s) Oral at bedtime  enoxaparin Injectable 40 milliGRAM(s) SubCutaneous every 24 hours  melatonin 10 milliGRAM(s) Oral at bedtime  pantoprazole    Tablet 40 milliGRAM(s) Oral before breakfast  piperacillin/tazobactam IVPB.. 3.375 Gram(s) IV Intermittent every 8 hours  saccharomyces boulardii 250 milliGRAM(s) Oral two times a day  senna 2 Tablet(s) Oral at bedtime      VITALS /  EXAM    T(C): 36.4 (07-15-22 @ 04:35), Max: 37.8 (22 @ 15:49)  HR: 84 (07-15-22 @ 04:35) (77 - 84)  BP: 116/64 (07-15-22 @ 04:35) (107/61 - 123/60)  RR: 18 (07-15-22 @ 04:35) (18 - 18)  SpO2: 97% (07-15-22 @ 04:35) (91% - 97%)    GENERAL: ANO x3  CHEST/LUNG: Clear to auscultation bilaterally; No wheezes, rales or rhonchi  HEART: Regular rate and rhythm; No murmurs, rubs, or gallops  ABDOMEN: Soft, Nontender, distended (globus); Bowel sounds present, no masses.  EXTREMITIES:  2+ Peripheral Pulses, No clubbing, cyanosis, or edema    I's & O's     22 @ 07:01  -  07-15-22 @ 07:00  --------------------------------------------------------  IN:  Total IN: 0 mL    OUT:    Indwelling Catheter - Urethral (mL): 1300 mL  Total OUT: 1300 mL    Total NET: -1300 mL        LABS             9.3    5.17  )-----------( 339      ( 07-15-22 @ 05:46 )             29.2     139  |  103  |  10  -------------------------<  88   07-15-22 @ 05:46  4.6  |  27  |  0.6    Ca      8.8     07-15-22 @ 05:46  Mg     2.0     07-15-22 @ 05:46    TPro  5.8  /  Alb  2.6  /  TBili  0.8  /  DBili  x   /  AST  10  /  ALT  10  /  AlkPhos  74  /  GGT  x     07-15-22 @ 05:46                  Lactate, Blood: 1.3 mmol/L (22 @ 13:16)    Urinalysis Basic - ( 2022 13:30 )    Color: Yellow / Appearance: Clear / S.010 / pH: x  Gluc: x / Ketone: Negative  / Bili: Negative / Urobili: <2 mg/dL   Blood: x / Protein: Negative / Nitrite: Positive   Leuk Esterase: Large / RBC: 2 /HPF / WBC 27 /HPF   Sq Epi: x / Non Sq Epi: 0 /HPF / Bacteria: Few          CARDIOLOGY  
patient reports no abdominal pain. had rectal contrast study. no vomiting today. tolerating garcia. d/w 2 family members at bedside at length today    vss  deconditioned  aaox1 which has been baseline  cta b/l with poor basilar exchange  s1s2 rrr no m/r/g  rotund/ prior LLQ tenderness resolved, no rebound or guarding  garcia c output/ no sediment today  dry

## 2022-07-20 NOTE — PROGRESS NOTE ADULT - REASON FOR ADMISSION
urinary retention, abd pain

## 2022-07-20 NOTE — PROCEDURE NOTE - NSSECUREBY_VASC_A_CORE
stabilization device/sterile occulsive dressing/tape
stabilization device/sterile occulsive dressing

## 2022-07-20 NOTE — DISCHARGE NOTE PROVIDER - CARE PROVIDER_API CALL
Aparna Bowen)  Medicine  [address] [place] [address] [address]  [place] [address]  Phone: [phone]  Fax: [phone]  Follow Up Time: 1 week    Lonnie Wade)  Urology  [address] [place] [address], [address] [place] [address]  Phone: [phone]  Fax: [phone]  Follow Up Time: 1 week   Aparna Bowen)  Medicine  [address] [place] [address] [address]  [place] [address]  Phone: [phone]  Fax: [phone]  Follow Up Time: 1 week    Lonnie Wade)  Urology  [address] [place] [address], [address] [place] [address]  Phone: [phone]  Fax: [phone]  Follow Up Time: 2 weeks

## 2022-07-20 NOTE — DIETITIAN INITIAL EVALUATION ADULT - OTHER CALCULATIONS
Energy: 1026-1112kcal/day (SJ 1.2-1.3AF -- due to discrepancy in wts/ht vs. reported wt loss)  Protein: 54-59g/day (1.2-1.3g/kg -- same reason as above)   Fluid: 1125mL/day (25mL/kg for DRI per age)

## 2022-07-20 NOTE — DISCHARGE NOTE PROVIDER - NSDCFUADDAPPT_GEN_ALL_CORE_FT
Outpt F/u with gastroenterology: Patient would benefit from colonoscopy 4-8 weeks as outpatient after resolution of active inflammation.

## 2022-07-20 NOTE — PROCEDURE NOTE - NSPROCDETAILS_GEN_ALL_CORE
location identified, draped/prepped, sterile technique used/sterile dressing applied/sterile technique, catheter placed/supine position/ultrasound guidance
location identified, draped/prepped, sterile technique used/sterile dressing applied/sterile technique, catheter placed/supine position/Trendelenburg position/ultrasound guidance

## 2022-07-20 NOTE — DISCHARGE NOTE PROVIDER - NSDCCPCAREPLAN_GEN_ALL_CORE_FT
PRINCIPAL DISCHARGE DIAGNOSIS  Diagnosis: Acute UTI  Assessment and Plan of Treatment: You were having infection of the urinary tract and retention of urine for which we placed the garcia tube to drain urine. You have failed the trial of void once, please follow-up with urology as outpatient. Continue to use the antibiotics as prescribed.    You were noted either on arrival or during this hospitalization to have a Urinary Tract Infection. You may have already been treated and completed the antibiotics, please refer to the list of medications present on your discharge paperwork. If you notice that there are antibiotics listed, these may be to treat your infection, be sure to complete taking the full course, whether you have symptoms or not, as prescribed.  While taking antibiotics, you may benefit from taking a probiotic such as florastore to help to try and prevent an infectious type of diarrhea known as C Diff. If you notice that you begin having severe watery diarrhea, more than 4-5 episodes a day, please see your Primary Care Doctor or come to the ER to have your stool tested for this infection.   It is not necessary to repeat a urine test to see if the infection is gone, it is assumed that after treatment it should have resolved. However, if you continue to have symptoms, please see your Primary Care doctor or return to the ER.        SECONDARY DISCHARGE DIAGNOSES  Diagnosis: Diverticulitis  Assessment and Plan of Treatment: You were having persistent diverticulitis that was initially diagnosed in your previous admission. You were prescribed Intravenous antibiotics for the same, please use them as prescribed and follow up as outpatient with surgery doctor and your primary care physician.  Diverticula are small, bulging pouches that can form in the lining of your digestive system. They are found most often in the lower part of the large intestine (colon). Diverticula are common, especially after age 40, and seldom cause problems.  Sometimes, however, one or more of the pouches become inflamed or infected. That condition is known as diverticulitis (die-vur-tik-pricilla-LIE-tis). Diverticulitis can cause severe abdominal pain, fever, nausea and a marked change in your bowel habits.  An abscess, which occurs when pus collects in the pouch.  A blockage in your colon or small intestine caused by scarring.  An abnormal passageway (fistula) between sections of bowel or the bowel and bladder.  Peritonitis, which can occur if the infected or inflamed pouch ruptures, spilling intestinal contents into your abdominal cavity. Peritonitis is a medical emergency and requires immediate care.  Prevention  To help prevent diverticulitis:  Exercise regularly. Exercise promotes normal bowel function and reduces pressure inside your colon.   Eat more fiber. A high-fiber diet decreases the risk of diverticulitis. Fiber-rich foods, such as fresh fruits and vegetables and whole grains, soften waste material and help it pass more quickly through your colon. Eating seeds and nuts isn't associated with developing diverticulitis.  Drink plenty of fluids. Fiber works by absorbing water and increasing the soft, bulky waste in your colon. But if you don't drink enough liquid to replace what's absorbed, fiber can be constipating.  Treatment is with antibiotics and possibly surgery.  Call 911 and return to the emergency room if you have chest pain, difficulty breathing, high fevers, worsening of your symptoms, feel unwell or have paul     PRINCIPAL DISCHARGE DIAGNOSIS  Diagnosis: Acute UTI  Assessment and Plan of Treatment: You were having UTI and retention of urine for which we placed the garcia tube to drain urine. You have failed the trial of void once, please follow-up with urology as outpatient. Continue to use the antibiotics as prescribed.    You were noted either on arrival or during this hospitalization to have a Urinary Tract Infection. You may have already been treated and completed the antibiotics, please refer to the list of medications present on your discharge paperwork. If you notice that there are antibiotics listed, these may be to treat your infection, be sure to complete taking the full course, whether you have symptoms or not, as prescribed.  While taking antibiotics, you may benefit from taking a probiotic such as florastore to help to try and prevent an infectious type of diarrhea known as C Diff. If you notice that you begin having severe watery diarrhea, more than 4-5 episodes a day, please see your Primary Care Doctor or come to the ER to have your stool tested for this infection.   It is not necessary to repeat a urine test to see if the infection is gone, it is assumed that after treatment it should have resolved. However, if you continue to have symptoms, please see your Primary Care doctor or return to the ER.        SECONDARY DISCHARGE DIAGNOSES  Diagnosis: Diverticulitis  Assessment and Plan of Treatment: You were having persistent diverticulitis that was initially diagnosed in your previous admission. You were prescribed Intravenous antibiotics for the same, please use them as prescribed and follow up as outpatient with surgery doctor and your primary care physician.  Diverticula are small, bulging pouches that can form in the lining of your digestive system. They are found most often in the lower part of the large intestine (colon). Diverticula are common, especially after age 40, and seldom cause problems.  Sometimes, however, one or more of the pouches become inflamed or infected. That condition is known as diverticulitis (die-vur-tik-pricilla-LIE-tis). Diverticulitis can cause severe abdominal pain, fever, nausea and a marked change in your bowel habits.  An abscess, which occurs when pus collects in the pouch.  A blockage in your colon or small intestine caused by scarring.  An abnormal passageway (fistula) between sections of bowel or the bowel and bladder.  Peritonitis, which can occur if the infected or inflamed pouch ruptures, spilling intestinal contents into your abdominal cavity. Peritonitis is a medical emergency and requires immediate care.  Prevention  To help prevent diverticulitis:  Exercise regularly. Exercise promotes normal bowel function and reduces pressure inside your colon.   Eat more fiber. A high-fiber diet decreases the risk of diverticulitis. Fiber-rich foods, such as fresh fruits and vegetables and whole grains, soften waste material and help it pass more quickly through your colon. Eating seeds and nuts isn't associated with developing diverticulitis.  Drink plenty of fluids. Fiber works by absorbing water and increasing the soft, bulky waste in your colon. But if you don't drink enough liquid to replace what's absorbed, fiber can be constipating.  Treatment is with antibiotics and possibly surgery.  Call 911 and return to the emergency room if you have chest pain, difficulty breathing, high fevers, worsening of your symptoms, feel unwell or have paul

## 2022-07-20 NOTE — DIETITIAN INITIAL EVALUATION ADULT - ENERGY INTAKE
Pt was on clears until yesterday and was tolerating without any GI s/s and was advanced to regular. Observed pt ate 100% of lunch yesterday and reports an optimum appetite and wants to eat more. EMR PO doc 7/19-7/20: %.

## 2022-07-20 NOTE — DISCHARGE NOTE PROVIDER - PROVIDER TOKENS
PROVIDER:[TOKEN:[[id number]],FOLLOWUP:[1 week]],PROVIDER:[TOKEN:[[id number]],FOLLOWUP:[1 week]] PROVIDER:[TOKEN:[[id number]],FOLLOWUP:[1 week]],PROVIDER:[TOKEN:[[id number]],FOLLOWUP:[2 weeks]]

## 2022-07-20 NOTE — PROGRESS NOTE ADULT - ASSESSMENT
ASSESSMENT:  76y/o female with PMHx of dementia, HTN, diverticulitis presented to ED c/o abdominal pain x 1 day. Patient was at the NH and she had the Garcia removed, and had no urinary output after she then started c/o abdominal pain, She was brought to ED where Garcia was placed. Abdominal pain much improved after garcia catheter was placed, draining clear urine. Patient with recent history of acute diverticulitis. patient was admitted on 6/26, treated with IV antibiotics for for diverticulitis and sent home on 7/18.   WBC 5. CTAP shows Severe rectosigmoid colitis/diverticulitis, with Inflammatory changes extend to adjacent small bowel loop and fistulization cannot be excluded.    PLAN:  - Patient can be discharged on bowel prep  - Patient tolerating full liquid diet  - No active plan to take to OR this admission in setting of COVID and symptomatic improvement  - Requesting medical and cardiac risk stratification if needed in the future, f/u  - continue antibiotics per ID  - patient should follow up with urology as outpatient for urinary retention  - Multimodal pain control  - Urinary catheter in place   - F/U labs and replete electrolytes as needed  - Encourage ambulation, coughing, deep breathing, and incentive spirometry  - Plan to be discussed with attending    [phone]

## 2022-07-20 NOTE — DIETITIAN INITIAL EVALUATION ADULT - OTHER INFO
--76yo female with history of dementia (AAOx1 at baseline), HTN and recent hospital course for diverticulitis and asymptomatic COVID who presents to the ED from NH for lower abdominal pain and urinary retention.  #Recurrent Rectosigmoid Diverticulitis   - Patient can be discharged on bowel prep  - No active plan to take to OR this admission in setting of COVID and symptomatic improvement  - Requesting medical and cardiac risk stratification if needed in the future, f/u

## 2022-07-20 NOTE — DISCHARGE NOTE PROVIDER - ATTENDING DISCHARGE PHYSICAL EXAMINATION:
T(C): 36.1 (07-20-22 @ 07:49), Max: 36.8 (07-19-22 @ 23:46)  HR: 65 (07-20-22 @ 07:49) (65 - 82)  BP: 134/65 (07-20-22 @ 07:49) (102/53 - 134/65)  RR: 18 (07-20-22 @ 07:49) (18 - 18)  SpO2: 95% (07-19-22 @ 15:10) (95% - 95%)  O/E: awake, alert, oriented x1  HEENT: atraumatic, EOMI  Chest: clear  CVs: S1S2+, no murmur  P/A: Soft, BS+  CNS: AAO x1  Ext: no edema feet
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
## 2022-07-27 DIAGNOSIS — Z79.899 OTHER LONG TERM (CURRENT) DRUG THERAPY: ICD-10-CM

## 2022-07-27 DIAGNOSIS — R33.9 RETENTION OF URINE, UNSPECIFIED: ICD-10-CM

## 2022-07-27 DIAGNOSIS — K57.52 DIVERTICULITIS OF BOTH SMALL AND LARGE INTESTINE WITHOUT PERFORATION OR ABSCESS WITHOUT BLEEDING: ICD-10-CM

## 2022-07-27 DIAGNOSIS — U07.1 COVID-19: ICD-10-CM

## 2022-07-27 DIAGNOSIS — T83.511A INFECTION AND INFLAMMATORY REACTION DUE TO INDWELLING URETHRAL CATHETER, INITIAL ENCOUNTER: ICD-10-CM

## 2022-07-27 DIAGNOSIS — N30.00 ACUTE CYSTITIS WITHOUT HEMATURIA: ICD-10-CM
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
## 2022-07-27 DIAGNOSIS — I10 ESSENTIAL (PRIMARY) HYPERTENSION: ICD-10-CM

## 2022-07-27 DIAGNOSIS — F03.90 UNSPECIFIED DEMENTIA WITHOUT BEHAVIORAL DISTURBANCE: ICD-10-CM

## 2022-08-02 ENCOUNTER — APPOINTMENT (OUTPATIENT)
Dept: SURGERY | Facility: CLINIC | Age: 77
End: 2022-08-02

## 2022-08-02 VITALS
WEIGHT: 97 LBS | OXYGEN SATURATION: 98 % | SYSTOLIC BLOOD PRESSURE: 106 MMHG | TEMPERATURE: 97.1 F | HEART RATE: 98 BPM | DIASTOLIC BLOOD PRESSURE: 74 MMHG

## 2022-08-02 DIAGNOSIS — Z78.9 OTHER SPECIFIED HEALTH STATUS: ICD-10-CM

## 2022-08-02 DIAGNOSIS — Z86.79 PERSONAL HISTORY OF OTHER DISEASES OF THE CIRCULATORY SYSTEM: ICD-10-CM

## 2022-08-02 PROCEDURE — 99203 OFFICE O/P NEW LOW 30 MIN: CPT

## 2022-08-03 ENCOUNTER — APPOINTMENT (OUTPATIENT)
Dept: UROLOGY | Facility: CLINIC | Age: 77
End: 2022-08-03

## 2022-08-03 ENCOUNTER — NON-APPOINTMENT (OUTPATIENT)
Age: 77
End: 2022-08-03

## 2022-08-03 PROCEDURE — 99213 OFFICE O/P EST LOW 20 MIN: CPT

## 2022-08-03 RX ORDER — SENNOSIDES 8.6 MG TABLETS 8.6 MG/1
8.6 TABLET ORAL
Qty: 60 | Refills: 0 | Status: ACTIVE | COMMUNITY
Start: 2022-07-08

## 2022-08-03 RX ORDER — DONEPEZIL HYDROCHLORIDE 5 MG/1
5 TABLET ORAL
Qty: 90 | Refills: 0 | Status: ACTIVE | COMMUNITY
Start: 2022-02-16

## 2022-08-03 RX ORDER — ERTAPENEM SODIUM 1 G/1
1 INJECTION, POWDER, LYOPHILIZED, FOR SOLUTION INTRAMUSCULAR; INTRAVENOUS
Qty: 3 | Refills: 0 | Status: ACTIVE | COMMUNITY
Start: 2022-07-19

## 2022-08-03 RX ORDER — CIPROFLOXACIN HYDROCHLORIDE 500 MG/1
500 TABLET, FILM COATED ORAL
Qty: 14 | Refills: 0 | Status: ACTIVE | COMMUNITY
Start: 2022-06-21

## 2022-08-03 RX ORDER — TAMSULOSIN HYDROCHLORIDE 0.4 MG/1
0.4 CAPSULE ORAL
Qty: 20 | Refills: 0 | Status: ACTIVE | COMMUNITY
Start: 2022-07-20

## 2022-08-03 RX ORDER — AMOXICILLIN AND CLAVULANATE POTASSIUM 875; 125 MG/1; MG/1
875-125 TABLET, COATED ORAL
Qty: 30 | Refills: 0 | Status: ACTIVE | COMMUNITY
Start: 2022-06-14

## 2022-08-03 RX ORDER — METRONIDAZOLE 500 MG/1
500 TABLET ORAL
Qty: 21 | Refills: 0 | Status: ACTIVE | COMMUNITY
Start: 2022-06-21

## 2022-08-03 RX ORDER — PANTOPRAZOLE 40 MG/1
40 TABLET, DELAYED RELEASE ORAL
Qty: 30 | Refills: 0 | Status: ACTIVE | COMMUNITY
Start: 2022-07-13

## 2022-08-03 NOTE — ASSESSMENT
[FreeTextEntry1] : 77F with first episode of uncomplicated sigmoid diverticulitis.\par \par I had a discussion with the patient about uncomplicated diverticulitis.  I recommended that the patient undergo colonoscopy to evaluate the colon.  We discussed indications for surgery in the setting of uncomplicated diverticulitis including persistent diverticulitis despite treatment and severe recurrent episodes.  At this time, I recommended a high fiber diet and observation.  The patient was instructed to contact us if they have recurrent symptoms.\par

## 2022-08-03 NOTE — PHYSICAL EXAM
[Abdomen Masses] : No abdominal masses [Abdomen Tenderness] : ~T No ~M abdominal tenderness [No HSM] : no hepatosplenomegaly [Respiratory Effort] : normal respiratory effort [Normal Rate and Rhythm] : normal rate and rhythm [de-identified] : awake, alert and in no acute distress

## 2022-08-03 NOTE — HISTORY OF PRESENT ILLNESS
[FreeTextEntry1] : PT is a 78 yo F with PMH of dementia, HTN with recent hospitalization for first episode of  diverticulitis and asymptomatic Covid. PT presented to the Ed for abdominal pain and urinary retention from NH. PT was Dc'd with midline of  ertapenem. Midline was dc'd yesterday. Thomson catheter remains in place due to urinary retention. Pt has an appointment with urology MD Wade tomorrow for evaluation. PT reports a good appetite.  Pt denies fever, chills, nausea, vomiting, abdominal pain, constipation, diarrhea, blood in stool, or unexpected weight loss.Pt denies a family history of colon cancer, rectal cancer, or inflammatory bowel disease.\par PT never had a colonoscopy.

## 2022-08-03 NOTE — ASSESSMENT
[FreeTextEntry1] : 76 yo F with Dementia and HTN a/w severe rectosigmoid diverticulitis and severe constipation and\par urinary retention with COVID -  c/s for urinary retention with large urine\par output. CT A/P on 7/2 shows distended urinary bladder.\par garcia was in for a month to give bladder rest\par \par \par

## 2022-08-03 NOTE — DATA REVIEWED
[FreeTextEntry1] : EXAM: CT ABDOMEN AND PELVIS OC\par \par PROCEDURE DATE: 07/16/2022\par \par \par \par INTERPRETATION: CLINICAL STATEMENT: Nausea and vomiting. Sigmoid diverticulitis\par \par TECHNIQUE: Contiguous axial CT images were obtained from the lower chest to the pubic symphysis without intravenous contrast. Oral contrast was administered. Reformatted images in the coronal and sagittal planes were acquired. Rectal Comparison made with CT abdomen and pelvis July 14, 2022.\par \par FINDINGS:\par \par LOWER CHEST: Dependent atelectasis.\par \par HEPATOBILIARY: Unchanged.\par \par SPLEEN: Unchanged.\par \par PANCREAS: Unchanged.\par \par ADRENAL GLANDS: Unchanged.\par \par KIDNEYS: Unchanged.\par \par ABDOMINOPELVIC NODES: Unchanged.\par \par PELVIC ORGANS: Urinary bladder nondistended with Thomson catheter. Fibroid uterus.\par \par PERITONEUM/MESENTERY/BOWEL: Persistent sigmoid circumferential wall thickening with surrounding inflammation. Rectal contrast reaches the mid descending colon, without evidence of fistula or leak. No drainable fluid collection or pneumoperitoneum. No evidence for bowel obstruction. Normal caliber appendix. Colonic diverticulosis.\par \par BONES/SOFT TISSUES: Unchanged.\par \par OTHER: Unchanged IVC filter.\par \par \par IMPRESSION:\par Since July 14, 2022:\par \par Persistent sigmoid circumferential wall thickening with surrounding inflammation consistent with colitis/diverticulitis, overall unchanged. Rectal contrast reaches the mid descending colon, without evidence of fistula or leak. No evidence of abscess.

## 2022-08-04 ENCOUNTER — APPOINTMENT (OUTPATIENT)
Dept: UROLOGY | Facility: CLINIC | Age: 77
End: 2022-08-04

## 2022-08-04 PROCEDURE — 51798 US URINE CAPACITY MEASURE: CPT

## 2022-08-04 PROCEDURE — 99213 OFFICE O/P EST LOW 20 MIN: CPT

## 2022-08-05 ENCOUNTER — LABORATORY RESULT (OUTPATIENT)
Age: 77
End: 2022-08-05

## 2022-08-08 ENCOUNTER — OUTPATIENT (OUTPATIENT)
Dept: OUTPATIENT SERVICES | Facility: HOSPITAL | Age: 77
LOS: 1 days | Discharge: HOME | End: 2022-08-08

## 2022-08-08 ENCOUNTER — APPOINTMENT (OUTPATIENT)
Dept: SURGERY | Facility: HOSPITAL | Age: 77
End: 2022-08-08

## 2022-08-08 ENCOUNTER — RESULT REVIEW (OUTPATIENT)
Age: 77
End: 2022-08-08

## 2022-08-08 ENCOUNTER — TRANSCRIPTION ENCOUNTER (OUTPATIENT)
Age: 77
End: 2022-08-08

## 2022-08-08 VITALS
SYSTOLIC BLOOD PRESSURE: 119 MMHG | HEART RATE: 80 BPM | TEMPERATURE: 98 F | WEIGHT: 110.01 LBS | DIASTOLIC BLOOD PRESSURE: 81 MMHG | HEIGHT: 67 IN | RESPIRATION RATE: 18 BRPM

## 2022-08-08 VITALS
DIASTOLIC BLOOD PRESSURE: 63 MMHG | SYSTOLIC BLOOD PRESSURE: 120 MMHG | OXYGEN SATURATION: 100 % | HEART RATE: 81 BPM | RESPIRATION RATE: 18 BRPM

## 2022-08-08 DIAGNOSIS — Z98.890 OTHER SPECIFIED POSTPROCEDURAL STATES: Chronic | ICD-10-CM

## 2022-08-08 PROCEDURE — 88305 TISSUE EXAM BY PATHOLOGIST: CPT | Mod: 26

## 2022-08-08 PROCEDURE — 45331 SIGMOIDOSCOPY AND BIOPSY: CPT

## 2022-08-08 NOTE — ASU PATIENT PROFILE, ADULT - FALL HARM RISK - HARM RISK INTERVENTIONS

## 2022-08-08 NOTE — CHART NOTE - NSCHARTNOTEFT_GEN_A_CORE
PACU ANESTHESIA ADMISSION NOTE      Procedure:   Post op diagnosis:      ____  Intubated  TV:______       Rate: ______      FiO2: ______    __x__  Patent Airway    __x__  Full return of protective reflexes    __x__  Full recovery from anesthesia / back to baseline     Vitals:   T: 36          R:  18                BP:  103/52                Sat: 100                  P:       Mental Status:  __x__ Awake   ___x__ Alert   _____ Drowsy   _____ Sedated    Nausea/Vomiting:  _x___ NO  ______Yes,   See Post - Op Orders          Pain Scale (0-10):  _____    Treatment: __x__ None    ____ See Post - Op/PCA Orders    Post - Operative Fluids:   ____ Oral   ___x_ See Post - Op Orders    Plan: Discharge:   __x__Home       _____Floor     _____Critical Care    _____  Other:_________________    Comments:    Uneventful anesthesia. Patient transported to  spontaneously breathing and hemodynamically stable.

## 2022-08-08 NOTE — ASU PATIENT PROFILE, ADULT - NSICDXPASTMEDICALHX_GEN_ALL_CORE_FT
PAST MEDICAL HISTORY:  Dementia     Diverticulitis     GERD (gastroesophageal reflux disease)     Hypertension, unspecified type     Lack of bladder control

## 2022-08-08 NOTE — ASU DISCHARGE PLAN (ADULT/PEDIATRIC) - DRIVING DURATION DAY(S)
Coronavirus (COVID-19) Notification    Your result for COVID-19 is Negative  Letter sent that will serve as a formal notice for your employer
Problem: Knowledge Deficit  Goal: Patient/family/caregiver demonstrates understanding of disease process, treatment plan, medications, and discharge instructions  Complete learning assessment and assess knowledge base    Interventions:  - Provide teaching at level of understanding  - Provide teaching via preferred learning methods   Safety with functional mobility with RW
1

## 2022-08-12 NOTE — PHYSICAL THERAPY INITIAL EVALUATION ADULT - CRITERIA FOR SKILLED THERAPEUTIC INTERVENTIONS
Pt presents to the ER today with right middle finger swelling and concerns for an infection. Pt was sent in by his PCP for possible abx and draining. Per pt it has been going on for one week. Pt is A&0x4.   impairments found/functional limitations in following categories/risk reduction/prevention/rehab potential

## 2022-08-14 ENCOUNTER — EMERGENCY (EMERGENCY)
Facility: HOSPITAL | Age: 77
LOS: 0 days | Discharge: HOME | End: 2022-08-14
Attending: EMERGENCY MEDICINE | Admitting: EMERGENCY MEDICINE

## 2022-08-14 VITALS
SYSTOLIC BLOOD PRESSURE: 194 MMHG | TEMPERATURE: 98 F | OXYGEN SATURATION: 98 % | RESPIRATION RATE: 18 BRPM | HEART RATE: 88 BPM | HEIGHT: 67 IN | DIASTOLIC BLOOD PRESSURE: 94 MMHG

## 2022-08-14 VITALS
HEART RATE: 91 BPM | OXYGEN SATURATION: 98 % | DIASTOLIC BLOOD PRESSURE: 73 MMHG | RESPIRATION RATE: 18 BRPM | SYSTOLIC BLOOD PRESSURE: 121 MMHG | TEMPERATURE: 98 F

## 2022-08-14 DIAGNOSIS — R19.7 DIARRHEA, UNSPECIFIED: ICD-10-CM

## 2022-08-14 DIAGNOSIS — R11.2 NAUSEA WITH VOMITING, UNSPECIFIED: ICD-10-CM

## 2022-08-14 DIAGNOSIS — E86.0 DEHYDRATION: ICD-10-CM

## 2022-08-14 DIAGNOSIS — Z02.9 ENCOUNTER FOR ADMINISTRATIVE EXAMINATIONS, UNSPECIFIED: ICD-10-CM

## 2022-08-14 DIAGNOSIS — Z98.890 OTHER SPECIFIED POSTPROCEDURAL STATES: Chronic | ICD-10-CM

## 2022-08-14 DIAGNOSIS — K52.9 NONINFECTIVE GASTROENTERITIS AND COLITIS, UNSPECIFIED: ICD-10-CM

## 2022-08-14 DIAGNOSIS — I10 ESSENTIAL (PRIMARY) HYPERTENSION: ICD-10-CM

## 2022-08-14 DIAGNOSIS — Z87.19 PERSONAL HISTORY OF OTHER DISEASES OF THE DIGESTIVE SYSTEM: ICD-10-CM

## 2022-08-14 DIAGNOSIS — R63.8 OTHER SYMPTOMS AND SIGNS CONCERNING FOOD AND FLUID INTAKE: ICD-10-CM

## 2022-08-14 DIAGNOSIS — R10.9 UNSPECIFIED ABDOMINAL PAIN: ICD-10-CM

## 2022-08-14 PROBLEM — K57.92 DIVERTICULITIS OF INTESTINE, PART UNSPECIFIED, WITHOUT PERFORATION OR ABSCESS WITHOUT BLEEDING: Chronic | Status: ACTIVE | Noted: 2022-08-08

## 2022-08-14 PROBLEM — K21.9 GASTRO-ESOPHAGEAL REFLUX DISEASE WITHOUT ESOPHAGITIS: Chronic | Status: ACTIVE | Noted: 2022-08-08

## 2022-08-14 PROBLEM — R32 UNSPECIFIED URINARY INCONTINENCE: Chronic | Status: ACTIVE | Noted: 2022-08-08

## 2022-08-14 LAB
ALBUMIN SERPL ELPH-MCNC: 4.3 G/DL — SIGNIFICANT CHANGE UP (ref 3.5–5.2)
ALP SERPL-CCNC: 123 U/L — HIGH (ref 30–115)
ALT FLD-CCNC: 14 U/L — SIGNIFICANT CHANGE UP (ref 0–41)
ANION GAP SERPL CALC-SCNC: 12 MMOL/L — SIGNIFICANT CHANGE UP (ref 7–14)
ANION GAP SERPL CALC-SCNC: 23 MMOL/L — HIGH (ref 7–14)
APPEARANCE UR: CLEAR — SIGNIFICANT CHANGE UP
AST SERPL-CCNC: 28 U/L — SIGNIFICANT CHANGE UP (ref 0–41)
BACTERIA # UR AUTO: NEGATIVE — SIGNIFICANT CHANGE UP
BASOPHILS # BLD AUTO: 0.04 K/UL — SIGNIFICANT CHANGE UP (ref 0–0.2)
BASOPHILS NFR BLD AUTO: 0.5 % — SIGNIFICANT CHANGE UP (ref 0–1)
BILIRUB SERPL-MCNC: 0.3 MG/DL — SIGNIFICANT CHANGE UP (ref 0.2–1.2)
BILIRUB UR-MCNC: NEGATIVE — SIGNIFICANT CHANGE UP
BUN SERPL-MCNC: 10 MG/DL — SIGNIFICANT CHANGE UP (ref 10–20)
BUN SERPL-MCNC: 11 MG/DL — SIGNIFICANT CHANGE UP (ref 10–20)
CALCIUM SERPL-MCNC: 9.4 MG/DL — SIGNIFICANT CHANGE UP (ref 8.5–10.1)
CALCIUM SERPL-MCNC: 9.8 MG/DL — SIGNIFICANT CHANGE UP (ref 8.5–10.1)
CHLORIDE SERPL-SCNC: 102 MMOL/L — SIGNIFICANT CHANGE UP (ref 98–110)
CHLORIDE SERPL-SCNC: 98 MMOL/L — SIGNIFICANT CHANGE UP (ref 98–110)
CO2 SERPL-SCNC: 16 MMOL/L — LOW (ref 17–32)
CO2 SERPL-SCNC: 23 MMOL/L — SIGNIFICANT CHANGE UP (ref 17–32)
COLOR SPEC: SIGNIFICANT CHANGE UP
CREAT SERPL-MCNC: 0.5 MG/DL — LOW (ref 0.7–1.5)
CREAT SERPL-MCNC: 0.6 MG/DL — LOW (ref 0.7–1.5)
DIFF PNL FLD: NEGATIVE — SIGNIFICANT CHANGE UP
EGFR: 92 ML/MIN/1.73M2 — SIGNIFICANT CHANGE UP
EGFR: 96 ML/MIN/1.73M2 — SIGNIFICANT CHANGE UP
EOSINOPHIL # BLD AUTO: 0.01 K/UL — SIGNIFICANT CHANGE UP (ref 0–0.7)
EOSINOPHIL NFR BLD AUTO: 0.1 % — SIGNIFICANT CHANGE UP (ref 0–8)
EPI CELLS # UR: 12 /HPF — HIGH (ref 0–5)
GLUCOSE SERPL-MCNC: 131 MG/DL — HIGH (ref 70–99)
GLUCOSE SERPL-MCNC: 141 MG/DL — HIGH (ref 70–99)
GLUCOSE UR QL: NEGATIVE — SIGNIFICANT CHANGE UP
HCT VFR BLD CALC: 42.7 % — SIGNIFICANT CHANGE UP (ref 37–47)
HGB BLD-MCNC: 13.7 G/DL — SIGNIFICANT CHANGE UP (ref 12–16)
HYALINE CASTS # UR AUTO: 4 /LPF — SIGNIFICANT CHANGE UP (ref 0–7)
IMM GRANULOCYTES NFR BLD AUTO: 0.9 % — HIGH (ref 0.1–0.3)
KETONES UR-MCNC: NEGATIVE — SIGNIFICANT CHANGE UP
LACTATE SERPL-SCNC: 1.5 MMOL/L — SIGNIFICANT CHANGE UP (ref 0.7–2)
LEUKOCYTE ESTERASE UR-ACNC: NEGATIVE — SIGNIFICANT CHANGE UP
LIDOCAIN IGE QN: 10 U/L — SIGNIFICANT CHANGE UP (ref 7–60)
LYMPHOCYTES # BLD AUTO: 1.17 K/UL — LOW (ref 1.2–3.4)
LYMPHOCYTES # BLD AUTO: 13.4 % — LOW (ref 20.5–51.1)
MCHC RBC-ENTMCNC: 27.1 PG — SIGNIFICANT CHANGE UP (ref 27–31)
MCHC RBC-ENTMCNC: 32.1 G/DL — SIGNIFICANT CHANGE UP (ref 32–37)
MCV RBC AUTO: 84.6 FL — SIGNIFICANT CHANGE UP (ref 81–99)
MONOCYTES # BLD AUTO: 0.66 K/UL — HIGH (ref 0.1–0.6)
MONOCYTES NFR BLD AUTO: 7.6 % — SIGNIFICANT CHANGE UP (ref 1.7–9.3)
NEUTROPHILS # BLD AUTO: 6.77 K/UL — HIGH (ref 1.4–6.5)
NEUTROPHILS NFR BLD AUTO: 77.5 % — HIGH (ref 42.2–75.2)
NITRITE UR-MCNC: NEGATIVE — SIGNIFICANT CHANGE UP
NRBC # BLD: 0 /100 WBCS — SIGNIFICANT CHANGE UP (ref 0–0)
PH UR: 8 — SIGNIFICANT CHANGE UP (ref 5–8)
PLATELET # BLD AUTO: 393 K/UL — SIGNIFICANT CHANGE UP (ref 130–400)
POTASSIUM SERPL-MCNC: 4.1 MMOL/L — SIGNIFICANT CHANGE UP (ref 3.5–5)
POTASSIUM SERPL-MCNC: 5 MMOL/L — SIGNIFICANT CHANGE UP (ref 3.5–5)
POTASSIUM SERPL-SCNC: 4.1 MMOL/L — SIGNIFICANT CHANGE UP (ref 3.5–5)
POTASSIUM SERPL-SCNC: 5 MMOL/L — SIGNIFICANT CHANGE UP (ref 3.5–5)
PROT SERPL-MCNC: 8.8 G/DL — HIGH (ref 6–8)
PROT UR-MCNC: ABNORMAL
RBC # BLD: 5.05 M/UL — SIGNIFICANT CHANGE UP (ref 4.2–5.4)
RBC # FLD: 16.9 % — HIGH (ref 11.5–14.5)
RBC CASTS # UR COMP ASSIST: 4 /HPF — SIGNIFICANT CHANGE UP (ref 0–4)
SODIUM SERPL-SCNC: 133 MMOL/L — LOW (ref 135–146)
SODIUM SERPL-SCNC: 141 MMOL/L — SIGNIFICANT CHANGE UP (ref 135–146)
SP GR SPEC: 1.02 — SIGNIFICANT CHANGE UP (ref 1.01–1.03)
UROBILINOGEN FLD QL: SIGNIFICANT CHANGE UP
WBC # BLD: 8.73 K/UL — SIGNIFICANT CHANGE UP (ref 4.8–10.8)
WBC # FLD AUTO: 8.73 K/UL — SIGNIFICANT CHANGE UP (ref 4.8–10.8)
WBC UR QL: 6 /HPF — HIGH (ref 0–5)

## 2022-08-14 PROCEDURE — 99284 EMERGENCY DEPT VISIT MOD MDM: CPT | Mod: FS

## 2022-08-14 PROCEDURE — 74177 CT ABD & PELVIS W/CONTRAST: CPT | Mod: 26,MA

## 2022-08-14 RX ORDER — ONDANSETRON 8 MG/1
4 TABLET, FILM COATED ORAL ONCE
Refills: 0 | Status: COMPLETED | OUTPATIENT
Start: 2022-08-14 | End: 2022-08-14

## 2022-08-14 RX ORDER — SODIUM CHLORIDE 9 MG/ML
1000 INJECTION INTRAMUSCULAR; INTRAVENOUS; SUBCUTANEOUS ONCE
Refills: 0 | Status: COMPLETED | OUTPATIENT
Start: 2022-08-14 | End: 2022-08-14

## 2022-08-14 RX ADMIN — SODIUM CHLORIDE 1000 MILLILITER(S): 9 INJECTION INTRAMUSCULAR; INTRAVENOUS; SUBCUTANEOUS at 12:41

## 2022-08-14 RX ADMIN — Medication 875 MILLIGRAM(S): at 21:01

## 2022-08-14 RX ADMIN — ONDANSETRON 4 MILLIGRAM(S): 8 TABLET, FILM COATED ORAL at 12:41

## 2022-08-14 NOTE — ED PROVIDER NOTE - PATIENT PORTAL LINK FT
You can access the FollowMyHealth Patient Portal offered by [facility] by registering at the following website: [email or web address]. By joining [facility]’s FollowMyHealth portal, you will also be able to view your health information using other applications (apps) compatible with our system.

## 2022-08-14 NOTE — ED ADULT NURSE NOTE - INTERVENTIONS DEFINITIONS
Instruct patient to call for assistance/Non-slip footwear when patient is off stretcher/Physically safe environment: no spills, clutter or unnecessary equipment/Monitor gait and stability/Reinforce activity limits and safety measures with patient and family

## 2022-08-14 NOTE — ED ADULT TRIAGE NOTE - CHIEF COMPLAINT QUOTE
pt c/o lower abdominal pain with n/v/d and incontinence past 3 days with hx of diviticulitis - pt also has hx of htn but not on any meds at this time due to switching MDS

## 2022-08-14 NOTE — ED PROVIDER NOTE - CARE PROVIDER_API CALL
Arnulfo Jean-Baptiste)  ColonRectal Surgery; Surgery  [address], [address] [place] [address]  Phone: [phone]  Fax: [phone]  Follow Up Time:

## 2022-08-14 NOTE — ED ADULT NURSE NOTE - OBJECTIVE STATEMENT
Pt. came to ED c/o abdominal pain with n/v/d and incontinence x 3 days. Denies fever. Hx of dementia.

## 2022-08-14 NOTE — ED PROVIDER NOTE - ATTENDING APP SHARED VISIT CONTRIBUTION OF CARE
77-year-old female history of hypertension presents for evaluation of abdominal pain.  Patient has been having decreased appetite and nausea associated with vomiting diarrhea for the past 3 days.  Patient has no fever or chills.  Exam patient no distress S1-S2 with auscultation bilaterally no abdominal tenderness without any rebound or guarding  Impression  Patient here with nausea vomiting diarrhea abdominal pain.  Labs demonstrate an anion gap of 23 with a bicarb of 16.  CT demonstrates circumferential thickening of the transverse and descending colon consistent with colitis.  Patient also has chronic circumferential rectosigmoid wall thickening and sigmoid diverticulosis.

## 2022-08-14 NOTE — ED ADULT NURSE REASSESSMENT NOTE - NS ED NURSE REASSESS COMMENT FT1
Pt received from outgoing shift at 1900. Pt is calmly resting in bed at this time. Pt is alert and oriented x2. No s/s of respiratory distress. Pt is able to make all needs known. No further complaints at this time. Pt is currently awaiting further reevaluation at this time. Pending bed status. Safety and comfort measures in place. Fall precautions in place as per hospital policy. Will continue to monitor and assess for changes.

## 2022-08-14 NOTE — ED PROVIDER NOTE - CLINICAL SUMMARY MEDICAL DECISION MAKING FREE TEXT BOX
Patient here with nausea vomiting diarrhea abdominal pain.  CT demonstrates colitis with initial dehydration on labs.  After IV fluids the bicarb is corrected patient tolerating p.o. with no other further diarrhea or vomiting in the ED.  Patient stable for discharge outpatient antibiotics and outpatient GI follow-up advised.

## 2022-08-14 NOTE — ED PROVIDER NOTE - OBJECTIVE STATEMENT
76 y/o F, PMHx HTN, presents to the ED with complaints of abdominal pain x three days. Patient has a hx of recent rectosigmoid diverticulitis (in July 2022) for which she was hospitalized and ultimately discharged to home with a midline and received Ertapenem x 10 days. She was scheduled to have a colonoscopy on Aug 8th however a smaller colonoscope was required and so procedure had to be re-scheduled. Patient resides at home with her granddaughter who states that she has been with a decreased appetite and nausea; denies emesis, fever, chills, chest pain & dyspnea.

## 2022-08-16 ENCOUNTER — INPATIENT (INPATIENT)
Facility: HOSPITAL | Age: 77
LOS: 5 days | Discharge: HOME | End: 2022-08-22
Attending: INTERNAL MEDICINE | Admitting: INTERNAL MEDICINE

## 2022-08-16 VITALS
HEART RATE: 110 BPM | SYSTOLIC BLOOD PRESSURE: 117 MMHG | RESPIRATION RATE: 18 BRPM | DIASTOLIC BLOOD PRESSURE: 71 MMHG | HEIGHT: 67 IN | OXYGEN SATURATION: 98 % | TEMPERATURE: 98 F

## 2022-08-16 DIAGNOSIS — D63.8 ANEMIA IN OTHER CHRONIC DISEASES CLASSIFIED ELSEWHERE: ICD-10-CM

## 2022-08-16 DIAGNOSIS — R32 UNSPECIFIED URINARY INCONTINENCE: ICD-10-CM

## 2022-08-16 DIAGNOSIS — E43 UNSPECIFIED SEVERE PROTEIN-CALORIE MALNUTRITION: ICD-10-CM

## 2022-08-16 DIAGNOSIS — E87.1 HYPO-OSMOLALITY AND HYPONATREMIA: ICD-10-CM

## 2022-08-16 DIAGNOSIS — U07.1 COVID-19: ICD-10-CM

## 2022-08-16 DIAGNOSIS — E86.0 DEHYDRATION: ICD-10-CM

## 2022-08-16 DIAGNOSIS — K57.92 DIVERTICULITIS OF INTESTINE, PART UNSPECIFIED, WITHOUT PERFORATION OR ABSCESS WITHOUT BLEEDING: ICD-10-CM

## 2022-08-16 DIAGNOSIS — K57.32 DIVERTICULITIS OF LARGE INTESTINE WITHOUT PERFORATION OR ABSCESS WITHOUT BLEEDING: ICD-10-CM

## 2022-08-16 DIAGNOSIS — I10 ESSENTIAL (PRIMARY) HYPERTENSION: ICD-10-CM

## 2022-08-16 DIAGNOSIS — Z98.890 OTHER SPECIFIED POSTPROCEDURAL STATES: Chronic | ICD-10-CM

## 2022-08-16 DIAGNOSIS — Z71.3 DIETARY COUNSELING AND SURVEILLANCE: ICD-10-CM

## 2022-08-16 DIAGNOSIS — K21.9 GASTRO-ESOPHAGEAL REFLUX DISEASE WITHOUT ESOPHAGITIS: ICD-10-CM

## 2022-08-16 DIAGNOSIS — G89.29 OTHER CHRONIC PAIN: ICD-10-CM

## 2022-08-16 DIAGNOSIS — F03.90 UNSPECIFIED DEMENTIA WITHOUT BEHAVIORAL DISTURBANCE: ICD-10-CM

## 2022-08-16 DIAGNOSIS — Z20.822 CONTACT WITH AND (SUSPECTED) EXPOSURE TO COVID-19: ICD-10-CM

## 2022-08-16 LAB
ALBUMIN SERPL ELPH-MCNC: 3.4 G/DL — LOW (ref 3.5–5.2)
ALP SERPL-CCNC: 107 U/L — SIGNIFICANT CHANGE UP (ref 30–115)
ALT FLD-CCNC: 10 U/L — SIGNIFICANT CHANGE UP (ref 0–41)
ANION GAP SERPL CALC-SCNC: 12 MMOL/L — SIGNIFICANT CHANGE UP (ref 7–14)
AST SERPL-CCNC: 15 U/L — SIGNIFICANT CHANGE UP (ref 0–41)
BASOPHILS # BLD AUTO: 0.07 K/UL — SIGNIFICANT CHANGE UP (ref 0–0.2)
BASOPHILS NFR BLD AUTO: 0.6 % — SIGNIFICANT CHANGE UP (ref 0–1)
BILIRUB SERPL-MCNC: 0.4 MG/DL — SIGNIFICANT CHANGE UP (ref 0.2–1.2)
BUN SERPL-MCNC: 13 MG/DL — SIGNIFICANT CHANGE UP (ref 10–20)
CALCIUM SERPL-MCNC: 9.7 MG/DL — SIGNIFICANT CHANGE UP (ref 8.5–10.1)
CHLORIDE SERPL-SCNC: 94 MMOL/L — LOW (ref 98–110)
CO2 SERPL-SCNC: 23 MMOL/L — SIGNIFICANT CHANGE UP (ref 17–32)
CREAT SERPL-MCNC: 0.7 MG/DL — SIGNIFICANT CHANGE UP (ref 0.7–1.5)
CULTURE RESULTS: SIGNIFICANT CHANGE UP
EGFR: 88 ML/MIN/1.73M2 — SIGNIFICANT CHANGE UP
EOSINOPHIL # BLD AUTO: 0.25 K/UL — SIGNIFICANT CHANGE UP (ref 0–0.7)
EOSINOPHIL NFR BLD AUTO: 2.3 % — SIGNIFICANT CHANGE UP (ref 0–8)
GLUCOSE SERPL-MCNC: 111 MG/DL — HIGH (ref 70–99)
HCT VFR BLD CALC: 39.3 % — SIGNIFICANT CHANGE UP (ref 37–47)
HGB BLD-MCNC: 12.8 G/DL — SIGNIFICANT CHANGE UP (ref 12–16)
IMM GRANULOCYTES NFR BLD AUTO: 0.8 % — HIGH (ref 0.1–0.3)
LIDOCAIN IGE QN: 13 U/L — SIGNIFICANT CHANGE UP (ref 7–60)
LYMPHOCYTES # BLD AUTO: 1.59 K/UL — SIGNIFICANT CHANGE UP (ref 1.2–3.4)
LYMPHOCYTES # BLD AUTO: 14.6 % — LOW (ref 20.5–51.1)
MCHC RBC-ENTMCNC: 26.7 PG — LOW (ref 27–31)
MCHC RBC-ENTMCNC: 32.6 G/DL — SIGNIFICANT CHANGE UP (ref 32–37)
MCV RBC AUTO: 82 FL — SIGNIFICANT CHANGE UP (ref 81–99)
MONOCYTES # BLD AUTO: 1.37 K/UL — HIGH (ref 0.1–0.6)
MONOCYTES NFR BLD AUTO: 12.6 % — HIGH (ref 1.7–9.3)
NEUTROPHILS # BLD AUTO: 7.51 K/UL — HIGH (ref 1.4–6.5)
NEUTROPHILS NFR BLD AUTO: 69.1 % — SIGNIFICANT CHANGE UP (ref 42.2–75.2)
NRBC # BLD: 0 /100 WBCS — SIGNIFICANT CHANGE UP (ref 0–0)
PLATELET # BLD AUTO: 390 K/UL — SIGNIFICANT CHANGE UP (ref 130–400)
POTASSIUM SERPL-MCNC: 4.3 MMOL/L — SIGNIFICANT CHANGE UP (ref 3.5–5)
POTASSIUM SERPL-SCNC: 4.3 MMOL/L — SIGNIFICANT CHANGE UP (ref 3.5–5)
PROT SERPL-MCNC: 7.3 G/DL — SIGNIFICANT CHANGE UP (ref 6–8)
RBC # BLD: 4.79 M/UL — SIGNIFICANT CHANGE UP (ref 4.2–5.4)
RBC # FLD: 16.2 % — HIGH (ref 11.5–14.5)
SARS-COV-2 RNA SPEC QL NAA+PROBE: DETECTED
SODIUM SERPL-SCNC: 129 MMOL/L — LOW (ref 135–146)
SPECIMEN SOURCE: SIGNIFICANT CHANGE UP
SURGICAL PATHOLOGY STUDY: SIGNIFICANT CHANGE UP
WBC # BLD: 10.88 K/UL — HIGH (ref 4.8–10.8)
WBC # FLD AUTO: 10.88 K/UL — HIGH (ref 4.8–10.8)

## 2022-08-16 PROCEDURE — 74177 CT ABD & PELVIS W/CONTRAST: CPT | Mod: 26

## 2022-08-16 PROCEDURE — 99285 EMERGENCY DEPT VISIT HI MDM: CPT | Mod: FS

## 2022-08-16 PROCEDURE — 74021 RADEX ABDOMEN 3+ VIEWS: CPT | Mod: 26

## 2022-08-16 RX ORDER — DONEPEZIL HYDROCHLORIDE 10 MG/1
5 TABLET, FILM COATED ORAL AT BEDTIME
Refills: 0 | Status: DISCONTINUED | OUTPATIENT
Start: 2022-08-16 | End: 2022-08-22

## 2022-08-16 RX ORDER — SODIUM CHLORIDE 9 MG/ML
1000 INJECTION, SOLUTION INTRAVENOUS ONCE
Refills: 0 | Status: COMPLETED | OUTPATIENT
Start: 2022-08-16 | End: 2022-08-16

## 2022-08-16 RX ORDER — ENOXAPARIN SODIUM 100 MG/ML
40 INJECTION SUBCUTANEOUS EVERY 24 HOURS
Refills: 0 | Status: DISCONTINUED | OUTPATIENT
Start: 2022-08-16 | End: 2022-08-22

## 2022-08-16 RX ORDER — TAMSULOSIN HYDROCHLORIDE 0.4 MG/1
0.4 CAPSULE ORAL AT BEDTIME
Refills: 0 | Status: DISCONTINUED | OUTPATIENT
Start: 2022-08-16 | End: 2022-08-22

## 2022-08-16 RX ORDER — SENNA PLUS 8.6 MG/1
2 TABLET ORAL AT BEDTIME
Refills: 0 | Status: DISCONTINUED | OUTPATIENT
Start: 2022-08-16 | End: 2022-08-22

## 2022-08-16 RX ORDER — IOHEXOL 300 MG/ML
30 INJECTION, SOLUTION INTRAVENOUS ONCE
Refills: 0 | Status: DISCONTINUED | OUTPATIENT
Start: 2022-08-16 | End: 2022-08-16

## 2022-08-16 RX ADMIN — DONEPEZIL HYDROCHLORIDE 5 MILLIGRAM(S): 10 TABLET, FILM COATED ORAL at 23:53

## 2022-08-16 RX ADMIN — Medication 875 MILLIGRAM(S): at 23:52

## 2022-08-16 RX ADMIN — SENNA PLUS 2 TABLET(S): 8.6 TABLET ORAL at 23:52

## 2022-08-16 RX ADMIN — TAMSULOSIN HYDROCHLORIDE 0.4 MILLIGRAM(S): 0.4 CAPSULE ORAL at 23:52

## 2022-08-16 RX ADMIN — SODIUM CHLORIDE 1000 MILLILITER(S): 9 INJECTION, SOLUTION INTRAVENOUS at 14:53

## 2022-08-16 NOTE — H&P ADULT - NSHPPHYSICALEXAM_GEN_ALL_CORE
PHYSICAL EXAM:  GENERAL: NAD, lying in bed comfortably  HEAD:  Atraumatic, Normocephalic  EYES: EOMI, PERRLA, conjunctiva and sclera clear  ENT: Moist mucous membranes  NECK: Supple, No JVD  CHEST/LUNG: Clear to auscultation bilaterally; No rales, rhonchi, wheezing, or rubs. Unlabored respirations  HEART: Regular rate and rhythm; No murmurs, rubs, or gallops  ABDOMEN: Bowel sounds present; Soft. No hepatomegaly  EXTREMITIES:  2+ Peripheral Pulses, brisk capillary refill. No clubbing, cyanosis, or edema  NERVOUS SYSTEM:  Alert & Oriented X3, speech clear. No deficits   MSK: FROM all 4 extremities, full and equal strength  SKIN: No rashes or lesions PHYSICAL EXAM:  GENERAL: NAD, lying in bed comfortably  HEAD:  Atraumatic, Normocephalic  EYES: EOMI, PERRLA, conjunctiva and sclera clear  ENT: Moist mucous membranes  NECK: Supple, No JVD  CHEST/LUNG: Clear to auscultation bilaterally; No rales, rhonchi, wheezing, or rubs. Unlabored respirations  HEART: Regular rate and rhythm; No murmurs, rubs, or gallops  ABDOMEN: Bowel sounds present; Soft. No hepatomegaly, distended, nontender   EXTREMITIES:  2+ Peripheral Pulses, brisk capillary refill. No clubbing, cyanosis, or edema  NERVOUS SYSTEM:  Alert & Oriented X3, speech clear. No deficits   MSK: FROM all 4 extremities, full and equal strength  SKIN: No rashes or lesions

## 2022-08-16 NOTE — H&P ADULT - ATTENDING COMMENTS
78yo F hx of dementia (AAOx1 at baseline), HTN, recent admission on 7/2022 for diverticulitis + rectosigmoid bowel wall thickening with extension to small bowel (s/p 10 days ertapenem) presenting to the hospital for abdominal pain. Admitted for Abdominal pain  CT scan showing improvement in  diverticulitis. Being  Evaluated by Surgery and GI and  unusual  persistent bowel wall thinking.       IMPRESSION  #Abdominal pain 2/2 likely Rectal sigmoid colitis w/ bowel wall thickening- Resolved   CT Scan: When compared to prior study dated 8/14/2022:  Resolution of colonic wall thickening from the hepatic flexure to the   descending colon.  Persistent wall thickening of the sigmoid colon with suggestion of faint   perisigmoidal fat stranding on the current study. Minimal contrast   traverses the wall thickened sigmoid colon with no upstream contrast   noted in the descending colon. Findings may reflect wall thickening and   luminal narrowing secondary to an inflammatory process such as   colitis/diverticulitis, however recommend direct visualization when   feasible to ensure no underlying neoplastic process. No evidence for   bowel obstruction.  #HTN  #Dementia- mental status at baseline  # Residual +COVID  like from recent covid Infection   # Hyponatremia - likely from decreased PO intak        Plan  - Appears nonseptic.    - DC augmenting   - monitor off Abx for now   - follow up CT A/P with oral contrast  - surgery/GI consult  - s/p 1L IVF  - pain meds PRN as tolerated  - ECG NSR no Ischemic Changes   - >4 mets  at baseline   - Good functional status   - Pre- OP Risk  Low Risk Patient  For  Intermediate Risk Procedure  Score 7, 0.6% Risk of perioperative mortality  - Nutrition consult      #Progress Note Handoff  Pending (specify):  Consults____GI Surg_____, Tests__Labs ______, Test Results_______, Other_________  Family discussion:  Disposition:  Anticipated DC  Patient Seen at Bedside___________08/17______

## 2022-08-16 NOTE — ED PROVIDER NOTE - CLINICAL SUMMARY MEDICAL DECISION MAKING FREE TEXT BOX
Abdominal distention.  Abdominal pain.  Negative CT 2 days ago.  Decreased p.o. intake.  Dehydration.  Labs and imaging reviewed.  Surgery consulted.  GI consult

## 2022-08-16 NOTE — ED PROVIDER NOTE - ATTENDING APP SHARED VISIT CONTRIBUTION OF CARE
77-year-old female history of high blood pressure, recent colonoscopy on August 8 by Dr. diaz. Unable to completed all the way through.  Referred to surgical rectal Dr guidry.  Who evaluated patient this morning and referred patient to the emergency department for decreased p.o. intake and continued abdominal pain and distention.  No vomiting.  Patient was recently in the emergency department had a CT scan done on August 14, 2022, 2 days ago which revealed circumferential wall thickening of transverse and descending colon    vss, chronically ill-appearing, thin female, pink conj, anicteric, dry mucous membranes, poor skin turgor, neck supple, CTAB, RRR, equal radial pulses bilat, abd soft/nontender, no peritoneal signs, distended by tympany, no cva tend. no calves tend, no edema, no fnd. no rashes.    Plan:  labs, imaging, meds, reassess

## 2022-08-16 NOTE — ED PROVIDER NOTE - OBJECTIVE STATEMENT
78 yo F with pmhx of HTN, recent colonoscopy last week where they were unable to pass scope. Pt was advised by surgeon to come to hospital. Patient has not been eating so much (decreased appetite) and has been having abdominal pain and bloating. 76 yo F with pmhx of HTN, recent colonoscopy last week where they were unable to pass scope. Pt was advised by surgeon to come to hospital. Patient has not been eating so much (decreased appetite) and has been having abdominal pain and bloating. Last BM 2 days ago. Symptoms are moderate. No alleviating or aggravating factors. No cp, sob, fever, chills, nausea, vomiting, diarrhea, back pain, urinary symptoms, headache, dizziness, paresthesias, or weakness.

## 2022-08-16 NOTE — ED ADULT NURSE NOTE - NSFALLRSKOUTCOME_ED_ALL_ED
[FreeTextEntry1] : URI\par - POCT Rapid strep: negative\par - Flonase BID\par - Claritin D\par - Continue Tylenol\par - Hydration\par - Steam\par - Gargle with apple cider vinegar \par \par Vitamin B12 deficiency\par - F/u in 3 wks for B12 injection monthly x 3 months
Fall with Harm Risk

## 2022-08-16 NOTE — H&P ADULT - NSHPREVIEWOFSYSTEMS_GEN_ALL_CORE
REVIEW OF SYSTEMS:    CONSTITUTIONAL: No weakness, fevers or chills  EYES/ENT: No visual changes;  No vertigo or throat pain   NECK: No pain or stiffness  RESPIRATORY: No cough, wheezing, hemoptysis; No shortness of breath  CARDIOVASCULAR: No chest pain or palpitations  GASTROINTESTINAL: (+) abdominal pain. (+) distension. No nausea, vomiting, or hematemesis; No diarrhea or constipation. No melena or hematochezia.  GENITOURINARY: No dysuria, frequency or hematuria  NEUROLOGICAL: No numbness or weakness, (+) dementia  SKIN: No itching, rashes

## 2022-08-16 NOTE — H&P ADULT - HISTORY OF PRESENT ILLNESS
76yo F hx of dementia (AAOx1 at baseline), HTN, recent admission on 7/2022 for diverticulitis + rectosigmoid bowel wall thickening with extension to small bowel (s/p 10 days ertapenem) presenting to the hospital for abdominal pain.     Recently, on 8/8/2022, patient had colonoscopy for CRC screening (first colonoscopy) without any complications. She was found to have multiple non-bleeding diverticula with mixed openings + inflammation in the sigmoid colon. There was luminal narrowing, so colonoscope could not be advanced beyond the sigmoid colon. Patient was referred to rectal surgery team. This AM, while she was evaluated, patient was referred back to the ED because of decreased PO intake, continued abdominal pain + distension. Patient denies vomiting, and is passing gas. Recently, she went to the ED on 8/14/22 where CT abdomen was performed-> showing circumferential wall thickening of transverse + descending colon. Was prescribed 1 week of augmentin + colorectal surgery referral.     At the ED, VS shows : T- 36.7C, BP: 117/71, HR: 110bpm, RR: 18, 98% on RA. Labs show mild leukocytosis (10.8K), hyponatremia (129).  Chest X-ray performed- no perforation, lung opacities. At the ED, patient was given 1L LR bolus.  78yo F hx of dementia, HTN, recent admission on 7/2022 for diverticulitis + rectosigmoid bowel wall thickening with extension to small bowel (s/p 10 days ertapenem) presenting to the hospital for abdominal pain. Abdominal pain has been chronic, on/off, intermittent, dull, 4/10, periumbilical without radiation.     Recently, on 8/8/2022, patient had colonoscopy for CRC screening (first colonoscopy) without any complications. She was found to have multiple non-bleeding diverticula with mixed openings + inflammation in the sigmoid colon. There was luminal narrowing, so colonoscope could not be advanced beyond the sigmoid colon. Patient was referred to rectal surgery team. This AM, while she was evaluated, patient was referred back to the ED because of decreased PO intake, continued abdominal pain + distension. Patient denies vomiting, and is passing gas. Recently, she went to the ED on 8/14/22 where CT abdomen was performed-> showing circumferential wall thickening of transverse + descending colon. Was prescribed 1 week of augmentin + colorectal surgery referral.     At the ED, VS shows : T- 36.7C, BP: 117/71, HR: 110bpm, RR: 18, 98% on RA. Labs show mild leukocytosis (10.8K), hyponatremia (129).  Chest X-ray performed- no perforation, lung opacities. At the ED, patient was given 1L LR bolus. Of note, patient's abdominal pain has spontaneously resolved. Patient still feels she is passing gas. States she has an appetite to eat.

## 2022-08-16 NOTE — REASON FOR VISIT
[Consultation] : a consultation visit [FreeTextEntry1] : Dr. Jean-Baptiste for Blockage - Speak to Buster Villafuerte [phone]

## 2022-08-16 NOTE — H&P ADULT - NSICDXPASTMEDICALHX_GEN_ALL_CORE_FT
PAST MEDICAL HISTORY:  Dementia     Diverticulitis     GERD (gastroesophageal reflux disease)     Hypertension, unspecified type     Lack of bladder control      No

## 2022-08-16 NOTE — H&P ADULT - NSTOBACCOSCREENHP_GEN_A_NCS
Radiology Pre-Procedure Note     Patient: Christos Marrero Date: 2019   : 1955 Attending: Wendi Solares MD   63 year old male      Chief Complaint:  Pleural effusion    Pre-Op Diagnosis:  Pleural effusion    Patient Active Problem List    Diagnosis Date Noted   • ELKE (obstructive sleep apnea) 2019     Priority: Medium   • Pleural effusion on left 2019     Priority: Low   • Chronic systolic heart failure (CMS/[facility]) 08/10/2019     Priority: Low   • S/P CABG (coronary artery bypass graft) 08/10/2019     Priority: Low   • CAD (coronary artery disease) 08/10/2019     Priority: Low   • Postoperative anemia 08/10/2019     Priority: Low   • Medtronic loop recorder - implanted 2019     Priority: Low     Alert group: cryptogenic stroke     • Dysarthria as late effect of stroke 2018     Priority: Low   • CVA (cerebral vascular accident) (CMS/[facility]) 2018     Priority: Low   • Expressive aphasia 2018     Priority: Low   • Nuclear senile cataract of left eye 2018     Priority: Low   • Skin lesion 02/15/2018     Priority: Low     on the nose and neck      • Type 2 diabetes mellitus with both eyes affected by mild nonproliferative retinopathy without macular edema, with long-term current use of insulin (CMS/[facility]) 2017     Priority: Low   • Bilateral dry eyes 2017     Priority: Low   • Blepharitis of upper and lower eyelids of both eyes 2017     Priority: Low   • Asteroid hyalosis of both eyes 2017     Priority: Low   • Nuclear cataract of left eye 2017     Priority: Low   • Non-proliferative diabetic retinopathy (CMS/[facility]) 08/10/2016     Priority: Low   • Diastasis recti 08/10/2016     Priority: Low   • Aspirin contraindicated 08/10/2016     Priority: Low     Anaphylaxis reaction.      • Benign essential HTN 2016     Priority: Low   • Type 2 diabetes mellitus with diabetic retinopathy (CMS/[facility]) 2016     Priority: Low   • BMI  38.0-38.9,adult 2016     Priority: Low   • History of TIA (transient ischemic attack) 2016     Priority: Low     On plavix      • Pseudophakia of right eye      Priority: Low   • BPH (benign prostatic hyperplasia) 2014     Priority: Low   • Dyslipidemia 2013     Priority: Low     Past Medical History:   Diagnosis Date   • Basal cell carcinoma    • CAD (coronary artery disease) 2019    s/p CABG x 2 vessels   • Cataract    • Cerebral infarction (CMS/[facility]) 2018   • Chronic combined systolic and diastolic HF (heart failure) (CMS/[facility])    • Diabetes mellitus (CMS/[facility])    • Essential (primary) hypertension    • High cholesterol    • Ischemic cardiomyopathy    • ELKE on CPAP    • Pseudophakia of right eye    • RAD (reactive airway disease)       Past Surgical History:   Procedure Laterality Date   • Cataract extraction w/ intraocular lens implant Right 2002    Dr Dillon   • Occult blood test tube  2012   • Skin cancer excision  2018    chest, back and neck      Social History     Tobacco Use   • Smoking status: Former Smoker     Packs/day: 0.00     Types: Cigars     Last attempt to quit: 1979     Years since quittin.7   • Smokeless tobacco: Never Used   • Tobacco comment: ages [age] smoked cigars   Substance Use Topics   • Alcohol use: Yes     Alcohol/week: 0.0 standard drinks     Frequency: Monthly or less     Drinks per session: 1 or 2     Binge frequency: Never     Comment: rarely      Family History   Problem Relation Age of Onset   • Diabetes Paternal Uncle    • Macular degeneration Mother       Medications Prior to Admission   Medication Sig Dispense Refill   • furosemide (LASIX) 40 MG tablet Take 1 tablet by mouth 2 times daily for 3 days. 30 tablet 0   • insulin glargine (LANTUS SOLOSTAR) 100 UNIT/ML pen-injector Inject 50 Units into the skin nightly.  75 mL 0   • carvedilol (COREG) 6.25 MG tablet Take 2.5 tablets by mouth 2 times daily (with meals). 120 tablet 1  No   • aluminum hydrox-magnesium hydrox-simethicone 400-400-40 MG/5ML suspension Take 20 mLs by mouth as needed (indigestion).     • lisinopril (ZESTRIL) 2.5 MG tablet Take 1 tablet by mouth daily. Do not start before August 11, 2019. 30 tablet 2   • acetaminophen (TYLENOL) 325 MG tablet Take 2 tablets by mouth every 4 hours as needed for Pain. (Patient taking differently: Take 650 mg by mouth every 4 hours as needed for Pain. Pt is taking one 500mg tablet q 6-8 hours prn pain.) 120 tablet 0   • metformin (GLUCOPHAGE) 1000 MG tablet Take 1 tablet by mouth every 12 hours. 180 tablet 3   • clopidogrel (PLAVIX) 75 MG tablet Take 1 tablet by mouth daily. 90 tablet 3   • rivaroxaban (XARELTO) 20 MG Tab Take 1 tablet by mouth daily (with dinner). 90 tablet 2   • atorvastatin (LIPITOR) 80 MG tablet Take 1 tablet by mouth daily. 90 tablet 3   • fluticasone (FLONASE) 50 MCG/ACT nasal spray Spray 2 sprays in each nostril daily. 16 g 12   • albuterol 108 (90 Base) MCG/ACT inhaler Inhale 2 puffs into the lungs every 4 hours as needed for Shortness of Breath or Wheezing. 1 Inhaler 5   • DISPENSE SOLOSTAR PEN NEEDLES   .00   CHECKS BLOOD SUGAR  DAILY 10 each 0   • Glucose Blood (ONE TOUCH ULTRA TEST) strip CHECK BLOOD SUGAR DAILY    .00 100 each 3   • DISPENSE SOFT TOUCH LANCETS    TESTS BLOOD SUGAR DAILY  .00 100 each 3   • Glucose Blood strip Patient to test once to twice daily  Dx 250.00 150 each 2       Current Medications Reviewed:Yes    Current Facility-Administered Medications   Medication   • sodium chloride 0.9 % flush bag 500 mL   • potassium CHLORIDE (KLOR-CON M) pepe ER tablet 20 mEq   • potassium CHLORIDE (KLOR-CON) packet 20 mEq   • potassium CHLORIDE 20 mEq/100mL IVPB premix   • potassium CHLORIDE (KLOR-CON M) pepe ER tablet 40 mEq   • potassium CHLORIDE (KLOR-CON) packet 40 mEq   • potassium CHLORIDE 20 mEq/100mL IVPB premix   • magnesium sulfate 1 g in dextrose 5% 100 mL IVPB premix   • magnesium  sulfate 2 g in 50 mL premix IVPB   • magnesium sulfate 2 g in 50 mL premix IVPB   • potassium phosphate/sodium phosphate (K PHOS NEUTRAL) tablet 1 tablet   • sodium phosphate 15 mmol in dextrose 5 % 250 mL IVPB   • calcium gluconate 2 g in dextrose 5 % 70 mL total volume IVPB   • dextrose 10 % bolus 250 mL   • lidocaine 1 % injection 100 mg   • sodium chloride 0.9 % flush bag 25 mL   • sodium chloride (PF) 0.9 % injection 2 mL   • dextrose 5 % infusion   • glucagon (GLUCAGEN) injection 1 mg   • dextrose (GLUTOSE) 40 % gel 15 g   • insulin lispro (HumaLOG) correction dose   • atorvastatin (LIPITOR) tablet 80 mg   • furosemide (LASIX) tablet 40 mg   • lisinopril (ZESTRIL) tablet 2.5 mg   • insulin glargine (LANTUS) injection 50 Units   • carvedilol (COREG) tablet 15.625 mg       ALLERGIES:   Allergen Reactions   • Aspirin ANAPHYLAXIS   • Penicillins HIVES     When pt was a small child       Current Allergies Reviewed:Yes  Review of Systems:  No fevers overnight    Laboratory Results:  Lab Results   Component Value Date    SODIUM 138 09/20/2019    POTASSIUM 4.1 09/20/2019    BUN 20 09/20/2019    CREATININE 0.94 09/20/2019    WBC 6.3 09/20/2019    HCT 34.9 (L) 09/20/2019    HGB 11.2 (L) 09/20/2019     09/20/2019    INR 1.2 09/19/2019    BILIRUBIN 0.5 09/20/2019    RAPDTR 0.02 11/18/2018    GLUCOSE 106 (H) 09/20/2019    TSH 1.096 11/19/2018    MG 1.5 (L) 09/20/2019       Lab Results Reviewed: Yes    PHYSICAL ASSESSMENT  Vital signs in last 24 hours:  Temp:  [97 °F (36.1 °C)-98.7 °F (37.1 °C)] 98.7 °F (37.1 °C)  Pulse:  [84-97] 89  Resp:  [18-20] 20  BP: ()/(55-73) 106/58  FiO2 (%):  [21 %] 21 %    General: Alert, in no acute distress  Respiratory: Non-labored respirations  Integumentary: Site clear    Planned Procedure:  Thoracentesis    The procedure, risks, benefits, and alternatives were discussed with the patient who gives consent to proceed:Yes    A/P (DPI):  Christos SMILEY Janes is a 63 year old male with  pleural effusion here for thoracentesis      Assessing Provider: RICHARD Orellana                        Time: 3:51 PM

## 2022-08-16 NOTE — HISTORY OF PRESENT ILLNESS
[Home] : at home, [unfilled] , at the time of the visit. [Medical Office: ([facility])___] : at the medical office located in  [FreeTextEntry3] : Christo Goins  [FreeTextEntry4] : Rae

## 2022-08-16 NOTE — H&P ADULT - ASSESSMENT
76yo F hx of dementia (AAOx1 at baseline), HTN, recent admission on 7/2022 for diverticulitis + rectosigmoid bowel wall thickening with extension to small bowel (s/p 10 days ertapenem) presenting to the hospital for abdominal pain.     IMPRESSION  #Abdominal pain 2/2 likely Rectal sigmoid colitis w/ bowel wall thickening   #HTN  #Dementia- mental status at baseline  Hyponatremia - likely from decreased PO intake    PLAN 78yo F hx of dementia (AAOx1 at baseline), HTN, recent admission on 7/2022 for diverticulitis + rectosigmoid bowel wall thickening with extension to small bowel (s/p 10 days ertapenem) presenting to the hospital for abdominal pain.     IMPRESSION  #Abdominal pain 2/2 likely Rectal sigmoid colitis w/ bowel wall thickening- RESOLVED  #HTN  #Dementia- mental status at baseline  Hyponatremia - likely from decreased PO intake    PLAN    #Abdominal pain- resolved. Suspected 2/2 to colitis  - Appears nonseptic. No additional abx ordered. Cont augmentin to complete 7-day course  - follow up CT A/P with oral contrast  - surgery/GI consult recs for abd pain  - s/p 1L IVF  - pain meds PRN as tolerated    #Hyponatremia  - suspected from decreased PO intake  - monitor    #Decreased PO intake  - monitor diet    #Mild Hyponatremia- suspected decreased PO intake  - monitor    #DVT ppx: lovenox  #GI ppx: none  #Diet: DASH, low fiber for suspected colitis causing abd pain for now  #Code: full  #Dispo: dc within 24-48 hours. patient likely to leave AMA if not dc by tomorrow3  #Pending: GI/surg eval, CT abdomen

## 2022-08-16 NOTE — H&P ADULT - NSHPLABSRESULTS_GEN_ALL_CORE
LABS:  cret                        12.8   10.88 )-----------( 390      ( 16 Aug 2022 14:47 )             39.3     08-16    129<L>  |  94<L>  |  13  ----------------------------<  111<H>  4.3   |  23  |  0.7    Ca    9.7      16 Aug 2022 14:47    TPro  7.3  /  Alb  3.4<L>  /  TBili  0.4  /  DBili  x   /  AST  15  /  ALT  10  /  AlkPhos  107  08-16

## 2022-08-17 ENCOUNTER — APPOINTMENT (OUTPATIENT)
Dept: GASTROENTEROLOGY | Facility: CLINIC | Age: 77
End: 2022-08-17

## 2022-08-17 ENCOUNTER — APPOINTMENT (OUTPATIENT)
Dept: UROLOGY | Facility: CLINIC | Age: 77
End: 2022-08-17

## 2022-08-17 DIAGNOSIS — K57.32 DIVERTICULITIS OF LARGE INTESTINE WITHOUT PERFORATION OR ABSCESS WITHOUT BLEEDING: ICD-10-CM

## 2022-08-17 DIAGNOSIS — I10 ESSENTIAL (PRIMARY) HYPERTENSION: ICD-10-CM

## 2022-08-17 DIAGNOSIS — K57.30 DIVERTICULOSIS OF LARGE INTESTINE WITHOUT PERFORATION OR ABSCESS WITHOUT BLEEDING: ICD-10-CM

## 2022-08-17 DIAGNOSIS — K52.9 NONINFECTIVE GASTROENTERITIS AND COLITIS, UNSPECIFIED: ICD-10-CM

## 2022-08-17 LAB
ALBUMIN SERPL ELPH-MCNC: 3.2 G/DL — LOW (ref 3.5–5.2)
ALP SERPL-CCNC: 90 U/L — SIGNIFICANT CHANGE UP (ref 30–115)
ALT FLD-CCNC: 8 U/L — SIGNIFICANT CHANGE UP (ref 0–41)
ANION GAP SERPL CALC-SCNC: 10 MMOL/L — SIGNIFICANT CHANGE UP (ref 7–14)
APTT BLD: 25.5 SEC — LOW (ref 27–39.2)
AST SERPL-CCNC: 10 U/L — SIGNIFICANT CHANGE UP (ref 0–41)
BASOPHILS # BLD AUTO: 0.08 K/UL — SIGNIFICANT CHANGE UP (ref 0–0.2)
BASOPHILS NFR BLD AUTO: 1.2 % — HIGH (ref 0–1)
BILIRUB SERPL-MCNC: 0.3 MG/DL — SIGNIFICANT CHANGE UP (ref 0.2–1.2)
BLD GP AB SCN SERPL QL: SIGNIFICANT CHANGE UP
BUN SERPL-MCNC: 13 MG/DL — SIGNIFICANT CHANGE UP (ref 10–20)
CALCIUM SERPL-MCNC: 9.5 MG/DL — SIGNIFICANT CHANGE UP (ref 8.5–10.1)
CHLORIDE SERPL-SCNC: 102 MMOL/L — SIGNIFICANT CHANGE UP (ref 98–110)
CO2 SERPL-SCNC: 25 MMOL/L — SIGNIFICANT CHANGE UP (ref 17–32)
CREAT SERPL-MCNC: 0.6 MG/DL — LOW (ref 0.7–1.5)
EGFR: 92 ML/MIN/1.73M2 — SIGNIFICANT CHANGE UP
EOSINOPHIL # BLD AUTO: 0.25 K/UL — SIGNIFICANT CHANGE UP (ref 0–0.7)
EOSINOPHIL NFR BLD AUTO: 3.7 % — SIGNIFICANT CHANGE UP (ref 0–8)
GLUCOSE SERPL-MCNC: 88 MG/DL — SIGNIFICANT CHANGE UP (ref 70–99)
HCT VFR BLD CALC: 35.9 % — LOW (ref 37–47)
HGB BLD-MCNC: 12 G/DL — SIGNIFICANT CHANGE UP (ref 12–16)
IMM GRANULOCYTES NFR BLD AUTO: 1 % — HIGH (ref 0.1–0.3)
INR BLD: 1.04 RATIO — SIGNIFICANT CHANGE UP (ref 0.65–1.3)
LYMPHOCYTES # BLD AUTO: 1.28 K/UL — SIGNIFICANT CHANGE UP (ref 1.2–3.4)
LYMPHOCYTES # BLD AUTO: 18.9 % — LOW (ref 20.5–51.1)
MAGNESIUM SERPL-MCNC: 2.1 MG/DL — SIGNIFICANT CHANGE UP (ref 1.8–2.4)
MCHC RBC-ENTMCNC: 27.6 PG — SIGNIFICANT CHANGE UP (ref 27–31)
MCHC RBC-ENTMCNC: 33.4 G/DL — SIGNIFICANT CHANGE UP (ref 32–37)
MCV RBC AUTO: 82.7 FL — SIGNIFICANT CHANGE UP (ref 81–99)
MONOCYTES # BLD AUTO: 0.93 K/UL — HIGH (ref 0.1–0.6)
MONOCYTES NFR BLD AUTO: 13.8 % — HIGH (ref 1.7–9.3)
NEUTROPHILS # BLD AUTO: 4.15 K/UL — SIGNIFICANT CHANGE UP (ref 1.4–6.5)
NEUTROPHILS NFR BLD AUTO: 61.4 % — SIGNIFICANT CHANGE UP (ref 42.2–75.2)
NRBC # BLD: 0 /100 WBCS — SIGNIFICANT CHANGE UP (ref 0–0)
PLATELET # BLD AUTO: 324 K/UL — SIGNIFICANT CHANGE UP (ref 130–400)
POTASSIUM SERPL-MCNC: 4.5 MMOL/L — SIGNIFICANT CHANGE UP (ref 3.5–5)
POTASSIUM SERPL-SCNC: 4.5 MMOL/L — SIGNIFICANT CHANGE UP (ref 3.5–5)
PROT SERPL-MCNC: 6.4 G/DL — SIGNIFICANT CHANGE UP (ref 6–8)
PROTHROM AB SERPL-ACNC: 12 SEC — SIGNIFICANT CHANGE UP (ref 9.95–12.87)
RBC # BLD: 4.34 M/UL — SIGNIFICANT CHANGE UP (ref 4.2–5.4)
RBC # FLD: 16.4 % — HIGH (ref 11.5–14.5)
SODIUM SERPL-SCNC: 137 MMOL/L — SIGNIFICANT CHANGE UP (ref 135–146)
WBC # BLD: 6.76 K/UL — SIGNIFICANT CHANGE UP (ref 4.8–10.8)
WBC # FLD AUTO: 6.76 K/UL — SIGNIFICANT CHANGE UP (ref 4.8–10.8)

## 2022-08-17 PROCEDURE — 99221 1ST HOSP IP/OBS SF/LOW 40: CPT

## 2022-08-17 PROCEDURE — 99223 1ST HOSP IP/OBS HIGH 75: CPT

## 2022-08-17 PROCEDURE — 99222 1ST HOSP IP/OBS MODERATE 55: CPT

## 2022-08-17 PROCEDURE — 93010 ELECTROCARDIOGRAM REPORT: CPT

## 2022-08-17 PROCEDURE — 93010 ELECTROCARDIOGRAM REPORT: CPT | Mod: 77

## 2022-08-17 RX ORDER — SODIUM CHLORIDE 9 MG/ML
1000 INJECTION, SOLUTION INTRAVENOUS
Refills: 0 | Status: DISCONTINUED | OUTPATIENT
Start: 2022-08-17 | End: 2022-08-20

## 2022-08-17 RX ORDER — CIPROFLOXACIN LACTATE 400MG/40ML
VIAL (ML) INTRAVENOUS
Refills: 0 | Status: DISCONTINUED | OUTPATIENT
Start: 2022-08-17 | End: 2022-08-20

## 2022-08-17 RX ORDER — METRONIDAZOLE 500 MG
500 TABLET ORAL ONCE
Refills: 0 | Status: COMPLETED | OUTPATIENT
Start: 2022-08-17 | End: 2022-08-17

## 2022-08-17 RX ORDER — METRONIDAZOLE 500 MG
500 TABLET ORAL EVERY 8 HOURS
Refills: 0 | Status: DISCONTINUED | OUTPATIENT
Start: 2022-08-17 | End: 2022-08-20

## 2022-08-17 RX ORDER — AMLODIPINE BESYLATE 2.5 MG/1
5 TABLET ORAL ONCE
Refills: 0 | Status: COMPLETED | OUTPATIENT
Start: 2022-08-17 | End: 2022-08-17

## 2022-08-17 RX ORDER — ONDANSETRON 8 MG/1
4 TABLET, FILM COATED ORAL EVERY 6 HOURS
Refills: 0 | Status: DISCONTINUED | OUTPATIENT
Start: 2022-08-17 | End: 2022-08-22

## 2022-08-17 RX ORDER — CIPROFLOXACIN LACTATE 400MG/40ML
400 VIAL (ML) INTRAVENOUS ONCE
Refills: 0 | Status: COMPLETED | OUTPATIENT
Start: 2022-08-17 | End: 2022-08-17

## 2022-08-17 RX ORDER — HYDROMORPHONE HYDROCHLORIDE 2 MG/ML
0.5 INJECTION INTRAMUSCULAR; INTRAVENOUS; SUBCUTANEOUS ONCE
Refills: 0 | Status: DISCONTINUED | OUTPATIENT
Start: 2022-08-17 | End: 2022-08-17

## 2022-08-17 RX ORDER — METRONIDAZOLE 500 MG
TABLET ORAL
Refills: 0 | Status: DISCONTINUED | OUTPATIENT
Start: 2022-08-17 | End: 2022-08-20

## 2022-08-17 RX ORDER — CIPROFLOXACIN LACTATE 400MG/40ML
400 VIAL (ML) INTRAVENOUS EVERY 12 HOURS
Refills: 0 | Status: DISCONTINUED | OUTPATIENT
Start: 2022-08-18 | End: 2022-08-20

## 2022-08-17 RX ORDER — ONDANSETRON 8 MG/1
4 TABLET, FILM COATED ORAL ONCE
Refills: 0 | Status: COMPLETED | OUTPATIENT
Start: 2022-08-17 | End: 2022-08-17

## 2022-08-17 RX ADMIN — HYDROMORPHONE HYDROCHLORIDE 0.5 MILLIGRAM(S): 2 INJECTION INTRAMUSCULAR; INTRAVENOUS; SUBCUTANEOUS at 19:17

## 2022-08-17 RX ADMIN — ENOXAPARIN SODIUM 40 MILLIGRAM(S): 100 INJECTION SUBCUTANEOUS at 21:01

## 2022-08-17 RX ADMIN — ONDANSETRON 4 MILLIGRAM(S): 8 TABLET, FILM COATED ORAL at 15:24

## 2022-08-17 RX ADMIN — SENNA PLUS 2 TABLET(S): 8.6 TABLET ORAL at 21:04

## 2022-08-17 RX ADMIN — ONDANSETRON 4 MILLIGRAM(S): 8 TABLET, FILM COATED ORAL at 21:02

## 2022-08-17 RX ADMIN — Medication 100 MILLIGRAM(S): at 18:56

## 2022-08-17 RX ADMIN — AMLODIPINE BESYLATE 5 MILLIGRAM(S): 2.5 TABLET ORAL at 21:01

## 2022-08-17 RX ADMIN — Medication 200 MILLIGRAM(S): at 18:56

## 2022-08-17 RX ADMIN — DONEPEZIL HYDROCHLORIDE 5 MILLIGRAM(S): 10 TABLET, FILM COATED ORAL at 22:23

## 2022-08-17 RX ADMIN — SODIUM CHLORIDE 75 MILLILITER(S): 9 INJECTION, SOLUTION INTRAVENOUS at 21:04

## 2022-08-17 RX ADMIN — Medication 100 MILLIGRAM(S): at 22:27

## 2022-08-17 RX ADMIN — TAMSULOSIN HYDROCHLORIDE 0.4 MILLIGRAM(S): 0.4 CAPSULE ORAL at 21:04

## 2022-08-17 NOTE — PATIENT PROFILE ADULT - FALL HARM RISK - HARM RISK INTERVENTIONS

## 2022-08-17 NOTE — PATIENT PROFILE ADULT - FUNCTIONAL ASSESSMENT - BASIC MOBILITY 6.
3-calculated by average/Not able to assess (calculate score using [facility] averaging method)

## 2022-08-17 NOTE — CONSULT NOTE ADULT - PROVIDER SPECIALTY LIST ADULT
1935: receive report from [facility] Dillon, RN. Patient resting in bed at this time without any complaints at this time. 2330: Bedside and Verbal shift change report given to Calderon Ndiaye RN  (oncoming nurse) by ARIANA Anderson (offgoing nurse). Report included the following information SBAR, Kardex, Intake/Output, MAR, Recent Results, Med Rec Status and Cardiac Rhythm NSR.
Gastroenterology
Colorectal Surgery

## 2022-08-17 NOTE — CONSULT NOTE ADULT - ASSESSMENT
Assessment  77F PMH of Dementia (A&Ox2, baseline), HTN, recent admission on 7/2022 for diverticulitis with rectosigmoid fluid collection and recent presentation to the ED as well on 8/14 due to 3 day history of abdominal pain found to have colitis. Now representing with vague abdominal pain per the family, however patient unsure of the abdominal pain, no N/V, no BM/Gas, no distention, rebound, guarding or tenderness appreciated on examination    Plan  - CT with rectal contrast noted  - c/w augmentin  - medical risk stratification for Laparoscopic possible open sigmoid colon resection, possible ostomy and all other indication procedures  - advance diet as tolerated  - trend WBC and fever curve  - if patient has diarrhea would recommend C. diff testing as she is high risk  - f/u GI evaluation  - d/w Dr. Jean-Baptiste
78yo F hx of dementia, HTN, recent admission on 7/2022 for diverticulitis + rectosigmoid bowel wall thickening with extension to small bowel (s/p 10 days ertapenem) presenting to the hospital for abdominal pain.    #)Abdominal pain/Recurrent diverticulitis   -Admitted in June for diverticulitis   -Hemodynamically stable   -Abdominal pain resolved   -Passing gas   -rectal contrast unable to reach descending colon  -As per patient never had full colonoscopy   -Last colonoscopy on 8/8 by Dr. Jean-Baptiste unable to pass beyond sigmoid colon due to sever diverticulitis / narrowing    Imaging:   -CT 8/15 with rectal contrast Resolution of colonic wall thickening from the hepatic flexure to the descending colon.Persistent wall thickening of the sigmoid colon with suggestion of faint perisigmoidal fat stranding on the current study. Minimal contrast traverses the wall thickened sigmoid colon with no upstream contrast noted in the descending colon. Findings may reflect wall thickening and luminal narrowing secondary to an inflammatory process such as colitis/diverticulitis  CT abd 8/14; Circumferential rectosigmoid wall thickening with mild surrounding inflammatory changes. Mild circumferential wall  thickening of the transverse and descending colon  CT abdomen 7/16: Persistent sigmoid circumferential wall thickening with surrounding inflammation. Rectal contrast reaches the mid descending colon, without evidence of fistula or leak.  CT abdomen 6/26: Redemonstration of rectosigmoid severe wall thickening with pericolonic infiltrative change consistent with unchanged diverticulitis/colitis and unchanged perisigmoid lymphadenopathy.    Recs:   No abdominal pain at this time, passing gas, abdomen is soft   Clear liquid diet and advance slowly as tolerated   Abx for 7-10 days   ID consult   No endoscopic intervention from GI at this point given active inflammation   Follow up with surgery

## 2022-08-17 NOTE — CONSULT NOTE ADULT - ATTENDING COMMENTS
Recurrent uncomplicated diverticulitis. A consideration for surgical therapy should be made given recurrence and re hospitalization.
Patient is a 77F with PMH of Dementia (A&Ox2, baseline), HTN, admitted with sigmoid diverticulitis and COVID infection.     Patient seen and examined.  Patient denies abdominal pain    Abdomen - soft, mildly distended, non tender    Vitals labs and images reviewed    Plan  - continue with antibiotics  - medical risk assessment for laparoscopic possible open sigmoid colon resection, possible ostomy  - advance diet as tolerated  - patient may require surgery on this admission

## 2022-08-17 NOTE — PROGRESS NOTE ADULT - ASSESSMENT
patient with abdominal pain     ·	Thickened Sigmoid Colon - r/o colitis   ·	HTN   ·	Dementia   ·	Residual COVID +  ·	Residual Hyponatremia   ·	Malnutrition     -antibiotics discontinued - afebrile and no leukocytosis - start diet and advance as tolerated (NPO only if any procedure schedule - none so far)  -colorectal sx follow up   -GI consult requested on admission - pending   -oob to chair as tolerated   -Dietitian's evaluation -patient seems malnourished   -continue with home maintenance meds     DISPO: home once cleared by Sx and GI     Attending Physician Dr. Sosa Thomas # [id number]

## 2022-08-17 NOTE — CONSULT NOTE ADULT - SUBJECTIVE AND OBJECTIVE BOX
KEISHA PANTOJA [id number]  77y Female  1d    Surgical consult:  77F PMH of Dementia (A&Ox2, baseline), HTN, recent admission on 2022 for diverticulitis with rectosigmoid fluid collection treated with 10 days of ertapenem. Patient recently presented to the ED as well on  due to 3 day history of abdominal pain found to have colitis and was sent home on course of augmentin. She is now representing to the ED with persistent abdominal pain that has been on and off per the family for the past 4 days. However when questioning the patient she states that her pain has not been that bad and is localize to the RLQ. Patient is a poor historian and cannot recall many of the events that happened day prior to arrival. She has not had BM in the past 4 days and does not recall whether she passed gas or not. She has been having abdominal bloating as well with associated decreased PO intake. Patient does not complain of increased abdominal pain, N/V when eating however states that she just didnt feel like eating.    PAST MEDICAL & SURGICAL HISTORY:  Hypertension, unspecified type  Dementia  GERD (gastroesophageal reflux disease)  Diverticulitis  Lack of bladder control  H/O dilation and curettage  for missed     MEDICATIONS  (STANDING):  amoxicillin/clavulanate Oral Tab/Cap - Peds 875 milliGRAM(s) Oral two times a day  donepezil 5 milliGRAM(s) Oral at bedtime  enoxaparin Injectable 40 milliGRAM(s) SubCutaneous every 24 hours  senna 2 Tablet(s) Oral at bedtime  tamsulosin 0.4 milliGRAM(s) Oral at bedtime    MEDICATIONS  (PRN):    Allergies  No Known Allergies  Intolerances    REVIEW OF SYSTEMS  [x ] A ten-point review of systems was otherwise negative except as noted.  [ ] Due to altered mental status/intubation, subjective information were not able to be obtained from the patient. History was obtained, to the extent possible, from review of the chart and collateral sources of information.    Vital Signs Last 24 Hrs  T(C): 36.2 (17 Aug 2022 00:03), Max: 36.7 (16 Aug 2022 12:59)  T(F): 97.1 (17 Aug 2022 00:03), Max: 98.1 (16 Aug 2022 12:59)  HR: 74 (17 Aug 2022 00:03) (74 - 110)  BP: 117/59 (17 Aug 2022 00:03) (117/59 - 117/71)  RR: 18 (17 Aug 2022 00:03) (18 - 18)  SpO2: 97% (17 Aug 2022 00:03) (97% - 98%)    Parameters below as of 16 Aug 2022 12:59  Patient On (Oxygen Delivery Method): room air    PHYSICAL EXAM:  GENERAL: NAD, well-appearing  CHEST/LUNG: Clear to auscultation bilaterally  HEART: Regular rate and rhythm  ABDOMEN: Soft, Nontender, Nondistended; No rebound or guarding  EXTREMITIES:  No clubbing, cyanosis, or edema    Labs:  CAPILLARY BLOOD GLUCOSE                      12.8   10.88 )-----------( 390      ( 16 Aug 2022 14:47 )             39.3       Auto Neutrophil %: 69.1 % (22 @ 14:47)  Auto Immature Granulocyte %: 0.8 % (22 @ 14:47)    08  129<L>  |  94<L>  |  13  ----------------------------<  111<H>  4.3   |  23  |  0.7  Calcium, Total Serum: 9.7 mg/dL (22 @ 14:47)    LFTs:      7.3  | 0.4  | 15       ------------------[107     ( 16 Aug 2022 14:47 )  3.4  | x    | 10          Lipase:13     Amylase:x        Lactate, Blood: 1.5 mmol/L (22 @ 13:33)  Coags:    Culture - Urine (collected 14 Aug 2022 17:02)  Source: Clean Catch Clean Catch (Midstream)  Final Report (16 Aug 2022 08:32):    Normal Urogenital luis present    RADIOLOGY & ADDITIONAL STUDIES:  < from: CT Abdomen and Pelvis w/ Oral Cont and w/ IV Cont (22 @ 22:39) >    IMPRESSION:    When compared to prior study dated 2022:    Resolution of colonic wall thickening from the hepatic flexure to the   descending colon.    Persistent wall thickening of the sigmoid colon with suggestion of faint   perisigmoidal fat stranding on the current study. Minimal contrast   traverses the wall thickened sigmoid colon with no upstream contrast   noted in the descending colon. Findings may reflect wall thickening and   luminal narrowing secondary to an inflammatory process such as   colitis/diverticulitis, however recommend direct visualization when   feasible to ensure no underlying neoplastic process. No evidence for   bowel obstruction.    < end of copied text >    
Gastroenterology Consultation:    Patient is a 77y old  Female who presents with a chief complaint of abdominal pain (17 Aug 2022 11:50)      Admitted on: 22  HPI:  76yo F hx of dementia, HTN, recent admission on 2022 for diverticulitis + rectosigmoid bowel wall thickening with extension to small bowel (s/p 10 days ertapenem) presenting to the hospital for abdominal pain. Abdominal pain has been chronic, on/off, intermittent, dull, 4/10, periumbilical without radiation.     Recently, on 2022, patient had colonoscopy for CRC screening (first colonoscopy) without any complications. She was found to have multiple non-bleeding diverticula with mixed openings + inflammation in the sigmoid colon. There was luminal narrowing, so colonoscope could not be advanced beyond the sigmoid colon. Patient was referred to rectal surgery team. This AM, while she was evaluated, patient was referred back to the ED because of decreased PO intake, continued abdominal pain + distension. Patient denies vomiting, and is passing gas. Recently, she went to the ED on 22 where CT abdomen was performed-> showing circumferential wall thickening of transverse + descending colon. Was prescribed 1 week of augmentin + colorectal surgery referral.     At the ED, VS shows : T- 36.7C, BP: 117/71, HR: 110bpm, RR: 18, 98% on RA. Labs show mild leukocytosis (10.8K), hyponatremia (129).  Chest X-ray performed- no perforation, lung opacities. At the ED, patient was given 1L LR bolus. Of note, patient's abdominal pain has spontaneously resolved. Patient still feels she is passing gas. States she has an appetite to eat.  (16 Aug 2022 19:17)      Prior EGD: none on chart   Prior Colonoscopy: < from: Colonoscopy (22 @ 11:30) >  Impressions:    Severe diverticulitis of the sigmoid colon.    Plan:    High Fiber Diet    Await pathology results. I spoke at length with the patient's daughter regarding  her stricture. We will allow for additional time for healing followed by repeat  colonoscopy with the advanced endoscopy team. She would benefit from  sigmoidectomy    < end of copied text >        PAST MEDICAL & SURGICAL HISTORY:  Hypertension, unspecified type      Dementia      GERD (gastroesophageal reflux disease)      Diverticulitis      Lack of bladder control      H/O dilation and curettage  for missed           FAMILY HISTORY:  Patient unable to provide medical history        Social History:  Tobacco: N  Alcohol: N  Drugs: N    Home Medications:    MEDICATIONS  (STANDING):  donepezil 5 milliGRAM(s) Oral at bedtime  enoxaparin Injectable 40 milliGRAM(s) SubCutaneous every 24 hours  senna 2 Tablet(s) Oral at bedtime  tamsulosin 0.4 milliGRAM(s) Oral at bedtime    MEDICATIONS  (PRN):      Allergies  No Known Allergies      Review of Systems:   Constitutional:  No Fever, No Chills  ENT/Mouth:  No Hearing Changes,  No Difficulty Swallowing  Eyes:  No Eye Pain, No Vision Changes  Cardiovascular:  No Chest Pain, No Palpitations  Respiratory:  No Cough, No Dyspnea  Gastrointestinal:  As described in HPI  Musculoskeletal:  No Joint Swelling, No Back Pain  Skin:  No Skin Lesions, No Jaundice  Neuro:  No Syncope, No Dizziness  Heme/Lymph:  No Bruising, No Bleeding.          Physical Examination:  T(C): 36.6 (22 @ 12:42), Max: 36.6 (22 @ 12:42)  HR: 82 (22 @ 12:42) (74 - 82)  BP: 113/57 (22 @ 12:42) (113/57 - 117/59)  RR: 18 (22 @ 12:42) (18 - 18)  SpO2: 97% (22 @ 00:03) (97% - 97%)        Constitutional: No acute distress.  Eyes:. Conjunctivae are clear, Sclera is non-icteric.  Ears Nose and Throat: The external ears are normal appearing,  Oral mucosa is pink and moist.  Respiratory:  No signs of respiratory distress. Lung sounds are clear bilaterally.  Cardiovascular:  S1 S2, Regular rate and rhythm.  GI: Abdomen is soft, symmetric, and non-tender without distention.   Neuro: No Tremor, No involuntary movements  Skin: No rashes, No Jaundice.          Data:                        12.0   6.76  )-----------( 324      ( 17 Aug 2022 04:30 )             35.9     Hgb Trend:  12.0  22 @ 04:30  12.8  22 @ 14:47      -    137  |  102  |  13  ----------------------------<  88  4.5   |  25  |  0.6<L>    Ca    9.5      17 Aug 2022 04:30  Mg     2.1         TPro  6.4  /  Alb  3.2<L>  /  TBili  0.3  /  DBili  x   /  AST  10  /  ALT  8   /  AlkPhos  90      Liver panel trend:  TBili 0.3   /   AST 10   /   ALT 8   /   AlkP 90   /   Tptn 6.4   /   Alb 3.2    /   DBili --        TBili 0.4   /   AST 15   /   ALT 10   /   AlkP 107   /   Tptn 7.3   /   Alb 3.4    /   DBili --        TBili 0.3   /   AST 28   /   ALT 14   /   AlkP 123   /   Tptn 8.8   /   Alb 4.3    /   DBili --            PT/INR - ( 17 Aug 2022 11:00 )   PT: 12.00 sec;   INR: 1.04 ratio         PTT - ( 17 Aug 2022 11:00 )  PTT:25.5 sec    Culture - Urine (collected 14 Aug 2022 17:02)  Source: Clean Catch Clean Catch (Midstream)  Final Report (16 Aug 2022 08:32):    Normal Urogenital luis present          Radiology:  CT Abdomen and Pelvis w/ Oral Cont and w/ IV Cont:   ACC: [id number] EXAM:  CT ABDOMEN AND PELVIS OC IC                          *** ADDENDUM***    Impression should also include distended urinary bladder. Correlate for   urinary retention.    --- End of Report ---    *** END OF ADDENDUM***      PROCEDURE DATE:  2022          INTERPRETATION:  CLINICAL STATEMENT: Abdominal pain    TECHNIQUE: Contiguous CT images were obtained of the abdomen and pelvis.  Intravenous Contrast:  Intravenous contrast administered.  100 cc Omnipaque 350 intravenous contrast was administered. 0 cc   discarded.  Oral contrast: Rectal contrast was administered.      COMPARISON:  Abdominal CT dated 2022    FINDINGS:    LOWER CHEST: There is mild bibasilar subsegmental atelectasis. Trace   pericardial fluid, unchanged.    LIVER: Unremarkable aside from a few subcentimeter hypodensities too   small to characterize    SPLEEN: Redemonstrated 1.5 cm splenic hypodensity    PANCREAS: Unremarkable.    GALLBLADDER AND BILIARY TREE: Unremarkable CT appearance of the  gallbladder. No biliary ductal dilatation.    ADRENALS: Unremarkable.    KIDNEYS: Symmetric pattern of renal enhancement. No hydronephrosis. Small   right renal cyst. Subcentimeter left renal hypodensity, suspect   angiomyolipoma    LYMPH NODES: Enlarged perirectal/perisigmoidal lymph nodes measuring up   to 1 cm short axis    VASCULATURE: The abdominal aorta is normal in caliber. Redemonstrated   subcentimeter splenic artery aneurysms. IVC filter.    BOWEL: No evidence for small bowel obstruction. The appendix appears   unremarkable. Previous seen noted colonic wall thickening extending from   the hepatic flexure through the descending colon has resolved. There is   persistent wall thickening of the sigmoid colon, question is minimally   improved although there is faint perisigmoidal fat stranding on the   current study. Minimal contrast traverses the wall thickened sigmoid   colon with no upstream contrast noted in the descending colon.    PERITONEUM/RETROPERITONEUM/MESENTERY: No ascites, free air or abscess.    PELVIC VISCERA: Distended urinary bladder, almost to the level of the   umbilicus.    BONES AND SOFT TISSUES: Degenerative changes of the spine.      IMPRESSION:    When compared to prior study dated 2022:    Resolution of colonic wall thickening from the hepatic flexure to the   descending colon.    Persistent wall thickening of the sigmoid colon with suggestion of faint   perisigmoidal fat stranding on the current study. Minimal contrast   traverses the wall thickened sigmoid colon with no upstream contrast   noted in the descending colon. Findings may reflect wall thickening and   luminal narrowing secondary to an inflammatory process such as   colitis/diverticulitis, however recommend direct visualization when   feasible to ensure no underlying neoplastic process. No evidence for   bowel obstruction.    --- End of Report ---    ***Please see the addendum at the top of this report. It may contain   additional important information or changes.****        SOFIE SANCHEZ MD; Attending Radiologist  This document has been electronically signed. Aug 16 2022 11:08PM  Addend:SOFIE SANCEHZ MD; Attending Radiologist  This addendum was electronically signed on: Aug 16 2022 11:46PM. (22 @ 22:39)    < from: CT Abdomen and Pelvis w/ IV Cont (22 @ 16:32) >  Circumferential rectosigmoid wall thickening   with mild surrounding inflammatory changes. Mild circumferential wall   thickening of the transverse and descending colon    < end of copied text >    < from: CT Abdomen and Pelvis w/ Oral Cont (22 @ 13:18) >  Persistent sigmoid circumferential wall   thickening with surrounding inflammation. Rectal contrast reaches the mid   descending colon, without evidence of fistula or leak.    < end of copied text >    < from: CT Abdomen and Pelvis w/ IV Cont (22 @ 11:49) >  Redemonstration of rectosigmoid severe wall thickening with pericolonic   infiltrative change consistent with unchanged diverticulitis/colitis and   unchanged perisigmoid lymphadenopathy.    < end of copied text >

## 2022-08-18 LAB
ALBUMIN SERPL ELPH-MCNC: 3.2 G/DL — LOW (ref 3.5–5.2)
ALP SERPL-CCNC: 89 U/L — SIGNIFICANT CHANGE UP (ref 30–115)
ALT FLD-CCNC: 8 U/L — SIGNIFICANT CHANGE UP (ref 0–41)
ANION GAP SERPL CALC-SCNC: 8 MMOL/L — SIGNIFICANT CHANGE UP (ref 7–14)
APTT BLD: 25.8 SEC — LOW (ref 27–39.2)
AST SERPL-CCNC: 10 U/L — SIGNIFICANT CHANGE UP (ref 0–41)
BASOPHILS # BLD AUTO: 0.06 K/UL — SIGNIFICANT CHANGE UP (ref 0–0.2)
BASOPHILS NFR BLD AUTO: 0.9 % — SIGNIFICANT CHANGE UP (ref 0–1)
BILIRUB SERPL-MCNC: 0.2 MG/DL — SIGNIFICANT CHANGE UP (ref 0.2–1.2)
BLD GP AB SCN SERPL QL: SIGNIFICANT CHANGE UP
BUN SERPL-MCNC: 8 MG/DL — LOW (ref 10–20)
CALCIUM SERPL-MCNC: 9.2 MG/DL — SIGNIFICANT CHANGE UP (ref 8.5–10.1)
CHLORIDE SERPL-SCNC: 100 MMOL/L — SIGNIFICANT CHANGE UP (ref 98–110)
CO2 SERPL-SCNC: 27 MMOL/L — SIGNIFICANT CHANGE UP (ref 17–32)
CREAT SERPL-MCNC: 0.6 MG/DL — LOW (ref 0.7–1.5)
EGFR: 92 ML/MIN/1.73M2 — SIGNIFICANT CHANGE UP
EOSINOPHIL # BLD AUTO: 0.19 K/UL — SIGNIFICANT CHANGE UP (ref 0–0.7)
EOSINOPHIL NFR BLD AUTO: 2.8 % — SIGNIFICANT CHANGE UP (ref 0–8)
GLUCOSE SERPL-MCNC: 104 MG/DL — HIGH (ref 70–99)
HCT VFR BLD CALC: 36.6 % — LOW (ref 37–47)
HGB BLD-MCNC: 11.9 G/DL — LOW (ref 12–16)
IMM GRANULOCYTES NFR BLD AUTO: 0.9 % — HIGH (ref 0.1–0.3)
INR BLD: 1.18 RATIO — SIGNIFICANT CHANGE UP (ref 0.65–1.3)
LYMPHOCYTES # BLD AUTO: 1.07 K/UL — LOW (ref 1.2–3.4)
LYMPHOCYTES # BLD AUTO: 15.9 % — LOW (ref 20.5–51.1)
MAGNESIUM SERPL-MCNC: 1.9 MG/DL — SIGNIFICANT CHANGE UP (ref 1.8–2.4)
MCHC RBC-ENTMCNC: 26.9 PG — LOW (ref 27–31)
MCHC RBC-ENTMCNC: 32.5 G/DL — SIGNIFICANT CHANGE UP (ref 32–37)
MCV RBC AUTO: 82.8 FL — SIGNIFICANT CHANGE UP (ref 81–99)
MONOCYTES # BLD AUTO: 0.89 K/UL — HIGH (ref 0.1–0.6)
MONOCYTES NFR BLD AUTO: 13.2 % — HIGH (ref 1.7–9.3)
NEUTROPHILS # BLD AUTO: 4.46 K/UL — SIGNIFICANT CHANGE UP (ref 1.4–6.5)
NEUTROPHILS NFR BLD AUTO: 66.3 % — SIGNIFICANT CHANGE UP (ref 42.2–75.2)
NRBC # BLD: 0 /100 WBCS — SIGNIFICANT CHANGE UP (ref 0–0)
PHOSPHATE SERPL-MCNC: 3 MG/DL — SIGNIFICANT CHANGE UP (ref 2.1–4.9)
PLATELET # BLD AUTO: 330 K/UL — SIGNIFICANT CHANGE UP (ref 130–400)
POTASSIUM SERPL-MCNC: 4 MMOL/L — SIGNIFICANT CHANGE UP (ref 3.5–5)
POTASSIUM SERPL-SCNC: 4 MMOL/L — SIGNIFICANT CHANGE UP (ref 3.5–5)
PREALB SERPL-MCNC: 16 MG/DL — LOW (ref 20–40)
PROT SERPL-MCNC: 6.1 G/DL — SIGNIFICANT CHANGE UP (ref 6–8)
PROTHROM AB SERPL-ACNC: 13.5 SEC — HIGH (ref 9.95–12.87)
RBC # BLD: 4.42 M/UL — SIGNIFICANT CHANGE UP (ref 4.2–5.4)
RBC # FLD: 15.7 % — HIGH (ref 11.5–14.5)
SODIUM SERPL-SCNC: 135 MMOL/L — SIGNIFICANT CHANGE UP (ref 135–146)
WBC # BLD: 6.73 K/UL — SIGNIFICANT CHANGE UP (ref 4.8–10.8)
WBC # FLD AUTO: 6.73 K/UL — SIGNIFICANT CHANGE UP (ref 4.8–10.8)

## 2022-08-18 PROCEDURE — 99233 SBSQ HOSP IP/OBS HIGH 50: CPT

## 2022-08-18 PROCEDURE — 99232 SBSQ HOSP IP/OBS MODERATE 35: CPT

## 2022-08-18 RX ADMIN — ENOXAPARIN SODIUM 40 MILLIGRAM(S): 100 INJECTION SUBCUTANEOUS at 21:17

## 2022-08-18 RX ADMIN — Medication 200 MILLIGRAM(S): at 17:47

## 2022-08-18 RX ADMIN — SENNA PLUS 2 TABLET(S): 8.6 TABLET ORAL at 21:17

## 2022-08-18 RX ADMIN — Medication 100 MILLIGRAM(S): at 05:37

## 2022-08-18 RX ADMIN — TAMSULOSIN HYDROCHLORIDE 0.4 MILLIGRAM(S): 0.4 CAPSULE ORAL at 21:17

## 2022-08-18 RX ADMIN — Medication 100 MILLIGRAM(S): at 13:53

## 2022-08-18 RX ADMIN — Medication 100 MILLIGRAM(S): at 21:19

## 2022-08-18 RX ADMIN — DONEPEZIL HYDROCHLORIDE 5 MILLIGRAM(S): 10 TABLET, FILM COATED ORAL at 21:17

## 2022-08-18 RX ADMIN — Medication 200 MILLIGRAM(S): at 05:38

## 2022-08-18 NOTE — PROGRESS NOTE ADULT - ASSESSMENT
patient with abdominal pain     ·	Thickened Sigmoid Colon - r/o colitis   ·	HTN   ·	Dementia   ·	Residual COVID +  ·	Residual Hyponatremia   ·	Malnutrition     -antibiotics discontinued - afebrile and no leukocytosis - start diet and advance as tolerated (NPO only if any procedure schedule - none so far)  -colorectal sx follow up   -GI consult requested on admission - pending   -oob to chair as tolerated   -Dietitian's evaluation -patient seems malnourished   -continue with home maintenance meds     DISPO: home once cleared by Sx and GI     Attending Physician Dr. Sosa Thomas # [id number]  patient with abdominal pain     ·	Suspected Sigmoid Diverticulitis   ·	HTN   ·	Dementia / Debility   ·	Residual COVID +  ·	Residual Hyponatremia   ·	Severe Malnutrition     -abx, advance diet as tolerated   -colorectal sx follow up in the community - no urgent inpatient surgery (surgical team to discuss options with patient's family)  -GI consult appreciated   -oob to chair as tolerated   -Dietitian's evaluation -patient seems malnourished - calorie count ordered   -continue with home maintenance meds   -staff to chart BMI     DISPO: home once clinically stable - not discharge ready   Discussion: staff updated family     Attending Physician Dr. Sosa Thomas # [id number]

## 2022-08-18 NOTE — CHART NOTE - NSCHARTNOTEFT_GEN_A_CORE
Tried calling pts emergency contact " Mr Christo Dong" to update about pts condition and plan. Went to voice mail will try again later.

## 2022-08-18 NOTE — PROGRESS NOTE ADULT - ASSESSMENT
76yo F hx of dementia (AAOx2 at baseline), HTN, recent admission on 7/2022 for diverticulitis + rectosigmoid bowel wall thickening with extension to small bowel (s/p 10 days ertapenem) presenting to the hospital for abdominal pain.     #Abdominal pain- resolved. Suspected 2/2 to colitis  - follow up CT A/P with oral contrast  - surgery rec appreciated:        - medical risk stratification for Laparoscopic possible open sigmoid colon resection, possible ostomy and all other indication procedures  - advance diet as tolerated  - trend WBC and fever curve  - if patient has diarrhea would recommend C. diff testing as she is high risk  - Gi rec appreciated  - pain meds PRN as tolerated    #Hyponatremia (resolved)  - suspected from decreased PO intake  - monitor    #Misc:  #DVT ppx: lovenox  #GI ppx: none  #Diet: clear liquids  #Code: full     78yo F hx of dementia (AAOx2 at baseline), HTN, recent admission on 7/2022 for diverticulitis + rectosigmoid bowel wall thickening with extension to small bowel (s/p 10 days ertapenem) presenting to the hospital for abdominal pain.     #Abdominal pain- resolved. Suspected 2/2 to colitis  - follow up CT A/P with oral contrast  - surgery rec appreciated:        - Pt will likely benefit from surgery as outpt in elective setting as pts nutritional status is not suitable for extensive procedure  - will start calorie count today  - advance diet as tolerated  - trend WBC and fever curve  - if patient has diarrhea would recommend C. diff testing as she is high risk  - Gi rec appreciated  - pain meds PRN as tolerated    #Hyponatremia (resolved)  - suspected from decreased PO intake  - monitor    #Misc:  #DVT ppx: lovenox  #GI ppx: none  #Diet: clear liquids  #Code: full

## 2022-08-18 NOTE — PROGRESS NOTE ADULT - ASSESSMENT
77F PMH of Dementia (A&Ox2, baseline), HTN, recent admission on 7/2022 for diverticulitis with rectosigmoid fluid collection and recent presentation to the ED as well on 8/14 due to 3 day history of abdominal pain found to have colitis. Now representing with vague abdominal pain per the family, however patient unsure of the abdominal pain, no N/V, no BM/Gas, no distention, rebound, guarding or tenderness appreciated on examination    Plan  - advance diet as tolerated  - recommend calorie count  - if patient tolerates diet, would recommend surgical intervention as OP once active colitis resolves, and patient is nutritionally optimized  - d/w Dr. Jean-Baptiste

## 2022-08-19 LAB
ALBUMIN SERPL ELPH-MCNC: 2.9 G/DL — LOW (ref 3.5–5.2)
ALP SERPL-CCNC: 79 U/L — SIGNIFICANT CHANGE UP (ref 30–115)
ALT FLD-CCNC: 7 U/L — SIGNIFICANT CHANGE UP (ref 0–41)
ANION GAP SERPL CALC-SCNC: 7 MMOL/L — SIGNIFICANT CHANGE UP (ref 7–14)
ANION GAP SERPL CALC-SCNC: 9 MMOL/L — SIGNIFICANT CHANGE UP (ref 7–14)
ANISOCYTOSIS BLD QL: SLIGHT — SIGNIFICANT CHANGE UP
AST SERPL-CCNC: 9 U/L — SIGNIFICANT CHANGE UP (ref 0–41)
BASOPHILS # BLD AUTO: 0.06 K/UL — SIGNIFICANT CHANGE UP (ref 0–0.2)
BASOPHILS # BLD AUTO: 0.13 K/UL — SIGNIFICANT CHANGE UP (ref 0–0.2)
BASOPHILS NFR BLD AUTO: 1.1 % — HIGH (ref 0–1)
BASOPHILS NFR BLD AUTO: 1.8 % — HIGH (ref 0–1)
BILIRUB SERPL-MCNC: 0.3 MG/DL — SIGNIFICANT CHANGE UP (ref 0.2–1.2)
BUN SERPL-MCNC: 7 MG/DL — LOW (ref 10–20)
BUN SERPL-MCNC: 8 MG/DL — LOW (ref 10–20)
CALCIUM SERPL-MCNC: 8.6 MG/DL — SIGNIFICANT CHANGE UP (ref 8.5–10.1)
CALCIUM SERPL-MCNC: 8.8 MG/DL — SIGNIFICANT CHANGE UP (ref 8.5–10.1)
CHLORIDE SERPL-SCNC: 102 MMOL/L — SIGNIFICANT CHANGE UP (ref 98–110)
CHLORIDE SERPL-SCNC: 103 MMOL/L — SIGNIFICANT CHANGE UP (ref 98–110)
CO2 SERPL-SCNC: 25 MMOL/L — SIGNIFICANT CHANGE UP (ref 17–32)
CO2 SERPL-SCNC: 26 MMOL/L — SIGNIFICANT CHANGE UP (ref 17–32)
CREAT SERPL-MCNC: 0.5 MG/DL — LOW (ref 0.7–1.5)
CREAT SERPL-MCNC: 0.6 MG/DL — LOW (ref 0.7–1.5)
EGFR: 92 ML/MIN/1.73M2 — SIGNIFICANT CHANGE UP
EGFR: 96 ML/MIN/1.73M2 — SIGNIFICANT CHANGE UP
EOSINOPHIL # BLD AUTO: 0.21 K/UL — SIGNIFICANT CHANGE UP (ref 0–0.7)
EOSINOPHIL # BLD AUTO: 0.43 K/UL — SIGNIFICANT CHANGE UP (ref 0–0.7)
EOSINOPHIL NFR BLD AUTO: 3.8 % — SIGNIFICANT CHANGE UP (ref 0–8)
EOSINOPHIL NFR BLD AUTO: 6.1 % — SIGNIFICANT CHANGE UP (ref 0–8)
GIANT PLATELETS BLD QL SMEAR: PRESENT — SIGNIFICANT CHANGE UP
GLUCOSE SERPL-MCNC: 88 MG/DL — SIGNIFICANT CHANGE UP (ref 70–99)
GLUCOSE SERPL-MCNC: 92 MG/DL — SIGNIFICANT CHANGE UP (ref 70–99)
HCT VFR BLD CALC: 31.8 % — LOW (ref 37–47)
HCT VFR BLD CALC: 33.5 % — LOW (ref 37–47)
HGB BLD-MCNC: 10.6 G/DL — LOW (ref 12–16)
HGB BLD-MCNC: 10.8 G/DL — LOW (ref 12–16)
IMM GRANULOCYTES NFR BLD AUTO: 0.5 % — HIGH (ref 0.1–0.3)
LYMPHOCYTES # BLD AUTO: 0.84 K/UL — LOW (ref 1.2–3.4)
LYMPHOCYTES # BLD AUTO: 1.24 K/UL — SIGNIFICANT CHANGE UP (ref 1.2–3.4)
LYMPHOCYTES # BLD AUTO: 15.1 % — LOW (ref 20.5–51.1)
LYMPHOCYTES # BLD AUTO: 17.4 % — LOW (ref 20.5–51.1)
MAGNESIUM SERPL-MCNC: 1.9 MG/DL — SIGNIFICANT CHANGE UP (ref 1.8–2.4)
MAGNESIUM SERPL-MCNC: 2 MG/DL — SIGNIFICANT CHANGE UP (ref 1.8–2.4)
MANUAL SMEAR VERIFICATION: SIGNIFICANT CHANGE UP
MCHC RBC-ENTMCNC: 27.1 PG — SIGNIFICANT CHANGE UP (ref 27–31)
MCHC RBC-ENTMCNC: 27.7 PG — SIGNIFICANT CHANGE UP (ref 27–31)
MCHC RBC-ENTMCNC: 32.2 G/DL — SIGNIFICANT CHANGE UP (ref 32–37)
MCHC RBC-ENTMCNC: 33.3 G/DL — SIGNIFICANT CHANGE UP (ref 32–37)
MCV RBC AUTO: 83.2 FL — SIGNIFICANT CHANGE UP (ref 81–99)
MCV RBC AUTO: 84 FL — SIGNIFICANT CHANGE UP (ref 81–99)
MICROCYTES BLD QL: SLIGHT — SIGNIFICANT CHANGE UP
MONOCYTES # BLD AUTO: 0.74 K/UL — HIGH (ref 0.1–0.6)
MONOCYTES # BLD AUTO: 0.9 K/UL — HIGH (ref 0.1–0.6)
MONOCYTES NFR BLD AUTO: 10.4 % — HIGH (ref 1.7–9.3)
MONOCYTES NFR BLD AUTO: 16.2 % — HIGH (ref 1.7–9.3)
NEUTROPHILS # BLD AUTO: 3.52 K/UL — SIGNIFICANT CHANGE UP (ref 1.4–6.5)
NEUTROPHILS # BLD AUTO: 4.46 K/UL — SIGNIFICANT CHANGE UP (ref 1.4–6.5)
NEUTROPHILS NFR BLD AUTO: 62.6 % — SIGNIFICANT CHANGE UP (ref 42.2–75.2)
NEUTROPHILS NFR BLD AUTO: 63.3 % — SIGNIFICANT CHANGE UP (ref 42.2–75.2)
NRBC # BLD: 0 /100 WBCS — SIGNIFICANT CHANGE UP (ref 0–0)
PHOSPHATE SERPL-MCNC: 3 MG/DL — SIGNIFICANT CHANGE UP (ref 2.1–4.9)
PLAT MORPH BLD: NORMAL — SIGNIFICANT CHANGE UP
PLATELET # BLD AUTO: 264 K/UL — SIGNIFICANT CHANGE UP (ref 130–400)
PLATELET # BLD AUTO: 303 K/UL — SIGNIFICANT CHANGE UP (ref 130–400)
POLYCHROMASIA BLD QL SMEAR: SIGNIFICANT CHANGE UP
POTASSIUM SERPL-MCNC: 4.2 MMOL/L — SIGNIFICANT CHANGE UP (ref 3.5–5)
POTASSIUM SERPL-MCNC: 5.7 MMOL/L — HIGH (ref 3.5–5)
POTASSIUM SERPL-SCNC: 4.2 MMOL/L — SIGNIFICANT CHANGE UP (ref 3.5–5)
POTASSIUM SERPL-SCNC: 5.7 MMOL/L — HIGH (ref 3.5–5)
PROT SERPL-MCNC: 5.6 G/DL — LOW (ref 6–8)
RBC # BLD: 3.82 M/UL — LOW (ref 4.2–5.4)
RBC # BLD: 3.99 M/UL — LOW (ref 4.2–5.4)
RBC # FLD: 16.2 % — HIGH (ref 11.5–14.5)
RBC # FLD: 16.3 % — HIGH (ref 11.5–14.5)
RBC BLD AUTO: ABNORMAL
SODIUM SERPL-SCNC: 134 MMOL/L — LOW (ref 135–146)
SODIUM SERPL-SCNC: 138 MMOL/L — SIGNIFICANT CHANGE UP (ref 135–146)
VARIANT LYMPHS # BLD: 1.7 % — SIGNIFICANT CHANGE UP (ref 0–5)
WBC # BLD: 5.56 K/UL — SIGNIFICANT CHANGE UP (ref 4.8–10.8)
WBC # BLD: 7.12 K/UL — SIGNIFICANT CHANGE UP (ref 4.8–10.8)
WBC # FLD AUTO: 5.56 K/UL — SIGNIFICANT CHANGE UP (ref 4.8–10.8)
WBC # FLD AUTO: 7.12 K/UL — SIGNIFICANT CHANGE UP (ref 4.8–10.8)

## 2022-08-19 PROCEDURE — 99233 SBSQ HOSP IP/OBS HIGH 50: CPT

## 2022-08-19 PROCEDURE — 99232 SBSQ HOSP IP/OBS MODERATE 35: CPT

## 2022-08-19 PROCEDURE — 74018 RADEX ABDOMEN 1 VIEW: CPT | Mod: 26

## 2022-08-19 RX ADMIN — Medication 200 MILLIGRAM(S): at 06:28

## 2022-08-19 RX ADMIN — Medication 200 MILLIGRAM(S): at 18:55

## 2022-08-19 RX ADMIN — Medication 100 MILLIGRAM(S): at 13:35

## 2022-08-19 RX ADMIN — SODIUM CHLORIDE 75 MILLILITER(S): 9 INJECTION, SOLUTION INTRAVENOUS at 12:21

## 2022-08-19 RX ADMIN — Medication 100 MILLIGRAM(S): at 06:26

## 2022-08-19 RX ADMIN — DONEPEZIL HYDROCHLORIDE 5 MILLIGRAM(S): 10 TABLET, FILM COATED ORAL at 21:17

## 2022-08-19 RX ADMIN — ENOXAPARIN SODIUM 40 MILLIGRAM(S): 100 INJECTION SUBCUTANEOUS at 21:24

## 2022-08-19 RX ADMIN — TAMSULOSIN HYDROCHLORIDE 0.4 MILLIGRAM(S): 0.4 CAPSULE ORAL at 21:23

## 2022-08-19 RX ADMIN — SENNA PLUS 2 TABLET(S): 8.6 TABLET ORAL at 21:17

## 2022-08-19 RX ADMIN — Medication 100 MILLIGRAM(S): at 21:17

## 2022-08-19 NOTE — PROGRESS NOTE ADULT - ASSESSMENT
77F PMH of Dementia (A&Ox2, baseline), HTN, recent admission on 7/2022 for diverticulitis with rectosigmoid fluid collection and recent presentation to the ED as well on 8/14 due to 3 day history of abdominal pain found to have colitis. Now representing with vague abdominal pain per the family, however patient unsure of the abdominal pain, no N/V, no BM/Gas, no distention, rebound, guarding or tenderness appreciated on examination    Plan  - advance diet as tolerated  - calorie count pending: first meal of count revealed that patient consumed 100% Juice, 50% fruit, will continue to monitor   - if patient tolerates diet, would recommend surgical intervention as OP once active colitis resolves, and patient is nutritionally optimized  - Spectra [phone] for questions or concerns

## 2022-08-19 NOTE — PROGRESS NOTE ADULT - ASSESSMENT
patient with abdominal pain     ·	Suspected Sigmoid Diverticulitis   ·	HTN   ·	Dementia / Debility   ·	Residual COVID +  ·	Residual Hyponatremia   ·	Severe Malnutrition     -continue with IV abx  -calorie count Day 2/3  -colorectal sx follow up in the community - no urgent inpatient surgery   -oob to chair as tolerated   -Dietitian's evaluation   -continue with home maintenance meds   -BMI needs to be charted     DISPO: home once clinically stable - not discharge ready   Discussion: staff updated family     Attending Physician Dr. Sosa Thomas # [id number]

## 2022-08-19 NOTE — DIETITIAN INITIAL EVALUATION ADULT - ORAL INTAKE PTA/DIET HISTORY
Pt unable to participate in nutrition assessment at this time; pt confused per flowsheet. Pt dx with dementia; AAOx1 per H&P. Unable to reach emergency contract to obtain nutrition hx. Will attempt again at follow up.  Pt unable to participate in nutrition assessment at this time; pt confused per flowsheet. Pt dx with dementia; AAOx1 per H&P. Unable to reach emergency contract to obtain nutrition hx. Will attempt again at follow up. Per RN, pt had poor appetite PTA?

## 2022-08-19 NOTE — DIETITIAN INITIAL EVALUATION ADULT - NAME AND PHONE
Rachael [phone]    Nutrition Intervention: meals, snacks, medical food supplements; Nutrition Monitoring: diet order, PO intake, weights, labs, NFPF, body composition, BM and tolerance to medical food supplements

## 2022-08-19 NOTE — CHART NOTE - NSCHARTNOTEFT_GEN_A_CORE
3 DAY CALORIE COUNT observed hanging by door. Initiated on 8/18 and will continue until 8/20. RN aware. RD will collect and post results on 8/21.    RD remains available: Rachael Lund [phone]

## 2022-08-19 NOTE — DIETITIAN INITIAL EVALUATION ADULT - ORAL NUTRITION SUPPLEMENTS
Ensure Plus HP 3X/DAILY to optimize kcal/pro intake -- will provide 480 kcal/day total, 48g pro/day total  MAGIC CUP 2X/DAILY to optimize kcal/pro intake -- will provide 580 kcal/day total, 18g pro/day total

## 2022-08-19 NOTE — DIETITIAN INITIAL EVALUATION ADULT - OTHER INFO
Diet advanced from clear liquid to soft and bite sized on 8/19; during lunch. Per RN, pt consumed 100% of meals on a clear liquid diet. RD observed pt consuming soft and bite sized at bedside without difficulties. No nausea or vomiting reported.     Weight hx: UBW unknown. Current dosing weight is 48.8 KG - RD took bed scale weight on 8/19. Previous admission weight Diet advanced from clear liquid to soft and bite sized on 8/19; during lunch. Per RN, pt consumed 100% of meals on a clear liquid diet. RD observed pt consuming soft and bite sized at bedside without difficulties. No nausea or vomiting reported.     Weight hx: UBW unknown. Current dosing weight is 48.8 KG - RD took bed scale weight on 8/19. Previous admission weight was 47.5 KG on 6/27/22; 2.6% unintentional weight gain in over 1 month. Pt does not meet criteria for malnutrition.

## 2022-08-19 NOTE — PROGRESS NOTE ADULT - ASSESSMENT
6yo F hx of dementia (AAOx2 at baseline), HTN, recent admission on 7/2022 for diverticulitis + rectosigmoid bowel wall thickening with extension to small bowel (s/p 10 days ertapenem) presenting to the hospital for abdominal pain.     #Abdominal pain- resolved. Suspected 2/2 to colitis  - follow up CT A/P with oral contrast  - surgery rec appreciated:        - Pt will likely benefit from surgery as outpt in elective setting as pts nutritional status is not suitable for extensive procedure  - calorie count today  - advance diet as tolerated  - trend WBC and fever curve  - if patient has diarrhea would recommend C. diff testing as she is high risk  - Gi rec appreciated  - pain meds PRN as tolerated    #Hyponatremia (resolved)  - suspected from decreased PO intake  - monitor    #Misc:  #DVT ppx: lovenox  #GI ppx: none  #Diet: clear liquids  #Code: full

## 2022-08-19 NOTE — DIETITIAN INITIAL EVALUATION ADULT - OTHER CALCULATIONS
Using ABW 48.8 KG and IBW: ENERGY: 0996-6254 kcal/day (MSJ 1.2-1.5 SF) vs. 6192-7796 kcal/day (30-35 kcal/kg IBW 56.6 KG); PROTEIN: 68-85 g/day (1.2-1.5 g/kg IBW); FLUID: 5382-1349 mL/day (25-30 mL/kg) -- with consideration for age, BMI<19

## 2022-08-19 NOTE — DIETITIAN INITIAL EVALUATION ADULT - PERTINENT LABORATORY DATA
08-19    138  |  103  |  8<L>  ----------------------------<  92  4.2   |  26  |  0.5<L>    Ca    8.8      19 Aug 2022 08:07  Phos  3.0     08-18  Mg     2.0     08-19    TPro  5.6<L>  /  Alb  2.9<L>  /  TBili  0.3  /  DBili  x   /  AST  9   /  ALT  7   /  AlkPhos  79  08-19

## 2022-08-19 NOTE — DIETITIAN INITIAL EVALUATION ADULT - FLUID ACCUMULATION
NFPE: Pt observed to be well nourished, however, BMI is 16.8 (underweight range). Age related muscle mass loss observed.  EDEMA: no edema noted per flowsheet

## 2022-08-19 NOTE — DIETITIAN NUTRITION RISK NOTIFICATION - TREATMENT: THE FOLLOWING DIET HAS BEEN RECOMMENDED
Diet, Soft and Bite Sized:   Free Water Flush Instructions:  PLEASE PROVIDE ENSURE PLUS HP 3X/DAILY + MAGIC CUP 2X/DAILY (08-19-22 @ 15:50) [Pending Verification By Attending]  Diet, Soft and Bite Sized (08-19-22 @ 09:50) [Active]

## 2022-08-19 NOTE — DIETITIAN INITIAL EVALUATION ADULT - PERTINENT MEDS FT
MEDICATIONS  (STANDING):  ciprofloxacin   IVPB      ciprofloxacin   IVPB 400 milliGRAM(s) IV Intermittent every 12 hours  donepezil 5 milliGRAM(s) Oral at bedtime  enoxaparin Injectable 40 milliGRAM(s) SubCutaneous every 24 hours  lactated ringers. 1000 milliLiter(s) (75 mL/Hr) IV Continuous <Continuous>  metroNIDAZOLE  IVPB      metroNIDAZOLE  IVPB 500 milliGRAM(s) IV Intermittent every 8 hours  senna 2 Tablet(s) Oral at bedtime  tamsulosin 0.4 milliGRAM(s) Oral at bedtime    MEDICATIONS  (PRN):  ondansetron Injectable 4 milliGRAM(s) IV Push every 6 hours PRN Nausea and/or Vomiting

## 2022-08-19 NOTE — DIETITIAN INITIAL EVALUATION ADULT - ADD RECOMMEND
1. Recommend to add Ensure Plus HP 3X/DAILY -- will provide 480 kcal/day total, 48g pro/day total and MAGIC CUP 2X/DAILY -- will provide 580 kcal/day total, 18g pro/day total  2. Continue with current diet order; consider low sodium modular if BP elevates - pt dx w/ HTN  3. Encourage PO intake and assist during meals as needed    Pt is at high nutrition risk; will f/u in 4 days.

## 2022-08-19 NOTE — DIETITIAN INITIAL EVALUATION ADULT - SIGNS/SYMPTOMS
Diet recently upgraded from clear liquid to soft + bite sized on 8/19; was on clears liq for 2 days

## 2022-08-20 LAB
ALBUMIN SERPL ELPH-MCNC: 2.9 G/DL — LOW (ref 3.5–5.2)
ALP SERPL-CCNC: 84 U/L — SIGNIFICANT CHANGE UP (ref 30–115)
ALT FLD-CCNC: 9 U/L — SIGNIFICANT CHANGE UP (ref 0–41)
ANION GAP SERPL CALC-SCNC: 11 MMOL/L — SIGNIFICANT CHANGE UP (ref 7–14)
AST SERPL-CCNC: 13 U/L — SIGNIFICANT CHANGE UP (ref 0–41)
BASOPHILS # BLD AUTO: 0.05 K/UL — SIGNIFICANT CHANGE UP (ref 0–0.2)
BASOPHILS NFR BLD AUTO: 0.6 % — SIGNIFICANT CHANGE UP (ref 0–1)
BILIRUB SERPL-MCNC: <0.2 MG/DL — SIGNIFICANT CHANGE UP (ref 0.2–1.2)
BUN SERPL-MCNC: 11 MG/DL — SIGNIFICANT CHANGE UP (ref 10–20)
CALCIUM SERPL-MCNC: 8.8 MG/DL — SIGNIFICANT CHANGE UP (ref 8.5–10.1)
CHLORIDE SERPL-SCNC: 101 MMOL/L — SIGNIFICANT CHANGE UP (ref 98–110)
CO2 SERPL-SCNC: 25 MMOL/L — SIGNIFICANT CHANGE UP (ref 17–32)
CREAT SERPL-MCNC: 0.6 MG/DL — LOW (ref 0.7–1.5)
EGFR: 92 ML/MIN/1.73M2 — SIGNIFICANT CHANGE UP
EOSINOPHIL # BLD AUTO: 0.32 K/UL — SIGNIFICANT CHANGE UP (ref 0–0.7)
EOSINOPHIL NFR BLD AUTO: 3.6 % — SIGNIFICANT CHANGE UP (ref 0–8)
GLUCOSE SERPL-MCNC: 133 MG/DL — HIGH (ref 70–99)
HCT VFR BLD CALC: 31.8 % — LOW (ref 37–47)
HGB BLD-MCNC: 10.4 G/DL — LOW (ref 12–16)
IMM GRANULOCYTES NFR BLD AUTO: 0.4 % — HIGH (ref 0.1–0.3)
LYMPHOCYTES # BLD AUTO: 1.19 K/UL — LOW (ref 1.2–3.4)
LYMPHOCYTES # BLD AUTO: 13.4 % — LOW (ref 20.5–51.1)
MAGNESIUM SERPL-MCNC: 1.9 MG/DL — SIGNIFICANT CHANGE UP (ref 1.8–2.4)
MCHC RBC-ENTMCNC: 27.6 PG — SIGNIFICANT CHANGE UP (ref 27–31)
MCHC RBC-ENTMCNC: 32.7 G/DL — SIGNIFICANT CHANGE UP (ref 32–37)
MCV RBC AUTO: 84.4 FL — SIGNIFICANT CHANGE UP (ref 81–99)
MONOCYTES # BLD AUTO: 1.02 K/UL — HIGH (ref 0.1–0.6)
MONOCYTES NFR BLD AUTO: 11.5 % — HIGH (ref 1.7–9.3)
NEUTROPHILS # BLD AUTO: 6.27 K/UL — SIGNIFICANT CHANGE UP (ref 1.4–6.5)
NEUTROPHILS NFR BLD AUTO: 70.5 % — SIGNIFICANT CHANGE UP (ref 42.2–75.2)
NRBC # BLD: 0 /100 WBCS — SIGNIFICANT CHANGE UP (ref 0–0)
PLATELET # BLD AUTO: 306 K/UL — SIGNIFICANT CHANGE UP (ref 130–400)
POTASSIUM SERPL-MCNC: 4.1 MMOL/L — SIGNIFICANT CHANGE UP (ref 3.5–5)
POTASSIUM SERPL-SCNC: 4.1 MMOL/L — SIGNIFICANT CHANGE UP (ref 3.5–5)
PROT SERPL-MCNC: 5.7 G/DL — LOW (ref 6–8)
RBC # BLD: 3.77 M/UL — LOW (ref 4.2–5.4)
RBC # FLD: 16.4 % — HIGH (ref 11.5–14.5)
SODIUM SERPL-SCNC: 137 MMOL/L — SIGNIFICANT CHANGE UP (ref 135–146)
WBC # BLD: 8.89 K/UL — SIGNIFICANT CHANGE UP (ref 4.8–10.8)
WBC # FLD AUTO: 8.89 K/UL — SIGNIFICANT CHANGE UP (ref 4.8–10.8)

## 2022-08-20 PROCEDURE — 99232 SBSQ HOSP IP/OBS MODERATE 35: CPT

## 2022-08-20 PROCEDURE — 99233 SBSQ HOSP IP/OBS HIGH 50: CPT

## 2022-08-20 RX ORDER — METRONIDAZOLE 500 MG
500 TABLET ORAL EVERY 8 HOURS
Refills: 0 | Status: DISCONTINUED | OUTPATIENT
Start: 2022-08-20 | End: 2022-08-22

## 2022-08-20 RX ORDER — CIPROFLOXACIN LACTATE 400MG/40ML
500 VIAL (ML) INTRAVENOUS
Refills: 0 | Status: DISCONTINUED | OUTPATIENT
Start: 2022-08-20 | End: 2022-08-22

## 2022-08-20 RX ADMIN — TAMSULOSIN HYDROCHLORIDE 0.4 MILLIGRAM(S): 0.4 CAPSULE ORAL at 21:55

## 2022-08-20 RX ADMIN — Medication 500 MILLIGRAM(S): at 17:37

## 2022-08-20 RX ADMIN — Medication 100 MILLIGRAM(S): at 05:37

## 2022-08-20 RX ADMIN — Medication 500 MILLIGRAM(S): at 21:56

## 2022-08-20 RX ADMIN — DONEPEZIL HYDROCHLORIDE 5 MILLIGRAM(S): 10 TABLET, FILM COATED ORAL at 21:54

## 2022-08-20 RX ADMIN — Medication 200 MILLIGRAM(S): at 05:37

## 2022-08-20 RX ADMIN — ENOXAPARIN SODIUM 40 MILLIGRAM(S): 100 INJECTION SUBCUTANEOUS at 21:56

## 2022-08-20 RX ADMIN — SENNA PLUS 2 TABLET(S): 8.6 TABLET ORAL at 21:56

## 2022-08-20 NOTE — PROGRESS NOTE ADULT - ASSESSMENT
patient with abdominal pain     ·	Suspected Sigmoid Diverticulitis   ·	HTN   ·	Dementia / Debility   ·	Residual COVID +  ·	Residual Hyponatremia   ·	Severe Malnutrition     -continue with IV abx  -calorie count Day 3/3  -colorectal sx following- no urgent inpatient surgery (surgery follow up noted; unsure if family would agree to inpatient surgery)  -oob to chair as tolerated   -Dietitian's evaluation   -continue with home maintenance meds   -BMI needs to be charted     DISPO: home once clinically stable - not discharge ready   Discussion: staff updated family     Attending Physician Dr. Sosa Thomas # [id number]

## 2022-08-20 NOTE — PROGRESS NOTE ADULT - ASSESSMENT
· Assessment	  77F PMH of Dementia (A&Ox2, baseline), HTN, recent admission on 7/2022 for diverticulitis with rectosigmoid fluid collection and recent presentation to the ED as well on 8/14 due to 3 day history of abdominal pain found to have colitis. Now representing with vague abdominal pain per the family, however patient unsure of the abdominal pain, no N/V, no BM/Gas, no distention, rebound, guarding or tenderness appreciated on examination    Plan  - advance diet as tolerated  - calorie count on door  - if patient tolerates diet, would recommend surgical intervention as OP once active colitis resolves, and patient is nutritionally optimized  - continue with antibiotics  - KUB - nonobstructed pattern  - medical risk assessment for laparoscopic possible open sigmoid colon resection, possible ostomy  - Soft biet sized diet  - monitor bowel function  - Spectra [phone] for questions or concerns

## 2022-08-20 NOTE — PROGRESS NOTE ADULT - ASSESSMENT
76yo F hx of dementia (AAOx2 at baseline), HTN, recent admission on 7/2022 for diverticulitis + rectosigmoid bowel wall thickening with extension to small bowel (s/p 10 days ertapenem) presenting to the hospital for abdominal pain.     #Abdominal pain- resolved. Suspected 2/2 to colitis  - follow up CT A/P with oral contrast: Resolution of colonic wall thickening from the hepatic flexure to the descending colon.  - surgery rec appreciated:        - Pt will likely benefit from surgery as outpt in elective setting as pts nutritional status is not suitable for extensive procedure  - calorie count: DAY 3  - advance diet as tolerated  - C/w Abx  - if patient has diarrhea would recommend C. diff testing as she is high risk  - Gi rec appreciated  - pain meds PRN as tolerated    #Hyponatremia (resolved)  - suspected from decreased PO intake  - monitor    #Misc:  #DVT ppx: lovenox  #GI ppx: none  #Diet: clear liquids  #Code: full

## 2022-08-21 LAB
ALBUMIN SERPL ELPH-MCNC: 2.9 G/DL — LOW (ref 3.5–5.2)
ALP SERPL-CCNC: 74 U/L — SIGNIFICANT CHANGE UP (ref 30–115)
ALT FLD-CCNC: 8 U/L — SIGNIFICANT CHANGE UP (ref 0–41)
ANION GAP SERPL CALC-SCNC: 8 MMOL/L — SIGNIFICANT CHANGE UP (ref 7–14)
AST SERPL-CCNC: 12 U/L — SIGNIFICANT CHANGE UP (ref 0–41)
BASOPHILS # BLD AUTO: 0.05 K/UL — SIGNIFICANT CHANGE UP (ref 0–0.2)
BASOPHILS NFR BLD AUTO: 0.9 % — SIGNIFICANT CHANGE UP (ref 0–1)
BILIRUB SERPL-MCNC: <0.2 MG/DL — SIGNIFICANT CHANGE UP (ref 0.2–1.2)
BUN SERPL-MCNC: 9 MG/DL — LOW (ref 10–20)
CALCIUM SERPL-MCNC: 8.6 MG/DL — SIGNIFICANT CHANGE UP (ref 8.5–10.1)
CHLORIDE SERPL-SCNC: 110 MMOL/L — SIGNIFICANT CHANGE UP (ref 98–110)
CO2 SERPL-SCNC: 26 MMOL/L — SIGNIFICANT CHANGE UP (ref 17–32)
CREAT SERPL-MCNC: 0.5 MG/DL — LOW (ref 0.7–1.5)
EGFR: 96 ML/MIN/1.73M2 — SIGNIFICANT CHANGE UP
EOSINOPHIL # BLD AUTO: 0.34 K/UL — SIGNIFICANT CHANGE UP (ref 0–0.7)
EOSINOPHIL NFR BLD AUTO: 6.3 % — SIGNIFICANT CHANGE UP (ref 0–8)
GLUCOSE SERPL-MCNC: 96 MG/DL — SIGNIFICANT CHANGE UP (ref 70–99)
HCT VFR BLD CALC: 30.8 % — LOW (ref 37–47)
HGB BLD-MCNC: 10 G/DL — LOW (ref 12–16)
IMM GRANULOCYTES NFR BLD AUTO: 0.6 % — HIGH (ref 0.1–0.3)
LYMPHOCYTES # BLD AUTO: 1.36 K/UL — SIGNIFICANT CHANGE UP (ref 1.2–3.4)
LYMPHOCYTES # BLD AUTO: 25.2 % — SIGNIFICANT CHANGE UP (ref 20.5–51.1)
MAGNESIUM SERPL-MCNC: 2 MG/DL — SIGNIFICANT CHANGE UP (ref 1.8–2.4)
MCHC RBC-ENTMCNC: 27.4 PG — SIGNIFICANT CHANGE UP (ref 27–31)
MCHC RBC-ENTMCNC: 32.5 G/DL — SIGNIFICANT CHANGE UP (ref 32–37)
MCV RBC AUTO: 84.4 FL — SIGNIFICANT CHANGE UP (ref 81–99)
MONOCYTES # BLD AUTO: 0.7 K/UL — HIGH (ref 0.1–0.6)
MONOCYTES NFR BLD AUTO: 13 % — HIGH (ref 1.7–9.3)
NEUTROPHILS # BLD AUTO: 2.92 K/UL — SIGNIFICANT CHANGE UP (ref 1.4–6.5)
NEUTROPHILS NFR BLD AUTO: 54 % — SIGNIFICANT CHANGE UP (ref 42.2–75.2)
NRBC # BLD: 0 /100 WBCS — SIGNIFICANT CHANGE UP (ref 0–0)
PLATELET # BLD AUTO: 283 K/UL — SIGNIFICANT CHANGE UP (ref 130–400)
POTASSIUM SERPL-MCNC: 4.5 MMOL/L — SIGNIFICANT CHANGE UP (ref 3.5–5)
POTASSIUM SERPL-SCNC: 4.5 MMOL/L — SIGNIFICANT CHANGE UP (ref 3.5–5)
PROT SERPL-MCNC: 5.4 G/DL — LOW (ref 6–8)
RBC # BLD: 3.65 M/UL — LOW (ref 4.2–5.4)
RBC # FLD: 16.4 % — HIGH (ref 11.5–14.5)
SODIUM SERPL-SCNC: 144 MMOL/L — SIGNIFICANT CHANGE UP (ref 135–146)
WBC # BLD: 5.4 K/UL — SIGNIFICANT CHANGE UP (ref 4.8–10.8)
WBC # FLD AUTO: 5.4 K/UL — SIGNIFICANT CHANGE UP (ref 4.8–10.8)

## 2022-08-21 PROCEDURE — 99233 SBSQ HOSP IP/OBS HIGH 50: CPT

## 2022-08-21 PROCEDURE — 99232 SBSQ HOSP IP/OBS MODERATE 35: CPT

## 2022-08-21 PROCEDURE — 74021 RADEX ABDOMEN 3+ VIEWS: CPT | Mod: 26

## 2022-08-21 RX ADMIN — Medication 500 MILLIGRAM(S): at 05:59

## 2022-08-21 RX ADMIN — TAMSULOSIN HYDROCHLORIDE 0.4 MILLIGRAM(S): 0.4 CAPSULE ORAL at 22:39

## 2022-08-21 RX ADMIN — ENOXAPARIN SODIUM 40 MILLIGRAM(S): 100 INJECTION SUBCUTANEOUS at 22:38

## 2022-08-21 RX ADMIN — Medication 500 MILLIGRAM(S): at 22:39

## 2022-08-21 RX ADMIN — DONEPEZIL HYDROCHLORIDE 5 MILLIGRAM(S): 10 TABLET, FILM COATED ORAL at 22:39

## 2022-08-21 RX ADMIN — Medication 5 MILLIGRAM(S): at 13:13

## 2022-08-21 RX ADMIN — Medication 500 MILLIGRAM(S): at 13:13

## 2022-08-21 RX ADMIN — Medication 500 MILLIGRAM(S): at 17:26

## 2022-08-21 RX ADMIN — SENNA PLUS 2 TABLET(S): 8.6 TABLET ORAL at 22:39

## 2022-08-21 NOTE — PROGRESS NOTE ADULT - ASSESSMENT
Assessment and Plan:   · Assessment	  · Assessment	  77F PMH of Dementia (A&Ox2, baseline), HTN, recent admission on 7/2022 for diverticulitis with rectosigmoid fluid collection and recent presentation to the ED as well on 8/14 due to 3 day history of abdominal pain found to have colitis. Now representing with vague abdominal pain per the family, however patient unsure of the abdominal pain, no N/V, no BM/Gas, no distention, rebound, guarding or tenderness appreciated on examination    Plan  - advance diet as tolerated  - calorie count on door  - if patient tolerates diet, would recommend surgical intervention as OP once active colitis resolves, and patient is nutritionally optimized. Likely surgery this week.   -Obtain consent  - continue with antibiotics  - medical risk assessment for laparoscopic possible open sigmoid colon resection, possible ostomy  - Soft biet sized diet  - monitor bowel function  - Spectra [phone] for questions or concerns

## 2022-08-21 NOTE — CHART NOTE - NSCHARTNOTEFT_GEN_A_CORE
Registered Dietitian Follow-Up     Patient Profile Reviewed                           Yes [x]   No []     Nutrition History Previously Obtained        Yes [x]  No []       Pertinent Subjective Information:  3 Day Calorie Count was initiated from  to , the following are the results:    Day 1 - :  Breakfast: not documented    Lunch:  100% Juice   50% Soup  Total for Lunch: 75 kcal, 1.5 gm Protein     Dinner not Documented    Day 2 - :   Breakfast:  100% Lemon Tea  100% Jello  Total for Breakfast: 70 kcal    Lunch:  100% Juice   50% Rice   75% Meat   25% Vegetable   75% Soup  Total for Lunch: 614.5 kcal, 48.5 gm Protein     Dinner:  100% Juice   50% Meat   100% Soup   75% Dessert   Total Dinner: 674 kcal, 45 gm Protein     Total for Day 2: 1358.5 kcal, 90 gm Protein    Day 3  :  Breakfast:  100% Juice   75% Eggs   75% Bread   100% supplement   Total for Breakfast: 387 kcal, 25 gm Protein    Lunch and Dinner not Documented    Average PO intake over 3 day with meals documented: 606.83 kcal, 38 gm Protein     Per results of calorie count, Patient meeting ~50% of estimated energy and protein needs. However, Calorie Count was also started when patient was on clear liquid diet, and some days/meals not documented.    Spoke with RN and PCA about patient's PO intake. Usually patient does better with liquids than foods. She has been drinking most of the Ensure Supplements.      Pertinent Medical Interventions:  Patient is 76 yo female who presented with abdominal pain  -surgical team following - outpatient surgery later in the week     Diet order:   Diet, Soft and Bite Sized:   Free Water Flush Instructions:  PLEASE PROVIDE ENSURE PLUS HP 3X/DAILY + MAGIC CUP 2X/DAILY (22 @ 15:50) [Active]    Anthropometrics:  Height (cm): 170.2 (22 @ 15:47)  Weight (kg): 105 (22 @ 09:45)  BMI (kg/m2): 36.2 (22 @ 09:45)  IBW: 61.3 kg     Daily Weight in k.8 ()    MEDICATIONS  (STANDING):  ciprofloxacin     Tablet 500 milliGRAM(s) Oral two times a day  donepezil 5 milliGRAM(s) Oral at bedtime  enoxaparin Injectable 40 milliGRAM(s) SubCutaneous every 24 hours  metroNIDAZOLE    Tablet 500 milliGRAM(s) Oral every 8 hours  senna 2 Tablet(s) Oral at bedtime  tamsulosin 0.4 milliGRAM(s) Oral at bedtime    MEDICATIONS  (PRN):  bisacodyl 5 milliGRAM(s) Oral every 12 hours PRN Constipation  ondansetron Injectable 4 milliGRAM(s) IV Push every 6 hours PRN Nausea and/or Vomiting    Pertinent Labs:  @ 06:01: Na 144, BUN 9<L>, Cr 0.5<L>, BG 96, K+ 4.5, Phos --, Mg 2.0, Alk Phos 74, ALT/SGPT 8, AST/SGOT 12, HbA1c --   @ 18:22: Na 137, BUN 11, Cr 0.6<L>, <H>, K+ 4.1, Phos --, Mg 1.9, Alk Phos 84, ALT/SGPT 9, AST/SGOT 13, HbA1c --    Physical Findings:  - Appearance: disoriented x 3  - GI function:  - 2x/ BM  - Tubes: n/a  - Oral/Mouth cavity: soft and bite sized texture, thin liquids  - Skin: intact; no edema per flowsheet     Nutrition Requirements:  Weight Used: ABW 48.8 kg and IBW - estimated needs with consideration for age,  BMI <19     Estimated Energy Needs    Continue [x]  Adjust []  1952-8936 kcal/day (MSJ 1.2-1.5 SF)      Estimated Protein Needs    Continue []  Adjust [x]  73 - 79 gm/day (1.2 - 1.3 gm/kg IBW)     Estimated Fluid Needs        Continue [x]  Adjust []  1 mL/kg or per LIP      Nutrient Intake: 25 - 75% PO intake of meals     [x] Previous Nutrition Diagnosis:  Increased Nutrient Needs            [x] Ongoing          [] Resolved     Nutrition Intervention:  medical food supplements, coordination of care      Goal/Expected Outcome:   Patient to have PO intake >/=75% of meals within 4-6 days     Indicator/Monitoring:   RD to monitor energy intake, weight, labs, skin status, NFPF     Recommendation:  1) Continue current diet order  2) Continue Ensure Plus HP 3X/DAILY (provides 480 kcal/day total, 48g pro/day total) and MAGIC CUP 2X/DAILY  -- (provides 580 kcal/day total, 18g pro/day total) ---to optimize kcal/pro intake  3) Check Weights     Patient is at moderate nutrition risk, RD to f/u in 4-6 days or PRN Registered Dietitian Follow-Up     Patient Profile Reviewed                           Yes [x]   No []     Nutrition History Previously Obtained        Yes [x]  No []       Pertinent Subjective Information:  3 Day Calorie Count was initiated from  to , the following are the results:    Day 1 - :  Breakfast: not documented    Lunch:  100% Juice   50% Soup  Total for Lunch: 75 kcal, 1.5 gm Protein     Dinner not Documented    Day 2 - :   Breakfast:  100% Lemon Tea  100% Jello  Total for Breakfast: 70 kcal    Lunch:  100% Juice   50% Rice   75% Meat   25% Vegetable   75% Soup  Total for Lunch: 614.5 kcal, 48.5 gm Protein     Dinner:  100% Juice   50% Meat   100% Soup   75% Dessert   Total Dinner: 674 kcal, 45 gm Protein     Total for Day 2: 1358.5 kcal, 90 gm Protein    Day 3  :  Breakfast:  100% Juice   75% Eggs   75% Bread   100% supplement   Total for Breakfast: 387 kcal, 25 gm Protein    Lunch and Dinner not Documented    Average PO intake over 3 day with meals documented: 606.83 kcal, 38 gm Protein     Per results of calorie count, Patient meeting ~50% of estimated energy and protein needs. However, Calorie Count was also started when patient was on clear liquid diet, and some days/meals not documented.    Spoke with RN and PCA about patient's PO intake. Usually patient does better with liquids than foods. She has been drinking most of the Ensure Supplements.      Pertinent Medical Interventions:  Patient is 76 yo female who presented with abdominal pain  -surgical team following - outpatient surgery later in the week     Diet order:   Diet, Soft and Bite Sized:   Free Water Flush Instructions:  PLEASE PROVIDE ENSURE PLUS HP 3X/DAILY + MAGIC CUP 2X/DAILY (22 @ 15:50) [Active]    Anthropometrics:  Height (cm): 170.2   Weight (kg): 48.8 kg (107.6 kg)  BMI (kg/m2): 16.8  IBW: 61.3 kg     Daily Weight in k.8 ()    MEDICATIONS  (STANDING):  ciprofloxacin     Tablet 500 milliGRAM(s) Oral two times a day  donepezil 5 milliGRAM(s) Oral at bedtime  enoxaparin Injectable 40 milliGRAM(s) SubCutaneous every 24 hours  metroNIDAZOLE    Tablet 500 milliGRAM(s) Oral every 8 hours  senna 2 Tablet(s) Oral at bedtime  tamsulosin 0.4 milliGRAM(s) Oral at bedtime    MEDICATIONS  (PRN):  bisacodyl 5 milliGRAM(s) Oral every 12 hours PRN Constipation  ondansetron Injectable 4 milliGRAM(s) IV Push every 6 hours PRN Nausea and/or Vomiting    Pertinent Labs:  @ 06:01: Na 144, BUN 9<L>, Cr 0.5<L>, BG 96, K+ 4.5, Phos --, Mg 2.0, Alk Phos 74, ALT/SGPT 8, AST/SGOT 12, HbA1c --   @ 18:22: Na 137, BUN 11, Cr 0.6<L>, <H>, K+ 4.1, Phos --, Mg 1.9, Alk Phos 84, ALT/SGPT 9, AST/SGOT 13, HbA1c --    Physical Findings:  - Appearance: disoriented x 3  - GI function:  - 2x/ BM  - Tubes: n/a  - Oral/Mouth cavity: soft and bite sized texture, thin liquids  - Skin: intact; no edema per flowsheet     Nutrition Requirements:  Weight Used: ABW 48.8 kg and IBW - estimated needs with consideration for age,  BMI <19     Estimated Energy Needs    Continue [x]  Adjust []  9551-3086 kcal/day (MSJ 1.2-1.5 SF)      Estimated Protein Needs    Continue []  Adjust [x]  73 - 79 gm/day (1.2 - 1.3 gm/kg IBW)     Estimated Fluid Needs        Continue [x]  Adjust []  1 mL/kg or per LIP      Nutrient Intake: 25 - 75% PO intake of meals     [x] Previous Nutrition Diagnosis:  Increased Nutrient Needs            [x] Ongoing          [] Resolved     Nutrition Intervention:  medical food supplements, coordination of care      Goal/Expected Outcome:   Patient to have PO intake >/=75% of meals within 4-6 days     Indicator/Monitoring:   RD to monitor energy intake, weight, labs, skin status, NFPF     Recommendation:  1) Continue current diet order  2) Continue Ensure Plus HP 3X/DAILY (provides 480 kcal/day total, 48g pro/day total) and MAGIC CUP 2X/DAILY  -- (provides 580 kcal/day total, 18g pro/day total) ---to optimize kcal/pro intake  3) Check Weights     Patient is at moderate nutrition risk, RD to f/u in 4-6 days or PRN

## 2022-08-21 NOTE — PROGRESS NOTE ADULT - ASSESSMENT
patient with abdominal pain     ·	Suspected Sigmoid Diverticulitis   ·	HTN   ·	Dementia / Debility   ·	Residual COVID +  ·	Residual Hyponatremia   ·	Severe Malnutrition     on abx, tolerating soft bite sized diet   -calorie count completed - results to be charted by dietary team   -colorectal sx following- outpatient surgery later in the week vs. inpatient (if family insists)  -oob to chair as tolerated   -continue with home maintenance meds   -BMI needs to be re- charted     DISPO: home once clinically stable - not discharge ready today   Discussion: staff updated family     Attending Physician Dr. Sosa Thomas # [id number]

## 2022-08-22 ENCOUNTER — TRANSCRIPTION ENCOUNTER (OUTPATIENT)
Age: 77
End: 2022-08-22

## 2022-08-22 VITALS
HEART RATE: 84 BPM | DIASTOLIC BLOOD PRESSURE: 59 MMHG | RESPIRATION RATE: 19 BRPM | TEMPERATURE: 97 F | SYSTOLIC BLOOD PRESSURE: 103 MMHG

## 2022-08-22 LAB
ALBUMIN SERPL ELPH-MCNC: 3 G/DL — LOW (ref 3.5–5.2)
ALP SERPL-CCNC: 78 U/L — SIGNIFICANT CHANGE UP (ref 30–115)
ALT FLD-CCNC: 10 U/L — SIGNIFICANT CHANGE UP (ref 0–41)
ANION GAP SERPL CALC-SCNC: 9 MMOL/L — SIGNIFICANT CHANGE UP (ref 7–14)
AST SERPL-CCNC: 14 U/L — SIGNIFICANT CHANGE UP (ref 0–41)
BASOPHILS # BLD AUTO: 0.05 K/UL — SIGNIFICANT CHANGE UP (ref 0–0.2)
BASOPHILS NFR BLD AUTO: 1 % — SIGNIFICANT CHANGE UP (ref 0–1)
BILIRUB SERPL-MCNC: <0.2 MG/DL — SIGNIFICANT CHANGE UP (ref 0.2–1.2)
BUN SERPL-MCNC: 12 MG/DL — SIGNIFICANT CHANGE UP (ref 10–20)
CALCIUM SERPL-MCNC: 8.9 MG/DL — SIGNIFICANT CHANGE UP (ref 8.5–10.1)
CHLORIDE SERPL-SCNC: 104 MMOL/L — SIGNIFICANT CHANGE UP (ref 98–110)
CO2 SERPL-SCNC: 26 MMOL/L — SIGNIFICANT CHANGE UP (ref 17–32)
CREAT SERPL-MCNC: 0.6 MG/DL — LOW (ref 0.7–1.5)
EGFR: 92 ML/MIN/1.73M2 — SIGNIFICANT CHANGE UP
EOSINOPHIL # BLD AUTO: 0.3 K/UL — SIGNIFICANT CHANGE UP (ref 0–0.7)
EOSINOPHIL NFR BLD AUTO: 6.1 % — SIGNIFICANT CHANGE UP (ref 0–8)
GLUCOSE SERPL-MCNC: 164 MG/DL — HIGH (ref 70–99)
HCT VFR BLD CALC: 35.1 % — LOW (ref 37–47)
HGB BLD-MCNC: 11 G/DL — LOW (ref 12–16)
IMM GRANULOCYTES NFR BLD AUTO: 0.6 % — HIGH (ref 0.1–0.3)
LYMPHOCYTES # BLD AUTO: 1.12 K/UL — LOW (ref 1.2–3.4)
LYMPHOCYTES # BLD AUTO: 22.9 % — SIGNIFICANT CHANGE UP (ref 20.5–51.1)
MAGNESIUM SERPL-MCNC: 2 MG/DL — SIGNIFICANT CHANGE UP (ref 1.8–2.4)
MCHC RBC-ENTMCNC: 27 PG — SIGNIFICANT CHANGE UP (ref 27–31)
MCHC RBC-ENTMCNC: 31.3 G/DL — LOW (ref 32–37)
MCV RBC AUTO: 86 FL — SIGNIFICANT CHANGE UP (ref 81–99)
MONOCYTES # BLD AUTO: 0.49 K/UL — SIGNIFICANT CHANGE UP (ref 0.1–0.6)
MONOCYTES NFR BLD AUTO: 10 % — HIGH (ref 1.7–9.3)
NEUTROPHILS # BLD AUTO: 2.9 K/UL — SIGNIFICANT CHANGE UP (ref 1.4–6.5)
NEUTROPHILS NFR BLD AUTO: 59.4 % — SIGNIFICANT CHANGE UP (ref 42.2–75.2)
NRBC # BLD: 0 /100 WBCS — SIGNIFICANT CHANGE UP (ref 0–0)
PLATELET # BLD AUTO: 292 K/UL — SIGNIFICANT CHANGE UP (ref 130–400)
POTASSIUM SERPL-MCNC: 4 MMOL/L — SIGNIFICANT CHANGE UP (ref 3.5–5)
POTASSIUM SERPL-SCNC: 4 MMOL/L — SIGNIFICANT CHANGE UP (ref 3.5–5)
PROT SERPL-MCNC: 5.7 G/DL — LOW (ref 6–8)
RBC # BLD: 4.08 M/UL — LOW (ref 4.2–5.4)
RBC # FLD: 16.8 % — HIGH (ref 11.5–14.5)
SODIUM SERPL-SCNC: 139 MMOL/L — SIGNIFICANT CHANGE UP (ref 135–146)
WBC # BLD: 4.89 K/UL — SIGNIFICANT CHANGE UP (ref 4.8–10.8)
WBC # FLD AUTO: 4.89 K/UL — SIGNIFICANT CHANGE UP (ref 4.8–10.8)

## 2022-08-22 PROCEDURE — 74018 RADEX ABDOMEN 1 VIEW: CPT | Mod: 26

## 2022-08-22 PROCEDURE — 99232 SBSQ HOSP IP/OBS MODERATE 35: CPT

## 2022-08-22 PROCEDURE — 99239 HOSP IP/OBS DSCHRG MGMT >30: CPT

## 2022-08-22 RX ORDER — DOCUSATE SODIUM 100 MG
1 CAPSULE ORAL
Qty: 28 | Refills: 0
Start: 2022-08-22 | End: 2022-09-04

## 2022-08-22 RX ORDER — METRONIDAZOLE 500 MG
1 TABLET ORAL
Qty: 12 | Refills: 0
Start: 2022-08-22 | End: 2022-08-25

## 2022-08-22 RX ORDER — POLYETHYLENE GLYCOL 3350 17 G/17G
17 POWDER, FOR SOLUTION ORAL
Qty: 476 | Refills: 0
Start: 2022-08-22 | End: 2022-09-04

## 2022-08-22 RX ORDER — CIPROFLOXACIN LACTATE 400MG/40ML
1 VIAL (ML) INTRAVENOUS
Qty: 8 | Refills: 0
Start: 2022-08-22 | End: 2022-08-25

## 2022-08-22 RX ORDER — SENNA PLUS 8.6 MG/1
2 TABLET ORAL
Qty: 120 | Refills: 0
Start: 2022-08-22 | End: 2022-09-20

## 2022-08-22 RX ADMIN — Medication 500 MILLIGRAM(S): at 13:04

## 2022-08-22 RX ADMIN — Medication 500 MILLIGRAM(S): at 05:49

## 2022-08-22 RX ADMIN — Medication 500 MILLIGRAM(S): at 17:14

## 2022-08-22 NOTE — DISCHARGE NOTE PROVIDER - HOSPITAL COURSE
78yo F hx of dementia, HTN, recent admission on 7/2022 for diverticulitis + rectosigmoid bowel wall thickening with extension to small bowel (s/p 10 days ertapenem) presenting to the hospital for abdominal pain. Abdominal pain has been chronic, on/off, intermittent, dull, 4/10, periumbilical without radiation.     Recently, on 8/8/2022, patient had colonoscopy for CRC screening (first colonoscopy) without any complications. She was found to have multiple non-bleeding diverticula with mixed openings + inflammation in the sigmoid colon. There was luminal narrowing, so colonoscope could not be advanced beyond the sigmoid colon. Patient was referred to rectal surgery team. This AM, while she was evaluated, patient was referred back to the ED because of decreased PO intake, continued abdominal pain + distension. Patient denies vomiting, and is passing gas. Recently, she went to the ED on 8/14/22 where CT abdomen was performed-> showing circumferential wall thickening of transverse + descending colon. Was prescribed 1 week of augmentin + colorectal surgery referral.     At the ED, VS shows : T- 36.7C, BP: 117/71, HR: 110bpm, RR: 18, 98% on RA. Labs show mild leukocytosis (10.8K), hyponatremia (129).  Chest X-ray performed- no perforation, lung opacities. At the ED, patient was given 1L LR bolus. Of note, patient's abdominal pain has spontaneously resolved. Patient still feels she is passing gas. States she has an appetite to eat.        76yo F hx of dementia, HTN, recent admission on 7/2022 for diverticulitis + rectosigmoid bowel wall thickening with extension to small bowel (s/p 10 days ertapenem) presenting to the hospital for abdominal pain. Abdominal pain has been chronic, on/off, intermittent, dull, 4/10, periumbilical without radiation.     Recently, on 8/8/2022, patient had colonoscopy for CRC screening (first colonoscopy) without any complications. She was found to have multiple non-bleeding diverticula with mixed openings + inflammation in the sigmoid colon. There was luminal narrowing, so colonoscope could not be advanced beyond the sigmoid colon. Patient was referred to rectal surgery team. This AM, while she was evaluated, patient was referred back to the ED because of decreased PO intake, continued abdominal pain + distension. Patient denies vomiting, and is passing gas. Recently, she went to the ED on 8/14/22 where CT abdomen was performed-> showing circumferential wall thickening of transverse + descending colon. Was prescribed 1 week of augmentin + colorectal surgery referral.     At the ED, VS shows : T- 36.7C, BP: 117/71, HR: 110bpm, RR: 18, 98% on RA. Labs show mild leukocytosis (10.8K), hyponatremia (129).  Chest X-ray performed- no perforation, lung opacities. At the ED, patient was given 1L LR bolus. Of note, patient's abdominal pain has spontaneously resolved. Patient still feels she is passing gas. States she has an appetite to eat.     On the floors, patient has been able to tolerate diet without nausea and vomiting. Patient scheduled for an appointment with Dr. Jean-Baptiste on 08/31.

## 2022-08-22 NOTE — PROGRESS NOTE ADULT - SUBJECTIVE AND OBJECTIVE BOX
GENERAL SURGERY PROGRESS NOTE    Patient: KEISHA PANTOJA , 77y ([birth year])Female   MRN: [id number]  Location: [address]  Visit: 22 Inpatient  Date: 22 @ 16:47    Hospital Day #: 7  Post-Op Day #:    Procedure/Dx/Injuries: PERSISTENT DIVERTICULITIS    Events of past 24 hours:  NAEON  Patient had one large BM overnight, no flatus  No nausea, no vomiting  Patient is tolerating diet    PAST MEDICAL & SURGICAL HISTORY:  Hypertension, unspecified type      Dementia      GERD (gastroesophageal reflux disease)      Diverticulitis      Lack of bladder control      H/O dilation and curettage  for missed           Vitals:   T(F): 97.1 (22 @ 12:35), Max: 97.9 (22 @ 20:56)  HR: 84 (22 @ 12:35)  BP: 103/59 (22 @ 12:35)  RR: 19 (22 @ 12:35)  SpO2: 95% (22 @ 19:49)          Fluids:     I & O's:     PHYSICAL EXAM:   General: NAD, AAOx3, calm and cooperative  Cardiac: RRR S1, S2  Respiratory: CTAB, normal respiratory effort  Abdomen: Soft, non-distended, non-tender, no rebound, no guarding. +BS.  Vascular: Pulses 2+ throughout, extremities well perfused  Skin: Warm/dry, normal color, no jaundice  Incision/wound: healing well, dressings in place, clean, dry and intact    MEDICATIONS  (STANDING):  ciprofloxacin     Tablet 500 milliGRAM(s) Oral two times a day  donepezil 5 milliGRAM(s) Oral at bedtime  enoxaparin Injectable 40 milliGRAM(s) SubCutaneous every 24 hours  metroNIDAZOLE    Tablet 500 milliGRAM(s) Oral every 8 hours  senna 2 Tablet(s) Oral at bedtime  tamsulosin 0.4 milliGRAM(s) Oral at bedtime    MEDICATIONS  (PRN):  bisacodyl 5 milliGRAM(s) Oral every 12 hours PRN Constipation  ondansetron Injectable 4 milliGRAM(s) IV Push every 6 hours PRN Nausea and/or Vomiting      DVT PROPHYLAXIS: enoxaparin Injectable 40 milliGRAM(s) SubCutaneous every 24 hours    GI PROPHYLAXIS:   ANTICOAGULATION:   ANTIBIOTICS:  ciprofloxacin     Tablet 500 milliGRAM(s)  metroNIDAZOLE    Tablet 500 milliGRAM(s)            LAB/STUDIES:  Labs:  CAPILLARY BLOOD GLUCOSE                              11.0   4.89  )-----------( 292      ( 22 Aug 2022 09:13 )             35.1       Auto Neutrophil %: 59.4 % (22 @ 09:13)  Auto Immature Granulocyte %: 0.6 % (22 @ 09:13)        139  |  104  |  12  ----------------------------<  164<H>  4.0   |  26  |  0.6<L>      Calcium, Total Serum: 8.9 mg/dL (22 @ 09:13)      LFTs:             5.7  | <0.2 | 14       ------------------[78      ( 22 Aug 2022 09:13 )  3.0  | x    | 10              
Medicine Progress Note    Patient is a 77y old  Female who presents with a chief complaint of abdominal pain (20 Aug 2022 11:39)      SUBJECTIVE / OVERNIGHT EVENTS: Pt seen at bedside.  Pt doing well, no complaints. Pt wishes to go home and is motivated to eat. She claims to not be given lunch or dinner today.     ADDITIONAL REVIEW OF SYSTEMS:    MEDICATIONS  (STANDING):  ciprofloxacin     Tablet 500 milliGRAM(s) Oral two times a day  donepezil 5 milliGRAM(s) Oral at bedtime  enoxaparin Injectable 40 milliGRAM(s) SubCutaneous every 24 hours  metroNIDAZOLE    Tablet 500 milliGRAM(s) Oral every 8 hours  senna 2 Tablet(s) Oral at bedtime  tamsulosin 0.4 milliGRAM(s) Oral at bedtime    MEDICATIONS  (PRN):  ondansetron Injectable 4 milliGRAM(s) IV Push every 6 hours PRN Nausea and/or Vomiting    CAPILLARY BLOOD GLUCOSE        I&O's Summary      PHYSICAL EXAM:  Vital Signs Last 24 Hrs  T(C): 36.8 (20 Aug 2022 20:40), Max: 37.7 (20 Aug 2022 14:52)  T(F): 98.3 (20 Aug 2022 20:40), Max: 99.9 (20 Aug 2022 14:52)  HR: 109 (20 Aug 2022 20:40) (68 - 109)  BP: 110/58 (20 Aug 2022 20:40) (110/58 - 118/57)  BP(mean): 77 (20 Aug 2022 20:40) (77 - 77)  RR: 18 (20 Aug 2022 20:40) (16 - 18)  SpO2: --      GENERAL: NAD, lying in bed comfortably  HEAD:  Atraumatic, Normocephalic  EYES: EOMI, conjunctiva and sclera clear  ENT: Moist mucous membranes  NECK: Supple, No JVD  CHEST/LUNG: Clear to auscultation bilaterally; No rales, rhonchi, wheezing, or rubs. Unlabored respirations  HEART: regular rate and rhythm; No murmurs, rubs, or gallops  ABDOMEN: Bowel sounds present; Soft, Nontender, Nondistended.    EXTREMITIES: Warm. No clubbing, cyanosis, or edema  NERVOUS SYSTEM:  Alert & Oriented X3. No focal deficits   SKIN: No rashes or lesions      LABS:                        10.4   8.89  )-----------( 306      ( 20 Aug 2022 18:22 )             31.8     08-20    137  |  101  |  11  ----------------------------<  133<H>  4.1   |  25  |  0.6<L>    Ca    8.8      20 Aug 2022 18:22  Phos  3.0     08-19  Mg     1.9     08-20    TPro  5.7<L>  /  Alb  2.9<L>  /  TBili  <0.2  /  DBili  x   /  AST  13  /  ALT  9   /  AlkPhos  84  08-20              COVID-19 PCR: Detected (16 Aug 2022 16:44)  COVID-19 PCR: Detected (17 Jul 2022 15:17)  COVID-19 PCR: Detected (14 Jul 2022 15:40)  COVID-19 PCR: Detected (07 Jul 2022 11:03)  COVID-19 PCR: Detected (26 Jun 2022 12:30)  COVID-19 PCR: NotDetec (20 Jun 2022 10:48)      RADIOLOGY & ADDITIONAL TESTS:  Imaging from Last 24 Hours:    Electrocardiogram/QTc Interval:    COORDINATION OF CARE:  Care Discussed with Consultants/Other Providers:  
Progress Note: Surgery  Patient: KEISHA PANTOJA , 77y (1945)Female   MRN: [id number]  Location: [address]  Visit: 08-16-22 Inpatient  Date: 08-18-22 @ 18:29    Events over 24h: No acute event overnight. No new complaint. Pt is hemodynamically stable.     Vitals: T(F): 97.6 (08-18-22 @ 14:05), Max: 98.1 (08-18-22 @ 05:26)  HR: 81 (08-18-22 @ 14:05)  BP: 107/59 (08-18-22 @ 14:05) (107/59 - 186/87)  RR: 18 (08-18-22 @ 14:05)  SpO2: 96% (08-18-22 @ 08:00)    Diet: Diet, Clear Liquid (08-17-22 @ 20:47)    IV Fluid: lactated ringers. 1000 milliLiter(s) (75 mL/Hr) IV Continuous <Continuous>    Physical Examination:  General Appearance: NAD, alert and cooperative  Heart: S1 and S2. No murmurs. Rhythm is regular.  Lungs: Clear to auscultation BL without rales, rhonchi, wheezing, crackles or diminished breath sounds.  Abdomen: Soft, nondistended, nontender. No rigidity, guarding, or rebound tenderness.     Medications: [Standing]  ciprofloxacin   IVPB      ciprofloxacin   IVPB 400 milliGRAM(s) IV Intermittent every 12 hours  donepezil 5 milliGRAM(s) Oral at bedtime  enoxaparin Injectable 40 milliGRAM(s) SubCutaneous every 24 hours  lactated ringers. 1000 milliLiter(s) (75 mL/Hr) IV Continuous <Continuous>  metroNIDAZOLE  IVPB      metroNIDAZOLE  IVPB 500 milliGRAM(s) IV Intermittent every 8 hours  senna 2 Tablet(s) Oral at bedtime  tamsulosin 0.4 milliGRAM(s) Oral at bedtime    Medications:[PRN]  ondansetron Injectable 4 milliGRAM(s) IV Push every 6 hours PRN    Labs:                        11.9   6.73  )-----------( 330      ( 18 Aug 2022 08:00 )             36.6   08-18    135  |  100  |  8<L>  ----------------------------<  104<H>  4.0   |  27  |  0.6<L>    Ca    9.2      18 Aug 2022 08:00  Phos  3.0     08-18  Mg     1.9     08-18    TPro  6.1  /  Alb  3.2<L>  /  TBili  0.2  /  DBili  x   /  AST  10  /  ALT  8   /  AlkPhos  89  08-18  LIVER FUNCTIONS - ( 18 Aug 2022 08:00 )  Alb: 3.2 g/dL / Pro: 6.1 g/dL / ALK PHOS: 89 U/L / ALT: 8 U/L / AST: 10 U/L / GGT: x         PT/INR - ( 18 Aug 2022 08:00 )   PT: 13.50 sec;   INR: 1.18 ratio         PTT - ( 18 Aug 2022 08:00 )  PTT:25.8 sec    Micro/Urine:    Imaging:  None/24h      
  BOSTONCHAYO VELARDEBETH  77y  Female      Patient is a 77y old  Female who presents with a chief complaint of abdominal pain (17 Aug 2022 00:19)      INTERVAL HPI/OVERNIGHT EVENTS:  pt seen and examined at bedside this morning   -laying comfortably in bed   -no urgent need of inpatient surgery - outpatient follow up   -continue with abx - advance diet as tolerated   -daily assessments for d/c planning   -medical resident updated family     REVIEW OF SYSTEMS:  -no new complaints     Vital Signs Last 24 Hrs  T(C): 36.4 (18 Aug 2022 14:05), Max: 36.7 (18 Aug 2022 05:26)  T(F): 97.6 (18 Aug 2022 14:05), Max: 98.1 (18 Aug 2022 05:26)  HR: 81 (18 Aug 2022 14:05) (81 - 87)  BP: 107/59 (18 Aug 2022 14:05) (107/59 - 186/87)  BP(mean): 125 (17 Aug 2022 20:54) (125 - 125)  RR: 18 (18 Aug 2022 14:05) (18 - 18)  SpO2: 96% (18 Aug 2022 08:00) (96% - 96%)    Parameters below as of 18 Aug 2022 08:00  Patient On (Oxygen Delivery Method): room air    PHYSICAL EXAM:  GENERAL: Not in distress, speaking appropriately   HEAD:  Atraumatic, Normocephalic  EYES: EOMI, PERRLA, conjunctiva and sclera clear  NERVOUS SYSTEM:  Alert & Oriented X 2  CHEST/LUNG: Clear b/l  CV/HEART: Regular rate and rhythm; No murmurs, rubs, or gallops  GI/ABDOMEN: Soft abdomen, non tender   EXTREMITIES:  2+ Peripheral Pulses, No clubbing, cyanosis, or edema  SKIN: No rashes or lesions    LAB:                        12.0   6.76  )-----------( 324      ( 17 Aug 2022 04:30 )             35.9     08-17    137  |  102  |  13  ----------------------------<  88  4.5   |  25  |  0.6<L>    Ca    9.5      17 Aug 2022 04:30  Mg     2.1     08-17    TPro  6.4  /  Alb  3.2<L>  /  TBili  0.3  /  DBili  x   /  AST  10  /  ALT  8   /  AlkPhos  90  08-17    Daily Height in cm: 170.18 (16 Aug 2022 12:59)    Daily   CAPILLARY BLOOD GLUCOSE      LIVER FUNCTIONS - ( 17 Aug 2022 04:30 )  Alb: 3.2 g/dL / Pro: 6.4 g/dL / ALK PHOS: 90 U/L / ALT: 8 U/L / AST: 10 U/L / GGT: x           RADIOLOGY:    Imaging Personally visualized and Reviewed:  [y ] YES  [ ] NO    HEALTH ISSUES - PROBLEM Dx:    MEDS:  donepezil 5 milliGRAM(s) Oral at bedtime  enoxaparin Injectable 40 milliGRAM(s) SubCutaneous every 24 hours  senna 2 Tablet(s) Oral at bedtime  tamsulosin 0.4 milliGRAM(s) Oral at bedtime  
  BOSTONCHAYO VELARDEBETH  77y  Female      Patient is a 77y old  Female who presents with a chief complaint of abdominal pain (17 Aug 2022 00:19)      INTERVAL HPI/OVERNIGHT EVENTS:  pt seen and examined at bedside this morning   -laying comfortably in bed   -no urgent need of inpatient surgery - outpatient follow up   -continue with abx - calorie count # 3/3 today   -daily assessments for d/c planning   -spoke to beside RN     REVIEW OF SYSTEMS:  -no new complaints     Vital Signs Last 24 Hrs  T(C): 36.3 (20 Aug 2022 05:33), Max: 36.6 (19 Aug 2022 19:50)  T(F): 97.4 (20 Aug 2022 05:33), Max: 97.9 (19 Aug 2022 19:50)  HR: 68 (20 Aug 2022 05:33) (68 - 81)  BP: 118/57 (20 Aug 2022 05:33) (102/53 - 126/64)  BP(mean): --  RR: 16 (20 Aug 2022 05:33) (16 - 18)      PHYSICAL EXAM:  GENERAL: Not in distress, speaking appropriately   HEAD:  Atraumatic, Normocephalic  EYES: EOMI, PERRLA, conjunctiva and sclera clear  NERVOUS SYSTEM:  Alert & Oriented X 2  CHEST/LUNG: Clear b/l  CV/HEART: Regular rate and rhythm; No murmurs, rubs, or gallops  GI/ABDOMEN: Soft abdomen, non tender   EXTREMITIES:  2+ Peripheral Pulses, No clubbing, cyanosis, or edema  SKIN: No rashes or lesions    LAB:                                 10.6   5.56  )-----------( 264      ( 19 Aug 2022 08:07 )             31.8   08-19    138  |  103  |  8<L>  ----------------------------<  92  4.2   |  26  |  0.5<L>    Ca    8.8      19 Aug 2022 08:07  Phos  3.0     08-18  Mg     2.0     08-19    TPro  5.6<L>  /  Alb  2.9<L>  /  TBili  0.3  /  DBili  x   /  AST  9   /  ALT  7   /  AlkPhos  79  08-19      Daily Height in cm: 170.18 (16 Aug 2022 12:59)    Daily   CAPILLARY BLOOD GLUCOSE      LIVER FUNCTIONS - ( 17 Aug 2022 04:30 )  Alb: 3.2 g/dL / Pro: 6.4 g/dL / ALK PHOS: 90 U/L / ALT: 8 U/L / AST: 10 U/L / GGT: x           RADIOLOGY:    Imaging Personally visualized and Reviewed:  [y ] YES  [ ] NO    HEALTH ISSUES - PROBLEM Dx:    MEDICATIONS  (STANDING):  ciprofloxacin   IVPB      ciprofloxacin   IVPB 400 milliGRAM(s) IV Intermittent every 12 hours  donepezil 5 milliGRAM(s) Oral at bedtime  enoxaparin Injectable 40 milliGRAM(s) SubCutaneous every 24 hours  lactated ringers. 1000 milliLiter(s) (75 mL/Hr) IV Continuous <Continuous>  metroNIDAZOLE  IVPB      metroNIDAZOLE  IVPB 500 milliGRAM(s) IV Intermittent every 8 hours  senna 2 Tablet(s) Oral at bedtime  tamsulosin 0.4 milliGRAM(s) Oral at bedtime    MEDICATIONS  (PRN):  ondansetron Injectable 4 milliGRAM(s) IV Push every 6 hours PRN Nausea and/or Vomiting  
KEISHA PANTOJA 77y Female  MRN#: [id number]     Hospital Day: 2d    Pt is currently admitted with the primary diagnosis of  ABDOMINAL PAIN; DEHYDRATION        SUBJECTIVE     Overnight events  None    Subjective complaints  Pt was evaluated this am. Patient denied any active complaints                                            ----------------------------------------------------------  OBJECTIVE  PAST MEDICAL & SURGICAL HISTORY  Hypertension, unspecified type    Dementia    GERD (gastroesophageal reflux disease)    Diverticulitis    Lack of bladder control    H/O dilation and curettage  for missed                                               -----------------------------------------------------------  ALLERGIES:  No Known Allergies                                            ------------------------------------------------------------    HOME MEDICATIONS  Home Medications:                           MEDICATIONS:  STANDING MEDICATIONS  ciprofloxacin   IVPB      ciprofloxacin   IVPB 400 milliGRAM(s) IV Intermittent every 12 hours  donepezil 5 milliGRAM(s) Oral at bedtime  enoxaparin Injectable 40 milliGRAM(s) SubCutaneous every 24 hours  lactated ringers. 1000 milliLiter(s) IV Continuous <Continuous>  metroNIDAZOLE  IVPB      metroNIDAZOLE  IVPB 500 milliGRAM(s) IV Intermittent every 8 hours  senna 2 Tablet(s) Oral at bedtime  tamsulosin 0.4 milliGRAM(s) Oral at bedtime    PRN MEDICATIONS  ondansetron Injectable 4 milliGRAM(s) IV Push every 6 hours PRN                                            ------------------------------------------------------------  VITAL SIGNS: Last 24 Hours  T(C): 36.7 (18 Aug 2022 05:26), Max: 36.7 (18 Aug 2022 05:26)  T(F): 98.1 (18 Aug 2022 05:26), Max: 98.1 (18 Aug 2022 05:)  HR: 87 (18 Aug 2022 05:) (82 - 87)  BP: 163/77 (18 Aug 2022 05:26) (113/57 - 186/87)  BP(mean): 125 (17 Aug 2022 20:54) (125 - 125)  RR: 18 (18 Aug 2022 05:26) (18 - 18)  SpO2: 96% (18 Aug 2022 08:) (96% - 96%)                                             --------------------------------------------------------------  LABS:                        11.9   6.73  )-----------( 330      ( 18 Aug 2022 08: )             36.6     08-    137  |  102  |  13  ----------------------------<  88  4.5   |  25  |  0.6<L>    Ca    9.5      17 Aug 2022 04:30  Mg     2.1         TPro  6.4  /  Alb  3.2<L>  /  TBili  0.3  /  DBili  x   /  AST  10  /  ALT  8   /  AlkPhos  90  08    PT/INR - ( 18 Aug 2022 08: )   PT: 13.50 sec;   INR: 1.18 ratio         PTT - ( 18 Aug 2022 08: )  PTT:25.8 sec                                                          -------------------------------------------------------------  RADIOLOGY:                                            --------------------------------------------------------------    PHYSICAL EXAM:  GENERAL: NAD, lying in bed comfortably  HEAD:  Atraumatic, Normocephalic  EYES: EOMI, conjunctiva and sclera clear  ENT: Moist mucous membranes  NECK: Supple, No JVD  CHEST/LUNG: Clear to auscultation bilaterally; No rales, rhonchi, wheezing, or rubs. Unlabored respirations  HEART: regular rate and rhythm; No murmurs, rubs, or gallops  ABDOMEN: Bowel sounds present; Soft, Nontender, Nondistended.    EXTREMITIES: Warm. No clubbing, cyanosis, or edema  NERVOUS SYSTEM:  Alert & Oriented X3. No focal deficits   SKIN: No rashes or lesions                                           --------------------------------------------------------------                
  BOSTONCHAYO VELARDEBETH  77y  Female      Patient is a 77y old  Female who presents with a chief complaint of abdominal pain (17 Aug 2022 00:19)      INTERVAL HPI/OVERNIGHT EVENTS:  pt seen and examined at bedside this morning   -laying comfortably in bed   -does not seem to comprehend her current medical care - unaware of the surgical option - will have colorectal follow up - see admitting attending H/P note for risk stratification   -diet if ok with GI and Sx  -oob to chair as tolerated     REVIEW OF SYSTEMS:  denies any shortness of breath   -abdominal pain resolved     Vital Signs Last 24 Hrs  T(C): 36.2 (17 Aug 2022 00:03), Max: 36.7 (16 Aug 2022 12:59)  T(F): 97.1 (17 Aug 2022 00:03), Max: 98.1 (16 Aug 2022 12:59)  HR: 74 (17 Aug 2022 00:03) (74 - 110)  BP: 117/59 (17 Aug 2022 00:03) (117/59 - 117/71)  RR: 18 (17 Aug 2022 00:03) (18 - 18)  SpO2: 97% (17 Aug 2022 00:03) (97% - 98%)    Parameters below as of 16 Aug 2022 12:59  Patient On (Oxygen Delivery Method): room air    PHYSICAL EXAM:  GENERAL: Not in distress, speaking appropriately   HEAD:  Atraumatic, Normocephalic  EYES: EOMI, PERRLA, conjunctiva and sclera clear  NERVOUS SYSTEM:  Alert & Oriented X 2  CHEST/LUNG: Clear b/l  CV/HEART: Regular rate and rhythm; No murmurs, rubs, or gallops  GI/ABDOMEN: Soft abdomen, non tender   EXTREMITIES:  2+ Peripheral Pulses, No clubbing, cyanosis, or edema  SKIN: No rashes or lesions    LAB:                        12.0   6.76  )-----------( 324      ( 17 Aug 2022 04:30 )             35.9     08-17    137  |  102  |  13  ----------------------------<  88  4.5   |  25  |  0.6<L>    Ca    9.5      17 Aug 2022 04:30  Mg     2.1     08-17    TPro  6.4  /  Alb  3.2<L>  /  TBili  0.3  /  DBili  x   /  AST  10  /  ALT  8   /  AlkPhos  90  08-17    Daily Height in cm: 170.18 (16 Aug 2022 12:59)    Daily   CAPILLARY BLOOD GLUCOSE      LIVER FUNCTIONS - ( 17 Aug 2022 04:30 )  Alb: 3.2 g/dL / Pro: 6.4 g/dL / ALK PHOS: 90 U/L / ALT: 8 U/L / AST: 10 U/L / GGT: x           RADIOLOGY:    Imaging Personally visualized and Reviewed:  [y ] YES  [ ] NO    HEALTH ISSUES - PROBLEM Dx:    MEDS:  donepezil 5 milliGRAM(s) Oral at bedtime  enoxaparin Injectable 40 milliGRAM(s) SubCutaneous every 24 hours  senna 2 Tablet(s) Oral at bedtime  tamsulosin 0.4 milliGRAM(s) Oral at bedtime  
 SURGERY PROGRESS NOTE     Patient: KEISHA PANTOJA , 77y ([birth year])Female   MRN: [id number]  Location: [address]  Visit: 22 Inpatient  Date: 22 @ 04:13       Procedure/Dx/Injuries:      Events of past 24 hours:     PAST MEDICAL & SURGICAL HISTORY:  Hypertension, unspecified type      Dementia      GERD (gastroesophageal reflux disease)      Diverticulitis      Lack of bladder control      H/O dilation and curettage  for missed            Vitals:   T(F): 97.9 (22 @ 19:50), Max: 98.6 (22 @ 05:06)  HR: 75 (22 @ 19:50)  BP: 126/64 (22 @ 19:50)  RR: 18 (22 @ 19:50)  SpO2: 95% (22 @ 08:00)          Fluids:     I & O's:    Bowel Movement: : [] YES [] NO  Flatus: : [] YES [] NO     PHYSICAL EXAM:   General: NAD, AAOx3, calm and cooperative  Cardiac: RRR S1, S2  Respiratory: CTAB, normal respiratory effort  Abdomen: Soft, non-distended, non-tender, no rebound, no guarding. +BS.  Vascular: Pulses 2+ throughout, extremities well perfused  Skin: Warm/dry, normal color, no jaundice  Incision/wound: healing well, dressings in place, clean, dry and intact    MEDICATIONS  (STANDING):  ciprofloxacin   IVPB      ciprofloxacin   IVPB 400 milliGRAM(s) IV Intermittent every 12 hours  donepezil 5 milliGRAM(s) Oral at bedtime  enoxaparin Injectable 40 milliGRAM(s) SubCutaneous every 24 hours  lactated ringers. 1000 milliLiter(s) (75 mL/Hr) IV Continuous <Continuous>  metroNIDAZOLE  IVPB      metroNIDAZOLE  IVPB 500 milliGRAM(s) IV Intermittent every 8 hours  senna 2 Tablet(s) Oral at bedtime  tamsulosin 0.4 milliGRAM(s) Oral at bedtime    MEDICATIONS  (PRN):  ondansetron Injectable 4 milliGRAM(s) IV Push every 6 hours PRN Nausea and/or Vomiting      DVT PROPHYLAXIS: enoxaparin Injectable 40 milliGRAM(s) SubCutaneous every 24 hours    GI PROPHYLAXIS:   ANTICOAGULATION:   ANTIBIOTICS:  ciprofloxacin   IVPB    ciprofloxacin   IVPB 400 milliGRAM(s)  metroNIDAZOLE  IVPB    metroNIDAZOLE  IVPB 500 milliGRAM(s)            LAB/STUDIES:  Labs:  CAPILLARY BLOOD GLUCOSE                              10.8   7.12  )-----------( 303      ( 19 Aug 2022 21:36 )             33.5       Auto Neutrophil %: 62.6 % (22 @ 21:36)  Auto Neutrophil %: 63.3 % (22 @ 08:07)  Auto Immature Granulocyte %: 0.5 % (22 @ 08:07)        134<L>  |  102  |  7<L>  ----------------------------<  88  5.7<H>   |  25  |  0.6<L>      Calcium, Total Serum: 8.6 mg/dL (22 @ 21:36)      LFTs:             5.6  | 0.3  | 9        ------------------[79      ( 19 Aug 2022 08:07 )  2.9  | x    | 7           Lipase:x      Amylase:x             Coags:     13.50  ----< 1.18    ( 18 Aug 2022 08:00 )     25.8              
GENERAL SURGERY PROGRESS NOTE    Patient: KEISHA PANTOJA , 77y (43)Female   MRN: [id number]  Location: [address] A  Visit: 22 Inpatient  Date: 22 @ 03:53    Hospital Day #: 6  Post-Op Day #:    Procedure/Dx/Injuries:   persistent diverticulitis    Events of past 24 hours:  NAEON  Patient has been OOB,ambulatory  Patient has not had BM, no Flatus  Patient has been voiding adequately  Patient has had no nausea or vomiting.   Patient to have surgery likely this week.     PAST MEDICAL & SURGICAL HISTORY:  Hypertension, unspecified type      Dementia      GERD (gastroesophageal reflux disease)      Diverticulitis      Lack of bladder control      H/O dilation and curettage  for missed           Vitals:   T(F): 98.3 (22 @ 20:40), Max: 99.9 (22 @ 14:52)  HR: 109 (22 @ 20:40)  BP: 110/58 (22 @ 20:40)  RR: 18 (22 @ 20:40)  SpO2: 96% (22 @ 20:53)      Fluids:     I & O's:     PHYSICAL EXAM:   General: NAD, AAOx3, calm and cooperative  Cardiac: RRR S1, S2  Respiratory: CTAB, normal respiratory effort  Abdomen: Soft, non-distended, non-tender, no rebound, no guarding. +BS.  Vascular: Pulses 2+ throughout, extremities well perfused  Skin: Warm/dry, normal color, no jaundice  Incision/wound: healing well, dressings in place, clean, dry and intact    MEDICATIONS  (STANDING):  ciprofloxacin     Tablet 500 milliGRAM(s) Oral two times a day  donepezil 5 milliGRAM(s) Oral at bedtime  enoxaparin Injectable 40 milliGRAM(s) SubCutaneous every 24 hours  metroNIDAZOLE    Tablet 500 milliGRAM(s) Oral every 8 hours  senna 2 Tablet(s) Oral at bedtime  tamsulosin 0.4 milliGRAM(s) Oral at bedtime    MEDICATIONS  (PRN):  ondansetron Injectable 4 milliGRAM(s) IV Push every 6 hours PRN Nausea and/or Vomiting      DVT PROPHYLAXIS: enoxaparin Injectable 40 milliGRAM(s) SubCutaneous every 24 hours    GI PROPHYLAXIS:   ANTICOAGULATION:   ANTIBIOTICS:  ciprofloxacin     Tablet 500 milliGRAM(s)  metroNIDAZOLE    Tablet 500 milliGRAM(s)            LAB/STUDIES:  Labs:  CAPILLARY BLOOD GLUCOSE                              10.4   8.89  )-----------( 306      ( 20 Aug 2022 18:22 )             31.8       Auto Neutrophil %: 70.5 % (22 @ 18:22)  Auto Immature Granulocyte %: 0.4 % (22 @ 18:22)        137  |  101  |  11  ----------------------------<  133<H>  4.1   |  25  |  0.6<L>      Calcium, Total Serum: 8.8 mg/dL (22 @ 18:22)      LFTs:             5.7  | <0.2 | 13       ------------------[84      ( 20 Aug 2022 18:22 )  2.9  | x    | 9         
GENERAL SURGERY PROGRESS NOTE    Patient: KEISHA PANTOJA , 77y (43)Female   MRN: [id number]  Location: [address]14 A  Visit: 22 Inpatient  Date: 22 @ 07:36    Patient seen and evaluated at bedside overnight. Patient was resting comfortably in bed. Nurse reported that she removed IVs twice overnight. Patient states that her pain is well controlled and that she would like to go home. Denies nausea, vomiting, fever, and chills. She is tolerating CLD. Last BM was on the  following a suppository. Calorie count in room reports that patient ate 100% Juice portion and 50% Fruit.     PAST MEDICAL & SURGICAL HISTORY:  Hypertension, unspecified type      Dementia      GERD (gastroesophageal reflux disease)      Diverticulitis      Lack of bladder control      H/O dilation and curettage  for missed           Vitals:   T(F): 98.6 (22 @ 05:06), Max: 98.6 (22 @ 05:06)  HR: 68 (22 @ 05:06)  BP: 115/60 (22 @ 05:06)  RR: 17 (22 @ 05:06)  SpO2: 96% (22 @ 08:00)      Diet, Clear Liquid      Fluids:     I & O's:    PHYSICAL EXAM:  General: NAD, calm and cooperative  HEENT: NCAT  Cardiac: No peripheral cyanosis or pallor, extremities well perfused   Respiratory: Non-labored breathing, equal chest rise bilaterally   Abdomen: Soft, non-distended, non-tender, no rebound, no guarding  Musculoskeletal: Strength 5/5 BL UE/LE, ROM intact, compartments soft  Neuro: At baseline, no focal deficits  Vascular: Pulses 2+ throughout, extremities well perfused  Skin: Warm/dry, normal color, no jaundice    MEDICATIONS  (STANDING):  ciprofloxacin   IVPB      ciprofloxacin   IVPB 400 milliGRAM(s) IV Intermittent every 12 hours  donepezil 5 milliGRAM(s) Oral at bedtime  enoxaparin Injectable 40 milliGRAM(s) SubCutaneous every 24 hours  lactated ringers. 1000 milliLiter(s) (75 mL/Hr) IV Continuous <Continuous>  metroNIDAZOLE  IVPB      metroNIDAZOLE  IVPB 500 milliGRAM(s) IV Intermittent every 8 hours  senna 2 Tablet(s) Oral at bedtime  tamsulosin 0.4 milliGRAM(s) Oral at bedtime    MEDICATIONS  (PRN):  ondansetron Injectable 4 milliGRAM(s) IV Push every 6 hours PRN Nausea and/or Vomiting      DVT PROPHYLAXIS: enoxaparin Injectable 40 milliGRAM(s) SubCutaneous every 24 hours    GI PROPHYLAXIS:   ANTICOAGULATION:   ANTIBIOTICS:  ciprofloxacin   IVPB    ciprofloxacin   IVPB 400 milliGRAM(s)  metroNIDAZOLE  IVPB    metroNIDAZOLE  IVPB 500 milliGRAM(s)            LAB/STUDIES:  Labs:  CAPILLARY BLOOD GLUCOSE                              11.9   6.73  )-----------( 330      ( 18 Aug 2022 08:00 )             36.6       Auto Neutrophil %: 66.3 % (22 @ 08:00)  Auto Immature Granulocyte %: 0.9 % (22 @ 08:00)        135  |  100  |  8<L>  ----------------------------<  104<H>  4.0   |  27  |  0.6<L>      Calcium, Total Serum: 9.2 mg/dL (22 @ 08:00)      LFTs:             6.1  | 0.2  | 10       ------------------[89      ( 18 Aug 2022 08:00 )  3.2  | x    | 8           Lipase:x      Amylase:x             Coags:     13.50  ----< 1.18    ( 18 Aug 2022 08:00 )     25.8        
Medicine Progress Note    Patient is a 77y old  Female who presents with a chief complaint of abdominal pain (19 Aug 2022 07:35)      SUBJECTIVE / OVERNIGHT EVENTS: Overnight events: None    Subjective complaints  Pt was evaluated this am. Patient denied any active complaints    ADDITIONAL REVIEW OF SYSTEMS:    MEDICATIONS  (STANDING):  ciprofloxacin   IVPB      ciprofloxacin   IVPB 400 milliGRAM(s) IV Intermittent every 12 hours  donepezil 5 milliGRAM(s) Oral at bedtime  enoxaparin Injectable 40 milliGRAM(s) SubCutaneous every 24 hours  lactated ringers. 1000 milliLiter(s) (75 mL/Hr) IV Continuous <Continuous>  metroNIDAZOLE  IVPB      metroNIDAZOLE  IVPB 500 milliGRAM(s) IV Intermittent every 8 hours  senna 2 Tablet(s) Oral at bedtime  tamsulosin 0.4 milliGRAM(s) Oral at bedtime    MEDICATIONS  (PRN):  ondansetron Injectable 4 milliGRAM(s) IV Push every 6 hours PRN Nausea and/or Vomiting    CAPILLARY BLOOD GLUCOSE        I&O's Summary      PHYSICAL EXAM:  Vital Signs Last 24 Hrs  T(C): 36 (19 Aug 2022 13:56), Max: 37 (19 Aug 2022 05:06)  T(F): 96.8 (19 Aug 2022 13:56), Max: 98.6 (19 Aug 2022 05:06)  HR: 81 (19 Aug 2022 13:56) (68 - 81)  BP: 102/53 (19 Aug 2022 13:56) (95/58 - 115/60)  BP(mean): 70 (18 Aug 2022 21:06) (70 - 70)  RR: 16 (19 Aug 2022 13:56) (16 - 18)  SpO2: --      GENERAL: NAD, lying in bed comfortably  HEAD:  Atraumatic, Normocephalic  EYES: EOMI, conjunctiva and sclera clear  ENT: Moist mucous membranes  NECK: Supple, No JVD  CHEST/LUNG: Clear to auscultation bilaterally; No rales, rhonchi, wheezing, or rubs. Unlabored respirations  HEART: regular rate and rhythm; No murmurs, rubs, or gallops  ABDOMEN: Bowel sounds present; Soft, Nontender, Nondistended.    EXTREMITIES: Warm. No clubbing, cyanosis, or edema  NERVOUS SYSTEM:  Alert & Oriented X3. No focal deficits   SKIN: No rashes or lesions    LABS:                        10.6   5.56  )-----------( 264      ( 19 Aug 2022 08:07 )             31.8     08-19    138  |  103  |  8<L>  ----------------------------<  92  4.2   |  26  |  0.5<L>    Ca    8.8      19 Aug 2022 08:07  Phos  3.0     08-18  Mg     2.0     08-19    TPro  5.6<L>  /  Alb  2.9<L>  /  TBili  0.3  /  DBili  x   /  AST  9   /  ALT  7   /  AlkPhos  79  08-19    PT/INR - ( 18 Aug 2022 08:00 )   PT: 13.50 sec;   INR: 1.18 ratio         PTT - ( 18 Aug 2022 08:00 )  PTT:25.8 sec          COVID-19 PCR: Detected (16 Aug 2022 16:44)  COVID-19 PCR: Detected (17 Jul 2022 15:17)  COVID-19 PCR: Detected (14 Jul 2022 15:40)  COVID-19 PCR: Detected (07 Jul 2022 11:03)  COVID-19 PCR: Detected (26 Jun 2022 12:30)  COVID-19 PCR: NotDetec (20 Jun 2022 10:48)      RADIOLOGY & ADDITIONAL TESTS:  Imaging from Last 24 Hours:    Electrocardiogram/QTc Interval:    COORDINATION OF CARE:  Care Discussed with Consultants/Other Providers:  
  CHAYO PANTOJABETH  77y  Female      Patient is a 77y old  Female who presents with a chief complaint of abdominal pain (17 Aug 2022 00:19)      INTERVAL HPI/OVERNIGHT EVENTS:  pt seen and examined at bedside this morning   -laying comfortably in bed   -no urgent need of inpatient surgery - outpatient follow up   -continue with abx - calorie count # 2/3 today   -daily assessments for d/c planning   -medical resident updated the family     REVIEW OF SYSTEMS:  -no new complaints     Vital Signs Last 24 Hrs  T(C): 36 (19 Aug 2022 13:56), Max: 37 (19 Aug 2022 05:06)  T(F): 96.8 (19 Aug 2022 13:56), Max: 98.6 (19 Aug 2022 05:06)  HR: 81 (19 Aug 2022 13:56) (68 - 81)  BP: 102/53 (19 Aug 2022 13:56) (95/58 - 115/60)  BP(mean): 70 (18 Aug 2022 21:06) (70 - 70)  RR: 16 (19 Aug 2022 13:56) (16 - 18)      PHYSICAL EXAM:  GENERAL: Not in distress, speaking appropriately   HEAD:  Atraumatic, Normocephalic  EYES: EOMI, PERRLA, conjunctiva and sclera clear  NERVOUS SYSTEM:  Alert & Oriented X 2  CHEST/LUNG: Clear b/l  CV/HEART: Regular rate and rhythm; No murmurs, rubs, or gallops  GI/ABDOMEN: Soft abdomen, non tender   EXTREMITIES:  2+ Peripheral Pulses, No clubbing, cyanosis, or edema  SKIN: No rashes or lesions    LAB:                                 10.6   5.56  )-----------( 264      ( 19 Aug 2022 08:07 )             31.8   08-19    138  |  103  |  8<L>  ----------------------------<  92  4.2   |  26  |  0.5<L>    Ca    8.8      19 Aug 2022 08:07  Phos  3.0     08-18  Mg     2.0     08-19    TPro  5.6<L>  /  Alb  2.9<L>  /  TBili  0.3  /  DBili  x   /  AST  9   /  ALT  7   /  AlkPhos  79  08-19      Daily Height in cm: 170.18 (16 Aug 2022 12:59)    Daily   CAPILLARY BLOOD GLUCOSE      LIVER FUNCTIONS - ( 17 Aug 2022 04:30 )  Alb: 3.2 g/dL / Pro: 6.4 g/dL / ALK PHOS: 90 U/L / ALT: 8 U/L / AST: 10 U/L / GGT: x           RADIOLOGY:    Imaging Personally visualized and Reviewed:  [y ] YES  [ ] NO    HEALTH ISSUES - PROBLEM Dx:    MEDICATIONS  (STANDING):  ciprofloxacin   IVPB      ciprofloxacin   IVPB 400 milliGRAM(s) IV Intermittent every 12 hours  donepezil 5 milliGRAM(s) Oral at bedtime  enoxaparin Injectable 40 milliGRAM(s) SubCutaneous every 24 hours  lactated ringers. 1000 milliLiter(s) (75 mL/Hr) IV Continuous <Continuous>  metroNIDAZOLE  IVPB      metroNIDAZOLE  IVPB 500 milliGRAM(s) IV Intermittent every 8 hours  senna 2 Tablet(s) Oral at bedtime  tamsulosin 0.4 milliGRAM(s) Oral at bedtime    MEDICATIONS  (PRN):  ondansetron Injectable 4 milliGRAM(s) IV Push every 6 hours PRN Nausea and/or Vomiting  
  BOSTONSYD KEISHA  77y  Female      Patient is a 77y old  Female who presents with a chief complaint of abdominal pain (17 Aug 2022 00:19)      INTERVAL HPI/OVERNIGHT EVENTS:  pt seen and examined at bedside this morning   -sitting up and eating breakfast  --surgical team following - outpatient surgery later in the week   -on calorie count - results to be interpreted and added to chart   -spoke to beside RN     REVIEW OF SYSTEMS:  -no new complaints     Vital Signs Last 24 Hrs  T(C): 36.4 (21 Aug 2022 04:59), Max: 37.7 (20 Aug 2022 14:52)  T(F): 97.5 (21 Aug 2022 04:59), Max: 99.9 (20 Aug 2022 14:52)  HR: 60 (21 Aug 2022 07:50) (60 - 109)  BP: 115/67 (21 Aug 2022 07:50) (92/54 - 115/67)  BP(mean): 77 (20 Aug 2022 20:40) (77 - 77)  RR: 16 (21 Aug 2022 04:59) (16 - 18)  SpO2: 96% (20 Aug 2022 20:53) (96% - 96%)    Parameters below as of 20 Aug 2022 20:53  Patient On (Oxygen Delivery Method): room air        PHYSICAL EXAM:  GENERAL: Not in distress, speaking appropriately   HEAD:  Atraumatic, Normocephalic  EYES: EOMI, PERRLA, conjunctiva and sclera clear  NERVOUS SYSTEM:  Alert & Oriented X 2  CHEST/LUNG: Clear b/l  CV/HEART: Regular rate and rhythm; No murmurs, rubs, or gallops  GI/ABDOMEN: Soft abdomen, non tender   EXTREMITIES:  2+ Peripheral Pulses, No clubbing, cyanosis, or edema  SKIN: No rashes or lesions    LAB:                                          10.0   5.40  )-----------( 283      ( 21 Aug 2022 06:01 )             30.8   08-21    144  |  110  |  9<L>  ----------------------------<  96  4.5   |  26  |  0.5<L>    Ca    8.6      21 Aug 2022 06:01  Phos  3.0     08-19  Mg     2.0     08-21    TPro  5.4<L>  /  Alb  2.9<L>  /  TBili  <0.2  /  DBili  x   /  AST  12  /  ALT  8   /  AlkPhos  74  08-21      Daily Height in cm: 170.18 (16 Aug 2022 12:59)    Daily   CAPILLARY BLOOD GLUCOSE      LIVER FUNCTIONS - ( 17 Aug 2022 04:30 )  Alb: 3.2 g/dL / Pro: 6.4 g/dL / ALK PHOS: 90 U/L / ALT: 8 U/L / AST: 10 U/L / GGT: x           RADIOLOGY:    Imaging Personally visualized and Reviewed:  [y ] YES  [ ] NO    HEALTH ISSUES - PROBLEM Dx:    MEDICATIONS  (STANDING):  ciprofloxacin     Tablet 500 milliGRAM(s) Oral two times a day  donepezil 5 milliGRAM(s) Oral at bedtime  enoxaparin Injectable 40 milliGRAM(s) SubCutaneous every 24 hours  metroNIDAZOLE    Tablet 500 milliGRAM(s) Oral every 8 hours  senna 2 Tablet(s) Oral at bedtime  tamsulosin 0.4 milliGRAM(s) Oral at bedtime    MEDICATIONS  (PRN):  bisacodyl 5 milliGRAM(s) Oral every 12 hours PRN Constipation  ondansetron Injectable 4 milliGRAM(s) IV Push every 6 hours PRN Nausea and/or Vomiting  
  KEISHA PANTOJA  77y  Female  ***My note supersedes ALL resident notes that I sign.  My corrections for their notes are in my note.***    I can be reached directly on [email or web address]. My office number is [phone]. My personal cell number is [phone].    INTERVAL EVENTS: Here for f/u of diverticulitis. Pt doing well and wants to go home.    T(F): 97.1 (08-22-22 @ 12:35), Max: 97.9 (08-21-22 @ 20:56)  HR: 84 (08-22-22 @ 12:35) (70 - 97)  BP: 103/59 (08-22-22 @ 12:35) (103/59 - 106/56)  RR: 19 (08-22-22 @ 12:35) (16 - 19)  SpO2: 95% (08-21-22 @ 19:49) (95% - 95%)    Gen: NAD  HEENT: PERRL, EOMI, mouth clr, nose clr  Neck: no nodes, no JVD, thyroid nl  lungs: clr  hrt: s1 s2 rrr no murmur  abd: soft, NT/ND, no HS megaly  ext: no edema, no c/c  neuro: aa ox3, cn intact, can move all 4 ext    LABS:                      11.0    (    86.0   4.89  )-----------( ---------      292      ( 22 Aug 2022 09:13 )             35.1    (    16.8     139   (   104   (   164      08-22-22 @ 09:13  ----------------------               4.0   (   26   (   12                             -----                        0.6  Ca  8.9   Mg  2.0    P   --     LFT  5.7  (  <0.2  (  14       08-22-22 @ 09:13  -------------------------  3.0  (  78  (  10    RADIOLOGY & ADDITIONAL TESTS:  < from: Xray Abdomen Minimum 3 Views (08.21.22 @ 15:00) >  IMPRESSION:  Unremarkable cardiac mediastinal silhouette. No focal parenchymal   opacities, pleural effusions or pneumothorax.    Nonobstructive bowel gas pattern. Moderate diffuse colonic stool burden   noted. No pneumoperitoneum. IVC filter in place. Degenerative changes of   the spine and hips noted.    < end of copied text >    < from: CT Abdomen and Pelvis w/ Oral Cont and w/ IV Cont (08.16.22 @ 22:39) >  IMPRESSION:    When compared to prior study dated 8/14/2022:    Resolution of colonic wall thickening from the hepatic flexure to the descending colon.    Persistent wall thickening of the sigmoid colon with suggestion of faint   perisigmoidal fat stranding on the current study. Minimal contrast   traverses the wall thickened sigmoid colon with no upstream contrast   noted in the descending colon. Findings may reflect wall thickening and   luminal narrowing secondary to an inflammatory process such as   colitis/diverticulitis, however recommend direct visualization when   feasible to ensure no underlying neoplastic process. No evidence for   bowel obstruction.    < end of copied text >    MEDICATIONS:  ciprofloxacin     Tablet 500 milliGRAM(s) Oral two times a day  metroNIDAZOLE    Tablet 500 milliGRAM(s) Oral every 8 hours    bisacodyl 5 milliGRAM(s) Oral every 12 hours PRN  donepezil 5 milliGRAM(s) Oral at bedtime  enoxaparin Injectable 40 milliGRAM(s) SubCutaneous every 24 hours  ondansetron Injectable 4 milliGRAM(s) IV Push every 6 hours PRN  senna 2 Tablet(s) Oral at bedtime  tamsulosin 0.4 milliGRAM(s) Oral at bedtime

## 2022-08-22 NOTE — DISCHARGE NOTE NURSING/CASE MANAGEMENT/SOCIAL WORK - PATIENT PORTAL LINK FT
You can access the FollowMyHealth Patient Portal offered by [facility] by registering at the following website: [email or web address]. By joining [facility]’s FollowMyHealth portal, you will also be able to view your health information using other applications (apps) compatible with our system.

## 2022-08-22 NOTE — DISCHARGE NOTE PROVIDER - NSDCFUSCHEDAPPT_GEN_ALL_CORE_FT
Arnulfo Jean-Baptiste  [facility]  [facility] [address]  Scheduled Appointment: 08/31/2022

## 2022-08-22 NOTE — PROGRESS NOTE ADULT - ASSESSMENT
Assessment and Plan:   · Assessment	  · Assessment	  77F PMH of Dementia (A&Ox2, baseline), HTN, recent admission on 7/2022 for diverticulitis with rectosigmoid fluid collection and recent presentation to the ED as well on 8/14 due to 3 day history of abdominal pain found to have colitis. Now representing with vague abdominal pain per the family, however patient unsure of the abdominal pain, no N/V, no BM/Gas, no distention, rebound, guarding or tenderness appreciated on examination    Plan  - advance diet as tolerated  - calorie count on door  - Plan is for elective surgery since she is having bowel function, and discharge with aggressive bowel regimen  -BID Miralax, senna  -Colace on d/c  - monitor bowel function  - Spectra [phone] for questions or concerns

## 2022-08-22 NOTE — PROGRESS NOTE ADULT - PROVIDER SPECIALTY LIST ADULT
Hospitalist
Hospitalist
Internal Medicine
Internal Medicine
Surgery
Hospitalist
Internal Medicine
Surgery
Hospitalist
Hospitalist
Internal Medicine

## 2022-08-22 NOTE — PROGRESS NOTE ADULT - ASSESSMENT
patient with abdominal pain     # Abd pain 2/2 Sigmoid Diverticulitis; no sepsis  abx: cipro/flagyl PO for total of 7 days  diet tolerating  needs outpt GI f/u for c-scope after 6-8wks  sx not indicated currently  bowel reg    # mild anemia of chr dz  outpt w/u    # HTN - not on meds    # Dementia / Debility   actually doing fairly well  c/w aricept    # Residual COVID +  on isolation  off O2  no tx needed    # Hyponatremia - resolved    # Severe Malnutrition - improving w/ eating  uziel count better    # DVT ppx: LMWH    DISPO: d/c home today

## 2022-08-22 NOTE — DISCHARGE NOTE PROVIDER - NSDCMRMEDTOKEN_GEN_ALL_CORE_FT
amoxicillin-clavulanate 875 mg-125 mg oral tablet: 1 tab(s) orally every 12 hours   donepezil 5 mg oral tablet: 1 tab(s) orally once a day (at bedtime)  Flomax 0.4 mg oral capsule: 1 cap(s) orally once a day (at bedtime)  senna leaf extract oral tablet: 2 tab(s) orally once a day (at bedtime)   ciprofloxacin 500 mg oral tablet: 1 tab(s) orally 2 times a day for 4 more days (8/23-8/26)  Colace Clear 50 mg oral capsule: 1 cap(s) orally 2 times a day   donepezil 5 mg oral tablet: 1 tab(s) orally once a day (at bedtime)  Flomax 0.4 mg oral capsule: 1 cap(s) orally once a day (at bedtime)  metroNIDAZOLE 500 mg oral tablet: 1 tab(s) orally every 8 hours for 4 more days (8/23-8/26)  MiraLax oral powder for reconstitution: 17 gram(s) orally 2 times a day   senna leaf extract oral tablet: 2 tab(s) orally 2 times a day

## 2022-08-22 NOTE — DISCHARGE NOTE PROVIDER - DETAILS OF MALNUTRITION DIAGNOSIS/DIAGNOSES
This patient has been assessed with a concern for Malnutrition and was treated during this hospitalization for the following Nutrition diagnosis/diagnoses:     -  08/19/2022: Underweight (BMI < 19)

## 2022-08-22 NOTE — PROGRESS NOTE ADULT - ATTENDING COMMENTS
Patient is a 77F with PMH of Dementia (A&Ox2, baseline), HTN, admitted with sigmoid diverticulitis and COVID infection.     Patient seen and examined.  Patient denies abdominal pain flatus or BM    Abdomen - soft, distended, non tender    Vitals labs and images reviewed    Plan  - continue with antibiotics  - patient becoming progressively more distended  - KUB  - medical risk assessment for laparoscopic possible open sigmoid colon resection, possible ostomy  - CLD  - monitor bowel function  - calorie count  - there is concern for a partial LBO given her CT with rectal contrast and increasing distention.  We will obtain a KUB to evaluate for bowel distention
Patient is a 77F with PMH of Dementia (A&Ox2, baseline), HTN, admitted with sigmoid diverticulitis and COVID infection.     Patient seen and examined.  Patient denies abdominal pain flatus or BM    Abdomen - soft, mildly distended, non tender    Vitals labs and images reviewed    Plan  - continue with antibiotics  - medical risk assessment for laparoscopic possible open sigmoid colon resection, possible ostomy  - CLD  - monitor bowel function  - calorie count
76yo female with PMHx of dementia, HTN admitted for persistent diverticulitis. Patient is poor historian, repeating questions and denying symptoms. On exam, abdomen is soft without peritonitis. Labs reviewed - WBC 5.4, BUN/Cr 9/0.5. Explained to patient that she may have surgery later this week if does not tolerate CLD. Continue antibiotics. Encourage ambulation/OOB, incentive spirometer, DVT ppx. Primary care as per primary team.
78yo female with PMHx of dementia, HTN admitted for persistent diverticulitis. Patient is poor historian, repeating questions and denying symptoms. On exam, abdomen is soft without peritonitis. Labs reviewed - WBC 7.12, BUN/Cr 7/0.6. Explained to patient that she may have surgery later this week. All questions answered about surgery, although patient repeatedly asked how long surgery would be and if she would have pain. Unclear if she comprehended the conversation. Continue antibiotics, diet as tolerated. Encourage ambulation/OOB, incentive spirometer, DVT ppx. Primary care as per primary team.
Patient is a 77F with PMH of Dementia (A&Ox2, baseline), HTN, admitted with sigmoid diverticulitis and COVID infection.     Patient seen and examined.  Patient denies abdominal pain.  Patient reports tolerating a diet with large BM and flatus    Abdomen - soft, non -distended, non tender    Vitals labs and images reviewed    Plan  - continue antibiotics  - patient appears to have improved  - we will plan for DC and outpatient follow up for elective sigmoidectomy

## 2022-08-22 NOTE — PROGRESS NOTE ADULT - TIME BILLING
direct patient care and chart review   -coordinated current plan of care with medical staff on MDR
direct patient care and chart review   -coordinated current plan of care with medical staff on MDR
d/c plan
direct patient care and chart review   -coordinated current plan of care with medical staff on MDR

## 2022-08-22 NOTE — DISCHARGE NOTE PROVIDER - NSDCCPCAREPLAN_GEN_ALL_CORE_FT
PRINCIPAL DISCHARGE DIAGNOSIS  Diagnosis: Abdominal pain  Assessment and Plan of Treatment: Abdominal Pain  Many things can cause abdominal pain. Many times, abdominal pain is not caused by a disease and will improve without treatment. Your health care provider will do a physical exam to determine if there is a dangerous cause of your pain; blood tests and imaging may help determine the cause of your pain. However, in many cases, no cause may be found and you may need further testing as an outpatient. Monitor your abdominal pain for any changes.   SEEK IMMEDIATE MEDICAL CARE IF YOU HAVE ANY OF THE FOLLOWING SYMPTOMS: worsening abdominal pain, uncontrollable vomiting, profuse diarrhea, inability to have bowel movements or pass gas, black or bloody stools, fever accompanying chest pain or back pain, or fainting. These symptoms may represent a serious problem that is an emergency. Do not wait to see if the symptoms will go away. Get medical help right away. Call 911 and do not drive yourself to the hospital.        SECONDARY DISCHARGE DIAGNOSES  Diagnosis: Dehydration  Assessment and Plan of Treatment:      PRINCIPAL DISCHARGE DIAGNOSIS  Diagnosis: Abdominal pain  Assessment and Plan of Treatment: Abdominal Pain  Many things can cause abdominal pain. Many times, abdominal pain is not caused by a disease and will improve without treatment. Your health care provider will do a physical exam to determine if there is a dangerous cause of your pain; blood tests and imaging may help determine the cause of your pain. However, in many cases, no cause may be found and you may need further testing as an outpatient. Monitor your abdominal pain for any changes.   SEEK IMMEDIATE MEDICAL CARE IF YOU HAVE ANY OF THE FOLLOWING SYMPTOMS: worsening abdominal pain, uncontrollable vomiting, profuse diarrhea, inability to have bowel movements or pass gas, black or bloody stools, fever accompanying chest pain or back pain, or fainting. These symptoms may represent a serious problem that is an emergency. Do not wait to see if the symptoms will go away. Get medical help right away. Call 911 and do not drive yourself to the hospital.  You will continue on 4 more days of antibiotics. Please follow up with colorectal surgeon on 8/31.         SECONDARY DISCHARGE DIAGNOSES  Diagnosis: Dehydration  Assessment and Plan of Treatment:

## 2022-08-22 NOTE — PROGRESS NOTE ADULT - NUTRITIONAL ASSESSMENT
This patient has been assessed with a concern for Malnutrition and has been determined to have a diagnosis/diagnoses of Underweight (BMI < 19).    This patient is being managed with:   Diet Soft and Bite Sized-  Free Water Flush Instructions:  PLEASE PROVIDE ENSURE PLUS HP 3X/DAILY + MAGIC CUP 2X/DAILY  Entered: Aug 19 2022  3:50PM    

## 2022-08-22 NOTE — DISCHARGE NOTE NURSING/CASE MANAGEMENT/SOCIAL WORK - NSDCPEFALRISK_GEN_ALL_CORE
For information on Fall & Injury Prevention, visit: [email or web address] OR  [email or web address] OR  https://www.cdc.gov/steadi/patient.html

## 2022-08-22 NOTE — DISCHARGE NOTE PROVIDER - CARE PROVIDER_API CALL
Tylor Katz (DO)  Infectious Disease; Internal Medicine  [address] [place] [address]  Phone: [phone]  Fax: [phone]  Follow Up Time:

## 2022-08-31 ENCOUNTER — APPOINTMENT (OUTPATIENT)
Dept: SURGERY | Facility: CLINIC | Age: 77
End: 2022-08-31

## 2022-08-31 VITALS
WEIGHT: 104 LBS | SYSTOLIC BLOOD PRESSURE: 130 MMHG | HEART RATE: 99 BPM | DIASTOLIC BLOOD PRESSURE: 70 MMHG | OXYGEN SATURATION: 96 % | TEMPERATURE: 98 F

## 2022-08-31 PROCEDURE — 99214 OFFICE O/P EST MOD 30 MIN: CPT

## 2022-09-02 ENCOUNTER — NON-APPOINTMENT (OUTPATIENT)
Age: 77
End: 2022-09-02

## 2022-09-08 ENCOUNTER — INPATIENT (INPATIENT)
Facility: HOSPITAL | Age: 77
LOS: 2 days | Discharge: HOME | End: 2022-09-11
Attending: HOSPITALIST | Admitting: HOSPITALIST

## 2022-09-08 VITALS
OXYGEN SATURATION: 98 % | HEIGHT: 67 IN | DIASTOLIC BLOOD PRESSURE: 88 MMHG | RESPIRATION RATE: 20 BRPM | SYSTOLIC BLOOD PRESSURE: 200 MMHG | TEMPERATURE: 98 F | WEIGHT: 102.96 LBS | HEART RATE: 84 BPM

## 2022-09-08 DIAGNOSIS — Z98.890 OTHER SPECIFIED POSTPROCEDURAL STATES: Chronic | ICD-10-CM

## 2022-09-08 LAB
ALBUMIN SERPL ELPH-MCNC: 4 G/DL — SIGNIFICANT CHANGE UP (ref 3.5–5.2)
ALP SERPL-CCNC: 93 U/L — SIGNIFICANT CHANGE UP (ref 30–115)
ALT FLD-CCNC: 15 U/L — SIGNIFICANT CHANGE UP (ref 0–41)
ANION GAP SERPL CALC-SCNC: 14 MMOL/L — SIGNIFICANT CHANGE UP (ref 7–14)
AST SERPL-CCNC: 17 U/L — SIGNIFICANT CHANGE UP (ref 0–41)
BASOPHILS # BLD AUTO: 0.05 K/UL — SIGNIFICANT CHANGE UP (ref 0–0.2)
BASOPHILS NFR BLD AUTO: 0.4 % — SIGNIFICANT CHANGE UP (ref 0–1)
BILIRUB SERPL-MCNC: 0.3 MG/DL — SIGNIFICANT CHANGE UP (ref 0.2–1.2)
BLD GP AB SCN SERPL QL: SIGNIFICANT CHANGE UP
BUN SERPL-MCNC: 17 MG/DL — SIGNIFICANT CHANGE UP (ref 10–20)
CALCIUM SERPL-MCNC: 10.1 MG/DL — SIGNIFICANT CHANGE UP (ref 8.5–10.1)
CHLORIDE SERPL-SCNC: 101 MMOL/L — SIGNIFICANT CHANGE UP (ref 98–110)
CO2 SERPL-SCNC: 26 MMOL/L — SIGNIFICANT CHANGE UP (ref 17–32)
CREAT SERPL-MCNC: 0.7 MG/DL — SIGNIFICANT CHANGE UP (ref 0.7–1.5)
EGFR: 88 ML/MIN/1.73M2 — SIGNIFICANT CHANGE UP
EOSINOPHIL # BLD AUTO: 0.01 K/UL — SIGNIFICANT CHANGE UP (ref 0–0.7)
EOSINOPHIL NFR BLD AUTO: 0.1 % — SIGNIFICANT CHANGE UP (ref 0–8)
GLUCOSE SERPL-MCNC: 148 MG/DL — HIGH (ref 70–99)
HCT VFR BLD CALC: 36.4 % — LOW (ref 37–47)
HGB BLD-MCNC: 11.9 G/DL — LOW (ref 12–16)
IMM GRANULOCYTES NFR BLD AUTO: 0.5 % — HIGH (ref 0.1–0.3)
INR BLD: 1.08 RATIO — SIGNIFICANT CHANGE UP (ref 0.65–1.3)
LACTATE SERPL-SCNC: 1.4 MMOL/L — SIGNIFICANT CHANGE UP (ref 0.7–2)
LIDOCAIN IGE QN: 12 U/L — SIGNIFICANT CHANGE UP (ref 7–60)
LYMPHOCYTES # BLD AUTO: 0.48 K/UL — LOW (ref 1.2–3.4)
LYMPHOCYTES # BLD AUTO: 3.8 % — LOW (ref 20.5–51.1)
MAGNESIUM SERPL-MCNC: 2.4 MG/DL — SIGNIFICANT CHANGE UP (ref 1.8–2.4)
MCHC RBC-ENTMCNC: 27.7 PG — SIGNIFICANT CHANGE UP (ref 27–31)
MCHC RBC-ENTMCNC: 32.7 G/DL — SIGNIFICANT CHANGE UP (ref 32–37)
MCV RBC AUTO: 84.8 FL — SIGNIFICANT CHANGE UP (ref 81–99)
MONOCYTES # BLD AUTO: 1.39 K/UL — HIGH (ref 0.1–0.6)
MONOCYTES NFR BLD AUTO: 10.9 % — HIGH (ref 1.7–9.3)
NEUTROPHILS # BLD AUTO: 10.76 K/UL — HIGH (ref 1.4–6.5)
NEUTROPHILS NFR BLD AUTO: 84.3 % — HIGH (ref 42.2–75.2)
NRBC # BLD: 0 /100 WBCS — SIGNIFICANT CHANGE UP (ref 0–0)
PLATELET # BLD AUTO: 384 K/UL — SIGNIFICANT CHANGE UP (ref 130–400)
POTASSIUM SERPL-MCNC: 5 MMOL/L — SIGNIFICANT CHANGE UP (ref 3.5–5)
POTASSIUM SERPL-SCNC: 5 MMOL/L — SIGNIFICANT CHANGE UP (ref 3.5–5)
PROT SERPL-MCNC: 8 G/DL — SIGNIFICANT CHANGE UP (ref 6–8)
PROTHROM AB SERPL-ACNC: 12.4 SEC — SIGNIFICANT CHANGE UP (ref 9.95–12.87)
RBC # BLD: 4.29 M/UL — SIGNIFICANT CHANGE UP (ref 4.2–5.4)
RBC # FLD: 16.2 % — HIGH (ref 11.5–14.5)
SARS-COV-2 RNA SPEC QL NAA+PROBE: SIGNIFICANT CHANGE UP
SODIUM SERPL-SCNC: 141 MMOL/L — SIGNIFICANT CHANGE UP (ref 135–146)
WBC # BLD: 12.76 K/UL — HIGH (ref 4.8–10.8)
WBC # FLD AUTO: 12.76 K/UL — HIGH (ref 4.8–10.8)

## 2022-09-08 PROCEDURE — 99285 EMERGENCY DEPT VISIT HI MDM: CPT | Mod: FS

## 2022-09-08 PROCEDURE — 99283 EMERGENCY DEPT VISIT LOW MDM: CPT

## 2022-09-08 PROCEDURE — 74176 CT ABD & PELVIS W/O CONTRAST: CPT | Mod: 26,MA

## 2022-09-08 PROCEDURE — 71045 X-RAY EXAM CHEST 1 VIEW: CPT | Mod: 26

## 2022-09-08 RX ORDER — METRONIDAZOLE 500 MG
500 TABLET ORAL ONCE
Refills: 0 | Status: COMPLETED | OUTPATIENT
Start: 2022-09-08 | End: 2022-09-08

## 2022-09-08 RX ORDER — DONEPEZIL HYDROCHLORIDE 10 MG/1
5 TABLET, FILM COATED ORAL AT BEDTIME
Refills: 0 | Status: DISCONTINUED | OUTPATIENT
Start: 2022-09-08 | End: 2022-09-11

## 2022-09-08 RX ORDER — PANTOPRAZOLE SODIUM 20 MG/1
40 TABLET, DELAYED RELEASE ORAL
Refills: 0 | Status: DISCONTINUED | OUTPATIENT
Start: 2022-09-08 | End: 2022-09-10

## 2022-09-08 RX ORDER — POLYETHYLENE GLYCOL 3350 17 G/17G
17 POWDER, FOR SOLUTION ORAL DAILY
Refills: 0 | Status: DISCONTINUED | OUTPATIENT
Start: 2022-09-08 | End: 2022-09-09

## 2022-09-08 RX ORDER — ENOXAPARIN SODIUM 100 MG/ML
40 INJECTION SUBCUTANEOUS EVERY 24 HOURS
Refills: 0 | Status: DISCONTINUED | OUTPATIENT
Start: 2022-09-08 | End: 2022-09-11

## 2022-09-08 RX ORDER — METRONIDAZOLE 500 MG
500 TABLET ORAL EVERY 8 HOURS
Refills: 0 | Status: DISCONTINUED | OUTPATIENT
Start: 2022-09-08 | End: 2022-09-10

## 2022-09-08 RX ORDER — SODIUM CHLORIDE 9 MG/ML
1000 INJECTION, SOLUTION INTRAVENOUS
Refills: 0 | Status: DISCONTINUED | OUTPATIENT
Start: 2022-09-08 | End: 2022-09-11

## 2022-09-08 RX ORDER — ACETAMINOPHEN 500 MG
650 TABLET ORAL EVERY 6 HOURS
Refills: 0 | Status: DISCONTINUED | OUTPATIENT
Start: 2022-09-08 | End: 2022-09-11

## 2022-09-08 RX ORDER — CIPROFLOXACIN LACTATE 400MG/40ML
400 VIAL (ML) INTRAVENOUS EVERY 12 HOURS
Refills: 0 | Status: DISCONTINUED | OUTPATIENT
Start: 2022-09-08 | End: 2022-09-10

## 2022-09-08 RX ORDER — TAMSULOSIN HYDROCHLORIDE 0.4 MG/1
0.4 CAPSULE ORAL AT BEDTIME
Refills: 0 | Status: DISCONTINUED | OUTPATIENT
Start: 2022-09-08 | End: 2022-09-11

## 2022-09-08 RX ORDER — SODIUM CHLORIDE 9 MG/ML
1000 INJECTION, SOLUTION INTRAVENOUS ONCE
Refills: 0 | Status: COMPLETED | OUTPATIENT
Start: 2022-09-08 | End: 2022-09-08

## 2022-09-08 RX ORDER — ONDANSETRON 8 MG/1
4 TABLET, FILM COATED ORAL ONCE
Refills: 0 | Status: COMPLETED | OUTPATIENT
Start: 2022-09-08 | End: 2022-09-08

## 2022-09-08 RX ORDER — SENNA PLUS 8.6 MG/1
2 TABLET ORAL DAILY
Refills: 0 | Status: DISCONTINUED | OUTPATIENT
Start: 2022-09-08 | End: 2022-09-11

## 2022-09-08 RX ORDER — CIPROFLOXACIN LACTATE 400MG/40ML
400 VIAL (ML) INTRAVENOUS ONCE
Refills: 0 | Status: COMPLETED | OUTPATIENT
Start: 2022-09-08 | End: 2022-09-08

## 2022-09-08 RX ADMIN — Medication 100 MILLIGRAM(S): at 17:52

## 2022-09-08 RX ADMIN — DONEPEZIL HYDROCHLORIDE 5 MILLIGRAM(S): 10 TABLET, FILM COATED ORAL at 22:10

## 2022-09-08 RX ADMIN — ONDANSETRON 4 MILLIGRAM(S): 8 TABLET, FILM COATED ORAL at 14:49

## 2022-09-08 RX ADMIN — TAMSULOSIN HYDROCHLORIDE 0.4 MILLIGRAM(S): 0.4 CAPSULE ORAL at 22:10

## 2022-09-08 RX ADMIN — Medication 100 MILLIGRAM(S): at 22:10

## 2022-09-08 RX ADMIN — SODIUM CHLORIDE 1000 MILLILITER(S): 9 INJECTION, SOLUTION INTRAVENOUS at 14:49

## 2022-09-08 RX ADMIN — Medication 200 MILLIGRAM(S): at 17:52

## 2022-09-08 NOTE — H&P ADULT - TIME BILLING
time spent evaluating and treating the patient's acute illness as well as time spent reviewing labs, radiology, discussing with patient and/or patient's family, and discussing the case with a multidisciplinary team.

## 2022-09-08 NOTE — H&P ADULT - ASSESSMENT
78yo F hx of dementia (AAOx2 at baseline), HTN, recent admission on 7/2022 for diverticulitis + rectosigmoid bowel wall thickening with extension to small bowel (s/p 10 days ertapenem) presenting to the hospital for abdominal pain.     #Abdominal pain secondary to acute colitis  - CT findings suggestive of acute colitis  -  Keep NPO for now  - s/p cipro and  flagyl  - C/w Cipro and flagyl  - f/u cdiff and GI PCR  - F/u GI c/s        #Misc  - DVT Prophylaxis: Heparin S/C Lovenox S/C  - GI Prophylaxis: Protonix Not needed  - Diet: Regular DASH Carb Consistent Renal  - Activity: Increase as tolerated  - Dispo:   78yo F hx of dementia (AAOx2 at baseline), HTN, recent admission on 7/2022 for diverticulitis + rectosigmoid bowel wall thickening presenting to the hospital for abdominal pain.     #Abdominal pain secondary to acute colitis  - CT findings suggestive of acute colitis  - Keep NPO for now  - s/p cipro and  flagyl  - C/w Cipro and flagyl  - f/u cdiff and GI PCR  - F/u GI c/s        #Misc  - DVT Prophylaxis: Lovenox S/C  - GI Prophylaxis: Protonix   - Diet: Regular   - Dispo: ACute   76yo F hx of dementia (AAOx2 at baseline), HTN, recent admission on 7/2022 for diverticulitis + rectosigmoid bowel wall thickening presenting to the hospital for abdominal pain.     #Abdominal pain secondary to acute colitis  - CT findings suggestive of acute colitis  - Keep NPO for now  - s/p cipro and  flagyl  - C/w Cipro and flagyl  - f/u cdiff and GI PCR  - F/u GI c/s        #Misc  - DVT Prophylaxis: Lovenox S/C  - GI Prophylaxis: Protonix   - Diet:NPO  - Dispo: ACute

## 2022-09-08 NOTE — H&P ADULT - NSHPREVIEWOFSYSTEMS_GEN_ALL_CORE
CONSTITUTIONAL - No fever, No diaphoresis, No weight change  SKIN - No rash  HEMATOLOGIC - No abnormal bleeding or bruising  EYES - No eye pain, No blurred vision  ENT - No change in hearing, No sore throat, No neck pain, No rhinorrhea, No ear pain  RESPIRATORY - No shortness of breath, No cough  CARDIAC -No chest pain, No palpitations  GI - No abdominal pain, No nausea, No vomiting, No diarrhea, No constipation, No bright red blood per rectum or melena. No flank pain  - No dysuria, frequency, hematuria.   ENDO - No polydypsia, No polyuria, No heat/cold intolerance  MUSCULOSKELETAL - No joint paint, No swelling, No back pain  NEUROLOGIC - No numbness, No focal weakness, No headache, No dizziness  All other systems negative, unless specified in HPI Review of Systems:  •	CONSTITUTIONAL - No fever, No diaphoresis, No weight change  •	SKIN - No rash  •	EYES - No eye pain,  •	ENT -  No sore throat, No neck pain, No rhinorrhea, No ear pain  •	RESPIRATORY - No shortness of breath,  •	CARDIAC -No chest pain,  •	GI - No abdominal pain, No nausea, No vomiting, No diarrhea, No constipation  •              - No dysuria, frequency, hematuria.   •	MUSCULOSKELETAL - No joint paint, No swelling, No back pain    All other systems negative, unless specified in HPI

## 2022-09-08 NOTE — H&P ADULT - NSHPPHYSICALEXAM_GEN_ALL_CORE
GENERAL: NAD, lying in bed comfortably  HEAD:  Atraumatic, Normocephalic  EYES: EOMI, PERRLA, conjunctiva and sclera clear  ENT: Moist mucous membranes  NECK: Supple, No JVD  CHEST/LUNG: Clear to auscultation bilaterally; No rales, rhonchi, wheezing, or rubs. Unlabored respirations  HEART: Regular rate and rhythm; No murmurs, rubs, or gallops  ABDOMEN: Bowel sounds present; Soft, Nontender, Nondistended. No hepatomegaly  EXTREMITIES:  2+ Peripheral Pulses, brisk capillary refill. No clubbing, cyanosis, or edema  NERVOUS SYSTEM:  Alert & Oriented X3, speech clear. No deficits   MSK: FROM all 4 extremities, full and equal strength  SKIN: No rashes or lesions GENERAL: NAD, lying in bed comfortably  CHEST/LUNG: Clear to auscultation bilaterally; N  HEART: Regular rate and rhythm  ABDOMEN: Bowel sounds present; Soft, Nontender, Nondistended. No hepatomegaly  EXTREMITIES:  2+ Peripheral Pulses  NERVOUS SYSTEM:  Alert & Oriented x1   SKIN: No rashes or lesions

## 2022-09-08 NOTE — H&P ADULT - ATTENDING COMMENTS
76yo F hx of dementia (AAOx2 at baseline), HTN, recent admission on 7/2022 for diverticulitis + rectosigmoid bowel wall thickening presenting to the hospital for abdominal pain.     Agree  with  Above  except for changes outlined  Below.     VITAL SIGNS: AFebrile, vital signs stable  CONSTITUTIONAL: Well-developed; Thin ; in no acute distress.  SKIN: Skin exam is warm and dry, no acute rash.  HEAD: Normocephalic; atraumatic.  EYES: Pupils equal round reactive to light, Extraocular movements intact; conjunctiva and sclera clear.  ENT: No nasal discharge; airway clear. Moist mucus membranes.  NECK: Supple; non tender. No rigidity  CARD: Regular rate and rhythm. Normal S1, S2; no murmurs, gallops, or rubs.  RESP: Lungs clear to auscultation bilaterally. No wheezes, rales or rhonchi.  ABD: Abdomen soft; non-tender; non-distended;  no hepatosplenomegaly. No costovertebral angle tenderness.   EXT: Normal ROM. No clubbing, cyanosis or edema. No calf tenderness to palpation.  NEURO: Alert and oriented x 1-2. No focal deficits.  PSYCH: Cooperative, appropriate.     IMPRESSION  Acute Uncomplicated Colitis   Hx of Dementia   Hx HTN       Plan  Continue Abx  therapy   Convert  to  Oral on discharge   OP GI follow Up   Reorientation For  [place]   Fall precautions  Advance diet     #Progress Note Handoff  Disposition: 24 DC  Patient Seen at Bedside___________09/09_____

## 2022-09-08 NOTE — ED PROVIDER NOTE - CLINICAL SUMMARY MEDICAL DECISION MAKING FREE TEXT BOX
77yF p/w abd pain, n/v similar to prior.  Labs w/ borderline leukocytosis but no BRANDY or electrolyte abnormality.  CT c/w colitis, similar to prior.  Pt treated with IV LR and zofran and cipro/flagyl.  Will adm to medicine for IV abx and symptom control and plan for surgery consult as needed.

## 2022-09-08 NOTE — CONSULT NOTE ADULT - ASSESSMENT
76yo F hx of dementia (AAOx2 at baseline), HTN, recent admission on 7/2022 for diverticulitis + rectosigmoid bowel wall thickening presenting to the hospital for abdominal pain.     Plan:  - CLD if tolerating  - Continue antibiotics  - Colorectal surgery to follow while inpatient      Above plan discussed with Attending Surgeon Dr. Jean-Baptiste , patient, patient family, and Primary team  09-08-22 @ 21:42 78yo F hx of dementia (AAOx2 at baseline), HTN, recent admission on 7/2022 for diverticulitis + rectosigmoid bowel wall thickening presenting to the hospital for abdominal pain.     Plan:  - CLD if tolerating  - Continue antibiotics  - Patient may benefit from inpatient colectomy  - Further discussion with patient and daughter      Above plan discussed with Attending Surgeon Dr. Jean-Baptiste , patient, patient family, and Primary team  09-08-22 @ 21:42

## 2022-09-08 NOTE — H&P ADULT - HISTORY OF PRESENT ILLNESS
77 years old F with PMHx of HTN, Gerd< recurrent colitis presents to [facility] ED with abdominal pain and nausea/vomiting.      In the ED pt VS: Temp 97.7, HR 84, /88  Labs showed Hg of 11.9(baseline), WBC 12.76, rest of the labs were normal  Ct scan showed findings suggestive of acute colitis. 78yo F hx of dementia, HTN, recent admission on 7/22 and 8/22 for diverticulitis presenting to the hospital for abdominal pain and nausea. Pt unable to provide any history as she is very confuse. Hx mainly obtained form pts Nicole on the phone who said pt was recently dischrged and completed her ABx but since yesterdaypt was complaining of pain in the abdomen. pt appeared more confused and was acting out. Pt is scheduled for surgery with Dr. Jean-Baptiste on 30th september and was on soft diet for prep for surgery. ZHx goes back to June when she started to have these frequent episodes of coloitis and was in and out of the hospital multiple times. Abdominal pain has been chronic, on/off, intermittent, dull, 4/10, periumbilical without radiation.     Recently, on 8/8/2022, patient had colonoscopy for CRC screening (first colonoscopy) without any complications. She was found to have multiple non-bleeding diverticula with mixed openings + inflammation in the sigmoid colon. There was luminal narrowing, so colonoscope could not be advanced beyond the sigmoid colon.     In the ED pt VS: Temp 97.7, HR 84, /88  Labs showed Hg of 11.9(baseline), WBC 12.76, rest of the labs were normal  Ct scan showed findings suggestive of acute colitis. 78yo F hx of dementia, HTN, recent admission on 7/22 and 8/22 for diverticulitis presenting to the hospital for abdominal pain and nausea. Pt unable to provide any history as she is very confused. Hx mainly obtained form pts daughter Lisbet on the phone who said pt was recently discharged and completed her ABx but since yesterday pt was complaining of pain in the abdomen. Abdominal pain has been chronic, on/off, intermittent, dull, 4/10, periumbilical without radiation.  Pt appeared more confused and was acting out. Pt is scheduled for surgery with Dr. Jean-Baptiste on 30th september and was on soft diet for prep for surgery. Hx goes back to June when she started to have these frequent episodes of colitis and was in and out of the hospital multiple times.     Recently, on 8/8/2022, patient had colonoscopy for CRC screening (first colonoscopy) without any complications. She was found to have multiple non-bleeding diverticula with mixed openings + inflammation in the sigmoid colon. There was luminal narrowing, so colonoscope could not be advanced beyond the sigmoid colon.     In the ED pt VS: Temp 97.7, HR 84, /88  Labs showed Hg of 11.9(baseline), WBC 12.76, rest of the labs were normal  Ct scan showed findings suggestive of acute colitis.    Pt is admitted to medicine for further workup.

## 2022-09-08 NOTE — ED PROVIDER NOTE - PHYSICAL EXAMINATION
CONST: NAD  EYES: Sclera and conjunctiva clear.   ENT: No nasal discharge. Oropharynx normal appearing  NECK: Non-tender, no meningeal signs. normal ROM. supple   CARD: S1 S2; No jvd  RESP: Equal BS B/L, No wheezes, rhonchi or rales. No distress  GI: LLQ tenderness. Soft, non-distended. no cva tenderness. normal BS  MS: Normal ROM in all extremities. pulses 2 +. no calf tenderness or swelling  SKIN: Warm, dry, no acute rashes. Good turgor  NEURO: A&Ox1, No focal deficits. Strength 5/5 with no sensory deficits.

## 2022-09-08 NOTE — ED PROVIDER NOTE - OBJECTIVE STATEMENT
77y F pmh Dementia, HTN, GERD, Diverticulitis presents for eval of abd pain. As per son pt developed sharp lower abd pain this morning with associated nbnb vomiting and diarrhea. Pt currently on PO abx for colitis, son unsure of abx.

## 2022-09-08 NOTE — CONSULT NOTE ADULT - SUBJECTIVE AND OBJECTIVE BOX
GENERAL SURGERY CONSULT NOTE    Patient: KEISHA PANTOJA , 77y (43)Female   MRN: [id number]  Location: [facility] [place] ER Hold 053 A  Visit: 22 Inpatient  Date: 22 @ 21:42    HPI:  78yo F hx of dementia, HTN, recent admission on  and  for diverticulitis presenting to the hospital for abdominal pain and nausea. Pt unable to provide any history as she is very confused. Hx mainly obtained form pts daughter Lisbet on the phone who said pt was recently discharged and completed her ABx but since yesterday pt was complaining of pain in the abdomen. Abdominal pain has been chronic, on/off, intermittent, dull, 4/10, periumbilical without radiation.  Pt appeared more confused and was acting out. Pt is scheduled for surgery with Dr. Jean-Baptiste on  and was on soft diet for prep for surgery. Hx goes back to  when she started to have these frequent episodes of colitis and was in and out of the hospital multiple times.     Recently, on 2022, patient had colonoscopy for CRC screening (first colonoscopy) without any complications. She was found to have multiple non-bleeding diverticula with mixed openings + inflammation in the sigmoid colon. There was luminal narrowing, so colonoscope could not be advanced beyond the sigmoid colon.     In the ED pt VS: Temp 97.7, HR 84, /88  Labs showed Hg of 11.9(baseline), WBC 12.76, rest of the labs were normal  Ct scan showed findings suggestive of acute colitis.    Pt is admitted to medicine for further workup.     When seen at bedside, daughter provided the history. There is a two-day history of increased abdominal pain. She states that the patient is normally on a soft food diet but has been avoiding most food for the last two days and mainly drinking ensures.  Last bowel movement was today and was diarrhea in nature. Of note, she is currently on a laxative. Patient had extreme pain this afternoon which prompted being brought it. She was originally scheduled for  with Dr. Jean-Baptiste for surgery. Patient was laying in bed.      PAST MEDICAL & SURGICAL HISTORY:  Hypertension, unspecified type  Dementia  GERD (gastroesophageal reflux disease)  Diverticulitis  Lack of bladder control  H/O dilation and curettage for missed     Home Medications:      VITALS:  T(F): 98.7 (22 @ 20:14), Max: 98.7 (22 @ 20:14)  HR: 80 (22 @ 20:14) (80 - 84)  BP: 184/78 (22 @ 20:14) (184/78 - 200/88)  RR: 20 (22 @ 20:14) (20 - 20)  SpO2: 97% (22 @ 20:14) (97% - 98%)    PHYSICAL EXAM:  General: NAD, calm and cooperative  HEENT: Trachea ML  Cardiac: S1, S2  Respiratory: Normal respiratory effort  Abdomen: TTP. Soft, non-distended, no rebound, no guarding. +BS.  Skin: Warm/dry, normal color, no jaundice    MEDICATIONS  (STANDING):  ciprofloxacin   IVPB 400 milliGRAM(s) IV Intermittent every 12 hours  donepezil 5 milliGRAM(s) Oral at bedtime  enoxaparin Injectable 40 milliGRAM(s) SubCutaneous every 24 hours  lactated ringers. 1000 milliLiter(s) (75 mL/Hr) IV Continuous <Continuous>  metroNIDAZOLE  IVPB 500 milliGRAM(s) IV Intermittent every 8 hours  pantoprazole  Injectable 40 milliGRAM(s) IV Push two times a day  polyethylene glycol 3350 Oral Powder - Peds 17 Gram(s) Oral daily  senna 2 Tablet(s) Oral daily  tamsulosin 0.4 milliGRAM(s) Oral at bedtime    MEDICATIONS  (PRN):  acetaminophen     Tablet .. 650 milliGRAM(s) Oral every 6 hours PRN Temp greater or equal to 38C (100.4F), Mild Pain (1 - 3)      LAB/STUDIES:                        11.9   12.76 )-----------( 384      ( 08 Sep 2022 14:41 )             36.4         141  |  101  |  17  ----------------------------<  148<H>  5.0   |  26  |  0.7    Ca    10.1      08 Sep 2022 14:41  Mg     2.4         TPro  8.0  /  Alb  4.0  /  TBili  0.3  /  DBili  x   /  AST  17  /  ALT  15  /  AlkPhos  93      PT/INR - ( 08 Sep 2022 14:41 )   PT: TNP sec;   INR: TNP ratio           LIVER FUNCTIONS - ( 08 Sep 2022 14:41 )  Alb: 4.0 g/dL / Pro: 8.0 g/dL / ALK PHOS: 93 U/L / ALT: 15 U/L / AST: 17 U/L / GGT: x             IMAGING:  < from: CT Abdomen and Pelvis No Cont (22 @ 16:01) >  IMPRESSION:    1. Since 2022, persistent moderate mural thickening involving   the sigmoid colon, with moderate pericolonic fat stranding, compatible   with acute colitis; evaluation for extraluminal collection is limited   without oral or intravenous contrast. Please note that underlying   neoplastic process remains a possibility and correlation with direct   visualization is suggested when acute episode resolves.    2. Remainder of findings are overall unchanged on this short-term   follow-up exam.    --- End of Report ---    < end of copied text >    · Assessment	  78yo F hx of dementia (AAOx2 at baseline), HTN, recent admission on 2022 for diverticulitis + rectosigmoid bowel wall thickening presenting to the hospital for abdominal pain.     Plan:  - Surgery to follow  - Patient wishes to go home. Home vs. Procedure if admitted  -    Above plan discussed with Attending Surgeon Dr. Jean-Baptiste , patient, patient family, and Primary team  22 @ 21:42   GENERAL SURGERY CONSULT NOTE    Patient: KEISHA PANTOJA , 77y (43)Female   MRN: [id number]  Location: [facility] ER Hold 053 A  Visit: 22 Inpatient  Date: 22 @ 21:42    HPI:  78yo F hx of dementia, HTN, recent admission on  and  for diverticulitis presenting to the hospital for abdominal pain and nausea. Pt unable to provide any history as she is very confused. Hx mainly obtained form pts daughter Lisbet on the phone who said pt was recently discharged and completed her ABx but since yesterday pt was complaining of pain in the abdomen. Abdominal pain has been chronic, on/off, intermittent, dull, 4/10, periumbilical without radiation.  Pt appeared more confused and was acting out. Pt is scheduled for surgery with Dr. Jean-Baptiste on  and was on soft diet for prep for surgery. Hx goes back to  when she started to have these frequent episodes of colitis and was in and out of the hospital multiple times.     Recently, on 2022, patient had colonoscopy for CRC screening (first colonoscopy) without any complications. She was found to have multiple non-bleeding diverticula with mixed openings + inflammation in the sigmoid colon. There was luminal narrowing, so colonoscope could not be advanced beyond the sigmoid colon.     In the ED pt VS: Temp 97.7, HR 84, /88  Labs showed Hg of 11.9(baseline), WBC 12.76, rest of the labs were normal  Ct scan showed findings suggestive of acute colitis.    Pt is admitted to medicine for further workup.     When seen at bedside, daughter provided the history. There is a two-day history of increased abdominal pain. She states that the patient is normally on a soft food diet but has been avoiding most food for the last two days and mainly drinking ensures.  Last bowel movement was today and was diarrhea in nature. Of note, she is currently on a laxative. Patient had extreme pain this afternoon which prompted being brought it. She was originally scheduled for  with Dr. Jean-Baptiste for surgery. Patient was laying in bed.      PAST MEDICAL & SURGICAL HISTORY:  Hypertension, unspecified type  Dementia  GERD (gastroesophageal reflux disease)  Diverticulitis  Lack of bladder control  H/O dilation and curettage for missed     Home Medications:      VITALS:  T(F): 98.7 (22 @ 20:14), Max: 98.7 (22 @ 20:14)  HR: 80 (22 @ 20:14) (80 - 84)  BP: 184/78 (22 @ 20:14) (184/78 - 200/88)  RR: 20 (22 @ 20:14) (20 - 20)  SpO2: 97% (22 @ 20:14) (97% - 98%)    PHYSICAL EXAM:  General: NAD, calm and cooperative  HEENT: Trachea ML  Cardiac: S1, S2  Respiratory: Normal respiratory effort  Abdomen: TTP. Soft, non-distended, no rebound, no guarding. +BS.  Skin: Warm/dry, normal color, no jaundice    MEDICATIONS  (STANDING):  ciprofloxacin   IVPB 400 milliGRAM(s) IV Intermittent every 12 hours  donepezil 5 milliGRAM(s) Oral at bedtime  enoxaparin Injectable 40 milliGRAM(s) SubCutaneous every 24 hours  lactated ringers. 1000 milliLiter(s) (75 mL/Hr) IV Continuous <Continuous>  metroNIDAZOLE  IVPB 500 milliGRAM(s) IV Intermittent every 8 hours  pantoprazole  Injectable 40 milliGRAM(s) IV Push two times a day  polyethylene glycol 3350 Oral Powder - Peds 17 Gram(s) Oral daily  senna 2 Tablet(s) Oral daily  tamsulosin 0.4 milliGRAM(s) Oral at bedtime    MEDICATIONS  (PRN):  acetaminophen     Tablet .. 650 milliGRAM(s) Oral every 6 hours PRN Temp greater or equal to 38C (100.4F), Mild Pain (1 - 3)      LAB/STUDIES:                        11.9   12.76 )-----------( 384      ( 08 Sep 2022 14:41 )             36.4         141  |  101  |  17  ----------------------------<  148<H>  5.0   |  26  |  0.7    Ca    10.1      08 Sep 2022 14:41  Mg     2.4         TPro  8.0  /  Alb  4.0  /  TBili  0.3  /  DBili  x   /  AST  17  /  ALT  15  /  AlkPhos  93      PT/INR - ( 08 Sep 2022 14:41 )   PT: TNP sec;   INR: TNP ratio           LIVER FUNCTIONS - ( 08 Sep 2022 14:41 )  Alb: 4.0 g/dL / Pro: 8.0 g/dL / ALK PHOS: 93 U/L / ALT: 15 U/L / AST: 17 U/L / GGT: x             IMAGING:  < from: CT Abdomen and Pelvis No Cont (22 @ 16:01) >  IMPRESSION:    1. Since 2022, persistent moderate mural thickening involving   the sigmoid colon, with moderate pericolonic fat stranding, compatible   with acute colitis; evaluation for extraluminal collection is limited   without oral or intravenous contrast. Please note that underlying   neoplastic process remains a possibility and correlation with direct   visualization is suggested when acute episode resolves.    2. Remainder of findings are overall unchanged on this short-term   follow-up exam.    --- End of Report ---    < end of copied text >    · Assessment	  78yo F hx of dementia (AAOx2 at baseline), HTN, recent admission on 2022 for diverticulitis + rectosigmoid bowel wall thickening presenting to the hospital for abdominal pain.     Plan:  - TBD  -    Above plan discussed with Attending Surgeon Dr. Jean-Baptiste , patient, patient family, and Primary team  22 @ 21:42   GENERAL SURGERY CONSULT NOTE    Patient: KEISHA PANTOJA , 77y (43)Female   MRN: [id number]  Location: [facility] ER Hold 053 A  Visit: 22 Inpatient  Date: 22 @ 21:42    HPI:  78yo F hx of dementia, HTN, recent admission on  and  for diverticulitis presenting to the hospital for abdominal pain and nausea. Pt unable to provide any history as she is very confused. Hx mainly obtained form pts daughter Lisbet on the phone who said pt was recently discharged and completed her ABx but since yesterday pt was complaining of pain in the abdomen. Abdominal pain has been chronic, on/off, intermittent, dull, 4/10, periumbilical without radiation.  Pt appeared more confused and was acting out. Pt is scheduled for surgery with Dr. Jean-Baptiste on  and was on soft diet for prep for surgery. Hx goes back to  when she started to have these frequent episodes of colitis and was in and out of the hospital multiple times.     Recently, on 2022, patient had colonoscopy for CRC screening (first colonoscopy) without any complications. She was found to have multiple non-bleeding diverticula with mixed openings + inflammation in the sigmoid colon. There was luminal narrowing, so colonoscope could not be advanced beyond the sigmoid colon.     In the ED pt VS: Temp 97.7, HR 84, /88  Labs showed Hg of 11.9(baseline), WBC 12.76, rest of the labs were normal  Ct scan showed findings suggestive of acute colitis.    Pt is admitted to medicine for further workup.     When seen at bedside, daughter provided the history. There is a two-day history of increased abdominal pain. She states that the patient is normally on a soft food diet but has been avoiding most food for the last two days and mainly drinking ensures.  Last bowel movement was today and was diarrhea in nature. Of note, she is currently on a laxative. Patient had extreme pain this afternoon which prompted being brought it. She was originally scheduled for  with Dr. Jean-Baptiste for surgery. Patient was laying in bed.      PAST MEDICAL & SURGICAL HISTORY:  Hypertension, unspecified type  Dementia  GERD (gastroesophageal reflux disease)  Diverticulitis  Lack of bladder control  H/O dilation and curettage for missed     Home Medications:      VITALS:  T(F): 98.7 (22 @ 20:14), Max: 98.7 (22 @ 20:14)  HR: 80 (22 @ 20:14) (80 - 84)  BP: 184/78 (22 @ 20:14) (184/78 - 200/88)  RR: 20 (22 @ 20:14) (20 - 20)  SpO2: 97% (22 @ 20:14) (97% - 98%)    PHYSICAL EXAM:  General: NAD, calm and cooperative  HEENT: Trachea ML  Cardiac: S1, S2  Respiratory: Normal respiratory effort  Abdomen: TTP. Soft, non-distended, no rebound, no guarding. +BS.  Skin: Warm/dry, normal color, no jaundice    MEDICATIONS  (STANDING):  ciprofloxacin   IVPB 400 milliGRAM(s) IV Intermittent every 12 hours  donepezil 5 milliGRAM(s) Oral at bedtime  enoxaparin Injectable 40 milliGRAM(s) SubCutaneous every 24 hours  lactated ringers. 1000 milliLiter(s) (75 mL/Hr) IV Continuous <Continuous>  metroNIDAZOLE  IVPB 500 milliGRAM(s) IV Intermittent every 8 hours  pantoprazole  Injectable 40 milliGRAM(s) IV Push two times a day  polyethylene glycol 3350 Oral Powder - Peds 17 Gram(s) Oral daily  senna 2 Tablet(s) Oral daily  tamsulosin 0.4 milliGRAM(s) Oral at bedtime    MEDICATIONS  (PRN):  acetaminophen     Tablet .. 650 milliGRAM(s) Oral every 6 hours PRN Temp greater or equal to 38C (100.4F), Mild Pain (1 - 3)      LAB/STUDIES:                        11.9   12.76 )-----------( 384      ( 08 Sep 2022 14:41 )             36.4         141  |  101  |  17  ----------------------------<  148<H>  5.0   |  26  |  0.7    Ca    10.1      08 Sep 2022 14:41  Mg     2.4         TPro  8.0  /  Alb  4.0  /  TBili  0.3  /  DBili  x   /  AST  17  /  ALT  15  /  AlkPhos  93      PT/INR - ( 08 Sep 2022 14:41 )   PT: TNP sec;   INR: TNP ratio           LIVER FUNCTIONS - ( 08 Sep 2022 14:41 )  Alb: 4.0 g/dL / Pro: 8.0 g/dL / ALK PHOS: 93 U/L / ALT: 15 U/L / AST: 17 U/L / GGT: x             IMAGING:  < from: CT Abdomen and Pelvis No Cont (22 @ 16:01) >  IMPRESSION:    1. Since 2022, persistent moderate mural thickening involving   the sigmoid colon, with moderate pericolonic fat stranding, compatible   with acute colitis; evaluation for extraluminal collection is limited   without oral or intravenous contrast. Please note that underlying   neoplastic process remains a possibility and correlation with direct   visualization is suggested when acute episode resolves.    2. Remainder of findings are overall unchanged on this short-term   follow-up exam.    --- End of Report ---    < end of copied text >

## 2022-09-08 NOTE — ED PROVIDER NOTE - NS ED ROS FT
Constitutional: (-) fever  Eyes/ENT: (-) blurry vision, (-) epistaxis  Cardiovascular: (-) chest pain, (-) syncope  Respiratory: (-) cough, (-) shortness of breath  Gastrointestinal: (+) abd pain, (+) vomiting, (+) diarrhea  : (-) dysuria, (-) hematuria  Musculoskeletal: (-) neck pain, (-) back pain, (-) joint pain  Integumentary: (-) rash, (-) edema  Neurological: (-) headache, (-) altered mental status  Allergic/Immunologic: (-) pruritus

## 2022-09-08 NOTE — ED PROVIDER NOTE - ATTENDING APP SHARED VISIT CONTRIBUTION OF CARE
77yF p/w abd pain and n/v - pt seen in the ED recently for recurrent similar sx w/ persistent/recurrent inflammation (diverticulitis? sigmoid colitis?) and is following with Dr. Jean-Baptiste for elective sigmoidectomy.  She presents today for similar symptoms.

## 2022-09-08 NOTE — H&P ADULT - NSHPLABSRESULTS_GEN_ALL_CORE
11.9   12.76 )-----------( 384      ( 08 Sep 2022 14:41 )             36.4       09-08    141  |  101  |  17  ----------------------------<  148<H>  5.0   |  26  |  0.7    Ca    10.1      08 Sep 2022 14:41  Mg     2.4     09-08    TPro  8.0  /  Alb  4.0  /  TBili  0.3  /  DBili  x   /  AST  17  /  ALT  15  /  AlkPhos  93  09-08                  PT/INR - ( 08 Sep 2022 14:41 )   PT: TNP sec;   INR: TNP ratio             Lactate Trend  09-08 @ 14:41 Lactate:1.4             CAPILLARY BLOOD GLUCOSE            Culture Results:   Normal Urogenital luis present (08-14 @ 17:02) Detail Level: Zone Hide Additional Notes?: No

## 2022-09-09 LAB
A1C WITH ESTIMATED AVERAGE GLUCOSE RESULT: 6 % — HIGH (ref 4–5.6)
ALBUMIN SERPL ELPH-MCNC: 3.5 G/DL — SIGNIFICANT CHANGE UP (ref 3.5–5.2)
ALP SERPL-CCNC: 82 U/L — SIGNIFICANT CHANGE UP (ref 30–115)
ALT FLD-CCNC: 12 U/L — SIGNIFICANT CHANGE UP (ref 0–41)
ANION GAP SERPL CALC-SCNC: 13 MMOL/L — SIGNIFICANT CHANGE UP (ref 7–14)
AST SERPL-CCNC: 14 U/L — SIGNIFICANT CHANGE UP (ref 0–41)
BASOPHILS # BLD AUTO: 0.05 K/UL — SIGNIFICANT CHANGE UP (ref 0–0.2)
BASOPHILS NFR BLD AUTO: 0.7 % — SIGNIFICANT CHANGE UP (ref 0–1)
BILIRUB SERPL-MCNC: 0.4 MG/DL — SIGNIFICANT CHANGE UP (ref 0.2–1.2)
BUN SERPL-MCNC: 15 MG/DL — SIGNIFICANT CHANGE UP (ref 10–20)
CALCIUM SERPL-MCNC: 9.5 MG/DL — SIGNIFICANT CHANGE UP (ref 8.5–10.1)
CHLORIDE SERPL-SCNC: 101 MMOL/L — SIGNIFICANT CHANGE UP (ref 98–110)
CHOLEST SERPL-MCNC: 161 MG/DL — SIGNIFICANT CHANGE UP
CO2 SERPL-SCNC: 25 MMOL/L — SIGNIFICANT CHANGE UP (ref 17–32)
CREAT SERPL-MCNC: 0.6 MG/DL — LOW (ref 0.7–1.5)
EGFR: 92 ML/MIN/1.73M2 — SIGNIFICANT CHANGE UP
EOSINOPHIL # BLD AUTO: 0.12 K/UL — SIGNIFICANT CHANGE UP (ref 0–0.7)
EOSINOPHIL NFR BLD AUTO: 1.7 % — SIGNIFICANT CHANGE UP (ref 0–8)
ESTIMATED AVERAGE GLUCOSE: 126 MG/DL — HIGH (ref 68–114)
GLUCOSE SERPL-MCNC: 119 MG/DL — HIGH (ref 70–99)
HCT VFR BLD CALC: 35 % — LOW (ref 37–47)
HDLC SERPL-MCNC: 59 MG/DL — SIGNIFICANT CHANGE UP
HGB BLD-MCNC: 11.4 G/DL — LOW (ref 12–16)
IMM GRANULOCYTES NFR BLD AUTO: 0.6 % — HIGH (ref 0.1–0.3)
LIPID PNL WITH DIRECT LDL SERPL: 95 MG/DL — SIGNIFICANT CHANGE UP
LYMPHOCYTES # BLD AUTO: 1.31 K/UL — SIGNIFICANT CHANGE UP (ref 1.2–3.4)
LYMPHOCYTES # BLD AUTO: 18.5 % — LOW (ref 20.5–51.1)
MCHC RBC-ENTMCNC: 27.7 PG — SIGNIFICANT CHANGE UP (ref 27–31)
MCHC RBC-ENTMCNC: 32.6 G/DL — SIGNIFICANT CHANGE UP (ref 32–37)
MCV RBC AUTO: 85.2 FL — SIGNIFICANT CHANGE UP (ref 81–99)
MONOCYTES # BLD AUTO: 1.13 K/UL — HIGH (ref 0.1–0.6)
MONOCYTES NFR BLD AUTO: 16 % — HIGH (ref 1.7–9.3)
NEUTROPHILS # BLD AUTO: 4.43 K/UL — SIGNIFICANT CHANGE UP (ref 1.4–6.5)
NEUTROPHILS NFR BLD AUTO: 62.5 % — SIGNIFICANT CHANGE UP (ref 42.2–75.2)
NON HDL CHOLESTEROL: 102 MG/DL — SIGNIFICANT CHANGE UP
NRBC # BLD: 0 /100 WBCS — SIGNIFICANT CHANGE UP (ref 0–0)
PLATELET # BLD AUTO: 332 K/UL — SIGNIFICANT CHANGE UP (ref 130–400)
POTASSIUM SERPL-MCNC: 3.9 MMOL/L — SIGNIFICANT CHANGE UP (ref 3.5–5)
POTASSIUM SERPL-SCNC: 3.9 MMOL/L — SIGNIFICANT CHANGE UP (ref 3.5–5)
PROT SERPL-MCNC: 7.2 G/DL — SIGNIFICANT CHANGE UP (ref 6–8)
RBC # BLD: 4.11 M/UL — LOW (ref 4.2–5.4)
RBC # FLD: 16.1 % — HIGH (ref 11.5–14.5)
SODIUM SERPL-SCNC: 139 MMOL/L — SIGNIFICANT CHANGE UP (ref 135–146)
TRIGL SERPL-MCNC: 34 MG/DL — SIGNIFICANT CHANGE UP
WBC # BLD: 7.08 K/UL — SIGNIFICANT CHANGE UP (ref 4.8–10.8)
WBC # FLD AUTO: 7.08 K/UL — SIGNIFICANT CHANGE UP (ref 4.8–10.8)

## 2022-09-09 PROCEDURE — 93010 ELECTROCARDIOGRAM REPORT: CPT

## 2022-09-09 PROCEDURE — 99222 1ST HOSP IP/OBS MODERATE 55: CPT

## 2022-09-09 PROCEDURE — 99231 SBSQ HOSP IP/OBS SF/LOW 25: CPT

## 2022-09-09 RX ORDER — POLYETHYLENE GLYCOL 3350 17 G/17G
17 POWDER, FOR SOLUTION ORAL
Refills: 0 | Status: DISCONTINUED | OUTPATIENT
Start: 2022-09-09 | End: 2022-09-11

## 2022-09-09 RX ADMIN — Medication 650 MILLIGRAM(S): at 04:49

## 2022-09-09 RX ADMIN — TAMSULOSIN HYDROCHLORIDE 0.4 MILLIGRAM(S): 0.4 CAPSULE ORAL at 21:17

## 2022-09-09 RX ADMIN — Medication 200 MILLIGRAM(S): at 17:27

## 2022-09-09 RX ADMIN — SODIUM CHLORIDE 75 MILLILITER(S): 9 INJECTION, SOLUTION INTRAVENOUS at 01:22

## 2022-09-09 RX ADMIN — Medication 650 MILLIGRAM(S): at 05:10

## 2022-09-09 RX ADMIN — DONEPEZIL HYDROCHLORIDE 5 MILLIGRAM(S): 10 TABLET, FILM COATED ORAL at 21:17

## 2022-09-09 RX ADMIN — Medication 200 MILLIGRAM(S): at 05:49

## 2022-09-09 RX ADMIN — SENNA PLUS 1 TABLET(S): 8.6 TABLET ORAL at 11:30

## 2022-09-09 RX ADMIN — PANTOPRAZOLE SODIUM 40 MILLIGRAM(S): 20 TABLET, DELAYED RELEASE ORAL at 17:28

## 2022-09-09 RX ADMIN — PANTOPRAZOLE SODIUM 40 MILLIGRAM(S): 20 TABLET, DELAYED RELEASE ORAL at 06:45

## 2022-09-09 RX ADMIN — Medication 100 MILLIGRAM(S): at 13:30

## 2022-09-09 RX ADMIN — Medication 100 MILLIGRAM(S): at 21:19

## 2022-09-09 RX ADMIN — ENOXAPARIN SODIUM 40 MILLIGRAM(S): 100 INJECTION SUBCUTANEOUS at 05:48

## 2022-09-09 RX ADMIN — Medication 100 MILLIGRAM(S): at 05:48

## 2022-09-09 RX ADMIN — POLYETHYLENE GLYCOL 3350 17 GRAM(S): 17 POWDER, FOR SOLUTION ORAL at 17:30

## 2022-09-09 RX ADMIN — SODIUM CHLORIDE 75 MILLILITER(S): 9 INJECTION, SOLUTION INTRAVENOUS at 13:30

## 2022-09-09 NOTE — CHART NOTE - NSCHARTNOTEFT_GEN_A_CORE
Discussed patient with patient's daughter Lisbet. Spoke with her about her mother's baseline confusion and forgetfulness. I informed her the patient was no longer having abdominal pain and was stable. I informed her there is an ongoing discussion with colorectal surgery about whether to have surgery now vs on the 30th of September as she was originally scheduled. Daughter was concerned about a swelling in patient's LUE and duplex was cancelled because patient refused. I evaluated the arm. There is a small area of nontender erythema on the forearm, looks non-swollen and superficial. Possibly site of previous IV.

## 2022-09-09 NOTE — CONSULT NOTE ADULT - ASSESSMENT
78yo F hx of dementia, HTN, recent admission on 7/22 and 8/22 for diverticulitis presenting to the hospital for abdominal pain and nausea.     # Recurrent Diverticulitis complicated with sigmoid colon narrowing cannot rule out CRC     - was scheduled for surgery with Dr. Jean-Baptiste on 30th september   - multiple admission s with recurrent diverticulitis   - colonoscopy last month: non-bleeding diverticula with mixed openings + inflammation in the sigmoid colon. There was luminal narrowing, so colonoscope could not be advanced beyond the sigmoid colon.   - WBC 12  - lactate 1.4   - reviewed labs, and imaging.  - on Cipro and flagyl.   - CT on admission: persistent moderate mural thickening involving   the sigmoid colon, with moderate pericolonic fat stranding, compatible   with acute colitis; evaluation for extraluminal collection is limited   without oral or intravenous contrast. Please note that underlying   neoplastic process remains a possibility and correlation with direct   visualization is suggested when acute episode resolves.    RECOMMENDATIONS  - c/w Abx   - IV hydration as tolerated  - CLD   - possible colectomy      78yo F hx of dementia, HTN, recent admission on 7/22 and 8/22 for diverticulitis presenting to the hospital for abdominal pain and nausea.     # Recurrent Diverticulitis complicated with sigmoid colon stricture.     - was scheduled for surgery with Dr. Jean-Baptiste on 30th september   - multiple admission s with recurrent diverticulitis   - colonoscopy last month: non-bleeding diverticula with mixed openings + inflammation in the sigmoid colon. There was luminal narrowing, so colonoscope could not be advanced beyond the sigmoid colon.   - WBC 12  - lactate 1.4   - reviewed labs, and imaging.  - on Cipro and flagyl.   - CT on admission: persistent moderate mural thickening involving   the sigmoid colon, with moderate pericolonic fat stranding, compatible   with acute colitis; evaluation for extraluminal collection is limited   without oral or intravenous contrast. Please note that underlying   neoplastic process remains a possibility and correlation with direct   visualization is suggested when acute episode resolves.    RECOMMENDATIONS  - c/w Abx   - IV hydration as tolerated  - CLD   - possible colectomy    - call as needed     78yo F hx of dementia, HTN, recent admission on 7/22 and 8/22 for diverticulitis presenting to the hospital for abdominal pain and nausea.     # Recurrent Diverticulitis complicated with sigmoid colon stricture.     - was scheduled for surgery with Dr. Jean-Baptiste on 30th september   - multiple admission s with recurrent diverticulitis   - colonoscopy last month: non-bleeding diverticula with mixed openings + inflammation in the sigmoid colon. There was luminal narrowing, so colonoscope could not be advanced beyond the sigmoid colon.   - WBC 12  - lactate 1.4   - reviewed labs, and imaging.  - on Cipro and flagyl.   - CT on admission: persistent moderate mural thickening involving   the sigmoid colon, with moderate pericolonic fat stranding, compatible   with acute colitis; evaluation for extraluminal collection is limited   without oral or intravenous contrast. Please note that underlying   neoplastic process remains a possibility and correlation with direct   visualization is suggested when acute episode resolves.    RECOMMENDATIONS  - c/w Abx   - IV hydration as tolerated  - CLD   - possible colectomy    - call as needed   - Follow up with our [facility] located at [address] [place] [address]. Phone Number: [phone]    - plan discussed with the pt and her daughter at bedside  76yo F hx of dementia, HTN, recent admission on 7/22 and 8/22 for diverticulitis presenting to the hospital for abdominal pain and nausea.     # Recurrent Diverticulitis complicated with sigmoid colon stricture.     - was scheduled for surgery with Dr. Jean-Baptiste on 30th september   - multiple admission s with recurrent diverticulitis   - colonoscopy last month: non-bleeding diverticula with mixed openings + inflammation in the sigmoid colon. There was luminal narrowing, so colonoscope could not be advanced beyond the sigmoid colon.   - WBC 12  - lactate 1.4 (reviewed)  - reviewed cbc, cmp,  and CT imaging.  - on Cipro and flagyl.   - CT on admission: scan reviewed: persistent moderate mural thickening involving   the sigmoid colon, with moderate pericolonic fat stranding, compatible   with acute colitis; evaluation for extraluminal collection is limited   without oral or intravenous contrast. Please note that underlying   neoplastic process remains a possibility and correlation with direct   visualization is suggested when acute episode resolves.    RECOMMENDATIONS  - c/w Abx   - IV hydration as tolerated  - CLD   - possible colectomy    - call as needed   - Follow up with our [facility] located at [address] [place] [address]. Phone Number: [phone]    - plan discussed with the pt and her daughter at bedside

## 2022-09-09 NOTE — CONSULT NOTE ADULT - SUBJECTIVE AND OBJECTIVE BOX
Gastroenterology Consultation:    Patient is a 77y old  Female who presents with a chief complaint of Colitis (08 Sep 2022 21:41)    Admitted on: 22    HPI:  76yo F hx of dementia, HTN, recent admission on  and  for diverticulitis presenting to the hospital for abdominal pain and nausea. Pt unable to provide any history as she is very confused. Hx mainly obtained form pts daughter Lisbet on the phone who said pt was recently discharged and completed her ABx but since yesterday pt was complaining of pain in the abdomen. Abdominal pain has been chronic, on/off, intermittent, dull, 4/10, periumbilical without radiation.  Pt appeared more confused and was acting out. Pt is scheduled for surgery with Dr. Jean-Baptiste on  and was on soft diet for prep for surgery. Hx goes back to  when she started to have these frequent episodes of colitis and was in and out of the hospital multiple times. Recently, on 2022, patient had colonoscopy for CRC screening (first colonoscopy) without any complications. She was found to have multiple non-bleeding diverticula with mixed openings + inflammation in the sigmoid colon. There was luminal narrowing, so colonoscope could not be advanced beyond the sigmoid colon.     Prior EGD: no records available     Prior Colonoscopy:  < from: Colonoscopy (22 @ 11:30) >  Excavated lesions Multiple non-bleeding diverticula with mixed openings and  signs of inflammation were seen in the sigmoid colon. Additional findings  include luminal narrowing. Diverticulitis appeared to be severe. Adult  colonoscope could not be advanced beyond the sigmoid colon due to narrowing and  angulation. Biopsies were taken for histology. The stricture was benign in  appearance.    < end of copied text >      PAST MEDICAL & SURGICAL HISTORY:  Hypertension, unspecified type      Dementia      GERD (gastroesophageal reflux disease)      Diverticulitis      Lack of bladder control      H/O dilation and curettage  for missed             FAMILY HISTORY:  no reported FH of GI cancers in pts records       Social History:  no history of substance use, no active smoking or ETOH   Home Medications:        MEDICATIONS  (STANDING):  ciprofloxacin   IVPB 400 milliGRAM(s) IV Intermittent every 12 hours  donepezil 5 milliGRAM(s) Oral at bedtime  enoxaparin Injectable 40 milliGRAM(s) SubCutaneous every 24 hours  lactated ringers. 1000 milliLiter(s) (75 mL/Hr) IV Continuous <Continuous>  metroNIDAZOLE  IVPB 500 milliGRAM(s) IV Intermittent every 8 hours  pantoprazole  Injectable 40 milliGRAM(s) IV Push two times a day  polyethylene glycol 3350 Oral Powder - Peds 17 Gram(s) Oral daily  senna 2 Tablet(s) Oral daily  tamsulosin 0.4 milliGRAM(s) Oral at bedtime    MEDICATIONS  (PRN):  acetaminophen     Tablet .. 650 milliGRAM(s) Oral every 6 hours PRN Temp greater or equal to 38C (100.4F), Mild Pain (1 - 3)      Allergies  No Known Allergies      Review of Systems:   unable to obtain due to pt condition         Physical Examination:  T(C): 36.4 (22 @ 07:23), Max: 37.1 (22 @ 20:14)  HR: 88 (22 @ 07:23) (80 - 88)  BP: 116/62 (22 @ 07:23) (107/64 - 200/88)  RR: 18 (22 @ 07:23) (18 - 20)  SpO2: 100% (22 @ 07:23) (96% - 100%)  Height (cm): 170.2 (22 @ 13:23)  Weight (kg): 46.7 (22 @ 13:23)        GENERAL: AAOx1  HEAD:  Atraumatic, Normocephalic  EYES: conjunctiva and sclera clear  NECK: Supple, no JVD or thyromegaly  CHEST/LUNG: Clear to auscultation bilaterally; No wheeze, rhonchi, or rales  HEART: Regular rate and rhythm; normal S1, S2, No murmurs.  ABDOMEN: Soft, nontender, nondistended  NEUROLOGY: No asterixis or tremor.   SKIN: Intact, no jaundice        Data:                        11.   12.76 )-----------( 384      ( 08 Sep 2022 14:41 )             36.4     Hgb Trend:  11.9  22 @ 14:41          141  |  101  |  17  ----------------------------<  148<H>  5.0   |  26  |  0.7    Ca    10.1      08 Sep 2022 14:41  Mg     2.4         TPro  8.0  /  Alb  4.0  /  TBili  0.3  /  DBili  x   /  AST  17  /  ALT  15  /  AlkPhos  93      Liver panel trend:  TBili 0.3   /   AST 17   /   ALT 15   /   AlkP 93   /   Tptn 8.0   /   Alb 4.0    /   DBili --            PT/INR - ( 08 Sep 2022 14:41 )   PT: TNP sec;   INR: TNP ratio                 Radiology:  CT Abdomen and Pelvis No Cont:   ACC: [id number] EXAM:  CT ABDOMEN AND PELVIS                          PROCEDURE DATE:  2022          INTERPRETATION:  CLINICAL STATEMENT: Abdominal pain. Nausea and vomiting.    TECHNIQUE: Contiguous axial CT images were obtained from the lowerchest   to the pubic symphysis without intravenous contrast.  Oral contrast was   not administered.  Reformatted images in the coronal and sagittal planes   were acquired.    COMPARISON CT: 2022.    FINDINGS:    LOWER CHEST: Mild bibasilardependent atelectasis.    HEPATOBILIARY: Unremarkable.    SPLEEN: Unchanged indeterminate splenic hypodensity.    PANCREAS: Unremarkable.    ADRENAL GLANDS: Unremarkable.    KIDNEYS: A 9 mm right renal lower pole cyst is noted. No hydronephrosis.    ABDOMINOPELVIC NODES: No new abdominal or pelvic adenopathy.    PELVIC ORGANS: Unchanged coarse uterine calcifications.    PERITONEUM/MESENTERY/BOWEL: Persistent moderate mural thickening   involving the sigmoid colon, with moderate pericolonic fat stranding,   compatible with acute colitis. Evaluation for extraluminal collection is   limited without oral or intravenous contrast. No evidence of bowel   obstruction.    BONES/SOFT TISSUES: Degenerative changes of the spine with grade 1-2   anterolisthesis of L4 on L5. No acute osseous abnormality.    OTHER: Infrarenal IVC filter in stable position.      IMPRESSION:    1. Since 2022, persistent moderate mural thickening involving   the sigmoid colon, with moderate pericolonic fat stranding, compatible   with acute colitis; evaluation for extraluminal collection is limited   without oral or intravenous contrast. Please note that underlying   neoplastic process remains a possibility and correlation with direct   visualization is suggested when acute episode resolves.    2. Remainder of findings are overall unchanged on this short-term   follow-up exam.    --- End of Report ---            ASAD JAIME MD; Attending Radiologist  This document has been electronically signed. Sep  8 2022  4:29PM (22 @ 16:01)

## 2022-09-10 ENCOUNTER — TRANSCRIPTION ENCOUNTER (OUTPATIENT)
Age: 77
End: 2022-09-10

## 2022-09-10 LAB
ALBUMIN SERPL ELPH-MCNC: 3.8 G/DL — SIGNIFICANT CHANGE UP (ref 3.5–5.2)
ALP SERPL-CCNC: 90 U/L — SIGNIFICANT CHANGE UP (ref 30–115)
ALT FLD-CCNC: 13 U/L — SIGNIFICANT CHANGE UP (ref 0–41)
ANION GAP SERPL CALC-SCNC: 13 MMOL/L — SIGNIFICANT CHANGE UP (ref 7–14)
AST SERPL-CCNC: 16 U/L — SIGNIFICANT CHANGE UP (ref 0–41)
BASOPHILS # BLD AUTO: 0.03 K/UL — SIGNIFICANT CHANGE UP (ref 0–0.2)
BASOPHILS NFR BLD AUTO: 0.4 % — SIGNIFICANT CHANGE UP (ref 0–1)
BILIRUB SERPL-MCNC: 0.4 MG/DL — SIGNIFICANT CHANGE UP (ref 0.2–1.2)
BUN SERPL-MCNC: 7 MG/DL — LOW (ref 10–20)
CALCIUM SERPL-MCNC: 9.5 MG/DL — SIGNIFICANT CHANGE UP (ref 8.5–10.1)
CHLORIDE SERPL-SCNC: 96 MMOL/L — LOW (ref 98–110)
CO2 SERPL-SCNC: 24 MMOL/L — SIGNIFICANT CHANGE UP (ref 17–32)
CREAT SERPL-MCNC: 0.5 MG/DL — LOW (ref 0.7–1.5)
EGFR: 96 ML/MIN/1.73M2 — SIGNIFICANT CHANGE UP
EOSINOPHIL # BLD AUTO: 0.03 K/UL — SIGNIFICANT CHANGE UP (ref 0–0.7)
EOSINOPHIL NFR BLD AUTO: 0.4 % — SIGNIFICANT CHANGE UP (ref 0–8)
GLUCOSE SERPL-MCNC: 162 MG/DL — HIGH (ref 70–99)
HCT VFR BLD CALC: 36.7 % — LOW (ref 37–47)
HGB BLD-MCNC: 12.1 G/DL — SIGNIFICANT CHANGE UP (ref 12–16)
IMM GRANULOCYTES NFR BLD AUTO: 0.7 % — HIGH (ref 0.1–0.3)
LYMPHOCYTES # BLD AUTO: 0.83 K/UL — LOW (ref 1.2–3.4)
LYMPHOCYTES # BLD AUTO: 11.1 % — LOW (ref 20.5–51.1)
MAGNESIUM SERPL-MCNC: 1.8 MG/DL — SIGNIFICANT CHANGE UP (ref 1.8–2.4)
MCHC RBC-ENTMCNC: 27.3 PG — SIGNIFICANT CHANGE UP (ref 27–31)
MCHC RBC-ENTMCNC: 33 G/DL — SIGNIFICANT CHANGE UP (ref 32–37)
MCV RBC AUTO: 82.8 FL — SIGNIFICANT CHANGE UP (ref 81–99)
MONOCYTES # BLD AUTO: 0.73 K/UL — HIGH (ref 0.1–0.6)
MONOCYTES NFR BLD AUTO: 9.8 % — HIGH (ref 1.7–9.3)
NEUTROPHILS # BLD AUTO: 5.78 K/UL — SIGNIFICANT CHANGE UP (ref 1.4–6.5)
NEUTROPHILS NFR BLD AUTO: 77.6 % — HIGH (ref 42.2–75.2)
NRBC # BLD: 0 /100 WBCS — SIGNIFICANT CHANGE UP (ref 0–0)
PHOSPHATE SERPL-MCNC: 3.3 MG/DL — SIGNIFICANT CHANGE UP (ref 2.1–4.9)
PLATELET # BLD AUTO: 370 K/UL — SIGNIFICANT CHANGE UP (ref 130–400)
POTASSIUM SERPL-MCNC: 4.6 MMOL/L — SIGNIFICANT CHANGE UP (ref 3.5–5)
POTASSIUM SERPL-SCNC: 4.6 MMOL/L — SIGNIFICANT CHANGE UP (ref 3.5–5)
PROT SERPL-MCNC: 7.2 G/DL — SIGNIFICANT CHANGE UP (ref 6–8)
RBC # BLD: 4.43 M/UL — SIGNIFICANT CHANGE UP (ref 4.2–5.4)
RBC # FLD: 15.5 % — HIGH (ref 11.5–14.5)
SODIUM SERPL-SCNC: 133 MMOL/L — LOW (ref 135–146)
WBC # BLD: 7.45 K/UL — SIGNIFICANT CHANGE UP (ref 4.8–10.8)
WBC # FLD AUTO: 7.45 K/UL — SIGNIFICANT CHANGE UP (ref 4.8–10.8)

## 2022-09-10 PROCEDURE — 99232 SBSQ HOSP IP/OBS MODERATE 35: CPT

## 2022-09-10 PROCEDURE — 93010 ELECTROCARDIOGRAM REPORT: CPT | Mod: 76

## 2022-09-10 RX ORDER — CIPROFLOXACIN LACTATE 400MG/40ML
500 VIAL (ML) INTRAVENOUS EVERY 12 HOURS
Refills: 0 | Status: DISCONTINUED | OUTPATIENT
Start: 2022-09-10 | End: 2022-09-11

## 2022-09-10 RX ORDER — ONDANSETRON 8 MG/1
4 TABLET, FILM COATED ORAL EVERY 8 HOURS
Refills: 0 | Status: DISCONTINUED | OUTPATIENT
Start: 2022-09-10 | End: 2022-09-11

## 2022-09-10 RX ORDER — ONDANSETRON 8 MG/1
4 TABLET, FILM COATED ORAL ONCE
Refills: 0 | Status: COMPLETED | OUTPATIENT
Start: 2022-09-10 | End: 2022-09-10

## 2022-09-10 RX ORDER — METRONIDAZOLE 500 MG
500 TABLET ORAL EVERY 8 HOURS
Refills: 0 | Status: DISCONTINUED | OUTPATIENT
Start: 2022-09-10 | End: 2022-09-11

## 2022-09-10 RX ORDER — AMLODIPINE BESYLATE 2.5 MG/1
5 TABLET ORAL DAILY
Refills: 0 | Status: DISCONTINUED | OUTPATIENT
Start: 2022-09-10 | End: 2022-09-11

## 2022-09-10 RX ORDER — METRONIDAZOLE 500 MG
1 TABLET ORAL
Qty: 15 | Refills: 0
Start: 2022-09-10 | End: 2022-09-14

## 2022-09-10 RX ORDER — LABETALOL HCL 100 MG
10 TABLET ORAL ONCE
Refills: 0 | Status: COMPLETED | OUTPATIENT
Start: 2022-09-10 | End: 2022-09-10

## 2022-09-10 RX ORDER — PANTOPRAZOLE SODIUM 20 MG/1
1 TABLET, DELAYED RELEASE ORAL
Qty: 0 | Refills: 0 | DISCHARGE
Start: 2022-09-10

## 2022-09-10 RX ORDER — PANTOPRAZOLE SODIUM 20 MG/1
40 TABLET, DELAYED RELEASE ORAL
Refills: 0 | Status: DISCONTINUED | OUTPATIENT
Start: 2022-09-10 | End: 2022-09-11

## 2022-09-10 RX ORDER — LABETALOL HCL 100 MG
10 TABLET ORAL ONCE
Refills: 0 | Status: DISCONTINUED | OUTPATIENT
Start: 2022-09-10 | End: 2022-09-10

## 2022-09-10 RX ORDER — MOXIFLOXACIN HYDROCHLORIDE TABLETS, 400 MG 400 MG/1
1 TABLET, FILM COATED ORAL
Qty: 10 | Refills: 0
Start: 2022-09-10 | End: 2022-09-14

## 2022-09-10 RX ADMIN — PANTOPRAZOLE SODIUM 40 MILLIGRAM(S): 20 TABLET, DELAYED RELEASE ORAL at 17:43

## 2022-09-10 RX ADMIN — Medication 10 MILLIGRAM(S): at 02:06

## 2022-09-10 RX ADMIN — ONDANSETRON 4 MILLIGRAM(S): 8 TABLET, FILM COATED ORAL at 08:49

## 2022-09-10 RX ADMIN — Medication 200 MILLIGRAM(S): at 05:18

## 2022-09-10 RX ADMIN — AMLODIPINE BESYLATE 5 MILLIGRAM(S): 2.5 TABLET ORAL at 17:45

## 2022-09-10 RX ADMIN — DONEPEZIL HYDROCHLORIDE 5 MILLIGRAM(S): 10 TABLET, FILM COATED ORAL at 21:37

## 2022-09-10 RX ADMIN — Medication 500 MILLIGRAM(S): at 21:37

## 2022-09-10 RX ADMIN — Medication 650 MILLIGRAM(S): at 00:04

## 2022-09-10 RX ADMIN — POLYETHYLENE GLYCOL 3350 17 GRAM(S): 17 POWDER, FOR SOLUTION ORAL at 17:42

## 2022-09-10 RX ADMIN — ENOXAPARIN SODIUM 40 MILLIGRAM(S): 100 INJECTION SUBCUTANEOUS at 05:18

## 2022-09-10 RX ADMIN — Medication 100 MILLIGRAM(S): at 05:18

## 2022-09-10 RX ADMIN — POLYETHYLENE GLYCOL 3350 17 GRAM(S): 17 POWDER, FOR SOLUTION ORAL at 05:18

## 2022-09-10 RX ADMIN — Medication 500 MILLIGRAM(S): at 20:32

## 2022-09-10 RX ADMIN — PANTOPRAZOLE SODIUM 40 MILLIGRAM(S): 20 TABLET, DELAYED RELEASE ORAL at 05:18

## 2022-09-10 RX ADMIN — ONDANSETRON 4 MILLIGRAM(S): 8 TABLET, FILM COATED ORAL at 01:21

## 2022-09-10 RX ADMIN — SENNA PLUS 2 TABLET(S): 8.6 TABLET ORAL at 12:02

## 2022-09-10 RX ADMIN — TAMSULOSIN HYDROCHLORIDE 0.4 MILLIGRAM(S): 0.4 CAPSULE ORAL at 21:37

## 2022-09-10 NOTE — PROGRESS NOTE ADULT - ATTENDING COMMENTS
Patient seen and examined with surgery team on rounds and discussed management plans. Denies any abd pain now, wbc normal now. on antibiotics, Abd soft mild fullness. CT images reviewed significant fecal loading of whole colon . Continue on liquids and continue liquids and prep for surgery mid next week.

## 2022-09-10 NOTE — DISCHARGE NOTE PROVIDER - HOSPITAL COURSE
76yo F hx of dementia, HTN, recent admission on 7/22 and 8/22 for diverticulitis presenting to the hospital for abdominal pain and nausea. Pt unable to provide any history as she is very confused. Hx mainly obtained form pts daughter Lisbet on the phone who said pt was recently discharged and completed her ABx but since yesterday pt was complaining of pain in the abdomen. Abdominal pain has been chronic, on/off, intermittent, dull, 4/10, periumbilical without radiation.  Pt appeared more confused and was acting out. Pt is scheduled for surgery with Dr. Jean-Baptiste on 30th september and was on soft diet for prep for surgery. Hx goes back to June when she started to have these frequent episodes of colitis and was in and out of the hospital multiple times.     Recently, on 8/8/2022, patient had colonoscopy for CRC screening (first colonoscopy) without any complications. She was found to have multiple non-bleeding diverticula with mixed openings + inflammation in the sigmoid colon. There was luminal narrowing, so colonoscope could not be advanced beyond the sigmoid colon.     In the ED pt VS: Temp 97.7, HR 84, /88  Labs showed Hg of 11.9(baseline), WBC 12.76, rest of the labs were normal  Ct scan showed findings suggestive of acute colitis.    Pt started on cipro 400 IV q12, flagyl 500 IV q8 on 9/8/22 and admitted to medicine for further workup.    Pt and family requesting discharge on 9/10/22. Stable for discharge and f/u outpatient. Abx switched to PO on discharge due to pt removing IV.     # Recurrent Diverticulitis complicated with sigmoid colon stricture.   - 9/10 - pt says abdominal pain is resolved  - c/w cipro 400 IV q12, flagyl 500 IV q8  - amlodipine PRN for HTN  - was scheduled for surgery with Dr. Jean-Baptiste on 30th september  - f/u w/ surgery about possible sigmoidectomy  - f/u w/ GI about possible colectomy   - per surgery Miralax BID, clear liquid diet  - multiple admission with recurrent diverticulitis   - colonoscopy last month: non-bleeding diverticula with mixed openings + inflammation in the sigmoid colon. There was luminal narrowing, so colonoscope could not be advanced beyond the sigmoid colon.   - WBC 12  - lactate 1.4  - reviewed cbc, cmp,  and CT imaging.  - CT on admission: scan reviewed: persistent moderate mural thickening involving the sigmoid colon, with moderate pericolonic fat stranding, compatible with acute colitis; evaluation for extraluminal collection is limited without oral or intravenous contrast. Please note that underlying neoplastic process remains a possibility and correlation with direct visualization is suggested when acute episode resolves.

## 2022-09-10 NOTE — DISCHARGE NOTE PROVIDER - ATTENDING DISCHARGE PHYSICAL EXAMINATION:
Patient clinically improved with resolution of abdominal discomfort.  No fever noted.  BP remained elevated but Amlodipine 5 mg po QD just initiated and will need to be f/up outpt.  Pt to continue po Flagyl and cipro for acute diverticulitis with outpt f/up with GI and Surgery.  Sigmoidectomy planned per surgery but this can be done outpt.  Home PT will be arranged as pt is debilitated from re-admissions with ambulation requiring use of walker.

## 2022-09-10 NOTE — HISTORY OF PRESENT ILLNESS
[FreeTextEntry1] : Patient is a 77F with PMH of dementia, HTN , diverticulitis presents after recent admission for recurrent sigmoid diverticulitis and partial obstruction.  She was treated conservatively and presents now for follow up.  She is tolerating a diet and has daily BM.  She denies abdominal pain. Pt denies fever, chills, nausea, vomiting, abdominal pain, constipation, diarrhea, blood in stool, or unexpected weight loss.Pt denies a family history of colon cancer, rectal cancer, or inflammatory bowel disease. She had an attempted colonoscopy that was aborted due to sigmoid stricture.

## 2022-09-10 NOTE — PATIENT PROFILE ADULT - FALL HARM RISK - HARM RISK INTERVENTIONS

## 2022-09-10 NOTE — DISCHARGE NOTE PROVIDER - CARE PROVIDER_API CALL
Arnulfo Jean-Baptiste)  ColonRectal Surgery; Surgery  [address], [address] [place] [address]  Phone: [phone]  Fax: [phone]  Follow Up Time: 2 weeks

## 2022-09-10 NOTE — DISCHARGE NOTE PROVIDER - NSDCFUSCHEDAPPT_GEN_ALL_CORE_FT
Arnulfo Jean-Baptiste  [facility] PreAdmits  Scheduled Appointment: 09/30/2022    Arnulfo Jean-Baptiste  [facility]  GENSURG [address]  Scheduled Appointment: 10/12/2022    [facility]  UROLOGY [address]  Scheduled Appointment: 11/18/2022

## 2022-09-10 NOTE — PHYSICAL EXAM
[Abdomen Masses] : No abdominal masses [Abdomen Tenderness] : ~T No ~M abdominal tenderness [Respiratory Effort] : normal respiratory effort [de-identified] : awake, alert and in no acute distress

## 2022-09-10 NOTE — ASSESSMENT
[FreeTextEntry1] : 77F with sigmoid diverticulitis and stricture\par \par I recommended that she undergo robot assisted laparoscopic sigmoidectomy possible open, possible ostomy.  All risks benefits and alternatives were discussed including bleeding, infection, damage to surrounding structures including the ureters, anastomotic leak, anastomotic stricture, cardiopulmonary events, thromboembolic events and hernia. We expect a 3-7 day hospital stay and 6-8 week recovery. Patient expressed understanding and was in agreement with the plan.\par

## 2022-09-10 NOTE — PROGRESS NOTE ADULT - ATTENDING COMMENTS
Patient examined and discussed with HO.  Chart reviewed.  F/up of this 78 yo F with hx of dementia, GERD, recurrent diverticulitis admitted with abd pain.  Currently being txed with cipro and flagyl.  Last PM, noted to have markedly elevated bp and ?cp which pt denied.  This AM, states she is fine and wants to go home and was afraid that her family did not know she is hospitalized.    Vital Signs Last 24 Hrs  T(C): 35.8 (10 Sep 2022 08:00), Max: 36.9 (09 Sep 2022 21:38)  T(F): 96.4 (10 Sep 2022 08:00), Max: 98.5 (09 Sep 2022 21:38)  HR: 73 (10 Sep 2022 08:00) (67 - 97)  BP: 159/83 (10 Sep 2022 08:00) (125/68 - 216/101)  BP(mean): 59 (10 Sep 2022 03:30) (59 - 59)  RR: 18 (10 Sep 2022 08:00) (18 - 19)  SpO2: 98% (10 Sep 2022 08:00) (97% - 100%)    Anicteric.  Oral mucosa moist.  Chest CTA b/l.  Cor Reg S1S2.  Abd not distended, + BS, soft and NT.  No palpable mass.                          12.1   7.45  )-----------( 370      ( 10 Sep 2022 07:05 )             36.7     09-10    133<L>  |  96<L>  |  7<L>  ----------------------------<  162<H>  4.6   |  24  |  0.5<L>    Ca    9.5      10 Sep 2022 07:05  Phos  3.3     09-10  Mg     1.8     09-10    TPro  7.2  /  Alb  3.8  /  TBili  0.4  /  DBili  x   /  AST  16  /  ALT  13  /  AlkPhos  90  09-10    A/P:  Acute recurrent diverticulitis - seen by GI and surgery.  Advised sigmoidectomy which can be done in or outpt basis.  May continue Abx coverage pending dc with SW / Case management clearance.    HTN - start Amlodipine 5 mg po QD.  EKG advised and will f/up.

## 2022-09-10 NOTE — DISCHARGE NOTE PROVIDER - NSDCMRMEDTOKEN_GEN_ALL_CORE_FT
Cipro 500 mg oral tablet: 1 tab(s) orally 2 times a day   Colace Clear 50 mg oral capsule: 1 cap(s) orally 2 times a day   donepezil 5 mg oral tablet: 1 tab(s) orally once a day (at bedtime)  Flomax 0.4 mg oral capsule: 1 cap(s) orally once a day (at bedtime)  metroNIDAZOLE 500 mg oral tablet: 1 tab(s) orally every 8 hours   MiraLax oral powder for reconstitution: 17 gram(s) orally 2 times a day   senna leaf extract oral tablet: 2 tab(s) orally 2 times a day    amLODIPine 5 mg oral tablet: 1 tab(s) orally once a day  Cipro 500 mg oral tablet: 1 tab(s) orally 2 times a day   Colace Clear 50 mg oral capsule: 1 cap(s) orally 2 times a day   donepezil 5 mg oral tablet: 1 tab(s) orally once a day (at bedtime)  Flomax 0.4 mg oral capsule: 1 cap(s) orally once a day (at bedtime)  metroNIDAZOLE 500 mg oral tablet: 1 tab(s) orally every 8 hours   MiraLax oral powder for reconstitution: 17 gram(s) orally 2 times a day   Protonix 40 mg oral delayed release tablet: 1 tab(s) orally 2 times a day  senna leaf extract oral tablet: 2 tab(s) orally 2 times a day

## 2022-09-10 NOTE — PROVIDER CONTACT NOTE (OTHER) - ACTION/TREATMENT ORDERED:
md aguilar notifed and aware, will order labetolol and zofran and be given as per md policy, will cont to monitor

## 2022-09-10 NOTE — DISCHARGE NOTE PROVIDER - NSDCCPCAREPLAN_GEN_ALL_CORE_FT
PRINCIPAL DISCHARGE DIAGNOSIS  Diagnosis: Colitis  Assessment and Plan of Treatment:   Colitis is a condition in which the colon is inflamed. It can cause diarrhea, blood in the stool, and abdominal pain. Colitis can last a short time (be acute), or it may last a long time (become chronic).  What are the causes?  This condition may be caused by:  •Infections from viruses or bacteria.  •A reaction to medicine.  •Certain autoimmune diseases, such as Crohn's disease or ulcerative colitis.  •Radiation treatment.  •Decreased blood flow to the bowel (ischemia).  What are the signs or symptoms?  Symptoms of this condition includes  •Diarrhea, blood in the stool, or black, tarry stool.  •Pain in the joints or abdominal pain.  •Fever or fatigue.  •Vomiting.  •Weight loss.  •Bloating.  •Having fewer bowel movements than usual.  •A strong and sudden urge to have a bowel movement.  •Feeling like the bowel is not empty after a bowel movement.  How is this diagnosed?  This condition may be diagnosed based on a stool test and a blood test. You may also have other tests, such as:  •X-rays.  •CT scan.  •Colonoscopy.  •Endoscopy.  •Biopsy.  How is this treated?  Treatment for this condition depends on the cause. This condition may be treated with:  •Steps to rest the bowel, such as not eating or drinking for a period of time.  •Fluids that are given through an IV.  •Medicine for pain and diarrhea.  •Antibiotic medicines.  •Cortisone medicines.  •Surgery.

## 2022-09-11 ENCOUNTER — TRANSCRIPTION ENCOUNTER (OUTPATIENT)
Age: 77
End: 2022-09-11

## 2022-09-11 VITALS
SYSTOLIC BLOOD PRESSURE: 155 MMHG | RESPIRATION RATE: 18 BRPM | DIASTOLIC BLOOD PRESSURE: 90 MMHG | HEART RATE: 80 BPM | TEMPERATURE: 97 F

## 2022-09-11 LAB
ALBUMIN SERPL ELPH-MCNC: 3.7 G/DL — SIGNIFICANT CHANGE UP (ref 3.5–5.2)
ALP SERPL-CCNC: 84 U/L — SIGNIFICANT CHANGE UP (ref 30–115)
ALT FLD-CCNC: 13 U/L — SIGNIFICANT CHANGE UP (ref 0–41)
ANION GAP SERPL CALC-SCNC: 13 MMOL/L — SIGNIFICANT CHANGE UP (ref 7–14)
AST SERPL-CCNC: 14 U/L — SIGNIFICANT CHANGE UP (ref 0–41)
BASOPHILS # BLD AUTO: 0.03 K/UL — SIGNIFICANT CHANGE UP (ref 0–0.2)
BASOPHILS NFR BLD AUTO: 0.4 % — SIGNIFICANT CHANGE UP (ref 0–1)
BILIRUB SERPL-MCNC: 0.3 MG/DL — SIGNIFICANT CHANGE UP (ref 0.2–1.2)
BUN SERPL-MCNC: 9 MG/DL — LOW (ref 10–20)
CALCIUM SERPL-MCNC: 9.4 MG/DL — SIGNIFICANT CHANGE UP (ref 8.5–10.1)
CHLORIDE SERPL-SCNC: 102 MMOL/L — SIGNIFICANT CHANGE UP (ref 98–110)
CO2 SERPL-SCNC: 24 MMOL/L — SIGNIFICANT CHANGE UP (ref 17–32)
CREAT SERPL-MCNC: 0.6 MG/DL — LOW (ref 0.7–1.5)
EGFR: 92 ML/MIN/1.73M2 — SIGNIFICANT CHANGE UP
EOSINOPHIL # BLD AUTO: 0.12 K/UL — SIGNIFICANT CHANGE UP (ref 0–0.7)
EOSINOPHIL NFR BLD AUTO: 1.7 % — SIGNIFICANT CHANGE UP (ref 0–8)
GLUCOSE SERPL-MCNC: 134 MG/DL — HIGH (ref 70–99)
HCT VFR BLD CALC: 39 % — SIGNIFICANT CHANGE UP (ref 37–47)
HGB BLD-MCNC: 12.6 G/DL — SIGNIFICANT CHANGE UP (ref 12–16)
IMM GRANULOCYTES NFR BLD AUTO: 0.9 % — HIGH (ref 0.1–0.3)
LYMPHOCYTES # BLD AUTO: 0.91 K/UL — LOW (ref 1.2–3.4)
LYMPHOCYTES # BLD AUTO: 13.1 % — LOW (ref 20.5–51.1)
MAGNESIUM SERPL-MCNC: 1.9 MG/DL — SIGNIFICANT CHANGE UP (ref 1.8–2.4)
MCHC RBC-ENTMCNC: 26.9 PG — LOW (ref 27–31)
MCHC RBC-ENTMCNC: 32.3 G/DL — SIGNIFICANT CHANGE UP (ref 32–37)
MCV RBC AUTO: 83.2 FL — SIGNIFICANT CHANGE UP (ref 81–99)
MONOCYTES # BLD AUTO: 0.83 K/UL — HIGH (ref 0.1–0.6)
MONOCYTES NFR BLD AUTO: 11.9 % — HIGH (ref 1.7–9.3)
NEUTROPHILS # BLD AUTO: 5 K/UL — SIGNIFICANT CHANGE UP (ref 1.4–6.5)
NEUTROPHILS NFR BLD AUTO: 72 % — SIGNIFICANT CHANGE UP (ref 42.2–75.2)
NRBC # BLD: 0 /100 WBCS — SIGNIFICANT CHANGE UP (ref 0–0)
PHOSPHATE SERPL-MCNC: 2.4 MG/DL — SIGNIFICANT CHANGE UP (ref 2.1–4.9)
PLATELET # BLD AUTO: 324 K/UL — SIGNIFICANT CHANGE UP (ref 130–400)
POTASSIUM SERPL-MCNC: 4.3 MMOL/L — SIGNIFICANT CHANGE UP (ref 3.5–5)
POTASSIUM SERPL-SCNC: 4.3 MMOL/L — SIGNIFICANT CHANGE UP (ref 3.5–5)
PROT SERPL-MCNC: 7.2 G/DL — SIGNIFICANT CHANGE UP (ref 6–8)
RBC # BLD: 4.69 M/UL — SIGNIFICANT CHANGE UP (ref 4.2–5.4)
RBC # FLD: 15.8 % — HIGH (ref 11.5–14.5)
SODIUM SERPL-SCNC: 139 MMOL/L — SIGNIFICANT CHANGE UP (ref 135–146)
WBC # BLD: 6.95 K/UL — SIGNIFICANT CHANGE UP (ref 4.8–10.8)
WBC # FLD AUTO: 6.95 K/UL — SIGNIFICANT CHANGE UP (ref 4.8–10.8)

## 2022-09-11 PROCEDURE — 99239 HOSP IP/OBS DSCHRG MGMT >30: CPT

## 2022-09-11 RX ORDER — AMLODIPINE BESYLATE 2.5 MG/1
5 TABLET ORAL
Qty: 30 | Refills: 0
Start: 2022-09-11 | End: 2022-10-10

## 2022-09-11 RX ORDER — AMLODIPINE BESYLATE 2.5 MG/1
1 TABLET ORAL
Qty: 30 | Refills: 0
Start: 2022-09-11 | End: 2022-10-10

## 2022-09-11 RX ORDER — DOCUSATE SODIUM 100 MG
1 CAPSULE ORAL
Qty: 28 | Refills: 0
Start: 2022-09-11 | End: 2022-09-24

## 2022-09-11 RX ADMIN — ENOXAPARIN SODIUM 40 MILLIGRAM(S): 100 INJECTION SUBCUTANEOUS at 05:36

## 2022-09-11 RX ADMIN — Medication 500 MILLIGRAM(S): at 05:37

## 2022-09-11 RX ADMIN — Medication 500 MILLIGRAM(S): at 13:16

## 2022-09-11 RX ADMIN — SENNA PLUS 2 TABLET(S): 8.6 TABLET ORAL at 11:46

## 2022-09-11 RX ADMIN — Medication 500 MILLIGRAM(S): at 17:16

## 2022-09-11 RX ADMIN — POLYETHYLENE GLYCOL 3350 17 GRAM(S): 17 POWDER, FOR SOLUTION ORAL at 05:36

## 2022-09-11 RX ADMIN — AMLODIPINE BESYLATE 5 MILLIGRAM(S): 2.5 TABLET ORAL at 05:36

## 2022-09-11 RX ADMIN — PANTOPRAZOLE SODIUM 40 MILLIGRAM(S): 20 TABLET, DELAYED RELEASE ORAL at 05:36

## 2022-09-11 RX ADMIN — ONDANSETRON 4 MILLIGRAM(S): 8 TABLET, FILM COATED ORAL at 05:55

## 2022-09-11 RX ADMIN — PANTOPRAZOLE SODIUM 40 MILLIGRAM(S): 20 TABLET, DELAYED RELEASE ORAL at 17:17

## 2022-09-11 RX ADMIN — POLYETHYLENE GLYCOL 3350 17 GRAM(S): 17 POWDER, FOR SOLUTION ORAL at 17:16

## 2022-09-11 NOTE — PHYSICAL THERAPY INITIAL EVALUATION ADULT - ADDITIONAL COMMENTS
PTA: pt is a poor historian, so it is unclear if this is all accurate. Pt states she lives in  c [place] (3-4 c HR). Has RW at home, but does not always use it. Has 1 flight to 2nd level. Has tub shower c shower chair. No grab bars.

## 2022-09-11 NOTE — PHYSICAL THERAPY INITIAL EVALUATION ADULT - PERTINENT HX OF CURRENT PROBLEM, REHAB EVAL
76yo F hx of dementia, HTN, recent admission on 7/22 and 8/22 for diverticulitis presenting to the hospital for abdominal pain and nausea. Pt unable to provide any history as she is very confused. Hx mainly obtained form pts daughter Lisbet on the phone who said pt was recently discharged and completed her ABx but since yesterday pt was complaining of pain in the abdomen. Abdominal pain has been chronic, on/off, intermittent, dull, 4/10, periumbilical without radiation.  Pt appeared more confused and was acting out. Pt is scheduled for surgery with Dr. Jean-Baptiste on 30th september and was on soft diet for prep for surgery. Hx goes back to June when she started to have these frequent episodes of colitis and was in and out of the hospital multiple times.     Recently, on 8/8/2022, patient had colonoscopy for CRC screening (first colonoscopy) without any complications. She was found to have multiple non-bleeding diverticula with mixed openings + inflammation in the sigmoid colon. There was luminal narrowing, so colonoscope could not be advanced beyond the sigmoid colon.

## 2022-09-11 NOTE — PHYSICAL THERAPY INITIAL EVALUATION ADULT - PATIENT PROFILE REVIEW, REHAB EVAL
Chart reviewed. Unable to complete PT IE 2/2 pt is without activity orders. PT attempted to contact physician on call to have orders placed ([phone]), but there was no answer. PT to attempt again tomorrow, as able./yes
Chart reviewed. PT IE completed from 2-3PM. Total of 60 minutes./yes

## 2022-09-11 NOTE — PROGRESS NOTE ADULT - SUBJECTIVE AND OBJECTIVE BOX
GENERAL SURGERY PROGRESS NOTE    Hospital Day: 3    24 Hour Events:  Patient hypertensive urgency (/101)   - Given labetalol 10   - Reports chest pain -> EKG shows no ST changes   - Reports some abdominal pain ar rest    BM+/F+  N/V(-)    Vitals:  T(F): 98.2 (09-10-22 @ 00:56), Max: 98.5 (09-09-22 @ 21:38)  HR: 89 (09-10-22 @ 03:30)  BP: 192/91 (09-10-22 @ 03:30)  RR: 18 (09-10-22 @ 03:30)  SpO2: 99% (09-10-22 @ 03:30)    Diet, Clear Liquid    Fluids:     I & O's:    PHYSICAL EXAM:  General: In pain, uncomfortable  Cardiac: RRR, S1/S2 identified, nmrg  Respiratory: unlabored breathing at rest, clear to auscultation bilaterally  Abdomen: Soft, non-distended, moderately tender diffusely, no rebound, no guarding.  Musculoskeletal: FROM in b/l UE and LE  Neuro: Sensation grossly intact and equal throughout, no focal deficits  Skin: Warm/dry, normal color, no jaundice    MEDICATIONS  (STANDING):  ciprofloxacin   IVPB 400 milliGRAM(s) IV Intermittent every 12 hours  donepezil 5 milliGRAM(s) Oral at bedtime  enoxaparin Injectable 40 milliGRAM(s) SubCutaneous every 24 hours  labetalol Injectable 10 milliGRAM(s) IV Push once  lactated ringers. 1000 milliLiter(s) (75 mL/Hr) IV Continuous <Continuous>  metroNIDAZOLE  IVPB 500 milliGRAM(s) IV Intermittent every 8 hours  pantoprazole  Injectable 40 milliGRAM(s) IV Push two times a day  polyethylene glycol 3350 Oral Powder - Peds 17 Gram(s) Oral two times a day  senna 2 Tablet(s) Oral daily  tamsulosin 0.4 milliGRAM(s) Oral at bedtime    MEDICATIONS  (PRN):  acetaminophen     Tablet .. 650 milliGRAM(s) Oral every 6 hours PRN Temp greater or equal to 38C (100.4F), Mild Pain (1 - 3)    DVT PROPHYLAXIS: enoxaparin Injectable 40 milliGRAM(s) SubCutaneous every 24 hours    GI PROPHYLAXIS: pantoprazole  Injectable 40 milliGRAM(s) IV Push two times a day    ANTICOAGULATION:   ANTIBIOTICS:  ciprofloxacin   IVPB 400 milliGRAM(s)  metroNIDAZOLE  IVPB 500 milliGRAM(s)    LAB/STUDIES:  Labs:  CAPILLARY BLOOD GLUCOSE                        11.4   7.08  )-----------( 332      ( 09 Sep 2022 07:41 )             35.0       Auto Neutrophil %: 62.5 % (09-09-22 @ 07:41)  Auto Immature Granulocyte %: 0.6 % (09-09-22 @ 07:41)    09-09    139  |  101  |  15  ----------------------------<  119<H>  3.9   |  25  |  0.6<L>    Calcium, Total Serum: 9.5 mg/dL (09-09-22 @ 07:41)    LFTs:             7.2  | 0.4  | 14       ------------------[82      ( 09 Sep 2022 07:41 )  3.5  | x    | 12          Lipase:x      Amylase:x         Lactate, Blood: 1.4 mmol/L (09-08-22 @ 14:41)    Coags:     TNP    ----< TNP     ( 08 Sep 2022 14:41 )     x        
HPI  Patient is a 77y old Female who presents with a chief complaint of Colitis (09 Sep 2022 14:58)    Currently admitted to medicine with the primary diagnosis of Colitis       Today is hospital day 1d.     INTERVAL HPI / OVERNIGHT EVENTS:  Patient was seen and examined at bedside  Patient Feels better  denies any complaints; asking when she can go home  Denies any complains of chest pain or shortness of breath  Denies any abdominal pain/nausea/vomiting        PAST MEDICAL & SURGICAL HISTORY  Hypertension, unspecified type    Dementia    GERD (gastroesophageal reflux disease)    Diverticulitis    Lack of bladder control    H/O dilation and curettage  for missed       ALLERGIES  No Known Allergies    MEDICATIONS  STANDING MEDICATIONS  ciprofloxacin   IVPB 400 milliGRAM(s) IV Intermittent every 12 hours  donepezil 5 milliGRAM(s) Oral at bedtime  enoxaparin Injectable 40 milliGRAM(s) SubCutaneous every 24 hours  lactated ringers. 1000 milliLiter(s) IV Continuous <Continuous>  metroNIDAZOLE  IVPB 500 milliGRAM(s) IV Intermittent every 8 hours  pantoprazole  Injectable 40 milliGRAM(s) IV Push two times a day  polyethylene glycol 3350 Oral Powder - Peds 17 Gram(s) Oral two times a day  senna 2 Tablet(s) Oral daily  tamsulosin 0.4 milliGRAM(s) Oral at bedtime    PRN MEDICATIONS  acetaminophen     Tablet .. 650 milliGRAM(s) Oral every 6 hours PRN    VITALS:  T(F): 97.1  HR: 67  BP: 125/68  RR: 18  SpO2: 100%    PHYSICAL EXAM  GEN: no distress, comfortable  PULM: BS heard b/l equal, No wheezing  CVS: S1S2 present, no rubs or gallops  ABD: Soft, non-distended, no guarding; non-tender  EXT: No lower extremity edema  NEURO: A&Ox3, moving all extremities    LABS                        11.4   7.08  )-----------( 332      ( 09 Sep 2022 07:41 )             35.0     -    139  |  101  |  15  ----------------------------<  119<H>  3.9   |  25  |  0.6<L>    Ca    9.5      09 Sep 2022 07:41  Mg     2.4     -    TPro  7.2  /  Alb  3.5  /  TBili  0.4  /  DBili  x   /  AST  14  /  ALT  12  /  AlkPhos  82  -    PT/INR - ( 08 Sep 2022 14:41 )   PT: TNP sec;   INR: TNP ratio                       RADIOLOGY    
SUBJECTIVE:    Patient is a 77y old Female who presents with a chief complaint of Colitis (10 Sep 2022 03:38)    Currently admitted to medicine with the primary diagnosis of Colitis    Today is hospital day 2d. This morning she is resting comfortably in bed and reports no new issues or overnight events. Pt was initially worried about if her family knew she was at the hospital was became calm when reminded that they brought her in. She says that she is pain free and ready to go home when possible.    PAST MEDICAL & SURGICAL HISTORY  Hypertension, unspecified type    Dementia    GERD (gastroesophageal reflux disease)    Diverticulitis    Lack of bladder control    H/O dilation and curettage  for missed       SOCIAL HISTORY:  Negative for smoking/alcohol/drug use.     ALLERGIES:  No Known Allergies    MEDICATIONS:  STANDING MEDICATIONS  ciprofloxacin   IVPB 400 milliGRAM(s) IV Intermittent every 12 hours  donepezil 5 milliGRAM(s) Oral at bedtime  enoxaparin Injectable 40 milliGRAM(s) SubCutaneous every 24 hours  lactated ringers. 1000 milliLiter(s) IV Continuous <Continuous>  metroNIDAZOLE  IVPB 500 milliGRAM(s) IV Intermittent every 8 hours  pantoprazole  Injectable 40 milliGRAM(s) IV Push two times a day  polyethylene glycol 3350 Oral Powder - Peds 17 Gram(s) Oral two times a day  senna 2 Tablet(s) Oral daily  tamsulosin 0.4 milliGRAM(s) Oral at bedtime    PRN MEDICATIONS  acetaminophen     Tablet .. 650 milliGRAM(s) Oral every 6 hours PRN  ondansetron    Tablet 4 milliGRAM(s) Oral every 8 hours PRN    VITALS:   T(F): 96.4  HR: 73  BP: 159/83  RR: 18  SpO2: 98%    LABS:                        12.1   7.45  )-----------( 370      ( 10 Sep 2022 07:05 )             36.7     09-10    133<L>  |  96<L>  |  7<L>  ----------------------------<  162<H>  4.6   |  24  |  0.5<L>    Ca    9.5      10 Sep 2022 07:05  Phos  3.3     09-10  Mg     1.8     09-10    TPro  7.2  /  Alb  3.8  /  TBili  0.4  /  DBili  x   /  AST  16  /  ALT  13  /  AlkPhos  90  09-10    PT/INR - ( 08 Sep 2022 14:41 )   PT: TNP sec;   INR: TNP ratio      PHYSICAL EXAM:  GEN: No acute distress  LUNGS: Clear to auscultation bilaterally   HEART: S1/S2 present. RRR.   ABD: Soft, non-tender, non-distended. Bowel sounds present  EXT: NC/NC/NE/2+PP/ESCOBAR  NEURO: AAOX2, pt has memory deficits stemming from baseline dementia    
 SURGERY PROGRESS NOTE     Patient: KEISHA PANTOJA , 77y (43)Female   MRN: [id number]  Location: [address]  Visit: 22 Inpatient  Date: 22 @ 04:13     Events of past 24 hours: Patient seen resting in bed. Spent time talking with patient about diagnosis and plans moving forward. Patient confused and unclear how much she understands. No acute events overnight.     PAST MEDICAL & SURGICAL HISTORY:  Hypertension, unspecified type  Dementia  GERD (gastroesophageal reflux disease)  Diverticulitis  Lack of bladder control  H/O dilation and curettage  for missed     Vitals:   T(F): 97.4 (22 @ 00:00), Max: 98.1 (09-10-22 @ 06:55)  HR: 98 (22 @ 00:00)  BP: 117/59 (22 @ 00:00)  RR: 18 (22 @ 00:00)  SpO2: 98% (09-10-22 @ 08:00)      Diet, Clear Liquid      Fluids:     I & O's:    22 @ 07:01  -  09-10-22 @ 07:00  --------------------------------------------------------  IN:    IV PiggyBack: 300 mL    Lactated Ringers: 600 mL  Total IN: 900 mL    OUT:    Stool (mL): 1 mL  Total OUT: 1 mL    Total NET: 899 mL    PHYSICAL EXAM:   General: NAD, AAOx3, calm and cooperative  Cardiac: RRR S1, S2  Respiratory: CTAB, normal respiratory effort  Abdomen: Soft, non-distended, non-tender, no rebound, no guarding. +BS.  Vascular: Pulses 2+ throughout, extremities well perfused  Skin: Warm/dry, normal color, no jaundice    MEDICATIONS  (STANDING):  amLODIPine   Tablet 5 milliGRAM(s) Oral daily  ciprofloxacin     Tablet 500 milliGRAM(s) Oral every 12 hours  donepezil 5 milliGRAM(s) Oral at bedtime  enoxaparin Injectable 40 milliGRAM(s) SubCutaneous every 24 hours  lactated ringers. 1000 milliLiter(s) (75 mL/Hr) IV Continuous <Continuous>  metroNIDAZOLE    Tablet 500 milliGRAM(s) Oral every 8 hours  pantoprazole    Tablet 40 milliGRAM(s) Oral two times a day  polyethylene glycol 3350 Oral Powder - Peds 17 Gram(s) Oral two times a day  senna 2 Tablet(s) Oral daily  tamsulosin 0.4 milliGRAM(s) Oral at bedtime    MEDICATIONS  (PRN):  acetaminophen     Tablet .. 650 milliGRAM(s) Oral every 6 hours PRN Temp greater or equal to 38C (100.4F), Mild Pain (1 - 3)  ondansetron    Tablet 4 milliGRAM(s) Oral every 8 hours PRN Nausea and/or Vomiting      DVT PROPHYLAXIS: enoxaparin Injectable 40 milliGRAM(s) SubCutaneous every 24 hours    GI PROPHYLAXIS: pantoprazole    Tablet 40 milliGRAM(s) Oral two times a day    ANTICOAGULATION:   ANTIBIOTICS:  ciprofloxacin     Tablet 500 milliGRAM(s)  metroNIDAZOLE    Tablet 500 milliGRAM(s)            LAB/STUDIES:  Labs:  CAPILLARY BLOOD GLUCOSE                              12.1   7.45  )-----------( 370      ( 10 Sep 2022 07:05 )             36.7       Auto Neutrophil %: 77.6 % (09-10-22 @ 07:05)  Auto Immature Granulocyte %: 0.7 % (09-10-22 @ 07:05)    09-10    133<L>  |  96<L>  |  7<L>  ----------------------------<  162<H>  4.6   |  24  |  0.5<L>      Calcium, Total Serum: 9.5 mg/dL (09-10-22 @ 07:05)      LFTs:             7.2  | 0.4  | 16       ------------------[90      ( 10 Sep 2022 07:05 )  3.8  | x    | 13          Lipase:x      Amylase:x         Lactate, Blood: 1.4 mmol/L (22 @ 14:41)      Coags:                     
Procedure / diagnosis: diverticulitis      PAST MEDICAL & SURGICAL HISTORY:  Hypertension, unspecified type      Dementia      GERD (gastroesophageal reflux disease)      Diverticulitis      Lack of bladder control      H/O dilation and curettage  for missed           24H EVENTS    Vital Signs Last 24 Hrs  T(C): 36.4 (09 Sep 2022 07:23), Max: 37.1 (08 Sep 2022 20:14)  T(F): 97.5 (09 Sep 2022 07:23), Max: 98.7 (08 Sep 2022 20:14)  HR: 88 (09 Sep 2022 07:23) (80 - 88)  BP: 116/62 (09 Sep 2022 07:23) (107/64 - 184/78)  BP(mean): --  RR: 18 (09 Sep 2022 07:23) (18 - 20)  SpO2: 100% (09 Sep 2022 07:23) (96% - 100%)    Parameters below as of 08 Sep 2022 20:14  Patient On (Oxygen Delivery Method): room air            I&O's Detail                     11.4   7.08  )-----------( 332      (  @ 07:41 )             35.0                11.9   12.76 )-----------( 384      (  @ 14:41 )             36.4                    139   |  101   |  15                 Ca: 9.5    BMP:   ----------------------------< 119    Mg: x     (22 @ 07:41)             3.9    |  25    | 0.6                Ph: x        LFT:     TPro: 7.2 / Alb: 3.5 / TBili: 0.4 / DBili: x / AST: 14 / ALT: 12 / AlkPhos: 82   (22 @ 07:41)          PT/INR - ( 08 Sep 2022 14:41 )   PT: TNP sec;   INR: TNP ratio         MEDICATIONS  (STANDING):  ciprofloxacin   IVPB 400 milliGRAM(s) IV Intermittent every 12 hours  donepezil 5 milliGRAM(s) Oral at bedtime  enoxaparin Injectable 40 milliGRAM(s) SubCutaneous every 24 hours  lactated ringers. 1000 milliLiter(s) (75 mL/Hr) IV Continuous <Continuous>  metroNIDAZOLE  IVPB 500 milliGRAM(s) IV Intermittent every 8 hours  pantoprazole  Injectable 40 milliGRAM(s) IV Push two times a day  polyethylene glycol 3350 Oral Powder - Peds 17 Gram(s) Oral two times a day  senna 2 Tablet(s) Oral daily  tamsulosin 0.4 milliGRAM(s) Oral at bedtime    MEDICATIONS  (PRN):  acetaminophen     Tablet .. 650 milliGRAM(s) Oral every 6 hours PRN Temp greater or equal to 38C (100.4F), Mild Pain (1 - 3)      Diet, Clear Liquid (22 @ 09:40)      PHYSICAL EXAM:  General Appearance: pt in  NAD  Chest: + air entry bilat  CV: S1, S2, RRR  Abdomen: Soft, NT, ND  no rebound tenderness no guarding  Extremities: + ROM  Neuro: A&O      
  KEISHA PANTOJA  77y, Female  Allergy: No Known Allergies      OVERNIGHT EVENTS:  None reported    PAST MEDICAL & SURGICAL HISTORY:  Hypertension, unspecified type  Dementia  GERD (gastroesophageal reflux disease)  Diverticulitis  Lack of bladder control  H/O dilation and curettage  for missed       VITALS:  T(F): 97.1 (22 @ 09:01), Max: 97.4 (22 @ 00:00)  HR: 88 (22 @ 09:01)  BP: 171/92 (22 @ 09:01) (117/59 - 171/92)  RR: 18 (22 @ 09:01)    PHYSICAL:    Awake and responsive but with confusion noted.  Daughter by bedside and states this is her baseline mentation.  Daughter also states pt ambulates with walker at home though she does not like to use it.  Per RN, she was noted to be unsteady with gait with + fall risk.   PT consult was ordered yesterday with evaluation pending.    No further abd pain noted.  States she feels hungry and wants to eat.  Currently she is tolerating liquids.    Chest CTA b/l.  Cor Reg S1S2  Abd + BS, soft and NT  LE NT                        12.6   6.95  )-----------( 324      ( 11 Sep 2022 04:30 )             39.0           139  |  102  |  9<L>  ----------------------------<  134<H>  4.3   |  24  |  0.6<L>    Ca    9.4      11 Sep 2022 04:30  Phos  2.4       Mg     1.9         TPro  7.2  /  Alb  3.7  /  TBili  0.3  /  DBili  x   /  AST  14  /  ALT  13  /  AlkPhos  84  11    LIVER FUNCTIONS - ( 11 Sep 2022 04:30 )  Alb: 3.7 g/dL / Pro: 7.2 g/dL / ALK PHOS: 84 U/L / ALT: 13 U/L / AST: 14 U/L / GGT: x             MEDICATIONS:  MEDICATIONS  (STANDING):  amLODIPine   Tablet 5 milliGRAM(s) Oral daily  ciprofloxacin     Tablet 500 milliGRAM(s) Oral every 12 hours  donepezil 5 milliGRAM(s) Oral at bedtime  enoxaparin Injectable 40 milliGRAM(s) SubCutaneous every 24 hours  lactated ringers. 1000 milliLiter(s) (75 mL/Hr) IV Continuous <Continuous>  metroNIDAZOLE    Tablet 500 milliGRAM(s) Oral every 8 hours  pantoprazole    Tablet 40 milliGRAM(s) Oral two times a day  polyethylene glycol 3350 Oral Powder - Peds 17 Gram(s) Oral two times a day  senna 2 Tablet(s) Oral daily  tamsulosin 0.4 milliGRAM(s) Oral at bedtime    MEDICATIONS  (PRN):  acetaminophen     Tablet .. 650 milliGRAM(s) Oral every 6 hours PRN Temp greater or equal to 38C (100.4F), Mild Pain (1 - 3)  ondansetron    Tablet 4 milliGRAM(s) Oral every 8 hours PRN Nausea and/or Vomiting                Case discussed in details with:     PRESSURE ULCERS                                                 POA        YES/NO  URETHRAL CATHETER                                           POA        YES/NO  CENTRAL VENOUS CATHETER/PICC LINE          POA        YES/NO  SEPSIS                                                                       POA        YES/NO

## 2022-09-11 NOTE — PROGRESS NOTE ADULT - ASSESSMENT
ASSESSMENT:     SPECTRA [id number]  78yo F hx of dementia, HTN, recent admission on 7/22 and 8/22 for diverticulitis presenting to the hospital for abdominal pain and nausea. Pt unable to provide any history as she is very confused. Hx mainly obtained form pts daughter Lisbet on the phone who said pt was recently discharged and completed her ABx but since yesterday pt was complaining of pain in the abdomen. Abdominal pain has been chronic, on/off, intermittent, dull, 4/10, periumbilical without radiation.  Pt appeared more confused and was acting out. Pt is scheduled for surgery with Dr. Jean-Baptiste on 30th september and was on soft diet for prep for surgery. Hx goes back to June when she started to have these frequent episodes of colitis and was in and out of the hospital multiple times.   Recently, on 8/8/2022, patient had colonoscopy for CRC screening (first colonoscopy) without any complications. She was found to have multiple non-bleeding diverticula with mixed openings + inflammation in the sigmoid colon. There was luminal narrowing, so colonoscope could not be advanced beyond the sigmoid colon.        PLAN:    - Clear liquid diet   - Miralax BID   - Monitor vitals   - Treat HTN (labetalol PRN)   - Planning for possible sigmoidectomy this admission  
78yo F hx of dementia (AAOx2 at baseline), HTN, recent admission in July 2022 for diverticulitis + rectosigmoid bowel wall thickening, presented with abdominal pain and was admitted for colitis. On cipro and flagyl.     # Recurrent Diverticulitis complicated with sigmoid colon stricture.   - 9/10 - pt says abdominal pain is resolved  - c/w cipro 400 IV q12, flagyl 500 IV q8  - amlodipine PRN for HTN  - was scheduled for surgery with Dr. Jean-Baptiste on 30th september  - f/u w/ surgery about possible sigmoidectomy during this admission  - f/u w/ GI about possible colectomy   - per surgery Miralax BID, clear liquid diet  - multiple admission with recurrent diverticulitis   - colonoscopy last month: non-bleeding diverticula with mixed openings + inflammation in the sigmoid colon. There was luminal narrowing, so colonoscope could not be advanced beyond the sigmoid colon.   - WBC 12  - lactate 1.4  - reviewed cbc, cmp,  and CT imaging.  - CT on admission: scan reviewed: persistent moderate mural thickening involving the sigmoid colon, with moderate pericolonic fat stranding, compatible with acute colitis; evaluation for extraluminal collection is limited without oral or intravenous contrast. Please note that underlying neoplastic process remains a possibility and correlation with direct visualization is suggested when acute episode resolves.    
A/P 78yo F hx of dementia (AAOx2 at baseline), HTN, recent admission on 7/2022 for diverticulitis + rectosigmoid bowel wall thickening admitted for colitis on antibiotics  pt states that abdominal pain resolved  Clear liquid diet  Miralax BID  continue monitoring   
ASSESSMENT  78yo F hx of dementia (AAOx2 at baseline), HTN, recent admission on 7/2022 for diverticulitis + rectosigmoid bowel wall thickening admitted for colitis on antibiotics  pt states that abdominal pain resolved.    PLAN:   - Clear liquid diet   - Miralax BID   - Monitor vitals   - Treat HTN (labetalol PRN)   - Planning for possible sigmoidectomy this admission    SPECTRA [id number]
76yo F hx of dementia (AAOx2 at baseline), HTN, recent admission on 7/2022 for diverticulitis + rectosigmoid bowel wall thickening presenting to the hospital for abdominal pain.     CT abd/pelvis:  1. Since August 16, 2022, persistent moderate mural thickening involving   the sigmoid colon, with moderate pericolonic fat stranding, compatible   with acute colitis; evaluation for extraluminal collection is limited   without oral or intravenous contrast. Please note that underlying   neoplastic process remains a possibility and correlation with direct   visualization is suggested when acute episode resolves.    < from: Colonoscopy (08.08.22 @ 11:30) >  Excavated lesions Multiple non-bleeding diverticula with mixed openings and  signs of inflammation were seen in the sigmoid colon. Additional findings  include luminal narrowing. Diverticulitis appeared to be severe. Adult  colonoscope could not be advanced beyond the sigmoid colon due to narrowing and  angulation. Biopsies were taken for histology. The stricture was benign in  appearance.    Impressions:  Severe diverticulitis of the sigmoid colon.    < end of copied text >    Pathology- Fragments of benign colonic mucosa, with mild to moderate nonspecific chronic inflammation, lymphoid aggregates and tissue reactive changes.       A&P    # acute Colitis -in patient with h/o severe diverticulitis and sigmoid stricture  patient had colonoscopy on 8/8- findings as above  contiune cipro and flagyl  contiune miralax  Gi and surgery team to follow  plan for sigmoidectomy to be decided      Hx of Dementia   Hx HTN       Pending: Gi and surgery follow up; clinical improvement  Resident team to update family
Acute recurrent diverticulitis  - Currently on po Cipro and Flagyl with resolution of abd discomfort.  No fever.  WBC nl.  - Seen by GI and surgery.  Sigmoidectomy is planned but surgery did not give date if it is to be done inpatient; per GI, this can be done outpt.  - Will advance diet to soft and if able to tolerate and PT cleared for dc, pt can be dc'ed to home today with f/up outpt with gi and surgery.  May continue Abx coverage pending dc with SW / Case management clearance.    HTN - Amlodipine 5 mg po QD started and will continue to monitor bp.  EKG when compared to previous study, no new changes noted.

## 2022-09-11 NOTE — PHYSICAL THERAPY INITIAL EVALUATION ADULT - IMPAIRMENTS FOUND, PT EVAL
aerobic capacity/endurance/arousal, attention, and cognition/cognitive impairment/ergonomics and body mechanics/gait, locomotion, and balance/gross motor/muscle strength/poor safety awareness/posture

## 2022-09-11 NOTE — PHYSICAL THERAPY INITIAL EVALUATION ADULT - GENERAL OBSERVATIONS, REHAB EVAL
Pt laying semifowler in bed in NAD. Agreeable to PT IE. + confusion and perseverates on why she is here/who brought her here.

## 2022-09-11 NOTE — DISCHARGE NOTE NURSING/CASE MANAGEMENT/SOCIAL WORK - PATIENT PORTAL LINK FT
You can access the FollowMyHealth Patient Portal offered by [facility] by registering at the following website: [email or web address]. By joining [facility]’s FollowMyHealth portal, you will also be able to view your health information using other applications (apps) compatible with our system.

## 2022-09-11 NOTE — DISCHARGE NOTE NURSING/CASE MANAGEMENT/SOCIAL WORK - NSDCPEFALRISK_GEN_ALL_CORE
For information on Fall & Injury Prevention, visit: [email or web address] OR  [email or web address] OR  https://www.cdc.gov/steadi/patient.html

## 2022-09-14 DIAGNOSIS — I16.0 HYPERTENSIVE URGENCY: ICD-10-CM

## 2022-09-14 DIAGNOSIS — K52.9 NONINFECTIVE GASTROENTERITIS AND COLITIS, UNSPECIFIED: ICD-10-CM

## 2022-09-14 DIAGNOSIS — I10 ESSENTIAL (PRIMARY) HYPERTENSION: ICD-10-CM

## 2022-09-14 DIAGNOSIS — Z20.822 CONTACT WITH AND (SUSPECTED) EXPOSURE TO COVID-19: ICD-10-CM

## 2022-09-14 DIAGNOSIS — Z53.20 PROCEDURE AND TREATMENT NOT CARRIED OUT BECAUSE OF PATIENT'S DECISION FOR UNSPECIFIED REASONS: ICD-10-CM

## 2022-09-14 DIAGNOSIS — K21.9 GASTRO-ESOPHAGEAL REFLUX DISEASE WITHOUT ESOPHAGITIS: ICD-10-CM

## 2022-09-14 DIAGNOSIS — F03.90 UNSPECIFIED DEMENTIA WITHOUT BEHAVIORAL DISTURBANCE: ICD-10-CM

## 2022-09-14 DIAGNOSIS — R10.9 UNSPECIFIED ABDOMINAL PAIN: ICD-10-CM

## 2022-09-14 DIAGNOSIS — K57.32 DIVERTICULITIS OF LARGE INTESTINE WITHOUT PERFORATION OR ABSCESS WITHOUT BLEEDING: ICD-10-CM

## 2022-09-15 ENCOUNTER — OUTPATIENT (OUTPATIENT)
Dept: OUTPATIENT SERVICES | Facility: HOSPITAL | Age: 77
LOS: 1 days | Discharge: HOME | End: 2022-09-15

## 2022-09-15 VITALS
RESPIRATION RATE: 20 BRPM | SYSTOLIC BLOOD PRESSURE: 112 MMHG | OXYGEN SATURATION: 99 % | HEIGHT: 66 IN | TEMPERATURE: 96 F | HEART RATE: 96 BPM | DIASTOLIC BLOOD PRESSURE: 62 MMHG | WEIGHT: 102.07 LBS

## 2022-09-15 DIAGNOSIS — K57.32 DIVERTICULITIS OF LARGE INTESTINE WITHOUT PERFORATION OR ABSCESS WITHOUT BLEEDING: ICD-10-CM

## 2022-09-15 DIAGNOSIS — Z98.890 OTHER SPECIFIED POSTPROCEDURAL STATES: Chronic | ICD-10-CM

## 2022-09-15 DIAGNOSIS — Z01.818 ENCOUNTER FOR OTHER PREPROCEDURAL EXAMINATION: ICD-10-CM

## 2022-09-15 LAB
A1C WITH ESTIMATED AVERAGE GLUCOSE RESULT: 6.1 % — HIGH (ref 4–5.6)
ALBUMIN SERPL ELPH-MCNC: 3.7 G/DL — SIGNIFICANT CHANGE UP (ref 3.5–5.2)
ALP SERPL-CCNC: 87 U/L — SIGNIFICANT CHANGE UP (ref 30–115)
ALT FLD-CCNC: 16 U/L — SIGNIFICANT CHANGE UP (ref 0–41)
ANION GAP SERPL CALC-SCNC: 27 MMOL/L — HIGH (ref 7–14)
APTT BLD: 27.9 SEC — SIGNIFICANT CHANGE UP (ref 27–39.2)
AST SERPL-CCNC: 24 U/L — SIGNIFICANT CHANGE UP (ref 0–41)
BASOPHILS # BLD AUTO: 0.05 K/UL — SIGNIFICANT CHANGE UP (ref 0–0.2)
BASOPHILS NFR BLD AUTO: 0.6 % — SIGNIFICANT CHANGE UP (ref 0–1)
BILIRUB SERPL-MCNC: 0.3 MG/DL — SIGNIFICANT CHANGE UP (ref 0.2–1.2)
BLD GP AB SCN SERPL QL: SIGNIFICANT CHANGE UP
BUN SERPL-MCNC: 35 MG/DL — HIGH (ref 10–20)
CALCIUM SERPL-MCNC: 9.3 MG/DL — SIGNIFICANT CHANGE UP (ref 8.4–10.5)
CHLORIDE SERPL-SCNC: 93 MMOL/L — LOW (ref 98–110)
CO2 SERPL-SCNC: 12 MMOL/L — LOW (ref 17–32)
CREAT SERPL-MCNC: 2.7 MG/DL — HIGH (ref 0.7–1.5)
EGFR: 18 ML/MIN/1.73M2 — LOW
EOSINOPHIL # BLD AUTO: 0.17 K/UL — SIGNIFICANT CHANGE UP (ref 0–0.7)
EOSINOPHIL NFR BLD AUTO: 2.2 % — SIGNIFICANT CHANGE UP (ref 0–8)
ESTIMATED AVERAGE GLUCOSE: 128 MG/DL — HIGH (ref 68–114)
GLUCOSE SERPL-MCNC: 99 MG/DL — SIGNIFICANT CHANGE UP (ref 70–99)
HCT VFR BLD CALC: 39.5 % — SIGNIFICANT CHANGE UP (ref 37–47)
HGB BLD-MCNC: 12.4 G/DL — SIGNIFICANT CHANGE UP (ref 12–16)
IMM GRANULOCYTES NFR BLD AUTO: 1 % — HIGH (ref 0.1–0.3)
INR BLD: 1.17 RATIO — SIGNIFICANT CHANGE UP (ref 0.65–1.3)
LYMPHOCYTES # BLD AUTO: 1.35 K/UL — SIGNIFICANT CHANGE UP (ref 1.2–3.4)
LYMPHOCYTES # BLD AUTO: 17.4 % — LOW (ref 20.5–51.1)
MCHC RBC-ENTMCNC: 27.1 PG — SIGNIFICANT CHANGE UP (ref 27–31)
MCHC RBC-ENTMCNC: 31.4 G/DL — LOW (ref 32–37)
MCV RBC AUTO: 86.2 FL — SIGNIFICANT CHANGE UP (ref 81–99)
MONOCYTES # BLD AUTO: 1.3 K/UL — HIGH (ref 0.1–0.6)
MONOCYTES NFR BLD AUTO: 16.8 % — HIGH (ref 1.7–9.3)
MRSA PCR RESULT.: NEGATIVE — SIGNIFICANT CHANGE UP
NEUTROPHILS # BLD AUTO: 4.8 K/UL — SIGNIFICANT CHANGE UP (ref 1.4–6.5)
NEUTROPHILS NFR BLD AUTO: 62 % — SIGNIFICANT CHANGE UP (ref 42.2–75.2)
NRBC # BLD: 0 /100 WBCS — SIGNIFICANT CHANGE UP (ref 0–0)
PLATELET # BLD AUTO: 294 K/UL — SIGNIFICANT CHANGE UP (ref 130–400)
POTASSIUM SERPL-MCNC: 5.8 MMOL/L — HIGH (ref 3.5–5)
POTASSIUM SERPL-SCNC: 5.8 MMOL/L — HIGH (ref 3.5–5)
PROT SERPL-MCNC: 7.4 G/DL — SIGNIFICANT CHANGE UP (ref 6–8)
PROTHROM AB SERPL-ACNC: 13.4 SEC — HIGH (ref 9.95–12.87)
RBC # BLD: 4.58 M/UL — SIGNIFICANT CHANGE UP (ref 4.2–5.4)
RBC # FLD: 15.9 % — HIGH (ref 11.5–14.5)
SODIUM SERPL-SCNC: 132 MMOL/L — LOW (ref 135–146)
WBC # BLD: 7.75 K/UL — SIGNIFICANT CHANGE UP (ref 4.8–10.8)
WBC # FLD AUTO: 7.75 K/UL — SIGNIFICANT CHANGE UP (ref 4.8–10.8)

## 2022-09-15 PROCEDURE — 93010 ELECTROCARDIOGRAM REPORT: CPT

## 2022-09-15 NOTE — H&P PST ADULT - HISTORY OF PRESENT ILLNESS
pt with hx colitis  admitted x 2 for abd pain and colitis flare up   now for planned procedure     PATIENT CURRENTLY DENIES CHEST PAIN  SHORTNESS OF BREATH  PALPITATIONS,  DYSURIA, OR UPPER RESPIRATORY INFECTION IN PAST 2 WEEKS  EXERCISE  TOLERANCE  1-2 FLIGHT OF STAIRS  WITHOUT SHORTNESS OF BREATH  denies travel outside the [place] in the past 30 days  Patient denies any signs or symptoms of COVID 19 and denies contact with known positive individuals.  They have an appointment for repeat COVID testing pre-procedure and acknowledge its time and place.  They were instructed to quarantine pre-procedure, practice exposure control measures, continue to self-monitor and report any concerns to their proceduralist.  pt advised self quarantine till day of procedure  Anesthesia Alert  Difficult Airway class iv  NO--History of neck surgery or radiation  NO--Limited ROM of neck  NO--History of Malignant hyperthermia  NO--No personal or family history of Pseudocholinesterase deficiency.  NO--Prior Anesthesia Complication  NO--Latex Allergy  NO--Loose teeth  NO--History of Rheumatoid Arthritis  NO--Bleeding risk  NO--GABO  hx dementia alert and oriented to person and place only    PT DENIES ANY RASHES, ABRASION, OR OPEN WOUNDS OR CUTS    AS PER THE PT, THIS IS HIS/HER COMPLETE MEDICAL AND SURGICAL HX, INCLUDING MEDICATIONS PRESCRIBED AND OVER THE COUNTER    Patient verbalized understanding of instructions and was given the opportunity to ask questions and have them answered.    pt denies any suicidal ideation or thoughts, pt states not a threat to self or others    K57.32/[id number]    Diverticulitis of large intestine without perforation or abscess without bleeding    Encounter for other preprocedural examination    ^K57.32/[id number]    Patient unable to provide medical history    Diverticulitis of large intestine without perforation or abscess without bleeding    Encounter for other preprocedural examination    Hypertension, unspecified type    Dementia    GERD (gastroesophageal reflux disease)    Diverticulitis    Lack of bladder control    No significant past surgical history    H/O dilation and curettage

## 2022-09-15 NOTE — H&P PST ADULT - REASON FOR ADMISSION
Patient is a  77 year old  female presenting to PAST in preparation for  Robot assisted laparoscopic sigmoidectomy possible open possible ostomy  on   9/30/22 under general anesthesia by Dr. guidry

## 2022-09-15 NOTE — H&P PST ADULT - NSICDXPASTMEDICALHX_GEN_ALL_CORE_FT
PAST MEDICAL HISTORY:  Dementia ao x 2    Diverticulitis     GERD (gastroesophageal reflux disease)     Hypertension, unspecified type     Lack of bladder control

## 2022-09-16 ENCOUNTER — OUTPATIENT (OUTPATIENT)
Dept: OUTPATIENT SERVICES | Facility: HOSPITAL | Age: 77
LOS: 1 days | Discharge: HOME | End: 2022-09-16

## 2022-09-16 ENCOUNTER — INPATIENT (INPATIENT)
Facility: HOSPITAL | Age: 77
LOS: 3 days | Discharge: HOME | End: 2022-09-20
Attending: STUDENT IN AN ORGANIZED HEALTH CARE EDUCATION/TRAINING PROGRAM | Admitting: STUDENT IN AN ORGANIZED HEALTH CARE EDUCATION/TRAINING PROGRAM

## 2022-09-16 VITALS
OXYGEN SATURATION: 97 % | TEMPERATURE: 98 F | HEART RATE: 97 BPM | HEIGHT: 66 IN | RESPIRATION RATE: 20 BRPM | DIASTOLIC BLOOD PRESSURE: 67 MMHG | SYSTOLIC BLOOD PRESSURE: 109 MMHG

## 2022-09-16 DIAGNOSIS — Z02.9 ENCOUNTER FOR ADMINISTRATIVE EXAMINATIONS, UNSPECIFIED: ICD-10-CM

## 2022-09-16 DIAGNOSIS — Z98.890 OTHER SPECIFIED POSTPROCEDURAL STATES: Chronic | ICD-10-CM

## 2022-09-16 PROBLEM — F03.90 UNSPECIFIED DEMENTIA, UNSPECIFIED SEVERITY, WITHOUT BEHAVIORAL DISTURBANCE, PSYCHOTIC DISTURBANCE, MOOD DISTURBANCE, AND ANXIETY: Chronic | Status: ACTIVE | Noted: 2022-08-08

## 2022-09-16 PROBLEM — F03.90 UNSPECIFIED DEMENTIA WITHOUT BEHAVIORAL DISTURBANCE: Chronic | Status: ACTIVE | Noted: 2022-08-08

## 2022-09-16 LAB
ALBUMIN SERPL ELPH-MCNC: 3.7 G/DL — SIGNIFICANT CHANGE UP (ref 3.5–5.2)
ALP SERPL-CCNC: 86 U/L — SIGNIFICANT CHANGE UP (ref 30–115)
ALT FLD-CCNC: 15 U/L — SIGNIFICANT CHANGE UP (ref 0–41)
ANION GAP SERPL CALC-SCNC: 11 MMOL/L — SIGNIFICANT CHANGE UP (ref 7–14)
APPEARANCE UR: CLEAR — SIGNIFICANT CHANGE UP
AST SERPL-CCNC: 19 U/L — SIGNIFICANT CHANGE UP (ref 0–41)
BASE EXCESS BLDV CALC-SCNC: 4.6 MMOL/L — HIGH (ref -2–3)
BASOPHILS # BLD AUTO: 0.04 K/UL — SIGNIFICANT CHANGE UP (ref 0–0.2)
BASOPHILS NFR BLD AUTO: 0.6 % — SIGNIFICANT CHANGE UP (ref 0–1)
BILIRUB SERPL-MCNC: 0.3 MG/DL — SIGNIFICANT CHANGE UP (ref 0.2–1.2)
BILIRUB UR-MCNC: NEGATIVE — SIGNIFICANT CHANGE UP
BUN SERPL-MCNC: 38 MG/DL — HIGH (ref 10–20)
CA-I SERPL-SCNC: 1.3 MMOL/L — SIGNIFICANT CHANGE UP (ref 1.15–1.33)
CALCIUM SERPL-MCNC: 10.2 MG/DL — SIGNIFICANT CHANGE UP (ref 8.4–10.5)
CHLORIDE SERPL-SCNC: 97 MMOL/L — LOW (ref 98–110)
CO2 SERPL-SCNC: 28 MMOL/L — SIGNIFICANT CHANGE UP (ref 17–32)
COLOR SPEC: SIGNIFICANT CHANGE UP
CREAT ?TM UR-MCNC: 13 MG/DL — SIGNIFICANT CHANGE UP
CREAT SERPL-MCNC: 2.6 MG/DL — HIGH (ref 0.7–1.5)
DIFF PNL FLD: NEGATIVE — SIGNIFICANT CHANGE UP
EGFR: 18 ML/MIN/1.73M2 — LOW
EOSINOPHIL # BLD AUTO: 0.26 K/UL — SIGNIFICANT CHANGE UP (ref 0–0.7)
EOSINOPHIL NFR BLD AUTO: 3.8 % — SIGNIFICANT CHANGE UP (ref 0–8)
GAS PNL BLDV: 131 MMOL/L — LOW (ref 136–145)
GAS PNL BLDV: SIGNIFICANT CHANGE UP
GAS PNL BLDV: SIGNIFICANT CHANGE UP
GLUCOSE SERPL-MCNC: 110 MG/DL — HIGH (ref 70–99)
GLUCOSE UR QL: NEGATIVE — SIGNIFICANT CHANGE UP
HCO3 BLDV-SCNC: 31 MMOL/L — HIGH (ref 22–29)
HCT VFR BLD CALC: 38.1 % — SIGNIFICANT CHANGE UP (ref 37–47)
HCT VFR BLDA CALC: 38 % — LOW (ref 39–51)
HGB BLD CALC-MCNC: 12.5 G/DL — LOW (ref 12.6–17.4)
HGB BLD-MCNC: 12.3 G/DL — SIGNIFICANT CHANGE UP (ref 12–16)
IMM GRANULOCYTES NFR BLD AUTO: 1.5 % — HIGH (ref 0.1–0.3)
KETONES UR-MCNC: NEGATIVE — SIGNIFICANT CHANGE UP
LACTATE BLDV-MCNC: 1.5 MMOL/L — SIGNIFICANT CHANGE UP (ref 0.5–2)
LEUKOCYTE ESTERASE UR-ACNC: NEGATIVE — SIGNIFICANT CHANGE UP
LYMPHOCYTES # BLD AUTO: 1.07 K/UL — LOW (ref 1.2–3.4)
LYMPHOCYTES # BLD AUTO: 15.8 % — LOW (ref 20.5–51.1)
MAGNESIUM SERPL-MCNC: 2.9 MG/DL — HIGH (ref 1.8–2.4)
MCHC RBC-ENTMCNC: 27.2 PG — SIGNIFICANT CHANGE UP (ref 27–31)
MCHC RBC-ENTMCNC: 32.3 G/DL — SIGNIFICANT CHANGE UP (ref 32–37)
MCV RBC AUTO: 84.1 FL — SIGNIFICANT CHANGE UP (ref 81–99)
MONOCYTES # BLD AUTO: 1.07 K/UL — HIGH (ref 0.1–0.6)
MONOCYTES NFR BLD AUTO: 15.8 % — HIGH (ref 1.7–9.3)
NEUTROPHILS # BLD AUTO: 4.22 K/UL — SIGNIFICANT CHANGE UP (ref 1.4–6.5)
NEUTROPHILS NFR BLD AUTO: 62.5 % — SIGNIFICANT CHANGE UP (ref 42.2–75.2)
NITRITE UR-MCNC: NEGATIVE — SIGNIFICANT CHANGE UP
NRBC # BLD: 0 /100 WBCS — SIGNIFICANT CHANGE UP (ref 0–0)
OSMOLALITY UR: 224 MOS/KG — SIGNIFICANT CHANGE UP (ref 50–1200)
PCO2 BLDV: 54 MMHG — HIGH (ref 39–42)
PH BLDV: 7.37 — SIGNIFICANT CHANGE UP (ref 7.32–7.43)
PH UR: 7.5 — SIGNIFICANT CHANGE UP (ref 5–8)
PLATELET # BLD AUTO: 384 K/UL — SIGNIFICANT CHANGE UP (ref 130–400)
PO2 BLDV: 28 MMHG — SIGNIFICANT CHANGE UP
POTASSIUM BLDV-SCNC: 5.6 MMOL/L — HIGH (ref 3.5–5.1)
POTASSIUM SERPL-MCNC: 5.8 MMOL/L — HIGH (ref 3.5–5)
POTASSIUM SERPL-SCNC: 5.8 MMOL/L — HIGH (ref 3.5–5)
POTASSIUM UR-SCNC: 15 MMOL/L — SIGNIFICANT CHANGE UP
PROT ?TM UR-MCNC: 5 MG/DLG/24H — SIGNIFICANT CHANGE UP
PROT SERPL-MCNC: 7.5 G/DL — SIGNIFICANT CHANGE UP (ref 6–8)
PROT UR-MCNC: NEGATIVE — SIGNIFICANT CHANGE UP
PROT/CREAT UR-RTO: 0.4 RATIO — HIGH (ref 0–0.2)
RBC # BLD: 4.53 M/UL — SIGNIFICANT CHANGE UP (ref 4.2–5.4)
RBC # FLD: 15.9 % — HIGH (ref 11.5–14.5)
SAO2 % BLDV: 42.1 % — SIGNIFICANT CHANGE UP
SARS-COV-2 RNA SPEC QL NAA+PROBE: SIGNIFICANT CHANGE UP
SODIUM SERPL-SCNC: 136 MMOL/L — SIGNIFICANT CHANGE UP (ref 135–146)
SODIUM UR-SCNC: 75 MMOL/L — SIGNIFICANT CHANGE UP
SP GR SPEC: 1.01 — LOW (ref 1.01–1.03)
TROPONIN T SERPL-MCNC: <0.01 NG/ML — SIGNIFICANT CHANGE UP
UROBILINOGEN FLD QL: SIGNIFICANT CHANGE UP
UUN UR-MCNC: 142 MG/DL — SIGNIFICANT CHANGE UP
WBC # BLD: 6.76 K/UL — SIGNIFICANT CHANGE UP (ref 4.8–10.8)
WBC # FLD AUTO: 6.76 K/UL — SIGNIFICANT CHANGE UP (ref 4.8–10.8)

## 2022-09-16 PROCEDURE — 93010 ELECTROCARDIOGRAM REPORT: CPT

## 2022-09-16 PROCEDURE — 99285 EMERGENCY DEPT VISIT HI MDM: CPT | Mod: FS

## 2022-09-16 PROCEDURE — 99223 1ST HOSP IP/OBS HIGH 75: CPT

## 2022-09-16 PROCEDURE — 76775 US EXAM ABDO BACK WALL LIM: CPT | Mod: 26

## 2022-09-16 PROCEDURE — 99497 ADVNCD CARE PLAN 30 MIN: CPT | Mod: 25

## 2022-09-16 RX ORDER — DEXTROSE 50 % IN WATER 50 %
50 SYRINGE (ML) INTRAVENOUS ONCE
Refills: 0 | Status: COMPLETED | OUTPATIENT
Start: 2022-09-16 | End: 2022-09-16

## 2022-09-16 RX ORDER — SODIUM BICARBONATE 1 MEQ/ML
50 SYRINGE (ML) INTRAVENOUS ONCE
Refills: 0 | Status: COMPLETED | OUTPATIENT
Start: 2022-09-16 | End: 2022-09-16

## 2022-09-16 RX ORDER — PANTOPRAZOLE SODIUM 20 MG/1
40 TABLET, DELAYED RELEASE ORAL
Refills: 0 | Status: DISCONTINUED | OUTPATIENT
Start: 2022-09-16 | End: 2022-09-20

## 2022-09-16 RX ORDER — AMLODIPINE BESYLATE 2.5 MG/1
5 TABLET ORAL DAILY
Refills: 0 | Status: DISCONTINUED | OUTPATIENT
Start: 2022-09-16 | End: 2022-09-18

## 2022-09-16 RX ORDER — TAMSULOSIN HYDROCHLORIDE 0.4 MG/1
0.4 CAPSULE ORAL AT BEDTIME
Refills: 0 | Status: DISCONTINUED | OUTPATIENT
Start: 2022-09-16 | End: 2022-09-20

## 2022-09-16 RX ORDER — INSULIN HUMAN 100 [IU]/ML
6 INJECTION, SOLUTION SUBCUTANEOUS ONCE
Refills: 0 | Status: COMPLETED | OUTPATIENT
Start: 2022-09-16 | End: 2022-09-16

## 2022-09-16 RX ORDER — CALCIUM GLUCONATE 100 MG/ML
2 VIAL (ML) INTRAVENOUS ONCE
Refills: 0 | Status: COMPLETED | OUTPATIENT
Start: 2022-09-16 | End: 2022-09-16

## 2022-09-16 RX ORDER — HEPARIN SODIUM 5000 [USP'U]/ML
5000 INJECTION INTRAVENOUS; SUBCUTANEOUS EVERY 12 HOURS
Refills: 0 | Status: DISCONTINUED | OUTPATIENT
Start: 2022-09-16 | End: 2022-09-20

## 2022-09-16 RX ORDER — METRONIDAZOLE 500 MG
500 TABLET ORAL EVERY 8 HOURS
Refills: 0 | Status: DISCONTINUED | OUTPATIENT
Start: 2022-09-16 | End: 2022-09-20

## 2022-09-16 RX ORDER — SODIUM CHLORIDE 9 MG/ML
1000 INJECTION INTRAMUSCULAR; INTRAVENOUS; SUBCUTANEOUS ONCE
Refills: 0 | Status: COMPLETED | OUTPATIENT
Start: 2022-09-16 | End: 2022-09-16

## 2022-09-16 RX ORDER — SODIUM ZIRCONIUM CYCLOSILICATE 10 G/10G
10 POWDER, FOR SUSPENSION ORAL EVERY 12 HOURS
Refills: 0 | Status: COMPLETED | OUTPATIENT
Start: 2022-09-16 | End: 2022-09-17

## 2022-09-16 RX ORDER — SODIUM CHLORIDE 9 MG/ML
1000 INJECTION INTRAMUSCULAR; INTRAVENOUS; SUBCUTANEOUS
Refills: 0 | Status: DISCONTINUED | OUTPATIENT
Start: 2022-09-16 | End: 2022-09-19

## 2022-09-16 RX ORDER — CIPROFLOXACIN LACTATE 400MG/40ML
500 VIAL (ML) INTRAVENOUS EVERY 12 HOURS
Refills: 0 | Status: DISCONTINUED | OUTPATIENT
Start: 2022-09-16 | End: 2022-09-19

## 2022-09-16 RX ORDER — DONEPEZIL HYDROCHLORIDE 10 MG/1
5 TABLET, FILM COATED ORAL AT BEDTIME
Refills: 0 | Status: DISCONTINUED | OUTPATIENT
Start: 2022-09-16 | End: 2022-09-20

## 2022-09-16 RX ADMIN — TAMSULOSIN HYDROCHLORIDE 0.4 MILLIGRAM(S): 0.4 CAPSULE ORAL at 21:22

## 2022-09-16 RX ADMIN — AMLODIPINE BESYLATE 5 MILLIGRAM(S): 2.5 TABLET ORAL at 21:22

## 2022-09-16 RX ADMIN — Medication 500 MILLIGRAM(S): at 21:22

## 2022-09-16 RX ADMIN — Medication 50 MILLILITER(S): at 15:13

## 2022-09-16 RX ADMIN — Medication 200 GRAM(S): at 15:13

## 2022-09-16 RX ADMIN — SODIUM CHLORIDE 1000 MILLILITER(S): 9 INJECTION INTRAMUSCULAR; INTRAVENOUS; SUBCUTANEOUS at 12:45

## 2022-09-16 RX ADMIN — Medication 500 MILLIGRAM(S): at 21:23

## 2022-09-16 RX ADMIN — SODIUM ZIRCONIUM CYCLOSILICATE 10 GRAM(S): 10 POWDER, FOR SUSPENSION ORAL at 21:21

## 2022-09-16 RX ADMIN — Medication 2 GRAM(S): at 16:39

## 2022-09-16 RX ADMIN — INSULIN HUMAN 6 UNIT(S): 100 INJECTION, SOLUTION SUBCUTANEOUS at 15:14

## 2022-09-16 RX ADMIN — DONEPEZIL HYDROCHLORIDE 5 MILLIGRAM(S): 10 TABLET, FILM COATED ORAL at 23:20

## 2022-09-16 NOTE — H&P ADULT - CONVERSATION DETAILS
Advanced Care Planning: FULL CODE  I have had goals of care discussion, advanced directive and code status with patient/family today.  I have spent 30 minutes with this patient. Over 50% of the encounter was spent in counseling on and coordination of patient care. The above recommendations were discussed with the patient. The patient and or family had all questions answered and expressed understanding of the plan.

## 2022-09-16 NOTE — H&P ADULT - ATTENDING COMMENTS
77 year old female with PMH of diverticulitis, HTN, dementia, scheduled for laparoscopic sigmoidectomy/colostomy on 9/30/22 by  Dr. Jean-Baptiste, sent to the hospital by pre-surgical testing for abnormal labs results. Pt was found to have hyperkalemia 5.8 and elevated creatinine 2.7.      Agree  with  Above  except for changes outlined  Below. 77 year old female with PMH of diverticulitis, HTN, dementia, scheduled for laparoscopic sigmoidectomy/colostomy on 9/30/22 by  Dr. Jean-Baptiste, sent to the hospital by pre-surgical testing for abnormal labs results. Pt was found to have hyperkalemia 5.8 and elevated creatinine 2.7.    US: Mild right hydronephrosis. 1 cm right renal lower pole cyst.  CT on past admission: persistent moderate mural thickening involving the sigmoid colon, with moderate pericolonic fat stranding, compatible with acute colitis; evaluation for extraluminal collection is limited without oral or intravenous contrast. Please note that underlying neoplastic process remains a possibility and correlation with direct visualization is suggested when acute episode resolves.      Agree  with  Above  except for changes outlined  Below.    VITAL SIGNS: AFebrile, vital signs stable  CONSTITUTIONAL: Thin; well-nourished; in no acute distress.  SKIN: Skin exam is warm and dry, no acute rash.  HEAD: Normocephalic; atraumatic.  EYES: Pupils equal round reactive to light, Extraocular movements intact; conjunctiva and sclera clear.  ENT: No nasal discharge; airway clear. Moist mucus membranes.  NECK: Supple; non tender. No rigidity  CARD: Regular rate and rhythm. Normal S1, S2; no murmurs, gallops, or rubs.  RESP: Lungs clear to auscultation bilaterally. No wheezes, rales or rhonchi.  ABD: Abdomen soft; non-tender; non-distended;  no hepatosplenomegaly. No costovertebral angle tenderness.   EXT: Normal ROM. No clubbing, cyanosis or edema. No calf tenderness to palpation.  NEURO: Alert and oriented x 3. No focal deficits.  PSYCH: Cooperative, appropriate.     IMPRESSION  Acute Kidney Injury Likely  Post Obstructive  Radiology + for  Hydronephrosis   Subacute likely   Recurrent  Diverticulitis   Hx Dementia   Hx HTN       Plan   Send Urine lytes Tamsulosin + Thomson  for bladder Rest  Possible of prerenal  given decreased PO intake   Start on Lokelma    No ECG changes noted   Repeat CT A/p non con   Encourage PO Diet  finish course of antibiotics  If no improvement  in  Renal Function  Consider Antibiotic induced  Kidney  Injury      #Progress Note Handoff  Pending (specify): , Tests___CT_____, Test Results_BMP ____, Other_________  Disposition: acute _  Patient Seen at Bedside_________09/16_________

## 2022-09-16 NOTE — ED PROVIDER NOTE - PHYSICAL EXAMINATION
Physical Exam    Constitutional: No acute distress.   Eyes: Conjunctiva pink, Sclera clear, PERRLA, EOMI.  ENT: No sinus tenderness. No nasal discharge. No oropharyngeal erythema, edema, or exudates. Uvula midline.   Cardiovascular: Regular rate, regular rhythm. No noted murmurs rubs or gallops.  Respiratory: unlabored respiratory effort, clear to auscultation bilaterally no wheezing, rales or rhonchi  Gastrointestinal: Normal bowel sounds. soft,  mildly distended, non-tender abdomen. No CVA tenderness  Musculoskeletal: supple neck, no midline tenderness. No joint or bony deformity.   Integumentary: warm, dry, no rash  Neurologic: awake, alert, oriented. cranial nerves II-XII grossly intact, extremities’ motor and sensory functions grossly intact. Short term memory impaired  Psychiatric: appropriate mood, appropriate affect

## 2022-09-16 NOTE — H&P ADULT - ASSESSMENT
Patient is 77 year old female with PMH of diverticulitis, HTN, dementia, scheduled for laparoscopic sigmoidectomy/colostomy on 9/30/22 by  Dr. Jean-Baptiste, sent to the hospital by pre-surgical testing for abnormal labs results. Pt was found to have hyperkalemia 5.8 and elevated creatinine 2.7.    # BRANDY, likely post obstructive  # hyperkalemia secondary to BRANDY  - Cr 2.6, BUN 38,   -  K 5.8, Mg 2.9.  - Renal US: Mild right hydronephrosis.1 cm right renal lower pole cyst.  - s/p Thomson insertion in ED, 700cc drained  - s/p dextrose and insulin in ED, start lokelma 10 mg Q12H x 3 doses  - f/u UA, urine prot/creat ratio, urine lytes  - f/u Nephrology consult    #Hx of recurrent diverticulitis,  - pt is planned for Sigmoidectomy by surgery   - colonoscopy8/2022: non-bleeding diverticula with mixed openings + inflammation in the sigmoid colon. There was luminal narrowing, so colonoscope could not be advanced beyond the sigmoid colon.   - CT on past admission: persistent moderate mural thickening involving the sigmoid colon, with moderate pericolonic fat stranding, compatible with acute colitis; evaluation for extraluminal collection is limited without oral or intravenous contrast. Please note that underlying neoplastic process remains a possibility and correlation with direct visualization is suggested when acute episode resolves.  - currently on PO Cipro and Flagyl since the last admission    # HTN  - c/w Amlodipine 5 mg po QD.    # dementia  - c/w donepezil    Diet: soft  Activity: as tolerated  DVT Prophylaxis: heparin subQ  GI Prophylaxis: protonix  CHG Order  Code Status: full code  Disposition: admit to medicine   Patient is 77 year old female with PMH of diverticulitis, HTN, dementia, scheduled for laparoscopic sigmoidectomy/colostomy on 9/30/22 by  Dr. Jean-Baptiste, sent to the hospital by pre-surgical testing for abnormal labs results. Pt was found to have hyperkalemia 5.8 and elevated creatinine 2.7.    # BRANDY, likely post obstructive  # hyperkalemia secondary to BRANDY  - Cr 2.6, BUN 38,   -  K 5.8, Mg 2.9.  - Renal US: Mild right hydronephrosis.1 cm right renal lower pole cyst.  - s/p Thomson insertion in ED, 700cc drained  - s/p dextrose and insulin in ED, start lokelma 10 mg Q12H x 3 doses  - f/u UA, urine prot/creat ratio, urine lytes  - f/u Nephrology consult  - c/w IVF    #Hx of recurrent diverticulitis,  - pt is planned for Sigmoidectomy by surgery   - colonoscopy8/2022: non-bleeding diverticula with mixed openings + inflammation in the sigmoid colon. There was luminal narrowing, so colonoscope could not be advanced beyond the sigmoid colon.   - CT on past admission: persistent moderate mural thickening involving the sigmoid colon, with moderate pericolonic fat stranding, compatible with acute colitis; evaluation for extraluminal collection is limited without oral or intravenous contrast. Please note that underlying neoplastic process remains a possibility and correlation with direct visualization is suggested when acute episode resolves.  - currently on PO Cipro and Flagyl since the last admission    # HTN  - c/w Amlodipine 5 mg po QD.    # dementia  - c/w donepezil    Diet: soft  Activity: as tolerated  DVT Prophylaxis: heparin subQ  GI Prophylaxis: protonix  CHG Order  Code Status: full code  Disposition: admit to medicine

## 2022-09-16 NOTE — ED PROVIDER NOTE - CARE PLAN
Principal Discharge DX:	Acute renal insufficiency  Secondary Diagnosis:	Urinary retention  Secondary Diagnosis:	Hyperkalemia   1

## 2022-09-16 NOTE — H&P ADULT - NSHPREVIEWOFSYSTEMS_GEN_ALL_CORE

## 2022-09-16 NOTE — H&P ADULT - HISTORY OF PRESENT ILLNESS
Patient is 77 year old female with PMH of diverticulitis, HTN, dementia, scheduled for laparoscopic sigmoidectomy/colostomy on 9/30/22 by  Dr. Jean-Baptiste, sent to the hospital by pre-surgical testing for abnormal labs results. Pt was found to have hyperkalemia 5.8 and elevated creatinine 2.7.  As per family, pt has been eating and drinking, pt had no recent complaints. Patient states that she has not urinated since yesterday despite eating and drinking. Denies flank pain, fever, chills, dysuria, suprapubic pain/pressure. She remains on cipro and flagyl from her last admission having infrequent BMs.    In ED, Vital Signs Last 24 Hrs  T(C): 36.2 (16 Sep 2022 16:41), Max: 36.6 (16 Sep 2022 10:54)  T(F): 97.1 (16 Sep 2022 16:41), Max: 97.9 (16 Sep 2022 10:54)  HR: 74 (16 Sep 2022 16:41) (74 - 97)  BP: 132/66 (16 Sep 2022 16:41) (109/67 - 132/66)  BP(mean): --  RR: 18 (16 Sep 2022 16:41) (18 - 20)  SpO2: 99% (16 Sep 2022 16:41) (97% - 99%)    Parameters below as of 16 Sep 2022 16:41  Patient On (Oxygen Delivery Method): room air    Labs are significant for K 5.8, Cr 2.6, BUN 38, Mg 2.9.   EKG: Normal sinus rhythm, Left anterior fascicular block; Septal infarct , age undetermined  Renal US: Mild right hydronephrosis.1 cm right renal lower pole cyst.  Pt had Garcia place by ED, drained 700cc post garcia.   Pt was given dextrose and insulin in ED, as well as 1L NS bolus.

## 2022-09-16 NOTE — ED PROVIDER NOTE - OBJECTIVE STATEMENT
77 year old female with a history of dementia, HTN, recent admission on 7/22 and 8/22 for diverticulitis planed for robotically assisted laparoscopic sigmoidectomy with Dr. Jean-Baptiste presents to the ED with acute renal failure noted on outpatient pre operative labs. Patient states that she has not urinated since yesterday despite eating and drinking. Denies flank pain, fever, chills, dysuria, suprapubic pain/pressure. She remains on cipro and flagyl from her last admission having infrequent BMs. 77 year old female with a history of dementia, HTN, recent admission on 7/22 and 8/22 for diverticulitis planned for robotically assisted laparoscopic sigmoidectomy with Dr. Jean-Baptiste presents to the ED with acute renal failure noted on outpatient pre operative labs. Patient states that she has not urinated since yesterday despite eating and drinking. Denies flank pain, fever, chills, dysuria, suprapubic pain/pressure. She remains on cipro and flagyl from her last admission having infrequent BMs.

## 2022-09-16 NOTE — ED PROVIDER NOTE - ATTENDING APP SHARED VISIT CONTRIBUTION OF CARE
78 yo f with pmh of dementia, htn, diverticulitis, scheduled for colectomy/colostomy on 9/30, sent by preadmissions testing for abnormal labs.  found to have hyperkalemia 5.8 and elevated creatinine 2.3.  as per family, pt has been eating and drinking.  pt has no complaints currently.  no abd pain, cp, sob.  exam: nad, cachectic, perrl, eomi, dry mm, rrr, ctab, abd soft, nt, nd aox2, dry skin imp: pt with aletha with hyperkalemia on outpt labs, will recheck, renal us, admission

## 2022-09-16 NOTE — H&P ADULT - NSHPLABSRESULTS_GEN_ALL_CORE
12.3   6.76  )-----------( 384      ( 16 Sep 2022 12:30 )             38.1       09-16    136  |  97<L>  |  38<H>  ----------------------------<  110<H>  5.8<H>   |  28  |  2.6<H>    Ca    10.2      16 Sep 2022 12:30  Mg     2.9     09-16    TPro  7.5  /  Alb  3.7  /  TBili  0.3  /  DBili  x   /  AST  19  /  ALT  15  /  AlkPhos  86  09-16                  PT/INR - ( 15 Sep 2022 14:27 )   PT: 13.40 sec;   INR: 1.17 ratio         PTT - ( 15 Sep 2022 14:27 )  PTT:27.9 sec    Lactate Trend      CARDIAC MARKERS ( 16 Sep 2022 12:30 )  x     / <0.01 ng/mL / x     / x     / x            CAPILLARY BLOOD GLUCOSE      POCT Blood Glucose.: 109 mg/dL (16 Sep 2022 15:05)        < from: US Renal (09.16.22 @ 14:25) >    IMPRESSION:    Mild right hydronephrosis.    1 cm right renal lower pole cyst.      < end of copied text >    < from: CT Abdomen and Pelvis No Cont (09.08.22 @ 16:01) >      IMPRESSION:    1. Since August 16, 2022, persistent moderate mural thickening involving   the sigmoid colon, with moderate pericolonic fat stranding, compatible   with acute colitis; evaluation for extraluminal collection is limited   without oral or intravenous contrast. Please note that underlying   neoplastic process remains a possibility and correlation with direct   visualization is suggested when acute episode resolves.    2. Remainder of findings are overall unchanged on this short-term   follow-up exam.    < end of copied text >

## 2022-09-16 NOTE — ED ADULT TRIAGE NOTE - CHIEF COMPLAINT QUOTE
Patient was in preop yesterday for colon surgery due to blockage, patient had abnormal renal function Hx diverticulitis

## 2022-09-16 NOTE — H&P ADULT - NSHPPHYSICALEXAM_GEN_ALL_CORE
PHYSICAL EXAM:  GENERAL: NAD, AAO x 4, 77y F  HEAD:  Atraumatic, Normocephalic  EYES: EOMI, conjunctiva and sclera white  NECK: Supple, No JVD  CHEST/LUNG: Clear to auscultation bilaterally; No wheeze; No crackles; No accessory muscles used  HEART: Regular rate and rhythm; No murmurs;   ABDOMEN: Soft, Nontender, Nondistended; Bowel sounds present; No guarding, No organomegaly  EXTREMITIES:  2+ Peripheral Pulses, No cyanosis or edema  NEUROLOGY: non-focal PHYSICAL EXAM:  GENERAL: NAD, AAO x 2-3, 77y F, confused  HEAD:  Atraumatic, Normocephalic  EYES: EOMI, conjunctiva and sclera white  NECK: Supple, No JVD  CHEST/LUNG: Clear to auscultation bilaterally; No wheeze; No crackles; No accessory muscles used  HEART: Regular rate and rhythm; No murmurs;   ABDOMEN: Soft, Nontender, Nondistended; Bowel sounds present; No guarding, No organomegaly  EXTREMITIES:  2+ Peripheral Pulses, No cyanosis or edema  NEUROLOGY: non-focal

## 2022-09-16 NOTE — ED PROVIDER NOTE - NS ED ROS FT
Constitutional: (-) fever  Eyes/ENT: (-) blurry vision, (-) epistaxis  Cardiovascular: (-) chest pain, (-) syncope  Respiratory: (-) cough, (-) shortness of breath  Gastrointestinal: (-) vomiting, (-) diarrhea  : (+) decreased urine output (-) dysuria (-) hematuria  Musculoskeletal: (-) neck pain, (-) back pain, (-) joint pain  Integumentary: (-) rash, (-) edema  Neurological: (-) headache, (+) altered mental status  Psychiatric: (-) hallucinations  Allergic/Immunologic: (-) pruritus

## 2022-09-17 LAB
ALBUMIN SERPL ELPH-MCNC: 3.2 G/DL — LOW (ref 3.5–5.2)
ALP SERPL-CCNC: 66 U/L — SIGNIFICANT CHANGE UP (ref 30–115)
ALT FLD-CCNC: 10 U/L — SIGNIFICANT CHANGE UP (ref 0–41)
ANION GAP SERPL CALC-SCNC: 9 MMOL/L — SIGNIFICANT CHANGE UP (ref 7–14)
AST SERPL-CCNC: 13 U/L — SIGNIFICANT CHANGE UP (ref 0–41)
BILIRUB SERPL-MCNC: 0.2 MG/DL — SIGNIFICANT CHANGE UP (ref 0.2–1.2)
BUN SERPL-MCNC: 29 MG/DL — HIGH (ref 10–20)
CALCIUM SERPL-MCNC: 9.3 MG/DL — SIGNIFICANT CHANGE UP (ref 8.4–10.5)
CHLORIDE SERPL-SCNC: 98 MMOL/L — SIGNIFICANT CHANGE UP (ref 98–110)
CO2 SERPL-SCNC: 28 MMOL/L — SIGNIFICANT CHANGE UP (ref 17–32)
CREAT SERPL-MCNC: 2.3 MG/DL — HIGH (ref 0.7–1.5)
EGFR: 21 ML/MIN/1.73M2 — LOW
GLUCOSE SERPL-MCNC: 95 MG/DL — SIGNIFICANT CHANGE UP (ref 70–99)
POTASSIUM SERPL-MCNC: 4.2 MMOL/L — SIGNIFICANT CHANGE UP (ref 3.5–5)
POTASSIUM SERPL-SCNC: 4.2 MMOL/L — SIGNIFICANT CHANGE UP (ref 3.5–5)
PROT SERPL-MCNC: 6 G/DL — SIGNIFICANT CHANGE UP (ref 6–8)
SODIUM SERPL-SCNC: 135 MMOL/L — SIGNIFICANT CHANGE UP (ref 135–146)

## 2022-09-17 PROCEDURE — 74176 CT ABD & PELVIS W/O CONTRAST: CPT | Mod: 26

## 2022-09-17 PROCEDURE — 99233 SBSQ HOSP IP/OBS HIGH 50: CPT

## 2022-09-17 RX ADMIN — AMLODIPINE BESYLATE 5 MILLIGRAM(S): 2.5 TABLET ORAL at 05:35

## 2022-09-17 RX ADMIN — Medication 500 MILLIGRAM(S): at 21:15

## 2022-09-17 RX ADMIN — Medication 500 MILLIGRAM(S): at 16:36

## 2022-09-17 RX ADMIN — Medication 500 MILLIGRAM(S): at 05:35

## 2022-09-17 RX ADMIN — SODIUM ZIRCONIUM CYCLOSILICATE 10 GRAM(S): 10 POWDER, FOR SUSPENSION ORAL at 05:34

## 2022-09-17 RX ADMIN — DONEPEZIL HYDROCHLORIDE 5 MILLIGRAM(S): 10 TABLET, FILM COATED ORAL at 21:14

## 2022-09-17 RX ADMIN — HEPARIN SODIUM 5000 UNIT(S): 5000 INJECTION INTRAVENOUS; SUBCUTANEOUS at 16:38

## 2022-09-17 RX ADMIN — TAMSULOSIN HYDROCHLORIDE 0.4 MILLIGRAM(S): 0.4 CAPSULE ORAL at 21:14

## 2022-09-17 RX ADMIN — SODIUM CHLORIDE 75 MILLILITER(S): 9 INJECTION INTRAMUSCULAR; INTRAVENOUS; SUBCUTANEOUS at 13:09

## 2022-09-17 RX ADMIN — PANTOPRAZOLE SODIUM 40 MILLIGRAM(S): 20 TABLET, DELAYED RELEASE ORAL at 05:35

## 2022-09-17 RX ADMIN — Medication 500 MILLIGRAM(S): at 14:56

## 2022-09-17 RX ADMIN — HEPARIN SODIUM 5000 UNIT(S): 5000 INJECTION INTRAVENOUS; SUBCUTANEOUS at 05:35

## 2022-09-17 RX ADMIN — SODIUM ZIRCONIUM CYCLOSILICATE 10 GRAM(S): 10 POWDER, FOR SUSPENSION ORAL at 16:37

## 2022-09-17 NOTE — CONSULT NOTE ADULT - SUBJECTIVE AND OBJECTIVE BOX
NEPHROLOGY CONSULTATION NOTE    THIS CONSULT IS INCOMPLETE / FULL CONSULT TO FOLLOW    Patient is a 77y Female whom presented to the hospital with     PAST MEDICAL & SURGICAL HISTORY:  Hypertension, unspecified type      Dementia  ao x 2      GERD (gastroesophageal reflux disease)      Diverticulitis      Lack of bladder control      H/O dilation and curettage  for missed         Allergies:  No Known Allergies    Home Medications Reviewed  Hospital Medications:   MEDICATIONS  (STANDING):  amLODIPine   Tablet 5 milliGRAM(s) Oral daily  ciprofloxacin     Tablet 500 milliGRAM(s) Oral every 12 hours  donepezil 5 milliGRAM(s) Oral at bedtime  heparin   Injectable 5000 Unit(s) SubCutaneous every 12 hours  metroNIDAZOLE    Tablet 500 milliGRAM(s) Oral every 8 hours  pantoprazole    Tablet 40 milliGRAM(s) Oral before breakfast  sodium chloride 0.9%. 1000 milliLiter(s) (75 mL/Hr) IV Continuous <Continuous>  sodium zirconium cyclosilicate 10 Gram(s) Oral every 12 hours  tamsulosin 0.4 milliGRAM(s) Oral at bedtime      SOCIAL HISTORY:  Denies ETOH,Smoking,   FAMILY HISTORY:  Patient unable to provide medical history          REVIEW OF SYSTEMS:  CONSTITUTIONAL: No weakness, fevers or chills  EYES/ENT: No visual changes;  No vertigo or throat pain   NECK: No pain or stiffness  RESPIRATORY: No cough, wheezing, hemoptysis; No shortness of breath  CARDIOVASCULAR: No chest pain or palpitations.  GASTROINTESTINAL: No abdominal or epigastric pain. No nausea, vomiting, or hematemesis; No diarrhea or constipation. No melena or hematochezia.  GENITOURINARY: No dysuria, frequency, foamy urine, urinary urgency, incontinence or hematuria  NEUROLOGICAL: No numbness or weakness  SKIN: No itching, burning, rashes, or lesions   VASCULAR: No bilateral lower extremity edema.   All other review of systems is negative unless indicated above.    VITALS:  T(F): 97.4 (22 @ 07:24), Max: 98 (22 @ 23:58)  HR: 86 (22 @ 07:24)  BP: 122/71 (22 @ 07:24)  RR: 18 (22 @ 07:24)  SpO2: 98% (22 @ 07:24)     @ 07:01  -   @ 07:00  --------------------------------------------------------  IN: 0 mL / OUT: 1925 mL / NET: -1925 mL          22 @ 07:01  -  22 @ 07:00  --------------------------------------------------------  IN: 0 mL / OUT: 1100 mL / NET: -1100 mL      I&O's Detail    16 Sep 2022 07:  -  17 Sep 2022 07:00  --------------------------------------------------------  IN:  Total IN: 0 mL    OUT:    Indwelling Catheter - Urethral (mL): 1100 mL    Voided (mL): 825 mL  Total OUT: 1925 mL    Total NET: -1925 mL            PHYSICAL EXAM:  Constitutional: NAD  HEENT: anicteric sclera, oropharynx clear, MMM  Neck: No JVD  Respiratory: CTAB, no wheezes, rales or rhonchi  Cardiovascular: S1, S2, RRR  Gastrointestinal: BS+, soft, NT/ND  Extremities: No cyanosis or clubbing. No peripheral edema  Neurological: A/O x 3, no focal deficits  Psychiatric: Normal mood, normal affect  : No CVA tenderness. No garcia.   Skin: No rashes  Vascular Access:    LABS:      135  |  98  |  29<H>  ----------------------------<  95  4.2   |  28  |  2.3<H>    Ca    9.3      17 Sep 2022 04:30  Mg     2.9         TPro  6.0  /  Alb  3.2<L>  /  TBili  0.2  /  DBili      /  AST  13  /  ALT  10  /  AlkPhos  66      Creatinine Trend: 2.3 <--, 2.6 <--, 2.7 <--, 0.6 <--, 0.5 <--, 0.6 <--, 0.7 <--, 0.6 <--, 0.5 <--, 0.6 <--, 0.6 <--, 0.5 <--                        12.3   6.76  )-----------( 384      ( 16 Sep 2022 12:30 )             38.1     Urine Studies:  Urinalysis Basic - ( 16 Sep 2022 20:07 )    Color: Light Yellow / Appearance: Clear / S.006 / pH:   Gluc:  / Ketone: Negative  / Bili: Negative / Urobili: <2 mg/dL   Blood:  / Protein: Negative / Nitrite: Negative   Leuk Esterase: Negative / RBC:  / WBC    Sq Epi:  / Non Sq Epi:  / Bacteria:       Sodium, Random Urine: 75.0 mmoL/L ( @ 20:07)  Osmolality, Random Urine: 224 mos/kg ( @ 20:07)  Potassium, Random Urine: 15 mmol/L ( @ 20:07)  Creatinine, Random Urine: 13 mg/dL ( @ 20:07)  Protein/Creatinine Ratio Calculation: 0.4 Ratio ( @ 20:07)        Lipid: chol 161, TG 34, HDL 59, LDL --      [22 @ 07:41]          RADIOLOGY & ADDITIONAL STUDIES:                 NEPHROLOGY CONSULTATION NOTE    history obtained from chart / patient was unsure why she was at the hospital / has dementia    Patient is 77 year old female with PMH of diverticulitis, HTN, dementia, scheduled for laparoscopic sigmoidectomy/colostomy on 22 by  Dr. Jean-Baptiste, sent to the hospital by pre-surgical testing for abnormal labs results. Pt was found to have hyperkalemia 5.8 and elevated creatinine 2.7.  As per family, pt has been eating and drinking, pt had no recent complaints. Patient states that she has not urinated since yesterday despite eating and drinking. Denies flank pain, fever, chills, dysuria, suprapubic pain/pressure. She remains on cipro and flagyl from her last admission having infrequent BMs.    PAST MEDICAL & SURGICAL HISTORY:  Hypertension, unspecified type  Dementia ao x 2  GERD (gastroesophageal reflux disease)  Diverticulitis  Lack of bladder control  H/O dilation and curettage for missed         Allergies:  No Known Allergies    Home Medications   Home Medications:  Cipro 500 mg oral tablet: 1 tab(s) orally every 12 hours (16 Sep 2022 19:44)  Protonix 40 mg oral delayed release tablet: 1 tab(s) orally 2 times a day (15 Sep 2022 12:40)    Hospital Medications:   MEDICATIONS  (STANDING):  amLODIPine   Tablet 5 milliGRAM(s) Oral daily  ciprofloxacin     Tablet 500 milliGRAM(s) Oral every 12 hours  donepezil 5 milliGRAM(s) Oral at bedtime  heparin   Injectable 5000 Unit(s) SubCutaneous every 12 hours  metroNIDAZOLE    Tablet 500 milliGRAM(s) Oral every 8 hours  pantoprazole    Tablet 40 milliGRAM(s) Oral before breakfast  sodium chloride 0.9%. 1000 milliLiter(s) (75 mL/Hr) IV Continuous <Continuous>  sodium zirconium cyclosilicate 10 Gram(s) Oral every 12 hours  tamsulosin 0.4 milliGRAM(s) Oral at bedtime      SOCIAL HISTORY:  Denies ETOH,Smoking,   FAMILY HISTORY:  Patient unable to provide medical history          REVIEW OF SYSTEMS:  All other review of systems is negative unless indicated above.    VITALS:  T(F): 97.4 (22 @ 07:24), Max: 98 (22 @ 23:58)  HR: 86 (22 @ 07:24)  BP: 122/71 (22 @ 07:24)  RR: 18 (22 @ 07:24)  SpO2: 98% (22 @ 07:24)     @ 07:01  -   @ 07:00  --------------------------------------------------------  IN: 0 mL / OUT: 1925 mL / NET: -1925 mL          22 @ 07:01  -  22 @ 07:00  --------------------------------------------------------  IN: 0 mL / OUT: 1100 mL / NET: -1100 mL      I&O's Detail    16 Sep 2022 07:01  -  17 Sep 2022 07:00  --------------------------------------------------------  IN:  Total IN: 0 mL    OUT:    Indwelling Catheter - Urethral (mL): 1100 mL    Voided (mL): 825 mL  Total OUT: 1925 mL    Total NET: -1925 mL            PHYSICAL EXAM:  Constitutional: NAD/ demented  Respiratory: CTAB  Cardiovascular: S1, S2, RRR  Gastrointestinal: BS+, soft, NT/ND  Extremities: No cyanosis or clubbing. No peripheral edema  :  garcia.   Skin: No rashes  Vascular Access:    LABS:      135  |  98  |  29<H>  ----------------------------<  95  4.2   |  28  |  2.3<H>    Ca    9.3      17 Sep 2022 04:30  Mg     2.9         TPro  6.0  /  Alb  3.2<L>  /  TBili  0.2  /  DBili      /  AST  13  /  ALT  10  /  AlkPhos  66      Creatinine Trend: 2.3 <--, 2.6 <--, 2.7 <--, 0.6 <--, 0.5 <--, 0.6 <--, 0.7 <--, 0.6 <--, 0.5 <--, 0.6 <--, 0.6 <--, 0.5 <--                        12.3   6.76  )-----------( 384      ( 16 Sep 2022 12:30 )             38.1     Urine Studies:  Urinalysis Basic - ( 16 Sep 2022 20:07 )    Color: Light Yellow / Appearance: Clear / S.006 / pH:   Gluc:  / Ketone: Negative  / Bili: Negative / Urobili: <2 mg/dL   Blood:  / Protein: Negative / Nitrite: Negative   Leuk Esterase: Negative / RBC:  / WBC    Sq Epi:  / Non Sq Epi:  / Bacteria:       Sodium, Random Urine: 75.0 mmoL/L ( @ 20:07)  Osmolality, Random Urine: 224 mos/kg ( @ 20:07)  Potassium, Random Urine: 15 mmol/L ( @ 20:07)  Creatinine, Random Urine: 13 mg/dL ( @ 20:07)  Protein/Creatinine Ratio Calculation: 0.4 Ratio ( @ 20:07)        Lipid: chol 161, TG 34, HDL 59, LDL --      [22 @ 07:41]          RADIOLOGY & ADDITIONAL STUDIES:    < from: US Renal (22 @ 14:25) >  IMPRESSION:    Mild right hydronephrosis.    1 cm right renal lower pole cyst.    < end of copied text >

## 2022-09-17 NOTE — PROGRESS NOTE ADULT - SUBJECTIVE AND OBJECTIVE BOX
INTERVAL HPI/OVERNIGHT EVENTS:    SUBJECTIVE: Patient seen and examined at bedside.     cc: aletha  no cp, sob, abd pain, fever  no hematuria dysuria, flank pain, suprapubic pain    OBJECTIVE:    VITAL SIGNS:  Vital Signs Last 24 Hrs  T(C): 36.3 (17 Sep 2022 07:24), Max: 36.7 (16 Sep 2022 23:58)  T(F): 97.4 (17 Sep 2022 07:24), Max: 98 (16 Sep 2022 23:58)  HR: 86 (17 Sep 2022 07:24) (74 - 86)  BP: 122/71 (17 Sep 2022 07:24) (112/68 - 132/66)  BP(mean): --  RR: 18 (17 Sep 2022 07:24) (18 - 18)  SpO2: 98% (17 Sep 2022 07:24) (95% - 99%)    Parameters below as of 17 Sep 2022 04:52  Patient On (Oxygen Delivery Method): room air          PHYSICAL EXAM:    General: NAD  HEENT: NC/AT; PERRL, clear conjunctiva  Neck: supple  Respiratory: CTA b/l  Cardiovascular: +S1/S2; RRR  Abdomen: soft, NT/ND; +BS x4; garcia with yellow urine  Extremities: WWP, 2+ peripheral pulses b/l; no LE edema  Skin: normal color and turgor; no rash  Neurological:    MEDICATIONS:  MEDICATIONS  (STANDING):  amLODIPine   Tablet 5 milliGRAM(s) Oral daily  ciprofloxacin     Tablet 500 milliGRAM(s) Oral every 12 hours  donepezil 5 milliGRAM(s) Oral at bedtime  heparin   Injectable 5000 Unit(s) SubCutaneous every 12 hours  metroNIDAZOLE    Tablet 500 milliGRAM(s) Oral every 8 hours  pantoprazole    Tablet 40 milliGRAM(s) Oral before breakfast  sodium chloride 0.9%. 1000 milliLiter(s) (75 mL/Hr) IV Continuous <Continuous>  sodium zirconium cyclosilicate 10 Gram(s) Oral every 12 hours  tamsulosin 0.4 milliGRAM(s) Oral at bedtime    MEDICATIONS  (PRN):      ALLERGIES:  Allergies    No Known Allergies    Intolerances        LABS:                        12.3   6.76  )-----------( 384      ( 16 Sep 2022 12:30 )             38.1     Hemoglobin: 12.3 g/dL (-16 @ 12:30)  Hemoglobin: 12.4 g/dL (09-15 @ 14:27)    CBC Full  -  ( 16 Sep 2022 12:30 )  WBC Count : 6.76 K/uL  RBC Count : 4.53 M/uL  Hemoglobin : 12.3 g/dL  Hematocrit : 38.1 %  Platelet Count - Automated : 384 K/uL  Mean Cell Volume : 84.1 fL  Mean Cell Hemoglobin : 27.2 pg  Mean Cell Hemoglobin Concentration : 32.3 g/dL  Auto Neutrophil # : 4.22 K/uL  Auto Lymphocyte # : 1.07 K/uL  Auto Monocyte # : 1.07 K/uL  Auto Eosinophil # : 0.26 K/uL  Auto Basophil # : 0.04 K/uL  Auto Neutrophil % : 62.5 %  Auto Lymphocyte % : 15.8 %  Auto Monocyte % : 15.8 %  Auto Eosinophil % : 3.8 %  Auto Basophil % : 0.6 %        135  |  98  |  29<H>  ----------------------------<  95  4.2   |  28  |  2.3<H>    Ca    9.3      17 Sep 2022 04:30  Mg     2.9         TPro  6.0  /  Alb  3.2<L>  /  TBili  0.2  /  DBili  x   /  AST  13  /  ALT  10  /  AlkPhos  66      Creatinine Trend: 2.3<--, 2.6<--, 2.7<--, 0.6<--, 0.5<--, 0.6<--  LIVER FUNCTIONS - ( 17 Sep 2022 04:30 )  Alb: 3.2 g/dL / Pro: 6.0 g/dL / ALK PHOS: 66 U/L / ALT: 10 U/L / AST: 13 U/L / GGT: x           PT/INR - ( 15 Sep 2022 14:27 )   PT: 13.40 sec;   INR: 1.17 ratio         PTT - ( 15 Sep 2022 14:27 )  PTT:27.9 sec    hs Troponin:            Urinalysis Basic - ( 16 Sep 2022 20:07 )    Color: Light Yellow / Appearance: Clear / S.006 / pH: x  Gluc: x / Ketone: Negative  / Bili: Negative / Urobili: <2 mg/dL   Blood: x / Protein: Negative / Nitrite: Negative   Leuk Esterase: Negative / RBC: x / WBC x   Sq Epi: x / Non Sq Epi: x / Bacteria: x      CSF:                      EKG:   MICROBIOLOGY:    IMAGING:      Labs, imaging, EKG personally reviewed    RADIOLOGY & ADDITIONAL TESTS: Reviewed.

## 2022-09-17 NOTE — PROGRESS NOTE ADULT - TIME BILLING
77F PMHx HTN, dementia, h/o diverticulitis pending sigmoidectomy here with BRANDY.    #BRANDY, possible pre-renal  ns 75 cc/hr  improving, trend  c/b hyperkalemia, now reoslved  renal us with mild R hydro  appreciate renal  #H/o Diverticulitis  recent admission for diverticulitis  cipro, flagyl to complete course  outpt f/u sx for sigmoidectomy  #HTN  norvasc 5  #Dementia  donepezil 5  #DVT ppx  subq hep    #Progress Note Handoff:  Pending (specify):  Consults_________, Tests________, Test Results_______, Other___aki______  Family discussion: d/w pt, daughter at bedside re: treatment plan, primary dx  Disposition: Home___/SNF___/Other________/Unknown at this time___x_____

## 2022-09-17 NOTE — CONSULT NOTE ADULT - ASSESSMENT
Patient is 77 year old female with PMH of diverticulitis, HTN, dementia, scheduled for laparoscopic sigmoidectomy/colostomy on 9/30/22 presenting with BRANDY and hyperkalemia     ·	BRANDY seemed prerenal on exam / hyperkalemia ( volume depletion related)  ·	creat better / K better  ·	DC Lokelma   ·	resume IVF if possible/ pulling IV lines  ·	sono noted for mild right hydro   ·	keep garcia  ·	checked UA and urine lytes / low U K noted and high U NA ? ATN (doubt)  ·	on cipro flagyl keep for now     will follow

## 2022-09-18 LAB
ANION GAP SERPL CALC-SCNC: 13 MMOL/L — SIGNIFICANT CHANGE UP (ref 7–14)
BUN SERPL-MCNC: 28 MG/DL — HIGH (ref 10–20)
CALCIUM SERPL-MCNC: 9.1 MG/DL — SIGNIFICANT CHANGE UP (ref 8.4–10.5)
CHLORIDE SERPL-SCNC: 100 MMOL/L — SIGNIFICANT CHANGE UP (ref 98–110)
CO2 SERPL-SCNC: 23 MMOL/L — SIGNIFICANT CHANGE UP (ref 17–32)
CREAT SERPL-MCNC: 2 MG/DL — HIGH (ref 0.7–1.5)
EGFR: 25 ML/MIN/1.73M2 — LOW
GLUCOSE SERPL-MCNC: 154 MG/DL — HIGH (ref 70–99)
POTASSIUM SERPL-MCNC: 4.6 MMOL/L — SIGNIFICANT CHANGE UP (ref 3.5–5)
POTASSIUM SERPL-SCNC: 4.6 MMOL/L — SIGNIFICANT CHANGE UP (ref 3.5–5)
SODIUM SERPL-SCNC: 136 MMOL/L — SIGNIFICANT CHANGE UP (ref 135–146)

## 2022-09-18 PROCEDURE — 99233 SBSQ HOSP IP/OBS HIGH 50: CPT

## 2022-09-18 RX ADMIN — Medication 500 MILLIGRAM(S): at 17:54

## 2022-09-18 RX ADMIN — Medication 500 MILLIGRAM(S): at 05:19

## 2022-09-18 RX ADMIN — Medication 500 MILLIGRAM(S): at 22:55

## 2022-09-18 RX ADMIN — AMLODIPINE BESYLATE 5 MILLIGRAM(S): 2.5 TABLET ORAL at 05:19

## 2022-09-18 RX ADMIN — HEPARIN SODIUM 5000 UNIT(S): 5000 INJECTION INTRAVENOUS; SUBCUTANEOUS at 05:20

## 2022-09-18 RX ADMIN — HEPARIN SODIUM 5000 UNIT(S): 5000 INJECTION INTRAVENOUS; SUBCUTANEOUS at 17:54

## 2022-09-18 RX ADMIN — PANTOPRAZOLE SODIUM 40 MILLIGRAM(S): 20 TABLET, DELAYED RELEASE ORAL at 05:20

## 2022-09-18 RX ADMIN — TAMSULOSIN HYDROCHLORIDE 0.4 MILLIGRAM(S): 0.4 CAPSULE ORAL at 22:55

## 2022-09-18 RX ADMIN — DONEPEZIL HYDROCHLORIDE 5 MILLIGRAM(S): 10 TABLET, FILM COATED ORAL at 22:55

## 2022-09-18 RX ADMIN — Medication 500 MILLIGRAM(S): at 14:25

## 2022-09-18 NOTE — PROGRESS NOTE ADULT - SUBJECTIVE AND OBJECTIVE BOX
INTERVAL HPI/OVERNIGHT EVENTS:    SUBJECTIVE: Patient seen and examined at bedside.     cc: aletha  no cp, sob, abd pain, fever  no dysuria, hematuria, flank pain, suprapubic pain    OBJECTIVE:    VITAL SIGNS:  Vital Signs Last 24 Hrs  T(C): 36.4 (18 Sep 2022 08:51), Max: 37.2 (17 Sep 2022 16:37)  T(F): 97.6 (18 Sep 2022 08:51), Max: 98.9 (17 Sep 2022 16:37)  HR: 85 (18 Sep 2022 08:51) (81 - 89)  BP: 120/56 (18 Sep 2022 08:51) (104/59 - 122/59)  BP(mean): --  RR: 18 (18 Sep 2022 08:51) (18 - 18)  SpO2: 97% (18 Sep 2022 08:51) (97% - 99%)    Parameters below as of 18 Sep 2022 08:51  Patient On (Oxygen Delivery Method): room air          PHYSICAL EXAM:    General: NAD  HEENT: NC/AT; PERRL, clear conjunctiva  Neck: supple  Respiratory: CTA b/l  Cardiovascular: +S1/S2; RRR  Abdomen: soft, NT/ND; +BS x4  Extremities: WWP, 2+ peripheral pulses b/l; no LE edema  Skin: normal color and turgor; no rash  Neurological:    MEDICATIONS:  MEDICATIONS  (STANDING):  ciprofloxacin     Tablet 500 milliGRAM(s) Oral every 12 hours  donepezil 5 milliGRAM(s) Oral at bedtime  heparin   Injectable 5000 Unit(s) SubCutaneous every 12 hours  metroNIDAZOLE    Tablet 500 milliGRAM(s) Oral every 8 hours  pantoprazole    Tablet 40 milliGRAM(s) Oral before breakfast  sodium chloride 0.9%. 1000 milliLiter(s) (50 mL/Hr) IV Continuous <Continuous>  tamsulosin 0.4 milliGRAM(s) Oral at bedtime    MEDICATIONS  (PRN):      ALLERGIES:  Allergies    No Known Allergies    Intolerances        LABS:                        12.3   6.76  )-----------( 384      ( 16 Sep 2022 12:30 )             38.1     Hemoglobin: 12.3 g/dL (09-16 @ 12:30)  Hemoglobin: 12.4 g/dL (09-15 @ 14:27)    CBC Full  -  ( 16 Sep 2022 12:30 )  WBC Count : 6.76 K/uL  RBC Count : 4.53 M/uL  Hemoglobin : 12.3 g/dL  Hematocrit : 38.1 %  Platelet Count - Automated : 384 K/uL  Mean Cell Volume : 84.1 fL  Mean Cell Hemoglobin : 27.2 pg  Mean Cell Hemoglobin Concentration : 32.3 g/dL  Auto Neutrophil # : 4.22 K/uL  Auto Lymphocyte # : 1.07 K/uL  Auto Monocyte # : 1.07 K/uL  Auto Eosinophil # : 0.26 K/uL  Auto Basophil # : 0.04 K/uL  Auto Neutrophil % : 62.5 %  Auto Lymphocyte % : 15.8 %  Auto Monocyte % : 15.8 %  Auto Eosinophil % : 3.8 %  Auto Basophil % : 0.6 %        135  |  98  |  29<H>  ----------------------------<  95  4.2   |  28  |  2.3<H>    Ca    9.3      17 Sep 2022 04:30  Mg     2.9         TPro  6.0  /  Alb  3.2<L>  /  TBili  0.2  /  DBili  x   /  AST  13  /  ALT  10  /  AlkPhos  66  -    Creatinine Trend: 2.3<--, 2.6<--, 2.7<--, 0.6<--, 0.5<--, 0.6<--  LIVER FUNCTIONS - ( 17 Sep 2022 04:30 )  Alb: 3.2 g/dL / Pro: 6.0 g/dL / ALK PHOS: 66 U/L / ALT: 10 U/L / AST: 13 U/L / GGT: x               hs Troponin:            Urinalysis Basic - ( 16 Sep 2022 20:07 )    Color: Light Yellow / Appearance: Clear / S.006 / pH: x  Gluc: x / Ketone: Negative  / Bili: Negative / Urobili: <2 mg/dL   Blood: x / Protein: Negative / Nitrite: Negative   Leuk Esterase: Negative / RBC: x / WBC x   Sq Epi: x / Non Sq Epi: x / Bacteria: x      CSF:                      EKG:   MICROBIOLOGY:    IMAGING:      Labs, imaging, EKG personally reviewed    RADIOLOGY & ADDITIONAL TESTS: Reviewed.

## 2022-09-18 NOTE — PATIENT PROFILE ADULT - FALL HARM RISK - HARM RISK INTERVENTIONS

## 2022-09-18 NOTE — PROGRESS NOTE ADULT - TIME BILLING
77F PMHx HTN, dementia, h/o diverticulitis pending sigmoidectomy here with BRANDY.    #BRANDY, possible pre-renal  ns 50 cc/hr  improving, trend  c/b hyperkalemia, now resolved  renal us with mild R hydro  garcia in place  appreciate renal  #H/o Diverticulitis  recent admission for diverticulitis  cipro, flagyl to complete course  outpt f/u sx for sigmoidectomy  #HTN  norvasc 5  #Dementia  donepezil 5  #DVT ppx  subq hep    #Progress Note Handoff:  Pending (specify):  Consults_________, Tests________, Test Results_______, Other___aki______  Family discussion: d/w pt, daughter at bedside re: treatment plan, primary dx  Disposition: Home___/SNF___/Other________/Unknown at this time___x_____

## 2022-09-18 NOTE — PROGRESS NOTE ADULT - SUBJECTIVE AND OBJECTIVE BOX
Nephrology progress note    Patient was seen and examined, events over the last 24 h noted .  Confused  Cr stable  Allergies:  No Known Allergies    Hospital Medications:   MEDICATIONS  (STANDING):  ciprofloxacin     Tablet 500 milliGRAM(s) Oral every 12 hours  donepezil 5 milliGRAM(s) Oral at bedtime  heparin   Injectable 5000 Unit(s) SubCutaneous every 12 hours  metroNIDAZOLE    Tablet 500 milliGRAM(s) Oral every 8 hours  pantoprazole    Tablet 40 milliGRAM(s) Oral before breakfast  sodium chloride 0.9%. 1000 milliLiter(s) (50 mL/Hr) IV Continuous <Continuous>  tamsulosin 0.4 milliGRAM(s) Oral at bedtime        VITALS:  T(F): 97.4 (22 @ 15:11), Max: 98.9 (22 @ 16:37)  HR: 89 (22 @ 15:11)  BP: 112/59 (22 @ 15:11)  RR: 18 (22 @ 15:11)  SpO2: 97% (22 @ 15:11)  Wt(kg): --     @ 07:01  -   @ 07:00  --------------------------------------------------------  IN: 0 mL / OUT: 1925 mL / NET: -1925 mL     @ 07:01  -   @ 07:00  --------------------------------------------------------  IN: 0 mL / OUT: 650 mL / NET: -650 mL          PHYSICAL EXAM:  Constitutional: NAD  HEENT: anicteric sclera, oropharynx clear, MMM  Neck: No JVD  Respiratory: CTAB, no wheezes, rales or rhonchi  Cardiovascular: S1, S2, RRR  Gastrointestinal: BS+, soft, NT/ND  Extremities: No cyanosis or clubbing. No peripheral edema  :  No garcia.   Skin: No rashes    LABS:      136  |  100  |  28<H>  ----------------------------<  154<H>  4.6   |  23  |  2.0<H>    Ca    9.1      18 Sep 2022 12:32    TPro  6.0  /  Alb  3.2<L>  /  TBili  0.2  /  DBili      /  AST  13  /  ALT  10  /  AlkPhos  66          Urine Studies:  Urinalysis Basic - ( 16 Sep 2022 20:07 )    Color: Light Yellow / Appearance: Clear / S.006 / pH:   Gluc:  / Ketone: Negative  / Bili: Negative / Urobili: <2 mg/dL   Blood:  / Protein: Negative / Nitrite: Negative   Leuk Esterase: Negative / RBC:  / WBC    Sq Epi:  / Non Sq Epi:  / Bacteria:       Sodium, Random Urine: 75.0 mmoL/L ( @ 20:07)  Osmolality, Random Urine: 224 mos/kg ( @ 20:07)  Potassium, Random Urine: 15 mmol/L ( @ 20:07)  Creatinine, Random Urine: 13 mg/dL ( @ 20:07)  Protein/Creatinine Ratio Calculation: 0.4 Ratio ( @ 20:07)    RADIOLOGY & ADDITIONAL STUDIES:

## 2022-09-18 NOTE — PROGRESS NOTE ADULT - ASSESSMENT
77 year old female with PMH of diverticulitis, HTN, dementia, scheduled for laparoscopic sigmoidectomy/colostomy on 9/30/22 presenting with BRANDY and hyperkalemia     ·	BRANDY seemed prerenal on exam / hyperkalemia ( volume depletion related)  ·	creat better / K better  ·	off  Lokelma   ·	cont IVF if possible/ pulling IV lines  ·	sono noted for mild right hydro   ·	keep garcia  ·	checked UA not much proteinuria neg hematuira   ·	on cipro flagyl keep for now     will follow    no

## 2022-09-19 DIAGNOSIS — Z02.9 ENCOUNTER FOR ADMINISTRATIVE EXAMINATIONS, UNSPECIFIED: ICD-10-CM

## 2022-09-19 LAB
ALBUMIN SERPL ELPH-MCNC: 3.6 G/DL — SIGNIFICANT CHANGE UP (ref 3.5–5.2)
ALP SERPL-CCNC: 76 U/L — SIGNIFICANT CHANGE UP (ref 30–115)
ALT FLD-CCNC: 10 U/L — SIGNIFICANT CHANGE UP (ref 0–41)
ANION GAP SERPL CALC-SCNC: 13 MMOL/L — SIGNIFICANT CHANGE UP (ref 7–14)
AST SERPL-CCNC: 15 U/L — SIGNIFICANT CHANGE UP (ref 0–41)
BASOPHILS # BLD AUTO: 0.05 K/UL — SIGNIFICANT CHANGE UP (ref 0–0.2)
BASOPHILS NFR BLD AUTO: 0.4 % — SIGNIFICANT CHANGE UP (ref 0–1)
BILIRUB SERPL-MCNC: 0.3 MG/DL — SIGNIFICANT CHANGE UP (ref 0.2–1.2)
BUN SERPL-MCNC: 21 MG/DL — HIGH (ref 10–20)
CALCIUM SERPL-MCNC: 9.4 MG/DL — SIGNIFICANT CHANGE UP (ref 8.4–10.5)
CHLORIDE SERPL-SCNC: 99 MMOL/L — SIGNIFICANT CHANGE UP (ref 98–110)
CO2 SERPL-SCNC: 24 MMOL/L — SIGNIFICANT CHANGE UP (ref 17–32)
CREAT SERPL-MCNC: 1.7 MG/DL — HIGH (ref 0.7–1.5)
EGFR: 30 ML/MIN/1.73M2 — LOW
EOSINOPHIL # BLD AUTO: 0.08 K/UL — SIGNIFICANT CHANGE UP (ref 0–0.7)
EOSINOPHIL NFR BLD AUTO: 0.7 % — SIGNIFICANT CHANGE UP (ref 0–8)
GLUCOSE SERPL-MCNC: 122 MG/DL — HIGH (ref 70–99)
HCT VFR BLD CALC: 36.5 % — LOW (ref 37–47)
HGB BLD-MCNC: 11.6 G/DL — LOW (ref 12–16)
IMM GRANULOCYTES NFR BLD AUTO: 0.8 % — HIGH (ref 0.1–0.3)
LYMPHOCYTES # BLD AUTO: 0.81 K/UL — LOW (ref 1.2–3.4)
LYMPHOCYTES # BLD AUTO: 6.9 % — LOW (ref 20.5–51.1)
MAGNESIUM SERPL-MCNC: 2 MG/DL — SIGNIFICANT CHANGE UP (ref 1.8–2.4)
MCHC RBC-ENTMCNC: 26.8 PG — LOW (ref 27–31)
MCHC RBC-ENTMCNC: 31.8 G/DL — LOW (ref 32–37)
MCV RBC AUTO: 84.3 FL — SIGNIFICANT CHANGE UP (ref 81–99)
MONOCYTES # BLD AUTO: 1.66 K/UL — HIGH (ref 0.1–0.6)
MONOCYTES NFR BLD AUTO: 14.2 % — HIGH (ref 1.7–9.3)
NEUTROPHILS # BLD AUTO: 8.98 K/UL — HIGH (ref 1.4–6.5)
NEUTROPHILS NFR BLD AUTO: 77 % — HIGH (ref 42.2–75.2)
NRBC # BLD: 0 /100 WBCS — SIGNIFICANT CHANGE UP (ref 0–0)
PLATELET # BLD AUTO: 313 K/UL — SIGNIFICANT CHANGE UP (ref 130–400)
POTASSIUM SERPL-MCNC: 4.1 MMOL/L — SIGNIFICANT CHANGE UP (ref 3.5–5)
POTASSIUM SERPL-SCNC: 4.1 MMOL/L — SIGNIFICANT CHANGE UP (ref 3.5–5)
PROT SERPL-MCNC: 7 G/DL — SIGNIFICANT CHANGE UP (ref 6–8)
RBC # BLD: 4.33 M/UL — SIGNIFICANT CHANGE UP (ref 4.2–5.4)
RBC # FLD: 15.5 % — HIGH (ref 11.5–14.5)
SODIUM SERPL-SCNC: 136 MMOL/L — SIGNIFICANT CHANGE UP (ref 135–146)
WBC # BLD: 11.67 K/UL — HIGH (ref 4.8–10.8)
WBC # FLD AUTO: 11.67 K/UL — HIGH (ref 4.8–10.8)

## 2022-09-19 PROCEDURE — 99232 SBSQ HOSP IP/OBS MODERATE 35: CPT

## 2022-09-19 PROCEDURE — 72110 X-RAY EXAM L-2 SPINE 4/>VWS: CPT | Mod: 26

## 2022-09-19 RX ORDER — AMLODIPINE BESYLATE 2.5 MG/1
5 TABLET ORAL DAILY
Refills: 0 | Status: DISCONTINUED | OUTPATIENT
Start: 2022-09-19 | End: 2022-09-20

## 2022-09-19 RX ORDER — CIPROFLOXACIN LACTATE 400MG/40ML
500 VIAL (ML) INTRAVENOUS EVERY 24 HOURS
Refills: 0 | Status: DISCONTINUED | OUTPATIENT
Start: 2022-09-20 | End: 2022-09-20

## 2022-09-19 RX ORDER — CIPROFLOXACIN LACTATE 400MG/40ML
500 VIAL (ML) INTRAVENOUS EVERY 24 HOURS
Refills: 0 | Status: DISCONTINUED | OUTPATIENT
Start: 2022-09-19 | End: 2022-09-19

## 2022-09-19 RX ORDER — ACETAMINOPHEN 500 MG
650 TABLET ORAL ONCE
Refills: 0 | Status: COMPLETED | OUTPATIENT
Start: 2022-09-19 | End: 2022-09-19

## 2022-09-19 RX ADMIN — Medication 650 MILLIGRAM(S): at 13:48

## 2022-09-19 RX ADMIN — Medication 500 MILLIGRAM(S): at 05:46

## 2022-09-19 RX ADMIN — Medication 650 MILLIGRAM(S): at 13:04

## 2022-09-19 RX ADMIN — Medication 500 MILLIGRAM(S): at 22:14

## 2022-09-19 RX ADMIN — Medication 500 MILLIGRAM(S): at 13:04

## 2022-09-19 RX ADMIN — PANTOPRAZOLE SODIUM 40 MILLIGRAM(S): 20 TABLET, DELAYED RELEASE ORAL at 07:46

## 2022-09-19 RX ADMIN — DONEPEZIL HYDROCHLORIDE 5 MILLIGRAM(S): 10 TABLET, FILM COATED ORAL at 22:16

## 2022-09-19 RX ADMIN — HEPARIN SODIUM 5000 UNIT(S): 5000 INJECTION INTRAVENOUS; SUBCUTANEOUS at 05:46

## 2022-09-19 RX ADMIN — HEPARIN SODIUM 5000 UNIT(S): 5000 INJECTION INTRAVENOUS; SUBCUTANEOUS at 17:53

## 2022-09-19 RX ADMIN — TAMSULOSIN HYDROCHLORIDE 0.4 MILLIGRAM(S): 0.4 CAPSULE ORAL at 22:14

## 2022-09-19 RX ADMIN — SODIUM CHLORIDE 50 MILLILITER(S): 9 INJECTION INTRAMUSCULAR; INTRAVENOUS; SUBCUTANEOUS at 00:36

## 2022-09-19 NOTE — PROGRESS NOTE ADULT - SUBJECTIVE AND OBJECTIVE BOX
KEISHA PANTOJA 77y Female  MRN#: [id number]   Hospital Day: 3d    HPI:  Patient is 77 year old female with PMH of diverticulitis, HTN, dementia, scheduled for laparoscopic sigmoidectomy/colostomy on 22 by  Dr. Jean-Baptiste, sent to the hospital by pre-surgical testing for abnormal labs results. Pt was found to have hyperkalemia 5.8 and elevated creatinine 2.7.  As per family, pt has been eating and drinking, pt had no recent complaints. Patient states that she has not urinated since yesterday despite eating and drinking. Denies flank pain, fever, chills, dysuria, suprapubic pain/pressure. She remains on cipro and flagyl from her last admission having infrequent BMs.    In ED, Vital Signs Last 24 Hrs  T(C): 36.2 (16 Sep 2022 16:41), Max: 36.6 (16 Sep 2022 10:54)  T(F): 97.1 (16 Sep 2022 16:41), Max: 97.9 (16 Sep 2022 10:54)  HR: 74 (16 Sep 2022 16:41) (74 - 97)  BP: 132/66 (16 Sep 2022 16:41) (109/67 - 132/66)  BP(mean): --  RR: 18 (16 Sep 2022 16:41) (18 - 20)  SpO2: 99% (16 Sep 2022 16:41) (97% - 99%)    Parameters below as of 16 Sep 2022 16:41  Patient On (Oxygen Delivery Method): room air    Labs are significant for K 5.8, Cr 2.6, BUN 38, Mg 2.9.   EKG: Normal sinus rhythm, Left anterior fascicular block; Septal infarct , age undetermined  Renal US: Mild right hydronephrosis.1 cm right renal lower pole cyst.  Pt had Garcia place by ED, drained 700cc post garcia.   Pt was given dextrose and insulin in ED, as well as 1L NS bolus.     (16 Sep 2022 19:29)    SUBJECTIVE  Patient is a 77y old Female who presents with a chief complaint of BRANDY, hyperkalemia (19 Sep 2022 09:52)  Currently admitted to medicine with the primary diagnosis of Acute renal insufficiency    INTERVAL HPI AND OVERNIGHT EVENTS:  Patient was examined and seen at bedside.     OBJECTIVE  PAST MEDICAL & SURGICAL HISTORY  Hypertension, unspecified type    Dementia  ao x 2    GERD (gastroesophageal reflux disease)    Diverticulitis    Lack of bladder control    H/O dilation and curettage  for missed       ALLERGIES:  No Known Allergies    MEDICATIONS:  STANDING MEDICATIONS  donepezil 5 milliGRAM(s) Oral at bedtime  heparin   Injectable 5000 Unit(s) SubCutaneous every 12 hours  metroNIDAZOLE    Tablet 500 milliGRAM(s) Oral every 8 hours  pantoprazole    Tablet 40 milliGRAM(s) Oral before breakfast  sodium chloride 0.9%. 1000 milliLiter(s) IV Continuous <Continuous>  tamsulosin 0.4 milliGRAM(s) Oral at bedtime    PRN MEDICATIONS      VITAL SIGNS: Last 24 Hours  T(C): 36.8 (19 Sep 2022 05:05), Max: 36.8 (19 Sep 2022 05:05)  T(F): 98.2 (19 Sep 2022 05:05), Max: 98.2 (19 Sep 2022 05:05)  HR: 88 (19 Sep 2022 05:05) (88 - 89)  BP: 134/71 (19 Sep 2022 05:05) (112/59 - 134/71)  BP(mean): --  RR: 16 (19 Sep 2022 05:05) (16 - 18)  SpO2: 97% (18 Sep 2022 15:11) (97% - 97%)    LABS:        136  |  100  |  28<H>  ----------------------------<  154<H>  4.6   |  23  |  2.0<H>    Ca    9.1      18 Sep 2022 12:32    RADIOLOGY:    PHYSICAL EXAM:    GENERAL: NAD, AAO x 2-3, 77y F, confused  HEAD:  Atraumatic, Normocephalic  EYES: EOMI, conjunctiva and sclera white  NECK: Supple, No JVD  CHEST/LUNG: Clear to auscultation bilaterally; No wheeze; No crackles; No accessory muscles used  HEART: Regular rate and rhythm; No murmurs;   ABDOMEN: Soft, Nontender, Nondistended; Bowel sounds present; No guarding, No organomegaly  EXTREMITIES:  2+ Peripheral Pulses, No cyanosis or edema  NEUROLOGY: non-focal

## 2022-09-19 NOTE — PROGRESS NOTE ADULT - ASSESSMENT
Patient is 77 year old female with PMH of diverticulitis, HTN, dementia, scheduled for laparoscopic sigmoidectomy/colostomy on 9/30/22 by  Dr. Jean-Baptiste, sent to the hospital by pre-surgical testing for abnormal labs results. Pt was found to have hyperkalemia 5.8 and elevated creatinine 2.7.    # BRANDY, likely prerenal  # hyperkalemia secondary to BRANDY  - Cr 2.6---> 2.0, BUN 38---> 28``   -  K 5.8--> 4.6, Mg 2.9.  - Renal US: Mild right hydronephrosis.1 cm right renal lower pole cyst.  - s/p Garcia insertion in ED, 700cc drained  - s/p dextrose and insulin in ED,   - Started lokelma 10 mg Q12H x 3 doses---> D/C  - UA: not much proteinuria neg hematuira  - urine prot/creat ratio   - urine lytes: low U K noted and high U NA ? ATN (doubt)  - keep garcia   - c/w IVF 50cc/h    #Hx of recurrent diverticulitis,  - pt is planned for Sigmoidectomy by surgery   - colonoscopy8/2022: non-bleeding diverticula with mixed openings + inflammation in the sigmoid colon. There was luminal narrowing, so colonoscope could not be advanced beyond the sigmoid colon.   - CT on past admission: persistent moderate mural thickening involving the sigmoid colon, with moderate pericolonic fat stranding, compatible with acute colitis; evaluation for extraluminal collection is limited without oral or intravenous contrast. Please note that underlying neoplastic process remains a possibility and correlation with direct visualization is suggested when acute episode resolves.  - currently on PO Cipro and Flagyl since the last admission    # HTN  - c/w Amlodipine 5 mg po QD.    # dementia  - c/w donepezil    Diet: soft  Activity: as tolerated  DVT Prophylaxis: heparin subQ  GI Prophylaxis: protonix  CHG Order  Code Status: full code  Disposition: admit to medicine   Patient is 77 year old female with PMH of diverticulitis, HTN, dementia, scheduled for laparoscopic sigmoidectomy/colostomy on 9/30/22 by  Dr. Jean-Baptiste, sent to the hospital by pre-surgical testing for abnormal labs results. Pt was found to have hyperkalemia 5.8 and elevated creatinine 2.7.    # BRANDY, likely prerenal  # hyperkalemia secondary to BRANDY  - Cr 2.6, BUN 38,   -  K 5.8, Mg 2.9.  - Renal US: Mild right hydronephrosis.1 cm right renal lower pole cyst.  - s/p Garcia insertion in ED, 700cc drained  - s/p dextrose and insulin in ED,   - Started lokelma 10 mg Q12H x 3 doses---> D/C  - UA: not much proteinuria neg hematuira  - urine prot/creat ratio   - urine lytes: low U K noted and high U NA ? ATN (doubt)  - keep garcia   - c/w IVF 50cc/h --> D/C  - d/c garcia for TOV -> need to make sure Cr stable or getting lower w/out garcia prior to d/c    #Hx of recurrent diverticulitis,  - pt is planned for Sigmoidectomy by surgery   - colonoscopy8/2022: non-bleeding diverticula with mixed openings + inflammation in the sigmoid colon. There was luminal narrowing, so colonoscope could not be advanced beyond the sigmoid colon.   - CT on past admission: persistent moderate mural thickening involving the sigmoid colon, with moderate pericolonic fat stranding, compatible with acute colitis; evaluation for extraluminal collection is limited without oral or intravenous contrast. Please note that underlying neoplastic process remains a possibility and correlation with direct visualization is suggested when acute episode resolves.  - currently on PO Cipro and Flagyl since the last admission  - need to adjust cipro for GFR: cipro 500mg po q24 -> but as Cr improves, needs to go back to q12  - c/w flagyl 500mg po q8    # HTN  - c/w Amlodipine 5 mg po QD.    # dementia  - c/w donepezil    Diet: soft  Activity: as tolerated  DVT Prophylaxis: heparin subQ  GI Prophylaxis: protonix  CHG Order  Code Status: full code  Disposition: admit to medicine

## 2022-09-19 NOTE — PROGRESS NOTE ADULT - ASSESSMENT
77 year old female with PMH of diverticulitis, HTN, dementia, scheduled for laparoscopic sigmoidectomy/colostomy on 9/30/22 presenting with BRANDY and hyperkalemia     ·	BRANDY seemed prerenal on exam / hyperkalemia ( volume depletion related)  ·	creat better / K better  ·	off  Lokelma   ·	sono noted for mild right hydro   ·	DC garcia  ·	checked UA not much proteinuria neg hematuria   ·	on cipro flagyl keep for now   ·	from renal stand point can follow as OP at [facility] clinic     will follow

## 2022-09-19 NOTE — PROGRESS NOTE ADULT - SUBJECTIVE AND OBJECTIVE BOX
Nephrology progress note    THIS IS AN INCOMPLETE NOTE . FULL NOTE TO FOLLOW SHORTLY    Patient is seen and examined, events over the last 24 h noted .    Allergies:  No Known Allergies    Hospital Medications:   MEDICATIONS  (STANDING):  ciprofloxacin     Tablet 500 milliGRAM(s) Oral every 12 hours  donepezil 5 milliGRAM(s) Oral at bedtime  heparin   Injectable 5000 Unit(s) SubCutaneous every 12 hours  metroNIDAZOLE    Tablet 500 milliGRAM(s) Oral every 8 hours  pantoprazole    Tablet 40 milliGRAM(s) Oral before breakfast  sodium chloride 0.9%. 1000 milliLiter(s) (50 mL/Hr) IV Continuous <Continuous>  tamsulosin 0.4 milliGRAM(s) Oral at bedtime        VITALS:  T(F): 98.2 (22 @ 05:05), Max: 98.2 (22 @ 05:05)  HR: 88 (22 @ 05:05)  BP: 134/71 (22 @ 05:05)  RR: 16 (22 @ 05:05)  SpO2: 97% (22 @ 15:11)  Wt(kg): --     @ 07:01  -   @ 07:00  --------------------------------------------------------  IN: 0 mL / OUT: 650 mL / NET: -650 mL     @ 07:01  -   @ 07:00  --------------------------------------------------------  IN: 0 mL / OUT: 2700 mL / NET: -2700 mL          PHYSICAL EXAM:  Constitutional: NAD  HEENT: anicteric sclera, oropharynx clear, MMM  Neck: No JVD  Respiratory: CTAB, no wheezes, rales or rhonchi  Cardiovascular: S1, S2, RRR  Gastrointestinal: BS+, soft, NT/ND  Extremities: No cyanosis or clubbing. No peripheral edema  :  No garcia.   Skin: No rashes    LABS:      136  |  100  |  28<H>  ----------------------------<  154<H>  4.6   |  23  |  2.0<H>    Ca    9.1      18 Sep 2022 12:32          Urine Studies:  Urinalysis Basic - ( 16 Sep 2022 20:07 )    Color: Light Yellow / Appearance: Clear / S.006 / pH:   Gluc:  / Ketone: Negative  / Bili: Negative / Urobili: <2 mg/dL   Blood:  / Protein: Negative / Nitrite: Negative   Leuk Esterase: Negative / RBC:  / WBC    Sq Epi:  / Non Sq Epi:  / Bacteria:       Sodium, Random Urine: 75.0 mmoL/L ( @ 20:07)  Osmolality, Random Urine: 224 mos/kg ( @ 20:07)  Potassium, Random Urine: 15 mmol/L ( @ 20:07)  Creatinine, Random Urine: 13 mg/dL ( @ 20:07)  Protein/Creatinine Ratio Calculation: 0.4 Ratio ( @ 20:07)      Lipid: chol 161, TG 34, HDL 59, LDL --      [22 @ 07:41]          RADIOLOGY & ADDITIONAL STUDIES:   Nephrology progress note  Patient is seen and examined, events over the last 24 h noted .  sitting in chair comfortable     Allergies:  No Known Allergies    Hospital Medications:   MEDICATIONS  (STANDING):  ciprofloxacin     Tablet 500 milliGRAM(s) Oral every 12 hours  donepezil 5 milliGRAM(s) Oral at bedtime  heparin   Injectable 5000 Unit(s) SubCutaneous every 12 hours  metroNIDAZOLE    Tablet 500 milliGRAM(s) Oral every 8 hours  pantoprazole    Tablet 40 milliGRAM(s) Oral before breakfast  sodium chloride 0.9%. 1000 milliLiter(s) (50 mL/Hr) IV Continuous <Continuous>  tamsulosin 0.4 milliGRAM(s) Oral at bedtime        VITALS:  T(F): 98.2 (22 @ 05:05), Max: 98.2 (22 @ 05:05)  HR: 88 (22 @ 05:05)  BP: 134/71 (22 @ 05:05)  RR: 16 (22 @ 05:05)  SpO2: 97% (22 @ 15:11)       @ 07:01  -   @ 07:00  --------------------------------------------------------  IN: 0 mL / OUT: 650 mL / NET: -650 mL     @ 07:01  -   @ 07:00  --------------------------------------------------------  IN: 0 mL / OUT: 2700 mL / NET: -2700 mL          PHYSICAL EXAM:  Constitutional: NAD  Neck: No JVD  Respiratory: CTAB,   Cardiovascular: S1, S2, RRR  Gastrointestinal: BS+, soft, NT/ND  Extremities: No cyanosis or clubbing. No peripheral edema  :   garcia.   Skin: No rashes    LABS:            136  |  100  |  28<H>  ----------------------------<  154<H>  4.6   |  23  |  2.0<H>    Ca    9.1      18 Sep 2022 12:32          Urine Studies:  Urinalysis Basic - ( 16 Sep 2022 20:07 )    Color: Light Yellow / Appearance: Clear / S.006 / pH:   Gluc:  / Ketone: Negative  / Bili: Negative / Urobili: <2 mg/dL   Blood:  / Protein: Negative / Nitrite: Negative   Leuk Esterase: Negative / RBC:  / WBC    Sq Epi:  / Non Sq Epi:  / Bacteria:       Sodium, Random Urine: 75.0 mmoL/L ( @ 20:07)  Osmolality, Random Urine: 224 mos/kg ( @ 20:07)  Potassium, Random Urine: 15 mmol/L ( @ 20:07)  Creatinine, Random Urine: 13 mg/dL ( @ 20:07)  Protein/Creatinine Ratio Calculation: 0.4 Ratio ( @ 20:07)      Lipid: chol 161, TG 34, HDL 59, LDL --      [22 @ 07:41]          RADIOLOGY & ADDITIONAL STUDIES:

## 2022-09-19 NOTE — PROGRESS NOTE ADULT - ASSESSMENT
Patient is 77 year old female with PMH of diverticulitis, HTN, dementia, scheduled for laparoscopic sigmoidectomy/colostomy on 9/30/22 by  Dr. Jean-Baptiste, sent to the hospital by pre-surgical testing for abnormal labs results. Pt was found to have hyperkalemia 5.8 and elevated creatinine 2.7.    # BRANDY, likely prerenal  # hyperkalemia secondary to BRANDY  - Cr 2.6, BUN 38,   -  K 5.8, Mg 2.9.  - Renal US: Mild right hydronephrosis.1 cm right renal lower pole cyst.  - s/p Garcia insertion in ED, 700cc drained  - s/p dextrose and insulin in ED,   - Started lokelma 10 mg Q12H x 3 doses---> D/C  - UA: not much proteinuria neg hematuira  - urine prot/creat ratio   - urine lytes: low U K noted and high U NA ? ATN (doubt)  - keep garcia   - c/w IVF 50cc/h    #Hx of recurrent diverticulitis,  - pt is planned for Sigmoidectomy by surgery   - colonoscopy8/2022: non-bleeding diverticula with mixed openings + inflammation in the sigmoid colon. There was luminal narrowing, so colonoscope could not be advanced beyond the sigmoid colon.   - CT on past admission: persistent moderate mural thickening involving the sigmoid colon, with moderate pericolonic fat stranding, compatible with acute colitis; evaluation for extraluminal collection is limited without oral or intravenous contrast. Please note that underlying neoplastic process remains a possibility and correlation with direct visualization is suggested when acute episode resolves.  - currently on PO Cipro and Flagyl since the last admission    # HTN  - c/w Amlodipine 5 mg po QD.    # dementia  - c/w donepezil    Diet: soft  Activity: as tolerated  DVT Prophylaxis: heparin subQ  GI Prophylaxis: protonix  CHG Order  Code Status: full code  Disposition: admit to medicine   Patient is 77 year old female with PMH of diverticulitis, HTN, dementia, scheduled for laparoscopic sigmoidectomy/colostomy on 9/30/22 by  Dr. Jean-Baptiste, sent to the hospital by pre-surgical testing for abnormal labs results. Pt was found to have hyperkalemia 5.8 and elevated creatinine 2.7.    # BRANDY, likely prerenal  # hyperkalemia secondary to BRANDY  - Cr 2.6, BUN 38,   -  K 5.8, Mg 2.9.  - Renal US: Mild right hydronephrosis.1 cm right renal lower pole cyst.  - s/p Garcia insertion in ED, 700cc drained  - s/p dextrose and insulin in ED,   - Started lokelma 10 mg Q12H x 3 doses---> D/C  - UA: not much proteinuria neg hematuira  - urine prot/creat ratio   - urine lytes: low U K noted and high U NA ? ATN (doubt)  - keep garcia   - c/w IVF 50cc/h --> D/C  - d/c garcia for TOV -> need to make sure Cr stable or getting lower w/out garcia prior to d/c    #Hx of recurrent diverticulitis,  - pt is planned for Sigmoidectomy by surgery   - colonoscopy8/2022: non-bleeding diverticula with mixed openings + inflammation in the sigmoid colon. There was luminal narrowing, so colonoscope could not be advanced beyond the sigmoid colon.   - CT on past admission: persistent moderate mural thickening involving the sigmoid colon, with moderate pericolonic fat stranding, compatible with acute colitis; evaluation for extraluminal collection is limited without oral or intravenous contrast. Please note that underlying neoplastic process remains a possibility and correlation with direct visualization is suggested when acute episode resolves.  - currently on PO Cipro and Flagyl since the last admission  - need to adjust cipro for GFR: cipro 500mg po q24 -> but as Cr improves, needs to go back to q12  - c/w flagyl 500mg po q8    # HTN  - c/w Amlodipine 5 mg po QD.    # dementia  - c/w donepezil    Diet: soft  Activity: as tolerated  DVT Prophylaxis: heparin subQ  GI Prophylaxis: protonix  CHG Order  Code Status: full code  Disposition: admit to medicine

## 2022-09-19 NOTE — PROGRESS NOTE ADULT - SUBJECTIVE AND OBJECTIVE BOX
KEISHA PANTOJA  77y  Female  ***My note supersedes ALL resident notes that I sign.  My corrections for their notes are in my note.***    I can be reached directly on [id number]. My office number is [phone]. My personal cell number is [phone].    INTERVAL EVENTS: Here for f/u of BRANDY. Pt doing OK. She has dementia and is a little afraid that her family does not know where she is. The pt needed to sit in the hallway to be watched by RN, but she was very cooperative while there. Pt did c/o severe lower back pain in the midline. Pt did not know why she was in the hospital, but she did know that she was in a hospital.    T(F): 98.3 (09-19-22 @ 12:23), Max: 98.3 (09-19-22 @ 12:23)  HR: 100 (09-19-22 @ 12:23) (88 - 100)  BP: 155/65 (09-19-22 @ 12:23) (124/63 - 155/65)  RR: 18 (09-19-22 @ 12:23) (16 - 18)  SpO2: --    09-18-22 @ 07:01  -  09-19-22 @ 07:00  --------------------------------------------------------  IN: 0 mL / OUT: 2700 mL / NET: -2700 mL    09-19-22 @ 07:01  -  09-19-22 @ 16:21  --------------------------------------------------------  IN: 0 mL / OUT: 700 mL / NET: -700 mL    Gen: NAD  HEENT: PERRL, EOMI, mouth clr, nose clr  Neck: no nodes, no JVD, thyroid nl  lungs: clr  hrt: s1 s2 rrr no murmur  abd: soft, NT/ND, no HS megaly  back: lumbar vert were tender to palp; nothing on skin of lower back  ext: no edema, no c/c  neuro: aa ox2, cn intact, can move all 4 ext    LABS:                       11.6    (    84.3   11.67 )-----------( ---------      313      ( 19 Sep 2022 11:22 )             36.5    (    15.5     WBC Count: 11.67 K/uL (09-19-22 @ 11:22)  WBC Count: 6.76 K/uL (09-16-22 @ 12:30)  WBC Count: 7.75 K/uL (09-15-22 @ 14:27)    136   (   99   (   122      09-19-22 @ 11:22  ----------------------               4.1   (   24   (   21                             -----                        1.7  Ca  9.4   Mg  2.0    P   --     Creatinine:   1.7 (09-19 @ 11:22)  eGFR:  30    Creatinine:   2.0 (09-18 @ 12:32)  eGFR:  25    Creatinine:   2.3 (09-17 @ 04:30)  eGFR:  21    Creatinine:   2.6 (09-16 @ 12:30)  eGFR:  18    Creatinine:   2.7 (09-15 @ 14:27)  eGFR:  18      LFT  7.0  (  0.3  (  15       09-19-22 @ 11:22  -------------------------  3.6  (  76  (  10    RADIOLOGY & ADDITIONAL TESTS:  < from: CT Abdomen and Pelvis No Cont (09.17.22 @ 01:07) >  IMPRESSION:    Redemonstrated sigmoid colon wall thickening limited in evaluation due to   lack of intravenous and intraluminal sigmoid contrast. Overall this   appears similar to questionably slightly decreased since prior exam. No   definite abscess within the limitations of this exam.    Again malignancy is on the differential once the acute symptoms subside is suggested.    < end of copied text >    < from: US Renal (09.16.22 @ 14:25) >  Urinary bladder: No debris or calculus. Bilateral ureteral jets are   visualized. Prevoid volume of approximately 744 cc. Patient does not have   the urge to void, and therefore the post void residual was not obtained.    IMPRESSION:    Mild right hydronephrosis.    1 cm right renal lower pole cyst.    < end of copied text >    < from: CT Abdomen and Pelvis No Cont (09.08.22 @ 16:01) >  BONES/SOFT TISSUES: Degenerative changes of the spine with grade 1-2   anterolisthesis of L4 on L5. No acute osseous abnormality.    OTHER: Infrarenal IVC filter in stable position.    IMPRESSION:    1. Since August 16, 2022, persistent moderate mural thickening involving   the sigmoid colon, with moderate pericolonic fat stranding, compatible   with acute colitis; evaluation for extraluminal collection is limited   without oral or intravenous contrast. Please note that underlying   neoplastic process remains a possibility and correlation with direct   visualization is suggested when acute episode resolves.    2. Remainder of findings are overall unchanged on this short-term follow-up exam.    < end of copied text >    MEDICATIONS:  metroNIDAZOLE    Tablet 500 milliGRAM(s) Oral every 8 hours    donepezil 5 milliGRAM(s) Oral at bedtime  heparin   Injectable 5000 Unit(s) SubCutaneous every 12 hours  pantoprazole    Tablet 40 milliGRAM(s) Oral before breakfast  sodium chloride 0.9%. 1000 milliLiter(s) IV Continuous <Continuous>  tamsulosin 0.4 milliGRAM(s) Oral at bedtime

## 2022-09-19 NOTE — PROGRESS NOTE ADULT - SUBJECTIVE AND OBJECTIVE BOX
KEISHA PANTOJA 77y Female  MRN#: [id number]   Hospital Day: 3d    HPI:  Patient is 77 year old female with PMH of diverticulitis, HTN, dementia, scheduled for laparoscopic sigmoidectomy/colostomy on 22 by  Dr. Jean-Baptiste, sent to the hospital by pre-surgical testing for abnormal labs results. Pt was found to have hyperkalemia 5.8 and elevated creatinine 2.7.  As per family, pt has been eating and drinking, pt had no recent complaints. Patient states that she has not urinated since yesterday despite eating and drinking. Denies flank pain, fever, chills, dysuria, suprapubic pain/pressure. She remains on cipro and flagyl from her last admission having infrequent BMs.    In ED, Vital Signs Last 24 Hrs  T(C): 36.2 (16 Sep 2022 16:41), Max: 36.6 (16 Sep 2022 10:54)  T(F): 97.1 (16 Sep 2022 16:41), Max: 97.9 (16 Sep 2022 10:54)  HR: 74 (16 Sep 2022 16:41) (74 - 97)  BP: 132/66 (16 Sep 2022 16:41) (109/67 - 132/66)  BP(mean): --  RR: 18 (16 Sep 2022 16:41) (18 - 20)  SpO2: 99% (16 Sep 2022 16:41) (97% - 99%)    Parameters below as of 16 Sep 2022 16:41  Patient On (Oxygen Delivery Method): room air    Labs are significant for K 5.8, Cr 2.6, BUN 38, Mg 2.9.   EKG: Normal sinus rhythm, Left anterior fascicular block; Septal infarct , age undetermined  Renal US: Mild right hydronephrosis.1 cm right renal lower pole cyst.  Pt had Garcia place by ED, drained 700cc post garcia.   Pt was given dextrose and insulin in ED, as well as 1L NS bolus.       (16 Sep 2022 19:29)      SUBJECTIVE  Patient is a 77y old Female who presents with a chief complaint of BRANDY, hyperkalemia (18 Sep 2022 16:20)  Currently admitted to medicine with the primary diagnosis of Acute renal insufficiency      INTERVAL HPI AND OVERNIGHT EVENTS:  Patient was examined and seen at bedside.       OBJECTIVE  PAST MEDICAL & SURGICAL HISTORY  Hypertension, unspecified type    Dementia  ao x 2    GERD (gastroesophageal reflux disease)    Diverticulitis    Lack of bladder control    H/O dilation and curettage  for missed       ALLERGIES:  No Known Allergies    MEDICATIONS:  STANDING MEDICATIONS  ciprofloxacin     Tablet 500 milliGRAM(s) Oral every 12 hours  donepezil 5 milliGRAM(s) Oral at bedtime  heparin   Injectable 5000 Unit(s) SubCutaneous every 12 hours  metroNIDAZOLE    Tablet 500 milliGRAM(s) Oral every 8 hours  pantoprazole    Tablet 40 milliGRAM(s) Oral before breakfast  sodium chloride 0.9%. 1000 milliLiter(s) IV Continuous <Continuous>  tamsulosin 0.4 milliGRAM(s) Oral at bedtime    PRN MEDICATIONS      VITAL SIGNS: Last 24 Hours  T(C): 36.8 (19 Sep 2022 05:05), Max: 36.8 (19 Sep 2022 05:05)  T(F): 98.2 (19 Sep 2022 05:05), Max: 98.2 (19 Sep 2022 05:05)  HR: 88 (19 Sep 2022 05:05) (85 - 89)  BP: 134/71 (19 Sep 2022 05:05) (112/59 - 134/71)  BP(mean): --  RR: 16 (19 Sep 2022 05:05) (16 - 18)  SpO2: 97% (18 Sep 2022 15:11) (97% - 97%)    LABS:        136  |  100  |  28<H>  ----------------------------<  154<H>  4.6   |  23  |  2.0<H>    Ca    9.1      18 Sep 2022 12:32    RADIOLOGY:    PHYSICAL EXAM:    GENERAL: NAD, AAO x 2-3, 77y F, confused  HEAD:  Atraumatic, Normocephalic  EYES: EOMI, conjunctiva and sclera white  NECK: Supple, No JVD  CHEST/LUNG: Clear to auscultation bilaterally; No wheeze; No crackles; No accessory muscles used  HEART: Regular rate and rhythm; No murmurs;   ABDOMEN: Soft, Nontender, Nondistended; Bowel sounds present; No guarding, No organomegaly  EXTREMITIES:  2+ Peripheral Pulses, No cyanosis or edema  NEUROLOGY: non-focal

## 2022-09-19 NOTE — PROGRESS NOTE ADULT - ASSESSMENT
77F PMHx HTN, dementia, h/o diverticulitis pending sigmoidectomy here with BRANDY.    # BRANDY likely pre-renal; mild rt hydro; hyperkalemia (resolved); prevoid vol 744 on US\  base Cr is nl (0.5) - no CKD  IVFs: d/c, check BMP louis -> need to make sure Cr is stable or getting lower off IVFs prior to d/c  d/c garcia for TOV -> need to make sure Cr stable or getting lower w/out garcia prior to d/c  renal us with mild R hydro  renal noted: OK to go home  pt on flomax (home med)  BMP q24    # H/o Diverticulitis  recent admission for diverticulitis  need to adjust cipro for GFR: cipro 500mg po q24 -> but as Cr improves, needs to go back to q12  c/w flagyl 500mg po q8  pt needs 14 days abx from 9/8 - 9/22  outpt f/u sx for sigmoidectomy w/ likely colostomy    # HTN  restart norvasc 5    # Dementia  c/w donepezil 5    # DVT ppx: subq hep    # GI ppx: PPI q24    Dispo: oral abx; d/c garcia (TOV); d/c IVFs; BMP q24; restart norvasc;   eventually, pt will go home to family - anticipate d/c tomorrow

## 2022-09-20 ENCOUNTER — TRANSCRIPTION ENCOUNTER (OUTPATIENT)
Age: 77
End: 2022-09-20

## 2022-09-20 VITALS
HEART RATE: 80 BPM | SYSTOLIC BLOOD PRESSURE: 112 MMHG | RESPIRATION RATE: 18 BRPM | DIASTOLIC BLOOD PRESSURE: 62 MMHG | TEMPERATURE: 97 F

## 2022-09-20 LAB
ALBUMIN SERPL ELPH-MCNC: 3 G/DL — LOW (ref 3.5–5.2)
ALP SERPL-CCNC: 64 U/L — SIGNIFICANT CHANGE UP (ref 30–115)
ALT FLD-CCNC: 7 U/L — SIGNIFICANT CHANGE UP (ref 0–41)
ANION GAP SERPL CALC-SCNC: 10 MMOL/L — SIGNIFICANT CHANGE UP (ref 7–14)
AST SERPL-CCNC: 11 U/L — SIGNIFICANT CHANGE UP (ref 0–41)
BASOPHILS # BLD AUTO: 0.05 K/UL — SIGNIFICANT CHANGE UP (ref 0–0.2)
BASOPHILS NFR BLD AUTO: 0.6 % — SIGNIFICANT CHANGE UP (ref 0–1)
BILIRUB SERPL-MCNC: 0.3 MG/DL — SIGNIFICANT CHANGE UP (ref 0.2–1.2)
BUN SERPL-MCNC: 21 MG/DL — HIGH (ref 10–20)
CALCIUM SERPL-MCNC: 9.2 MG/DL — SIGNIFICANT CHANGE UP (ref 8.4–10.5)
CHLORIDE SERPL-SCNC: 102 MMOL/L — SIGNIFICANT CHANGE UP (ref 98–110)
CO2 SERPL-SCNC: 25 MMOL/L — SIGNIFICANT CHANGE UP (ref 17–32)
CREAT SERPL-MCNC: 1.6 MG/DL — HIGH (ref 0.7–1.5)
EGFR: 33 ML/MIN/1.73M2 — LOW
EOSINOPHIL # BLD AUTO: 0.24 K/UL — SIGNIFICANT CHANGE UP (ref 0–0.7)
EOSINOPHIL NFR BLD AUTO: 2.9 % — SIGNIFICANT CHANGE UP (ref 0–8)
GLUCOSE SERPL-MCNC: 93 MG/DL — SIGNIFICANT CHANGE UP (ref 70–99)
HCT VFR BLD CALC: 31.1 % — LOW (ref 37–47)
HGB BLD-MCNC: 10.5 G/DL — LOW (ref 12–16)
IMM GRANULOCYTES NFR BLD AUTO: 0.7 % — HIGH (ref 0.1–0.3)
LYMPHOCYTES # BLD AUTO: 1.27 K/UL — SIGNIFICANT CHANGE UP (ref 1.2–3.4)
LYMPHOCYTES # BLD AUTO: 15.2 % — LOW (ref 20.5–51.1)
MAGNESIUM SERPL-MCNC: 1.9 MG/DL — SIGNIFICANT CHANGE UP (ref 1.8–2.4)
MCHC RBC-ENTMCNC: 28.1 PG — SIGNIFICANT CHANGE UP (ref 27–31)
MCHC RBC-ENTMCNC: 33.8 G/DL — SIGNIFICANT CHANGE UP (ref 32–37)
MCV RBC AUTO: 83.2 FL — SIGNIFICANT CHANGE UP (ref 81–99)
MONOCYTES # BLD AUTO: 1.3 K/UL — HIGH (ref 0.1–0.6)
MONOCYTES NFR BLD AUTO: 15.6 % — HIGH (ref 1.7–9.3)
NEUTROPHILS # BLD AUTO: 5.43 K/UL — SIGNIFICANT CHANGE UP (ref 1.4–6.5)
NEUTROPHILS NFR BLD AUTO: 65 % — SIGNIFICANT CHANGE UP (ref 42.2–75.2)
NRBC # BLD: 0 /100 WBCS — SIGNIFICANT CHANGE UP (ref 0–0)
PLATELET # BLD AUTO: 282 K/UL — SIGNIFICANT CHANGE UP (ref 130–400)
POTASSIUM SERPL-MCNC: 4 MMOL/L — SIGNIFICANT CHANGE UP (ref 3.5–5)
POTASSIUM SERPL-SCNC: 4 MMOL/L — SIGNIFICANT CHANGE UP (ref 3.5–5)
PROT SERPL-MCNC: 5.8 G/DL — LOW (ref 6–8)
RBC # BLD: 3.74 M/UL — LOW (ref 4.2–5.4)
RBC # FLD: 15.7 % — HIGH (ref 11.5–14.5)
SODIUM SERPL-SCNC: 137 MMOL/L — SIGNIFICANT CHANGE UP (ref 135–146)
WBC # BLD: 8.35 K/UL — SIGNIFICANT CHANGE UP (ref 4.8–10.8)
WBC # FLD AUTO: 8.35 K/UL — SIGNIFICANT CHANGE UP (ref 4.8–10.8)

## 2022-09-20 PROCEDURE — 99239 HOSP IP/OBS DSCHRG MGMT >30: CPT

## 2022-09-20 RX ORDER — ACETAMINOPHEN 500 MG
650 TABLET ORAL EVERY 6 HOURS
Refills: 0 | Status: DISCONTINUED | OUTPATIENT
Start: 2022-09-20 | End: 2022-09-20

## 2022-09-20 RX ORDER — DOCUSATE SODIUM 100 MG
1 CAPSULE ORAL
Qty: 28 | Refills: 0
Start: 2022-09-20 | End: 2022-10-03

## 2022-09-20 RX ORDER — POLYETHYLENE GLYCOL 3350 17 G/17G
17 POWDER, FOR SOLUTION ORAL
Qty: 476 | Refills: 0
Start: 2022-09-20 | End: 2022-10-03

## 2022-09-20 RX ORDER — SENNA PLUS 8.6 MG/1
2 TABLET ORAL
Qty: 120 | Refills: 0
Start: 2022-09-20 | End: 2022-10-19

## 2022-09-20 RX ORDER — MOXIFLOXACIN HYDROCHLORIDE TABLETS, 400 MG 400 MG/1
1 TABLET, FILM COATED ORAL
Qty: 0 | Refills: 0 | DISCHARGE

## 2022-09-20 RX ORDER — MOXIFLOXACIN HYDROCHLORIDE TABLETS, 400 MG 400 MG/1
1 TABLET, FILM COATED ORAL
Qty: 4 | Refills: 0
Start: 2022-09-20 | End: 2022-09-21

## 2022-09-20 RX ORDER — METRONIDAZOLE 500 MG
1 TABLET ORAL
Qty: 6 | Refills: 0
Start: 2022-09-20 | End: 2022-09-21

## 2022-09-20 RX ADMIN — HEPARIN SODIUM 5000 UNIT(S): 5000 INJECTION INTRAVENOUS; SUBCUTANEOUS at 05:59

## 2022-09-20 RX ADMIN — Medication 500 MILLIGRAM(S): at 05:58

## 2022-09-20 RX ADMIN — AMLODIPINE BESYLATE 5 MILLIGRAM(S): 2.5 TABLET ORAL at 05:58

## 2022-09-20 RX ADMIN — PANTOPRAZOLE SODIUM 40 MILLIGRAM(S): 20 TABLET, DELAYED RELEASE ORAL at 05:58

## 2022-09-20 RX ADMIN — Medication 500 MILLIGRAM(S): at 14:23

## 2022-09-20 NOTE — DISCHARGE NOTE PROVIDER - NSDCMRMEDTOKEN_GEN_ALL_CORE_FT
amLODIPine 5 mg oral tablet: 1 tab(s) orally once a day  Cipro 500 mg oral tablet: 1 tab(s) orally every 12 hours  Colace Clear 50 mg oral capsule: 1 cap(s) orally 2 times a day   donepezil 5 mg oral tablet: 1 tab(s) orally once a day (at bedtime)  Flomax 0.4 mg oral capsule: 1 cap(s) orally once a day (at bedtime)  metroNIDAZOLE 500 mg oral tablet: 1 tab(s) orally every 8 hours   MiraLax oral powder for reconstitution: 17 gram(s) orally 2 times a day   Protonix 40 mg oral delayed release tablet: 1 tab(s) orally 2 times a day  senna leaf extract oral tablet: 2 tab(s) orally 2 times a day    amLODIPine 5 mg oral tablet: 1 tab(s) orally once a day  Cipro 500 mg oral tablet: 1 tab(s) orally every 12 hours  Colace Clear 50 mg oral capsule: 1 cap(s) orally 2 times a day, As Needed -for constipation   donepezil 5 mg oral tablet: 1 tab(s) orally once a day (at bedtime)  Flomax 0.4 mg oral capsule: 1 cap(s) orally once a day (at bedtime)  metroNIDAZOLE 500 mg oral tablet: 1 tab(s) orally every 8 hours   MiraLax oral powder for reconstitution: 17 gram(s) orally 2 times a day, As Needed -for constipation   Protonix 40 mg oral delayed release tablet: 1 tab(s) orally 2 times a day  senna leaf extract oral tablet: 2 tab(s) orally 2 times a day, As Needed -for constipation

## 2022-09-20 NOTE — DISCHARGE NOTE PROVIDER - NSDCFUADDAPPT_GEN_ALL_CORE_FT
Please follow up with your primary care doctor within 1 week after discharge Please follow up with your primary care doctor within 1 week after discharge.

## 2022-09-20 NOTE — PROGRESS NOTE ADULT - ASSESSMENT
77 year old female with PMH of diverticulitis, HTN, dementia, scheduled for laparoscopic sigmoidectomy/colostomy on 9/30/22 presenting with BRANDY and hyperkalemia     ·	BRANDY seemed prerenal on exam / hyperkalemia ( volume depletion related)  ·	creat improving   ·	non oliguric   ·	off  Lokelma / k ok   ·	sono noted for mild right hydro   ·	on cipro flagyl keep for now   ·	BP well controlled   ·	will sign off recall as needed

## 2022-09-20 NOTE — PROGRESS NOTE ADULT - PROVIDER SPECIALTY LIST ADULT
Internal Medicine
Nephrology
Nephrology
Internal Medicine
Internal Medicine
Nephrology
Internal Medicine

## 2022-09-20 NOTE — DISCHARGE NOTE PROVIDER - NSDCCPCAREPLAN_GEN_ALL_CORE_FT
PRINCIPAL DISCHARGE DIAGNOSIS  Diagnosis: Acute renal insufficiency  Assessment and Plan of Treatment: You were noted to have a temporary insult to your kidney function either at the time that you arrived at the hospital or during your stay here. We have monitored your kidney function with blood work during your time here and you are at a level that no longer requires continued hospital level care, but we do recommend that you follow up to continually have your kidney function checked. You can follow up with your primary care doctor, or, if recommended in the discharge paperwork, you should follow up with a Kidney specialist called a Nephrologist.

## 2022-09-20 NOTE — PROGRESS NOTE ADULT - SUBJECTIVE AND OBJECTIVE BOX
KEISHA PANTOJA 77y Female  MRN#: [id number]   Hospital Day: 4d    HPI:  Patient is 77 year old female with PMH of diverticulitis, HTN, dementia, scheduled for laparoscopic sigmoidectomy/colostomy on 22 by  Dr. Jean-Baptiste, sent to the hospital by pre-surgical testing for abnormal labs results. Pt was found to have hyperkalemia 5.8 and elevated creatinine 2.7.  As per family, pt has been eating and drinking, pt had no recent complaints. Patient states that she has not urinated since yesterday despite eating and drinking. Denies flank pain, fever, chills, dysuria, suprapubic pain/pressure. She remains on cipro and flagyl from her last admission having infrequent BMs.    In ED, Vital Signs Last 24 Hrs  T(C): 36.2 (16 Sep 2022 16:41), Max: 36.6 (16 Sep 2022 10:54)  T(F): 97.1 (16 Sep 2022 16:41), Max: 97.9 (16 Sep 2022 10:54)  HR: 74 (16 Sep 2022 16:41) (74 - 97)  BP: 132/66 (16 Sep 2022 16:41) (109/67 - 132/66)  BP(mean): --  RR: 18 (16 Sep 2022 16:41) (18 - 20)  SpO2: 99% (16 Sep 2022 16:41) (97% - 99%)    Parameters below as of 16 Sep 2022 16:41  Patient On (Oxygen Delivery Method): room air    Labs are significant for K 5.8, Cr 2.6, BUN 38, Mg 2.9.   EKG: Normal sinus rhythm, Left anterior fascicular block; Septal infarct , age undetermined  Renal US: Mild right hydronephrosis.1 cm right renal lower pole cyst.  Pt had Garcia place by ED, drained 700cc post garcia.   Pt was given dextrose and insulin in ED, as well as 1L NS bolus.       (16 Sep 2022 19:29)      SUBJECTIVE  Patient is a 77y old Female who presents with a chief complaint of BRANDY, hyperkalemia (20 Sep 2022 09:09)  Currently admitted to medicine with the primary diagnosis of Acute renal insufficiency      INTERVAL HPI AND OVERNIGHT EVENTS:  Patient was examined and seen at bedside.     OBJECTIVE  PAST MEDICAL & SURGICAL HISTORY  Hypertension, unspecified type    Dementia  ao x 2    GERD (gastroesophageal reflux disease)    Diverticulitis    Lack of bladder control    H/O dilation and curettage  for missed       ALLERGIES:  No Known Allergies    MEDICATIONS:  STANDING MEDICATIONS  amLODIPine   Tablet 5 milliGRAM(s) Oral daily  ciprofloxacin     Tablet 500 milliGRAM(s) Oral every 24 hours  donepezil 5 milliGRAM(s) Oral at bedtime  heparin   Injectable 5000 Unit(s) SubCutaneous every 12 hours  metroNIDAZOLE    Tablet 500 milliGRAM(s) Oral every 8 hours  pantoprazole    Tablet 40 milliGRAM(s) Oral before breakfast  tamsulosin 0.4 milliGRAM(s) Oral at bedtime    PRN MEDICATIONS  acetaminophen     Tablet .. 650 milliGRAM(s) Oral every 6 hours PRN      VITAL SIGNS: Last 24 Hours  T(C): 36.3 (20 Sep 2022 12:17), Max: 37.2 (19 Sep 2022 20:17)  T(F): 97.3 (20 Sep 2022 12:17), Max: 99 (19 Sep 2022 20:17)  HR: 80 (20 Sep 2022 12:17) (80 - 101)  BP: 112/62 (20 Sep 2022 12:17) (112/62 - 145/68)  BP(mean): --  RR: 18 (20 Sep 2022 12:17) (18 - 18)  SpO2: 96% (19 Sep 2022 22:30) (96% - 96%)    LABS:                        10.5   8.35  )-----------( 282      ( 20 Sep 2022 07:53 )             31.1         137  |  102  |  21<H>  ----------------------------<  93  4.0   |  25  |  1.6<H>    Ca    9.2      20 Sep 2022 07:53  Mg     1.9         TPro  5.8<L>  /  Alb  3.0<L>  /  TBili  0.3  /  DBili  x   /  AST  11  /  ALT  7   /  AlkPhos  64      RADIOLOGY:    PHYSICAL EXAM:    GENERAL: NAD, AAO x 2-3, 77y F, confused  HEAD:  Atraumatic, Normocephalic  EYES: EOMI, conjunctiva and sclera white  NECK: Supple, No JVD  CHEST/LUNG: Clear to auscultation bilaterally; No wheeze; No crackles; No accessory muscles used  HEART: Regular rate and rhythm; No murmurs;   ABDOMEN: Soft, Nontender, Nondistended; Bowel sounds present; No guarding, No organomegaly  EXTREMITIES:  2+ Peripheral Pulses, No cyanosis or edema  NEUROLOGY: non-focal, lumbar spine tenderness

## 2022-09-20 NOTE — PROGRESS NOTE ADULT - REASON FOR ADMISSION
BRANDY, hyperkalemia

## 2022-09-20 NOTE — PROGRESS NOTE ADULT - SUBJECTIVE AND OBJECTIVE BOX
seen and examined  24 h events noted   no distress   lying comfortable       PAST HISTORY  --------------------------------------------------------------------------------  No significant changes to PMH, PSH, FHx, SHx, unless otherwise noted    ALLERGIES & MEDICATIONS  --------------------------------------------------------------------------------  Allergies    No Known Allergies    Intolerances      Standing Inpatient Medications  amLODIPine   Tablet 5 milliGRAM(s) Oral daily  ciprofloxacin     Tablet 500 milliGRAM(s) Oral every 24 hours  donepezil 5 milliGRAM(s) Oral at bedtime  heparin   Injectable 5000 Unit(s) SubCutaneous every 12 hours  metroNIDAZOLE    Tablet 500 milliGRAM(s) Oral every 8 hours  pantoprazole    Tablet 40 milliGRAM(s) Oral before breakfast  tamsulosin 0.4 milliGRAM(s) Oral at bedtime          VITALS/PHYSICAL EXAM  --------------------------------------------------------------------------------  T(C): 37 (09-20-22 @ 06:04), Max: 37.2 (09-19-22 @ 20:17)  HR: 83 (09-20-22 @ 06:04) (83 - 101)  BP: 140/70 (09-20-22 @ 06:04) (140/70 - 155/65)  RR: 18 (09-20-22 @ 06:04) (18 - 18)  SpO2: 96% (09-19-22 @ 22:30) (96% - 96%)  Wt(kg): --        09-19-22 @ 07:01  -  09-20-22 @ 07:00  --------------------------------------------------------  IN: 0 mL / OUT: 700 mL / NET: -700 mL        LABS/STUDIES  --------------------------------------------------------------------------------              11.6   11.67 >-----------<  313      [09-19-22 @ 11:22]              36.5     136  |  99  |  21  ----------------------------<  122      [09-19-22 @ 11:22]  4.1   |  24  |  1.7        Ca     9.4     [09-19-22 @ 11:22]      Mg     2.0     [09-19-22 @ 11:22]    TPro  7.0  /  Alb  3.6  /  TBili  0.3  /  DBili  x   /  AST  15  /  ALT  10  /  AlkPhos  76  [09-19-22 @ 11:22]    Creatinine Trend:  SCr 1.7 [09-19 @ 11:22]  SCr 2.0 [09-18 @ 12:32]  SCr 2.3 [09-17 @ 04:30]  SCr 2.6 [09-16 @ 12:30]  SCr 2.7 [09-15 @ 14:27]    Urinalysis - [09-16-22 @ 20:07]      Color Light Yellow / Appearance Clear / SG 1.006 / pH 7.5      Gluc Negative / Ketone Negative  / Bili Negative / Urobili <2 mg/dL       Blood Negative / Protein Negative / Leuk Est Negative / Nitrite Negative      RBC  / WBC  / Hyaline  / Gran  / Sq Epi  / Non Sq Epi  / Bacteria     Urine Creatinine 13      [09-16-22 @ 20:07]  Urine Protein 5      [09-16-22 @ 20:07]  Urine Sodium 75.0      [09-16-22 @ 20:07]  Urine Urea Nitrogen 142      [09-16-22 @ 20:07]  Urine Potassium 15      [09-16-22 @ 20:07]  Urine Osmolality 224      [09-16-22 @ 20:07]    Lipid: chol 161, TG 34, HDL 59, LDL --      [09-09-22 @ 07:41]

## 2022-09-20 NOTE — PROGRESS NOTE ADULT - ASSESSMENT
Patient is 77 year old female with PMH of diverticulitis, HTN, dementia, scheduled for laparoscopic sigmoidectomy/colostomy on 9/30/22 by  Dr. Jean-Baptiste, sent to the hospital by pre-surgical testing for abnormal labs results. Pt was found to have hyperkalemia 5.8 and elevated creatinine 2.7.    # BRANDY, likely prerenal  # hyperkalemia secondary to BRANDY  - Cr 2.6, BUN 38,   -  K 5.8, Mg 2.9.  - Renal US: Mild right hydronephrosis.1 cm right renal lower pole cyst.  - s/p Garcia insertion in ED, 700cc drained  - s/p dextrose and insulin in ED,   - Started lokelma 10 mg Q12H x 3 doses---> D/C  - UA: not much proteinuria neg hematuira  - urine prot/creat ratio   - urine lytes: low U K noted and high U NA ? ATN (doubt)  - keep garcia   - c/w IVF 50cc/h --> D/C  - d/c garcia for TOV -> need to make sure Cr stable or getting lower w/out garcia prior to d/c    #Hx of recurrent diverticulitis,  - pt is planned for Sigmoidectomy by surgery   - colonoscopy8/2022: non-bleeding diverticula with mixed openings + inflammation in the sigmoid colon. There was luminal narrowing, so colonoscope could not be advanced beyond the sigmoid colon.   - CT on past admission: persistent moderate mural thickening involving the sigmoid colon, with moderate pericolonic fat stranding, compatible with acute colitis; evaluation for extraluminal collection is limited without oral or intravenous contrast. Please note that underlying neoplastic process remains a possibility and correlation with direct visualization is suggested when acute episode resolves.  - currently on PO Cipro and Flagyl since the last admission  - need to adjust cipro for GFR: cipro 500mg po q24 -> but as Cr improves, needs to go back to q12  - c/w flagyl 500mg po q8    #Lumbar spine tenderness   -Xray lumbosacral spine: Diffuse osteopenia. 6 nonrib-bearing lumbar type vertebral   bodies. The pedicles are visualized. The vertebral body heights are   maintained. Severe bilateral lumbar facet joint degenerative changes.   Reidentified is grade 1 anterolisthesis of L5 on L6. Multilevel severe   degenerative disc disease, increased since 2013. IVC filter is seen at   level L3-L4. There are degenerative changes of the bilateral sacroiliac   joints and hips.    # HTN  - c/w Amlodipine 5 mg po QD.    # dementia  - c/w donepezil    Diet: soft  Activity: as tolerated  DVT Prophylaxis: heparin subQ  GI Prophylaxis: protonix  CHG Order  Code Status: full code  Disposition: admit to medicine

## 2022-09-20 NOTE — DISCHARGE NOTE PROVIDER - HOSPITAL COURSE
Patient is 77 year old female with PMH of diverticulitis, HTN, dementia, scheduled for laparoscopic sigmoidectomy/colostomy on 9/30/22 by  Dr. Jean-Baptiste, sent to the hospital by pre-surgical testing for abnormal labs results. Pt was found to have hyperkalemia 5.8 and elevated creatinine 2.7.  As per family, pt has been eating and drinking, pt had no recent complaints. Patient states that she has not urinated since yesterday despite eating and drinking. Denies flank pain, fever, chills, dysuria, suprapubic pain/pressure. She remains on cipro and flagyl from her last admission having infrequent BMs.  In ED Labs were significant for K 5.8, Cr 2.6, BUN 38, Mg 2.9. EKG: Normal sinus rhythm, Left anterior fascicular block; Septal infarct , age undetermined. Renal US: Mild right hydronephrosis.1 cm right renal lower pole cyst. Pt had Garcia place by ED, drained 700cc post garcia. Pt was given dextrose and insulin in ED, as well as 1L NS bolus.    Patient was admitted for further assessment and management. Nephrology were consulted, BRANDY was most likely pre-renal, creatine improved with hydration from 2.6 to 1.7. Garcia was discontinued with a trial of void. Pa   Patient is 77 year old female with PMH of diverticulitis, HTN, dementia, scheduled for laparoscopic sigmoidectomy/colostomy on 9/30/22 by  Dr. Jean-Baptiste, sent to the hospital by pre-surgical testing for abnormal labs results. Pt was found to have hyperkalemia 5.8 and elevated creatinine 2.7.  As per family, pt has been eating and drinking, pt had no recent complaints. Patient states that she has not urinated since yesterday despite eating and drinking. Denies flank pain, fever, chills, dysuria, suprapubic pain/pressure. She remains on cipro and flagyl from her last admission having infrequent BMs.  In ED Labs were significant for K 5.8, Cr 2.6, BUN 38, Mg 2.9. EKG: Normal sinus rhythm, Left anterior fascicular block; Septal infarct , age undetermined. Renal US: Mild right hydronephrosis.1 cm right renal lower pole cyst. Pt had Garcia place by ED, drained 700cc post garcia. Pt was given dextrose and insulin in ED, as well as 1L NS bolus.    Patient was admitted for further assessment and management. Nephrology were consulted, BRANDY was most likely pre-renal, creatine improved with hydration from 2.6 to 1.7. Garcia was discontinued with a trial of void. Patient also complained of low back pain, an xray of lumbosacral spine was done    Patient is 77 year old female with PMH of diverticulitis, HTN, dementia, scheduled for laparoscopic sigmoidectomy/colostomy on 9/30/22 by  Dr. Jean-Baptiste, sent to the hospital by pre-surgical testing for abnormal labs results. Pt was found to have hyperkalemia 5.8 and elevated creatinine 2.7.  As per family, pt has been eating and drinking, pt had no recent complaints. Patient states that she has not urinated since yesterday despite eating and drinking. Denies flank pain, fever, chills, dysuria, suprapubic pain/pressure. She remains on cipro and flagyl from her last admission having infrequent BMs.  In ED Labs were significant for K 5.8, Cr 2.6, BUN 38, Mg 2.9. EKG: Normal sinus rhythm, Left anterior fascicular block; Septal infarct , age undetermined. Renal US: Mild right hydronephrosis.1 cm right renal lower pole cyst. Pt had Garcia place by ED, drained 700cc post garcia. Pt was given dextrose and insulin in ED, as well as 1L NS bolus.    Patient was admitted for further assessment and management. Nephrology were consulted, BRANDY was most likely pre-renal, creatine improved with hydration from 2.6 to 1.6. Garcia was discontinued with a successful trial of void. Patient also complained of low back pain, an xray of lumbosacral spine was done, pending results    Patient seen and examined at the bedside today. Patient clinically stable at this time. No longer requires acute in hospital level of care. Can be continually followed/managed as an outpatient. Please see progress note for further information including today's vitals and physical exam.    Patient is 77 year old female with PMH of diverticulitis, HTN, dementia, scheduled for laparoscopic sigmoidectomy/colostomy on 9/30/22 by  Dr. Jean-Baptiste, sent to the hospital by pre-surgical testing for abnormal labs results. Pt was found to have hyperkalemia 5.8 and elevated creatinine 2.7.  As per family, pt has been eating and drinking, pt had no recent complaints. Patient states that she has not urinated since yesterday despite eating and drinking. Denies flank pain, fever, chills, dysuria, suprapubic pain/pressure. She remains on cipro and flagyl from her last admission having infrequent BMs.  In ED Labs were significant for K 5.8, Cr 2.6, BUN 38, Mg 2.9. EKG: Normal sinus rhythm, Left anterior fascicular block; Septal infarct , age undetermined. Renal US: Mild right hydronephrosis.1 cm right renal lower pole cyst. Pt had Garcia place by ED, drained 700cc post garcia. Pt was given dextrose and insulin in ED, as well as 1L NS bolus.    Patient was admitted for further assessment and management. Nephrology were consulted, BRANDY was most likely pre-renal, creatine improved with hydration from 2.6 to 1.6. Garcia was discontinued with a successful trial of void. Patient also complained of low back pain, an xray of lumbosacral spine was done, showed diffuse osteopenia, severe bilateral lumbar facet joint degenerative changes, grade 1 anterolisthesis of L5 on L6.Multilevel severe   degenerative disc disease, increased since 2013.degenerative changes of the bilateral sacroiliac joints and hips.    Patient seen and examined at the bedside today. Patient clinically stable at this time. No longer requires acute in hospital level of care. Can be continually followed/managed as an outpatient. Please see progress note for further information including today's vitals and physical exam.

## 2022-09-20 NOTE — DISCHARGE NOTE PROVIDER - CARE PROVIDER_API CALL
Tylor Katz (DO)  Infectious Disease; Internal Medicine  [address] [place] [address]  Phone: [phone]  Fax: [phone]  Follow Up Time:

## 2022-09-20 NOTE — DISCHARGE NOTE NURSING/CASE MANAGEMENT/SOCIAL WORK - PATIENT PORTAL LINK FT
You can access the FollowMyHealth Patient Portal offered by [facility] by registering at the following website: [email or web address]. By joining [employer]’s FollowMyHealth portal, you will also be able to view your health information using other applications (apps) compatible with our system.

## 2022-09-20 NOTE — DISCHARGE NOTE PROVIDER - NSDCFUSCHEDAPPT_GEN_ALL_CORE_FT
Arnulfo Jean-Baptiste  [facility] PreAdmits  Scheduled Appointment: 09/30/2022    Arnulfo Jean-Baptiste  [facility] Physician [facility]  GENSURG [address]  Scheduled Appointment: 10/12/2022    Lonnie Wade  [facility] Physician [facility]  UROLOGY [address]  Scheduled Appointment: 11/18/2022    Razia Seay  [facility] Physician [facility]  GASTRO Doc Off [id number]  Scheduled Appointment: 12/15/2022

## 2022-09-20 NOTE — DISCHARGE NOTE NURSING/CASE MANAGEMENT/SOCIAL WORK - NSDCPEFALRISK_GEN_ALL_CORE
For information on Fall & Injury Prevention, visit: [email or web address] OR  [email or web address] OR  https://www.cdc.gov/steadi/patient.html

## 2022-09-22 DIAGNOSIS — N13.30 UNSPECIFIED HYDRONEPHROSIS: ICD-10-CM

## 2022-09-22 DIAGNOSIS — F03.90 UNSPECIFIED DEMENTIA WITHOUT BEHAVIORAL DISTURBANCE: ICD-10-CM

## 2022-09-22 DIAGNOSIS — N17.9 ACUTE KIDNEY FAILURE, UNSPECIFIED: ICD-10-CM

## 2022-09-22 DIAGNOSIS — Z79.899 OTHER LONG TERM (CURRENT) DRUG THERAPY: ICD-10-CM

## 2022-09-22 DIAGNOSIS — I10 ESSENTIAL (PRIMARY) HYPERTENSION: ICD-10-CM

## 2022-09-22 DIAGNOSIS — K57.92 DIVERTICULITIS OF INTESTINE, PART UNSPECIFIED, WITHOUT PERFORATION OR ABSCESS WITHOUT BLEEDING: ICD-10-CM

## 2022-09-22 DIAGNOSIS — E87.5 HYPERKALEMIA: ICD-10-CM

## 2022-09-22 DIAGNOSIS — R33.9 RETENTION OF URINE, UNSPECIFIED: ICD-10-CM

## 2022-09-22 DIAGNOSIS — Z20.822 CONTACT WITH AND (SUSPECTED) EXPOSURE TO COVID-19: ICD-10-CM

## 2022-09-26 RX ORDER — METRONIDAZOLE 500 MG/1
500 TABLET ORAL
Qty: 6 | Refills: 0 | Status: ACTIVE | COMMUNITY
Start: 2022-09-26 | End: 1900-01-01

## 2022-09-26 RX ORDER — NEOMYCIN SULFATE 500 MG/1
500 TABLET ORAL
Qty: 6 | Refills: 0 | Status: ACTIVE | COMMUNITY
Start: 2022-09-26 | End: 1900-01-01

## 2022-09-28 ENCOUNTER — LABORATORY RESULT (OUTPATIENT)
Age: 77
End: 2022-09-28

## 2022-09-29 ENCOUNTER — INPATIENT (INPATIENT)
Facility: HOSPITAL | Age: 77
LOS: 4 days | Discharge: NURSING, REHAB OR LONG-TERM CARE | End: 2022-10-04
Attending: COLON & RECTAL SURGERY | Admitting: COLON & RECTAL SURGERY

## 2022-09-29 VITALS
RESPIRATION RATE: 18 BRPM | OXYGEN SATURATION: 99 % | SYSTOLIC BLOOD PRESSURE: 129 MMHG | TEMPERATURE: 99 F | HEIGHT: 66 IN | DIASTOLIC BLOOD PRESSURE: 68 MMHG | HEART RATE: 98 BPM

## 2022-09-29 DIAGNOSIS — Z98.890 OTHER SPECIFIED POSTPROCEDURAL STATES: Chronic | ICD-10-CM

## 2022-09-29 PROCEDURE — 99285 EMERGENCY DEPT VISIT HI MDM: CPT | Mod: FS

## 2022-09-29 RX ORDER — SODIUM CHLORIDE 9 MG/ML
1000 INJECTION, SOLUTION INTRAVENOUS ONCE
Refills: 0 | Status: COMPLETED | OUTPATIENT
Start: 2022-09-29 | End: 2022-09-29

## 2022-09-29 RX ORDER — DIATRIZOATE MEGLUMINE 180 MG/ML
30 INJECTION, SOLUTION INTRAVESICAL ONCE
Refills: 0 | Status: COMPLETED | OUTPATIENT
Start: 2022-09-29 | End: 2022-09-30

## 2022-09-29 NOTE — ED PROVIDER NOTE - CLINICAL SUMMARY MEDICAL DECISION MAKING FREE TEXT BOX
ab pain  r/o sbo  labs, imaging, surg eval ab pain  r/o sbo  labs, imaging, surg eval    admit to surg for further mgt

## 2022-09-29 NOTE — ED ADULT NURSE NOTE - OBJECTIVE STATEMENT
patient history of dementia. Patient family reports patient is having SBO surgery tomorrow and was n/v fecal matter after receiving her bowl prep medications tonight.

## 2022-09-29 NOTE — ED PROVIDER NOTE - OBJECTIVE STATEMENT
77 yold female to Ed Pmhx Dementia, Htn, Diverticulitis; pt scheduled for "robot assisted laparoscopic sigmoidectomy possible open possible ostomy" tomorrow; pt started gi prep with dulcolax, miralax, and abx(flagyl, neomycin) today at 12p; pt developed n/v, abdominal pain and distension; pt poor historian due to dementia - [facility] providing hx; pt vomited feculant material x5 today; minimal stool;

## 2022-09-29 NOTE — ED ADULT NURSE NOTE - NSIMPLEMENTINTERV_GEN_ALL_ED
Implemented All Fall with Harm Risk Interventions:  [place] to call system. Call bell, personal items and telephone within reach. Instruct patient to call for assistance. Room bathroom lighting operational. Non-slip footwear when patient is off stretcher. Physically safe environment: no spills, clutter or unnecessary equipment. Stretcher in lowest position, wheels locked, appropriate side rails in place. Provide visual cue, wrist band, yellow gown, etc. Monitor gait and stability. Monitor for mental status changes and reorient to person, place, and time. Review medications for side effects contributing to fall risk. Reinforce activity limits and safety measures with patient and family. Provide visual clues: red socks.

## 2022-09-29 NOTE — ED PROVIDER NOTE - ATTENDING APP SHARED VISIT CONTRIBUTION OF CARE
pt with hx of sbo, c/o ab distention and vomiting dark vomitus. no fever. no pain. last nml bm 4 days ago.   pt has appt tomorrow with colo-rectal surgery.    pt is in nad, anict, cta, rrr, ab soft, distended, non tender. +bs.    imaging, labs, surg eval, supportive care  reassess

## 2022-09-29 NOTE — ED PROVIDER NOTE - PHYSICAL EXAMINATION
Constitutional: Well developed, well nourished. NAD  Head: Normocephalic, atraumatic.  Eyes: PERRL, EOMI.  ENT: No nasal discharge. Mucous membranes dry.  Neck: Supple. Painless ROM.  Cardiovascular:  Regular rate and rhythm.    Pulmonary:   Lungs clear to auscultation bilaterally.    Abdominal: Soft mildly distended abdomen with signif tenderness; no rebound, guarding or flank pain  Extremities. Pelvis stable. No lower extremity edema, symmetric calves.  Skin: No rashes, cyanosis.  Neuro: AAOx1. No focal neurological deficits.  Psych: Normal mood. Normal affect.

## 2022-09-30 ENCOUNTER — TRANSCRIPTION ENCOUNTER (OUTPATIENT)
Age: 77
End: 2022-09-30

## 2022-09-30 ENCOUNTER — APPOINTMENT (OUTPATIENT)
Dept: SURGERY | Facility: HOSPITAL | Age: 77
End: 2022-09-30

## 2022-09-30 ENCOUNTER — RESULT REVIEW (OUTPATIENT)
Age: 77
End: 2022-09-30

## 2022-09-30 LAB
ALBUMIN SERPL ELPH-MCNC: 3.5 G/DL — SIGNIFICANT CHANGE UP (ref 3.5–5.2)
ALP SERPL-CCNC: 76 U/L — SIGNIFICANT CHANGE UP (ref 30–115)
ALT FLD-CCNC: 9 U/L — SIGNIFICANT CHANGE UP (ref 0–41)
ANION GAP SERPL CALC-SCNC: 10 MMOL/L — SIGNIFICANT CHANGE UP (ref 7–14)
ANION GAP SERPL CALC-SCNC: 10 MMOL/L — SIGNIFICANT CHANGE UP (ref 7–14)
AST SERPL-CCNC: 13 U/L — SIGNIFICANT CHANGE UP (ref 0–41)
BASOPHILS # BLD AUTO: 0.01 K/UL — SIGNIFICANT CHANGE UP (ref 0–0.2)
BASOPHILS # BLD AUTO: 0.03 K/UL — SIGNIFICANT CHANGE UP (ref 0–0.2)
BASOPHILS NFR BLD AUTO: 0.2 % — SIGNIFICANT CHANGE UP (ref 0–1)
BASOPHILS NFR BLD AUTO: 0.4 % — SIGNIFICANT CHANGE UP (ref 0–1)
BILIRUB SERPL-MCNC: 0.2 MG/DL — SIGNIFICANT CHANGE UP (ref 0.2–1.2)
BLD GP AB SCN SERPL QL: SIGNIFICANT CHANGE UP
BUN SERPL-MCNC: 10 MG/DL — SIGNIFICANT CHANGE UP (ref 10–20)
BUN SERPL-MCNC: 18 MG/DL — SIGNIFICANT CHANGE UP (ref 10–20)
CALCIUM SERPL-MCNC: 8.1 MG/DL — LOW (ref 8.4–10.5)
CALCIUM SERPL-MCNC: 9.1 MG/DL — SIGNIFICANT CHANGE UP (ref 8.4–10.5)
CHLORIDE SERPL-SCNC: 107 MMOL/L — SIGNIFICANT CHANGE UP (ref 98–110)
CHLORIDE SERPL-SCNC: 97 MMOL/L — LOW (ref 98–110)
CO2 SERPL-SCNC: 21 MMOL/L — SIGNIFICANT CHANGE UP (ref 17–32)
CO2 SERPL-SCNC: 29 MMOL/L — SIGNIFICANT CHANGE UP (ref 17–32)
CREAT SERPL-MCNC: 0.9 MG/DL — SIGNIFICANT CHANGE UP (ref 0.7–1.5)
CREAT SERPL-MCNC: 1 MG/DL — SIGNIFICANT CHANGE UP (ref 0.7–1.5)
EGFR: 58 ML/MIN/1.73M2 — LOW
EGFR: 65 ML/MIN/1.73M2 — SIGNIFICANT CHANGE UP
EOSINOPHIL # BLD AUTO: 0.02 K/UL — SIGNIFICANT CHANGE UP (ref 0–0.7)
EOSINOPHIL # BLD AUTO: 0.05 K/UL — SIGNIFICANT CHANGE UP (ref 0–0.7)
EOSINOPHIL NFR BLD AUTO: 0.4 % — SIGNIFICANT CHANGE UP (ref 0–8)
EOSINOPHIL NFR BLD AUTO: 0.6 % — SIGNIFICANT CHANGE UP (ref 0–8)
GAS PNL BLDA: SIGNIFICANT CHANGE UP
GLUCOSE BLDC GLUCOMTR-MCNC: 110 MG/DL — HIGH (ref 70–99)
GLUCOSE BLDC GLUCOMTR-MCNC: 131 MG/DL — HIGH (ref 70–99)
GLUCOSE SERPL-MCNC: 121 MG/DL — HIGH (ref 70–99)
GLUCOSE SERPL-MCNC: 149 MG/DL — HIGH (ref 70–99)
HCT VFR BLD CALC: 30.2 % — LOW (ref 37–47)
HCT VFR BLD CALC: 32.8 % — LOW (ref 37–47)
HGB BLD-MCNC: 10.5 G/DL — LOW (ref 12–16)
HGB BLD-MCNC: 9.9 G/DL — LOW (ref 12–16)
IMM GRANULOCYTES NFR BLD AUTO: 0.4 % — HIGH (ref 0.1–0.3)
IMM GRANULOCYTES NFR BLD AUTO: 0.4 % — HIGH (ref 0.1–0.3)
LACTATE SERPL-SCNC: 0.9 MMOL/L — SIGNIFICANT CHANGE UP (ref 0.7–2)
LIDOCAIN IGE QN: 18 U/L — SIGNIFICANT CHANGE UP (ref 7–60)
LYMPHOCYTES # BLD AUTO: 0.49 K/UL — LOW (ref 1.2–3.4)
LYMPHOCYTES # BLD AUTO: 0.79 K/UL — LOW (ref 1.2–3.4)
LYMPHOCYTES # BLD AUTO: 10 % — LOW (ref 20.5–51.1)
LYMPHOCYTES # BLD AUTO: 9.1 % — LOW (ref 20.5–51.1)
MAGNESIUM SERPL-MCNC: 1.6 MG/DL — LOW (ref 1.8–2.4)
MCHC RBC-ENTMCNC: 27.5 PG — SIGNIFICANT CHANGE UP (ref 27–31)
MCHC RBC-ENTMCNC: 28.1 PG — SIGNIFICANT CHANGE UP (ref 27–31)
MCHC RBC-ENTMCNC: 32 G/DL — SIGNIFICANT CHANGE UP (ref 32–37)
MCHC RBC-ENTMCNC: 32.8 G/DL — SIGNIFICANT CHANGE UP (ref 32–37)
MCV RBC AUTO: 83.9 FL — SIGNIFICANT CHANGE UP (ref 81–99)
MCV RBC AUTO: 87.7 FL — SIGNIFICANT CHANGE UP (ref 81–99)
MONOCYTES # BLD AUTO: 0.4 K/UL — SIGNIFICANT CHANGE UP (ref 0.1–0.6)
MONOCYTES # BLD AUTO: 1.24 K/UL — HIGH (ref 0.1–0.6)
MONOCYTES NFR BLD AUTO: 15.7 % — HIGH (ref 1.7–9.3)
MONOCYTES NFR BLD AUTO: 7.4 % — SIGNIFICANT CHANGE UP (ref 1.7–9.3)
NEUTROPHILS # BLD AUTO: 4.47 K/UL — SIGNIFICANT CHANGE UP (ref 1.4–6.5)
NEUTROPHILS # BLD AUTO: 5.74 K/UL — SIGNIFICANT CHANGE UP (ref 1.4–6.5)
NEUTROPHILS NFR BLD AUTO: 72.9 % — SIGNIFICANT CHANGE UP (ref 42.2–75.2)
NEUTROPHILS NFR BLD AUTO: 82.5 % — HIGH (ref 42.2–75.2)
NRBC # BLD: 0 /100 WBCS — SIGNIFICANT CHANGE UP (ref 0–0)
NRBC # BLD: 0 /100 WBCS — SIGNIFICANT CHANGE UP (ref 0–0)
PHOSPHATE SERPL-MCNC: 3.3 MG/DL — SIGNIFICANT CHANGE UP (ref 2.1–4.9)
PLATELET # BLD AUTO: 239 K/UL — SIGNIFICANT CHANGE UP (ref 130–400)
PLATELET # BLD AUTO: 284 K/UL — SIGNIFICANT CHANGE UP (ref 130–400)
POTASSIUM SERPL-MCNC: 4.4 MMOL/L — SIGNIFICANT CHANGE UP (ref 3.5–5)
POTASSIUM SERPL-MCNC: 4.6 MMOL/L — SIGNIFICANT CHANGE UP (ref 3.5–5)
POTASSIUM SERPL-SCNC: 4.4 MMOL/L — SIGNIFICANT CHANGE UP (ref 3.5–5)
POTASSIUM SERPL-SCNC: 4.6 MMOL/L — SIGNIFICANT CHANGE UP (ref 3.5–5)
PROT SERPL-MCNC: 6.7 G/DL — SIGNIFICANT CHANGE UP (ref 6–8)
RBC # BLD: 3.6 M/UL — LOW (ref 4.2–5.4)
RBC # BLD: 3.74 M/UL — LOW (ref 4.2–5.4)
RBC # FLD: 15.1 % — HIGH (ref 11.5–14.5)
RBC # FLD: 15.3 % — HIGH (ref 11.5–14.5)
SARS-COV-2 RNA SPEC QL NAA+PROBE: SIGNIFICANT CHANGE UP
SODIUM SERPL-SCNC: 136 MMOL/L — SIGNIFICANT CHANGE UP (ref 135–146)
SODIUM SERPL-SCNC: 138 MMOL/L — SIGNIFICANT CHANGE UP (ref 135–146)
WBC # BLD: 5.41 K/UL — SIGNIFICANT CHANGE UP (ref 4.8–10.8)
WBC # BLD: 7.88 K/UL — SIGNIFICANT CHANGE UP (ref 4.8–10.8)
WBC # FLD AUTO: 5.41 K/UL — SIGNIFICANT CHANGE UP (ref 4.8–10.8)
WBC # FLD AUTO: 7.88 K/UL — SIGNIFICANT CHANGE UP (ref 4.8–10.8)

## 2022-09-30 PROCEDURE — 74177 CT ABD & PELVIS W/CONTRAST: CPT | Mod: 26,MA

## 2022-09-30 PROCEDURE — 44143 PARTIAL REMOVAL OF COLON: CPT

## 2022-09-30 PROCEDURE — 88307 TISSUE EXAM BY PATHOLOGIST: CPT | Mod: 26

## 2022-09-30 PROCEDURE — 44120 REMOVAL OF SMALL INTESTINE: CPT

## 2022-09-30 PROCEDURE — 93010 ELECTROCARDIOGRAM REPORT: CPT | Mod: 76

## 2022-09-30 RX ORDER — ONDANSETRON 8 MG/1
4 TABLET, FILM COATED ORAL ONCE
Refills: 0 | Status: DISCONTINUED | OUTPATIENT
Start: 2022-09-30 | End: 2022-09-30

## 2022-09-30 RX ORDER — CEFOTETAN DISODIUM 1 G
2 VIAL (EA) INJECTION EVERY 12 HOURS
Refills: 0 | Status: DISCONTINUED | OUTPATIENT
Start: 2022-10-01 | End: 2022-10-03

## 2022-09-30 RX ORDER — SODIUM CHLORIDE 9 MG/ML
1000 INJECTION, SOLUTION INTRAVENOUS
Refills: 0 | Status: DISCONTINUED | OUTPATIENT
Start: 2022-09-30 | End: 2022-09-30

## 2022-09-30 RX ORDER — TAMSULOSIN HYDROCHLORIDE 0.4 MG/1
0.4 CAPSULE ORAL AT BEDTIME
Refills: 0 | Status: DISCONTINUED | OUTPATIENT
Start: 2022-09-30 | End: 2022-10-04

## 2022-09-30 RX ORDER — ACETAMINOPHEN 500 MG
650 TABLET ORAL EVERY 6 HOURS
Refills: 0 | Status: DISCONTINUED | OUTPATIENT
Start: 2022-09-30 | End: 2022-10-04

## 2022-09-30 RX ORDER — HEPARIN SODIUM 5000 [USP'U]/ML
5000 INJECTION INTRAVENOUS; SUBCUTANEOUS EVERY 8 HOURS
Refills: 0 | Status: DISCONTINUED | OUTPATIENT
Start: 2022-09-30 | End: 2022-10-04

## 2022-09-30 RX ORDER — SODIUM CHLORIDE 9 MG/ML
1000 INJECTION, SOLUTION INTRAVENOUS
Refills: 0 | Status: DISCONTINUED | OUTPATIENT
Start: 2022-09-30 | End: 2022-10-01

## 2022-09-30 RX ORDER — ACETAMINOPHEN 500 MG
650 TABLET ORAL EVERY 6 HOURS
Refills: 0 | Status: DISCONTINUED | OUTPATIENT
Start: 2022-09-30 | End: 2022-09-30

## 2022-09-30 RX ORDER — PANTOPRAZOLE SODIUM 20 MG/1
40 TABLET, DELAYED RELEASE ORAL
Refills: 0 | Status: DISCONTINUED | OUTPATIENT
Start: 2022-09-30 | End: 2022-10-04

## 2022-09-30 RX ORDER — ENOXAPARIN SODIUM 100 MG/ML
40 INJECTION SUBCUTANEOUS EVERY 24 HOURS
Refills: 0 | Status: DISCONTINUED | OUTPATIENT
Start: 2022-09-30 | End: 2022-09-30

## 2022-09-30 RX ORDER — DONEPEZIL HYDROCHLORIDE 10 MG/1
5 TABLET, FILM COATED ORAL AT BEDTIME
Refills: 0 | Status: DISCONTINUED | OUTPATIENT
Start: 2022-09-30 | End: 2022-10-04

## 2022-09-30 RX ORDER — PANTOPRAZOLE SODIUM 20 MG/1
40 TABLET, DELAYED RELEASE ORAL
Refills: 0 | Status: DISCONTINUED | OUTPATIENT
Start: 2022-09-30 | End: 2022-09-30

## 2022-09-30 RX ORDER — DONEPEZIL HYDROCHLORIDE 10 MG/1
5 TABLET, FILM COATED ORAL AT BEDTIME
Refills: 0 | Status: DISCONTINUED | OUTPATIENT
Start: 2022-09-30 | End: 2022-09-30

## 2022-09-30 RX ORDER — TAMSULOSIN HYDROCHLORIDE 0.4 MG/1
0.4 CAPSULE ORAL AT BEDTIME
Refills: 0 | Status: DISCONTINUED | OUTPATIENT
Start: 2022-09-30 | End: 2022-09-30

## 2022-09-30 RX ORDER — HYDROMORPHONE HYDROCHLORIDE 2 MG/ML
0.5 INJECTION INTRAMUSCULAR; INTRAVENOUS; SUBCUTANEOUS EVERY 6 HOURS
Refills: 0 | Status: DISCONTINUED | OUTPATIENT
Start: 2022-09-30 | End: 2022-10-03

## 2022-09-30 RX ORDER — AMLODIPINE BESYLATE 2.5 MG/1
5 TABLET ORAL DAILY
Refills: 0 | Status: DISCONTINUED | OUTPATIENT
Start: 2022-09-30 | End: 2022-09-30

## 2022-09-30 RX ORDER — KETOROLAC TROMETHAMINE 30 MG/ML
15 SYRINGE (ML) INJECTION EVERY 8 HOURS
Refills: 0 | Status: DISCONTINUED | OUTPATIENT
Start: 2022-09-30 | End: 2022-10-03

## 2022-09-30 RX ORDER — BUPIVACAINE 13.3 MG/ML
20 INJECTION, SUSPENSION, LIPOSOMAL INFILTRATION ONCE
Refills: 0 | Status: DISCONTINUED | OUTPATIENT
Start: 2022-09-30 | End: 2022-09-30

## 2022-09-30 RX ORDER — SODIUM CHLORIDE 9 MG/ML
500 INJECTION, SOLUTION INTRAVENOUS ONCE
Refills: 0 | Status: COMPLETED | OUTPATIENT
Start: 2022-09-30 | End: 2022-09-30

## 2022-09-30 RX ORDER — HYDROMORPHONE HYDROCHLORIDE 2 MG/ML
0.25 INJECTION INTRAMUSCULAR; INTRAVENOUS; SUBCUTANEOUS
Refills: 0 | Status: DISCONTINUED | OUTPATIENT
Start: 2022-09-30 | End: 2022-09-30

## 2022-09-30 RX ORDER — HYDROMORPHONE HYDROCHLORIDE 2 MG/ML
0.5 INJECTION INTRAMUSCULAR; INTRAVENOUS; SUBCUTANEOUS
Refills: 0 | Status: DISCONTINUED | OUTPATIENT
Start: 2022-09-30 | End: 2022-09-30

## 2022-09-30 RX ORDER — AMLODIPINE BESYLATE 2.5 MG/1
5 TABLET ORAL DAILY
Refills: 0 | Status: DISCONTINUED | OUTPATIENT
Start: 2022-09-30 | End: 2022-10-04

## 2022-09-30 RX ADMIN — DONEPEZIL HYDROCHLORIDE 5 MILLIGRAM(S): 10 TABLET, FILM COATED ORAL at 21:23

## 2022-09-30 RX ADMIN — Medication 15 MILLIGRAM(S): at 21:23

## 2022-09-30 RX ADMIN — SODIUM CHLORIDE 500 MILLILITER(S): 9 INJECTION, SOLUTION INTRAVENOUS at 06:56

## 2022-09-30 RX ADMIN — HEPARIN SODIUM 5000 UNIT(S): 5000 INJECTION INTRAVENOUS; SUBCUTANEOUS at 21:23

## 2022-09-30 RX ADMIN — DIATRIZOATE MEGLUMINE 30 MILLILITER(S): 180 INJECTION, SOLUTION INTRAVESICAL at 00:09

## 2022-09-30 RX ADMIN — SODIUM CHLORIDE 50 MILLILITER(S): 9 INJECTION, SOLUTION INTRAVENOUS at 18:30

## 2022-09-30 RX ADMIN — ENOXAPARIN SODIUM 40 MILLIGRAM(S): 100 INJECTION SUBCUTANEOUS at 08:16

## 2022-09-30 RX ADMIN — Medication 650 MILLIGRAM(S): at 23:27

## 2022-09-30 RX ADMIN — PANTOPRAZOLE SODIUM 40 MILLIGRAM(S): 20 TABLET, DELAYED RELEASE ORAL at 08:15

## 2022-09-30 RX ADMIN — TAMSULOSIN HYDROCHLORIDE 0.4 MILLIGRAM(S): 0.4 CAPSULE ORAL at 21:23

## 2022-09-30 RX ADMIN — SODIUM CHLORIDE 100 MILLILITER(S): 9 INJECTION, SOLUTION INTRAVENOUS at 20:17

## 2022-09-30 RX ADMIN — SODIUM CHLORIDE 1000 MILLILITER(S): 9 INJECTION, SOLUTION INTRAVENOUS at 00:41

## 2022-09-30 RX ADMIN — SODIUM CHLORIDE 75 MILLILITER(S): 9 INJECTION, SOLUTION INTRAVENOUS at 06:56

## 2022-09-30 NOTE — BRIEF OPERATIVE NOTE - NSICDXBRIEFPROCEDURE_GEN_ALL_CORE_FT
PROCEDURES:  Open low anterior resection of colon 30-Sep-2022 18:07:54  Brandon Teran  Small bowel resection with anastomosis 30-Sep-2022 18:08:27  Brandon Teran  Block, transversus abdominis plane, bilateral 30-Sep-2022 18:08:43  Brandon Teran  Colostomy 30-Sep-2022 18:09:04  Brandon Teran

## 2022-09-30 NOTE — PRE-ANESTHESIA EVALUATION ADULT - NSRADCARDRESULTSFT_GEN_ALL_CORE
EKG:    Ventricular Rate 78 BPM    Atrial Rate 78 BPM    P-R Interval 146 ms    QRS Duration 88 ms    Q-T Interval 400 ms    QTC Calculation(Bazett) 456 ms    P Axis 31 degrees    R Axis -55 degrees    T Axis 31 degrees    Diagnosis Line Normal sinus rhythm  Left anterior fascicular block  Septal infarct , age undetermined  Abnormal ECG

## 2022-09-30 NOTE — H&P ADULT - ASSESSMENT
ASSESSMENT:  77yF w/ PMHx of dementia, HTN diverticulitis who presents to the ED accompanied by her niece with complaints of nausea associated with 3 episodes of bilious, non bloody vomiting.  Patient is scheduled for robotic assisted laparoscopic sigmoidectomy tomorrow, started drinking bowel prep today but niece reports only 2-3 watery bowel movements today. Physical exam findings, imaging, and labs as documented above.     PLAN:  -Admit to surgery under Dr. Jean-Baptiste  -Nausea and emesis likely due to bowel prep  -NPO w/ IVF  -On schedule today for robotic assisted laparoscopic sigmoidectomy  -Home medications restarted  -DVT ppx     Above plan discussed with Attending Surgeon Dr. Jean-Baptiste, patient, patient family, and Primary team  09-30-22 @ 06:31    [place] Team Spectra: [phone]

## 2022-09-30 NOTE — H&P ADULT - NSHPLABSRESULTS_GEN_ALL_CORE
LAB/STUDIES:                        9.9    7.88  )-----------( 284      ( 30 Sep 2022 00:40 )             30.2     09-30    136  |  97<L>  |  18  ----------------------------<  121<H>  4.6   |  29  |  1.0    Ca    9.1      30 Sep 2022 00:40    TPro  6.7  /  Alb  3.5  /  TBili  0.2  /  DBili  x   /  AST  13  /  ALT  9   /  AlkPhos  76  09-30    LIVER FUNCTIONS - ( 30 Sep 2022 00:40 )  Alb: 3.5 g/dL / Pro: 6.7 g/dL / ALK PHOS: 76 U/L / ALT: 9 U/L / AST: 13 U/L / GGT: x           IMAGING:  < from: CT Abdomen and Pelvis w/ Oral Cont and w/ IV Cont (09.30.22 @ 02:32) >  IMPRESSION:  1. No evidence of bowel obstruction.    2. Severe rectosigmoid colonic circumferential mucosal wall thickening   and pericolonic stranding, consistent with colitis.    3. Colonic liquid stool, likely secondary to ingestion of bowel prep in   preparation for patient's scheduled procedure.    Other chronic findings as above.  < end of copied text >

## 2022-09-30 NOTE — PRE-ANESTHESIA EVALUATION ADULT - NSANTHPEFT_GEN_ALL_CORE
Confused.  NAD.  RRR  b/l coarse BSs Cheek Interpolation Flap Text: A decision was made to reconstruct the defect utilizing an interpolation axial flap and a staged reconstruction.  A telfa template was made of the defect.  This telfa template was then used to outline the Cheek Interpolation flap.  The donor area for the pedicle flap was then injected with anesthesia.  The flap was excised through the skin and subcutaneous tissue down to the layer of the underlying musculature.  The interpolation flap was carefully excised within this deep plane to maintain its blood supply.  The edges of the donor site were undermined.   The donor site was closed in a primary fashion.  The pedicle was then rotated into position and sutured.  Once the tube was sutured into place, adequate blood supply was confirmed with blanching and refill.  The pedicle was then wrapped with xeroform gauze and dressed appropriately with a telfa and gauze bandage to ensure continued blood supply and protect the attached pedicle.

## 2022-09-30 NOTE — BRIEF OPERATIVE NOTE - NSICDXBRIEFPOSTOP_GEN_ALL_CORE_FT
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

## 2022-09-30 NOTE — H&P ADULT - HISTORY OF PRESENT ILLNESS
78 yo female with past medical history significant for dementia, HTN diverticulitis with most recent admission on 9/8 who presents to the ED accompanied by her niece with complaints of nausea associated with 3 episodes of bilious, non bloody vomiting.  Patient is scheduled for robotic assisted laparoscopic sigmoidectomy tomorrow, started drinking bowel prep today but niece reports only 2-3 watery bowel movements today.

## 2022-09-30 NOTE — CONSULT NOTE ADULT - ATTENDING COMMENTS
77F with PMH of dementia, HTN who has known sigmoid diverticulitis with partial obstruction pre op for sigmoidectomy today who presents with vomiting with bowel prep.     Patient seen and examined.      Abdomen - soft, non tender, mildly distended    Vitals labs and images reviewed    PLAN:  - NPO  - IVF  - CT chest results appreciated.  Possible PE discussed with anesthesia.  ABG is acceptable and patient clinically stable so we will proceed with surgery.  We will plan for CTPE post op and start anticoagulation on POD2 if it is positive.  - Proceed with robot assisted laparoscopic possible open sigmoidectomy possible ostomy.

## 2022-09-30 NOTE — BRIEF OPERATIVE NOTE - OPERATION/FINDINGS
Significant adhesions of colon and loop of terminal ileum in pelvis. 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 Segment of small intestine resected  and anastomosed. Open Low anterior resection with end colostomy.

## 2022-09-30 NOTE — CHART NOTE - NSCHARTNOTEFT_GEN_A_CORE
PACU ANESTHESIA ADMISSION NOTE      Procedure: Open low anterior resection of colon    Small bowel resection    Small bowel resection with anastomosis    Block, transversus abdominis plane, bilateral    Colostomy      Post op diagnosis:  Diverticulitis    [place]-enteric fistula        ____  Intubated  TV:______       Rate: ______      FiO2: ______    _x___  Patent Airway    _x___  Full return of protective reflexes    _x___  Full recovery from anesthesia / back to baseline status    Vitals:  T97.8  HR: 96  BP: 125/71  RR: 18 (09-30-22 @ 12:41) (18 - 20)  SpO2: 96% (09-30-22 @ 12:41) (93% - 99%)    Mental Status:  _x___ Awake   _____ Alert   _____ Drowsy   _____ Sedated    Nausea/Vomiting:  _x___  NO       ______Yes,   See Post - Op Orders         Pain Scale (0-10):  __0___    Treatment: _x___ None    ____ See Post - Op/PCA Orders    Post - Operative Fluids:   __x__ Oral   ____ See Post - Op Orders    Plan: Discharge:   __Home       __x___Floor     _____Critical Care    _____  Other:_________________    Comments:  No anesthesia issues or complications noted.  Discharge when criteria met.

## 2022-09-30 NOTE — H&P ADULT - NSHPPHYSICALEXAM_GEN_ALL_CORE
PHYSICAL EXAM:  General: NAD, calm and cooperative  HEENT: NCAT, EOMI  Cardiac: S1, S2  Respiratory: normal respiratory effort, bilateral breath sounds   Abdomen: Soft, mildly distended, non-tender, no rebound, no guarding  Skin: Warm/dry, normal color, no jaundice

## 2022-10-01 LAB
ANION GAP SERPL CALC-SCNC: 10 MMOL/L — SIGNIFICANT CHANGE UP (ref 7–14)
BASOPHILS # BLD AUTO: 0.02 K/UL — SIGNIFICANT CHANGE UP (ref 0–0.2)
BASOPHILS NFR BLD AUTO: 0.3 % — SIGNIFICANT CHANGE UP (ref 0–1)
BUN SERPL-MCNC: 13 MG/DL — SIGNIFICANT CHANGE UP (ref 10–20)
CALCIUM SERPL-MCNC: 8 MG/DL — LOW (ref 8.4–10.5)
CHLORIDE SERPL-SCNC: 107 MMOL/L — SIGNIFICANT CHANGE UP (ref 98–110)
CO2 SERPL-SCNC: 24 MMOL/L — SIGNIFICANT CHANGE UP (ref 17–32)
CREAT SERPL-MCNC: 1 MG/DL — SIGNIFICANT CHANGE UP (ref 0.7–1.5)
EGFR: 58 ML/MIN/1.73M2 — LOW
EOSINOPHIL # BLD AUTO: 0.11 K/UL — SIGNIFICANT CHANGE UP (ref 0–0.7)
EOSINOPHIL NFR BLD AUTO: 1.4 % — SIGNIFICANT CHANGE UP (ref 0–8)
GLUCOSE SERPL-MCNC: 98 MG/DL — SIGNIFICANT CHANGE UP (ref 70–99)
HCT VFR BLD CALC: 24.4 % — LOW (ref 37–47)
HGB BLD-MCNC: 8 G/DL — LOW (ref 12–16)
IMM GRANULOCYTES NFR BLD AUTO: 0.4 % — HIGH (ref 0.1–0.3)
LYMPHOCYTES # BLD AUTO: 1.05 K/UL — LOW (ref 1.2–3.4)
LYMPHOCYTES # BLD AUTO: 13.2 % — LOW (ref 20.5–51.1)
MAGNESIUM SERPL-MCNC: 2.1 MG/DL — SIGNIFICANT CHANGE UP (ref 1.8–2.4)
MCHC RBC-ENTMCNC: 28.1 PG — SIGNIFICANT CHANGE UP (ref 27–31)
MCHC RBC-ENTMCNC: 32.8 G/DL — SIGNIFICANT CHANGE UP (ref 32–37)
MCV RBC AUTO: 85.6 FL — SIGNIFICANT CHANGE UP (ref 81–99)
MONOCYTES # BLD AUTO: 0.69 K/UL — HIGH (ref 0.1–0.6)
MONOCYTES NFR BLD AUTO: 8.7 % — SIGNIFICANT CHANGE UP (ref 1.7–9.3)
NEUTROPHILS # BLD AUTO: 6.05 K/UL — SIGNIFICANT CHANGE UP (ref 1.4–6.5)
NEUTROPHILS NFR BLD AUTO: 76 % — HIGH (ref 42.2–75.2)
NRBC # BLD: 0 /100 WBCS — SIGNIFICANT CHANGE UP (ref 0–0)
PHOSPHATE SERPL-MCNC: 2.9 MG/DL — SIGNIFICANT CHANGE UP (ref 2.1–4.9)
PLATELET # BLD AUTO: 240 K/UL — SIGNIFICANT CHANGE UP (ref 130–400)
POTASSIUM SERPL-MCNC: 4.4 MMOL/L — SIGNIFICANT CHANGE UP (ref 3.5–5)
POTASSIUM SERPL-SCNC: 4.4 MMOL/L — SIGNIFICANT CHANGE UP (ref 3.5–5)
RBC # BLD: 2.85 M/UL — LOW (ref 4.2–5.4)
RBC # FLD: 15.4 % — HIGH (ref 11.5–14.5)
SODIUM SERPL-SCNC: 141 MMOL/L — SIGNIFICANT CHANGE UP (ref 135–146)
WBC # BLD: 7.95 K/UL — SIGNIFICANT CHANGE UP (ref 4.8–10.8)
WBC # FLD AUTO: 7.95 K/UL — SIGNIFICANT CHANGE UP (ref 4.8–10.8)

## 2022-10-01 RX ORDER — MAGNESIUM SULFATE 500 MG/ML
2 VIAL (ML) INJECTION ONCE
Refills: 0 | Status: COMPLETED | OUTPATIENT
Start: 2022-10-01 | End: 2022-10-01

## 2022-10-01 RX ORDER — SODIUM CHLORIDE 9 MG/ML
500 INJECTION, SOLUTION INTRAVENOUS ONCE
Refills: 0 | Status: COMPLETED | OUTPATIENT
Start: 2022-10-01 | End: 2022-10-01

## 2022-10-01 RX ORDER — SODIUM CHLORIDE 9 MG/ML
1000 INJECTION, SOLUTION INTRAVENOUS
Refills: 0 | Status: DISCONTINUED | OUTPATIENT
Start: 2022-10-01 | End: 2022-10-03

## 2022-10-01 RX ADMIN — Medication 15 MILLIGRAM(S): at 15:11

## 2022-10-01 RX ADMIN — HEPARIN SODIUM 5000 UNIT(S): 5000 INJECTION INTRAVENOUS; SUBCUTANEOUS at 13:36

## 2022-10-01 RX ADMIN — DONEPEZIL HYDROCHLORIDE 5 MILLIGRAM(S): 10 TABLET, FILM COATED ORAL at 21:01

## 2022-10-01 RX ADMIN — Medication 650 MILLIGRAM(S): at 17:29

## 2022-10-01 RX ADMIN — Medication 100 GRAM(S): at 02:11

## 2022-10-01 RX ADMIN — HEPARIN SODIUM 5000 UNIT(S): 5000 INJECTION INTRAVENOUS; SUBCUTANEOUS at 05:05

## 2022-10-01 RX ADMIN — Medication 650 MILLIGRAM(S): at 11:14

## 2022-10-01 RX ADMIN — Medication 15 MILLIGRAM(S): at 21:02

## 2022-10-01 RX ADMIN — Medication 650 MILLIGRAM(S): at 05:04

## 2022-10-01 RX ADMIN — Medication 100 GRAM(S): at 15:11

## 2022-10-01 RX ADMIN — Medication 12.5 GRAM(S): at 02:11

## 2022-10-01 RX ADMIN — TAMSULOSIN HYDROCHLORIDE 0.4 MILLIGRAM(S): 0.4 CAPSULE ORAL at 21:01

## 2022-10-01 RX ADMIN — HEPARIN SODIUM 5000 UNIT(S): 5000 INJECTION INTRAVENOUS; SUBCUTANEOUS at 21:01

## 2022-10-01 RX ADMIN — AMLODIPINE BESYLATE 5 MILLIGRAM(S): 2.5 TABLET ORAL at 05:04

## 2022-10-01 RX ADMIN — SODIUM CHLORIDE 500 MILLILITER(S): 9 INJECTION, SOLUTION INTRAVENOUS at 21:12

## 2022-10-01 RX ADMIN — HYDROMORPHONE HYDROCHLORIDE 0.5 MILLIGRAM(S): 2 INJECTION INTRAMUSCULAR; INTRAVENOUS; SUBCUTANEOUS at 17:55

## 2022-10-01 RX ADMIN — Medication 15 MILLIGRAM(S): at 05:05

## 2022-10-01 RX ADMIN — PANTOPRAZOLE SODIUM 40 MILLIGRAM(S): 20 TABLET, DELAYED RELEASE ORAL at 05:04

## 2022-10-01 NOTE — DISCHARGE NOTE PROVIDER - NSDCHC_MEDRECSTATUS_GEN_ALL_CORE
Admission Reconciliation is Completed  Discharge Reconciliation is Not Complete Admission Reconciliation is Completed  Discharge Reconciliation is Completed 6

## 2022-10-02 ENCOUNTER — TRANSCRIPTION ENCOUNTER (OUTPATIENT)
Age: 77
End: 2022-10-02

## 2022-10-02 LAB
BASOPHILS # BLD AUTO: 0.03 K/UL — SIGNIFICANT CHANGE UP (ref 0–0.2)
BASOPHILS # BLD AUTO: 0.03 K/UL — SIGNIFICANT CHANGE UP (ref 0–0.2)
BASOPHILS NFR BLD AUTO: 0.4 % — SIGNIFICANT CHANGE UP (ref 0–1)
BASOPHILS NFR BLD AUTO: 0.5 % — SIGNIFICANT CHANGE UP (ref 0–1)
EOSINOPHIL # BLD AUTO: 0.2 K/UL — SIGNIFICANT CHANGE UP (ref 0–0.7)
EOSINOPHIL # BLD AUTO: 0.21 K/UL — SIGNIFICANT CHANGE UP (ref 0–0.7)
EOSINOPHIL NFR BLD AUTO: 2.4 % — SIGNIFICANT CHANGE UP (ref 0–8)
EOSINOPHIL NFR BLD AUTO: 3.2 % — SIGNIFICANT CHANGE UP (ref 0–8)
HCT VFR BLD CALC: 24 % — LOW (ref 37–47)
HCT VFR BLD CALC: 24 % — LOW (ref 37–47)
HGB BLD-MCNC: 7.8 G/DL — LOW (ref 12–16)
HGB BLD-MCNC: 7.8 G/DL — LOW (ref 12–16)
IMM GRANULOCYTES NFR BLD AUTO: 0.5 % — HIGH (ref 0.1–0.3)
IMM GRANULOCYTES NFR BLD AUTO: 0.5 % — HIGH (ref 0.1–0.3)
LYMPHOCYTES # BLD AUTO: 0.94 K/UL — LOW (ref 1.2–3.4)
LYMPHOCYTES # BLD AUTO: 1.03 K/UL — LOW (ref 1.2–3.4)
LYMPHOCYTES # BLD AUTO: 11.3 % — LOW (ref 20.5–51.1)
LYMPHOCYTES # BLD AUTO: 15.7 % — LOW (ref 20.5–51.1)
MCHC RBC-ENTMCNC: 27.7 PG — SIGNIFICANT CHANGE UP (ref 27–31)
MCHC RBC-ENTMCNC: 27.8 PG — SIGNIFICANT CHANGE UP (ref 27–31)
MCHC RBC-ENTMCNC: 32.5 G/DL — SIGNIFICANT CHANGE UP (ref 32–37)
MCHC RBC-ENTMCNC: 32.5 G/DL — SIGNIFICANT CHANGE UP (ref 32–37)
MCV RBC AUTO: 85.1 FL — SIGNIFICANT CHANGE UP (ref 81–99)
MCV RBC AUTO: 85.4 FL — SIGNIFICANT CHANGE UP (ref 81–99)
MONOCYTES # BLD AUTO: 0.58 K/UL — SIGNIFICANT CHANGE UP (ref 0.1–0.6)
MONOCYTES # BLD AUTO: 0.64 K/UL — HIGH (ref 0.1–0.6)
MONOCYTES NFR BLD AUTO: 7.7 % — SIGNIFICANT CHANGE UP (ref 1.7–9.3)
MONOCYTES NFR BLD AUTO: 8.8 % — SIGNIFICANT CHANGE UP (ref 1.7–9.3)
NEUTROPHILS # BLD AUTO: 4.7 K/UL — SIGNIFICANT CHANGE UP (ref 1.4–6.5)
NEUTROPHILS # BLD AUTO: 6.46 K/UL — SIGNIFICANT CHANGE UP (ref 1.4–6.5)
NEUTROPHILS NFR BLD AUTO: 71.3 % — SIGNIFICANT CHANGE UP (ref 42.2–75.2)
NEUTROPHILS NFR BLD AUTO: 77.7 % — HIGH (ref 42.2–75.2)
NRBC # BLD: 0 /100 WBCS — SIGNIFICANT CHANGE UP (ref 0–0)
NRBC # BLD: 0 /100 WBCS — SIGNIFICANT CHANGE UP (ref 0–0)
PLATELET # BLD AUTO: 212 K/UL — SIGNIFICANT CHANGE UP (ref 130–400)
PLATELET # BLD AUTO: 217 K/UL — SIGNIFICANT CHANGE UP (ref 130–400)
RBC # BLD: 2.81 M/UL — LOW (ref 4.2–5.4)
RBC # BLD: 2.82 M/UL — LOW (ref 4.2–5.4)
RBC # FLD: 15.6 % — HIGH (ref 11.5–14.5)
RBC # FLD: 15.6 % — HIGH (ref 11.5–14.5)
WBC # BLD: 6.58 K/UL — SIGNIFICANT CHANGE UP (ref 4.8–10.8)
WBC # BLD: 8.31 K/UL — SIGNIFICANT CHANGE UP (ref 4.8–10.8)
WBC # FLD AUTO: 6.58 K/UL — SIGNIFICANT CHANGE UP (ref 4.8–10.8)
WBC # FLD AUTO: 8.31 K/UL — SIGNIFICANT CHANGE UP (ref 4.8–10.8)

## 2022-10-02 RX ORDER — SODIUM CHLORIDE 9 MG/ML
500 INJECTION, SOLUTION INTRAVENOUS ONCE
Refills: 0 | Status: COMPLETED | OUTPATIENT
Start: 2022-10-02 | End: 2022-10-02

## 2022-10-02 RX ADMIN — DONEPEZIL HYDROCHLORIDE 5 MILLIGRAM(S): 10 TABLET, FILM COATED ORAL at 21:44

## 2022-10-02 RX ADMIN — Medication 15 MILLIGRAM(S): at 21:45

## 2022-10-02 RX ADMIN — Medication 15 MILLIGRAM(S): at 05:07

## 2022-10-02 RX ADMIN — Medication 100 GRAM(S): at 01:10

## 2022-10-02 RX ADMIN — Medication 650 MILLIGRAM(S): at 23:11

## 2022-10-02 RX ADMIN — Medication 100 GRAM(S): at 13:11

## 2022-10-02 RX ADMIN — PANTOPRAZOLE SODIUM 40 MILLIGRAM(S): 20 TABLET, DELAYED RELEASE ORAL at 05:06

## 2022-10-02 RX ADMIN — Medication 650 MILLIGRAM(S): at 00:19

## 2022-10-02 RX ADMIN — Medication 650 MILLIGRAM(S): at 05:06

## 2022-10-02 RX ADMIN — Medication 15 MILLIGRAM(S): at 13:11

## 2022-10-02 RX ADMIN — SODIUM CHLORIDE 500 MILLILITER(S): 9 INJECTION, SOLUTION INTRAVENOUS at 00:11

## 2022-10-02 RX ADMIN — TAMSULOSIN HYDROCHLORIDE 0.4 MILLIGRAM(S): 0.4 CAPSULE ORAL at 21:44

## 2022-10-02 RX ADMIN — HEPARIN SODIUM 5000 UNIT(S): 5000 INJECTION INTRAVENOUS; SUBCUTANEOUS at 05:07

## 2022-10-02 RX ADMIN — HEPARIN SODIUM 5000 UNIT(S): 5000 INJECTION INTRAVENOUS; SUBCUTANEOUS at 21:45

## 2022-10-02 RX ADMIN — HEPARIN SODIUM 5000 UNIT(S): 5000 INJECTION INTRAVENOUS; SUBCUTANEOUS at 13:10

## 2022-10-02 RX ADMIN — Medication 15 MILLIGRAM(S): at 23:12

## 2022-10-02 RX ADMIN — Medication 650 MILLIGRAM(S): at 17:18

## 2022-10-02 RX ADMIN — HYDROMORPHONE HYDROCHLORIDE 0.5 MILLIGRAM(S): 2 INJECTION INTRAMUSCULAR; INTRAVENOUS; SUBCUTANEOUS at 12:33

## 2022-10-02 RX ADMIN — SODIUM CHLORIDE 1000 MILLILITER(S): 9 INJECTION, SOLUTION INTRAVENOUS at 12:34

## 2022-10-02 NOTE — PHYSICAL THERAPY INITIAL EVALUATION ADULT - DID THE PATIENT HAVE SURGERY?
Open low anterior resection of colon, Small bowel resection with anastomosis, Block, transversus abdominis plane, bilateral, Colostomy/yes

## 2022-10-02 NOTE — CONSULT NOTE ADULT - ASSESSMENT
IMPRESSION: Rehab of debilitation secondary to bowel obstruction/ resection/ colostomy. History of dementia. Unable to obtain home environment or functional history.    PRECAUTIONS: [  ] Cardiac  [  ] Respiratory  [  ] Seizures [  ] Contact Isolation  [  ] Droplet Isolation  [  ] Other    Weight Bearing Status: wbat    RECOMMENDATION:    Out of Bed to Chair     DVT/Decubiti Prophylaxis    REHAB PLAN:     [ x  ] Bedside P/T 3-5 times a week   [   ]   Bedside O/T  2-3 times a week             [   ] No Rehab Therapy Indicated                   [   ]  Speech Therapy   Conditioning/ROM                                    ADL  Bed Mobility                                               Conditioning/ROM  Transfers                                                     Bed Mobility  Sitting /Standing Balance                         Transfers                                        Gait Training                                               Sitting/Standing Balance  Stair Training [   ]Applicable                    Home equipment Eval                                                                        Splinting  [   ] Only      GOALS:   ADL   [   ]   Independent                    Transfers  [   ] Independent                          Ambulation  [   ] Independent     [    ] With device                            [   ]  CG                                                         [   ]  CG                                                                  [   ] CG                            [ x   ] Min A                                                   [ x  ] Min A                                                              [  x ] Min  A          DISCHARGE PLAN:   [   ]  Good candidate for Intensive Rehabilitation/Hospital based-4A [facility]                                             Will tolerate 3hrs Intensive Rehab Daily                                       [x    ]  Short Term Rehab in Skilled Nursing Facility    vs.                                        [   x ]  Home with Outpatient or  services and home care                                         [    ]  Possible Candidate for Intensive Hospital based Rehab

## 2022-10-02 NOTE — DISCHARGE NOTE NURSING/CASE MANAGEMENT/SOCIAL WORK - PATIENT PORTAL LINK FT
You can access the FollowMyHealth Patient Portal offered by [facility] by registering at the following website: [email or web address]. By joining [facility]’s FollowMyHealth portal, you will also be able to view your health information using other applications (apps) compatible with our system.

## 2022-10-02 NOTE — PHYSICAL THERAPY INITIAL EVALUATION ADULT - PERTINENT HX OF CURRENT PROBLEM, REHAB EVAL
76 yo female with past medical history significant for dementia, HTN diverticulitis with most recent admission on 9/8 who presents to the ED accompanied by her niece with complaints of nausea associated with 3 episodes of bilious, non bloody vomiting.  Patient is scheduled for robotic assisted laparoscopic sigmoidectomy tomorrow, started drinking bowel prep today but niece reports only 2-3 watery bowel movements today.

## 2022-10-02 NOTE — CONSULT NOTE ADULT - SUBJECTIVE AND OBJECTIVE BOX
HPI:  78 yo female with past medical history significant for dementia, HTN diverticulitis with most recent admission on  who presents to the ED accompanied by her niece with complaints of nausea associated with 3 episodes of bilious, non bloody vomiting.  Patient is scheduled for robotic assisted laparoscopic sigmoidectomy tomorrow, started drinking bowel prep today but niece reports only 2-3 watery bowel movements today.    (30 Sep 2022 06:31)    PAST MEDICAL & SURGICAL HISTORY:  Hypertension, unspecified type      Dementia      GERD (gastroesophageal reflux disease)      Diverticulitis      Lack of bladder control      H/O dilation and curettage  for missed           Hospital Course:  Open low anterior resection of colon 30-Sep-2022 18:07:54  Brandon Teran  Small bowel resection with anastomosis 30-Sep-2022 18:08:27  Brandon Teran  Block, transversus abdominis plane, bilateral 30-Sep-2022 18:08:43  Brandon Teran  Colostomy 30-Sep-2022 18:09:04  Brandon Teran.    Patient is a poor historian secondary to dementia. Nurse bedside states that he tried to get her out of bed yesterday, but she was unable. She is c/o increased abdominal pain today. Had Thomson Cath removed a few hour ago for TOV. PT to see her today.        TODAY'S SUBJECTIVE & REVIEW OF SYMPTOMS:  difficult secondary to dementia     Constitutional WNL   Cardio WNL   Resp WNL   GI as above  Heme WNL  Endo WNL  Skin WNL  MSK WNL  Neuro WNL  Cognitive Dementia  Psych WNL      MEDICATIONS  (STANDING):  acetaminophen     Tablet .. 650 milliGRAM(s) Oral every 6 hours  amLODIPine   Tablet 5 milliGRAM(s) Oral daily  cefoTEtan  IVPB 2 Gram(s) IV Intermittent every 12 hours  dextrose 5% + sodium chloride 0.45%. 1000 milliLiter(s) (40 mL/Hr) IV Continuous <Continuous>  donepezil 5 milliGRAM(s) Oral at bedtime  heparin   Injectable 5000 Unit(s) SubCutaneous every 8 hours  ketorolac   Injectable 15 milliGRAM(s) IV Push every 8 hours  pantoprazole    Tablet 40 milliGRAM(s) Oral before breakfast  tamsulosin 0.4 milliGRAM(s) Oral at bedtime    MEDICATIONS  (PRN):  HYDROmorphone  Injectable 0.5 milliGRAM(s) IV Push every 6 hours PRN Severe Pain (7 - 10)      FAMILY HISTORY:  Patient unable to provide medical history        Allergies    No Known Allergies    Intolerances        SOCIAL HISTORY: unknown    [  ] Etoh  [  ] Smoking  [  ] Substance abuse     Home Environment: unkown  [  ] Home Alone  [  ] Lives with Family  [  ] Home Health Aid     Dwelling: unknown  [  ] Apartment  [  ] Private House  [  ] Adult Home  [  ] Skilled Nursing Facility      [  ] Short Term  [  ] Long Term  [  ] Stairs       Elevator [  ]    FUNCTIONAL STATUS PTA: (Check all that apply) unknown  Ambulation: [   ]Independent   [   ] Requires Assistance   [  ] Dependent     [  ] Non-Ambulatory       Assistive Device: [  ] SA Cane  [  ]  Q Cane  [  ] Walker  [  ]  Wheelchair  ADL : [  ] Independent    [   ] Requires Assistance    [  ]  Dependent       Vital Signs Last 24 Hrs  T(C): 36.1 (02 Oct 2022 12:21), Max: 36.1 (02 Oct 2022 12:21)  T(F): 96.9 (02 Oct 2022 12:21), Max: 96.9 (02 Oct 2022 12:21)  HR: 78 (02 Oct 2022 12:21) (58 - 78)  BP: 104/56 (02 Oct 2022 12:21) (84/51 - 125/55)  BP(mean): --  RR: 18 (02 Oct 2022 12:21) (17 - 18)  SpO2: 100% (02 Oct 2022 12:21) (96% - 100%)    Parameters below as of 02 Oct 2022 05:04  Patient On (Oxygen Delivery Method): room air          PHYSICAL EXAM: Alert & Oriented 1-2  GENERAL: NAD, well-groomed, well-developed, thin  HEAD:  Atraumatic, Normocephalic  EYES: EOMI, PERRL  NECK: Supple  CHEST/LUNG: Clear to auscultation  HEART: Regular rate and rhythm  ABDOMEN: Colostomy pink, intact.  Soft, mild lower abdominal distension. Marked tenderness.  EXTREMITIES:  No clubbing, cyanosis, or edema  ROM:  [ x  ] WFL all extremities  [  ] Abnormal    NERVOUS SYSTEM:   Cranial Nerves 2-12: [x   ] intact  [  ] Abnormal   Motor Strength: [ x  ] WFL all extremities   [  ] Abnormal   Sensation: [ x  ] intact to light touch  [  ] Abnormal   Reflexes: [ x  ] Symmetric  [  ]  Abnormal     FUNCTIONAL STATUS:  Bed Mobility: [   ]Independent  [  ] Supervision  [ x ] Needs Assistance   [  ] N/A   Transfers: [   ] Independent  [  ] Supervision  [ x ] Needs Assistance  [  ]  N/A   Ambulation: [   ] Independent  [  ] Supervision  [x  ] Needs Assistance  [  ] N/A   ADL: [   ] Independent  [x  ] Requires Assistance  [  ] N/A       LABS:                        7.8    8.31  )-----------( 212      ( 02 Oct 2022 07:39 )             24.0     10-    141  |  107  |  13  ----------------------------<  98  4.4   |  24  |  1.0    Ca    8.0<L>      01 Oct 2022 21:18  Phos  2.9     10-  Mg     2.1     10-01            RADIOLOGY & ADDITIONAL STUDIES:            Assesment:
GENERAL SURGERY CONSULT NOTE    Patient: KEISHA PANTOJA , 77y (43)Female   MRN: [id number]  Location: [facility] ED  Visit: 22 Emergency  Date: 22 @ 01:34    HPI: 76 yo female with past medical history significant for dementia, diverticulitis with most recent admission on , and hypertension who presents to the ED accompanied by her niece with complaints of nausea associated with 3 episodes of bilious, non bloody vomiting. Patient is scheduled for robotic assisted laparoscopic sigmoidectomy tomorrow, started drinking bowel prep today but niece reports only 2-3 bowel movements today. Patient herself is a poor historian and is unable to provide history.     PAST MEDICAL & SURGICAL HISTORY:  Hypertension, unspecified type  Dementia  ao x 2  GERD (gastroesophageal reflux disease)  Diverticulitis  Lack of bladder control  H/O dilation and curettage  for missed     Home Medications:  Protonix 40 mg oral delayed release tablet: 1 tab(s) orally 2 times a day (15 Sep 2022 12:40)    VITALS:  T(F): 98.9 (22 @ 22:49), Max: 98.9 (22 @ 22:49)  HR: 98 (22 @ 22:49) (98 - 98)  BP: 129/68 (22 @ 22:49) (129/68 - 129/68)  RR: 18 (22 @ 22:49) (18 - 18)  SpO2: 99% (22 @ 22:49) (99% - 99%)    PHYSICAL EXAM:  General: NAD, AAOx2, confused   Cardiac: RRR S1, S2  Respiratory: normal respiratory effort  Abdomen: Soft, non-distended, non-tender  Vascular: Pulses 2+ throughout, extremities well perfused  Skin: Warm/dry, normal color, no jaundice    MEDICATIONS  (STANDING):    MEDICATIONS  (PRN):    LAB/STUDIES:                        9.9    7.88  )-----------( 284      ( 30 Sep 2022 00:40 )             30.2         136  |  97<L>  |  18  ----------------------------<  121<H>  4.6   |  29  |  1.0    Ca    9.1      30 Sep 2022 00:40    TPro  6.7  /  Alb  3.5  /  TBili  0.2  /  DBili  x   /  AST  13  /  ALT  9   /  AlkPhos  76      LIVER FUNCTIONS - ( 30 Sep 2022 00:40 )  Alb: 3.5 g/dL / Pro: 6.7 g/dL / ALK PHOS: 76 U/L / ALT: 9 U/L / AST: 13 U/L / GGT: x           IMAGING:      ACCESS DEVICES:  [x] Peripheral IV  [ ] Central Venous Line	[ ] R	[ ] L	[ ] IJ	[ ] Fem	[ ] SC	Placed:   [ ] Arterial Line		[ ] R	[ ] L	[ ] Fem	[ ] Rad	[ ] Ax	Placed:   [ ] PICC:					[ ] Mediport  [ ] Urinary Catheter, Date Placed:     ASSESSMENT:  77yF w/ PMHx of  dementia, hypertension, diverticulitis scheduled for robotic assisted laparoscopic sigmoidectomy tomorrow  who presented with nausea and bilious, non-bloody emesis x3 today. Physical exam findings, imaging, and labs as documented above.     PLAN:  -pending CT scan  ***    ***INCOMPLETE NOTE***    Above plan discussed with Attending Surgeon Dr. Fitzpatrick, patient, patient family, and Primary team  22 @ 01:34

## 2022-10-02 NOTE — PROGRESS NOTE ADULT - ATTENDING COMMENTS
78 yo female s/p robot converted to open LAR, small bowel resection, creation of end colostomy for complicated sigmoid diverticulitis with coloenteric fistula; episode of hypotension reported o/n, responded to fluids; Hgb down to 8 from 10.5; SBP 110s this morning; U o/p 2650/24hr; Abd soft, incisions c/d/i; MELVIN serosang; ostomy with feculant output and gas    as above; monitor hemodynamics; trend hgb

## 2022-10-02 NOTE — PHYSICAL THERAPY INITIAL EVALUATION ADULT - GENERAL OBSERVATIONS, REHAB EVAL
Pt encountered OOB sitting in a recliner chair A & O x 2 confused but in NAD, IV, +MELVIN drain, no c/o pain, and follow commands appropriately. Pt requires Min A in transfer mobility, ambulated 150 ft CGA using RW and negotiated 5 steps Min A using 1HR. Pt ambulates with decreased burke/step length, +Rt foot drop noted. Pt tolerated therapy session and left seated in a recliner chair in NAD as found, +chair alarm, callbell, RN aware.

## 2022-10-02 NOTE — CONSULT NOTE ADULT - CONSULT REASON
Pt was given colonoscopy referral on 7/5/17. Appt not scheduled. Left message to call back [phone].
colitis
nausea, vomiting

## 2022-10-03 LAB
ANION GAP SERPL CALC-SCNC: 9 MMOL/L — SIGNIFICANT CHANGE UP (ref 7–14)
BUN SERPL-MCNC: 11 MG/DL — SIGNIFICANT CHANGE UP (ref 10–20)
CALCIUM SERPL-MCNC: 8.1 MG/DL — LOW (ref 8.4–10.5)
CHLORIDE SERPL-SCNC: 108 MMOL/L — SIGNIFICANT CHANGE UP (ref 98–110)
CO2 SERPL-SCNC: 24 MMOL/L — SIGNIFICANT CHANGE UP (ref 17–32)
CREAT SERPL-MCNC: 0.9 MG/DL — SIGNIFICANT CHANGE UP (ref 0.7–1.5)
EGFR: 65 ML/MIN/1.73M2 — SIGNIFICANT CHANGE UP
GLUCOSE SERPL-MCNC: 93 MG/DL — SIGNIFICANT CHANGE UP (ref 70–99)
MAGNESIUM SERPL-MCNC: 1.9 MG/DL — SIGNIFICANT CHANGE UP (ref 1.8–2.4)
PHOSPHATE SERPL-MCNC: 2.5 MG/DL — SIGNIFICANT CHANGE UP (ref 2.1–4.9)
POTASSIUM SERPL-MCNC: 4 MMOL/L — SIGNIFICANT CHANGE UP (ref 3.5–5)
POTASSIUM SERPL-SCNC: 4 MMOL/L — SIGNIFICANT CHANGE UP (ref 3.5–5)
SARS-COV-2 RNA SPEC QL NAA+PROBE: SIGNIFICANT CHANGE UP
SODIUM SERPL-SCNC: 141 MMOL/L — SIGNIFICANT CHANGE UP (ref 135–146)

## 2022-10-03 RX ORDER — CIPROFLOXACIN LACTATE 400MG/40ML
500 VIAL (ML) INTRAVENOUS EVERY 12 HOURS
Refills: 0 | Status: DISCONTINUED | OUTPATIENT
Start: 2022-10-03 | End: 2022-10-04

## 2022-10-03 RX ORDER — METRONIDAZOLE 500 MG
500 TABLET ORAL EVERY 8 HOURS
Refills: 0 | Status: DISCONTINUED | OUTPATIENT
Start: 2022-10-03 | End: 2022-10-04

## 2022-10-03 RX ADMIN — AMLODIPINE BESYLATE 5 MILLIGRAM(S): 2.5 TABLET ORAL at 06:07

## 2022-10-03 RX ADMIN — PANTOPRAZOLE SODIUM 40 MILLIGRAM(S): 20 TABLET, DELAYED RELEASE ORAL at 06:07

## 2022-10-03 RX ADMIN — Medication 650 MILLIGRAM(S): at 11:38

## 2022-10-03 RX ADMIN — TAMSULOSIN HYDROCHLORIDE 0.4 MILLIGRAM(S): 0.4 CAPSULE ORAL at 22:00

## 2022-10-03 RX ADMIN — Medication 500 MILLIGRAM(S): at 13:38

## 2022-10-03 RX ADMIN — Medication 100 GRAM(S): at 03:13

## 2022-10-03 RX ADMIN — Medication 650 MILLIGRAM(S): at 06:26

## 2022-10-03 RX ADMIN — Medication 650 MILLIGRAM(S): at 17:15

## 2022-10-03 RX ADMIN — HEPARIN SODIUM 5000 UNIT(S): 5000 INJECTION INTRAVENOUS; SUBCUTANEOUS at 13:38

## 2022-10-03 RX ADMIN — Medication 15 MILLIGRAM(S): at 06:27

## 2022-10-03 RX ADMIN — Medication 500 MILLIGRAM(S): at 17:15

## 2022-10-03 RX ADMIN — HEPARIN SODIUM 5000 UNIT(S): 5000 INJECTION INTRAVENOUS; SUBCUTANEOUS at 22:01

## 2022-10-03 RX ADMIN — Medication 15 MILLIGRAM(S): at 06:12

## 2022-10-03 RX ADMIN — DONEPEZIL HYDROCHLORIDE 5 MILLIGRAM(S): 10 TABLET, FILM COATED ORAL at 22:00

## 2022-10-03 RX ADMIN — Medication 650 MILLIGRAM(S): at 06:07

## 2022-10-03 RX ADMIN — HEPARIN SODIUM 5000 UNIT(S): 5000 INJECTION INTRAVENOUS; SUBCUTANEOUS at 06:07

## 2022-10-03 RX ADMIN — Medication 15 MILLIGRAM(S): at 13:37

## 2022-10-03 RX ADMIN — Medication 500 MILLIGRAM(S): at 22:00

## 2022-10-03 RX ADMIN — Medication 85 MILLIMOLE(S): at 06:43

## 2022-10-03 NOTE — ADVANCED PRACTICE NURSE CONSULT - REASON FOR CONSULT
WOCN consult requested for pre-op stomal marking 
WOCN consult requested for post-op stomal teaching

## 2022-10-03 NOTE — ADVANCED PRACTICE NURSE CONSULT - ASSESSMENT
78 yo female with past medical history significant for dementia, HTN diverticulitis with most recent admission on 9/8 who presents to the ED (9/30) accompanied by her niece with complaints of nausea associated with 3 episodes of bilious, non bloody vomiting.  Patient is scheduled for robotic assisted laparoscopic sigmoidectomy tomorrow, started drinking bowel prep today but niece reports only 2-3 watery bowel movements today. Patient now s/p open low anterior resection of colon, Small bowel resection with anastomosis, end colostomy-9/30/22. 	    Received patient on 4C, sitting up in bed, awake, not attentive, with impaired attention, pt's son Tang (present during previous WOCN visit & with recognition of WOCN from previous visit) & other daughter Sarita at beside, all made aware of  made aware of purpose of WOCN visit, agreeable to consult. Colostomy to LLQ w/ [place] 2 3/4" 2pc drainable pouching system applied in OR still in place, top of barrier at 6 o'clock minimally soiled w/ dried effluent (likely from pouch being disconnected from barrier). Pouching system gently removed from pt's abdomen w/ water moistened gauze.     Type of ostomy: end colostomy   Location: Q  Samson: n/a  Size: 1 1/2"  Mucosa: dark pink - red tissue, edematous, translucent     Peristomal skin: intact   Mucocutaneous junction: intact   Abdominal contours: semi-soft   Output: removed pouch ~1/3 full of mushy effluent, + flatus     Midline/lap sites/ drains: midline abdominal incision, lap sites x 3- approximated w/ staples- c/d/i    Patient re-pouched w/ [place] 2 1/4” CeraPlus flat barrier #29324 (cut to 1 1/2"), Grzegorz Adapt flat CeraRing #[id number], and [place]  2 1/4” drainable pouch w/ lock & roll closure #[id number]. Full ostomy lesson and Grzegorz instructional colostomy booklet provided to patient & family.    Impression:   End colostomy to LLQ-given pt's stomal characteristics and abdominal topography, pt requires flat pouching system w/ use of flat ring to absorb excess moisture and protect peristomal skin w/ shrinking post-op stomal size    Patient not teachable, not attentive, with h/o dementia and  frequent periods of forgetfulness anxiety and agitation r/t dementia (per daughter Lisbet's report during previous WOCN visit pre-op). During previous WOCN visit, daughter Lisbet also reported patient has 9 children but resides at home with one son, daughter, & granddaughter.  Daughter Sarita and son Tang currently at bedside report patient lives w/ son-Robb, daughter Kiana & grandaughter -Radha. Also report HCPs are Lisbet & Tang.   Sarita & Tang report family that patient lives with will have to assist patient w/ ostomy care given pt's inability to manage ostomy care independently r/t her dementia. Sarita & Tang are not sure at what time Robb, Kiana, or Radha will be arriving to the hospital today and are requesting ostomy lesson to be provided now as they report they can also assist patient w/ ostomy care at home as needed.    Both Sarita & Tang ready & willing to learn ostomy, both full attentive during pouch change and ostomy lesson, asking appropriate questions; able to verbalize key ostomy points as discussed during lesson. Discussed w/ family ability for patient to utilize filtered close-ended disposable pouches at home- family shown how to connect/disconnect pouch from barrier for disposable & how to vent flatus from pouch- Sarita correctly demonstrated these skills. Son Tang video-taped entire pouch change procedure on his iPhone.     Reviewed with daughter Sarita  dietary restrictions, stomal obstruction s/s and prevention and lifestyle modifications (clothing, bathing, physical activity, etc).  Daughter verbalized understanding of all information given. Written information regarding all above topics provided to daughter.    
76 yo female with past medical history significant for dementia, HTN diverticulitis with most recent admission on 9/8 who presents to the ED (9/30) accompanied by her niece with complaints of nausea associated with 3 episodes of bilious, non bloody vomiting.  Patient is scheduled for robotic assisted laparoscopic sigmoidectomy tomorrow, started drinking bowel prep today but niece reports only 2-3 watery bowel movements today.    Received patient in ED, sitting up on stretcher, awake, alert, responding appropriately, son Tang & daughter Lisbet at bedside, all made aware of purpose of WOCN visit, agreeable to consult. Pt's daughter reports patient w/ frequent periods of forgetfulness anxiety and agitation r/t pt's dementia. Pt's abdomen assessed in laying and sitting up position on stretcher, abdomen round/distended, semi-soft, rectus muscle palpated, pt's belt line noted. Pt stoma sited in LLQ (for possible descending colostomy) within the rectus muscle, void of any scarring, dimpling, creasing, bony prominences or pt's belt line. Site visualized in laying and sitting u-p positions, pt able to visualize sites in both position. Tegaderm dressing applied over marking.    Briefly discussed w/ family purpose and function of ostomy and urine/stool frequency & consistency. Pt's daughter Lisbet reports patient has 9 children but  currently resides at home with one son, daughter & her granddaughter. Daughter reports patient may require nursing assistance upon d/c if colostomy is placed given that patient may not be able to independently care for her ostomy at home.

## 2022-10-03 NOTE — ADVANCED PRACTICE NURSE CONSULT - RECOMMEDATIONS
Pouch change: 	  -Gently remove pouch water moistened gauze   -Cleanse stoma site with water moistened gauze, gently pat dry  -Measure stoma, currently 1 1/2"  -Trace and cut current size on [place] 2 1/4” CeraPlus flat barrier (#48492)  -Stretch Grzegorz Adapt CeraRing (#8805) onto back of barrier  -Apply barrier to patient's skin  -Attach [place] 2 1/4” drainable lock and roll pouch (#26539) onto barrier  Empty pouch when 1/3 to no more than 1/2 full of effluent/flatus. Change pouching system q 3 - 4 days and prn for leaking. Next pouch change- Thurs 10/6.    Plan of Care: Patient to be d/c'ed to home with home nursing services tomorrow. Daughter Kiana who resides with patient made aware of plan by . Sarita and Tang currently at bedside also made aware of d/c plan. All d/c paperwork and supplies provided to family at bedside in anticipation for d/c tomorrow. Supplies include:  [place]  2 ¼” CeraPlus flat barrier #70560  [place] 2 ¼” disposeable/close-ended pouches w/ gas filter #95810 (transparent), #09664 (beige)  Grzegorz Adapt flat CeraRing #8805  Grzegorz adhesive remover #1049  Grzegorz Adapt stoma powder #7906  3M Cavilon no-sting barrier film #3459  Adapt lubricating deodorant #97583    Patient to follow with MD upon d/c for continued post-op management & care/treatment.  Family given [facility] contact info and instructed to call w/ questions/concerns. With family's permission, will enroll patient in [place] Secure Start Program upon d/c. Signing off on patient, patient d/c'ed from [facility] service. If additional family presents to bedside, RN staff can reinforce ostomy teaching with family.  Questions or concerns, please contact [facility], [facility] #[phone]. Primary RN Yany made aware. 
WOCN to follow up w/ patient post-operatively for post-op teaching. Questions/concerns, please contact [facility], [facility][phone].

## 2022-10-04 ENCOUNTER — TRANSCRIPTION ENCOUNTER (OUTPATIENT)
Age: 77
End: 2022-10-04

## 2022-10-04 VITALS
TEMPERATURE: 98 F | DIASTOLIC BLOOD PRESSURE: 67 MMHG | RESPIRATION RATE: 18 BRPM | SYSTOLIC BLOOD PRESSURE: 120 MMHG | HEART RATE: 75 BPM

## 2022-10-04 RX ORDER — CIPROFLOXACIN LACTATE 400MG/40ML
1 VIAL (ML) INTRAVENOUS
Qty: 3 | Refills: 0
Start: 2022-10-04

## 2022-10-04 RX ORDER — METRONIDAZOLE 500 MG
1 TABLET ORAL
Qty: 5 | Refills: 0
Start: 2022-10-04

## 2022-10-04 RX ADMIN — Medication 650 MILLIGRAM(S): at 00:33

## 2022-10-04 RX ADMIN — HEPARIN SODIUM 5000 UNIT(S): 5000 INJECTION INTRAVENOUS; SUBCUTANEOUS at 13:39

## 2022-10-04 RX ADMIN — Medication 650 MILLIGRAM(S): at 05:38

## 2022-10-04 RX ADMIN — Medication 650 MILLIGRAM(S): at 11:19

## 2022-10-04 RX ADMIN — HEPARIN SODIUM 5000 UNIT(S): 5000 INJECTION INTRAVENOUS; SUBCUTANEOUS at 05:38

## 2022-10-04 RX ADMIN — Medication 500 MILLIGRAM(S): at 13:39

## 2022-10-04 RX ADMIN — Medication 500 MILLIGRAM(S): at 05:36

## 2022-10-04 RX ADMIN — PANTOPRAZOLE SODIUM 40 MILLIGRAM(S): 20 TABLET, DELAYED RELEASE ORAL at 05:36

## 2022-10-04 RX ADMIN — Medication 500 MILLIGRAM(S): at 05:37

## 2022-10-04 RX ADMIN — AMLODIPINE BESYLATE 5 MILLIGRAM(S): 2.5 TABLET ORAL at 05:37

## 2022-10-04 NOTE — DISCHARGE NOTE PROVIDER - CARE PROVIDER_API CALL
Arnulfo Jean-Baptiste)  ColonRectal Surgery; Surgery  [address], [address] [place] [address]  Phone: [phone]  Fax: [phone]  Follow Up Time:

## 2022-10-04 NOTE — DISCHARGE NOTE PROVIDER - NSDCCPCAREPLAN_GEN_ALL_CORE_FT
PRINCIPAL DISCHARGE DIAGNOSIS  Diagnosis: Colitis  Assessment and Plan of Treatment: SURGERY DISCHARGE INSTRUCTIONS  FOLLOW-UP - with Dr. Jean-Baptiste on 10/12/22. Call the office to make an appointment or if you have any questions/concerns.  DIET - low fiber.   ACTIVITY- No heavy lifting for 4-6 wks over 10-20 lbs. Walking is encouraged. No running or swimming. No driving while taking pain medication.  COLOSTOMY - If you have a colostomy follow your specific instructions.  DRAINS- if you have drains, number or label each drain, for example drain 1 and drain 2. measure the output from each of them as needed when they become full or when you remember, for example once in the morning and once at night. Write down the total amount from each drain daily and bring this to your appointment.  ANTIBIOTICS- Take antibiotics as directed if prescribed.   OTHER MEDS - If you have any questions about your other regular home medications please call your primary care physician or the physician who prescribed those medications to you.   If you develop fever, dizziness, chest pain, trouble breathing, nausea, vomiting, increasing abdominal pain, inability to pass bowel movements, redness/pain/discharge from  Please call the office or go to the emergency room immediately.

## 2022-10-04 NOTE — PROGRESS NOTE ADULT - SUBJECTIVE AND OBJECTIVE BOX
SURGERY PROGRESS NOTE AND POST OP CHECK     Patient: KEISHA PANTOJA , 77y (43)Female   MRN: [id number]  Location: [address]  Visit: 22 Inpatient  Date: 10-01-22 @ 01:16       Procedure/Dx/Injuries: Open low anterior resection of colon, Small bowel resection with anastomosis, Block, transversus abdominis plane, bilateral, Colostomy 	     Events of past 24 hours: Patient seen resting comfortably in bed. Pain tolerated. Asking when she can go home. Some bowel sweat in colostomy. Patient has not yet ambulated. Thomson in place.     PAST MEDICAL & SURGICAL HISTORY:  Hypertension, unspecified type      Dementia  ao x 2      GERD (gastroesophageal reflux disease)      Diverticulitis      Lack of bladder control      H/O dilation and curettage  for missed        Vitals:   T(F): 96.2 (22 @ 22:46), Max: 98.5 (22 @ 11:45)  HR: 67 (22 @ 22:46)  BP: 123/67 (22 @ 22:46)  RR: 18 (22 @ 22:46)  SpO2: 98% (22 @ 22:46)      Diet, NPO:   Except Medications     Special Instructions for Nursing:  Except Medications      Fluids: lactated ringers.: Solution, 1000 milliLiter(s) infuse at 100 mL/Hr      I & O's:      Bowel Movement: : [] YES [] NO  Flatus: : [] YES [] NO     PHYSICAL EXAM:   General: NAD, AAOx3, calm and cooperative  Cardiac: RRR S1, S2  Respiratory: CTAB, normal respiratory effort  Abdomen: Soft, non-distended, non-tender, no rebound, no guarding. +BS.  Vascular: Pulses 2+ throughout, extremities well perfused  Skin: Warm/dry, normal color, no jaundice  Incision/wound: healing well, dressings in place, clean, dry and intact    MEDICATIONS  (STANDING):  acetaminophen     Tablet .. 650 milliGRAM(s) Oral every 6 hours  amLODIPine   Tablet 5 milliGRAM(s) Oral daily  cefoTEtan  IVPB 2 Gram(s) IV Intermittent every 12 hours  donepezil 5 milliGRAM(s) Oral at bedtime  heparin   Injectable 5000 Unit(s) SubCutaneous every 8 hours  ketorolac   Injectable 15 milliGRAM(s) IV Push every 8 hours  lactated ringers. 1000 milliLiter(s) (100 mL/Hr) IV Continuous <Continuous>  magnesium sulfate  IVPB 2 Gram(s) IV Intermittent once  pantoprazole    Tablet 40 milliGRAM(s) Oral before breakfast  tamsulosin 0.4 milliGRAM(s) Oral at bedtime    MEDICATIONS  (PRN):  HYDROmorphone  Injectable 0.5 milliGRAM(s) IV Push every 6 hours PRN Severe Pain (7 - 10)      DVT PROPHYLAXIS: heparin   Injectable 5000 Unit(s) SubCutaneous every 8 hours    GI PROPHYLAXIS: pantoprazole    Tablet 40 milliGRAM(s) Oral before breakfast    ANTICOAGULATION:   ANTIBIOTICS:  cefoTEtan  IVPB 2 Gram(s)            LAB/STUDIES:  Labs:  CAPILLARY BLOOD GLUCOSE      POCT Blood Glucose.: 131 mg/dL (30 Sep 2022 18:50)  POCT Blood Glucose.: 110 mg/dL (30 Sep 2022 15:20)                          10.5   5.41  )-----------( 239      ( 30 Sep 2022 19:04 )             32.8       Auto Neutrophil %: 82.5 % (22 @ 19:04)  Auto Immature Granulocyte %: 0.4 % (22 @ 19:04)        138  |  107  |  10  ----------------------------<  149<H>  4.4   |  21  |  0.9      Calcium, Total Serum: 8.1 mg/dL (22 @ 19:04)      LFTs:             6.7  | 0.2  | 13       ------------------[76      ( 30 Sep 2022 00:40 )  3.5  | x    | 9           Lipase:18     Amylase:x         Blood Gas Arterial, Lactate: 0.40 mmol/L (22 @ 09:09)  Lactate, Blood: 0.9 mmol/L (22 @ 00:40)    ABG - ( 30 Sep 2022 09:09 )  pH: 7.47  /  pCO2: 38    /  pO2: 88    / HCO3: 28    / Base Excess: 3.8   /  SaO2: 99.3      
COLORECTAL SURGERY PROGRESS NOTE      Events of past 24 hours:  +OOB/Ambulating, tolerating diet, +Ostomy fxn, +Void        ROS otherwise negative except per subjective and HPI      Vital Signs Last 24 Hrs  T(C): 36.2 (03 Oct 2022 05:16), Max: 36.6 (02 Oct 2022 21:14)  T(F): 97.1 (03 Oct 2022 05:16), Max: 97.8 (02 Oct 2022 21:14)  HR: 72 (03 Oct 2022 05:16) (66 - 83)  BP: 138/63 (03 Oct 2022 05:16) (94/51 - 138/63)  BP(mean): --  RR: 18 (03 Oct 2022 05:16) (18 - 18)  SpO2: 97% (03 Oct 2022 05:16) (97% - 100%)    Parameters below as of 03 Oct 2022 05:16  Patient On (Oxygen Delivery Method): room air        Diet, Low Fiber:   DASH/TLC Sodium & Cholesterol Restricted (10-02-22 @ 09:05) [Active]          I&O's Detail    02 Oct 2022 07:01  -  03 Oct 2022 07:00  --------------------------------------------------------  IN:  Total IN: 0 mL    OUT:    Bulb (mL): 8 mL    Colostomy (mL): 150 mL    Voided (mL): 700 mL  Total OUT: 858 mL    Total NET: -858 mL          General Appearance: NAD  Heart: RRR  Lungs: Unlabored breathing at rest  Abdomen:  S/ND/NT. No guarding or rebound.  Ostomy w/ stool and gas          MEDICATIONS:   MEDICATIONS  (STANDING):  acetaminophen     Tablet .. 650 milliGRAM(s) Oral every 6 hours  amLODIPine   Tablet 5 milliGRAM(s) Oral daily  cefoTEtan  IVPB 2 Gram(s) IV Intermittent every 12 hours  donepezil 5 milliGRAM(s) Oral at bedtime  heparin   Injectable 5000 Unit(s) SubCutaneous every 8 hours  ketorolac   Injectable 15 milliGRAM(s) IV Push every 8 hours  pantoprazole    Tablet 40 milliGRAM(s) Oral before breakfast  tamsulosin 0.4 milliGRAM(s) Oral at bedtime    MEDICATIONS  (PRN):  HYDROmorphone  Injectable 0.5 milliGRAM(s) IV Push every 6 hours PRN Severe Pain (7 - 10)        LAB/STUDIES:                        7.8    6.58  )-----------( 217      ( 02 Oct 2022 21:35 )             24.0     10-02    141  |  108  |  11  ----------------------------<  93  4.0   |  24  |  0.9    Ca    8.1<L>      02 Oct 2022 21:35  Phos  2.5     10-02  Mg     1.9     10-02                    IMAGING:    
GENERAL SURGERY PROGRESS NOTE    Patient: KEISHA PANTOJA , 77y (43)Female   MRN: [id number]  Location: [address]  Visit: 22 Inpatient  Date: 10-02-22 @ 01:46    Hospital Day #: 4  Post-Op Day #: 2    Procedure/Dx/Injuries: Colitis S/P robotic converted to open, low anterior resection    Events of past 24 hours: Midline was placed. A call was received stating that the patient was in a lot of pain. On examination, patient denied pain and clinical examination supported it. Patient had a BP of 84/51 with HR of 73. Manual was 80/50. Patient was asymptomatic though due to mental state, difficult to fully evaluate cognition. She got a bolus of 500cc LR twice. Blood pressure went up to 96/55 with HR 58. Her lab showed a decreased in Hgb to 8 from 10.5. We will continue to monitor with a follow up AM CBC.    PAST MEDICAL & SURGICAL HISTORY:  Hypertension, unspecified type  Dementia ao x 2  GERD (gastroesophageal reflux disease)  Diverticulitis  Lack of bladder control  H/O dilation and curettage for missed     Vitals:   T(F): 96.6 (10-02-22 @ 01:06), Max: 96.8 (10-01-22 @ 05:09)  HR: 58 (10-02-22 @ 01:06)  BP: 96/55 (10-02-22 @ 01:06)  RR: 18 (10-02-22 @ 01:06)  SpO2: 96% (10-02-22 @ 01:06)      Diet, Clear Liquid:   DASH/TLC Sodium & Cholesterol Restricted      Fluids: dextrose 5% + sodium chloride 0.45%.: Solution, 1000 milliLiter(s) infuse at 40 mL/Hr  Provider's Contact #: [phone]      I & O's:    22 @ 07:01  -  10-01-22 @ 07:00  --------------------------------------------------------  IN:    Lactated Ringers: 900 mL  Total IN: 900 mL    OUT:    Bulb (mL): 160 mL    Colostomy (mL): 40 mL    Indwelling Catheter - Urethral (mL): 500 mL  Total OUT: 700 mL    Total NET: 200 mL    PHYSICAL EXAM:  General: NAD, calm and cooperative  Cardiac: S1, S2  Respiratory: Normal respiratory effort  Abdomen: Ostomy LLQ. Mild TTP. Soft, non-distended, no rebound, no guarding  Skin: No jaundice    MEDICATIONS  (STANDING):  acetaminophen     Tablet .. 650 milliGRAM(s) Oral every 6 hours  amLODIPine   Tablet 5 milliGRAM(s) Oral daily  cefoTEtan  IVPB 2 Gram(s) IV Intermittent every 12 hours  dextrose 5% + sodium chloride 0.45%. 1000 milliLiter(s) (40 mL/Hr) IV Continuous <Continuous>  donepezil 5 milliGRAM(s) Oral at bedtime  heparin   Injectable 5000 Unit(s) SubCutaneous every 8 hours  ketorolac   Injectable 15 milliGRAM(s) IV Push every 8 hours  pantoprazole    Tablet 40 milliGRAM(s) Oral before breakfast  tamsulosin 0.4 milliGRAM(s) Oral at bedtime    MEDICATIONS  (PRN):  HYDROmorphone  Injectable 0.5 milliGRAM(s) IV Push every 6 hours PRN Severe Pain (7 - 10)      DVT PROPHYLAXIS: heparin   Injectable 5000 Unit(s) SubCutaneous every 8 hours    GI PROPHYLAXIS: pantoprazole    Tablet 40 milliGRAM(s) Oral before breakfast    ANTICOAGULATION:   ANTIBIOTICS:  cefoTEtan  IVPB 2 Gram(s)    LAB/STUDIES:  Labs:  CAPILLARY BLOOD GLUCOSE                        8.0    7.95  )-----------( 240      ( 01 Oct 2022 21:18 )             24.4       Auto Neutrophil %: 76.0 % (10-01-22 @ 21:18)  Auto Immature Granulocyte %: 0.4 % (10-01-22 @ 21:18)    10-01    141  |  107  |  13  ----------------------------<  98  4.4   |  24  |  1.0      Calcium, Total Serum: 8.0 mg/dL (10-01-22 @ 21:18)      LFTs:     Blood Gas Arterial, Lactate: 0.40 mmol/L (22 @ 09:09)  Lactate, Blood: 0.9 mmol/L (22 @ 00:40)    ABG - ( 30 Sep 2022 09:09 )  pH: 7.47  /  pCO2: 38    /  pO2: 88    / HCO3: 28    / Base Excess: 3.8   /  SaO2: 99.3      IMAGING:  < from: CT Abdomen and Pelvis w/ Oral Cont and w/ IV Cont (22 @ 02:32) >  IMPRESSION:    Redemonstration of chronically thickened rectosigmoid colon with   surrounding pericolic fat stranding and prominent adjacent pericolic   lymph nodes similar to prior studies. No evidence for bowel obstruction.   Clinical correlation would determine acute on chronic versus chronic   colitis.    Colonic liquid stool which may be secondary to ingestion of bowel prep as   patient is scheduled for procedure.    *The following finding is added to the final report: There is a left   lower lobe vascular filling defect (series 6, image 24) which is highly   suspicious for pulmonary embolism. Finding appears new from prior   studies, however only other prior CTs of the  abdomen and pelvis are   available for comparison. No prior chest CTs are available for   comparison. Note is made of an IVC filter. A dedicated pulmonary embolus   study can be obtained for further evaluation and to determine additional   clot burden.    *Findings discussed with Dr. Contreras of [facility] at 8:56 AM    --- End of Report ---    < end of copied text >    · Assessment	  77yF w/ PMHx of dementia, HTN diverticulitis who presents to the ED accompanied by her niece with complaints of nausea associated with 3 episodes of bilious, non bloody vomiting.  Patient is scheduled for robotic assisted laparoscopic sigmoidectomy tomorrow, started drinking bowel prep today but niece reports only 2-3 watery bowel movements today. Physical exam findings, imaging, and labs as documented above. Patient is now s/p Open low anterior resection of colon, Small bowel resection with anastomosis, Block, transversus abdominis plane, bilateral, Colostomy.     PLAN:  - Do not remove garcia catheter  - CLD  - Abx for 2 days post-op  - F/U Simon drain output, ostomy output  - Pain control  - F/U ostomy nurse consult  - Encourage early ambulation and IS  - Monitor BP and Hgb
GENERAL SURGERY PROGRESS NOTE    Patient: KEISHA PANTOJA , 77y (43)Female   MRN: [id number]  Location: [address]  Visit: 22 Inpatient  Date: 10-04-22 @ 01:19    Hospital Day #: 6  Post-Op Day #:4    Procedure/Dx/Injuries: colitis s/p robotic converted to open, Low Anterior Resection    Events of past 24 hours:  NAEON  Patient hemodynamically stable  Patient has had no nausea or vomiting  Ostomy output minimal  Patient is tolerating diet    PAST MEDICAL & SURGICAL HISTORY:  Hypertension, unspecified type      Dementia  ao x 2      GERD (gastroesophageal reflux disease)      Diverticulitis      Lack of bladder control      H/O dilation and curettage  for missed           Vitals:   T(F): 97.1 (10-04-22 @ 01:00), Max: 97.5 (10-03-22 @ 08:32)  HR: 69 (10-04-22 @ 01:00)  BP: 110/58 (10-04-22 @ 01:00)  RR: 18 (10-04-22 @ 01:00)  SpO2: 97% (10-03-22 @ 20:58)      Diet, Low Fiber:   DASH/TLC Sodium & Cholesterol Restricted      Fluids:     I & O's:    10-02-22 @ 07:01  -  10-03-22 @ 07:00  --------------------------------------------------------  IN:  Total IN: 0 mL    OUT:    Bulb (mL): 8 mL    Colostomy (mL): 150 mL    Voided (mL): 700 mL  Total OUT: 858 mL    Total NET: -858 mL        MEDICATIONS  (STANDING):  acetaminophen     Tablet .. 650 milliGRAM(s) Oral every 6 hours  amLODIPine   Tablet 5 milliGRAM(s) Oral daily  ciprofloxacin     Tablet 500 milliGRAM(s) Oral every 12 hours  donepezil 5 milliGRAM(s) Oral at bedtime  heparin   Injectable 5000 Unit(s) SubCutaneous every 8 hours  metroNIDAZOLE    Tablet 500 milliGRAM(s) Oral every 8 hours  pantoprazole    Tablet 40 milliGRAM(s) Oral before breakfast  tamsulosin 0.4 milliGRAM(s) Oral at bedtime    MEDICATIONS  (PRN):      DVT PROPHYLAXIS: heparin   Injectable 5000 Unit(s) SubCutaneous every 8 hours    GI PROPHYLAXIS: pantoprazole    Tablet 40 milliGRAM(s) Oral before breakfast    ANTICOAGULATION:   ANTIBIOTICS:  ciprofloxacin     Tablet 500 milliGRAM(s)  metroNIDAZOLE    Tablet 500 milliGRAM(s)      General Appearance: NAD  Heart: RRR  Lungs: Unlabored breathing at rest  Abdomen:  S/ND/NT. No guarding or rebound.  Ostomy w/ scant amounts of stool and gas      LAB/STUDIES:  Labs:  CAPILLARY BLOOD GLUCOSE                              7.8    6.58  )-----------( 217      ( 02 Oct 2022 21:35 )             24.0         10-02    141  |  108  |  11  ----------------------------<  93  4.0   |  24  |  0.9          LFTs:       ABG - ( 30 Sep 2022 09:09 )  pH: 7.47  /  pCO2: 38    /  pO2: 88    / HCO3: 28    / Base Excess: 3.8   /  SaO2: 99.3              Coags:

## 2022-10-04 NOTE — PROGRESS NOTE ADULT - ASSESSMENT
ASSESSMENT:   77yF w/ PMHx of dementia, HTN diverticulitis who presents to the ED accompanied by her niece with complaints of nausea associated with 3 episodes of bilious, non bloody vomiting.  Patient is scheduled for robotic assisted laparoscopic sigmoidectomy tomorrow, started drinking bowel prep today but niece reports only 2-3 watery bowel movements today. Physical exam findings, imaging, and labs as documented above. Patient is now s/p Open low anterior resection of colon, Small bowel resection with anastomosis, Block, transversus abdominis plane, bilateral, Colostomy.     PLAN:  -HEWITT CATHETER; DO NOT REMOVE  -ABX  x2 DAYS POST OP  -NPO  -F/U MACKENZIE OUTPUT  -F/U OSTOMY NURSE CONSULT  -F/U OSTOMY OUTPUT  -MULTIMODAL PAIN CONTROL  -ENCOURAGE EARLY AMBULATION AND IS
77yF w/ PMHx of dementia, HTN diverticulitis who presents to the ED accompanied by her niece with complaints of nausea associated with 3 episodes of bilious, non bloody vomiting.  Patient is scheduled for robotic assisted laparoscopic sigmoidectomy tomorrow, started drinking bowel prep today but niece reports only 2-3 watery bowel movements today. Physical exam findings, imaging, and labs as documented above. Patient is now s/p Open low anterior resection of colon, Small bowel resection with anastomosis, Block, transversus abdominis plane, bilateral, Colostomy.     PLAN:  - Low res diet  - Abx for 2 days post-op  - F/U Simon drain output, ostomy output  - Pain control  - F/U ostomy nurse consult  - Encourage early ambulation and IS  - Monitor BP and Hgb  
77yF w/ PMHx of dementia, HTN diverticulitis who presents to the ED accompanied by her niece with complaints of nausea associated with 3 episodes of bilious, non bloody vomiting.  Patient is scheduled for robotic assisted laparoscopic sigmoidectomy tomorrow, started drinking bowel prep today but niece reports only 2-3 watery bowel movements today. Physical exam findings, imaging, and labs as documented above. Patient is now s/p Open low anterior resection of colon, Small bowel resection with anastomosis, Block, transversus abdominis plane, bilateral, Colostomy.     PLAN:  - Low res diet  - F/U Simon drain output, ostomy output  - Pain control  -COVID negative 10/3  - Encourage early ambulation and IS  - Monitor BP and Hgb

## 2022-10-04 NOTE — DISCHARGE NOTE PROVIDER - NSDCCPTREATMENT_GEN_ALL_CORE_FT
PRINCIPAL PROCEDURE  Procedure: Open low anterior resection of colon  Findings and Treatment:       SECONDARY PROCEDURE  Procedure: Colostomy  Findings and Treatment:

## 2022-10-04 NOTE — PROGRESS NOTE ADULT - ATTENDING COMMENTS
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     Patient seen and examined.  Patient cannot recall events from prior day.  According to nursing staff she has been tolerating a diet without vomiting.  Colostomy is functioning. Passed TOV    Abdomen - soft, non distended, appropriately tender.  Midline wound with staples in place.  No erythema induration or purulence.  Left sided colostomy pink and viable with gas and stool in the appliance.  Right sided MELVIN drain with serosanguineous drainage    Vitals labs reviewed    PLAN:  - low residue diet  - DC IVF  - DC drain prior to DC  - PO Pain medication  - OOB ambulate  - WOCN teaching  - SW and Case management for placement  - patient stable for DC today

## 2022-10-04 NOTE — DISCHARGE NOTE PROVIDER - NSDCFUSCHEDAPPT_GEN_ALL_CORE_FT
Arnulfo Jean-Baptiste  [facility]  GENSURG [address]  Scheduled Appointment: 10/12/2022    Lonnie Wade  [facility]  UROLOGY [address]  Scheduled Appointment: 11/18/2022    Razia Seay  [facility]  GASTRO Doc Off [id number]  Scheduled Appointment: 12/15/2022

## 2022-10-04 NOTE — DISCHARGE NOTE PROVIDER - HOSPITAL COURSE
76 yo female with past medical history significant for dementia, HTN diverticulitis with most recent admission on 9/8 who presents to the ED accompanied by her niece with complaints of nausea associated with 3 episodes of bilious, non bloody vomiting. Patient is s/p robotic assisted, converted to open, low anterior resection with colostomy. Post-operatively, patient's diet has been advanced slowly and patient has tolerated with colostomy output. Patient is having no nausea or vomiting. Patient's pain is well controlled. Patient is hemodynamically stable and is ready for discharge. Patient is to complete antibiotics - last dose in PM on 10/5/22.      78 yo female with past medical history significant for dementia, HTN diverticulitis with most recent admission on 9/8 who presents to the ED accompanied by her niece with complaints of nausea associated with 3 episodes of bilious, non bloody vomiting. Patient is s/p robotic assisted, converted to open, low anterior resection with colostomy and oz drain. Post-operatively, patient's diet has been advanced slowly and patient has tolerated with colostomy output. Oz drain has had minimal outputs of serosanguinous fluid. Patient is having no nausea or vomiting. Patient's pain is well controlled. Patient is hemodynamically stable and is ready for discharge. Patient is to complete antibiotics - last dose in PM on 10/5/22.

## 2022-10-04 NOTE — DISCHARGE NOTE PROVIDER - NSDCMRMEDTOKEN_GEN_ALL_CORE_FT
amLODIPine 5 mg oral tablet: 1 tab(s) orally once a day  ciprofloxacin 500 mg oral tablet: 1 tab(s) orally 2 times a day MDD:2 tablets  Colace Clear 50 mg oral capsule: 1 cap(s) orally 2 times a day, As Needed -for constipation   donepezil 5 mg oral tablet: 1 tab(s) orally once a day (at bedtime)  Flomax 0.4 mg oral capsule: 1 cap(s) orally once a day (at bedtime)  metroNIDAZOLE 500 mg oral tablet: 1 tab(s) orally every 8 hours MDD:3 tablets  MiraLax oral powder for reconstitution: 17 gram(s) orally 2 times a day, As Needed -for constipation   Protonix 40 mg oral delayed release tablet: 1 tab(s) orally 2 times a day  senna leaf extract oral tablet: 2 tab(s) orally 2 times a day, As Needed -for constipation

## 2022-10-06 LAB — SURGICAL PATHOLOGY STUDY: SIGNIFICANT CHANGE UP

## 2022-10-11 DIAGNOSIS — K66.0 PERITONEAL ADHESIONS (POSTPROCEDURAL) (POSTINFECTION): ICD-10-CM

## 2022-10-11 DIAGNOSIS — K57.20 DIVERTICULITIS OF LARGE INTESTINE WITH PERFORATION AND ABSCESS WITHOUT BLEEDING: ICD-10-CM

## 2022-10-11 DIAGNOSIS — I10 ESSENTIAL (PRIMARY) HYPERTENSION: ICD-10-CM

## 2022-10-11 DIAGNOSIS — K21.9 GASTRO-ESOPHAGEAL REFLUX DISEASE WITHOUT ESOPHAGITIS: ICD-10-CM

## 2022-10-11 DIAGNOSIS — Z53.31 LAPAROSCOPIC SURGICAL PROCEDURE CONVERTED TO OPEN PROCEDURE: ICD-10-CM

## 2022-10-11 DIAGNOSIS — Z79.899 OTHER LONG TERM (CURRENT) DRUG THERAPY: ICD-10-CM

## 2022-10-11 DIAGNOSIS — K52.9 NONINFECTIVE GASTROENTERITIS AND COLITIS, UNSPECIFIED: ICD-10-CM

## 2022-10-11 DIAGNOSIS — K63.2 FISTULA OF INTESTINE: ICD-10-CM

## 2022-10-11 DIAGNOSIS — F03.90 UNSPECIFIED DEMENTIA WITHOUT BEHAVIORAL DISTURBANCE: ICD-10-CM

## 2022-10-11 DIAGNOSIS — E83.42 HYPOMAGNESEMIA: ICD-10-CM

## 2022-10-11 DIAGNOSIS — I95.9 HYPOTENSION, UNSPECIFIED: ICD-10-CM

## 2022-10-11 DIAGNOSIS — K57.32 DIVERTICULITIS OF LARGE INTESTINE WITHOUT PERFORATION OR ABSCESS WITHOUT BLEEDING: ICD-10-CM

## 2022-10-12 ENCOUNTER — APPOINTMENT (OUTPATIENT)
Dept: SURGERY | Facility: CLINIC | Age: 77
End: 2022-10-12

## 2022-10-12 VITALS
OXYGEN SATURATION: 99 % | DIASTOLIC BLOOD PRESSURE: 56 MMHG | SYSTOLIC BLOOD PRESSURE: 108 MMHG | HEART RATE: 100 BPM | TEMPERATURE: 98.1 F | WEIGHT: 90 LBS

## 2022-10-12 DIAGNOSIS — K56.699 OTHER INTESTINAL OBSTRUCTION UNSPECIFIED AS TO PARTIAL VERSUS COMPLETE OBSTRUCTION: ICD-10-CM

## 2022-10-12 PROCEDURE — 99024 POSTOP FOLLOW-UP VISIT: CPT

## 2022-10-15 PROBLEM — K56.699 SIGMOID STRICTURE: Status: ACTIVE | Noted: 2022-09-10

## 2022-10-15 NOTE — PHYSICAL EXAM
[Abdomen Masses] : No abdominal masses [Abdomen Tenderness] : ~T No ~M abdominal tenderness [Respiratory Effort] : normal respiratory effort [de-identified] : soft, non distended, appropriately tender.  Staples removed.  Midline wound healing well.  LLQ colostomy pink and viable with stool in the appliance. [de-identified] : awake, alert and in no acute distress

## 2022-10-15 NOTE — HISTORY OF PRESENT ILLNESS
[FreeTextEntry1] : Patient is a 77F with PMH of dementia, HTN , diverticulitis presents for post operative appointment s/p robotic converted to open LAR with end colostomy, SBR for complicated diverticulitis with coloenteric fistula. She is tolerating a diet and her stoma works well.  She denies abdominal pain. Pt denies fever, chills, nausea, vomiting, abdominal pain, constipation, diarrhea, blood in stool, or unexpected weight loss.Pt denies a family history of colon cancer, rectal cancer, or inflammatory bowel disease. She had an attempted colonoscopy that was aborted due to sigmoid stricture.

## 2022-10-15 NOTE — ASSESSMENT
[FreeTextEntry1] : 77F s/p robotic converted to open LAR with end colostomy, SBR for complicated diverticulitis with coloenteric fistula. \par \par Patient is recovering well.  I recommended a high fiber diet and light physical activity.  We will see her back in 3 months.

## 2022-10-28 ENCOUNTER — OUTPATIENT (OUTPATIENT)
Dept: OUTPATIENT SERVICES | Facility: HOSPITAL | Age: 77
LOS: 1 days | Discharge: HOME | End: 2022-10-28

## 2022-10-28 DIAGNOSIS — Z98.890 OTHER SPECIFIED POSTPROCEDURAL STATES: Chronic | ICD-10-CM

## 2022-10-28 DIAGNOSIS — R94.02 ABNORMAL BRAIN SCAN: ICD-10-CM

## 2022-10-28 DIAGNOSIS — G20 PARKINSON'S DISEASE: ICD-10-CM

## 2022-10-28 PROCEDURE — 78803 RP LOCLZJ TUM SPECT 1 AREA: CPT | Mod: 26,MH

## 2022-11-01 ENCOUNTER — NON-APPOINTMENT (OUTPATIENT)
Age: 77
End: 2022-11-01

## 2022-11-02 ENCOUNTER — EMERGENCY (EMERGENCY)
Facility: HOSPITAL | Age: 77
LOS: 0 days | Discharge: HOME | End: 2022-11-02
Attending: EMERGENCY MEDICINE | Admitting: EMERGENCY MEDICINE

## 2022-11-02 VITALS
RESPIRATION RATE: 18 BRPM | HEART RATE: 107 BPM | SYSTOLIC BLOOD PRESSURE: 125 MMHG | DIASTOLIC BLOOD PRESSURE: 74 MMHG | WEIGHT: 100.09 LBS | HEIGHT: 66 IN | OXYGEN SATURATION: 98 % | TEMPERATURE: 98 F

## 2022-11-02 VITALS
OXYGEN SATURATION: 99 % | RESPIRATION RATE: 18 BRPM | DIASTOLIC BLOOD PRESSURE: 74 MMHG | SYSTOLIC BLOOD PRESSURE: 130 MMHG | HEART RATE: 83 BPM | TEMPERATURE: 98 F

## 2022-11-02 DIAGNOSIS — R63.8 OTHER SYMPTOMS AND SIGNS CONCERNING FOOD AND FLUID INTAKE: ICD-10-CM

## 2022-11-02 DIAGNOSIS — R53.1 WEAKNESS: ICD-10-CM

## 2022-11-02 DIAGNOSIS — F03.90 UNSPECIFIED DEMENTIA WITHOUT BEHAVIORAL DISTURBANCE: ICD-10-CM

## 2022-11-02 DIAGNOSIS — K59.00 CONSTIPATION, UNSPECIFIED: ICD-10-CM

## 2022-11-02 DIAGNOSIS — Z93.3 COLOSTOMY STATUS: ICD-10-CM

## 2022-11-02 DIAGNOSIS — K21.9 GASTRO-ESOPHAGEAL REFLUX DISEASE WITHOUT ESOPHAGITIS: ICD-10-CM

## 2022-11-02 DIAGNOSIS — R53.83 OTHER FATIGUE: ICD-10-CM

## 2022-11-02 DIAGNOSIS — Z98.890 OTHER SPECIFIED POSTPROCEDURAL STATES: Chronic | ICD-10-CM

## 2022-11-02 DIAGNOSIS — R00.0 TACHYCARDIA, UNSPECIFIED: ICD-10-CM

## 2022-11-02 DIAGNOSIS — Z87.19 PERSONAL HISTORY OF OTHER DISEASES OF THE DIGESTIVE SYSTEM: ICD-10-CM

## 2022-11-02 DIAGNOSIS — I10 ESSENTIAL (PRIMARY) HYPERTENSION: ICD-10-CM

## 2022-11-02 DIAGNOSIS — R10.9 UNSPECIFIED ABDOMINAL PAIN: ICD-10-CM

## 2022-11-02 LAB
ALBUMIN SERPL ELPH-MCNC: 3.9 G/DL — SIGNIFICANT CHANGE UP (ref 3.5–5.2)
ALP SERPL-CCNC: 106 U/L — SIGNIFICANT CHANGE UP (ref 30–115)
ALT FLD-CCNC: 9 U/L — SIGNIFICANT CHANGE UP (ref 0–41)
ANION GAP SERPL CALC-SCNC: 12 MMOL/L — SIGNIFICANT CHANGE UP (ref 7–14)
APPEARANCE UR: CLEAR — SIGNIFICANT CHANGE UP
AST SERPL-CCNC: 19 U/L — SIGNIFICANT CHANGE UP (ref 0–41)
BACTERIA # UR AUTO: NEGATIVE — SIGNIFICANT CHANGE UP
BASOPHILS # BLD AUTO: 0.06 K/UL — SIGNIFICANT CHANGE UP (ref 0–0.2)
BASOPHILS NFR BLD AUTO: 0.6 % — SIGNIFICANT CHANGE UP (ref 0–1)
BILIRUB SERPL-MCNC: 0.5 MG/DL — SIGNIFICANT CHANGE UP (ref 0.2–1.2)
BILIRUB UR-MCNC: NEGATIVE — SIGNIFICANT CHANGE UP
BUN SERPL-MCNC: 25 MG/DL — HIGH (ref 10–20)
CALCIUM SERPL-MCNC: 10.3 MG/DL — SIGNIFICANT CHANGE UP (ref 8.4–10.5)
CHLORIDE SERPL-SCNC: 95 MMOL/L — LOW (ref 98–110)
CO2 SERPL-SCNC: 28 MMOL/L — SIGNIFICANT CHANGE UP (ref 17–32)
COLOR SPEC: SIGNIFICANT CHANGE UP
CREAT SERPL-MCNC: 0.7 MG/DL — SIGNIFICANT CHANGE UP (ref 0.7–1.5)
DIFF PNL FLD: NEGATIVE — SIGNIFICANT CHANGE UP
EGFR: 88 ML/MIN/1.73M2 — SIGNIFICANT CHANGE UP
EOSINOPHIL # BLD AUTO: 0.09 K/UL — SIGNIFICANT CHANGE UP (ref 0–0.7)
EOSINOPHIL NFR BLD AUTO: 1 % — SIGNIFICANT CHANGE UP (ref 0–8)
EPI CELLS # UR: 6 /HPF — HIGH (ref 0–5)
GLUCOSE SERPL-MCNC: 123 MG/DL — HIGH (ref 70–99)
GLUCOSE UR QL: NEGATIVE — SIGNIFICANT CHANGE UP
HCT VFR BLD CALC: 41 % — SIGNIFICANT CHANGE UP (ref 37–47)
HGB BLD-MCNC: 13.3 G/DL — SIGNIFICANT CHANGE UP (ref 12–16)
HYALINE CASTS # UR AUTO: 10 /LPF — HIGH (ref 0–7)
IMM GRANULOCYTES NFR BLD AUTO: 0.4 % — HIGH (ref 0.1–0.3)
KETONES UR-MCNC: NEGATIVE — SIGNIFICANT CHANGE UP
LEUKOCYTE ESTERASE UR-ACNC: ABNORMAL
LYMPHOCYTES # BLD AUTO: 1.26 K/UL — SIGNIFICANT CHANGE UP (ref 1.2–3.4)
LYMPHOCYTES # BLD AUTO: 13.4 % — LOW (ref 20.5–51.1)
MAGNESIUM SERPL-MCNC: 2.1 MG/DL — SIGNIFICANT CHANGE UP (ref 1.8–2.4)
MCHC RBC-ENTMCNC: 27.9 PG — SIGNIFICANT CHANGE UP (ref 27–31)
MCHC RBC-ENTMCNC: 32.4 G/DL — SIGNIFICANT CHANGE UP (ref 32–37)
MCV RBC AUTO: 86 FL — SIGNIFICANT CHANGE UP (ref 81–99)
MONOCYTES # BLD AUTO: 1.11 K/UL — HIGH (ref 0.1–0.6)
MONOCYTES NFR BLD AUTO: 11.8 % — HIGH (ref 1.7–9.3)
NEUTROPHILS # BLD AUTO: 6.82 K/UL — HIGH (ref 1.4–6.5)
NEUTROPHILS NFR BLD AUTO: 72.8 % — SIGNIFICANT CHANGE UP (ref 42.2–75.2)
NITRITE UR-MCNC: NEGATIVE — SIGNIFICANT CHANGE UP
NRBC # BLD: 0 /100 WBCS — SIGNIFICANT CHANGE UP (ref 0–0)
PH UR: 6.5 — SIGNIFICANT CHANGE UP (ref 5–8)
PLATELET # BLD AUTO: 326 K/UL — SIGNIFICANT CHANGE UP (ref 130–400)
POTASSIUM SERPL-MCNC: 4.5 MMOL/L — SIGNIFICANT CHANGE UP (ref 3.5–5)
POTASSIUM SERPL-SCNC: 4.5 MMOL/L — SIGNIFICANT CHANGE UP (ref 3.5–5)
PROT SERPL-MCNC: 7 G/DL — SIGNIFICANT CHANGE UP (ref 6–8)
PROT UR-MCNC: ABNORMAL
RBC # BLD: 4.77 M/UL — SIGNIFICANT CHANGE UP (ref 4.2–5.4)
RBC # FLD: 15.9 % — HIGH (ref 11.5–14.5)
RBC CASTS # UR COMP ASSIST: 4 /HPF — SIGNIFICANT CHANGE UP (ref 0–4)
SODIUM SERPL-SCNC: 135 MMOL/L — SIGNIFICANT CHANGE UP (ref 135–146)
SP GR SPEC: >1.05 (ref 1.01–1.03)
TROPONIN T SERPL-MCNC: <0.01 NG/ML — SIGNIFICANT CHANGE UP
UROBILINOGEN FLD QL: SIGNIFICANT CHANGE UP
WBC # BLD: 9.38 K/UL — SIGNIFICANT CHANGE UP (ref 4.8–10.8)
WBC # FLD AUTO: 9.38 K/UL — SIGNIFICANT CHANGE UP (ref 4.8–10.8)
WBC UR QL: 15 /HPF — HIGH (ref 0–5)

## 2022-11-02 PROCEDURE — 70450 CT HEAD/BRAIN W/O DYE: CPT | Mod: 26,MA

## 2022-11-02 PROCEDURE — 71045 X-RAY EXAM CHEST 1 VIEW: CPT | Mod: 26

## 2022-11-02 PROCEDURE — 93010 ELECTROCARDIOGRAM REPORT: CPT

## 2022-11-02 PROCEDURE — 99285 EMERGENCY DEPT VISIT HI MDM: CPT | Mod: FS

## 2022-11-02 PROCEDURE — 99283 EMERGENCY DEPT VISIT LOW MDM: CPT

## 2022-11-02 PROCEDURE — 74177 CT ABD & PELVIS W/CONTRAST: CPT | Mod: 26,MA

## 2022-11-02 RX ORDER — SODIUM CHLORIDE 9 MG/ML
1000 INJECTION INTRAMUSCULAR; INTRAVENOUS; SUBCUTANEOUS ONCE
Refills: 0 | Status: COMPLETED | OUTPATIENT
Start: 2022-11-02 | End: 2022-11-02

## 2022-11-02 RX ORDER — DOCUSATE SODIUM 100 MG
1 CAPSULE ORAL
Qty: 14 | Refills: 0
Start: 2022-11-02 | End: 2022-11-08

## 2022-11-02 RX ADMIN — SODIUM CHLORIDE 1000 MILLILITER(S): 9 INJECTION INTRAMUSCULAR; INTRAVENOUS; SUBCUTANEOUS at 11:42

## 2022-11-02 NOTE — ED PROVIDER NOTE - NSFOLLOWUPCLINICS_GEN_ALL_ED_FT
[facility] ([facility])  Rehabilitation  [facility] [address], [address] [place] [address]  Phone: [phone]  Fax:

## 2022-11-02 NOTE — ED ADULT NURSE NOTE - OBJECTIVE STATEMENT
Pt complaining of not eating the past couple of days and not being able to ambulate/weakness. Pt denies nausea, vomiting, or diarrhea. Pt also denies pain/discomfort. Pt awaiting lab results. Aox2 (baseline), VSS.

## 2022-11-02 NOTE — ED PROVIDER NOTE - PHYSICAL EXAMINATION
CONST: Well appearing in NAD  EYES: Sclera and conjunctiva clear.   ENT: No nasal discharge. Oropharynx normal appearing, no erythema or exudates. No abscess or swelling. Uvula midline.   NECK: Non-tender, no meningeal signs. normal ROM. supple   CARD: S1 S2; No jvd  RESP: Equal BS B/L, No wheezes, rhonchi or rales. No distress  GI: Soft, non-tender, non-distended. Colostomy with brown stool. no cva tenderness. normal BS  MS: Normal ROM in all extremities. pulses 2 +. no calf tenderness or swelling  SKIN: Warm, dry, no acute rashes. Good turgor  NEURO: A&Ox3, No focal deficits. Strength 5/5 with no sensory deficits.

## 2022-11-02 NOTE — ED PROVIDER NOTE - CARE PROVIDER_API CALL
Arnulfo Jean-Baptiste)  Surgery  Colorectal  [address] [facility], [address] [place] [address]  Phone: [phone]  Fax: [phone]  Follow Up Time:

## 2022-11-02 NOTE — ED PROVIDER NOTE - PATIENT PORTAL LINK FT
You can access the FollowMyHealth Patient Portal offered by [facility] by registering at the following website: [email or web address]. By joining [employer] [facility]’s FollowMyHealth portal, you will also be able to view your health information using other applications (apps) compatible with our system.

## 2022-11-02 NOTE — CONSULT NOTE ADULT - ASSESSMENT
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 Otherwise no leukocytosis, hemodynamically stable, UA (-) for bacteria    Plan  - recommend high fiber diet  - colace TID  - dulcolax daily  - OP f/u with Dr. Jean-Baptiste in 2 weeks  - d/w Dr. Jean-Baptiste

## 2022-11-02 NOTE — ED PROVIDER NOTE - NSFOLLOWUPINSTRUCTIONS_ED_ALL_ED_FT
Follow up with PMD and Surgery in 1-2 days.    Constipation    Constipation is when a person has fewer than three bowel movements a week, has difficulty having a bowel movement, or has stools that are dry, hard, or larger than normal. Other symptoms can include abdominal pain or bloating. As people grow older, constipation is more common. A low-fiber diet, not taking in enough fluids, and taking certain medicines, including opioid painkillers, may make constipation worse. Treatment varies but may include dietary modifications (more fiber-rich foods), lifestyle modifications, and possible medications.     SEEK IMMEDIATE MEDICAL CARE IF YOU HAVE ANY OF THE FOLLOWING SYMPTOMS: bright red blood in your stool, constipation for longer than 4 days, abdominal or rectal pain, unexplained weight loss, or inability to pass gas.

## 2022-11-02 NOTE — CONSULT NOTE ADULT - SUBJECTIVE AND OBJECTIVE BOX
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 Per the family, patient has not had an apetite recently. Colostomy functioning, changed on day of presentation. Being changed either daily or every other day with stool (mild decrease from usual frequency). Of note, patient has severe dementia and her house is currently undergoing construction leading to bouts of confusion and disorientation. No N/V/F/chills.    PAST MEDICAL & SURGICAL HISTORY:  Hypertension, unspecified type  Dementia  ao x 2  GERD (gastroesophageal reflux disease)  Diverticulitis  Lack of bladder control  H/O dilation and curettage  for missed     MEDICATIONS  (STANDING):    MEDICATIONS  (PRN):    Allergies  No Known Allergies  Intolerances    REVIEW OF SYSTEMS  [ x] A ten-point review of systems was otherwise negative except as noted.  [ ] Due to altered mental status/intubation, subjective information were not able to be obtained from the patient. History was obtained, to the extent possible, from review of the chart and collateral sources of information.    Vital Signs Last 24 Hrs  T(C): 36.4 (2022 15:45), Max: 36.7 (2022 10:06)  T(F): 97.6 (2022 15:45), Max: 98 (2022 10:06)  HR: 83 (2022 15:45) (83 - 107)  BP: 130/74 (2022 15:45) (125/74 - 130/74)  BP(mean): 96 (2022 15:45) (96 - 96)  RR: 18 (2022 15:45) (18 - 18)  SpO2: 99% (2022 15:45) (98% - 99%)    Parameters below as of 2022 15:45  Patient On (Oxygen Delivery Method): room air    PHYSICAL EXAM:  GENERAL: NAD, well-appearing  CHEST/LUNG: Clear to auscultation bilaterally  HEART: Regular rate and rhythm  ABDOMEN: Soft, Nontender, Nondistended; No rebound or guarding. Colostomy pink and patent.  EXTREMITIES:  No clubbing, cyanosis, or edema    Labs:                 13.3   9.38  )-----------( 326      ( 2022 12:00 )             41.0    Auto Neutrophil %: 72.8 % (22 @ 12:00)  Auto Immature Granulocyte %: 0.4 % (22 @ 12:00)      135  |  95<L>  |  25<H>  ----------------------------<  123<H>  4.5   |  28  |  0.7  Calcium, Total Serum: 10.3 mg/dL (22 @ 12:00)    LFTs:           7.0  | 0.5  | 19       ------------------[106     ( 2022 12:00 )  3.9  | x    | 9           Lipase:x      Amylase:x      Coags:    CARDIAC MARKERS ( 2022 12:00 )  x     / <0.01 ng/mL / x     / x     / x        Urinalysis Basic - ( 2022 15:47 )    Color: Light Yellow / Appearance: Clear / SG: >1.050 / pH: x  Gluc: x / Ketone: Negative  / Bili: Negative / Urobili: <2 mg/dL   Blood: x / Protein: 30 mg/dL / Nitrite: Negative   Leuk Esterase: Small / RBC: 4 /HPF / WBC 15 /HPF   Sq Epi: x / Non Sq Epi: 6 /HPF / Bacteria: Negative    RADIOLOGY & ADDITIONAL STUDIES:  < from: CT Abdomen and Pelvis w/ IV Cont (22 @ 13:57) >  IMPRESSION:    Post low anterior resection with left lower quadrant colostomy and small   bowel resection, without bowel obstruction or acute intra-abdominal   pathology.    Large colonic stool load within the ascending and transverse colon.    < end of copied text >

## 2022-11-02 NOTE — ED PROVIDER NOTE - OBJECTIVE STATEMENT
77y F pmh dementia, HTN, Colostomy presents for eval of weakness. As per family pt has not been eating or drinking for the past 3 days with associated fatigue. Endorses decreased amount of stool from colostomy and abdominal discomfort. Pt states she ate and drank food this morning with no pain. Denies fever, ha, cp, sob, numbness, dysuria, hematuria, n/v

## 2022-11-02 NOTE — ED PROVIDER NOTE - CLINICAL SUMMARY MEDICAL DECISION MAKING FREE TEXT BOX
77yF HTN dementia recent colostomy p/w weakness, fatigue and dec PO intake x days, now w/ dec ostomy output.  Pt not sig dehydrated and abd soft/benign on assessment.  Labs reassuring w/o BRANDY, electrolyte abnormalities or sig leukocytosis.  CXR w/o ptx or pna.  EKG w/o ischemia or arrhythmia.  CT w/ colonic stool burden, later identified also gastritis/esophagitis and renal lesion.  Surg consulted, recommend o/p f/u w/o need for acute intervention.  D/w pt and family, who are willing to accept pt back home and continue to care for her.  Ok to dc with supportive care, o/p f/u with surg, return precautions.

## 2022-11-02 NOTE — ED PROVIDER NOTE - NS ED ROS FT
Constitutional: (+) fatigue, (-) fever  Eyes/ENT: (-) blurry vision, (-) epistaxis  Cardiovascular: (-) chest pain, (-) syncope  Respiratory: (-) cough, (-) shortness of breath  Gastrointestinal: (+) abd pain, (+) constipation, (-) vomiting, (-) diarrhea  : (-) dysuria, (-) hematuria  Musculoskeletal: (-) neck pain, (-) back pain, (-) joint pain  Integumentary: (-) rash, (-) edema  Neurological: (-) headache, (-) altered mental status  Allergic/Immunologic: (-) pruritus

## 2022-11-04 LAB
CULTURE RESULTS: SIGNIFICANT CHANGE UP
SPECIMEN SOURCE: SIGNIFICANT CHANGE UP

## 2022-11-18 ENCOUNTER — APPOINTMENT (OUTPATIENT)
Dept: UROLOGY | Facility: CLINIC | Age: 77
End: 2022-11-18
Payer: MEDICARE

## 2022-11-18 DIAGNOSIS — R33.9 RETENTION OF URINE, UNSPECIFIED: ICD-10-CM

## 2022-11-18 PROCEDURE — 99213 OFFICE O/P EST LOW 20 MIN: CPT

## 2022-11-21 ENCOUNTER — NON-APPOINTMENT (OUTPATIENT)
Age: 77
End: 2022-11-21

## 2022-12-03 ENCOUNTER — EMERGENCY (EMERGENCY)
Facility: HOSPITAL | Age: 77
LOS: 0 days | Discharge: HOME | End: 2022-12-03
Attending: EMERGENCY MEDICINE | Admitting: EMERGENCY MEDICINE

## 2022-12-03 VITALS
HEART RATE: 86 BPM | DIASTOLIC BLOOD PRESSURE: 57 MMHG | SYSTOLIC BLOOD PRESSURE: 122 MMHG | TEMPERATURE: 98 F | HEIGHT: 66 IN | WEIGHT: 100.09 LBS | RESPIRATION RATE: 16 BRPM | OXYGEN SATURATION: 99 %

## 2022-12-03 DIAGNOSIS — Z87.891 PERSONAL HISTORY OF NICOTINE DEPENDENCE: ICD-10-CM

## 2022-12-03 DIAGNOSIS — Z43.3 ENCOUNTER FOR ATTENTION TO COLOSTOMY: ICD-10-CM

## 2022-12-03 DIAGNOSIS — Z87.19 PERSONAL HISTORY OF OTHER DISEASES OF THE DIGESTIVE SYSTEM: ICD-10-CM

## 2022-12-03 DIAGNOSIS — I10 ESSENTIAL (PRIMARY) HYPERTENSION: ICD-10-CM

## 2022-12-03 DIAGNOSIS — Z98.890 OTHER SPECIFIED POSTPROCEDURAL STATES: Chronic | ICD-10-CM

## 2022-12-03 DIAGNOSIS — K21.9 GASTRO-ESOPHAGEAL REFLUX DISEASE WITHOUT ESOPHAGITIS: ICD-10-CM

## 2022-12-03 PROCEDURE — 99282 EMERGENCY DEPT VISIT SF MDM: CPT

## 2022-12-03 NOTE — ED ADULT NURSE NOTE - CHIEF COMPLAINT
1) Fasting labs are due on or after 06/10/2017.  2) Carotid US due in January 2018.  3) Follow up with Dr. Hayden 01/08/2018.    Patient informed, and she verbalizes understanding and is agreeable.   Writer provided patient with Central Scheduling number ([phone]).  No additional questions at this time.        
Caitlyn called and stated that she has several appointments for labs, carotid artery and follow up with Dr. Hayden and she doesn't know when she needs to have these appointments.   She does with Dr. Hayden but not the others. Please give Caitlyn a call back at [phone] (B)  
The patient is a 77y Female complaining of wound check.

## 2022-12-03 NOTE — ED PROVIDER NOTE - PATIENT PORTAL LINK FT
You can access the FollowMyHealth Patient Portal offered by [facility] by registering at the following website: [email or web address]. By joining [employer]’s FollowMyHealth portal, you will also be able to view your health information using other applications (apps) compatible with our system.

## 2022-12-03 NOTE — ED PROVIDER NOTE - PHYSICAL EXAMINATION
CONSTITUTIONAL: nontoxic appearing, in no acute distress  HEAD:  normocephalic, atraumatic  EYES:  no conjunctival injection, no eye discharge, tracking well  ENT:  moist mucous membranes; airway clear  NECK:  supple, no masses, no tender anterior/posterior cervical lymphadenopathy  CV:  regular rate and rhythm, cap refill < 2 seconds  RESP:  normal respiratory effort, lungs clear to auscultation bilaterally, no wheezes, no crackles, no retractions, no stridor  ABD:  soft, nontender, nondistended, no masses, no organomegaly; colostomy bag clean and in place; no surrounding erythema or edema; stoma open, pink/red mucosa tissue., non edematous  LYMPH:  no significant lymphadenopathy  MSK/NEURO:  normal movement, normal tone  SKIN:  warm, dry, no rash

## 2022-12-03 NOTE — ED PROVIDER NOTE - CLINICAL SUMMARY MEDICAL DECISION MAKING FREE TEXT BOX
Patient presented for wound check of stoma for ostomy bag. On arrival patient afebrile, HD stable, well appearing. Family states they noted some mild redness/irritation around where the tape for the ostomy bag was placed. On exam, no signs of infection, no surrounding erythema or pus drainage, abd non-tender, normal stool output. Likely mild skin irritation from adhesive on ostomy bag. Patient has upcoming f/u with colorectal surgery and family agrees to have her follow up. Agrees to return to ED for any new or worsening symptoms.

## 2022-12-03 NOTE — ED PROVIDER NOTE - CARE PROVIDER_API CALL
Arnulfo Jean-Baptiste)  Surgery  Colorectal  [address], [address] [place] [address]  Phone: [phone]  Fax: [phone]  Follow Up Time: Routine

## 2022-12-03 NOTE — ED PROVIDER NOTE - NSFOLLOWUPINSTRUCTIONS_ED_ALL_ED_FT
Colostomy Surgery, Adult, Care After       This sheet gives you information about how to care for yourself after your procedure. Your health care provider may also give you more specific instructions. If you have problems or questions, contact your health care provider.      What can I expect after the procedure?    After the procedure, it is common to have:  •Swelling at the opening that was created during the procedure (stoma).      •Slight bleeding around the stoma.      •Redness around the stoma.        Follow these instructions at home:    Activity     •Rest as needed while the stoma area heals.      •Return to your normal activities as told by your health care provider. Ask your health care provider what activities are safe for you.      •Avoid strenuous activity and abdominal exercises for 3 weeks or for as long as told by your health care provider.      • Do not lift anything that is heavier than 10 lb (4.5 kg), or the limit that you are told, until your health care provider says that it is safe.      Incision care   •Follow instructions from your health care provider about how to take care of your incision. Make sure you:  •Wash your hands with soap and water before you change your bandage (dressing). If soap and water are not available, use hand .      •Change your dressing as told by your health care provider.      •Leave stitches (sutures), skin glue, or adhesive strips in place. These skin closures may need to stay in place for 2 weeks or longer. If adhesive strip edges start to loosen and curl up, you may trim the loose edges. Do not remove adhesive strips completely unless your health care provider tells you to do that.        Stoma care     •Keep the stoma area clean.    •Clean and dry the skin around the stoma each time you change the colostomy bag. To clean the stoma area:  •Use warm water and only use cleansers that are recommended by your health care provider.      •Rinse the stoma area with plain water.      •Dry the area well.        •Use stoma powder or skin barrier film on your skin only as told by your health care provider. Do not use any other powders, gels, wipes, or creams on the skin around the stoma.    •Check the stoma area every day for signs of infection. Check for:  •More redness, swelling, or pain.      •More fluid or blood.      •Pus or warmth.        •Measure the stoma opening regularly and record the size. Watch for changes. Share this information with your health care provider.      Bathing     • Do not take baths, swim, or use a hot tub until your health care provider approves. Ask your health care provider if you may take showers. You may be able to shower with or without the colostomy bag in place. If you bathe with the bag on, dry the bag afterward.      •Avoid using harsh or oily soaps when you bathe.      Colostomy bag care     •Follow instructions from your health care provider about how to empty or change the colostomy bag.      •Keep colostomy supplies with you at all times.      •Store all supplies in a cool, dry place.    •Empty the colostomy bag:  •Whenever it is one-third to one-half full.      •At bedtime.        •Replace the bag every 3–4 days for the first 6 weeks, then every 4–7 days.      Driving     •Follow driving restrictions as told by your health care provider.      • Do not drive or use heavy machinery while taking prescription pain medicine.      General instructions     •Follow instructions from your health care provider about eating or drinking restrictions.      •Take over-the-counter and prescription medicines only as told by your health care provider.      •Avoid wearing clothes that are tight directly over your stoma area.      • Do not use any products that contain nicotine or tobacco, such as cigarettes and e-cigarettes. If you need help quitting, ask your health care provider.      •If you are a woman, ask your health care provider about becoming pregnant and about using birth control. Medicines may not be absorbed normally after the procedure.      •Keep all follow-up visits as told by your health care provider. This is important.        Contact a health care provider if you have:    •Trouble caring for your stoma or changing the colostomy bag.      •Nausea or vomiting.      •A fever.      •More redness, swelling, or pain at the site of your stoma or around your anus.      •More fluid or blood coming from your stoma or your anus.      •Warmth around your stoma area.      •Pus coming from your stoma.      •A change in the size or appearance of the stoma.      •Abdominal pain, bloating, pressure, or cramping.      •Stool more often or less often than your health care provider tells you to expect.      •Very little urine production. This may be a sign of dehydration.        Get help right away if you have:    •Abdominal pain that does not go away or becomes severe.      •Frequent vomiting.      •No stool draining through the stoma.      •Chest pain or an irregular heartbeat.        Summary    •Follow instructions from your health care provider about how to take care of your incision and stoma.      •Contact a health care provider if you have trouble caring for your stoma or changing the colostomy bag.      •Get help right away if you have abdominal pain that does not go away or becomes severe or if you have no stool draining through the stoma.      •Keep all follow-up visits as told by your health care provider. This is important.      This information is not intended to replace advice given to you by your health care provider. Make sure you discuss any questions you have with your health care provider.

## 2022-12-15 ENCOUNTER — APPOINTMENT (OUTPATIENT)
Dept: GASTROENTEROLOGY | Facility: CLINIC | Age: 77
End: 2022-12-15

## 2023-01-10 NOTE — ASSESSMENT
[FreeTextEntry1] : Gudelia is a 77-year-old female, with history of dementia and severe rectosigmoid diverticulitis status post robot assisted laparoscopic sigmoidectomy with colostomy.\par \par Patient aide states she is voiding large amounts at night with small amounts throughout the day.  She is voiding roughly 4 times throughout the day and small volumes.\par \par PVR today 38 cc\par \par \par Denies dysuria, abdominal/flank pain, nausea/vomiting, chills, fever, lightheadedness, dizziness, further urological/constitutional symptoms\par

## 2023-01-10 NOTE — PHYSICAL EXAM
[General Appearance - Well Developed] : well developed [General Appearance - Well Nourished] : well nourished [Normal Appearance] : normal appearance [Well Groomed] : well groomed [General Appearance - In No Acute Distress] : no acute distress [Abdomen Soft] : soft [Abdomen Tenderness] : non-tender [Costovertebral Angle Tenderness] : no ~M costovertebral angle tenderness [Urinary Bladder Findings] : the bladder was normal on palpation [Edema] : no peripheral edema [] : no respiratory distress [Respiration, Rhythm And Depth] : normal respiratory rhythm and effort [Exaggerated Use Of Accessory Muscles For Inspiration] : no accessory muscle use [Not Anxious] : not anxious [Normal Station and Gait] : the gait and station were normal for the patient's age [FreeTextEntry1] : colostomy in place

## 2023-01-10 NOTE — HISTORY OF PRESENT ILLNESS
[FreeTextEntry1] : Gudelia is a 77-year-old female, with history of dementia and severe rectosigmoid diverticulitis status post robot assisted laparoscopic sigmoidectomy with colostomy.  Patient aide states she is voiding large amounts at night with small amounts throughout the day.  She is voiding roughly 4 times throughout the day and small volumes.  PVR today 38 cc   Denies dysuria, abdominal/flank pain, nausea/vomiting, chills, fever, lightheadedness, dizziness, further urological/constitutional symptoms

## 2023-01-11 ENCOUNTER — APPOINTMENT (OUTPATIENT)
Dept: SURGERY | Facility: CLINIC | Age: 78
End: 2023-01-11
Payer: MEDICARE

## 2023-01-11 VITALS
OXYGEN SATURATION: 98 % | TEMPERATURE: 96.3 F | WEIGHT: 106.13 LBS | DIASTOLIC BLOOD PRESSURE: 60 MMHG | SYSTOLIC BLOOD PRESSURE: 114 MMHG | BODY MASS INDEX: 17.68 KG/M2 | HEART RATE: 62 BPM | HEIGHT: 65 IN

## 2023-01-11 DIAGNOSIS — K63.2 FISTULA OF INTESTINE: ICD-10-CM

## 2023-01-11 DIAGNOSIS — K57.32 DIVERTICULITIS OF LARGE INTESTINE W/OUT PERFORATION OR ABSCESS W/OUT BLEEDING: ICD-10-CM

## 2023-01-11 PROCEDURE — 99213 OFFICE O/P EST LOW 20 MIN: CPT

## 2023-01-13 ENCOUNTER — APPOINTMENT (OUTPATIENT)
Dept: UROLOGY | Facility: CLINIC | Age: 78
End: 2023-01-13

## 2023-01-22 PROBLEM — K57.32: Status: ACTIVE | Noted: 2022-08-02

## 2023-01-22 PROBLEM — K63.2 ENTEROCOLIC FISTULA: Status: ACTIVE | Noted: 2022-10-15

## 2023-01-22 NOTE — HISTORY OF PRESENT ILLNESS
[FreeTextEntry1] : Patient is a 77F with PMH of dementia, HTN , diverticulitis presents for follow up appointment s/p robotic converted to open LAR with end colostomy, SBR for complicated diverticulitis with coloenteric fistula. She is tolerating a diet and her stoma works well.  She denies abdominal pain. Family reports issues with pt pulling off stoma when left unattended. Pt denies fever, chills, nausea, vomiting, abdominal pain, constipation, diarrhea, blood in stool, or unexpected weight loss.Pt denies a family history of colon cancer, rectal cancer, or inflammatory bowel disease. She had an attempted colonoscopy that was aborted due to sigmoid stricture.

## 2023-01-22 NOTE — PHYSICAL EXAM
[Abdomen Masses] : No abdominal masses [Abdomen Tenderness] : ~T No ~M abdominal tenderness [Respiratory Effort] : normal respiratory effort [de-identified] : soft, non distended, non tender.  Midline wound healed.  LLQ colostomy pink and viable with stool in the appliance. [de-identified] : awake, alert and in no acute distress

## 2023-01-22 NOTE — ASSESSMENT
[FreeTextEntry1] : 77F s/p robotic converted to open LAR with end colostomy, SBR for complicated diverticulitis with coloenteric fistula. \par \par The patient has recovered well from surgery.  Unfortunately given her condition and her previous surgery, she is a poor candidate for colostomy closure with low pelvic anastomosis.  She can return as needed.

## 2023-02-08 ENCOUNTER — NON-APPOINTMENT (OUTPATIENT)
Age: 78
End: 2023-02-08

## 2023-04-19 ENCOUNTER — APPOINTMENT (OUTPATIENT)
Dept: UROLOGY | Facility: CLINIC | Age: 78
End: 2023-04-19

## 2023-06-30 ENCOUNTER — APPOINTMENT (OUTPATIENT)
Dept: UROLOGY | Facility: CLINIC | Age: 78
End: 2023-06-30

## 2023-09-20 NOTE — ED PROVIDER NOTE - PRO INTERPRETER NEED 2
Agency/Facility Name: [facility]  Spoke To: Emelia  Outcome: [facility] will begin start of care after pt is established with Dr. Lopez on 10/03.    LSW  notified via Teams.    @[id number]  DPA faxed the discharge summary to [facility].  Fax #[phone].     English

## 2023-12-01 NOTE — ED PROVIDER NOTE - PRINCIPAL DIAGNOSIS
Previous Labs: No
How Did The Hair Loss Occur?: sudden in onset
How Severe Is Your Hair Loss?: severe
Additional History: Patient states hair loss started 2 days after having a total knee replacement 12/05/2021
Acute renal insufficiency

## 2023-12-13 NOTE — PROCEDURE NOTE - NSANATOMICLLOCATION_GEN_A_CORE
[facility]    Kiana Odonnell is a 60 y.o. male, here for evaluation of the following concerns:    Hospital recovery s/p LLL excision (NSCLC)  Smoking  Patient is doing pulmonary rehab and rehabbing physically himself. The progress is slow but steady. He is off oxygen for about a month and a half. He continues to smoke about a half a pack a day. We discussed strategies for quitting. CAD  S/p AVR  Follows Dr. Armani Dillon. Bovine valve. Complaint with home Warfarin and ASA 81 mg daily. Last INR 3.2 -> 1.3 today. No signs of bleeding. T2DM  Hemoglobin A1c last 6.6% on 9/21/23. Our POCT HbA1C machine in the clinic needs to be calibrated prior to using it so I will send out for a lab collect. Patient is compliant with his home medication metformin 1 g twice daily. He reports that his diet is doing well, he cooks his own meals and avoids salt and fatty foods. Spinal stenosis  Back pain chronic. He is following pain specialist clinic that prescribe him with Percocet PRN. HTN  BP today 133/78. Patient is compliant with home Lopressor 25 mg BID. He is asymptomatic. HLD  Compliant with home Crestor 20 mg daily. Does not reports muscle pain. 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 Will screen for prostate cancer (PSA). Healthcare maintenance  Weight is increasing; he is active daily. Diet is doing well. He denies using processed foods. He started working part time again. Foot examination within normal limits. Advised patient to call optometrist for diabetic eye exam; he is concerned about the cost. Will check lab work. Review of Systems   Constitutional:  Negative for appetite change, chills, diaphoresis and fatigue. HENT:  Negative for congestion, ear pain, rhinorrhea and tinnitus. Eyes:  Negative for photophobia and visual disturbance. Respiratory:  Positive for shortness of breath (improving).  Negative
right
upper extremity/right

## 2023-12-14 NOTE — PHYSICAL THERAPY INITIAL EVALUATION ADULT - LIVES WITH, PROFILE
Called patient, reminded of the repeat CMV for this week. Pt states he was just in the [facility] for blood draw yesterday. Lab did not chevy the CMV.    Pt will comeback have them drawn this week.    Patient is asking to reschedule ID appt on 2/20/24 - He will be out of town.  
son and family

## 2024-06-22 NOTE — ED ADULT NURSE NOTE - NSSEPSISSUSPECTED_ED_A_ED
[FreeTextEntry1] : Mr. Jeremy Rea is a pleasant 81-year-old man with HTN, HLD, DM, CAD, PPM, AFib on Eliquis, CHF (Last EF 35-40%), COPD on 3L home O2, chronic pancreatitis, chronic back pain, multilevel cervical spondylosis and spondylolisthesis, C3-4 and severe left/moderate-severe right foraminal stenosis, cataract, increased thresholds on RA and RV leads, and pacemaker with device at RRT since 4/22/2024. Patient has chronic subclavian vein stenosis not allowing lead revision with Dr. Demarco.   I recommend continuing same medications.   I recommend 2D echo to assess EF.  I recommend bilateral venous upper extremity doppler to assess venous patency.  I discussed with patient right sided LBAP vs Micra AV depending on EF. Will also do bilateral subcalvian venogram.   I interrogated and reprogrammed his device as described in procedure. His wound is healed properly, with no signs of inflammation, infection or bleeding. I discussed with patient plan of care in great details. I discussed remote monitoring with him, and need to call office if he does manual transmission. I answered all his questions to his satisfaction. Patient was pleased with the visit.    Patient will follow with me in 1 month's time. Please do not hesitate to contact me at [phone] if you have any further questions regarding this patient care.
No

## 2024-08-09 NOTE — CONSULT NOTE ADULT - CARDIOVASCULAR
----- Message from Eleazar Romo MD sent at 8/9/2024  1:23 PM EDT -----  Call patient, blood work shows elevated white blood cell count.  Repeat CBC in 1 month.  ----- Message -----  From: Lab, Background User  Sent: 8/7/2024   2:39 PM EDT  To: Eleazar Romo MD   negative normal/regular rate and rhythm/S1 S2 present/no gallops/no rub/no murmur

## 2024-08-15 NOTE — ED PROVIDER NOTE - NSICDXPASTMEDICALHX_GEN_ALL_CORE_FT
PATIENT:   Subjective   Patient ID: Ena is a 76 year old female.    CHIEF COMPLAINT:  Chief Complaint   Patient presents with   • Exposure Coronavirus (Covid-19)     The patient tested positive x1 day       HPI:  76-year-old female with a history of hypertension and other comorbidities presents to the urgent care for complaint of testing positive COVID at home.  Patient spoke to the nurse via phone who urged her to come in.  Patient also notes symptoms of diarrhea at the very first day of her COVID notes cough and fatigue.  Notes she has been taking Tylenol with cough syrup.  Patient notes positive COVID exposure from her daughter who also tested positive states they were at a wedding this past week        ROS:  Review of Systems   Constitutional:  Positive for fatigue.   HENT:  Positive for sore throat.    Respiratory:  Positive for cough.    Gastrointestinal:  Positive for diarrhea.       VITAL SIGNS THIS VISIT:    BP (!) 159/86 (BP Location: LUE - Left upper extremity, Patient Position: Sitting, Cuff Size: Large Adult)   Pulse 75   Temp 99 °F (37.2 °C) (Temporal)   Resp 18   Ht 5' 3\" (1.6 m)   Wt 95.1 kg (209 lb 10.5 oz)   BMI 37.14 kg/m²   BSA 1.97 m²       No LMP recorded. Patient has had a hysterectomy.     Past Medical History:   Diagnosis Date   • Essential (primary) hypertension    • Hyperlipidemia    • Thyroid cancer  (CMD)    • Thyroid cancer  (CMD)        Past Surgical History:   Procedure Laterality Date   • HYSTERECTOMY     • THYROIDECTOMY Bilateral     [facility]   • TOTAL HIP REPLACEMENT Bilateral        Family History   Problem Relation Age of Onset   • Cancer Mother    • Diabetes Mother    • Heart disease Mother    • Depression Brother    • Heart disease Brother    • Depression Maternal Uncle        Social History     Tobacco Use   • Smoking status: Never   • Smokeless tobacco: Never   Vaping Use   • Vaping status: never used   Substance Use Topics   • Alcohol use: No   • Drug use: No        LAB RESULTS  Recent Results (from the past 168 hour(s))   POCT SARS-COV-2 ANTIGEN/FLU ANTIGEN PANEL    Collection Time: 08/15/24  4:56 PM   Result Value Ref Range    POCT SARS-COV-2 ANTIGEN Detected (A) Not Detected    Rapid Influenza A Ag Negative Negative    Rapid Influenza B Ag Negative Negative    TEST LOT NUMBER 1     TEST LOT EXPIRATION DATE 1    ]  ALEKSEY ROMERO    PHYSICAL EXAM:  Objective    Physical Exam  Vitals and nursing note reviewed.   Constitutional:       General: She is not in acute distress.     Appearance: Normal appearance. She is not ill-appearing or toxic-appearing.   HENT:      Head: Normocephalic.      Right Ear: Tympanic membrane normal.      Left Ear: Tympanic membrane normal.      Nose: Nose normal.      Mouth/Throat:      Mouth: Mucous membranes are moist.   Eyes:      Conjunctiva/sclera: Conjunctivae normal.   Cardiovascular:      Rate and Rhythm: Normal rate and regular rhythm.      Pulses: Normal pulses.      Heart sounds: Normal heart sounds.   Pulmonary:      Breath sounds: Normal breath sounds.   Skin:     General: Skin is warm.   Neurological:      Mental Status: She is alert and oriented to person, place, and time.   Psychiatric:         Behavior: Behavior normal.         Procedures     ORDERS:  Orders Placed This Encounter   • POCT SARS-COV-2 ANTIGEN/FLU ANTIGEN PANEL   • Molnupiravir 200 MG Cap       IMAGING:      ASSESSMENT:  Problem List Items Addressed This Visit    None  Visit Diagnoses     COVID-19 virus detected    -  Primary    Relevant Orders    POCT SARS-COV-2 ANTIGEN/FLU ANTIGEN PANEL (Completed)           Multiple differential diagnoses were considered. The patient was apprised of diagnostic and treatment options including alternate modes of care, in addition to risks and benefits, for this medical condition. Based on this discussion the patient agrees with this chosen diagnostic and treatment plan.    Problem: cough, fatigue,     Plan: covid/flu    Dispo:   Presentation most consistent with Viral Syndrome.  Test is COVID-positive prescribe antiviral.  On exam lungs are clear anterior posterior, heart sounds are regular.  No erythema swelling of pharynx or TMs  Based on vitals and exam they are nontoxic and stable for discharge  Given History and Exam I have a lower suspicion for:  Emergent CardioPulmonary causes such as Acute Asthma or COPD Exacerbation, acute Heart Failure or exacerbation, PE, PTX, atypical ACS, PNA  Emergent Otolaryngeal causes such as Peritonsillar abscess, retropharyngeal abscess, Geremias's angina, Epiglottitis, EBV      I have carefully discussed with the patient that the patient needs follow up with a primary care provider or specialist, and as necessary I have given this information to the patient.  The patient verbally expresses that the understand the discharge action plan and clearly understands to follow up with pcp,  return or proceed to the ER if symptoms worsen, . Pt verbalized understanding of plan.    PAST MEDICAL HISTORY:  Dementia ao x 2    Diverticulitis     GERD (gastroesophageal reflux disease)     Hypertension, unspecified type     Lack of bladder control

## 2024-09-05 NOTE — ED PROVIDER NOTE - ATTENDING APP SHARED VISIT CONTRIBUTION OF CARE
78 yo f with pmh of diverticulitis s/p colectomy and colostomy by dr. guidry, gerd, htn, recent dx of dementia, presents for evaluation.  as per aide at the bedside, she usually takes care of pt daily, but went on vacation for a week.  during this time, pt was cared for by family members at home.  aide says pt has very strict routines, and the last week, the family painted her room and moved all of her stuff out into the dining room.  pt has since seems more confused and stopped eating and drinking.  family says pt vomited a few days ago.  pt is feeling weak.  aide returned today and says pt ate and drank a normal meal and says feels fine.  no abd pain, cp or sob.  no reported fevers.  family says they called dr. guidry regarding pt's condition and they were told to send her to ER for evaluation.  pt currently has no complaints.  exam: nad, ncat, perrl, eomi, dry mm, rrr, ctab, abd soft, nt, nd, L colostomy, aox2, imp: pt with dec po intake recently, will check labs, surg consult
This was a shared visit with the REDD. I reviewed and verified the documentation.

## 2024-09-06 NOTE — ED ADULT NURSE NOTE - DRUG PRE-SCREENING (DAST -1)
Up to date with pap smear Health Maintenance   Patients last follow up visit was 8/14/24.    Statement Selected

## 2024-10-19 ENCOUNTER — TRANSCRIPTION ENCOUNTER (OUTPATIENT)
Age: 79
End: 2024-10-19

## 2024-10-19 ENCOUNTER — INPATIENT (INPATIENT)
Facility: HOSPITAL | Age: 79
LOS: 3 days | Discharge: NURSING, REHAB OR LONG-TERM CARE | DRG: 481 | End: 2024-10-23
Attending: HOSPITALIST | Admitting: INTERNAL MEDICINE
Payer: MEDICARE

## 2024-10-19 VITALS
OXYGEN SATURATION: 100 % | RESPIRATION RATE: 18 BRPM | DIASTOLIC BLOOD PRESSURE: 72 MMHG | HEIGHT: 66 IN | TEMPERATURE: 97 F | HEART RATE: 55 BPM | SYSTOLIC BLOOD PRESSURE: 131 MMHG | WEIGHT: 110.01 LBS

## 2024-10-19 DIAGNOSIS — Z98.890 OTHER SPECIFIED POSTPROCEDURAL STATES: Chronic | ICD-10-CM

## 2024-10-19 DIAGNOSIS — S72.009A FRACTURE OF UNSPECIFIED PART OF NECK OF UNSPECIFIED FEMUR, INITIAL ENCOUNTER FOR CLOSED FRACTURE: ICD-10-CM

## 2024-10-19 LAB
ALBUMIN SERPL ELPH-MCNC: 4.2 G/DL — SIGNIFICANT CHANGE UP (ref 3.5–5.2)
ALP SERPL-CCNC: 97 U/L — SIGNIFICANT CHANGE UP (ref 30–115)
ALT FLD-CCNC: 10 U/L — SIGNIFICANT CHANGE UP (ref 0–41)
ANION GAP SERPL CALC-SCNC: 14 MMOL/L — SIGNIFICANT CHANGE UP (ref 7–14)
APTT BLD: 20.2 SEC — CRITICAL LOW (ref 27–39.2)
APTT BLD: 27.6 SEC — SIGNIFICANT CHANGE UP (ref 27–39.2)
AST SERPL-CCNC: 17 U/L — SIGNIFICANT CHANGE UP (ref 0–41)
BASOPHILS # BLD AUTO: 0.06 K/UL — SIGNIFICANT CHANGE UP (ref 0–0.2)
BASOPHILS NFR BLD AUTO: 0.8 % — SIGNIFICANT CHANGE UP (ref 0–1)
BILIRUB SERPL-MCNC: 0.3 MG/DL — SIGNIFICANT CHANGE UP (ref 0.2–1.2)
BLD GP AB SCN SERPL QL: SIGNIFICANT CHANGE UP
BUN SERPL-MCNC: 17 MG/DL — SIGNIFICANT CHANGE UP (ref 10–20)
CALCIUM SERPL-MCNC: 10.1 MG/DL — SIGNIFICANT CHANGE UP (ref 8.4–10.5)
CHLORIDE SERPL-SCNC: 103 MMOL/L — SIGNIFICANT CHANGE UP (ref 98–110)
CO2 SERPL-SCNC: 24 MMOL/L — SIGNIFICANT CHANGE UP (ref 17–32)
CREAT SERPL-MCNC: 0.8 MG/DL — SIGNIFICANT CHANGE UP (ref 0.7–1.5)
EGFR: 74 ML/MIN/1.73M2 — SIGNIFICANT CHANGE UP
EOSINOPHIL # BLD AUTO: 0.11 K/UL — SIGNIFICANT CHANGE UP (ref 0–0.7)
EOSINOPHIL NFR BLD AUTO: 1.4 % — SIGNIFICANT CHANGE UP (ref 0–8)
GLUCOSE BLDC GLUCOMTR-MCNC: 151 MG/DL — HIGH (ref 70–99)
GLUCOSE BLDC GLUCOMTR-MCNC: 164 MG/DL — HIGH (ref 70–99)
GLUCOSE SERPL-MCNC: 297 MG/DL — HIGH (ref 70–99)
HCT VFR BLD CALC: 36.5 % — LOW (ref 37–47)
HCT VFR BLD CALC: 39.9 % — SIGNIFICANT CHANGE UP (ref 37–47)
HGB BLD-MCNC: 11.9 G/DL — LOW (ref 12–16)
HGB BLD-MCNC: 12.9 G/DL — SIGNIFICANT CHANGE UP (ref 12–16)
IMM GRANULOCYTES NFR BLD AUTO: 0.4 % — HIGH (ref 0.1–0.3)
INR BLD: 0.96 RATIO — SIGNIFICANT CHANGE UP (ref 0.65–1.3)
INR BLD: 0.98 RATIO — SIGNIFICANT CHANGE UP (ref 0.65–1.3)
LYMPHOCYTES # BLD AUTO: 1.09 K/UL — LOW (ref 1.2–3.4)
LYMPHOCYTES # BLD AUTO: 13.7 % — LOW (ref 20.5–51.1)
MCHC RBC-ENTMCNC: 29.5 PG — SIGNIFICANT CHANGE UP (ref 27–31)
MCHC RBC-ENTMCNC: 29.7 PG — SIGNIFICANT CHANGE UP (ref 27–31)
MCHC RBC-ENTMCNC: 32.3 G/DL — SIGNIFICANT CHANGE UP (ref 32–37)
MCHC RBC-ENTMCNC: 32.6 G/DL — SIGNIFICANT CHANGE UP (ref 32–37)
MCV RBC AUTO: 90.6 FL — SIGNIFICANT CHANGE UP (ref 81–99)
MCV RBC AUTO: 91.7 FL — SIGNIFICANT CHANGE UP (ref 81–99)
MONOCYTES # BLD AUTO: 0.89 K/UL — HIGH (ref 0.1–0.6)
MONOCYTES NFR BLD AUTO: 11.2 % — HIGH (ref 1.7–9.3)
NEUTROPHILS # BLD AUTO: 5.8 K/UL — SIGNIFICANT CHANGE UP (ref 1.4–6.5)
NEUTROPHILS NFR BLD AUTO: 72.5 % — SIGNIFICANT CHANGE UP (ref 42.2–75.2)
NRBC # BLD: 0 /100 WBCS — SIGNIFICANT CHANGE UP (ref 0–0)
NRBC # BLD: 0 /100 WBCS — SIGNIFICANT CHANGE UP (ref 0–0)
PLATELET # BLD AUTO: 228 K/UL — SIGNIFICANT CHANGE UP (ref 130–400)
PLATELET # BLD AUTO: 241 K/UL — SIGNIFICANT CHANGE UP (ref 130–400)
PMV BLD: 10 FL — SIGNIFICANT CHANGE UP (ref 7.4–10.4)
PMV BLD: 10.3 FL — SIGNIFICANT CHANGE UP (ref 7.4–10.4)
POTASSIUM SERPL-MCNC: 4.6 MMOL/L — SIGNIFICANT CHANGE UP (ref 3.5–5)
POTASSIUM SERPL-SCNC: 4.6 MMOL/L — SIGNIFICANT CHANGE UP (ref 3.5–5)
PROT SERPL-MCNC: 6.9 G/DL — SIGNIFICANT CHANGE UP (ref 6–8)
PROTHROM AB SERPL-ACNC: 10.9 SEC — SIGNIFICANT CHANGE UP (ref 9.95–12.87)
PROTHROM AB SERPL-ACNC: 11.2 SEC — SIGNIFICANT CHANGE UP (ref 9.95–12.87)
RBC # BLD: 4.03 M/UL — LOW (ref 4.2–5.4)
RBC # BLD: 4.35 M/UL — SIGNIFICANT CHANGE UP (ref 4.2–5.4)
RBC # FLD: 13.8 % — SIGNIFICANT CHANGE UP (ref 11.5–14.5)
RBC # FLD: 13.9 % — SIGNIFICANT CHANGE UP (ref 11.5–14.5)
SODIUM SERPL-SCNC: 141 MMOL/L — SIGNIFICANT CHANGE UP (ref 135–146)
WBC # BLD: 7.98 K/UL — SIGNIFICANT CHANGE UP (ref 4.8–10.8)
WBC # BLD: 9.15 K/UL — SIGNIFICANT CHANGE UP (ref 4.8–10.8)
WBC # FLD AUTO: 7.98 K/UL — SIGNIFICANT CHANGE UP (ref 4.8–10.8)
WBC # FLD AUTO: 9.15 K/UL — SIGNIFICANT CHANGE UP (ref 4.8–10.8)

## 2024-10-19 PROCEDURE — 71045 X-RAY EXAM CHEST 1 VIEW: CPT | Mod: 26

## 2024-10-19 PROCEDURE — 85730 THROMBOPLASTIN TIME PARTIAL: CPT

## 2024-10-19 PROCEDURE — 73552 X-RAY EXAM OF FEMUR 2/>: CPT | Mod: 26,RT

## 2024-10-19 PROCEDURE — 0241U: CPT

## 2024-10-19 PROCEDURE — 99222 1ST HOSP IP/OBS MODERATE 55: CPT | Mod: 25

## 2024-10-19 PROCEDURE — 85025 COMPLETE CBC W/AUTO DIFF WBC: CPT

## 2024-10-19 PROCEDURE — 73501 X-RAY EXAM HIP UNI 1 VIEW: CPT | Mod: 26,RT

## 2024-10-19 PROCEDURE — C9399: CPT

## 2024-10-19 PROCEDURE — 70450 CT HEAD/BRAIN W/O DYE: CPT | Mod: 26,MC

## 2024-10-19 PROCEDURE — C1713: CPT

## 2024-10-19 PROCEDURE — 99222 1ST HOSP IP/OBS MODERATE 55: CPT

## 2024-10-19 PROCEDURE — 97162 PT EVAL MOD COMPLEX 30 MIN: CPT | Mod: GP

## 2024-10-19 PROCEDURE — 73562 X-RAY EXAM OF KNEE 3: CPT | Mod: 26,RT

## 2024-10-19 PROCEDURE — 82962 GLUCOSE BLOOD TEST: CPT

## 2024-10-19 PROCEDURE — 80053 COMPREHEN METABOLIC PANEL: CPT

## 2024-10-19 PROCEDURE — 73552 X-RAY EXAM OF FEMUR 2/>: CPT | Mod: RT

## 2024-10-19 PROCEDURE — 36415 COLL VENOUS BLD VENIPUNCTURE: CPT

## 2024-10-19 PROCEDURE — 97167 OT EVAL HIGH COMPLEX 60 MIN: CPT | Mod: GO

## 2024-10-19 PROCEDURE — 83735 ASSAY OF MAGNESIUM: CPT

## 2024-10-19 PROCEDURE — 85027 COMPLETE CBC AUTOMATED: CPT

## 2024-10-19 PROCEDURE — 73502 X-RAY EXAM HIP UNI 2-3 VIEWS: CPT | Mod: RT

## 2024-10-19 PROCEDURE — 99285 EMERGENCY DEPT VISIT HI MDM: CPT

## 2024-10-19 PROCEDURE — 97116 GAIT TRAINING THERAPY: CPT | Mod: GP

## 2024-10-19 PROCEDURE — 73562 X-RAY EXAM OF KNEE 3: CPT | Mod: RT

## 2024-10-19 PROCEDURE — 93010 ELECTROCARDIOGRAM REPORT: CPT

## 2024-10-19 PROCEDURE — 93005 ELECTROCARDIOGRAM TRACING: CPT

## 2024-10-19 PROCEDURE — 97530 THERAPEUTIC ACTIVITIES: CPT | Mod: GP

## 2024-10-19 PROCEDURE — 85610 PROTHROMBIN TIME: CPT

## 2024-10-19 RX ORDER — PANTOPRAZOLE SODIUM 40 MG/1
40 TABLET, DELAYED RELEASE ORAL
Refills: 0 | Status: DISCONTINUED | OUTPATIENT
Start: 2024-10-19 | End: 2024-10-20

## 2024-10-19 RX ORDER — CORYNEBACTERIUM DIPHTHERIAE TOXOID ANTIGEN (FORMALDEHYDE INACTIVATED), CLOSTRIDIUM TETANI TOXOID ANTIGEN (FORMALDEHYDE INACTIVATED), BORDETELLA PERTUSSIS TOXOID ANTIGEN (GLUTARALDEHYDE INACTIVATED), BORDETELLA PERTUSSIS FILAMENTOUS HEMAGGLUTININ ANTIGEN (FORMALDEHYDE INACTIVATED), BORDETELLA PERTUSSIS PERTACTIN ANTIGEN, AND BORDETELLA PERTUSSIS FIMBRIAE 2/3 ANTIGEN 15; 5; 10; 5; 3; 5 [LF]/.5ML; [LF]/.5ML; UG/.5ML; UG/.5ML; UG/.5ML; UG/.5ML
0.5 INJECTION, SUSPENSION INTRAMUSCULAR ONCE
Refills: 0 | Status: DISCONTINUED | OUTPATIENT
Start: 2024-10-19 | End: 2024-10-19

## 2024-10-19 RX ORDER — QUETIAPINE FUMARATE 200 MG/1
1 TABLET ORAL
Refills: 0 | DISCHARGE

## 2024-10-19 RX ORDER — QUETIAPINE FUMARATE 200 MG/1
25 TABLET ORAL DAILY
Refills: 0 | Status: DISCONTINUED | OUTPATIENT
Start: 2024-10-19 | End: 2024-10-20

## 2024-10-19 RX ORDER — ENOXAPARIN SODIUM 40MG/0.4ML
40 SYRINGE (ML) SUBCUTANEOUS EVERY 24 HOURS
Refills: 0 | Status: DISCONTINUED | OUTPATIENT
Start: 2024-10-19 | End: 2024-10-20

## 2024-10-19 RX ORDER — INFLUENZ VIR VAC TV P-SURF2003 15MCG/.5ML
0.5 SYRINGE (ML) INTRAMUSCULAR ONCE
Refills: 0 | Status: DISCONTINUED | OUTPATIENT
Start: 2024-10-19 | End: 2024-10-23

## 2024-10-19 RX ORDER — ACETAMINOPHEN 500 MG
650 TABLET ORAL EVERY 6 HOURS
Refills: 0 | Status: DISCONTINUED | OUTPATIENT
Start: 2024-10-19 | End: 2024-10-20

## 2024-10-19 RX ORDER — MORPHINE SULFATE 30 MG/1
4 TABLET, EXTENDED RELEASE ORAL ONCE
Refills: 0 | Status: DISCONTINUED | OUTPATIENT
Start: 2024-10-19 | End: 2024-10-19

## 2024-10-19 RX ORDER — TRAZODONE HYDROCHLORIDE 100 MG/1
0.5 TABLET ORAL
Refills: 0 | DISCHARGE

## 2024-10-19 RX ORDER — CHLORHEXIDINE GLUCONATE 40 MG/ML
1 SOLUTION TOPICAL
Refills: 0 | Status: DISCONTINUED | OUTPATIENT
Start: 2024-10-19 | End: 2024-10-20

## 2024-10-19 RX ORDER — CLOSTRIDIUM TETANI TOXOID ANTIGEN (FORMALDEHYDE INACTIVATED), CORYNEBACTERIUM DIPHTHERIAE TOXOID ANTIGEN (FORMALDEHYDE INACTIVATED), BORDETELLA PERTUSSIS TOXOID ANTIGEN (GLUTARALDEHYDE INACTIVATED), BORDETELLA PERTUSSIS FILAMENTOUS HEMAGGLUTININ ANTIGEN (FORMALDEHYDE INACTIVATED), BORDETELLA PERTUSSIS PERTACTIN ANTIGEN, AND BORDETELLA PERTUSSIS FIMBRIAE 2/3 ANTIGEN 5; 2; 2.5; 5; 3; 5 [LF]/.5ML; [LF]/.5ML; UG/.5ML; UG/.5ML; UG/.5ML; UG/.5ML
0.5 INJECTION, SUSPENSION INTRAMUSCULAR ONCE
Refills: 0 | Status: COMPLETED | OUTPATIENT
Start: 2024-10-19 | End: 2024-10-19

## 2024-10-19 RX ADMIN — CLOSTRIDIUM TETANI TOXOID ANTIGEN (FORMALDEHYDE INACTIVATED), CORYNEBACTERIUM DIPHTHERIAE TOXOID ANTIGEN (FORMALDEHYDE INACTIVATED), BORDETELLA PERTUSSIS TOXOID ANTIGEN (GLUTARALDEHYDE INACTIVATED), BORDETELLA PERTUSSIS FILAMENTOUS HEMAGGLUTININ ANTIGEN (FORMALDEHYDE INACTIVATED), BORDETELLA PERTUSSIS PERTACTIN ANTIGEN, AND BORDETELLA PERTUSSIS FIMBRIAE 2/3 ANTIGEN 0.5 MILLILITER(S): 5; 2; 2.5; 5; 3; 5 INJECTION, SUSPENSION INTRAMUSCULAR at 13:58

## 2024-10-19 RX ADMIN — MORPHINE SULFATE 4 MILLIGRAM(S): 30 TABLET, EXTENDED RELEASE ORAL at 13:30

## 2024-10-19 RX ADMIN — Medication 650 MILLIGRAM(S): at 21:10

## 2024-10-19 RX ADMIN — Medication 650 MILLIGRAM(S): at 19:41

## 2024-10-19 NOTE — PRE PROCEDURE NOTE - PRE PROCEDURE EVALUATION
ORTHOPEDIC SURGERY PRE OP NOTE    Diagnosis: right hip fracture    Planned Procedure: right hip open reduction internal fixation    Consent: TO BE OBTAINED BY ATTENDING                   Risks/benefits/alternatives were discussed with the patient/family and they wish to proceed with surgery.     ANTICIPATED DATE OF PROCEDURE: 10/20  SCHEDULED CASE OR ADD-ON CASE: add on    Clearances:   [x] Medicine:       T(C): 36.6 (10-19-24 @ 16:53), Max: 36.6 (10-19-24 @ 16:53)  HR: 56 (10-19-24 @ 16:53) (55 - 56)  BP: 152/74 (10-19-24 @ 16:53) (131/72 - 152/74)  RR: 18 (10-19-24 @ 16:53) (18 - 18)  SpO2: 100% (10-19-24 @ 16:53) (100% - 100%)    Labs:                        12.9   7.98  )-----------( 241      ( 19 Oct 2024 13:19 )             39.9     10-19    141  |  103  |  17  ----------------------------<  297[H]  4.6   |  24  |  0.8    Ca    10.1      19 Oct 2024 13:19    TPro  6.9  /  Alb  4.2  /  TBili  0.3  /  DBili  x   /  AST  17  /  ALT  10  /  AlkPhos  97  10-19    PT/INR - ( 19 Oct 2024 13:19 )   PT: 10.90 sec;   INR: 0.96 ratio         PTT - ( 19 Oct 2024 13:19 )  PTT:27.6 sec  [x ]Type and Screen X 2:  [ ]Pregnancy test ( if female of childbearing age) : ***  [x ]EKG:   [x ]CXR:     DIET: NPO after midnight  IVF: per primary team    ANTICOAGULATION STATUS ( include name of anticoagulant) :  [x] CONTINUE (**name of anticoagulant) dvt ppx             A/P: Patient is a 79y y/o Female Pending right hip open reduction internal fixation tomorrow    [ ] NPO and IVF @ midnight  [ ] pain control/analgesia prn per primary team   [ ] Incentive Spirometry   [ ] F/U Clearance  [ ] F/U Pending Labs  [ ] Notify Ortho with any questions- spectra [id number]    [ ]DISCUSSED WITH PRIMARY TEAM MEMBER (name of team member): yenny  [ ]Date and Time DISCUSSED WITH PRIMARY TEAM MEMBER: teams 9pm

## 2024-10-19 NOTE — H&P ADULT - HISTORY OF PRESENT ILLNESS
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    She presents after an unwitnessed  fall at home, she doesn't remember how she fell but does not report any head trauma or loss of consciousness. Not on anticoagulation.   She reports ambulating independetly, very rarely uses the walker (confirmed with son)  Spoke with son, has not had any fall recently. Medrec consists of only Trazodone and Quietiapine. 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    She presents after an unwitnessed  fall at home, she doesn't remember how she fell but does not report any head trauma or loss of consciousness. Not on anticoagulation.   She reports ambulating independetly, very rarely uses the walker (confirmed with son)  Spoke with son, has not had any fall recently. Medrec consists of only Trazodone and Quietiapine. Patient does not take anything else at home  She does not endorse any urinary symptoms, no fevers or chills     In the ED:  - Vitals: T 36.3 HR 55 -72 SPO2 100% RR 18  - Labs: WBC 7.98 Hgb 12.9  Na 141 K 4.6 Cr 0.8  - Imaging:   _ Chest xray: No radiographic evidence of acute cardiopulmonary disease.  _Right femoral intertrochanteric displaced comminuted fracture, varus deformity. Bilateral hip arthrosis. Lower lumbar spine degenerative changes. IVC filter. Right pelvic chain sutures. Bilateral femoral vascular calcification.   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    She presents after an unwitnessed  fall at home, she doesn't remember how she fell but does not report any head trauma or loss of consciousness. Not on anticoagulation.   She reports ambulating independently very rarely uses the walker (confirmed with son)  Spoke with son, has not had any fall recently. Medrec consists of only Trazodone and Quietiapine. Patient does not take anything else at home  She does not endorse any urinary symptoms, no fevers or chills     In the ED:  - Vitals: T 36.3 HR 55 -72 SPO2 100% RR 18  - Labs: WBC 7.98 Hgb 12.9  Na 141 K 4.6 Cr 0.8  - Imaging:   _ Chest xray: No radiographic evidence of acute cardiopulmonary disease.  _Right femoral intertrochanteric displaced comminuted fracture, varus deformity. Bilateral hip arthrosis. Lower lumbar spine degenerative changes. IVC filter. Right pelvic chain sutures. Bilateral femoral vascular calcification.

## 2024-10-19 NOTE — H&P ADULT - ASSESSMENT
#Fall  #Hip fracture  - Evaluated by ortho, tentatively scheduled for OR tomorrow  - Pain control : Tylenol 650q6 PRN for now  - Patient requires Internal Medicine pre-op clearance / risk stratification / medical optimization  - Pre-Op CBC, CMP/BMP, PT/PTT/INR, Type & Screen x2, CXR, EKG, COVID test  - 2u RBC on hold for surgery  - NPO after midnight  - RCRI: 0    #HTN  - monitor vitals inpatient  - if hypertensive, norvasc 5 mg    #Miscellaneous  - DVT prophylaxis: Lovenox 40 mg SC daily, to be held pre op  - GI prophylaxis: Pantoprazole 40 mg daily  - Activity: Bedrest for now, per ortho after surgery  - Diet: DASH, NPO after midnight  - Disposition: acute, admit to medicine     78 y/o female PSH of LAR with 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 s/p reversed colostomy, HTN, dementia (A&Ox1 at baseline) presenting from home after a fall    #Fall  #Hip fracture  - Evaluated by ortho, tentatively scheduled for OR tomorrow  - Pain control : Tylenol 650q6 PRN for now  - Pre-Op CBC, CMP/BMP, PT/PTT/INR, Type & Screen x2, CXR, EKG, COVID test  - 2u RBC on hold for surgery  - NPO after midnight  - Revised Cardiac Risk Index for Pre-Operative Risk: 0    #HTN  - monitor vitals inpatient  - if hypertensive, norvasc 5 mg    #Miscellaneous  - DVT prophylaxis: Lovenox 40 mg SC daily, to be held pre op  - GI prophylaxis: Pantoprazole 40 mg daily  - Activity: Bedrest for now, per ortho after surgery  - Diet: DASH, NPO after midnight  - Disposition: acute, admit to medicine

## 2024-10-19 NOTE — ED PROVIDER NOTE - PROGRESS NOTE DETAILS
Ortho made aware of fracture and need for admission.  Will be medical admission and ortho on consultation.

## 2024-10-19 NOTE — ED ADULT NURSE NOTE - NSFALLHARMRISKINTERV_ED_ALL_ED

## 2024-10-19 NOTE — H&P ADULT - NSHPLABSRESULTS_GEN_ALL_CORE
12.9   7.98  )-----------( 241      ( 19 Oct 2024 13:19 )             39.9       10-19    141  |  103  |  17  ----------------------------<  297[H]  4.6   |  24  |  0.8    Ca    10.1      19 Oct 2024 13:19    TPro  6.9  /  Alb  4.2  /  TBili  0.3  /  DBili  x   /  AST  17  /  ALT  10  /  AlkPhos  97  10-19      ACC: [id number] EXAM:  XR HIP 1V RT   ORDERED BY: CECILE SUAZO     PROCEDURE DATE:  10/19/2024      INTERPRETATION:  Clinical history/reason for exam: Trauma.    Frontal pelvis, right hip single view    No comparison.    Findings/impression:     Right femoral intertrochanteric displaced comminuted fracture, varus deformity. Bilateral hip arthrosis. Lower lumbar spine degenerative changes. IVC filter. Right pelvic chain sutures. Bilateral femoral vascular calcification.    --- End of Report ---    ANGELICA AGRAWAL MD; Attending Radiologist  This document has been electronically signed. Oct 19 2024  2:03PM

## 2024-10-19 NOTE — H&P ADULT - NSHPREVIEWOFSYSTEMS_GEN_ALL_CORE
GENERAL: no fever, no chills  CARDIAC: no chest pain,   RESPIRATORY: no cough, no shortness of breath  ABDOMEN: no nausea, no vomiting, no abdominal pain, no diarrhea, no constipation  EXTREMITIES: right hip pain  NEURO: no headache, no dizziness

## 2024-10-19 NOTE — H&P ADULT - ATTENDING COMMENTS
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     PHYSICAL EXAM:  GENERAL: NAD, well-developed.  HEAD:  Atraumatic, Normocephalic.  EYES: EOMI, PERRLA, conjunctiva and sclera clear.  NECK: Supple, No JVD.  CHEST/LUNG: Clear to auscultation bilaterally; No wheeze.  HEART: Regular rate and rhythm; S1 S2.   ABDOMEN: Soft, Nontender, Nondistended; Bowel sounds present.  EXTREMITIES:  2+ Peripheral Pulses, right lower extremity is shortened and externally rotated.   PSYCH: AAOx3.  NEUROLOGY: non-focal.  SKIN: No rashes or lesions.      A/P:   Right hip fracture possibly due to mechanical fall  Patient with right hip pain, lower back pain,   Orthopedic consult.     Medical clearance  Patient is stable, no chest pain, or SOB, no previous cardiac disease  EKG showed Normal Sinus Rhythm, LAD, LAFB, no ischemic changes    Patient is low cardiac riks for intermediate risk surgery.

## 2024-10-19 NOTE — ED PROVIDER NOTE - CLINICAL SUMMARY MEDICAL DECISION MAKING FREE TEXT BOX
Labs and EKG were ordered and reviewed.  Imaging was ordered and reviewed by me.  Appropriate medications for patient's presenting complaints were ordered and effects were reassessed.  Patient's records (prior hospital, ED visit, and/or nursing home notes if available) were reviewed.  Additional history was obtained from EMS, family, and/or PCP (where available).  Escalation to admission/observation was considered.  Patient requires inpatient hospitalization - monitored setting.  ED CXR prelim, my independent interpretation - Dr. Zan Bess: [No PTX, No infiltrates, No Free air]  ED Right hip XRAY:  Right intertrochanteric fracture

## 2024-10-19 NOTE — ED ADULT NURSE NOTE - NSFALLRISKFACTORS_ED_ALL_ED
Bone Condition: Including osteoporosis, prolonged steroid use or metastatic bone disease/cancer/Surgery: Recent surgery, recent lower limb amputation, major abdominal or thoracic surgery

## 2024-10-19 NOTE — CONSULT NOTE ADULT - ATTENDING COMMENTS
79 year old female with a past medical history of hypertension, dementia, who sustained a closed injury to her right hip after a ground level fall at home. Evaluation demonstrated a right displaced basicervical femoral neck fracture. At baseline, she ambulates independently with no assistive devices.    On my exam in the preoperative holding area, the patient was resting comfortably in a hospital bed. Vital signs stable. In no acute distress with normal work of breathing. A&O x 2. Examination of the right lower extremity demonstrated intact skin with a short and externally rotated leg. +TA/GS motor intact. SILT s/t/s/dp/sp. Foot warm and well perfused with good capillary refill.    XRs were reviewed, demonstrating a right displaced basicervical femoral neck fracture    #Right closed displaced basicervical femoral neck fracture    For this injury, the patient is indicated for surgical stabilization with Right hip open reduction and internal fixation. The patient was unable to provide informed consent due to dementia. The risks, benefits, and alternatives of the proposed surgical procedure were discussed in detail over the phone with the patient's son. The risks included but were not limited to bleeding, infection, injury to nearby nerves/vessels/structures, malunion, nonunion, implant/fixation failure, functional impairment, wound healing complications, anesthesia related complications, need for reoperation, blood clots, stroke, cardiac arrest, loss of limb, and even death. I discussed the postoperative recovery and rehabilitation. I discussed the morbidity and mortality associated with geriatric hip fractures. The patient's son understood and elected to proceed with the proposed surgical procedure.    Preoperative medical optimization and risk stratification were completed. Plan to proceed with Right hip open reduction and internal fixation.

## 2024-10-19 NOTE — ED PROVIDER NOTE - PHYSICAL EXAMINATION
VITAL SIGNS: I have reviewed nursing notes and confirm.  CONSTITUTIONAL: well-appearing, non-toxic, NAD  SKIN: Warm dry, normal skin turgor. echymosis to R hip   HEAD: NCAT  CARD: RRR  RESP: clear to ausculation   ABD: soft,  non-tender, non-distended, no rebound or guarding.   EXT: R leg ext rotated, + pedal pulse, + R hip tenderness.   NEURO: Axo2  PSYCH: Cooperative, appropriate.

## 2024-10-19 NOTE — H&P ADULT - NSHPPHYSICALEXAM_GEN_ALL_CORE
GENERAL: NAD, AAO x 1 (knows son and that she is in [place] - not time of year or which hospital this is), asked same question  multiple time  HEAD:  Atraumatic, Normocephalic  EYES: EOMI, conjunctiva and sclera white  NECK: Supple, No JVD  CHEST/LUNG: Clear to auscultation bilaterally; No wheeze; No crackles; No accessory muscles used  HEART: Regular rate and rhythm; No murmurs;   ABDOMEN: Soft, Nontender, Nondistended; Bowel sounds present; No guarding, Colostomy scars clean  EXTREMITIES:  Right lower extremity : Skin intact, Shortened rotated, Ecchymosis about hip, Compartments soft and compressible, no pain w/ passive stretch of digits  NEUROLOGY: non-focal    Vital Signs Last 24 Hrs  T(C): 36.6 (19 Oct 2024 16:53), Max: 36.6 (19 Oct 2024 16:53)  T(F): 97.9 (19 Oct 2024 16:53), Max: 97.9 (19 Oct 2024 16:53)  HR: 56 (19 Oct 2024 16:53) (55 - 56)  BP: 152/74 (19 Oct 2024 16:53) (131/72 - 152/74)  RR: 18 (19 Oct 2024 16:53) (18 - 18)  SpO2: 100% (19 Oct 2024 16:53) (100% - 100%)    Parameters below as of 19 Oct 2024 16:53  Patient On (Oxygen Delivery Method): room air

## 2024-10-19 NOTE — ED ADULT TRIAGE NOTE - CHIEF COMPLAINT QUOTE
pt BIBA from home s/p fall. hx of dementia. As per EMS pt was washing dishes and went to answer the door and fell on right hip. - HT - LOC. denies blood thinners

## 2024-10-19 NOTE — ED PROVIDER NOTE - OBJECTIVE STATEMENT
78 y/o female w. PMH of  PSH of LAR, HTN presents to ED s/p unwitnessed fall. Unknown head trauma or LOC. No on AC. Denies any n/v, fever, abd pain or chest pain.

## 2024-10-19 NOTE — PATIENT PROFILE ADULT - FALL HARM RISK - HARM RISK INTERVENTIONS

## 2024-10-20 LAB
ALBUMIN SERPL ELPH-MCNC: 3.6 G/DL — SIGNIFICANT CHANGE UP (ref 3.5–5.2)
ALP SERPL-CCNC: 85 U/L — SIGNIFICANT CHANGE UP (ref 30–115)
ALT FLD-CCNC: 14 U/L — SIGNIFICANT CHANGE UP (ref 0–41)
ANION GAP SERPL CALC-SCNC: 10 MMOL/L — SIGNIFICANT CHANGE UP (ref 7–14)
AST SERPL-CCNC: 27 U/L — SIGNIFICANT CHANGE UP (ref 0–41)
BASOPHILS # BLD AUTO: 0.01 K/UL — SIGNIFICANT CHANGE UP (ref 0–0.2)
BASOPHILS NFR BLD AUTO: 0.1 % — SIGNIFICANT CHANGE UP (ref 0–1)
BILIRUB SERPL-MCNC: 0.3 MG/DL — SIGNIFICANT CHANGE UP (ref 0.2–1.2)
BUN SERPL-MCNC: 13 MG/DL — SIGNIFICANT CHANGE UP (ref 10–20)
CALCIUM SERPL-MCNC: 9.3 MG/DL — SIGNIFICANT CHANGE UP (ref 8.4–10.4)
CHLORIDE SERPL-SCNC: 106 MMOL/L — SIGNIFICANT CHANGE UP (ref 98–110)
CO2 SERPL-SCNC: 26 MMOL/L — SIGNIFICANT CHANGE UP (ref 17–32)
CREAT SERPL-MCNC: 0.9 MG/DL — SIGNIFICANT CHANGE UP (ref 0.7–1.5)
EGFR: 65 ML/MIN/1.73M2 — SIGNIFICANT CHANGE UP
EOSINOPHIL # BLD AUTO: 0 K/UL — SIGNIFICANT CHANGE UP (ref 0–0.7)
EOSINOPHIL NFR BLD AUTO: 0 % — SIGNIFICANT CHANGE UP (ref 0–8)
FLUAV AG NPH QL: SIGNIFICANT CHANGE UP
FLUBV AG NPH QL: SIGNIFICANT CHANGE UP
GLUCOSE BLDC GLUCOMTR-MCNC: 102 MG/DL — HIGH (ref 70–99)
GLUCOSE BLDC GLUCOMTR-MCNC: 134 MG/DL — HIGH (ref 70–99)
GLUCOSE BLDC GLUCOMTR-MCNC: 140 MG/DL — HIGH (ref 70–99)
GLUCOSE BLDC GLUCOMTR-MCNC: 171 MG/DL — HIGH (ref 70–99)
GLUCOSE SERPL-MCNC: 155 MG/DL — HIGH (ref 70–99)
HCT VFR BLD CALC: 33.4 % — LOW (ref 37–47)
HGB BLD-MCNC: 10.7 G/DL — LOW (ref 12–16)
IMM GRANULOCYTES NFR BLD AUTO: 0.4 % — HIGH (ref 0.1–0.3)
LYMPHOCYTES # BLD AUTO: 0.54 K/UL — LOW (ref 1.2–3.4)
LYMPHOCYTES # BLD AUTO: 5.7 % — LOW (ref 20.5–51.1)
MAGNESIUM SERPL-MCNC: 2 MG/DL — SIGNIFICANT CHANGE UP (ref 1.8–2.4)
MCHC RBC-ENTMCNC: 29.4 PG — SIGNIFICANT CHANGE UP (ref 27–31)
MCHC RBC-ENTMCNC: 32 G/DL — SIGNIFICANT CHANGE UP (ref 32–37)
MCV RBC AUTO: 91.8 FL — SIGNIFICANT CHANGE UP (ref 81–99)
MONOCYTES # BLD AUTO: 1.04 K/UL — HIGH (ref 0.1–0.6)
MONOCYTES NFR BLD AUTO: 10.9 % — HIGH (ref 1.7–9.3)
NEUTROPHILS # BLD AUTO: 7.88 K/UL — HIGH (ref 1.4–6.5)
NEUTROPHILS NFR BLD AUTO: 82.9 % — HIGH (ref 42.2–75.2)
NRBC # BLD: 0 /100 WBCS — SIGNIFICANT CHANGE UP (ref 0–0)
PLATELET # BLD AUTO: 219 K/UL — SIGNIFICANT CHANGE UP (ref 130–400)
PMV BLD: 10.3 FL — SIGNIFICANT CHANGE UP (ref 7.4–10.4)
POTASSIUM SERPL-MCNC: 4.4 MMOL/L — SIGNIFICANT CHANGE UP (ref 3.5–5)
POTASSIUM SERPL-SCNC: 4.4 MMOL/L — SIGNIFICANT CHANGE UP (ref 3.5–5)
PROT SERPL-MCNC: 6.2 G/DL — SIGNIFICANT CHANGE UP (ref 6–8)
RBC # BLD: 3.64 M/UL — LOW (ref 4.2–5.4)
RBC # FLD: 13.9 % — SIGNIFICANT CHANGE UP (ref 11.5–14.5)
RSV RNA NPH QL NAA+NON-PROBE: SIGNIFICANT CHANGE UP
SARS-COV-2 RNA SPEC QL NAA+PROBE: SIGNIFICANT CHANGE UP
SODIUM SERPL-SCNC: 142 MMOL/L — SIGNIFICANT CHANGE UP (ref 135–146)
WBC # BLD: 9.51 K/UL — SIGNIFICANT CHANGE UP (ref 4.8–10.8)
WBC # FLD AUTO: 9.51 K/UL — SIGNIFICANT CHANGE UP (ref 4.8–10.8)

## 2024-10-20 PROCEDURE — 99232 SBSQ HOSP IP/OBS MODERATE 35: CPT

## 2024-10-20 PROCEDURE — 27245 TREAT THIGH FRACTURE: CPT | Mod: RT

## 2024-10-20 RX ORDER — CHLORHEXIDINE GLUCONATE 40 MG/ML
1 SOLUTION TOPICAL
Refills: 0 | Status: DISCONTINUED | OUTPATIENT
Start: 2024-10-20 | End: 2024-10-23

## 2024-10-20 RX ORDER — ACETAMINOPHEN 500 MG
650 TABLET ORAL EVERY 6 HOURS
Refills: 0 | Status: DISCONTINUED | OUTPATIENT
Start: 2024-10-20 | End: 2024-10-20

## 2024-10-20 RX ORDER — HYDROMORPHONE HCL/0.9% NACL/PF 6 MG/30 ML
0.2 PATIENT CONTROLLED ANALGESIA SYRINGE INTRAVENOUS
Refills: 0 | Status: DISCONTINUED | OUTPATIENT
Start: 2024-10-20 | End: 2024-10-21

## 2024-10-20 RX ORDER — ONDANSETRON HYDROCHLORIDE 2 MG/ML
4 INJECTION, SOLUTION INTRAMUSCULAR; INTRAVENOUS ONCE
Refills: 0 | Status: DISCONTINUED | OUTPATIENT
Start: 2024-10-20 | End: 2024-10-23

## 2024-10-20 RX ORDER — HYDROMORPHONE HCL/0.9% NACL/PF 6 MG/30 ML
0.1 PATIENT CONTROLLED ANALGESIA SYRINGE INTRAVENOUS
Refills: 0 | Status: DISCONTINUED | OUTPATIENT
Start: 2024-10-20 | End: 2024-10-21

## 2024-10-20 RX ORDER — ENOXAPARIN SODIUM 40MG/0.4ML
40 SYRINGE (ML) SUBCUTANEOUS EVERY 24 HOURS
Refills: 0 | Status: DISCONTINUED | OUTPATIENT
Start: 2024-10-20 | End: 2024-10-23

## 2024-10-20 RX ORDER — HYDRALAZINE HYDROCHLORIDE 50 MG/1
5 TABLET, FILM COATED ORAL ONCE
Refills: 0 | Status: COMPLETED | OUTPATIENT
Start: 2024-10-20 | End: 2024-10-20

## 2024-10-20 RX ORDER — PANTOPRAZOLE SODIUM 40 MG/1
40 TABLET, DELAYED RELEASE ORAL
Refills: 0 | Status: DISCONTINUED | OUTPATIENT
Start: 2024-10-20 | End: 2024-10-23

## 2024-10-20 RX ORDER — PANTOPRAZOLE SODIUM 40 MG/1
40 TABLET, DELAYED RELEASE ORAL
Refills: 0 | Status: DISCONTINUED | OUTPATIENT
Start: 2024-10-20 | End: 2024-10-20

## 2024-10-20 RX ORDER — CEFAZOLIN SODIUM 1 G
2000 VIAL (EA) INJECTION EVERY 8 HOURS
Refills: 0 | Status: COMPLETED | OUTPATIENT
Start: 2024-10-20 | End: 2024-10-21

## 2024-10-20 RX ORDER — QUETIAPINE FUMARATE 200 MG/1
25 TABLET ORAL DAILY
Refills: 0 | Status: DISCONTINUED | OUTPATIENT
Start: 2024-10-20 | End: 2024-10-20

## 2024-10-20 RX ORDER — QUETIAPINE FUMARATE 200 MG/1
25 TABLET ORAL DAILY
Refills: 0 | Status: DISCONTINUED | OUTPATIENT
Start: 2024-10-20 | End: 2024-10-23

## 2024-10-20 RX ORDER — ACETAMINOPHEN 500 MG
650 TABLET ORAL EVERY 6 HOURS
Refills: 0 | Status: DISCONTINUED | OUTPATIENT
Start: 2024-10-20 | End: 2024-10-23

## 2024-10-20 RX ADMIN — QUETIAPINE FUMARATE 25 MILLIGRAM(S): 200 TABLET ORAL at 11:33

## 2024-10-20 RX ADMIN — Medication 100 MILLILITER(S): at 10:34

## 2024-10-20 RX ADMIN — Medication 100 MILLIGRAM(S): at 13:30

## 2024-10-20 RX ADMIN — HYDRALAZINE HYDROCHLORIDE 5 MILLIGRAM(S): 50 TABLET, FILM COATED ORAL at 09:52

## 2024-10-20 RX ADMIN — PANTOPRAZOLE SODIUM 40 MILLIGRAM(S): 40 TABLET, DELAYED RELEASE ORAL at 06:09

## 2024-10-20 RX ADMIN — Medication 40 MILLIGRAM(S): at 11:33

## 2024-10-20 RX ADMIN — Medication 100 MILLIGRAM(S): at 21:15

## 2024-10-20 RX ADMIN — CHLORHEXIDINE GLUCONATE 1 APPLICATION(S): 40 SOLUTION TOPICAL at 06:10

## 2024-10-20 NOTE — PROGRESS NOTE ADULT - SUBJECTIVE AND OBJECTIVE BOX
KEISHA PANTOJA  79[facility]      Patient is a 79y old  Female who presents with a chief complaint of Fall (20 Oct 2024 01:08)      My note supersedes the resident's note      INTERVAL HPI/OVERNIGHT EVENTS:  no acute events overnight       REVIEW OF SYSTEMS:  CONSTITUTIONAL: No fever, weight loss, or fatigue  EYES: No eye pain, visual disturbances, or discharge  ENMT:  No difficulty hearing, tinnitus, vertigo; No sinus or throat pain  NECK: No pain or stiffness  BREASTS: No pain, masses, or nipple discharge  RESPIRATORY: No cough, wheezing, chills or hemoptysis; No shortness of breath  CARDIOVASCULAR: No chest pain, palpitations, dizziness, or leg swelling  GASTROINTESTINAL: No abdominal or epigastric pain. No nausea, vomiting, or hematemesis; No diarrhea or constipation. No melena or hematochezia.  GENITOURINARY: No dysuria, frequency, hematuria, or incontinence  NEUROLOGICAL: No headaches, memory loss, loss of strength, numbness, or tremors  SKIN: No itching, burning, rashes, or lesions   LYMPH NODES: No enlarged glands  ENDOCRINE: No heat or cold intolerance; No hair loss  MUSCULOSKELETAL: No joint pain or swelling; No muscle, back, or extremity pain  PSYCHIATRIC: No depression, anxiety, mood swings, or difficulty sleeping  HEME/LYMPH: No easy bruising, or bleeding gums  ALLERY AND IMMUNOLOGIC: No hives or eczema  FAMILY HISTORY:    T(C): 36.5 (10-20-24 @ 10:00), Max: 37.2 (10-19-24 @ 23:32)  HR: 56 (10-20-24 @ 10:15) (53 - 76)  BP: 154/72 (10-20-24 @ 10:15) (131/76 - 193/90)  RR: 17 (10-20-24 @ 10:15) (10 - 23)  SpO2: 100% (10-20-24 @ 10:15) (94% - 100%)  Wt(kg): --Vital Signs Last 24 Hrs  T(C): 36.5 (20 Oct 2024 10:00), Max: 37.2 (19 Oct 2024 23:32)  T(F): 97.7 (20 Oct 2024 10:00), Max: 99 (19 Oct 2024 23:32)  HR: 56 (20 Oct 2024 10:15) (53 - 76)  BP: 154/72 (20 Oct 2024 10:15) (131/76 - 193/90)  BP(mean): --  RR: 17 (20 Oct 2024 10:15) (10 - 23)  SpO2: 100% (20 Oct 2024 10:15) (94% - 100%)    Parameters below as of 20 Oct 2024 10:15  Patient On (Oxygen Delivery Method): room air        PHYSICAL EXAM:  GENERAL: NAD,   HEAD:  Atraumatic, Normocephalic  EYES: EOMI, PERRLA, conjunctiva and sclera clear  ENMT: No tonsillar erythema, exudates, or enlargement; Moist mucous membranes, Good dentition, No lesions  NECK: Supple, No JVD, Normal thyroid  NERVOUS SYSTEM:  Alert   PULM: Clear to auscultation bilaterally  CARDIAC: Regular rate and rhythm; No murmurs, rubs, or gallops  GI: Soft, Nontender, Nondistended; Bowel sounds present  EXTREMITIES:  2+ Peripheral Pulses, No clubbing, cyanosis, or edema  LYMPH: No lymphadenopathy noted  SKIN: No rashes or lesions    Consultant(s) Notes Reviewed:  [x ] YES  [ ] NO  Care Discussed with Consultants/Other Providers [ x] YES  [ ] NO    LABS:                            11.9   9.15  )-----------( 228      ( 19 Oct 2024 20:00 )             36.5   10-19    141  |  103  |  17  ----------------------------<  297[H]  4.6   |  24  |  0.8    Ca    10.1      19 Oct 2024 13:19    TPro  6.9  /  Alb  4.2  /  TBili  0.3  /  DBili  x   /  AST  17  /  ALT  10  /  AlkPhos  97  10-19            acetaminophen     Tablet .. 650 milliGRAM(s) Oral every 6 hours PRN  ceFAZolin   IVPB 2000 milliGRAM(s) IV Intermittent every 8 hours  chlorhexidine 2% Cloths 1 Application(s) Topical <User Schedule>  enoxaparin Injectable 40 milliGRAM(s) SubCutaneous every 24 hours  HYDROmorphone  Injectable 0.1 milliGRAM(s) IV Push every 10 minutes PRN  HYDROmorphone  Injectable 0.2 milliGRAM(s) IV Push every 5 minutes PRN  influenza  Vaccine (HIGH DOSE) 0.5 milliLiter(s) IntraMuscular once  lactated ringers. 1000 milliLiter(s) IV Continuous <Continuous>  ondansetron Injectable 4 milliGRAM(s) IV Push once PRN  pantoprazole    Tablet 40 milliGRAM(s) Oral before breakfast  QUEtiapine 25 milliGRAM(s) Oral daily      HEALTH ISSUES - PROBLEM Dx:          Case Discussed with House Staff   Spectra [phone]

## 2024-10-20 NOTE — BRIEF OPERATIVE NOTE - NSICDXBRIEFPOSTOP_GEN_ALL_CORE_FT
POST-OP DIAGNOSIS:  Closed basicervical fracture of right femur 20-Oct-2024 09:20:24  Shana Wesley

## 2024-10-20 NOTE — PRE-OP CHECKLIST - 1.
Patient is A/Ox2. Patient verbalized understanding of procedure. Denies pain/discomfort. Safety precautions maintained. All questions and concerns were addressed.

## 2024-10-20 NOTE — PROGRESS NOTE ADULT - SUBJECTIVE AND OBJECTIVE BOX
ORTHOPEDIC POST-OP CHECK    Procedure: R Hip ORIF    Subjective: Patient seen and examined at bedside after above procedure.  Contacted by the floor that patient removed dressings after being brought up to the floor. Doing well, pain controlled. No other complaints.    MEDICATIONS  (STANDING):  ceFAZolin   IVPB 2000 milliGRAM(s) IV Intermittent every 8 hours  chlorhexidine 2% Cloths 1 Application(s) Topical <User Schedule>  enoxaparin Injectable 40 milliGRAM(s) SubCutaneous every 24 hours  influenza  Vaccine (HIGH DOSE) 0.5 milliLiter(s) IntraMuscular once  lactated ringers. 1000 milliLiter(s) (100 mL/Hr) IV Continuous <Continuous>  pantoprazole    Tablet 40 milliGRAM(s) Oral before breakfast  QUEtiapine 25 milliGRAM(s) Oral daily    MEDICATIONS  (PRN):  acetaminophen     Tablet .. 650 milliGRAM(s) Oral every 6 hours PRN Mild Pain (1 - 3), Moderate Pain (4 - 6), Severe Pain (7 - 10)  HYDROmorphone  Injectable 0.2 milliGRAM(s) IV Push every 5 minutes PRN Severe Pain (7 -10)  HYDROmorphone  Injectable 0.1 milliGRAM(s) IV Push every 10 minutes PRN Moderate Pain (4 - 6)  ondansetron Injectable 4 milliGRAM(s) IV Push once PRN Nausea and/or Vomiting      Objective:  T(C): 36.7 (10-20-24 @ 16:15), Max: 37.2 (10-19-24 @ 23:32)  HR: 72 (10-20-24 @ 16:15) (53 - 76)  BP: 135/75 (10-20-24 @ 16:15) (131/76 - 193/90)  RR: 18 (10-20-24 @ 16:15) (10 - 23)  SpO2: 99% (10-20-24 @ 16:15) (94% - 100%)    Gen: Alert, NAD. NLB on RA.    RLE:  Dressings replaced; c/d/i  SILT s/s/sp/dp/t  Motor intact TA/EHL/FHL/GSC  Toes wwp, cap refill < 2 sec    Labs:                        10.7   9.51  )-----------( 219      ( 20 Oct 2024 18:37 )             33.4     10-20    142  |  106  |  13  ----------------------------<  155[H]  4.4   |  26  |  0.9    Ca    9.3      20 Oct 2024 18:37  Mg     2.0     10-20    TPro  6.2  /  Alb  3.6  /  TBili  0.3  /  DBili  x   /  AST  27  /  ALT  14  /  AlkPhos  85  10-20      A/P: 79yFemale s/p R Hip ORIF, doing well on POC.    - Activity: WBAT RLE  - Abx: Ancef 2g q8hr x3 doses for a total of 24hrs post-operatively  - DVT PPx: LVX/SQH per primary team  - Pain control  - IS encouraged  - AM labs  - PT/Rehab  - D/C planning

## 2024-10-20 NOTE — PROGRESS NOTE ADULT - SUBJECTIVE AND OBJECTIVE BOX
SUBJECTIVE/OVERNIGHT EVENTS  Today is hospital day 1d. This morning patient was seen and examined at bedside, resting comfortably in bed. No acute or major events overnight.      CODE STATUS: FULL      MEDICATIONS  STANDING MEDICATIONS  chlorhexidine 2% Cloths 1 Application(s) Topical <User Schedule>  enoxaparin Injectable 40 milliGRAM(s) SubCutaneous every 24 hours  influenza  Vaccine (HIGH DOSE) 0.5 milliLiter(s) IntraMuscular once  pantoprazole    Tablet 40 milliGRAM(s) Oral before breakfast  QUEtiapine 25 milliGRAM(s) Oral daily    PRN MEDICATIONS  acetaminophen     Tablet .. 650 milliGRAM(s) Oral every 6 hours PRN    VITALS  T(F): 99 (10-19-24 @ 23:32), Max: 99 (10-19-24 @ 23:32)  HR: 53 (10-19-24 @ 23:32) (53 - 56)  BP: 155/60 (10-19-24 @ 23:32) (131/72 - 155/60)  RR: 18 (10-19-24 @ 23:32) (18 - 18)  SpO2: 98% (10-19-24 @ 23:32) (98% - 100%)  POCT Blood Glucose.: 151 mg/dL (10-19-24 @ 20:53)  POCT Blood Glucose.: 164 mg/dL (10-19-24 @ 17:43)    PHYSICAL EXAM  GENERAL: NAD  HEAD:  Atraumatic, normocephalic  EYES: EOMI, PERRL  NECK: Supple, trachea midline, no JVD  HEART: Regular rate and rhythm  LUNGS: Clear to auscultation bilaterally, no crackles, wheezing, or rhonchi  ABDOMEN: Soft, nontender, nondistended, +BS  EXTREMITIES: 2+ peripheral pulses bilaterally, right lower extremity is shortened and externally rotated.  NERVOUS SYSTEM:  Alert, no focal deficits       (  ) Indwelling Thomson Catheter   Date inserted:    Reason (  ) Critical illness     (  ) urinary retention    (  ) Accurate Ins/Outs Monitoring     (  ) CMO patient    (  ) Central Line  Date inserted:  Location: (  ) Right IJ   (  ) Left IJ   (  ) Right Fem   (  ) Left Fem    (  ) SPC  (  ) pigtail  (  ) PEG tube  (  ) colostomy  (  ) jejunostomy  (  ) U-Dall    LABS             11.9   9.15  )-----------( 228      ( 10-19-24 @ 20:00 )             36.5     141  |  103  |  17  -------------------------<  297   10-19-24 @ 13:19  4.6  |  24  |  0.8    Ca      10.1     10-19-24 @ 13:19    TPro  6.9  /  Alb  4.2  /  TBili  0.3  /  DBili  x   /  AST  17  /  ALT  10  /  AlkPhos  97  /  GGT  x     10-19-24 @ 13:19    PT/INR - ( 10-19-24 @ 20:00 )   PT: 11.20 sec;   INR: 0.98 ratio  PTT - ( 10-19-24 @ 20:00 )  PTT:20.2 sec      Urinalysis Basic - ( 19 Oct 2024 13:19 )    Color: x / Appearance: x / SG: x / pH: x  Gluc: 297 mg/dL / Ketone: x  / Bili: x / Urobili: x   Blood: x / Protein: x / Nitrite: x   Leuk Esterase: x / RBC: x / WBC x   Sq Epi: x / Non Sq Epi: x / Bacteria: x          IMAGING

## 2024-10-21 LAB
ALBUMIN SERPL ELPH-MCNC: 3.4 G/DL — LOW (ref 3.5–5.2)
ALP SERPL-CCNC: 79 U/L — SIGNIFICANT CHANGE UP (ref 30–115)
ALT FLD-CCNC: 12 U/L — SIGNIFICANT CHANGE UP (ref 0–41)
ANION GAP SERPL CALC-SCNC: 9 MMOL/L — SIGNIFICANT CHANGE UP (ref 7–14)
AST SERPL-CCNC: 34 U/L — SIGNIFICANT CHANGE UP (ref 0–41)
BASOPHILS # BLD AUTO: 0.05 K/UL — SIGNIFICANT CHANGE UP (ref 0–0.2)
BASOPHILS NFR BLD AUTO: 0.7 % — SIGNIFICANT CHANGE UP (ref 0–1)
BILIRUB SERPL-MCNC: 0.2 MG/DL — SIGNIFICANT CHANGE UP (ref 0.2–1.2)
BUN SERPL-MCNC: 12 MG/DL — SIGNIFICANT CHANGE UP (ref 10–20)
CALCIUM SERPL-MCNC: 9.1 MG/DL — SIGNIFICANT CHANGE UP (ref 8.4–10.4)
CHLORIDE SERPL-SCNC: 106 MMOL/L — SIGNIFICANT CHANGE UP (ref 98–110)
CO2 SERPL-SCNC: 24 MMOL/L — SIGNIFICANT CHANGE UP (ref 17–32)
CREAT SERPL-MCNC: 0.8 MG/DL — SIGNIFICANT CHANGE UP (ref 0.7–1.5)
EGFR: 74 ML/MIN/1.73M2 — SIGNIFICANT CHANGE UP
EOSINOPHIL # BLD AUTO: 0.09 K/UL — SIGNIFICANT CHANGE UP (ref 0–0.7)
EOSINOPHIL NFR BLD AUTO: 1.2 % — SIGNIFICANT CHANGE UP (ref 0–8)
GLUCOSE BLDC GLUCOMTR-MCNC: 122 MG/DL — HIGH (ref 70–99)
GLUCOSE BLDC GLUCOMTR-MCNC: 125 MG/DL — HIGH (ref 70–99)
GLUCOSE BLDC GLUCOMTR-MCNC: 140 MG/DL — HIGH (ref 70–99)
GLUCOSE BLDC GLUCOMTR-MCNC: 172 MG/DL — HIGH (ref 70–99)
GLUCOSE SERPL-MCNC: 120 MG/DL — HIGH (ref 70–99)
HCT VFR BLD CALC: 30.7 % — LOW (ref 37–47)
HGB BLD-MCNC: 9.8 G/DL — LOW (ref 12–16)
IMM GRANULOCYTES NFR BLD AUTO: 0.4 % — HIGH (ref 0.1–0.3)
LYMPHOCYTES # BLD AUTO: 0.78 K/UL — LOW (ref 1.2–3.4)
LYMPHOCYTES # BLD AUTO: 10.3 % — LOW (ref 20.5–51.1)
MAGNESIUM SERPL-MCNC: 2 MG/DL — SIGNIFICANT CHANGE UP (ref 1.8–2.4)
MCHC RBC-ENTMCNC: 29.3 PG — SIGNIFICANT CHANGE UP (ref 27–31)
MCHC RBC-ENTMCNC: 31.9 G/DL — LOW (ref 32–37)
MCV RBC AUTO: 91.6 FL — SIGNIFICANT CHANGE UP (ref 81–99)
MONOCYTES # BLD AUTO: 1.09 K/UL — HIGH (ref 0.1–0.6)
MONOCYTES NFR BLD AUTO: 14.4 % — HIGH (ref 1.7–9.3)
NEUTROPHILS # BLD AUTO: 5.55 K/UL — SIGNIFICANT CHANGE UP (ref 1.4–6.5)
NEUTROPHILS NFR BLD AUTO: 73 % — SIGNIFICANT CHANGE UP (ref 42.2–75.2)
NRBC # BLD: 0 /100 WBCS — SIGNIFICANT CHANGE UP (ref 0–0)
PLATELET # BLD AUTO: 166 K/UL — SIGNIFICANT CHANGE UP (ref 130–400)
PMV BLD: 10.5 FL — HIGH (ref 7.4–10.4)
POTASSIUM SERPL-MCNC: 4.2 MMOL/L — SIGNIFICANT CHANGE UP (ref 3.5–5)
POTASSIUM SERPL-SCNC: 4.2 MMOL/L — SIGNIFICANT CHANGE UP (ref 3.5–5)
PROT SERPL-MCNC: 5.7 G/DL — LOW (ref 6–8)
RBC # BLD: 3.35 M/UL — LOW (ref 4.2–5.4)
RBC # FLD: 14 % — SIGNIFICANT CHANGE UP (ref 11.5–14.5)
SODIUM SERPL-SCNC: 139 MMOL/L — SIGNIFICANT CHANGE UP (ref 135–146)
WBC # BLD: 7.59 K/UL — SIGNIFICANT CHANGE UP (ref 4.8–10.8)
WBC # FLD AUTO: 7.59 K/UL — SIGNIFICANT CHANGE UP (ref 4.8–10.8)

## 2024-10-21 PROCEDURE — 99232 SBSQ HOSP IP/OBS MODERATE 35: CPT

## 2024-10-21 RX ORDER — OXYCODONE HYDROCHLORIDE 30 MG/1
5 TABLET ORAL
Refills: 0 | Status: DISCONTINUED | OUTPATIENT
Start: 2024-10-21 | End: 2024-10-23

## 2024-10-21 RX ADMIN — Medication 40 MILLIGRAM(S): at 11:27

## 2024-10-21 RX ADMIN — CHLORHEXIDINE GLUCONATE 1 APPLICATION(S): 40 SOLUTION TOPICAL at 06:15

## 2024-10-21 RX ADMIN — Medication 100 MILLILITER(S): at 06:43

## 2024-10-21 RX ADMIN — Medication 100 MILLIGRAM(S): at 06:04

## 2024-10-21 RX ADMIN — QUETIAPINE FUMARATE 25 MILLIGRAM(S): 200 TABLET ORAL at 11:28

## 2024-10-21 RX ADMIN — PANTOPRAZOLE SODIUM 40 MILLIGRAM(S): 40 TABLET, DELAYED RELEASE ORAL at 06:05

## 2024-10-21 NOTE — DIETITIAN INITIAL EVALUATION ADULT - ADD RECOMMEND
Low Risk f/u 7-10 days   Interventions: meals, snacks, nutritional supplements, coordination of care  Monitoring/Evaluation: PO intake, Wt, labs, acceptance of nutritional supplement, electrolytes,  NFPF  Recs:   1. Continue diet as ordered  2. Add Ensure Plus HP to promote healing & wt stability  3. Encourage PO intake & assist PRN Low Risk f/u 7-10 days   Interventions: meals, snacks, nutritional supplements, coordination of care  Monitoring/Evaluation: PO intake, Wt, labs, acceptance of nutritional supplement, electrolytes,  NFPF  Recs:   1. Continue diet as ordered  2. BMI is below normal, however family confirms patient's UBW is 50kg. Patient has good PO intake, and has gained weight, no new nutritional dx at this time. RD   2. Add Ensure Plus HP to promote healing & wt stability.   3. Encourage PO intake & assist PRN High Risk f/u 3-5 days   Interventions: meals, snacks, nutritional supplements, coordination of care  Monitoring/Evaluation: PO intake, Wt, labs, acceptance of nutritional supplement, electrolytes,  NFPF  Recs:   1. Continue diet as ordered  2. BMI is below normal, however family confirms patient's UBW is 50kg. Patient has good PO intake, and has gained weight, no new nutritional dx at this time.    2. Add Ensure Plus HP to promote healing & wt stability.   3. Encourage PO intake & assist PRN

## 2024-10-21 NOTE — PHYSICAL THERAPY INITIAL EVALUATION ADULT - PERTINENT HX OF CURRENT PROBLEM, REHAB EVAL
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    She presents after an unwitnessed  fall at home, she doesn't remember how she fell but does not report any head trauma or loss of consciousness. Not on anticoagulation.   She reports ambulating independently very rarely uses the walker (confirmed with son)  Spoke with son, has not had any fall recently. Medrec consists of only Trazodone and Quietiapine. Patient does not take anything else at home  She does not endorse any urinary symptoms, no fevers or chills

## 2024-10-21 NOTE — DIETITIAN INITIAL EVALUATION ADULT - PERTINENT LABORATORY DATA
10-21    139  |  106  |  12  ----------------------------<  120[H]  4.2   |  24  |  0.8    Ca    9.1      21 Oct 2024 07:28  Mg     2.0     10-21    TPro  5.7[L]  /  Alb  3.4[L]  /  TBili  0.2  /  DBili  x   /  AST  34  /  ALT  12  /  AlkPhos  79  10-21  POCT Blood Glucose.: 172 mg/dL (10-21-24 @ 11:28)

## 2024-10-21 NOTE — DIETITIAN INITIAL EVALUATION ADULT - OTHER CALCULATIONS
Energy calculated with consideration for age, acuity of illness, weight status  MSJ 1156 + 1.4% = 1618 kcal/day (skeletal trauma)  Additional fluids related increased protein.

## 2024-10-21 NOTE — PROGRESS NOTE ADULT - SUBJECTIVE AND OBJECTIVE BOX
79y Female POD #  1    S/P right hip orif     Patient seen and examined at bedside . The patient is awake and alert in NAD. No complaints of chest pain, SOB, N/V.  Patient is comfortable    PAST MEDICAL & SURGICAL HISTORY:  Hypertension, unspecified type    Dementia  ao x 2    GERD (gastroesophageal reflux disease)    Diverticulitis    Lack of bladder control    H/O dilation and curettage  for missed           MEDICATIONS  (STANDING):  chlorhexidine 2% Cloths 1 Application(s) Topical <User Schedule>  enoxaparin Injectable 40 milliGRAM(s) SubCutaneous every 24 hours  influenza  Vaccine (HIGH DOSE) 0.5 milliLiter(s) IntraMuscular once  lactated ringers. 1000 milliLiter(s) (100 mL/Hr) IV Continuous <Continuous>  pantoprazole    Tablet 40 milliGRAM(s) Oral before breakfast  QUEtiapine 25 milliGRAM(s) Oral daily    MEDICATIONS  (PRN):  acetaminophen     Tablet .. 650 milliGRAM(s) Oral every 6 hours PRN Mild Pain (1 - 3), Moderate Pain (4 - 6), Severe Pain (7 - 10)  HYDROmorphone  Injectable 0.2 milliGRAM(s) IV Push every 5 minutes PRN Severe Pain (7 -10)  HYDROmorphone  Injectable 0.1 milliGRAM(s) IV Push every 10 minutes PRN Moderate Pain (4 - 6)  ondansetron Injectable 4 milliGRAM(s) IV Push once PRN Nausea and/or Vomiting        Vital Signs Last 24 Hrs  T(C): 36.9 (21 Oct 2024 00:01), Max: 37.1 (20 Oct 2024 08:14)  T(F): 98.5 (21 Oct 2024 00:01), Max: 98.7 (20 Oct 2024 08:14)  HR: 72 (21 Oct 2024 00:01) (54 - 76)  BP: 145/75 (21 Oct 2024 00:01) (135/75 - 193/90)  BP(mean): 99 (21 Oct 2024 00:01) (99 - 99)  RR: 18 (21 Oct 2024 00:01) (10 - 23)  SpO2: 100% (21 Oct 2024 00:01) (94% - 100%)                          10.7   9.51  )-----------( 219      ( 20 Oct 2024 18:37 )             33.4     10-20    142  |  106  |  13  ----------------------------<  155[H]  4.4   |  26  |  0.9    Ca    9.3      20 Oct 2024 18:37  Mg     2.0     10-20    TPro  6.2  /  Alb  3.6  /  TBili  0.3  /  DBili  x   /  AST  27  /  ALT  14  /  AlkPhos  85  10-20    PT/INR - ( 19 Oct 2024 20:00 )   PT: 11.20 sec;   INR: 0.98 ratio         PTT - ( 19 Oct 2024 20:00 )  PTT:20.2 sec  Urinalysis Basic - ( 20 Oct 2024 18:37 )    Color: x / Appearance: x / SG: x / pH: x  Gluc: 155 mg/dL / Ketone: x  / Bili: x / Urobili: x   Blood: x / Protein: x / Nitrite: x   Leuk Esterase: x / RBC: x / WBC x   Sq Epi: x / Non Sq Epi: x / Bacteria: x            PE:  The patient was seen and examined at bedside          Awake and alert, NAD          right hip distal dressings removed by patient, wound cleansed with chg and dsd applied           Compartments soft, BLE          NVI, SILT           A/P:     # POD #  1     s/p right hip orif                 - OOB to Chair   -PT/OT - wbat  -Pain control - per pain protocol   -Incentive Spirometry   -DVT Prophylaxis -   -GI- bowel regimen   -f/u am labs   -discharge planning

## 2024-10-21 NOTE — DIETITIAN INITIAL EVALUATION ADULT - PERSON TAUGHT/METHOD
Encourage PO intake. And instructed to have  nutritional supplements between meals./verbal instruction/patient instructed/daughter instructed

## 2024-10-21 NOTE — DIETITIAN INITIAL EVALUATION ADULT - ORAL INTAKE PTA/DIET HISTORY
PTA patient reports good appetite, consuming % of 3 meals/day. Patient daughter present during interview. Daughter reports patient is always hungry. Patient follows no special diet. NKFA. Patient endorses Ensure (chocolate) 2x/day. Patient has dentures. Per daughter, according to last PCP appointment, UBW 43kg, CBW 49.8kg, a 15.8% weight gain. Patient does not meet weight criteria for malnutrition.     Presently, patient has good appetite consuming 51-75% of meals. Patient observed having difficulty keeping her upper dentures staying secured while eating - nurse assisted. Patient's diet down graded to pureed today.  PTA patient reports good appetite, consuming % of 3 meals/day. Patient daughter present during interview. Daughter reports patient is always hungry. Patient follows no special diet. NKFA. Patient endorses Ensure (chocolate) 2x/day. Patient has dentures. Per daughter, according to last PCP appointment patient wt was 43kg 1 month ago. CBW 49.8kg, a 15.8% weight gain. Patient and daughter confirmed CBW is what her UBW is. Patient does not meet weight criteria for malnutrition.     Presently, patient has good appetite consuming 51-75% of meals. Patient observed having difficulty keeping her upper dentures staying secured while eating - nurse assisted. Patient's diet down graded to pureed today.  PTA patient & daughter report good appetite, consuming % of 3 meals/day. Patient's daughter present during interview. Daughter reports patient is always hungry. Patient follows no special diet. NKFA. Patient & daughter endorse Ensure (chocolate) 2x/day. Patient has dentures. Per daughter, according to last PCP appointment patient wt was 43kg 1 month ago. CBW 49.8kg, a 15.8% weight gain. Patient and daughter confirmed 50kg is patient's UBW. Patient does not meet weight criteria for malnutrition.     Presently, patient has good appetite consuming 51-75% of meals. Patient observed w difficulty keeping upper dentures secured while eating - nurse assisted. Patient's diet down-graded to pureed today. Not able to obtain information on last BM.

## 2024-10-21 NOTE — PROGRESS NOTE ADULT - SUBJECTIVE AND OBJECTIVE BOX
KEISHA PANTOJA  79y  [facility]      Patient is a 79y old  Female who presents with a chief complaint of Fall (21 Oct 2024 08:10)      My note supersedes the resident's note      INTERVAL HPI/OVERNIGHT EVENTS:    no acute events overnight     REVIEW OF SYSTEMS:  CONSTITUTIONAL: No fever, weight loss, or fatigue  EYES: No eye pain, visual disturbances, or discharge  ENMT:  No difficulty hearing, tinnitus, vertigo; No sinus or throat pain  NECK: No pain or stiffness  BREASTS: No pain, masses, or nipple discharge  RESPIRATORY: No cough, wheezing, chills or hemoptysis; No shortness of breath  CARDIOVASCULAR: No chest pain, palpitations, dizziness, or leg swelling  GASTROINTESTINAL: No abdominal or epigastric pain. No nausea, vomiting, or hematemesis; No diarrhea or constipation. No melena or hematochezia.  GENITOURINARY: No dysuria, frequency, hematuria, or incontinence  NEUROLOGICAL: No headaches, memory loss, loss of strength, numbness, or tremors  SKIN: No itching, burning, rashes, or lesions   LYMPH NODES: No enlarged glands  ENDOCRINE: No heat or cold intolerance; No hair loss  MUSCULOSKELETAL: No joint pain or swelling; No muscle, back, or extremity pain  PSYCHIATRIC: No depression, anxiety, mood swings, or difficulty sleeping  HEME/LYMPH: No easy bruising, or bleeding gums  ALLERY AND IMMUNOLOGIC: No hives or eczema  FAMILY HISTORY:    T(C): 36.8 (10-21-24 @ 07:10), Max: 36.9 (10-21-24 @ 00:01)  HR: 79 (10-21-24 @ 07:10) (56 - 79)  BP: 163/69 (10-21-24 @ 07:10) (135/75 - 163/69)  RR: 18 (10-21-24 @ 07:10) (17 - 18)  SpO2: 98% (10-21-24 @ 07:10) (98% - 100%)  Wt(kg): --Vital Signs Last 24 Hrs  T(C): 36.8 (21 Oct 2024 07:10), Max: 36.9 (21 Oct 2024 00:01)  T(F): 98.2 (21 Oct 2024 07:10), Max: 98.5 (21 Oct 2024 00:01)  HR: 79 (21 Oct 2024 07:10) (56 - 79)  BP: 163/69 (21 Oct 2024 07:10) (135/75 - 163/69)  BP(mean): 99 (21 Oct 2024 00:01) (99 - 99)  RR: 18 (21 Oct 2024 07:10) (17 - 18)  SpO2: 98% (21 Oct 2024 07:10) (98% - 100%)    Parameters below as of 21 Oct 2024 07:10  Patient On (Oxygen Delivery Method): room air        PHYSICAL EXAM:  GENERAL: NAD,   HEAD:  Atraumatic, Normocephalic  EYES: EOMI, PERRLA, conjunctiva and sclera clear  ENMT: No tonsillar erythema, exudates, or enlargement; Moist mucous membranes, Good dentition, No lesions  NECK: Supple, No JVD, Normal thyroid  NERVOUS SYSTEM:  Alert & Oriented X3, Good concentration; Motor Strength 5/5 B/L upper and lower extremities; DTRs 2+ intact and symmetric  PULM: Clear to auscultation bilaterally  CARDIAC: Regular rate and rhythm; No murmurs, rubs, or gallops  GI: Soft, Nontender, Nondistended; Bowel sounds present  EXTREMITIES: Right side clean post op site   LYMPH: No lymphadenopathy noted  SKIN: No rashes or lesions    Consultant(s) Notes Reviewed:  [x ] YES  [ ] NO  Care Discussed with Consultants/Other Providers [ x] YES  [ ] NO    LABS:                            9.8    7.59  )-----------( 166      ( 21 Oct 2024 07:28 )             30.7   10-21    139  |  106  |  12  ----------------------------<  120[H]  4.2   |  24  |  0.8    Ca    9.1      21 Oct 2024 07:28  Mg     2.0     10-21    TPro  5.7[L]  /  Alb  3.4[L]  /  TBili  0.2  /  DBili  x   /  AST  34  /  ALT  12  /  AlkPhos  79  10-21            acetaminophen     Tablet .. 650 milliGRAM(s) Oral every 6 hours PRN  chlorhexidine 2% Cloths 1 Application(s) Topical <User Schedule>  enoxaparin Injectable 40 milliGRAM(s) SubCutaneous every 24 hours  HYDROmorphone  Injectable 0.2 milliGRAM(s) IV Push every 5 minutes PRN  HYDROmorphone  Injectable 0.1 milliGRAM(s) IV Push every 10 minutes PRN  influenza  Vaccine (HIGH DOSE) 0.5 milliLiter(s) IntraMuscular once  ondansetron Injectable 4 milliGRAM(s) IV Push once PRN  pantoprazole    Tablet 40 milliGRAM(s) Oral before breakfast  QUEtiapine 25 milliGRAM(s) Oral daily      HEALTH ISSUES - PROBLEM Dx:          Case Discussed with House Staff   Spectra [phone]     KEISHA PANTOJA  79y  [facility]      Patient is a 79y old  Female who presents with a chief complaint of Fall (21 Oct 2024 08:10)      My note supersedes the resident's note      INTERVAL HPI/OVERNIGHT EVENTS:    no acute events overnight     REVIEW OF SYSTEMS:  CONSTITUTIONAL: No fever, weight loss, or fatigue  EYES: No eye pain, visual disturbances, or discharge  ENMT:  No difficulty hearing, tinnitus, vertigo; No sinus or throat pain  NECK: No pain or stiffness  BREASTS: No pain, masses, or nipple discharge  RESPIRATORY: No cough, wheezing, chills or hemoptysis; No shortness of breath  CARDIOVASCULAR: No chest pain, palpitations, dizziness, or leg swelling  GASTROINTESTINAL: No abdominal or epigastric pain. No nausea, vomiting, or hematemesis; No diarrhea or constipation. No melena or hematochezia.  GENITOURINARY: No dysuria, frequency, hematuria, or incontinence  NEUROLOGICAL: No headaches, memory loss, loss of strength, numbness, or tremors  SKIN: No itching, burning, rashes, or lesions   LYMPH NODES: No enlarged glands  ENDOCRINE: No heat or cold intolerance; No hair loss  MUSCULOSKELETAL: No joint pain or swelling; No muscle, back, or extremity pain  PSYCHIATRIC: No depression, anxiety, mood swings, or difficulty sleeping  HEME/LYMPH: No easy bruising, or bleeding gums  ALLERY AND IMMUNOLOGIC: No hives or eczema  FAMILY HISTORY:    T(C): 36.8 (10-21-24 @ 07:10), Max: 36.9 (10-21-24 @ 00:01)  HR: 79 (10-21-24 @ 07:10) (56 - 79)  BP: 163/69 (10-21-24 @ 07:10) (135/75 - 163/69)  RR: 18 (10-21-24 @ 07:10) (17 - 18)  SpO2: 98% (10-21-24 @ 07:10) (98% - 100%)  Wt(kg): --Vital Signs Last 24 Hrs  T(C): 36.8 (21 Oct 2024 07:10), Max: 36.9 (21 Oct 2024 00:01)  T(F): 98.2 (21 Oct 2024 07:10), Max: 98.5 (21 Oct 2024 00:01)  HR: 79 (21 Oct 2024 07:10) (56 - 79)  BP: 163/69 (21 Oct 2024 07:10) (135/75 - 163/69)  BP(mean): 99 (21 Oct 2024 00:01) (99 - 99)  RR: 18 (21 Oct 2024 07:10) (17 - 18)  SpO2: 98% (21 Oct 2024 07:10) (98% - 100%)    Parameters below as of 21 Oct 2024 07:10  Patient On (Oxygen Delivery Method): room air        PHYSICAL EXAM:  GENERAL: NAD,   HEAD:  Atraumatic, Normocephalic  EYES: EOMI, PERRLA, conjunctiva and sclera clear  ENMT: No tonsillar erythema, exudates, or enlargement; Moist mucous membranes, Good dentition, No lesions  NECK: Supple, No JVD, Normal thyroid  NERVOUS SYSTEM:  Alert    PULM: Clear to auscultation bilaterally  CARDIAC: Regular rate and rhythm; No murmurs, rubs, or gallops  GI: Soft, Nontender, Nondistended; Bowel sounds present  EXTREMITIES: Right side clean post op site   LYMPH: No lymphadenopathy noted  SKIN: No rashes or lesions    Consultant(s) Notes Reviewed:  [x ] YES  [ ] NO  Care Discussed with Consultants/Other Providers [ x] YES  [ ] NO    LABS:                            9.8    7.59  )-----------( 166      ( 21 Oct 2024 07:28 )             30.7   10-21    139  |  106  |  12  ----------------------------<  120[H]  4.2   |  24  |  0.8    Ca    9.1      21 Oct 2024 07:28  Mg     2.0     10-21    TPro  5.7[L]  /  Alb  3.4[L]  /  TBili  0.2  /  DBili  x   /  AST  34  /  ALT  12  /  AlkPhos  79  10-21            acetaminophen     Tablet .. 650 milliGRAM(s) Oral every 6 hours PRN  chlorhexidine 2% Cloths 1 Application(s) Topical <User Schedule>  enoxaparin Injectable 40 milliGRAM(s) SubCutaneous every 24 hours  HYDROmorphone  Injectable 0.2 milliGRAM(s) IV Push every 5 minutes PRN  HYDROmorphone  Injectable 0.1 milliGRAM(s) IV Push every 10 minutes PRN  influenza  Vaccine (HIGH DOSE) 0.5 milliLiter(s) IntraMuscular once  ondansetron Injectable 4 milliGRAM(s) IV Push once PRN  pantoprazole    Tablet 40 milliGRAM(s) Oral before breakfast  QUEtiapine 25 milliGRAM(s) Oral daily      HEALTH ISSUES - PROBLEM Dx:          Case Discussed with House Staff   Spectra [phone]

## 2024-10-21 NOTE — PROGRESS NOTE ADULT - SUBJECTIVE AND OBJECTIVE BOX
CC: Fall (21 Oct 2024 10:12)    INTERVAL HPI/OVERNIGHT EVENTS:  POD#1. Patient seen and examined at bedside. Reports mild pain at surgical site. Reports low urine output. Vitals stable.       Vital Signs Last 24 Hrs  T(C): 36.8 (21 Oct 2024 07:10), Max: 36.9 (21 Oct 2024 00:01)  T(F): 98.2 (21 Oct 2024 07:10), Max: 98.5 (21 Oct 2024 00:01)  HR: 79 (21 Oct 2024 07:10) (72 - 79)  BP: 163/69 (21 Oct 2024 07:10) (135/75 - 163/69)  BP(mean): 99 (21 Oct 2024 00:01) (99 - 99)  RR: 18 (21 Oct 2024 07:10) (18 - 18)  SpO2: 98% (21 Oct 2024 07:10) (98% - 100%)    Parameters below as of 21 Oct 2024 07:10  Patient On (Oxygen Delivery Method): room air      PHYSICAL EXAM:  GENERAL: NAD, resting comfortably  HEAD:  Atraumatic, normocephalic  EYES: EOMI, PERRL  NECK: Supple, trachea midline, no JVD  HEART: Regular rate and rhythm, no m/r/g  LUNGS: Clear to auscultation bilaterally, no crackles, wheezing, or rhonchi  ABDOMEN: Soft, nontender, nondistended, +BS  EXTREMITIES: 2+ peripheral pulses bilaterally, right lower extremity is shortened and externally rotated.  NERVOUS SYSTEM:  Alert, no focal deficits  Skin: Warm and dry. No acute rash  Psychiatric: Cooperative and appropriate    I&O's Detail    20 Oct 2024 07:01  -  21 Oct 2024 07:00  --------------------------------------------------------  IN:    Oral Fluid: 240 mL  Total IN: 240 mL    OUT:    Voided (mL): 700 mL  Total OUT: 700 mL    Total NET: -460 mL                                    9.8    7.59  )-----------( 166      ( 21 Oct 2024 07:28 )             30.7     21 Oct 2024 07:28    139    |  106    |  12     ----------------------------<  120    4.2     |  24     |  0.8      Ca    9.1        21 Oct 2024 07:28  Mg     2.0       21 Oct 2024 07:28    TPro  5.7    /  Alb  3.4    /  TBili  0.2    /  DBili  x      /  AST  34     /  ALT  12     /  AlkPhos  79     21 Oct 2024 07:28    PT/INR - ( 19 Oct 2024 20:00 )   PT: 11.20 sec;   INR: 0.98 ratio         PTT - ( 19 Oct 2024 20:00 )  PTT:20.2 sec  CAPILLARY BLOOD GLUCOSE      POCT Blood Glucose.: 172 mg/dL (21 Oct 2024 11:28)  POCT Blood Glucose.: 125 mg/dL (21 Oct 2024 07:26)  POCT Blood Glucose.: 134 mg/dL (20 Oct 2024 20:50)  POCT Blood Glucose.: 171 mg/dL (20 Oct 2024 16:45)  POCT Blood Glucose.: 140 mg/dL (20 Oct 2024 12:07)    LIVER FUNCTIONS - ( 21 Oct 2024 07:28 )  Alb: 3.4 g/dL / Pro: 5.7 g/dL / ALK PHOS: 79 U/L / ALT: 12 U/L / AST: 34 U/L / GGT: x           Urinalysis Basic - ( 21 Oct 2024 07:28 )    Color: x / Appearance: x / SG: x / pH: x  Gluc: 120 mg/dL / Ketone: x  / Bili: x / Urobili: x   Blood: x / Protein: x / Nitrite: x   Leuk Esterase: x / RBC: x / WBC x   Sq Epi: x / Non Sq Epi: x / Bacteria: x        MEDICATIONS  (STANDING):  chlorhexidine 2% Cloths 1 Application(s) Topical <User Schedule>  enoxaparin Injectable 40 milliGRAM(s) SubCutaneous every 24 hours  influenza  Vaccine (HIGH DOSE) 0.5 milliLiter(s) IntraMuscular once  pantoprazole    Tablet 40 milliGRAM(s) Oral before breakfast  QUEtiapine 25 milliGRAM(s) Oral daily    MEDICATIONS  (PRN):  acetaminophen     Tablet .. 650 milliGRAM(s) Oral every 6 hours PRN Mild Pain (1 - 3), Moderate Pain (4 - 6), Severe Pain (7 - 10)  ondansetron Injectable 4 milliGRAM(s) IV Push once PRN Nausea and/or Vomiting  oxyCODONE    IR 5 milliGRAM(s) Oral four times a day PRN Moderate Pain (4 - 6)      RADIOLOGY & ADDITIONAL TESTS:

## 2024-10-21 NOTE — DIETITIAN INITIAL EVALUATION ADULT - PERTINENT MEDS FT
MEDICATIONS  (STANDING):  chlorhexidine 2% Cloths 1 Application(s) Topical <User Schedule>  enoxaparin Injectable 40 milliGRAM(s) SubCutaneous every 24 hours  influenza  Vaccine (HIGH DOSE) 0.5 milliLiter(s) IntraMuscular once  pantoprazole    Tablet 40 milliGRAM(s) Oral before breakfast  QUEtiapine 25 milliGRAM(s) Oral daily    MEDICATIONS  (PRN):  acetaminophen     Tablet .. 650 milliGRAM(s) Oral every 6 hours PRN Mild Pain (1 - 3), Moderate Pain (4 - 6), Severe Pain (7 - 10)  ondansetron Injectable 4 milliGRAM(s) IV Push once PRN Nausea and/or Vomiting  oxyCODONE    IR 5 milliGRAM(s) Oral four times a day PRN Moderate Pain (4 - 6)

## 2024-10-21 NOTE — DIETITIAN INITIAL EVALUATION ADULT - OTHER INFO
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 She had the colostomy reversed. She presents after an unwitnessed  fall at home.  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 Patient alert with periods of confusion. Presented to ED from home after a fall.  She presents after an unwitnessed  fall at home.  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 Patient alert with periods of confusion. Presented to ED from home after a fall.   # Comminuted Hip fracture  - Evaluated by ortho, tentatively scheduled for OR tomorrow  - Pain control : Tylenol 650q6 PRN for now  - Pre-Op CBC, CMP/BMP, PT/PTT/INR, Type & Screen x2, CXR, EKG, COVID test  - 2u RBC on hold for surgery  - NPO after midnight  - Revised Cardiac Risk Index for Pre-Operative Risk: 0  monitor hemoglobin   #HTN  BP: 163/69 (21 Oct 2024 07:10) (135/75 - 163/69)  controlled

## 2024-10-22 LAB
ALBUMIN SERPL ELPH-MCNC: 3.2 G/DL — LOW (ref 3.5–5.2)
ALP SERPL-CCNC: 73 U/L — SIGNIFICANT CHANGE UP (ref 30–115)
ALT FLD-CCNC: 11 U/L — SIGNIFICANT CHANGE UP (ref 0–41)
ANION GAP SERPL CALC-SCNC: 12 MMOL/L — SIGNIFICANT CHANGE UP (ref 7–14)
AST SERPL-CCNC: 27 U/L — SIGNIFICANT CHANGE UP (ref 0–41)
BASOPHILS # BLD AUTO: 0.06 K/UL — SIGNIFICANT CHANGE UP (ref 0–0.2)
BASOPHILS NFR BLD AUTO: 1.1 % — HIGH (ref 0–1)
BILIRUB SERPL-MCNC: 0.5 MG/DL — SIGNIFICANT CHANGE UP (ref 0.2–1.2)
BUN SERPL-MCNC: 9 MG/DL — LOW (ref 10–20)
CALCIUM SERPL-MCNC: 8.6 MG/DL — SIGNIFICANT CHANGE UP (ref 8.4–10.5)
CHLORIDE SERPL-SCNC: 108 MMOL/L — SIGNIFICANT CHANGE UP (ref 98–110)
CO2 SERPL-SCNC: 23 MMOL/L — SIGNIFICANT CHANGE UP (ref 17–32)
CREAT SERPL-MCNC: 0.6 MG/DL — LOW (ref 0.7–1.5)
EGFR: 91 ML/MIN/1.73M2 — SIGNIFICANT CHANGE UP
EOSINOPHIL # BLD AUTO: 0.17 K/UL — SIGNIFICANT CHANGE UP (ref 0–0.7)
EOSINOPHIL NFR BLD AUTO: 3.2 % — SIGNIFICANT CHANGE UP (ref 0–8)
GLUCOSE BLDC GLUCOMTR-MCNC: 130 MG/DL — HIGH (ref 70–99)
GLUCOSE BLDC GLUCOMTR-MCNC: 170 MG/DL — HIGH (ref 70–99)
GLUCOSE BLDC GLUCOMTR-MCNC: 184 MG/DL — HIGH (ref 70–99)
GLUCOSE BLDC GLUCOMTR-MCNC: 96 MG/DL — SIGNIFICANT CHANGE UP (ref 70–99)
GLUCOSE SERPL-MCNC: 97 MG/DL — SIGNIFICANT CHANGE UP (ref 70–99)
HCT VFR BLD CALC: 28.4 % — LOW (ref 37–47)
HGB BLD-MCNC: 9.3 G/DL — LOW (ref 12–16)
IMM GRANULOCYTES NFR BLD AUTO: 0.4 % — HIGH (ref 0.1–0.3)
LYMPHOCYTES # BLD AUTO: 0.93 K/UL — LOW (ref 1.2–3.4)
LYMPHOCYTES # BLD AUTO: 17.6 % — LOW (ref 20.5–51.1)
MAGNESIUM SERPL-MCNC: 1.9 MG/DL — SIGNIFICANT CHANGE UP (ref 1.8–2.4)
MCHC RBC-ENTMCNC: 29.8 PG — SIGNIFICANT CHANGE UP (ref 27–31)
MCHC RBC-ENTMCNC: 32.7 G/DL — SIGNIFICANT CHANGE UP (ref 32–37)
MCV RBC AUTO: 91 FL — SIGNIFICANT CHANGE UP (ref 81–99)
MONOCYTES # BLD AUTO: 0.8 K/UL — HIGH (ref 0.1–0.6)
MONOCYTES NFR BLD AUTO: 15.2 % — HIGH (ref 1.7–9.3)
NEUTROPHILS # BLD AUTO: 3.29 K/UL — SIGNIFICANT CHANGE UP (ref 1.4–6.5)
NEUTROPHILS NFR BLD AUTO: 62.5 % — SIGNIFICANT CHANGE UP (ref 42.2–75.2)
NRBC # BLD: 0 /100 WBCS — SIGNIFICANT CHANGE UP (ref 0–0)
PLATELET # BLD AUTO: 181 K/UL — SIGNIFICANT CHANGE UP (ref 130–400)
PMV BLD: 10.1 FL — SIGNIFICANT CHANGE UP (ref 7.4–10.4)
POTASSIUM SERPL-MCNC: 4.1 MMOL/L — SIGNIFICANT CHANGE UP (ref 3.5–5)
POTASSIUM SERPL-SCNC: 4.1 MMOL/L — SIGNIFICANT CHANGE UP (ref 3.5–5)
PROT SERPL-MCNC: 5.4 G/DL — LOW (ref 6–8)
RBC # BLD: 3.12 M/UL — LOW (ref 4.2–5.4)
RBC # FLD: 13.9 % — SIGNIFICANT CHANGE UP (ref 11.5–14.5)
SODIUM SERPL-SCNC: 143 MMOL/L — SIGNIFICANT CHANGE UP (ref 135–146)
WBC # BLD: 5.27 K/UL — SIGNIFICANT CHANGE UP (ref 4.8–10.8)
WBC # FLD AUTO: 5.27 K/UL — SIGNIFICANT CHANGE UP (ref 4.8–10.8)

## 2024-10-22 PROCEDURE — 99232 SBSQ HOSP IP/OBS MODERATE 35: CPT

## 2024-10-22 RX ADMIN — CHLORHEXIDINE GLUCONATE 1 APPLICATION(S): 40 SOLUTION TOPICAL at 05:55

## 2024-10-22 RX ADMIN — Medication 40 MILLIGRAM(S): at 11:18

## 2024-10-22 RX ADMIN — PANTOPRAZOLE SODIUM 40 MILLIGRAM(S): 40 TABLET, DELAYED RELEASE ORAL at 05:54

## 2024-10-22 RX ADMIN — QUETIAPINE FUMARATE 25 MILLIGRAM(S): 200 TABLET ORAL at 22:15

## 2024-10-22 NOTE — OCCUPATIONAL THERAPY INITIAL EVALUATION ADULT - ADL RETRAINING, OT EVAL
By discharge, Pt will perform lower body dressing with moderate assistance using adaptive equipment and rolling walker as necessary

## 2024-10-22 NOTE — PROGRESS NOTE ADULT - SUBJECTIVE AND OBJECTIVE BOX
CC: Fall (21 Oct 2024 10:12)    INTERVAL HPI/OVERNIGHT EVENTS:  POD#1. Patient seen and examined at bedside. Reports mild pain at surgical site. No new complaints. Working well with PT.       Vital Signs Last 24 Hrs  T(C): 36.8 (21 Oct 2024 07:10), Max: 36.9 (21 Oct 2024 00:01)  T(F): 98.2 (21 Oct 2024 07:10), Max: 98.5 (21 Oct 2024 00:01)  HR: 79 (21 Oct 2024 07:10) (72 - 79)  BP: 163/69 (21 Oct 2024 07:10) (135/75 - 163/69)  BP(mean): 99 (21 Oct 2024 00:01) (99 - 99)  RR: 18 (21 Oct 2024 07:10) (18 - 18)  SpO2: 98% (21 Oct 2024 07:10) (98% - 100%)    Parameters below as of 21 Oct 2024 07:10  Patient On (Oxygen Delivery Method): room air      PHYSICAL EXAM:  GENERAL: NAD, resting comfortably  HEAD:  Atraumatic, normocephalic  EYES: EOMI, PERRL  NECK: Supple, trachea midline, no JVD  HEART: Regular rate and rhythm, no m/r/g  LUNGS: Clear to auscultation bilaterally, no crackles, wheezing, or rhonchi  ABDOMEN: Soft, nontender, nondistended, +BS  EXTREMITIES: 2+ peripheral pulses bilaterally, right lower extremity is shortened and externally rotated.  NERVOUS SYSTEM:  Alert, no focal deficits  Skin: Warm and dry. No acute rash  Psychiatric: Cooperative and appropriate    I&O's Detail    20 Oct 2024 07:01  -  21 Oct 2024 07:00  --------------------------------------------------------  IN:    Oral Fluid: 240 mL  Total IN: 240 mL    OUT:    Voided (mL): 700 mL  Total OUT: 700 mL    Total NET: -460 mL                                    9.8    7.59  )-----------( 166      ( 21 Oct 2024 07:28 )             30.7     21 Oct 2024 07:28    139    |  106    |  12     ----------------------------<  120    4.2     |  24     |  0.8      Ca    9.1        21 Oct 2024 07:28  Mg     2.0       21 Oct 2024 07:28    TPro  5.7    /  Alb  3.4    /  TBili  0.2    /  DBili  x      /  AST  34     /  ALT  12     /  AlkPhos  79     21 Oct 2024 07:28    PT/INR - ( 19 Oct 2024 20:00 )   PT: 11.20 sec;   INR: 0.98 ratio         PTT - ( 19 Oct 2024 20:00 )  PTT:20.2 sec  CAPILLARY BLOOD GLUCOSE      POCT Blood Glucose.: 172 mg/dL (21 Oct 2024 11:28)  POCT Blood Glucose.: 125 mg/dL (21 Oct 2024 07:26)  POCT Blood Glucose.: 134 mg/dL (20 Oct 2024 20:50)  POCT Blood Glucose.: 171 mg/dL (20 Oct 2024 16:45)  POCT Blood Glucose.: 140 mg/dL (20 Oct 2024 12:07)    LIVER FUNCTIONS - ( 21 Oct 2024 07:28 )  Alb: 3.4 g/dL / Pro: 5.7 g/dL / ALK PHOS: 79 U/L / ALT: 12 U/L / AST: 34 U/L / GGT: x           Urinalysis Basic - ( 21 Oct 2024 07:28 )    Color: x / Appearance: x / SG: x / pH: x  Gluc: 120 mg/dL / Ketone: x  / Bili: x / Urobili: x   Blood: x / Protein: x / Nitrite: x   Leuk Esterase: x / RBC: x / WBC x   Sq Epi: x / Non Sq Epi: x / Bacteria: x        MEDICATIONS  (STANDING):  chlorhexidine 2% Cloths 1 Application(s) Topical <User Schedule>  enoxaparin Injectable 40 milliGRAM(s) SubCutaneous every 24 hours  influenza  Vaccine (HIGH DOSE) 0.5 milliLiter(s) IntraMuscular once  pantoprazole    Tablet 40 milliGRAM(s) Oral before breakfast  QUEtiapine 25 milliGRAM(s) Oral daily    MEDICATIONS  (PRN):  acetaminophen     Tablet .. 650 milliGRAM(s) Oral every 6 hours PRN Mild Pain (1 - 3), Moderate Pain (4 - 6), Severe Pain (7 - 10)  ondansetron Injectable 4 milliGRAM(s) IV Push once PRN Nausea and/or Vomiting  oxyCODONE    IR 5 milliGRAM(s) Oral four times a day PRN Moderate Pain (4 - 6)      RADIOLOGY & ADDITIONAL TESTS:

## 2024-10-22 NOTE — OCCUPATIONAL THERAPY INITIAL EVALUATION ADULT - ADDITIONAL COMMENTS
Pt independent with ADLs at baseline. Uses a shower chair and uses a  walker when outside. No HCS or HHA.

## 2024-10-22 NOTE — OCCUPATIONAL THERAPY INITIAL EVALUATION ADULT - LIVES WITH, PROFILE
Pt is a poor historian. History obtained from chart. As per chart Pt lives with her grandson and granddaughter in a private home., 0 steps to enters and 6 steps to basement./children

## 2024-10-22 NOTE — OCCUPATIONAL THERAPY INITIAL EVALUATION ADULT - GENERAL OBSERVATIONS, REHAB EVAL
Pt encountered semi-reclined in bed in NAD, +prima fit, +IV pulled out and laying on Pt's lap (LAURO Aburto made aware), +call bell, +bed alarm. Pt agreeable to OT eval. A+O to self; confused but follows commnads. c/o pain with movement, not able to verbalize VAS. LAURO Aburto made aware.

## 2024-10-22 NOTE — PROGRESS NOTE ADULT - SUBJECTIVE AND OBJECTIVE BOX
[facility]ANT[facility]JA  79[facility]      Patient is a 79y old  Female who presents with a chief complaint of Fall (22 Oct 2024 09:40)      My note supersedes the resident's note      INTERVAL HPI/OVERNIGHT EVENTS:    no acute events overnight     REVIEW OF SYSTEMS:  CONSTITUTIONAL: No fever, weight loss, or fatigue  EYES: No eye pain, visual disturbances, or discharge  ENMT:  No difficulty hearing, tinnitus, vertigo; No sinus or throat pain  NECK: No pain or stiffness  BREASTS: No pain, masses, or nipple discharge  RESPIRATORY: No cough, wheezing, chills or hemoptysis; No shortness of breath  CARDIOVASCULAR: No chest pain, palpitations, dizziness, or leg swelling  GASTROINTESTINAL: No abdominal or epigastric pain. No nausea, vomiting, or hematemesis; No diarrhea or constipation. No melena or hematochezia.  GENITOURINARY: No dysuria, frequency, hematuria, or incontinence  NEUROLOGICAL: No headaches, memory loss, loss of strength, numbness, or tremors  SKIN: No itching, burning, rashes, or lesions   LYMPH NODES: No enlarged glands  ENDOCRINE: No heat or cold intolerance; No hair loss  MUSCULOSKELETAL: No joint pain or swelling; No muscle, back, or extremity pain  PSYCHIATRIC: No depression, anxiety, mood swings, or difficulty sleeping  HEME/LYMPH: No easy bruising, or bleeding gums  ALLERY AND IMMUNOLOGIC: No hives or eczema  FAMILY HISTORY:    T(C): 36.7 (10-22-24 @ 07:10), Max: 37.9 (10-22-24 @ 00:21)  HR: 63 (10-22-24 @ 07:10) (63 - 96)  BP: 131/69 (10-22-24 @ 07:10) (131/69 - 151/71)  RR: 18 (10-22-24 @ 07:10) (17 - 18)  SpO2: 98% (10-22-24 @ 07:10) (98% - 98%)  Wt(kg): --Vital Signs Last 24 Hrs  T(C): 36.7 (22 Oct 2024 07:10), Max: 37.9 (22 Oct 2024 00:21)  T(F): 98 (22 Oct 2024 07:10), Max: 100.3 (22 Oct 2024 00:21)  HR: 63 (22 Oct 2024 07:10) (63 - 96)  BP: 131/69 (22 Oct 2024 07:10) (131/69 - 151/71)  BP(mean): --  RR: 18 (22 Oct 2024 07:10) (17 - 18)  SpO2: 98% (22 Oct 2024 07:10) (98% - 98%)    Parameters below as of 22 Oct 2024 07:10  Patient On (Oxygen Delivery Method): room air        PHYSICAL EXAM:  GENERAL: NAD,   HEAD:  Atraumatic, Normocephalic  EYES: EOMI, PERRLA, conjunctiva and sclera clear  ENMT: No tonsillar erythema, exudates, or enlargement; Moist mucous membranes, Good dentition, No lesions  NECK: Supple, No JVD, Normal thyroid  NERVOUS SYSTEM:  Alert & Oriented X1  PULM: Clear to auscultation bilaterally  CARDIAC: Regular rate and rhythm; No murmurs, rubs, or gallops  GI: Soft, Nontender, Nondistended; Bowel sounds present  EXTREMITIES:  2+ Peripheral Pulses, No clubbing, cyanosis, or edema  LYMPH: No lymphadenopathy noted  SKIN: No rashes or lesions    Consultant(s) Notes Reviewed:  [x ] YES  [ ] NO  Care Discussed with Consultants/Other Providers [ x] YES  [ ] NO    LABS:                            9.3    5.27  )-----------( 181      ( 22 Oct 2024 07:56 )             28.4   10-21    139  |  106  |  12  ----------------------------<  120[H]  4.2   |  24  |  0.8    Ca    9.1      21 Oct 2024 07:28  Mg     2.0     10-21    TPro  5.7[L]  /  Alb  3.4[L]  /  TBili  0.2  /  DBili  x   /  AST  34  /  ALT  12  /  AlkPhos  79  10-21            acetaminophen     Tablet .. 650 milliGRAM(s) Oral every 6 hours PRN  chlorhexidine 2% Cloths 1 Application(s) Topical <User Schedule>  enoxaparin Injectable 40 milliGRAM(s) SubCutaneous every 24 hours  influenza  Vaccine (HIGH DOSE) 0.5 milliLiter(s) IntraMuscular once  ondansetron Injectable 4 milliGRAM(s) IV Push once PRN  oxyCODONE    IR 5 milliGRAM(s) Oral four times a day PRN  pantoprazole    Tablet 40 milliGRAM(s) Oral before breakfast  QUEtiapine 25 milliGRAM(s) Oral daily      HEALTH ISSUES - PROBLEM Dx:          Case Discussed with House Staff   Spectra [phone]

## 2024-10-22 NOTE — CONSULT NOTE ADULT - ASSESSMENT
Patient is a 79y old  Female who presents with a chief complaint of Fracture of unspecified part of neck of unspecified femur, initial encounter for closed fracture.    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    She had the colostomy reversed and has not had any new hospitalizations since then.    She presents after an unwitnessed  fall at home, she doesn't remember how she fell but does not report any head trauma or loss of consciousness. Not on anticoagulation.   She reports ambulating independently very rarely uses the walker (confirmed with son)  Spoke with son, has not had any fall recently. Medrec consists of only Trazodone and Quietiapine. Patient does not take anything else at home  She does not endorse any urinary symptoms, no fevers or chills     In the ED:  - Vitals: T 36.3 HR 55 -72 SPO2 100% RR 18  - Labs: WBC 7.98 Hgb 12.9  Na 141 K 4.6 Cr 0.8  - Imaging:   _ Chest xray: No radiographic evidence of acute cardiopulmonary disease.  _Right femoral intertrochanteric displaced comminuted fracture, varus deformity. Bilateral hip arthrosis. Lower lumbar spine degenerative changes. IVC filter. Right pelvic chain sutures. Bilateral femoral vascular calcification.   (19 Oct 2024 16:56)    PAST MEDICAL & SURGICAL HISTORY:  Hypertension, unspecified type  Dementia  GERD (gastroesophageal reflux disease)  Diverticulitis  Lack of bladder control    H/O dilation and curettage  for missed     MEDICATIONS  (STANDING):  chlorhexidine 2% Cloths 1 Application(s) Topical <User Schedule>  enoxaparin Injectable 40 milliGRAM(s) SubCutaneous every 24 hours  influenza  Vaccine (HIGH DOSE) 0.5 milliLiter(s) IntraMuscular once  pantoprazole    Tablet 40 milliGRAM(s) Oral before breakfast  QUEtiapine 25 milliGRAM(s) Oral daily    MEDICATIONS  (PRN):  acetaminophen     Tablet .. 650 milliGRAM(s) Oral every 6 hours PRN Mild Pain (1 - 3), Moderate Pain (4 - 6), Severe Pain (7 - 10)  ondansetron Injectable 4 milliGRAM(s) IV Push once PRN Nausea and/or Vomiting  oxyCODONE    IR 5 milliGRAM(s) Oral four times a day PRN Moderate Pain (4 - 6)    Allergies  No Known Allergies    Vital Signs Last 24 Hrs  T(C): 37.4 (21 Oct 2024 15:24), Max: 37.4 (21 Oct 2024 15:)  T(F): 99.3 (21 Oct 2024 15:24), Max: 99.3 (21 Oct 2024 15:24)  HR: 96 (21 Oct 2024 15:) (72 - 96)  BP: 136/83 (21 Oct 2024 15:) (136/83 - 163/69)  BP(mean): 99 (21 Oct 2024 00:01) (99 - 99)  RR: 17 (21 Oct 2024 15:) (17 - 18)  SpO2: 98% (21 Oct 2024 15:) (98% - 100%)    Parameters below as of 21 Oct 2024 15:24  Patient On (Oxygen Delivery Method): room air    LABS:                        9.8    7.59  )-----------( 166      ( 21 Oct 2024 07:28 )             30.7     10-21    139  |  106  |  12  ----------------------------<  120[H]  4.2   |  24  |  0.8    Ca    9.1      21 Oct 2024 07:  Mg     2.0     10-21    TPro  5.7[L]  /  Alb  3.4[L]  /  TBili  0.2  /  DBili  x   /  AST  34  /  ALT  12  /  AlkPhos  79  10-21    WBC Count: 7.59 K/uL [4.80 - 10.80] (10-21 @ 07:28)  Platelet Count - Automated: 166 K/uL [130 - 400] (10-21 @ 07:28)  WBC Count: 9.51 K/uL [4.80 - 10.80] (10-20 @ 18:37)  Platelet Count - Automated: 219 K/uL [130 - 400] (10-20 @ 18:37)  WBC Count: 9.15 K/uL [4.80 - 10.80] (10-19 @ 20:00)  Platelet Count - Automated: 228 K/uL [130 - 400] (10-19 @ 20:00)  INR: 0.98 ratio [0.65 - 1.30] (10-19 @ 20:00)  WBC Count: 7.98 K/uL [4.80 - 10.80] (10-19 @ 13:19)  Platelet Count - Automated: 241 K/uL [130 - 400] (10-19 @ 13:19)  INR: 0.96 ratio [0.65 - 1.30] (10-19 @ 13:19)    Urinalysis Basic - ( 21 Oct 2024 07:28 )    Color: x / Appearance: x / SG: x / pH: x  Gluc: 120 mg/dL / Ketone: x  / Bili: x / Urobili: x   Blood: x / Protein: x / Nitrite: x   Leuk Esterase: x / RBC: x / WBC x   Sq Epi: x / Non Sq Epi: x / Bacteria: x    PT/INR - ( 19 Oct 2024 20:00 )   PT: 11.20 sec;   INR: 0.98 ratio       PTT - ( 19 Oct 2024 20:00 )  PTT:20.2 sec  EOE:  TMJ ( - ) clicks                     ( - ) pops                     ( - ) crepitus             Mandible COOPER within normal limits             Facial bones and MOM <<grossly intact>>             ( - ) trismus             ( - ) lymphadenopathy             ( - ) swelling             ( - ) asymmetry             ( - ) palpation             ( - ) dyspnea             ( - ) dysphagia             ( - ) loss of consciousness    IOE:  <<permanent>> dentition: <<grossly intact>>            hard/soft palate: <<No pathology noted>>           tongue/FOM <<No pathology noted>>           labial/buccal mucosa <<No pathology noted>>           ( - ) percussion           ( -) palpation           ( - swelling            ( -) abscess           ( -) sinus tract    *ASSESSMENT: Patient presents with loose fixed maxillary partial denture. Clinical exam reveals failure of current abutment teeth. Explained to patient that we can remove the current fixed denture so that she is more comfortable. Patient agrees. Treatment risks and benefits explained as per OS sheet dated 2000. Consents obtained, side site completed.    PROCEDURE: Administered 1 carpule 4% septocaine 1:100k epinephrine via local infiltration. Removed fixed partial denture, extracted tooth #6. Root tips for teeth #11, 12, 13 remain in the mouth. Explained to patient that she must return to her outside dentist once discharged for a comprehensive treatment plan and radiographs. Patient understood.    RECOMMENDATIONS:  1 Dental F/U with outpatient dentist for comprehensive dental care.   2 If any difficulty swallowing/breathing, fever occur, return to ER.     Resident Name: Lacie Long DDS
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 s/p reversed colostomy, HTN, dementia (A&Ox1 at baseline)    PRECAUTIONS: [   ] Cardiac  [   ] Respiratory  [   ] Seizures [   ] Contact Isolation  [   ] Droplet Isolation  [   ] Other    Weight Bearing Status: WBAT RLE    RECOMMENDATION:    Out of Bed to Chair     DVT/Decubiti Prophylaxis    REHAB PLAN:     [  x  ] Bedside P/T 3-5 times a week   [ x   ]   Bedside O/T  2-3 times a week             [    ] Speech Therapy               [    ]  No Rehab Therapy Indicated   Conditioning/ROM                                    ADL  Bed Mobility                                               Conditioning/ROM  Transfers                                                     Bed Mobility  Sitting /Standing Balance                         Transfers                                        Gait Training                                               Sitting/Standing Balance  Stair Training [   ]Applicable                    Home equipment Eval                                                                        Splinting  [   ] Only      GOALS:   ADL   [   x ]   Independent                    Transfers  [  x  ] Independent                          Ambulation  [ x  ] Independent     [    x ] With device                            [    ]  CG                                                         [    ]  CG                                                                  [    ] CG                            [    ] Min A                                                   [    ] Min A                                                              [    ] Min  A          DISCHARGE PLAN:   [    ]  Good candidate for Intensive Rehabilitation/Hospital based                                             Will tolerate 3hrs Intensive Rehab Daily                                       [     ]  Short Term Rehab in Skilled Nursing Facility                                       [     ]  Home with Outpatient or VN services                                         [ x  ]  Possible Candidate for Intensive Hospital based Rehab - if pt can participate in therapy and make functional gains

## 2024-10-22 NOTE — OCCUPATIONAL THERAPY INITIAL EVALUATION ADULT - TRANSFER TRAINING, PT EVAL
By discharge, Pt will perform toilet transfers with moderate assistance using rolling walker and commode.

## 2024-10-22 NOTE — OCCUPATIONAL THERAPY INITIAL EVALUATION ADULT - SHORT TERM MEMORY, REHAB EVAL
Max impairment. Pt with immediate recall of 3/3 words, recalled 0/3 words with 3-5 minutes delay/impaired

## 2024-10-22 NOTE — OCCUPATIONAL THERAPY INITIAL EVALUATION ADULT - PERTINENT HX OF CURRENT PROBLEM, REHAB EVAL
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    She presents after an unwitnessed  fall at home, she doesn't remember how she fell but does not report any head trauma or loss of consciousness. Not on anticoagulation.   She reports ambulating independently very rarely uses the walker (confirmed with son)  Spoke with son, has not had any fall recently. Medrec consists of only Trazodone and Quietiapine. Patient does not take anything else at home

## 2024-10-22 NOTE — CDI QUERY NOTE - NSCDIOTHERTXTBX_GEN_ALL_CORE_HH
Clinical documentation and/or evidence of the patient’s presentation, evaluation, and medical management, as evidenced below, may support a diagnosis that is not documented in the medical record.  In order to ensure accurate coding and accuracy of the clinical record, the documentation in this patient’s medical record requires additional clarification.      If you think the supporting documentation and/or clinical evidence supports a more specific diagnosis, please include more specific documentation of a diagnosis associated with these findings in your progress note and/or discharge summary.    Please clarify if the patient was found to have one of the following:         • Underweight with BMI 17.7 was evaluated        • Other (specify)    Supporting documentation and/or clinical evidence:    10/21 Progress Note Adult-Hospitalist Attending: ...  presenting from home after a fall. Hip fracture status post fall. sp or. pt ot. change iv pain med to oral     10/21 Dietitian Initial Evaluation Adult: ... Reason/Indicator for Assessment: BMI <19 ... Body Mass Index: 17.7 ... Physical Assessment: underweight ... Oral Nutrition Supplements: Ensure (Chocolate) 3x/day - to promote healing and weight stability ... Additional Recommendations: High Risk f/u 3-5 days. Interventions: meals, snacks, nutritional supplements, coordination of care. Monitoring/Evaluation: PO intake, Wt, labs, acceptance of nutritional supplement, electrolytes    Per MD orders: Food and Nutrition: DIet, Pureed-Supplement Feeding Modality: Oral, Ensure Plus High Protein Cans or Servings Per Day: 1, Frequency: Three times a day      Thank you,  Brianna Castle RN [facility]  [phone]

## 2024-10-22 NOTE — CONSULT NOTE ADULT - SUBJECTIVE AND OBJECTIVE BOX
ORTHOPAEDIC SURGERY CONSULT NOTE    Reason for Consult: R It FX    HPI: 79yFemale presents with pain in right hip after ground level fall. Was in her house and fall when going to answer the door. NO HS or LOC. AMbulates without assistance. No AC use. No pain elsewhere. No numbness or tingling.     History of HTN and dementia. Lives with grand daughter. Has stairs at home.      PAST MEDICAL & SURGICAL HISTORY:  Hypertension, unspecified type      Dementia  ao x 2      GERD (gastroesophageal reflux disease)      Diverticulitis      Lack of bladder control      H/O dilation and curettage  for missed         Allergies: No Known Allergies    Medications:     PHYSICAL EXAM:  Vital Signs Last 24 Hrs  T(C): 36.3 (19 Oct 2024 13:02), Max: 36.3 (19 Oct 2024 13:02)  T(F): 97.4 (19 Oct 2024 13:02), Max: 97.4 (19 Oct 2024 13:02)  HR: 55 (19 Oct 2024 13:02) (55 - 55)  BP: 131/72 (19 Oct 2024 13:02) (131/72 - 131/72)  BP(mean): --  RR: 18 (19 Oct 2024 13:02) (18 - 18)  SpO2: 100% (19 Oct 2024 13:02) (100% - 100%)    Parameters below as of 19 Oct 2024 13:02  Patient On (Oxygen Delivery Method): room air        Physical exam:  Awake, alert  Non-labored breathing        RLE  Skin intact  Shortened rotated  Ecchymosis about hip  ROM deferred  TTP hip  Compartments soft and compressible, no pain w/ passive stretch of digits  SILT SP/DP/T/Sural/Saph  Firing TA/EHL/FHL/GS  DP pulse 2+, cap refill <2    BL UE LLE  Skin intact  No swelling/erythema/ecchymosis noted  No TTP, stepoff, deformity   ROM grossly intact in all joints, all planes  Motor/ sensation grossly intact  WWP distally    Labs:                        12.9   7.98  )-----------( 241      ( 19 Oct 2024 13:19 )             39.9     10-    141  |  103  |  17  ----------------------------<  297[H]  4.6   |  24  |  0.8    Ca    10.1      19 Oct 2024 13:19    TPro  6.9  /  Alb  4.2  /  TBili  0.3  /  DBili  x   /  AST  17  /  ALT  10  /  AlkPhos  97  10-    PT/INR - ( 19 Oct 2024 13:19 )   PT: 10.90 sec;   INR: 0.96 ratio         PTT - ( 19 Oct 2024 13:19 )  PTT:27.6 sec    Imaging XR: Right IT hip fracture    A/P: 79y Female with right intertrochanteric hip fracture. DIscussed with patient. Indicated for operative intervention.     Added on for right hip open reduction internal fixation 10/20/24 pending clearances.       - Bed rest  - Pain control per primary team  - DVT PPx per primary, to be held on morning of procedure  - Patient requires Internal Medicine pre-op clearance / risk stratification / medical optimization  - Pre-Op CBC, CMP/BMP, PT/PTT/INR, Type & Screen x2, CXR, EKG, COVID test  - Trend Hgb  - 2u RBC on hold for surgery  - NPO for surgery tomorrow 
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    She presents after an unwitnessed  fall at home, she doesn't remember how she fell but does not report any head trauma or loss of consciousness. Not on anticoagulation.   She reports ambulating independently very rarely uses the walker (confirmed with son)  Spoke with son, has not had any fall recently. Medrec consists of only Trazodone and Quietiapine. Patient does not take anything else at home  She does not endorse any urinary symptoms, no fevers or chills     In the ED:  - Vitals: T 36.3 HR 55 -72 SPO2 100% RR 18  - Labs: WBC 7.98 Hgb 12.9  Na 141 K 4.6 Cr 0.8  - Imaging:   _ Chest xray: No radiographic evidence of acute cardiopulmonary disease.  _Right femoral intertrochanteric displaced comminuted fracture, varus deformity. Bilateral hip arthrosis. Lower lumbar spine degenerative changes. IVC filter. Right pelvic chain sutures. Bilateral femoral vascular calcification.     S/p ORIF R hip 10/20/24, Weightbearing: WBAT RLE      PAST MEDICAL & SURGICAL HISTORY:  Hypertension, unspecified type      Dementia  ao x 2      GERD (gastroesophageal reflux disease)      Diverticulitis      Lack of bladder control      H/O dilation and curettage  for missed           Hospital Course:    TODAY'S SUBJECTIVE & REVIEW OF SYMPTOMS:     Constitutional WNL   Cardio WNL   Resp WNL   GI WNL  Heme WNL  Endo WNL  Skin WNL  MSK right hip pain   Neuro WNL  Cognitive WNL  Psych WNL      MEDICATIONS  (STANDING):  chlorhexidine 2% Cloths 1 Application(s) Topical <User Schedule>  enoxaparin Injectable 40 milliGRAM(s) SubCutaneous every 24 hours  influenza  Vaccine (HIGH DOSE) 0.5 milliLiter(s) IntraMuscular once  pantoprazole    Tablet 40 milliGRAM(s) Oral before breakfast  QUEtiapine 25 milliGRAM(s) Oral daily    MEDICATIONS  (PRN):  acetaminophen     Tablet .. 650 milliGRAM(s) Oral every 6 hours PRN Mild Pain (1 - 3), Moderate Pain (4 - 6), Severe Pain (7 - 10)  ondansetron Injectable 4 milliGRAM(s) IV Push once PRN Nausea and/or Vomiting  oxyCODONE    IR 5 milliGRAM(s) Oral four times a day PRN Moderate Pain (4 - 6)      FAMILY HISTORY:      Allergies    No Known Allergies    Intolerances        SOCIAL HISTORY:    [  ] Etoh  [  ] Smoking  [  ] Substance abuse     Home Environment:  [   ] Home Alone  [ x  ] Lives with Family  [   ] Home Health Aid    Dwelling:  [   ] Apartment  [ x  ] Private House  [   ] Adult Home  [   ] Skilled Nursing Facility      [   ] Short Term  [   ] Long Term  [ x  ] Stairs       Elevator [   ]    FUNCTIONAL STATUS PTA: (Check all that apply)  Ambulation: [ x   ]Independent    [   ] Dependent     [   ] Non-Ambulatory  Assistive Device: [   ] SA Cane  [   ]  Q Cane  [x   ] Walker  [   ]  Wheelchair  ADL : [   ] Independent  [    ]  Dependent       Vital Signs Last 24 Hrs  T(C): 36.7 (22 Oct 2024 07:10), Max: 37.9 (22 Oct 2024 00:21)  T(F): 98 (22 Oct 2024 07:10), Max: 100.3 (22 Oct 2024 00:21)  HR: 63 (22 Oct 2024 07:10) (63 - 96)  BP: 131/69 (22 Oct 2024 07:10) (131/69 - 151/71)  BP(mean): --  RR: 18 (22 Oct 2024 07:10) (17 - 18)  SpO2: 98% (22 Oct 2024 07:10) (98% - 98%)    Parameters below as of 22 Oct 2024 07:10  Patient On (Oxygen Delivery Method): room air          PHYSICAL EXAM: Awake & confused   GENERAL: NAD  HEAD:  Normocephalic  CHEST/LUNG: Clear   HEART: S1S2+  ABDOMEN: Soft, Nontender  EXTREMITIES:  no calf tenderness    NERVOUS SYSTEM:  Cranial Nerves 2-12 intact [   ] Abnormal  [   ]  ROM: WFL all extremities [   ]  Abnormal [ x  ]limited RLE  Motor Strength: WFL all extremities  [   ]  Abnormal [  x ]limited RLE  Sensation: intact to light touch [ x  ] Abnormal [   ]    FUNCTIONAL STATUS:  Bed Mobility: Independent [   ]  Supervision [   ]  Needs Assistance [ x  ]  N/A [   ]  Transfers: Independent [   ]  Supervision [   ]  Needs Assistance [   ]  N/A [   ]   Ambulation: Independent [   ]  Supervision [   ]  Needs Assistance [   ]  N/A [   ]  ADL: Independent [   ] Requires Assistance [   ] N/A [   ]      LABS:                        9.3    5.27  )-----------( 181      ( 22 Oct 2024 07:56 )             28.4     10-22    143  |  108  |  9[L]  ----------------------------<  97  4.1   |  23  |  0.6[L]    Ca    8.6      22 Oct 2024 07:56  Mg     1.9     10-22    TPro  5.4[L]  /  Alb  3.2[L]  /  TBili  0.5  /  DBili  x   /  AST  27  /  ALT  11  /  AlkPhos  73  10-22      Urinalysis Basic - ( 22 Oct 2024 07:56 )    Color: x / Appearance: x / SG: x / pH: x  Gluc: 97 mg/dL / Ketone: x  / Bili: x / Urobili: x   Blood: x / Protein: x / Nitrite: x   Leuk Esterase: x / RBC: x / WBC x   Sq Epi: x / Non Sq Epi: x / Bacteria: x        RADIOLOGY & ADDITIONAL STUDIES:

## 2024-10-22 NOTE — PROGRESS NOTE ADULT - SUBJECTIVE AND OBJECTIVE BOX
Orthopaedic Surgery Progress Note    S&E this AM. Pain well controlled. Denies fever/chills/CP/SOB. No other complaints      T(C): 36.7 (10-22-24 @ 07:10), Max: 37.9 (10-22-24 @ 00:21)  HR: 63 (10-22-24 @ 07:10) (63 - 96)  BP: 131/69 (10-22-24 @ 07:10) (131/69 - 151/71)  RR: 18 (10-22-24 @ 07:10) (17 - 18)  SpO2: 98% (10-22-24 @ 07:10) (98% - 98%)    P/E:  NAD  Nonlabored breathing    RLE  Dressing in place, c/d/i  SILT sp/dp/t/sural/saph  Firing ta/ehl/fhl/gs  Foot WWP, 2+ DP pulse    Labs                        9.3    5.27  )-----------( 181      ( 22 Oct 2024 07:56 )             28.4     10-21    139  |  106  |  12  ----------------------------<  120[H]  4.2   |  24  |  0.8    Ca    9.1      21 Oct 2024 07:28  Mg     2.0     10-21    TPro  5.7[L]  /  Alb  3.4[L]  /  TBili  0.2  /  DBili  x   /  AST  34  /  ALT  12  /  AlkPhos  79  10-21    LIVER FUNCTIONS - ( 21 Oct 2024 07:28 )  Alb: 3.4 g/dL / Pro: 5.7 g/dL / ALK PHOS: 79 U/L / ALT: 12 U/L / AST: 34 U/L / GGT: x             A/P:   80yo Female s/p ORIF R hip 10/20/24    Weightbearing: WBAT RLE  DVT ppx  Pain control  Incentive spirometer  Home meds  Trend labs  PT/OT/Rehab

## 2024-10-23 ENCOUNTER — TRANSCRIPTION ENCOUNTER (OUTPATIENT)
Age: 79
End: 2024-10-23

## 2024-10-23 VITALS
HEART RATE: 80 BPM | SYSTOLIC BLOOD PRESSURE: 138 MMHG | TEMPERATURE: 98 F | RESPIRATION RATE: 18 BRPM | OXYGEN SATURATION: 98 % | DIASTOLIC BLOOD PRESSURE: 85 MMHG

## 2024-10-23 LAB
ALBUMIN SERPL ELPH-MCNC: 3.3 G/DL — LOW (ref 3.5–5.2)
ALP SERPL-CCNC: 85 U/L — SIGNIFICANT CHANGE UP (ref 30–115)
ALT FLD-CCNC: 12 U/L — SIGNIFICANT CHANGE UP (ref 0–41)
ANION GAP SERPL CALC-SCNC: 10 MMOL/L — SIGNIFICANT CHANGE UP (ref 7–14)
AST SERPL-CCNC: 23 U/L — SIGNIFICANT CHANGE UP (ref 0–41)
BASOPHILS # BLD AUTO: 0.05 K/UL — SIGNIFICANT CHANGE UP (ref 0–0.2)
BASOPHILS NFR BLD AUTO: 0.8 % — SIGNIFICANT CHANGE UP (ref 0–1)
BILIRUB SERPL-MCNC: 0.4 MG/DL — SIGNIFICANT CHANGE UP (ref 0.2–1.2)
BUN SERPL-MCNC: 13 MG/DL — SIGNIFICANT CHANGE UP (ref 10–20)
CALCIUM SERPL-MCNC: 9.4 MG/DL — SIGNIFICANT CHANGE UP (ref 8.4–10.5)
CHLORIDE SERPL-SCNC: 108 MMOL/L — SIGNIFICANT CHANGE UP (ref 98–110)
CO2 SERPL-SCNC: 26 MMOL/L — SIGNIFICANT CHANGE UP (ref 17–32)
CREAT SERPL-MCNC: 0.6 MG/DL — LOW (ref 0.7–1.5)
EGFR: 91 ML/MIN/1.73M2 — SIGNIFICANT CHANGE UP
EOSINOPHIL # BLD AUTO: 0.32 K/UL — SIGNIFICANT CHANGE UP (ref 0–0.7)
EOSINOPHIL NFR BLD AUTO: 5.4 % — SIGNIFICANT CHANGE UP (ref 0–8)
GLUCOSE BLDC GLUCOMTR-MCNC: 112 MG/DL — HIGH (ref 70–99)
GLUCOSE BLDC GLUCOMTR-MCNC: 128 MG/DL — HIGH (ref 70–99)
GLUCOSE BLDC GLUCOMTR-MCNC: 207 MG/DL — HIGH (ref 70–99)
GLUCOSE SERPL-MCNC: 117 MG/DL — HIGH (ref 70–99)
HCT VFR BLD CALC: 31.3 % — LOW (ref 37–47)
HGB BLD-MCNC: 10.1 G/DL — LOW (ref 12–16)
IMM GRANULOCYTES NFR BLD AUTO: 0.5 % — HIGH (ref 0.1–0.3)
LYMPHOCYTES # BLD AUTO: 0.96 K/UL — LOW (ref 1.2–3.4)
LYMPHOCYTES # BLD AUTO: 16.2 % — LOW (ref 20.5–51.1)
MAGNESIUM SERPL-MCNC: 2.1 MG/DL — SIGNIFICANT CHANGE UP (ref 1.8–2.4)
MCHC RBC-ENTMCNC: 29.5 PG — SIGNIFICANT CHANGE UP (ref 27–31)
MCHC RBC-ENTMCNC: 32.3 G/DL — SIGNIFICANT CHANGE UP (ref 32–37)
MCV RBC AUTO: 91.5 FL — SIGNIFICANT CHANGE UP (ref 81–99)
MONOCYTES # BLD AUTO: 0.78 K/UL — HIGH (ref 0.1–0.6)
MONOCYTES NFR BLD AUTO: 13.1 % — HIGH (ref 1.7–9.3)
NEUTROPHILS # BLD AUTO: 3.8 K/UL — SIGNIFICANT CHANGE UP (ref 1.4–6.5)
NEUTROPHILS NFR BLD AUTO: 64 % — SIGNIFICANT CHANGE UP (ref 42.2–75.2)
NRBC # BLD: 0 /100 WBCS — SIGNIFICANT CHANGE UP (ref 0–0)
PLATELET # BLD AUTO: 217 K/UL — SIGNIFICANT CHANGE UP (ref 130–400)
PMV BLD: 9.9 FL — SIGNIFICANT CHANGE UP (ref 7.4–10.4)
POTASSIUM SERPL-MCNC: 4.2 MMOL/L — SIGNIFICANT CHANGE UP (ref 3.5–5)
POTASSIUM SERPL-SCNC: 4.2 MMOL/L — SIGNIFICANT CHANGE UP (ref 3.5–5)
PROT SERPL-MCNC: 5.9 G/DL — LOW (ref 6–8)
RBC # BLD: 3.42 M/UL — LOW (ref 4.2–5.4)
RBC # FLD: 13.9 % — SIGNIFICANT CHANGE UP (ref 11.5–14.5)
SODIUM SERPL-SCNC: 144 MMOL/L — SIGNIFICANT CHANGE UP (ref 135–146)
WBC # BLD: 5.94 K/UL — SIGNIFICANT CHANGE UP (ref 4.8–10.8)
WBC # FLD AUTO: 5.94 K/UL — SIGNIFICANT CHANGE UP (ref 4.8–10.8)

## 2024-10-23 PROCEDURE — 99239 HOSP IP/OBS DSCHRG MGMT >30: CPT

## 2024-10-23 RX ORDER — ACETAMINOPHEN 500 MG
2 TABLET ORAL
Qty: 0 | Refills: 0 | DISCHARGE
Start: 2024-10-23

## 2024-10-23 RX ORDER — OXYCODONE HYDROCHLORIDE 30 MG/1
1 TABLET ORAL
Qty: 0 | Refills: 0 | DISCHARGE
Start: 2024-10-23

## 2024-10-23 RX ORDER — ASPIRIN/MAG CARB/ALUMINUM AMIN 325 MG
1 TABLET ORAL
Qty: 0 | Refills: 0 | DISCHARGE

## 2024-10-23 RX ADMIN — CHLORHEXIDINE GLUCONATE 1 APPLICATION(S): 40 SOLUTION TOPICAL at 05:48

## 2024-10-23 RX ADMIN — PANTOPRAZOLE SODIUM 40 MILLIGRAM(S): 40 TABLET, DELAYED RELEASE ORAL at 05:47

## 2024-10-23 NOTE — PROGRESS NOTE ADULT - ASSESSMENT
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 Admitted to medicine for R hip fracture.     #Fall  #R Hip Fracture  - Hip Xray showed right femoral intertrochanteric displaced comminuted fracture.   - Evaluated by ortho in ED, continue to follow  - s/p R ORIF 10/20  - Pain control : Tylenol 650q6 PRN for now  - PT/OT to follow, WBAT, OOB to chair  - ancef 2g 28h for 24 hours  - incentive spirometry  - DVT prophylaxis  - PT/OT    #HTN  - monitor vitals inpatient  - BP stable  - if hypertensive, norvasc 5 mg    #Miscellaneous  - DVT prophylaxis: Lovenox 40 mg SC daily  - GI prophylaxis: Pantoprazole 40 mg daily  - Activity: Ambulate with assistance, WBAT  - Diet: DASH    Pending: monitor urine output, pain control, PT/OT  
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   - Evaluated by ortho, tentatively scheduled for OR today 10/20  - Pain control : Tylenol 650q6 PRN for now  - F/u Pre-Op CBC, CMP/BMP, PT/PTT/INR, Type & Screen x2, CXR, EKG, COVID test  - 2u RBC on hold for surgery  - NPO after midnight  - EKG showed Normal Sinus Rhythm, LAD, LAFB, no ischemic changes  - CXR 10/19: No radiographic evidence of acute cardiopulmonary disease.  - CTH 10/19: no acute intracranial pathology. Stable mild chronic microvascular ischemic changes.  - Revised Cardiac Risk Index for Pre-Operative Risk: 0  - Patient is low cardiac riks for intermediate risk surgery.    #HTN  - monitor vitals inpatient  - if hypertensive, norvasc 5 mg    #Miscellaneous  - DVT prophylaxis: Lovenox 40 mg SC daily  - GI prophylaxis: Pantoprazole 40 mg daily  - Activity: Bedrest for now, per ortho after surgery  - Diet: DASH, NPO after midnight    Pending :  - OR today 10/20  - F/u Pre-Op CBC, CMP/BMP, PT/PTT/INR, Type & Screen x2  
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 Admitted to medicine for R hip fracture.     #Fall  #R Hip Fracture  - Hip Xray showed right femoral intertrochanteric displaced comminuted fracture.   - Evaluated by ortho in ED, continue to follow  - s/p R ORIF 10/20  - Pain control : Tylenol 650q6 PRN for now  - PT/OT to follow, WBAT, OOB to chair  - incentive spirometry  - DVT prophylaxis  - PT/OT  - physiatry consulted: potential candidate for 4a rehab    #HTN  - monitor vitals inpatient  - BP stable    #Miscellaneous  - DVT prophylaxis: Lovenox 40 mg SC daily  - GI prophylaxis: Pantoprazole 40 mg daily  - Activity: Ambulate with assistance, WBAT  - Diet: DASH    Pending: pain control, PT/OT    Dispo in 24 hours to [facility] or 4a  
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 Admitted to medicine for R hip fracture.     #Fall  #R Hip Fracture  - Hip Xray showed right femoral intertrochanteric displaced comminuted fracture.   - Evaluated by ortho in ED, continue to follow  - s/p R ORIF 10/20  - Pain control : Tylenol 650q6 PRN for now  - PT/OT to follow, WBAT, OOB to chair  - incentive spirometry  - DVT prophylaxis  - PT/OT  - physiatry consulted: potential candidate for 4a rehab    #HTN  - monitor vitals inpatient  - BP stable  - if hypertensive, add norvasc 5 mg    #Miscellaneous  - DVT prophylaxis: Lovenox 40 mg SC daily  - GI prophylaxis: Pantoprazole 40 mg daily  - Activity: Ambulate with assistance, WBAT  - Diet: DASH    Pending: pain control, PT/OT

## 2024-10-23 NOTE — DISCHARGE NOTE PROVIDER - HOSPITAL COURSE
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  She presents after an unwitnessed  fall at home, she doesn't remember how she fell but does not report any head trauma or loss of consciousness. Not on anticoagulation.   She reports ambulating independently very rarely uses the walker (confirmed with son)  Spoke with son, has not had any fall recently. Medrec consists of only Trazodone and Quietiapine. Patient does not take anything else at home  She does not endorse any urinary symptoms, no fevers or chills   In the ED:  - Vitals: T 36.3 HR 55 -72 SPO2 100% RR 18  - Labs: WBC 7.98 Hgb 12.9  Na 141 K 4.6 Cr 0.8  - Imaging: Xray: Right femoral intertrochanteric displaced comminuted fracture, varus deformity. Bilateral hip arthrosis. Lower lumbar spine degenerative changes. IVC filter. Right pelvic chain sutures. Bilateral femoral vascular calcification.    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 Admitted to medicine for R hip fracture. Hip Xray showed right femoral intertrochanteric displaced comminuted fracture. Evaluated by orthopedic surgery in ED. Pt underwent R ORIF surgery on 10/20 with no post-op complications. Patient received abx for 24 hours post op and pain is controlled. Patient has been working well with PT/OT. Labs and vitals are stable. Patient will be discharged to [facility]. Pt is HD stable and safe for discharge.     Plans of discharge, including medication reconciliation, diagnoses, and treatment plan, have been discussed with Dr. Stone on 10/23/2024 and discharge was approved.        Medicine attending -    Patient seen and examined this morning  lying comfortably in bed  awake and alert but confused which is her baseline    Vital Signs Last 24 Hrs  T(C): 36.9 (23 Oct 2024 08:01), Max: 37.4 (22 Oct 2024 15:22)  T(F): 98.4 (23 Oct 2024 08:01), Max: 99.4 (22 Oct 2024 15:22)  HR: 83 (23 Oct 2024 08:01) (71 - 85)  BP: 158/68 (23 Oct 2024 08:01) (127/80 - 158/68)  BP(mean): --  RR: 18 (23 Oct 2024 08:01) (18 - 18)  SpO2: 98% (23 Oct 2024 08:01) (98% - 99%)    Parameters below as of 23 Oct 2024 08:01  Patient On (Oxygen Delivery Method): room air    H&P:   78 y/o female PSH of LAR with 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  She presents after an unwitnessed  fall at home, she doesn't remember how she fell but does not report any head trauma or loss of consciousness. Not on anticoagulation.   She reports ambulating independently very rarely uses the walker (confirmed with son)  Spoke with son, has not had any fall recently. Medrec consists of only Trazodone and Quietiapine. Patient does not take anything else at home  She does not endorse any urinary symptoms, no fevers or chills   In the ED:  - Vitals: T 36.3 HR 55 -72 SPO2 100% RR 18  - Labs: WBC 7.98 Hgb 12.9  Na 141 K 4.6 Cr 0.8  - Imaging: Xray: Right femoral intertrochanteric displaced comminuted fracture, varus deformity. Bilateral hip arthrosis. Lower lumbar spine degenerative changes. IVC filter. Right pelvic chain sutures. Bilateral femoral vascular calcification.    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 Admitted to medicine for R hip fracture. Hip Xray showed right femoral intertrochanteric displaced comminuted fracture. Evaluated by orthopedic surgery in ED. Pt underwent R ORIF surgery on 10/20 with no post-op complications. Patient received abx for 24 hours post op and pain is controlled. Patient has been working well with PT/OT. Labs and vitals are stable. Patient will be discharged to [facility]. Pt is HD stable and safe for discharge.     Plans of discharge, including medication reconciliation, diagnoses, and treatment plan, have been discussed with Dr. Stone on 10/23/2024 and discharge was approved.   D/C today; d/c planning took over 75 min  d/c papers edited by me  discussed d/c plan and all instructions in detail

## 2024-10-23 NOTE — DISCHARGE NOTE PROVIDER - NSDCQMSTROKE_NEU_ALL_CORE
11/07/23                            Camilo Keith  [address]  [facility] [address]    To Whom It May Concern:    This is to certify Camilo Inna was evaluated with Ani Davies PA-C on 11/07/23 and is excused from school 11/8/23.             Electronically signed by:  Ani Davies PA-C  [facility]  [address]   [place] [address]  Dept Phone: [phone]        No

## 2024-10-23 NOTE — DISCHARGE NOTE PROVIDER - CARE PROVIDER_API CALL
Tylor Katz  Internal Medicine  [address] [place] [address]  Phone: [phone]  Fax: [phone]  Follow Up Time: 2 weeks

## 2024-10-23 NOTE — DISCHARGE NOTE PROVIDER - NSDCFUADDINST_GEN_ALL_CORE_FT
f/u as OP with ortho dr Wesley in 2 weeks at [address] [facility] office, call for appointment [phone]

## 2024-10-23 NOTE — DISCHARGE NOTE NURSING/CASE MANAGEMENT/SOCIAL WORK - PATIENT PORTAL LINK FT
You can access the FollowMyHealth Patient Portal offered by [facility] by registering at the following website: [email or web address]. By joining [facility]’s FollowMyHealth portal, you will also be able to view your health information using other applications (apps) compatible with our system.

## 2024-10-23 NOTE — PROGRESS NOTE ADULT - PROVIDER SPECIALTY LIST ADULT
Hospitalist
Internal Medicine
Internal Medicine
Orthopedics
Internal Medicine
Orthopedics
Orthopedics
Hospitalist
Orthopedics
Hospitalist
Internal Medicine

## 2024-10-23 NOTE — DISCHARGE NOTE NURSING/CASE MANAGEMENT/SOCIAL WORK - FINANCIAL ASSISTANCE
[facility] provides services at a reduced cost to those who are determined to be eligible through [facility]’s financial assistance program. Information regarding [facility]’s financial assistance program can be found by going to [email or web address] or by calling [phone].

## 2024-10-23 NOTE — DISCHARGE NOTE PROVIDER - NSDCCPCAREPLAN_GEN_ALL_CORE_FT
PRINCIPAL DISCHARGE DIAGNOSIS  Diagnosis: Hip fracture  Assessment and Plan of Treatment: You came to the ER after a fall and was found to have a right hip fracture on imaging. You had surgery with orthopedic team to help fix the fracture. There were no complications with this procedure. You can take over the counter pain medications if you experience pain in this area. You will be discharged to subacute rehab to help build up your strength and get strong again. Please call [phone] or come to the emergency room if your hip pain worsens or you are not able to ambulate with your R leg.

## 2024-10-23 NOTE — DISCHARGE NOTE PROVIDER - NSDCFUADDAPPT_GEN_ALL_CORE_FT
APPTS ARE READY TO BE MADE: [X ] YES  Dr. Shana Wesley (orthopedic surgery)  Best Family or Patient Contact (if needed):  [phone]  Additional Information about above appointments (if needed):    1:   2:   3:     Other comments or requests:    APPTS ARE READY TO BE MADE: [X ] YES  Dr. Shana Wesley (orthopedic surgery)  Best Family or Patient Contact (if needed):  [phone]  Additional Information about above appointments (if needed):    1: f/u as OP with ortho dr Wesley in 2 weeks at [address] [place] [address] office, call for appointment [phone]  2:   3:     Other comments or requests:

## 2024-10-23 NOTE — DISCHARGE NOTE NURSING/CASE MANAGEMENT/SOCIAL WORK - NSDCVIVACCINE_GEN_ALL_CORE_FT
Tdap; 19-Oct-2024 13:58; Chuy Penn (LAURO); [employer]; [id number] (Exp. Date: 01-May-2026); IntraMuscular; Deltoid Right.; 0.5 milliLiter(s); VIS (VIS Published: 09-May-2013, VIS Presented: 19-Oct-2024);

## 2024-10-23 NOTE — DISCHARGE NOTE PROVIDER - NSDCMRMEDTOKEN_GEN_ALL_CORE_FT
QUEtiapine 25 mg oral tablet: 1 tab(s) orally once a day  traZODone 50 mg oral tablet: 0.5 orally once a day   acetaminophen 325 mg oral tablet: 2 tab(s) orally every 6 hours As needed Mild Pain (1 - 3), Moderate Pain (4 - 6), Severe Pain (7 - 10)  aspirin 81 mg oral tablet: 1 tab(s) orally 2 times a day for DVT prophylaxis post op  oxyCODONE 5 mg oral tablet: 1 tab(s) orally 3 times a day as needed for Moderate Pain (4 - 6)  QUEtiapine 25 mg oral tablet: 1 tab(s) orally once a day  traZODone 50 mg oral tablet: 0.5 orally once a day

## 2024-10-23 NOTE — PROGRESS NOTE ADULT - SUBJECTIVE AND OBJECTIVE BOX
CC: Fall (21 Oct 2024 10:12)    INTERVAL HPI/OVERNIGHT EVENTS:  POD#1. Patient seen and examined at bedside. Reports mild pain at surgical site. Pt is confused, which is at baseline so not good historian.        Vital Signs Last 24 Hrs  T(C): 36.8 (21 Oct 2024 07:10), Max: 36.9 (21 Oct 2024 00:01)  T(F): 98.2 (21 Oct 2024 07:10), Max: 98.5 (21 Oct 2024 00:01)  HR: 79 (21 Oct 2024 07:10) (72 - 79)  BP: 163/69 (21 Oct 2024 07:10) (135/75 - 163/69)  BP(mean): 99 (21 Oct 2024 00:01) (99 - 99)  RR: 18 (21 Oct 2024 07:10) (18 - 18)  SpO2: 98% (21 Oct 2024 07:10) (98% - 100%)    Parameters below as of 21 Oct 2024 07:10  Patient On (Oxygen Delivery Method): room air      PHYSICAL EXAM:  GENERAL: NAD, resting comfortably  HEAD:  Atraumatic, normocephalic  EYES: EOMI, PERRL  NECK: Supple, trachea midline, no JVD  HEART: Regular rate and rhythm, no m/r/g  LUNGS: Clear to auscultation bilaterally, no crackles, wheezing, or rhonchi  ABDOMEN: Soft, nontender, nondistended, +BS  EXTREMITIES: 2+ peripheral pulses bilaterally, right lower extremity is shortened and externally rotated.  NERVOUS SYSTEM:  Alert, no focal deficits  Skin: Warm and dry. No acute rash  Psychiatric: Cooperative and appropriate    I&O's Detail    20 Oct 2024 07:01  -  21 Oct 2024 07:00  --------------------------------------------------------  IN:    Oral Fluid: 240 mL  Total IN: 240 mL    OUT:    Voided (mL): 700 mL  Total OUT: 700 mL    Total NET: -460 mL                                    9.8    7.59  )-----------( 166      ( 21 Oct 2024 07:28 )             30.7     21 Oct 2024 07:28    139    |  106    |  12     ----------------------------<  120    4.2     |  24     |  0.8      Ca    9.1        21 Oct 2024 07:28  Mg     2.0       21 Oct 2024 07:28    TPro  5.7    /  Alb  3.4    /  TBili  0.2    /  DBili  x      /  AST  34     /  ALT  12     /  AlkPhos  79     21 Oct 2024 07:28    PT/INR - ( 19 Oct 2024 20:00 )   PT: 11.20 sec;   INR: 0.98 ratio         PTT - ( 19 Oct 2024 20:00 )  PTT:20.2 sec  CAPILLARY BLOOD GLUCOSE      POCT Blood Glucose.: 172 mg/dL (21 Oct 2024 11:28)  POCT Blood Glucose.: 125 mg/dL (21 Oct 2024 07:26)  POCT Blood Glucose.: 134 mg/dL (20 Oct 2024 20:50)  POCT Blood Glucose.: 171 mg/dL (20 Oct 2024 16:45)  POCT Blood Glucose.: 140 mg/dL (20 Oct 2024 12:07)    LIVER FUNCTIONS - ( 21 Oct 2024 07:28 )  Alb: 3.4 g/dL / Pro: 5.7 g/dL / ALK PHOS: 79 U/L / ALT: 12 U/L / AST: 34 U/L / GGT: x           Urinalysis Basic - ( 21 Oct 2024 07:28 )    Color: x / Appearance: x / SG: x / pH: x  Gluc: 120 mg/dL / Ketone: x  / Bili: x / Urobili: x   Blood: x / Protein: x / Nitrite: x   Leuk Esterase: x / RBC: x / WBC x   Sq Epi: x / Non Sq Epi: x / Bacteria: x        MEDICATIONS  (STANDING):  chlorhexidine 2% Cloths 1 Application(s) Topical <User Schedule>  enoxaparin Injectable 40 milliGRAM(s) SubCutaneous every 24 hours  influenza  Vaccine (HIGH DOSE) 0.5 milliLiter(s) IntraMuscular once  pantoprazole    Tablet 40 milliGRAM(s) Oral before breakfast  QUEtiapine 25 milliGRAM(s) Oral daily    MEDICATIONS  (PRN):  acetaminophen     Tablet .. 650 milliGRAM(s) Oral every 6 hours PRN Mild Pain (1 - 3), Moderate Pain (4 - 6), Severe Pain (7 - 10)  ondansetron Injectable 4 milliGRAM(s) IV Push once PRN Nausea and/or Vomiting  oxyCODONE    IR 5 milliGRAM(s) Oral four times a day PRN Moderate Pain (4 - 6)      RADIOLOGY & ADDITIONAL TESTS:

## 2024-10-25 NOTE — CHART NOTE - NSCHARTNOTEFT_GEN_A_CORE
left vm 10/24 - DK / Pt has pcp, will see orthopedic in rehab 10/25 - DK (pcp & Ortho referral)
PACU ANESTHESIA ADMISSION NOTE      Procedure: Antegrade intertrochanteric nailing of femur      Post op diagnosis:  Closed basicervical fracture of right femur        ____  Intubated  TV:______       Rate: ______      FiO2: ______    ___x_  Patent Airway    _x___  Full return of protective reflexes    __x__  Full recovery from anesthesia / back to baseline     Vitals:   T:   98        R:  14                BP: 157/85                 Sat:  98%                 P: 69      Mental Status:  _x___ Awake   __x___ Alert   _____ Drowsy   _____ Sedated    Nausea/Vomiting:  ___x_ NO  ______Yes,   See Post - Op Orders          Pain Scale (0-10):  __0___    Treatment: ____ None    __x__ See Post - Op/PCA Orders    Post - Operative Fluids:   ____ Oral   __x__ See Post - Op Orders    Plan: Discharge:   ____Home       __x___Floor     _____Critical Care    _____  Other:_________________    Comments: patient hemodynamically stable. Handoff endorsed to PACU RN. No anesthesia related issues present. To be transferred back to floor when criteria met

## 2024-11-05 DIAGNOSIS — M16.0 BILATERAL PRIMARY OSTEOARTHRITIS OF HIP: ICD-10-CM
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
## 2024-11-05 DIAGNOSIS — K57.90 DIVERTICULOSIS OF INTESTINE, PART UNSPECIFIED, WITHOUT PERFORATION OR ABSCESS WITHOUT BLEEDING: ICD-10-CM

## 2024-11-05 DIAGNOSIS — S72.001A FRACTURE OF UNSPECIFIED PART OF NECK OF RIGHT FEMUR, INITIAL ENCOUNTER FOR CLOSED FRACTURE: ICD-10-CM

## 2024-11-05 DIAGNOSIS — R63.6 UNDERWEIGHT: ICD-10-CM

## 2024-11-05 DIAGNOSIS — I10 ESSENTIAL (PRIMARY) HYPERTENSION: ICD-10-CM
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
## 2024-11-05 DIAGNOSIS — K21.9 GASTRO-ESOPHAGEAL REFLUX DISEASE WITHOUT ESOPHAGITIS: ICD-10-CM

## 2024-11-11 ENCOUNTER — APPOINTMENT (OUTPATIENT)
Dept: ORTHOPEDIC SURGERY | Facility: CLINIC | Age: 79
End: 2024-11-11

## 2024-11-11 ENCOUNTER — APPOINTMENT (OUTPATIENT)
Dept: ORTHOPEDIC SURGERY | Facility: CLINIC | Age: 79
End: 2024-11-11
Payer: MEDICARE

## 2024-11-11 DIAGNOSIS — S72.041A: ICD-10-CM

## 2024-11-11 PROCEDURE — 73502 X-RAY EXAM HIP UNI 2-3 VIEWS: CPT

## 2024-11-11 PROCEDURE — 99024 POSTOP FOLLOW-UP VISIT: CPT

## 2025-01-09 ENCOUNTER — APPOINTMENT (OUTPATIENT)
Dept: ORTHOPEDIC SURGERY | Facility: CLINIC | Age: 80
End: 2025-01-09

## 2025-02-19 NOTE — DISCHARGE NOTE PROVIDER - NSDCHHHOMEBOUNDOTHER_GEN_ALL_CORE_FT
Called and spoke with patient to convey results per      Please advise patient the COVID test is negative.  Urine does not have pus cells however if culture shows infection we will call patient        home with home assist

## 2025-03-13 NOTE — ASU DISCHARGE PLAN (ADULT/PEDIATRIC) - "IF YOU OR YOUR GUARDIAN/FAMILY IS A SMOKER, IT IS IMPORTANT FOR YOUR HEALTH TO STOP SMOKING. PLEASE BE AWARE THAT SECOND HAND SMOKE IS ALSO HARMFUL."
Identified pt with two pt identifiers (name and ). Reviewed chart in preparation for visit and have obtained necessary documentation.    Patricia Lockwood is a 79 y.o. female Follow-up (6 month follow up for adenomatous polyp of colon)  .    Vitals:    25 1004   BP: 135/65   BP Site: Left Upper Arm   Patient Position: Sitting   BP Cuff Size: Large Adult   Pulse: 50   Resp: 19   Temp: 97.8 °F (36.6 °C)   TempSrc: Oral   SpO2: 96%   Weight: 66 kg (145 lb 6.4 oz)   Height: 1.499 m (4' 11\")          1. Have you been to the ER, urgent care clinic since your last visit?  Hospitalized since your last visit?  no     2. Have you seen or consulted any other health care providers outside of the [facility] since your last visit?  Include any pap smears or colon screening.  no   Statement Selected

## 2025-03-31 NOTE — DISCHARGE NOTE PROVIDER - NSDCACTIVITY_GEN_ALL_CORE
Catarino aware to get updated CBC and creat on Wednesday 4/2 along with other labs for Lovenox dosing. Orders have been placed.    No heavy lifting/straining

## 2025-04-22 NOTE — PHYSICAL THERAPY INITIAL EVALUATION ADULT - MANUAL MUSCLE TESTING RESULTS, REHAB EVAL
NP     BLE 4+/5

## 2025-05-07 NOTE — ED PROVIDER NOTE - DISPOSITION TYPE
[facility]    POD #1    Subjective     Doing well this morning, had some nausea vomiting last night but none since last night.  Pain controlled, tolerating liquids well    Objective     Patient Vitals for the past 24 hrs:   Temp Pulse Resp BP SpO2   05/07/25 0826 98.1 °F (36.7 °C) 73 18 136/79 98 %   05/07/25 0551 -- 66 -- -- --   05/07/25 0400 -- 81 -- -- --   05/07/25 0355 98.1 °F (36.7 °C) 93 16 123/76 98 %   05/07/25 0148 -- 82 -- -- --   05/06/25 2332 98.1 °F (36.7 °C) 82 18 127/72 99 %   05/06/25 2147 -- 80 -- -- --   05/06/25 2000 -- 96 -- -- --   05/06/25 1939 98.1 °F (36.7 °C) 88 17 121/79 100 %   05/06/25 1800 -- 95 -- -- --   05/06/25 1400 -- 94 -- -- --   05/06/25 1330 -- 95 18 103/66 97 %   05/06/25 1215 98.1 °F (36.7 °C) 91 18 (!) 108/59 97 %   05/06/25 1205 -- 91 19 -- 94 %   05/06/25 1200 -- 92 27 (!) 109/58 95 %   05/06/25 1150 -- 94 21 -- 99 %   05/06/25 1145 98.2 °F (36.8 °C) 91 20 (!) 107/58 99 %   05/06/25 1135 -- 92 24 -- 95 %   05/06/25 1130 -- 95 14 (!) 111/56 94 %   05/06/25 1125 -- 94 21 -- 96 %   05/06/25 1120 -- 95 22 -- 99 %   05/06/25 1115 -- 92 23 (!) 115/55 99 %   05/06/25 1105 -- 92 24 -- 99 %   05/06/25 1100 -- 92 22 (!) 110/55 99 %   05/06/25 1055 -- 93 25 -- 96 %   05/06/25 1050 -- 97 23 (!) 115/55 97 %   05/06/25 1045 -- 97 19 (!) 120/59 98 %   05/06/25 1040 -- 100 20 (!) 88/46 96 %   05/06/25 1035 98 °F (36.7 °C) 95 20 94/61 96 %   05/06/25 1032 -- -- -- (!) 107/54 --         Intake/Output Summary (Last 24 hours) at 5/7/2025 0842  Last data filed at 5/7/2025 0710  Gross per 24 hour   Intake 1270 ml   Output 1300 ml   Net -30 ml        PE  Pulm - CTAB  CV - RRR  Abd -soft, nondistended, incisions clean dry intact    Labs  Lab Results   Component Value Date/Time     05/07/2025 04:03 AM    K 3.9 05/07/2025 04:03 AM     05/07/2025 04:03 AM    CO2 26 05/07/2025 04:03 AM    BUN 7 05/07/2025 04:03 AM    CREATININE 0.62 
[facility] at [facility]    To Whom It may Concern,    Please excuse Lizabeth Dan from work/school duties as they are recovering from surgery.  They are excused for 1-2 weeks as she is recovering.  Please let her have continuous access to drinking fluids while working.  No heavy lifting for 2 weeks over 20lbs .  Please excuse their absence.  If there are any questions or issues my office can be reached below.  Thank you for your consideration of my patient.                Rao Walsh M.D.  [address]  [place] [address] [phone]    
ADMIT